# Patient Record
Sex: MALE | Race: WHITE | NOT HISPANIC OR LATINO | Employment: OTHER | URBAN - METROPOLITAN AREA
[De-identification: names, ages, dates, MRNs, and addresses within clinical notes are randomized per-mention and may not be internally consistent; named-entity substitution may affect disease eponyms.]

---

## 2017-01-04 ENCOUNTER — DOCUMENTATION ONLY (OUTPATIENT)
Dept: REHABILITATION | Facility: OTHER | Age: 62
End: 2017-01-04

## 2017-01-04 NOTE — PROGRESS NOTES
DC FROM [facility]    Pt was treated one time on 11/8/16.  He did not return for any further follow up. He did not respond to outreach calls and did not return for any further follow up.  DC from [facility] after one time only visit and goals of PT not met.

## 2017-02-15 ENCOUNTER — TELEPHONE (OUTPATIENT)
Dept: INTERNAL MEDICINE | Facility: CLINIC | Age: 62
End: 2017-02-15

## 2017-02-15 DIAGNOSIS — Z12.5 SCREENING PSA (PROSTATE SPECIFIC ANTIGEN): ICD-10-CM

## 2017-02-15 DIAGNOSIS — Z00.00 ROUTINE GENERAL MEDICAL EXAMINATION AT A HEALTH CARE FACILITY: Primary | ICD-10-CM

## 2017-02-15 NOTE — TELEPHONE ENCOUNTER
----- Message from Krystyna Finnegan sent at 2/15/2017  1:36 PM CST -----  Contact: Self/[phone]  Pt would like to do labs before his visit. Please advise pt.

## 2017-02-17 ENCOUNTER — HOSPITAL ENCOUNTER (OUTPATIENT)
Dept: RADIOLOGY | Facility: HOSPITAL | Age: 62
Discharge: HOME | End: 2017-02-17
Attending: INTERNAL MEDICINE
Payer: COMMERCIAL

## 2017-02-17 ENCOUNTER — OFFICE VISIT (OUTPATIENT)
Dept: INTERNAL MEDICINE | Facility: CLINIC | Age: 62
End: 2017-02-17
Payer: COMMERCIAL

## 2017-02-17 VITALS
TEMPERATURE: 98 F | DIASTOLIC BLOOD PRESSURE: 80 MMHG | HEIGHT: 77 IN | WEIGHT: 242.5 LBS | HEART RATE: 100 BPM | BODY MASS INDEX: 28.63 KG/M2 | SYSTOLIC BLOOD PRESSURE: 108 MMHG

## 2017-02-17 DIAGNOSIS — R53.83 FATIGUE, UNSPECIFIED TYPE: ICD-10-CM

## 2017-02-17 DIAGNOSIS — R53.83 FATIGUE, UNSPECIFIED TYPE: Primary | ICD-10-CM

## 2017-02-17 DIAGNOSIS — E78.5 HYPERLIPIDEMIA, UNSPECIFIED HYPERLIPIDEMIA TYPE: ICD-10-CM

## 2017-02-17 DIAGNOSIS — F09 COGNITIVE DISORDER: ICD-10-CM

## 2017-02-17 DIAGNOSIS — R06.02 EXERTIONAL SHORTNESS OF BREATH: ICD-10-CM

## 2017-02-17 PROCEDURE — 99999 PR PBB SHADOW E&M-EST. PATIENT-LVL III: CPT | Mod: PBBFAC,,, | Performed by: INTERNAL MEDICINE

## 2017-02-17 PROCEDURE — 99214 OFFICE O/P EST MOD 30 MIN: CPT | Mod: S$GLB,,, | Performed by: INTERNAL MEDICINE

## 2017-02-17 PROCEDURE — 71020 XR CHEST PA AND LATERAL: CPT | Mod: TC,PO

## 2017-02-17 PROCEDURE — 71020 XR CHEST PA AND LATERAL: CPT | Mod: 26,,, | Performed by: RADIOLOGY

## 2017-02-17 RX ORDER — HYDROCODONE BITARTRATE AND ACETAMINOPHEN 7.5; 325 MG/1; MG/1
TABLET ORAL
Refills: 0 | Status: ON HOLD | COMMUNITY
Start: 2017-01-20 | End: 2017-07-13 | Stop reason: HOSPADM

## 2017-02-17 NOTE — PROGRESS NOTES
This office note has been dictated.  HISTORY OF PRESENT ILLNESS:  This is a 61-year-old male with dyslipidemia,   bipolar disorder with chronic depression and tobacco use disorder who is in   today indicating for the last 6 months, he has had increasing fatigue and   decreased exertional capacity.  No chest pain.  No cough.  No shortness of   breath at rest.  He denies any changes in his medications or changes in   activities.  No changes of a significant degree to his bowel habits though he   does have off and on constipation.  There is no melena or hematochezia.    CURRENT MEDICATIONS:  All noted and reviewed in the electronic medical record   medication list.    REVIEW OF SYSTEMS:  CONSTITUTIONAL:  No fever.  No chills.  Fatigue as noted.  He sleeps reasonably   well.  HEENT:  No hoarseness.  No dysphagia.  CARDIOVASCULAR:  Denies chest pain, palpitations, syncope or edema.  GASTROINTESTINAL:  Mild constipation.  No abdominal pain, nausea or vomiting.    No melena.  No hematochezia.  GENITOURINARY:  No change in the color or character of urine.  NEUROLOGIC:  No new focal neurologic symptomatologies.  PSYCHIATRIC:  He reports that his depression is reasonably stable at this time.    PAST MEDICAL HISTORY, PAST SURGICAL HISTORY, FAMILY AND SOCIAL HISTORY:  All   noted and reviewed in the electronic medical record history sections.    PHYSICAL EXAMINATION:  GENERAL:  Alert male in no acute distress, appropriately groomed.  VITAL SIGNS:  All noted and reviewed.  Blood pressure taken by this examiner is   108/80.  EYES:  Sclerae are white and nonicteric.  HEENT:  Mucous membranes are not pale.  NECK:  Supple.  No masses.  No thyromegaly.  LUNGS:  Clear to auscultation.  CARDIOVASCULAR:  Regular rate and rhythm.  There is no significant murmur.    Carotids are full bilaterally without bruits.  There is no peripheral extremity   edema.  There is no abdominal bruit.  GASTROINTESTINAL:  The abdomen is soft and benign.  No  masses.  No tenderness.    No organomegaly.  MENTAL STATUS:  Alert and oriented.  Affect and mood all appear to be generally   appropriate.    IMPRESSION:  1.  Six months of fatigue and decreased exertional capacity.  2.  Dyslipidemia, on pharmacologic therapy.  3.  Bipolar disorder, seems to be stable at this time.  4.  Lumbar radiculopathy.    PLAN:  1.  Update lab data to include a CBC, metabolic profile, lipid profile, PSA, TSH   and urinalysis.  2.  Chest x-ray.  3.  If all is reasonable and normal, we will schedule stress echocardiogram.      PB/HN  dd: 02/17/2017 14:38:40 (CST)  td: 02/18/2017 04:57:22 (CST)  Doc ID   #[id number]  Job ID #[id number]    CC:

## 2017-02-20 ENCOUNTER — TELEPHONE (OUTPATIENT)
Dept: INTERNAL MEDICINE | Facility: CLINIC | Age: 62
End: 2017-02-20

## 2017-02-20 DIAGNOSIS — Z11.59 NEED FOR HEPATITIS C SCREENING TEST: Primary | ICD-10-CM

## 2017-02-20 DIAGNOSIS — F31.81 BIPOLAR 2 DISORDER: ICD-10-CM

## 2017-02-20 NOTE — TELEPHONE ENCOUNTER
----- Message from Krystyna Finnegan sent at 2/20/2017 10:59 AM CST -----  Contact: Self/[phone]  Pt states that when he gets his blood work like to check levels of Depakote. Pt would also like to get screened for Hepatatis C.

## 2017-02-22 ENCOUNTER — LAB VISIT (OUTPATIENT)
Dept: LAB | Facility: HOSPITAL | Age: 62
End: 2017-02-22
Attending: INTERNAL MEDICINE
Payer: COMMERCIAL

## 2017-02-22 DIAGNOSIS — Z00.00 ROUTINE GENERAL MEDICAL EXAMINATION AT A HEALTH CARE FACILITY: ICD-10-CM

## 2017-02-22 DIAGNOSIS — Z12.5 SCREENING PSA (PROSTATE SPECIFIC ANTIGEN): ICD-10-CM

## 2017-02-22 DIAGNOSIS — F31.81 BIPOLAR 2 DISORDER: ICD-10-CM

## 2017-02-22 DIAGNOSIS — Z11.59 NEED FOR HEPATITIS C SCREENING TEST: ICD-10-CM

## 2017-02-22 LAB
ALBUMIN SERPL BCP-MCNC: 3.8 G/DL
ALP SERPL-CCNC: 72 U/L
ALT SERPL W/O P-5'-P-CCNC: 17 U/L
ANION GAP SERPL CALC-SCNC: 9 MMOL/L
AST SERPL-CCNC: 17 U/L
BASOPHILS # BLD AUTO: 0.02 K/UL
BASOPHILS NFR BLD: 0.3 %
BILIRUB SERPL-MCNC: 0.5 MG/DL
BUN SERPL-MCNC: 7 MG/DL
CALCIUM SERPL-MCNC: 9.7 MG/DL
CHLORIDE SERPL-SCNC: 95 MMOL/L
CHOLEST/HDLC SERPL: 2.8 {RATIO}
CO2 SERPL-SCNC: 29 MMOL/L
COMPLEXED PSA SERPL-MCNC: 0.52 NG/ML
CREAT SERPL-MCNC: 1.1 MG/DL
DIFFERENTIAL METHOD: ABNORMAL
EOSINOPHIL # BLD AUTO: 0.2 K/UL
EOSINOPHIL NFR BLD: 3.3 %
ERYTHROCYTE [DISTWIDTH] IN BLOOD BY AUTOMATED COUNT: 12.3 %
EST. GFR  (AFRICAN AMERICAN): >60 ML/MIN/1.73 M^2
EST. GFR  (NON AFRICAN AMERICAN): >60 ML/MIN/1.73 M^2
GLUCOSE SERPL-MCNC: 102 MG/DL
HCT VFR BLD AUTO: 46.2 %
HCV AB SERPL QL IA: NEGATIVE
HDL/CHOLESTEROL RATIO: 36.2 %
HDLC SERPL-MCNC: 127 MG/DL
HDLC SERPL-MCNC: 46 MG/DL
HGB BLD-MCNC: 15.7 G/DL
LDLC SERPL CALC-MCNC: 54.4 MG/DL
LYMPHOCYTES # BLD AUTO: 2 K/UL
LYMPHOCYTES NFR BLD: 29.9 %
MCH RBC QN AUTO: 32.8 PG
MCHC RBC AUTO-ENTMCNC: 34 %
MCV RBC AUTO: 97 FL
MONOCYTES # BLD AUTO: 0.5 K/UL
MONOCYTES NFR BLD: 8 %
NEUTROPHILS # BLD AUTO: 3.9 K/UL
NEUTROPHILS NFR BLD: 57.8 %
NONHDLC SERPL-MCNC: 81 MG/DL
PLATELET # BLD AUTO: 174 K/UL
PMV BLD AUTO: 10.1 FL
POTASSIUM SERPL-SCNC: 4.5 MMOL/L
PROT SERPL-MCNC: 7.5 G/DL
RBC # BLD AUTO: 4.78 M/UL
SODIUM SERPL-SCNC: 133 MMOL/L
T4 FREE SERPL-MCNC: 0.95 NG/DL
TRIGL SERPL-MCNC: 133 MG/DL
TSH SERPL DL<=0.005 MIU/L-ACNC: 4 UIU/ML
VALPROATE SERPL-MCNC: 61 UG/ML
WBC # BLD AUTO: 6.73 K/UL

## 2017-02-22 PROCEDURE — 84443 ASSAY THYROID STIM HORMONE: CPT

## 2017-02-22 PROCEDURE — 80053 COMPREHEN METABOLIC PANEL: CPT

## 2017-02-22 PROCEDURE — 80164 ASSAY DIPROPYLACETIC ACD TOT: CPT

## 2017-02-22 PROCEDURE — 84153 ASSAY OF PSA TOTAL: CPT

## 2017-02-22 PROCEDURE — 86803 HEPATITIS C AB TEST: CPT

## 2017-02-22 PROCEDURE — 36415 COLL VENOUS BLD VENIPUNCTURE: CPT

## 2017-02-22 PROCEDURE — 80061 LIPID PANEL: CPT

## 2017-02-22 PROCEDURE — 84439 ASSAY OF FREE THYROXINE: CPT

## 2017-02-22 PROCEDURE — 85025 COMPLETE CBC W/AUTO DIFF WBC: CPT

## 2017-03-13 ENCOUNTER — PATIENT MESSAGE (OUTPATIENT)
Dept: PSYCHIATRY | Facility: CLINIC | Age: 62
End: 2017-03-13

## 2017-03-27 ENCOUNTER — PATIENT MESSAGE (OUTPATIENT)
Dept: PSYCHIATRY | Facility: CLINIC | Age: 62
End: 2017-03-27

## 2017-03-27 ENCOUNTER — OFFICE VISIT (OUTPATIENT)
Dept: PSYCHIATRY | Facility: CLINIC | Age: 62
End: 2017-03-27
Payer: COMMERCIAL

## 2017-03-27 VITALS
DIASTOLIC BLOOD PRESSURE: 92 MMHG | SYSTOLIC BLOOD PRESSURE: 144 MMHG | HEIGHT: 77 IN | HEART RATE: 102 BPM | WEIGHT: 244 LBS | BODY MASS INDEX: 28.81 KG/M2

## 2017-03-27 DIAGNOSIS — F31.9 BIPOLAR 1 DISORDER: Primary | ICD-10-CM

## 2017-03-27 PROCEDURE — 1160F RVW MEDS BY RX/DR IN RCRD: CPT | Mod: S$GLB,,, | Performed by: PSYCHIATRY & NEUROLOGY

## 2017-03-27 PROCEDURE — 99214 OFFICE O/P EST MOD 30 MIN: CPT | Mod: S$GLB,,, | Performed by: PSYCHIATRY & NEUROLOGY

## 2017-03-27 PROCEDURE — 99999 PR PBB SHADOW E&M-EST. PATIENT-LVL II: CPT | Mod: PBBFAC,,, | Performed by: PSYCHIATRY & NEUROLOGY

## 2017-03-27 RX ORDER — HYDROXYZINE PAMOATE 50 MG/1
50 CAPSULE ORAL NIGHTLY
Qty: 30 CAPSULE | Refills: 3 | Status: SHIPPED | OUTPATIENT
Start: 2017-03-27 | End: 2017-06-14 | Stop reason: SDUPTHER

## 2017-03-27 RX ORDER — DIVALPROEX SODIUM 500 MG/1
TABLET, FILM COATED, EXTENDED RELEASE ORAL
Qty: 90 TABLET | Refills: 3 | Status: SHIPPED | OUTPATIENT
Start: 2017-03-27 | End: 2017-06-14 | Stop reason: SDUPTHER

## 2017-03-27 RX ORDER — DOXEPIN HYDROCHLORIDE 100 MG/1
CAPSULE ORAL
Qty: 30 CAPSULE | Refills: 3 | Status: SHIPPED | OUTPATIENT
Start: 2017-03-27 | End: 2017-06-14 | Stop reason: SDUPTHER

## 2017-03-27 RX ORDER — LURASIDONE HYDROCHLORIDE 80 MG/1
80 TABLET, FILM COATED ORAL DAILY
Qty: 30 TABLET | Refills: 3 | Status: SHIPPED | OUTPATIENT
Start: 2017-03-27 | End: 2017-06-14 | Stop reason: SDUPTHER

## 2017-03-27 NOTE — PROGRESS NOTES
ESTABLISHED OUTPATIENT VISIT   E/M LEVEL 4: [id number]    ENCOUNTER DATE: 3/27/2017  SITE: [facility]    HISTORY    CHIEF COMPLAINT   Mirza Morales is a 61 y.o. male who presents for follow up of bipolar d/o.    HPI     Leg pain causes some frustration.    Denies recent cognitive problems.    No seizure.    On exam: some intention tremor of hands    Recent alcohol: rare  Recent drug: no    Sleeping well.    ROS   Right Leg pain continues    PAST MEDICAL, FAMILY AND SOCIAL HISTORY: The patient's past medical, family and social history have been reviewed and updated as appropriate within the electronic medical record - see encounter notes.    PSYCHOTROPIC MEDICATIONS   Doxepin 100 mg at bedtime, Latuda 80 mg with dinner, Depakote 500 mg qam and 1000 mg at bedtime[prescribed for seizures], Hydroxyzine 50 mg at bedtime    Scheduled and PRN Medications     Current Outpatient Prescriptions:     atorvastatin (LIPITOR) 40 MG tablet, TAKE 1 TABLET (40 MG TOTAL) BY MOUTH ONCE DAILY., Disp: 30 tablet, Rfl: 6    divalproex (DEPAKOTE) 500 MG Tb24, Take 1 tablet (500mg) every morning and 2 tablets (1000mg) every night., Disp: 90 tablet, Rfl: 3    doxepin (SINEQUAN) 100 MG capsule, TAKE 1 CAPSULE (100 MG TOTAL) BY MOUTH EVERY EVENING., Disp: 30 capsule, Rfl: 3    hydrocodone-acetaminophen 7.5-325mg (NORCO) 7.5-325 mg per tablet, TAKE 1 TABLET EVERY 12 HOURS FOR 30 DAYS, Disp: , Rfl: 0    hydrOXYzine pamoate (VISTARIL) 50 MG Cap, Take 1 capsule (50 mg total) by mouth every evening., Disp: 30 capsule, Rfl: 3    lurasidone (LATUDA) 80 mg Tab tablet, Take 80 mg by mouth once daily. Take one tablet daily with dinner., Disp: 30 tablet, Rfl: 3    LABORATORY DATA  Lab Visit on 02/22/2017   Component Date Value Ref Range Status    WBC 02/22/2017 6.73  3.90 - 12.70 K/uL Final    RBC 02/22/2017 4.78  4.60 - 6.20 M/uL Final    Hemoglobin 02/22/2017 15.7  14.0 - 18.0 g/dL Final    Hematocrit 02/22/2017  46.2  40.0 - 54.0 % Final    MCV 02/22/2017 97  82 - 98 fL Final    MCH 02/22/2017 32.8* 27.0 - 31.0 pg Final    MCHC 02/22/2017 34.0  32.0 - 36.0 % Final    RDW 02/22/2017 12.3  11.5 - 14.5 % Final    Platelets 02/22/2017 174  150 - 350 K/uL Final    MPV 02/22/2017 10.1  9.2 - 12.9 fL Final    Gran # 02/22/2017 3.9  1.8 - 7.7 K/uL Final    Lymph # 02/22/2017 2.0  1.0 - 4.8 K/uL Final    Mono # 02/22/2017 0.5  0.3 - 1.0 K/uL Final    Eos # 02/22/2017 0.2  0.0 - 0.5 K/uL Final    Baso # 02/22/2017 0.02  0.00 - 0.20 K/uL Final    Gran% 02/22/2017 57.8  38.0 - 73.0 % Final    Lymph% 02/22/2017 29.9  18.0 - 48.0 % Final    Mono% 02/22/2017 8.0  4.0 - 15.0 % Final    Eosinophil% 02/22/2017 3.3  0.0 - 8.0 % Final    Basophil% 02/22/2017 0.3  0.0 - 1.9 % Final    Differential Method 02/22/2017 Automated   Final    Sodium 02/22/2017 133* 136 - 145 mmol/L Final    Potassium 02/22/2017 4.5  3.5 - 5.1 mmol/L Final    Chloride 02/22/2017 95  95 - 110 mmol/L Final    CO2 02/22/2017 29  23 - 29 mmol/L Final    Glucose 02/22/2017 102  70 - 110 mg/dL Final    BUN, Bld 02/22/2017 7  6 - 20 mg/dL Final    Creatinine 02/22/2017 1.1  0.5 - 1.4 mg/dL Final    Calcium 02/22/2017 9.7  8.7 - 10.5 mg/dL Final    Total Protein 02/22/2017 7.5  6.0 - 8.4 g/dL Final    Albumin 02/22/2017 3.8  3.5 - 5.2 g/dL Final    Total Bilirubin 02/22/2017 0.5  0.1 - 1.0 mg/dL Final    Comment: For infants and newborns, interpretation of results should be based  on gestational age, weight and in agreement with clinical  observations.  Premature Infant recommended reference ranges:  Up to 24 hours.............<8.0 mg/dL  Up to 48 hours............<12.0 mg/dL  3-5 days..................<15.0 mg/dL  6-29 days.................<15.0 mg/dL      Alkaline Phosphatase 02/22/2017 72  55 - 135 U/L Final    AST 02/22/2017 17  10 - 40 U/L Final    ALT 02/22/2017 17  10 - 44 U/L Final    Anion Gap 02/22/2017 9  8 - 16 mmol/L Final    eGFR  if  02/22/2017 >60.0  >60 mL/min/1.73 m^2 Final    eGFR if non African American 02/22/2017 >60.0  >60 mL/min/1.73 m^2 Final    Comment: Calculation used to obtain the estimated glomerular filtration  rate (eGFR) is the CKD-EPI equation. Since race is unknown   in our information system, the eGFR values for   -American and Non--American patients are given   for each creatinine result.      Cholesterol 02/22/2017 127  120 - 199 mg/dL Final    Comment: The National Cholesterol Education Program (NCEP) has set the  following guidelines (reference ranges) for Cholesterol:  Optimal.....................<200 mg/dL  Borderline High.............200-239 mg/dL  High........................> or = 240 mg/dL      Triglycerides 02/22/2017 133  30 - 150 mg/dL Final    Comment: The National Cholesterol Education Program (NCEP) has set the  following guidelines (reference values) for triglycerides:  Normal......................<150 mg/dL  Borderline High.............150-199 mg/dL  High........................200-499 mg/dL      HDL 02/22/2017 46  40 - 75 mg/dL Final    Comment: The National Cholesterol Education Program (NCEP) has set the  following guidelines (reference values) for HDL Cholesterol:  Low...............<40 mg/dL  Optimal...........>60 mg/dL      LDL Cholesterol 02/22/2017 54.4* 63.0 - 159.0 mg/dL Final    Comment: The National Cholesterol Education Program (NCEP) has set the  following guidelines (reference values) for LDL Cholesterol:  Optimal.......................<130 mg/dL  Borderline High...............130-159 mg/dL  High..........................160-189 mg/dL  Very High.....................>190 mg/dL      HDL/Chol Ratio 02/22/2017 36.2  20.0 - 50.0 % Final    Total Cholesterol/HDL Ratio 02/22/2017 2.8  2.0 - 5.0 Final    Non-HDL Cholesterol 02/22/2017 81  mg/dL Final    Comment: Risk category and Non-HDL cholesterol goals:  Coronary heart disease (CHD)or equivalent (10-year  risk of CHD >20%):  Non-HDL cholesterol goal     <130 mg/dL  Two or more CHD risk factors and 10-year risk of CHD <= 20%:  Non-HDL cholesterol goal     <160 mg/dL  0 to 1 CHD risk factor:  Non-HDL cholesterol goal     <190 mg/dL      TSH 02/22/2017 4.004* 0.400 - 4.000 uIU/mL Final    PSA, SCREEN 02/22/2017 0.52  0.00 - 4.00 ng/mL Final    Comment: PSA Expected levels:  Hormonal Therapy: <0.05 ng/ml  Prostatectomy: <0.01 ng/ml  Radiation Therapy: <1.00 ng/ml      Hepatitis C Ab 02/22/2017 Negative   Final    Valproic Acid Lvl 02/22/2017 61.0  50.0 - 100.0 ug/mL Final    Comment: Valproic Acid (ug/ml)  Toxic:   >100.0 ug/ml      Free T4 02/22/2017 0.95  0.71 - 1.51 ng/dL Final   Lab Visit on 02/22/2017   Component Date Value Ref Range Status    Specimen UA 02/22/2017 Urine, Unspecified   Final    Color, UA 02/22/2017 Yellow  Yellow, Straw, Emily Final    Appearance, UA 02/22/2017 Hazy* Clear Final    pH, UA 02/22/2017 7.0  5.0 - 8.0 Final    Specific Gravity, UA 02/22/2017 1.010  1.005 - 1.030 Final    Protein, UA 02/22/2017 Negative  Negative Final    Comment: Recommend a 24 hour urine protein or a urine   protein/creatinine ratio if globulin induced proteinuria is  clinically suspected.      Glucose, UA 02/22/2017 Negative  Negative Final    Ketones, UA 02/22/2017 Negative  Negative Final    Bilirubin (UA) 02/22/2017 Negative  Negative Final    Occult Blood UA 02/22/2017 Negative  Negative Final    Nitrite, UA 02/22/2017 Negative  Negative Final    Urobilinogen, UA 02/22/2017 Negative  <2.0 EU/dL Final    Leukocytes, UA 02/22/2017 Negative  Negative Final       EXAMINATION    /100,     CONSTITUTIONAL  General Appearance: well nourished    MUSCULOSKELETAL  Muscle Strength and Tone: normal strength and tone  Abnormal Involuntary Movements: no abnormal movement noted  Gait and Station: normal gait    PSYCHIATRIC   Level of Consciousness: alert  Orientation: grossly intact  Grooming: well  groomed  Psychomotor Behavior: no restlessness/agitation  Speech: normal in rate, rhythm and volume  Language: normal vocabulary  Mood: stable  Affect: full range and appropriate  Thought Process: logical and goal directed  Associations: intact associations  Thought Content: no SI/HI  Memory: grossly intact  Attention: intact to content of interview  Fund of Knowledge: appears adequate  Insight: good  Judgement: good    MEDICAL DECISION MAKING    DIAGNOSES  Bipolar d/o    PROBLEM LIST AND MANAGEMENT PLANS    - continue Doxepin, Depakote ER, Latuda, Hydroxyzine as above  - monitor tremor  - rtc 3 months    Time with patient: 20 min      Ambrosio Loco

## 2017-03-27 NOTE — MR AVS SNAPSHOT
[facility] - Psychiatry  [address]  [place] [address]  Phone: [phone]  Fax: [phone]                  Mirza Morales   3/27/2017 4:30 PM   Office Visit    Description:  Male : 1955   Provider:  Ambrosio Loco MD   Department:  [facility] - Psychiatry           Diagnoses this Visit        Comments    Bipolar 1 disorder    -  Primary            To Do List           Goals (5 Years of Data)     None       These Medications        Disp Refills Start End    lurasidone (LATUDA) 80 mg Tab tablet 30 tablet 3 3/27/2017 3/27/2018    Take 80 mg by mouth once daily. Take one tablet daily with dinner. - Oral    Pharmacy: [facility]/pharmacy [address] [place] [address] [address] Ph #: [phone]       hydrOXYzine pamoate (VISTARIL) 50 MG Cap 30 capsule 3 3/27/2017     Take 1 capsule (50 mg total) by mouth every evening. - Oral    Pharmacy: [facility]/pharmacy #[address] [place] [address] [place] [address]. Ph #: [phone]       doxepin (SINEQUAN) 100 MG capsule 30 capsule 3 3/27/2017     TAKE 1 CAPSULE (100 MG TOTAL) BY MOUTH EVERY EVENING.    Pharmacy: [facility]/pharmacy #[address] [place] [address] [place] [address] [address]. Ph #: [phone]       divalproex (DEPAKOTE) 500 MG Tb24 90 tablet 3 3/27/2017     Take 1 tablet (500mg) every morning and 2 tablets (1000mg) every night.    Pharmacy: [facility]/pharmacy #[address] [place] [address] [address] Ph #: [phone]         [facility] On [facility]     [facility] On Call Nurse Care Line -  Assistance  Registered nurses in the [facility] provide clinical advisement, health education, appointment booking, and other advisory services.  Call for this free service at [phone].             Medications           Message regarding Medications     Verify the changes and/or additions to your medication regime listed below are the same as discussed with your clinician today.  If any of these changes or additions are incorrect, please notify your  "healthcare provider.             Verify that the below list of medications is an accurate representation of the medications you are currently taking.  If none reported, the list may be blank. If incorrect, please contact your healthcare provider. Carry this list with you in case of emergency.           Current Medications     atorvastatin (LIPITOR) 40 MG tablet TAKE 1 TABLET (40 MG TOTAL) BY MOUTH ONCE DAILY.    divalproex (DEPAKOTE) 500 MG Tb24 Take 1 tablet (500mg) every morning and 2 tablets (1000mg) every night.    doxepin (SINEQUAN) 100 MG capsule TAKE 1 CAPSULE (100 MG TOTAL) BY MOUTH EVERY EVENING.    hydrocodone-acetaminophen 7.5-325mg (NORCO) 7.5-325 mg per tablet TAKE 1 TABLET EVERY 12 HOURS FOR 30 DAYS    hydrOXYzine pamoate (VISTARIL) 50 MG Cap Take 1 capsule (50 mg total) by mouth every evening.    lurasidone (LATUDA) 80 mg Tab tablet Take 80 mg by mouth once daily. Take one tablet daily with dinner.           Clinical Reference Information           Your Vitals Were     BP Pulse Height Weight BMI    152/100 107 6' 5" (1.956 m) 110.7 kg (244 lb) 28.93 kg/m2      Blood Pressure          Most Recent Value    BP  (!)  152/100      Allergies as of 3/27/2017     Gabapentin    Nardil [Phenelzine]    Penicillins    Pneumococcal 23-jett Ps Vaccine    Lamictal [Lamotrigine]      Immunizations Administered on Date of Encounter - 3/27/2017     None      Smoking Cessation     If you would like to quit smoking:   You may be eligible for free services if you are a [place] resident and started smoking cigarettes before September 1, 1988.  Call the Smoking Cessation Trust ([facility]) toll free at (150) 012-7454 or (510) 665-7751.   Call 4-947-QUIT-NOW if you do not meet the above criteria.            Language Assistance Services     ATTENTION: Language assistance services are available, free of charge. Please call [phone].      ATENCIÓN: Si habla yancyañol, tiene a chen disposición servicios gratuitos de asistencia " lingüística. Yoselin al [phone].     MARTI Ý: N?u b?n nói Ti?ng Vi?t, có các d?ch v? h? tr? ngôn ng? mi?n phí dành cho b?n. G?i s? [phone].         [facility] complies with applicable Federal civil rights laws and does not discriminate on the basis of race, color, national origin, age, disability, or sex.

## 2017-04-04 RX ORDER — ATORVASTATIN CALCIUM 40 MG/1
TABLET, FILM COATED ORAL
Qty: 30 TABLET | Refills: 6 | Status: SHIPPED | OUTPATIENT
Start: 2017-04-04 | End: 2018-01-26 | Stop reason: SDUPTHER

## 2017-04-20 ENCOUNTER — PATIENT MESSAGE (OUTPATIENT)
Dept: PSYCHIATRY | Facility: CLINIC | Age: 62
End: 2017-04-20

## 2017-06-14 ENCOUNTER — OFFICE VISIT (OUTPATIENT)
Dept: PSYCHIATRY | Facility: CLINIC | Age: 62
End: 2017-06-14
Payer: COMMERCIAL

## 2017-06-14 VITALS
DIASTOLIC BLOOD PRESSURE: 89 MMHG | BODY MASS INDEX: 29.16 KG/M2 | HEART RATE: 111 BPM | HEIGHT: 77 IN | WEIGHT: 247 LBS | SYSTOLIC BLOOD PRESSURE: 139 MMHG

## 2017-06-14 DIAGNOSIS — F31.9 BIPOLAR 1 DISORDER: Primary | ICD-10-CM

## 2017-06-14 PROCEDURE — 99213 OFFICE O/P EST LOW 20 MIN: CPT | Mod: S$GLB,,, | Performed by: PSYCHIATRY & NEUROLOGY

## 2017-06-14 PROCEDURE — 99999 PR PBB SHADOW E&M-EST. PATIENT-LVL II: CPT | Mod: PBBFAC,,, | Performed by: PSYCHIATRY & NEUROLOGY

## 2017-06-14 RX ORDER — HYDROXYZINE PAMOATE 50 MG/1
50 CAPSULE ORAL NIGHTLY
Qty: 30 CAPSULE | Refills: 3 | Status: ON HOLD | OUTPATIENT
Start: 2017-06-14 | End: 2017-07-13 | Stop reason: HOSPADM

## 2017-06-14 RX ORDER — LURASIDONE HYDROCHLORIDE 80 MG/1
80 TABLET, FILM COATED ORAL DAILY
Qty: 30 TABLET | Refills: 3 | Status: SHIPPED | OUTPATIENT
Start: 2017-06-14 | End: 2018-05-24

## 2017-06-14 RX ORDER — DIVALPROEX SODIUM 500 MG/1
TABLET, FILM COATED, EXTENDED RELEASE ORAL
Qty: 90 TABLET | Refills: 3 | Status: SHIPPED | OUTPATIENT
Start: 2017-06-14 | End: 2017-09-12 | Stop reason: SDUPTHER

## 2017-06-14 RX ORDER — DOXEPIN HYDROCHLORIDE 100 MG/1
CAPSULE ORAL
Qty: 30 CAPSULE | Refills: 3 | Status: SHIPPED | OUTPATIENT
Start: 2017-06-14 | End: 2017-09-12 | Stop reason: SDUPTHER

## 2017-06-14 NOTE — PROGRESS NOTES
ESTABLISHED OUTPATIENT VISIT   E/M LEVEL 4: [id number]    ENCOUNTER DATE: 6/14/2017  SITE: [facility], [facility]    HISTORY    CHIEF COMPLAINT   Mirza Morales is a 62 y.o. male who presents for follow up of bipolar d/o.    HPI     Leg pain causes some frustration/depression, has difficulty walking.    Denies recent cognitive problems.    No seizure since previous.    On exam: some intention tremor of hands again noted[appears unchanged]    Enjoys reading, watches TV, spends time on the computer.    Recent alcohol: rare  Recent drug: no    Sleeping well.    ROS   Right Leg pain continues    PAST MEDICAL, FAMILY AND SOCIAL HISTORY: The patient's past medical, family and social history have been reviewed and updated as appropriate within the electronic medical record - see encounter notes.    PSYCHOTROPIC MEDICATIONS   Doxepin 100 mg at bedtime, Latuda 80 mg with dinner, Depakote 500 mg qam and 1000 mg at bedtime[prescribed for seizures], Hydroxyzine 50 mg at bedtime    Scheduled and PRN Medications     Current Outpatient Prescriptions:     atorvastatin (LIPITOR) 40 MG tablet, TAKE 1 TABLET (40 MG TOTAL) BY MOUTH ONCE DAILY., Disp: 30 tablet, Rfl: 6    divalproex (DEPAKOTE) 500 MG Tb24, Take 1 tablet (500mg) every morning and 2 tablets (1000mg) every night., Disp: 90 tablet, Rfl: 3    doxepin (SINEQUAN) 100 MG capsule, TAKE 1 CAPSULE (100 MG TOTAL) BY MOUTH EVERY EVENING., Disp: 30 capsule, Rfl: 3    hydrocodone-acetaminophen 7.5-325mg (NORCO) 7.5-325 mg per tablet, TAKE 1 TABLET EVERY 12 HOURS FOR 30 DAYS, Disp: , Rfl: 0    hydrOXYzine pamoate (VISTARIL) 50 MG Cap, Take 1 capsule (50 mg total) by mouth every evening., Disp: 30 capsule, Rfl: 3    lurasidone (LATUDA) 80 mg Tab tablet, Take 80 mg by mouth once daily. Take one tablet daily with dinner., Disp: 30 tablet, Rfl: 3    LABORATORY DATA  No visits with results within 3 Month(s) from this visit.   Latest known visit with results is:   Lab  Visit on 02/22/2017   Component Date Value Ref Range Status    WBC 02/22/2017 6.73  3.90 - 12.70 K/uL Final    RBC 02/22/2017 4.78  4.60 - 6.20 M/uL Final    Hemoglobin 02/22/2017 15.7  14.0 - 18.0 g/dL Final    Hematocrit 02/22/2017 46.2  40.0 - 54.0 % Final    MCV 02/22/2017 97  82 - 98 fL Final    MCH 02/22/2017 32.8* 27.0 - 31.0 pg Final    MCHC 02/22/2017 34.0  32.0 - 36.0 % Final    RDW 02/22/2017 12.3  11.5 - 14.5 % Final    Platelets 02/22/2017 174  150 - 350 K/uL Final    MPV 02/22/2017 10.1  9.2 - 12.9 fL Final    Gran # 02/22/2017 3.9  1.8 - 7.7 K/uL Final    Lymph # 02/22/2017 2.0  1.0 - 4.8 K/uL Final    Mono # 02/22/2017 0.5  0.3 - 1.0 K/uL Final    Eos # 02/22/2017 0.2  0.0 - 0.5 K/uL Final    Baso # 02/22/2017 0.02  0.00 - 0.20 K/uL Final    Gran% 02/22/2017 57.8  38.0 - 73.0 % Final    Lymph% 02/22/2017 29.9  18.0 - 48.0 % Final    Mono% 02/22/2017 8.0  4.0 - 15.0 % Final    Eosinophil% 02/22/2017 3.3  0.0 - 8.0 % Final    Basophil% 02/22/2017 0.3  0.0 - 1.9 % Final    Differential Method 02/22/2017 Automated   Final    Sodium 02/22/2017 133* 136 - 145 mmol/L Final    Potassium 02/22/2017 4.5  3.5 - 5.1 mmol/L Final    Chloride 02/22/2017 95  95 - 110 mmol/L Final    CO2 02/22/2017 29  23 - 29 mmol/L Final    Glucose 02/22/2017 102  70 - 110 mg/dL Final    BUN, Bld 02/22/2017 7  6 - 20 mg/dL Final    Creatinine 02/22/2017 1.1  0.5 - 1.4 mg/dL Final    Calcium 02/22/2017 9.7  8.7 - 10.5 mg/dL Final    Total Protein 02/22/2017 7.5  6.0 - 8.4 g/dL Final    Albumin 02/22/2017 3.8  3.5 - 5.2 g/dL Final    Total Bilirubin 02/22/2017 0.5  0.1 - 1.0 mg/dL Final    Comment: For infants and newborns, interpretation of results should be based  on gestational age, weight and in agreement with clinical  observations.  Premature Infant recommended reference ranges:  Up to 24 hours.............<8.0 mg/dL  Up to 48 hours............<12.0 mg/dL  3-5 days..................<15.0  mg/dL  6-29 days.................<15.0 mg/dL      Alkaline Phosphatase 02/22/2017 72  55 - 135 U/L Final    AST 02/22/2017 17  10 - 40 U/L Final    ALT 02/22/2017 17  10 - 44 U/L Final    Anion Gap 02/22/2017 9  8 - 16 mmol/L Final    eGFR if African American 02/22/2017 >60.0  >60 mL/min/1.73 m^2 Final    eGFR if non African American 02/22/2017 >60.0  >60 mL/min/1.73 m^2 Final    Comment: Calculation used to obtain the estimated glomerular filtration  rate (eGFR) is the CKD-EPI equation. Since race is unknown   in our information system, the eGFR values for   -American and Non--American patients are given   for each creatinine result.      Cholesterol 02/22/2017 127  120 - 199 mg/dL Final    Comment: The National Cholesterol Education Program (NCEP) has set the  following guidelines (reference ranges) for Cholesterol:  Optimal.....................<200 mg/dL  Borderline High.............200-239 mg/dL  High........................> or = 240 mg/dL      Triglycerides 02/22/2017 133  30 - 150 mg/dL Final    Comment: The National Cholesterol Education Program (NCEP) has set the  following guidelines (reference values) for triglycerides:  Normal......................<150 mg/dL  Borderline High.............150-199 mg/dL  High........................200-499 mg/dL      HDL 02/22/2017 46  40 - 75 mg/dL Final    Comment: The National Cholesterol Education Program (NCEP) has set the  following guidelines (reference values) for HDL Cholesterol:  Low...............<40 mg/dL  Optimal...........>60 mg/dL      LDL Cholesterol 02/22/2017 54.4* 63.0 - 159.0 mg/dL Final    Comment: The National Cholesterol Education Program (NCEP) has set the  following guidelines (reference values) for LDL Cholesterol:  Optimal.......................<130 mg/dL  Borderline High...............130-159 mg/dL  High..........................160-189 mg/dL  Very High.....................>190 mg/dL      HDL/Chol Ratio 02/22/2017 36.2   20.0 - 50.0 % Final    Total Cholesterol/HDL Ratio 02/22/2017 2.8  2.0 - 5.0 Final    Non-HDL Cholesterol 02/22/2017 81  mg/dL Final    Comment: Risk category and Non-HDL cholesterol goals:  Coronary heart disease (CHD)or equivalent (10-year risk of CHD >20%):  Non-HDL cholesterol goal     <130 mg/dL  Two or more CHD risk factors and 10-year risk of CHD <= 20%:  Non-HDL cholesterol goal     <160 mg/dL  0 to 1 CHD risk factor:  Non-HDL cholesterol goal     <190 mg/dL      TSH 02/22/2017 4.004* 0.400 - 4.000 uIU/mL Final    PSA, SCREEN 02/22/2017 0.52  0.00 - 4.00 ng/mL Final    Comment: PSA Expected levels:  Hormonal Therapy: <0.05 ng/ml  Prostatectomy: <0.01 ng/ml  Radiation Therapy: <1.00 ng/ml      Hepatitis C Ab 02/22/2017 Negative   Final    Valproic Acid Lvl 02/22/2017 61.0  50.0 - 100.0 ug/mL Final    Comment: Valproic Acid (ug/ml)  Toxic:   >100.0 ug/ml      Free T4 02/22/2017 0.95  0.71 - 1.51 ng/dL Final       EXAMINATION    Weight 247 lb's  /89, [walking is painful for the pt.]      CONSTITUTIONAL  General Appearance: well nourished    MUSCULOSKELETAL  Muscle Strength and Tone: normal strength and tone  Abnormal Involuntary Movements: no abnormal movement noted  Gait and Station: normal gait    PSYCHIATRIC   Level of Consciousness: alert  Orientation: grossly intact  Grooming: well groomed  Psychomotor Behavior: no restlessness/agitation  Speech: normal in rate, rhythm and volume  Language: normal vocabulary  Mood: stable  Affect: full range and appropriate  Thought Process: logical and goal directed  Associations: intact associations  Thought Content: no SI/HI  Memory: grossly intact  Attention: intact to content of interview  Fund of Knowledge: appears adequate  Insight: good  Judgement: good    MEDICAL DECISION MAKING    DIAGNOSES  Bipolar d/o    PROBLEM LIST AND MANAGEMENT PLANS    - continue Doxepin, Depakote ER, Latuda, Hydroxyzine as above  - obtain Doxepin level  - monitor  tremor  - rtc 3 months    Time with patient: 20 min      Ambrosio Loco

## 2017-07-09 ENCOUNTER — PATIENT MESSAGE (OUTPATIENT)
Dept: PSYCHIATRY | Facility: CLINIC | Age: 62
End: 2017-07-09

## 2017-07-10 ENCOUNTER — PATIENT MESSAGE (OUTPATIENT)
Dept: PSYCHIATRY | Facility: CLINIC | Age: 62
End: 2017-07-10

## 2017-07-11 ENCOUNTER — PATIENT MESSAGE (OUTPATIENT)
Dept: PSYCHIATRY | Facility: CLINIC | Age: 62
End: 2017-07-11

## 2017-07-11 ENCOUNTER — OFFICE VISIT (OUTPATIENT)
Dept: PSYCHIATRY | Facility: CLINIC | Age: 62
End: 2017-07-11
Payer: COMMERCIAL

## 2017-07-11 VITALS
BODY MASS INDEX: 28.22 KG/M2 | HEIGHT: 77 IN | HEART RATE: 133 BPM | SYSTOLIC BLOOD PRESSURE: 126 MMHG | DIASTOLIC BLOOD PRESSURE: 90 MMHG | WEIGHT: 239 LBS

## 2017-07-11 DIAGNOSIS — F31.9 BIPOLAR 1 DISORDER: Primary | ICD-10-CM

## 2017-07-11 PROCEDURE — 99213 OFFICE O/P EST LOW 20 MIN: CPT | Mod: S$GLB,,, | Performed by: PSYCHIATRY & NEUROLOGY

## 2017-07-11 PROCEDURE — 99999 PR PBB SHADOW E&M-EST. PATIENT-LVL II: CPT | Mod: PBBFAC,,, | Performed by: PSYCHIATRY & NEUROLOGY

## 2017-07-11 NOTE — PROGRESS NOTES
ESTABLISHED OUTPATIENT VISIT   E/M LEVEL 3: [id number]    ENCOUNTER DATE: 7/11/2017  SITE: [facility], [facility]    HISTORY    CHIEF COMPLAINT   Mirza Morales is a 62 y.o. male who presents for follow up of bipolar d/o.    HPI     Wife present during today's visit.    Pt. Reports stress due to family currently staying with pt. And wife. Wife confirms this.    Has again begun drinking alcohol, at least since around July 4th. Pt. States, that for the past few days he has decreased alcohol use. Has been smoking marijuana every few days.  Pt. Has been sleeping poorly.    ROS   Right Leg pain continues    PAST MEDICAL, FAMILY AND SOCIAL HISTORY: The patient's past medical, family and social history have been reviewed and updated as appropriate within the electronic medical record - see encounter notes    PSYCHOTROPIC MEDICATIONS   Doxepin 100 mg at bedtime, Latuda 80 mg with dinner, Depakote 500 mg qam and 1000 mg at bedtime[prescribed for seizures], Hydroxyzine 50 mg at bedtime    Scheduled and PRN Medications     Current Outpatient Prescriptions:     atorvastatin (LIPITOR) 40 MG tablet, TAKE 1 TABLET (40 MG TOTAL) BY MOUTH ONCE DAILY., Disp: 30 tablet, Rfl: 6    divalproex ER (DEPAKOTE) 500 MG Tb24, Take 1 tablet (500mg) every morning and 2 tablets (1000mg) every night., Disp: 90 tablet, Rfl: 3    doxepin (SINEQUAN) 100 MG capsule, TAKE 1 CAPSULE (100 MG TOTAL) BY MOUTH EVERY EVENING., Disp: 30 capsule, Rfl: 3    hydrocodone-acetaminophen 7.5-325mg (NORCO) 7.5-325 mg per tablet, TAKE 1 TABLET EVERY 12 HOURS FOR 30 DAYS, Disp: , Rfl: 0    hydrOXYzine pamoate (VISTARIL) 50 MG Cap, Take 1 capsule (50 mg total) by mouth every evening., Disp: 30 capsule, Rfl: 3    lurasidone (LATUDA) 80 mg Tab tablet, Take 80 mg by mouth once daily. Take one tablet daily with dinner., Disp: 30 tablet, Rfl: 3    LABORATORY DATA  No visits with results within 3 Month(s) from this visit.   Latest known visit with  results is:   Lab Visit on 02/22/2017   Component Date Value Ref Range Status    WBC 02/22/2017 6.73  3.90 - 12.70 K/uL Final    RBC 02/22/2017 4.78  4.60 - 6.20 M/uL Final    Hemoglobin 02/22/2017 15.7  14.0 - 18.0 g/dL Final    Hematocrit 02/22/2017 46.2  40.0 - 54.0 % Final    MCV 02/22/2017 97  82 - 98 fL Final    MCH 02/22/2017 32.8* 27.0 - 31.0 pg Final    MCHC 02/22/2017 34.0  32.0 - 36.0 % Final    RDW 02/22/2017 12.3  11.5 - 14.5 % Final    Platelets 02/22/2017 174  150 - 350 K/uL Final    MPV 02/22/2017 10.1  9.2 - 12.9 fL Final    Gran # 02/22/2017 3.9  1.8 - 7.7 K/uL Final    Lymph # 02/22/2017 2.0  1.0 - 4.8 K/uL Final    Mono # 02/22/2017 0.5  0.3 - 1.0 K/uL Final    Eos # 02/22/2017 0.2  0.0 - 0.5 K/uL Final    Baso # 02/22/2017 0.02  0.00 - 0.20 K/uL Final    Gran% 02/22/2017 57.8  38.0 - 73.0 % Final    Lymph% 02/22/2017 29.9  18.0 - 48.0 % Final    Mono% 02/22/2017 8.0  4.0 - 15.0 % Final    Eosinophil% 02/22/2017 3.3  0.0 - 8.0 % Final    Basophil% 02/22/2017 0.3  0.0 - 1.9 % Final    Differential Method 02/22/2017 Automated   Final    Sodium 02/22/2017 133* 136 - 145 mmol/L Final    Potassium 02/22/2017 4.5  3.5 - 5.1 mmol/L Final    Chloride 02/22/2017 95  95 - 110 mmol/L Final    CO2 02/22/2017 29  23 - 29 mmol/L Final    Glucose 02/22/2017 102  70 - 110 mg/dL Final    BUN, Bld 02/22/2017 7  6 - 20 mg/dL Final    Creatinine 02/22/2017 1.1  0.5 - 1.4 mg/dL Final    Calcium 02/22/2017 9.7  8.7 - 10.5 mg/dL Final    Total Protein 02/22/2017 7.5  6.0 - 8.4 g/dL Final    Albumin 02/22/2017 3.8  3.5 - 5.2 g/dL Final    Total Bilirubin 02/22/2017 0.5  0.1 - 1.0 mg/dL Final    Comment: For infants and newborns, interpretation of results should be based  on gestational age, weight and in agreement with clinical  observations.  Premature Infant recommended reference ranges:  Up to 24 hours.............<8.0 mg/dL  Up to 48 hours............<12.0 mg/dL  3-5  days..................<15.0 mg/dL  6-29 days.................<15.0 mg/dL      Alkaline Phosphatase 02/22/2017 72  55 - 135 U/L Final    AST 02/22/2017 17  10 - 40 U/L Final    ALT 02/22/2017 17  10 - 44 U/L Final    Anion Gap 02/22/2017 9  8 - 16 mmol/L Final    eGFR if African American 02/22/2017 >60.0  >60 mL/min/1.73 m^2 Final    eGFR if non African American 02/22/2017 >60.0  >60 mL/min/1.73 m^2 Final    Comment: Calculation used to obtain the estimated glomerular filtration  rate (eGFR) is the CKD-EPI equation. Since race is unknown   in our information system, the eGFR values for   -American and Non--American patients are given   for each creatinine result.      Cholesterol 02/22/2017 127  120 - 199 mg/dL Final    Comment: The National Cholesterol Education Program (NCEP) has set the  following guidelines (reference ranges) for Cholesterol:  Optimal.....................<200 mg/dL  Borderline High.............200-239 mg/dL  High........................> or = 240 mg/dL      Triglycerides 02/22/2017 133  30 - 150 mg/dL Final    Comment: The National Cholesterol Education Program (NCEP) has set the  following guidelines (reference values) for triglycerides:  Normal......................<150 mg/dL  Borderline High.............150-199 mg/dL  High........................200-499 mg/dL      HDL 02/22/2017 46  40 - 75 mg/dL Final    Comment: The National Cholesterol Education Program (NCEP) has set the  following guidelines (reference values) for HDL Cholesterol:  Low...............<40 mg/dL  Optimal...........>60 mg/dL      LDL Cholesterol 02/22/2017 54.4* 63.0 - 159.0 mg/dL Final    Comment: The National Cholesterol Education Program (NCEP) has set the  following guidelines (reference values) for LDL Cholesterol:  Optimal.......................<130 mg/dL  Borderline High...............130-159 mg/dL  High..........................160-189 mg/dL  Very High.....................>190 mg/dL       HDL/Chol Ratio 02/22/2017 36.2  20.0 - 50.0 % Final    Total Cholesterol/HDL Ratio 02/22/2017 2.8  2.0 - 5.0 Final    Non-HDL Cholesterol 02/22/2017 81  mg/dL Final    Comment: Risk category and Non-HDL cholesterol goals:  Coronary heart disease (CHD)or equivalent (10-year risk of CHD >20%):  Non-HDL cholesterol goal     <130 mg/dL  Two or more CHD risk factors and 10-year risk of CHD <= 20%:  Non-HDL cholesterol goal     <160 mg/dL  0 to 1 CHD risk factor:  Non-HDL cholesterol goal     <190 mg/dL      TSH 02/22/2017 4.004* 0.400 - 4.000 uIU/mL Final    PSA, SCREEN 02/22/2017 0.52  0.00 - 4.00 ng/mL Final    Comment: PSA Expected levels:  Hormonal Therapy: <0.05 ng/ml  Prostatectomy: <0.01 ng/ml  Radiation Therapy: <1.00 ng/ml      Hepatitis C Ab 02/22/2017 Negative   Final    Valproic Acid Lvl 02/22/2017 61.0  50.0 - 100.0 ug/mL Final    Comment: Valproic Acid (ug/ml)  Toxic:   >100.0 ug/ml      Free T4 02/22/2017 0.95  0.71 - 1.51 ng/dL Final       EXAMINATION    Weight 239 lb's  /90, heart rate 133       CONSTITUTIONAL  General Appearance: well nourished    MUSCULOSKELETAL  Muscle Strength and Tone: normal strength and tone  Abnormal Involuntary Movements: no abnormal movement noted  Gait and Station: normal gait    PSYCHIATRIC   Level of Consciousness: alert  Orientation: grossly intact  Grooming: well groomed  Psychomotor Behavior: no restlessness/agitation  Speech: normal in rate, rhythm and volume  Language: normal vocabulary  Mood: stable  Affect: full range and appropriate  Thought Process: logical and goal directed  Associations: intact associations  Thought Content: no SI/HI  Memory: grossly intact  Attention: intact to content of interview  Fund of Knowledge: appears adequate  Insight: good  Judgement: good    MEDICAL DECISION MAKING    DIAGNOSES  Bipolar d/o  Alcohol Use  Marijuana Use    PROBLEM LIST AND MANAGEMENT PLANS    - continue Doxepin, Depakote ER, Latuda, Hydroxyzine as above  -  obtain Doxepin level  - couples therapy  - monitor tremor  - rtc one month    Time with patient: 15 min      Ambrosio Loco

## 2017-07-12 ENCOUNTER — HOSPITAL ENCOUNTER (OUTPATIENT)
Facility: HOSPITAL | Age: 62
Discharge: HOME | DRG: 896 | End: 2017-07-13
Attending: EMERGENCY MEDICINE | Admitting: HOSPITALIST
Payer: COMMERCIAL

## 2017-07-12 DIAGNOSIS — F10.21 ALCOHOL DEPENDENCE IN SUSTAINED FULL REMISSION: Chronic | ICD-10-CM

## 2017-07-12 DIAGNOSIS — G40.909 SEIZURE DISORDER: ICD-10-CM

## 2017-07-12 DIAGNOSIS — J18.9 PNEUMONIA, COMMUNITY ACQUIRED: ICD-10-CM

## 2017-07-12 DIAGNOSIS — J18.9 COMMUNITY ACQUIRED PNEUMONIA: ICD-10-CM

## 2017-07-12 DIAGNOSIS — F41.9 ANXIETY: ICD-10-CM

## 2017-07-12 DIAGNOSIS — Z72.0 TOBACCO ABUSE: Chronic | ICD-10-CM

## 2017-07-12 DIAGNOSIS — F10.231: ICD-10-CM

## 2017-07-12 DIAGNOSIS — F09 COGNITIVE DISORDER: ICD-10-CM

## 2017-07-12 DIAGNOSIS — F31.60 BIPOLAR 1 DISORDER, MIXED: ICD-10-CM

## 2017-07-12 DIAGNOSIS — S09.90XA CLOSED HEAD INJURY: ICD-10-CM

## 2017-07-12 DIAGNOSIS — M79.661 PAIN IN RIGHT LOWER LEG: ICD-10-CM

## 2017-07-12 DIAGNOSIS — S09.90XA CLOSED HEAD INJURY, INITIAL ENCOUNTER: ICD-10-CM

## 2017-07-12 DIAGNOSIS — S82.451A DISPLACED COMMINUTED FRACTURE OF SHAFT OF RIGHT FIBULA, INITIAL ENCOUNTER FOR CLOSED FRACTURE: Primary | ICD-10-CM

## 2017-07-12 DIAGNOSIS — F10.939 ALCOHOL WITHDRAWAL SYNDROME WITH COMPLICATION, WITH UNSPECIFIED COMPLICATION: ICD-10-CM

## 2017-07-12 LAB
ALBUMIN SERPL BCP-MCNC: 3.4 G/DL
ALLENS TEST: ABNORMAL
ALP SERPL-CCNC: 117 U/L
ALT SERPL W/O P-5'-P-CCNC: 141 U/L
ANION GAP SERPL CALC-SCNC: 11 MMOL/L
AST SERPL-CCNC: 87 U/L
BASOPHILS # BLD AUTO: 0.03 K/UL
BASOPHILS NFR BLD: 0.2 %
BILIRUB SERPL-MCNC: 1.2 MG/DL
BUN SERPL-MCNC: 12 MG/DL
CALCIUM SERPL-MCNC: 8.8 MG/DL
CHLORIDE SERPL-SCNC: 99 MMOL/L
CO2 SERPL-SCNC: 28 MMOL/L
CREAT SERPL-MCNC: 1.1 MG/DL
DIFFERENTIAL METHOD: ABNORMAL
EOSINOPHIL # BLD AUTO: 0 K/UL
EOSINOPHIL NFR BLD: 0.1 %
ERYTHROCYTE [DISTWIDTH] IN BLOOD BY AUTOMATED COUNT: 12.9 %
EST. GFR  (AFRICAN AMERICAN): >60 ML/MIN/1.73 M^2
EST. GFR  (NON AFRICAN AMERICAN): >60 ML/MIN/1.73 M^2
ETHANOL SERPL-MCNC: <10 MG/DL
GLUCOSE SERPL-MCNC: 108 MG/DL
HCO3 UR-SCNC: 28.8 MMOL/L (ref 24–28)
HCT VFR BLD AUTO: 41.5 %
HGB BLD-MCNC: 14.7 G/DL
INR PPP: 1
LDH SERPL L TO P-CCNC: 0.8 MMOL/L (ref 0.5–2.2)
LYMPHOCYTES # BLD AUTO: 1.1 K/UL
LYMPHOCYTES NFR BLD: 9 %
MAGNESIUM SERPL-MCNC: 2.3 MG/DL
MCH RBC QN AUTO: 34.3 PG
MCHC RBC AUTO-ENTMCNC: 35.4 %
MCV RBC AUTO: 97 FL
MONOCYTES # BLD AUTO: 0.9 K/UL
MONOCYTES NFR BLD: 7.2 %
NEUTROPHILS # BLD AUTO: 10.1 K/UL
NEUTROPHILS NFR BLD: 83.2 %
PCO2 BLDA: 45.9 MMHG (ref 35–45)
PH SMN: 7.41 [PH] (ref 7.35–7.45)
PHOSPHATE SERPL-MCNC: 2.8 MG/DL
PLATELET # BLD AUTO: 185 K/UL
PMV BLD AUTO: 9.7 FL
PO2 BLDA: 17 MMHG (ref 40–60)
POC BE: 4 MMOL/L
POC SATURATED O2: 25 % (ref 95–100)
POC TCO2: 30 MMOL/L (ref 24–29)
POTASSIUM SERPL-SCNC: 4.3 MMOL/L
PROT SERPL-MCNC: 6.9 G/DL
PROTHROMBIN TIME: 10.7 SEC
RBC # BLD AUTO: 4.28 M/UL
SAMPLE: ABNORMAL
SITE: ABNORMAL
SODIUM SERPL-SCNC: 138 MMOL/L
TROPONIN I SERPL DL<=0.01 NG/ML-MCNC: 0.01 NG/ML
TSH SERPL DL<=0.005 MIU/L-ACNC: 1.87 UIU/ML
VALPROATE SERPL-MCNC: 14.6 UG/ML
WBC # BLD AUTO: 12.11 K/UL

## 2017-07-12 PROCEDURE — 36415 COLL VENOUS BLD VENIPUNCTURE: CPT

## 2017-07-12 PROCEDURE — 25000003 PHARM REV CODE 250: Performed by: EMERGENCY MEDICINE

## 2017-07-12 PROCEDURE — 87040 BLOOD CULTURE FOR BACTERIA: CPT | Mod: 59

## 2017-07-12 PROCEDURE — 80053 COMPREHEN METABOLIC PANEL: CPT

## 2017-07-12 PROCEDURE — 83605 ASSAY OF LACTIC ACID: CPT

## 2017-07-12 PROCEDURE — 83735 ASSAY OF MAGNESIUM: CPT

## 2017-07-12 PROCEDURE — 96361 HYDRATE IV INFUSION ADD-ON: CPT

## 2017-07-12 PROCEDURE — 96365 THER/PROPH/DIAG IV INF INIT: CPT

## 2017-07-12 PROCEDURE — 63600175 PHARM REV CODE 636 W HCPCS: Performed by: EMERGENCY MEDICINE

## 2017-07-12 PROCEDURE — 84100 ASSAY OF PHOSPHORUS: CPT

## 2017-07-12 PROCEDURE — 93010 ELECTROCARDIOGRAM REPORT: CPT | Mod: ,,, | Performed by: INTERNAL MEDICINE

## 2017-07-12 PROCEDURE — 80164 ASSAY DIPROPYLACETIC ACD TOT: CPT

## 2017-07-12 PROCEDURE — 85610 PROTHROMBIN TIME: CPT

## 2017-07-12 PROCEDURE — 87040 BLOOD CULTURE FOR BACTERIA: CPT

## 2017-07-12 PROCEDURE — 63600175 PHARM REV CODE 636 W HCPCS: Performed by: HOSPITALIST

## 2017-07-12 PROCEDURE — 99285 EMERGENCY DEPT VISIT HI MDM: CPT | Mod: 25

## 2017-07-12 PROCEDURE — 93005 ELECTROCARDIOGRAM TRACING: CPT

## 2017-07-12 PROCEDURE — 96366 THER/PROPH/DIAG IV INF ADDON: CPT

## 2017-07-12 PROCEDURE — 99285 EMERGENCY DEPT VISIT HI MDM: CPT | Mod: ,,, | Performed by: EMERGENCY MEDICINE

## 2017-07-12 PROCEDURE — 25000003 PHARM REV CODE 250: Performed by: HOSPITALIST

## 2017-07-12 PROCEDURE — G0378 HOSPITAL OBSERVATION PER HR: HCPCS

## 2017-07-12 PROCEDURE — 84443 ASSAY THYROID STIM HORMONE: CPT

## 2017-07-12 PROCEDURE — 80335 ANTIDEPRESSANT TRICYCLIC 1/2: CPT

## 2017-07-12 PROCEDURE — 99223 1ST HOSP IP/OBS HIGH 75: CPT | Mod: ,,, | Performed by: HOSPITALIST

## 2017-07-12 PROCEDURE — 80320 DRUG SCREEN QUANTALCOHOLS: CPT

## 2017-07-12 PROCEDURE — 85025 COMPLETE CBC W/AUTO DIFF WBC: CPT

## 2017-07-12 PROCEDURE — 84484 ASSAY OF TROPONIN QUANT: CPT

## 2017-07-12 PROCEDURE — 11000001 HC ACUTE MED/SURG PRIVATE ROOM

## 2017-07-12 RX ORDER — ENOXAPARIN SODIUM 100 MG/ML
40 INJECTION SUBCUTANEOUS EVERY 24 HOURS
Status: DISCONTINUED | OUTPATIENT
Start: 2017-07-12 | End: 2017-07-13 | Stop reason: HOSPADM

## 2017-07-12 RX ORDER — KETOROLAC TROMETHAMINE 30 MG/ML
15 INJECTION, SOLUTION INTRAMUSCULAR; INTRAVENOUS EVERY 6 HOURS PRN
Status: DISCONTINUED | OUTPATIENT
Start: 2017-07-12 | End: 2017-07-13 | Stop reason: HOSPADM

## 2017-07-12 RX ORDER — DIVALPROEX SODIUM 500 MG/1
1000 TABLET, FILM COATED, EXTENDED RELEASE ORAL DAILY
Status: DISCONTINUED | OUTPATIENT
Start: 2017-07-13 | End: 2017-07-13 | Stop reason: HOSPADM

## 2017-07-12 RX ORDER — IBUPROFEN 200 MG
1 TABLET ORAL DAILY
Status: DISCONTINUED | OUTPATIENT
Start: 2017-07-13 | End: 2017-07-13 | Stop reason: HOSPADM

## 2017-07-12 RX ORDER — LURASIDONE HYDROCHLORIDE 40 MG/1
80 TABLET, FILM COATED ORAL DAILY
Status: DISCONTINUED | OUTPATIENT
Start: 2017-07-13 | End: 2017-07-13 | Stop reason: HOSPADM

## 2017-07-12 RX ORDER — LIDOCAINE 50 MG/G
1 PATCH TOPICAL
Status: DISCONTINUED | OUTPATIENT
Start: 2017-07-12 | End: 2017-07-13 | Stop reason: HOSPADM

## 2017-07-12 RX ORDER — SODIUM CHLORIDE 0.9 % (FLUSH) 0.9 %
3 SYRINGE (ML) INJECTION EVERY 8 HOURS
Status: DISCONTINUED | OUTPATIENT
Start: 2017-07-12 | End: 2017-07-13 | Stop reason: HOSPADM

## 2017-07-12 RX ORDER — PANTOPRAZOLE SODIUM 40 MG/1
40 TABLET, DELAYED RELEASE ORAL DAILY
Status: DISCONTINUED | OUTPATIENT
Start: 2017-07-12 | End: 2017-07-13 | Stop reason: HOSPADM

## 2017-07-12 RX ORDER — CLONAZEPAM 0.5 MG/1
2 TABLET ORAL
Status: COMPLETED | OUTPATIENT
Start: 2017-07-12 | End: 2017-07-12

## 2017-07-12 RX ORDER — DIAZEPAM 5 MG/1
10 TABLET ORAL EVERY 8 HOURS
Status: DISCONTINUED | OUTPATIENT
Start: 2017-07-12 | End: 2017-07-12

## 2017-07-12 RX ORDER — SODIUM CHLORIDE, SODIUM LACTATE, POTASSIUM CHLORIDE, CALCIUM CHLORIDE 600; 310; 30; 20 MG/100ML; MG/100ML; MG/100ML; MG/100ML
INJECTION, SOLUTION INTRAVENOUS CONTINUOUS
Status: DISCONTINUED | OUTPATIENT
Start: 2017-07-12 | End: 2017-07-13 | Stop reason: HOSPADM

## 2017-07-12 RX ORDER — AZITHROMYCIN 250 MG/1
500 TABLET, FILM COATED ORAL
Status: COMPLETED | OUTPATIENT
Start: 2017-07-12 | End: 2017-07-12

## 2017-07-12 RX ORDER — ACETAMINOPHEN 325 MG/1
650 TABLET ORAL EVERY 4 HOURS PRN
Status: DISCONTINUED | OUTPATIENT
Start: 2017-07-12 | End: 2017-07-13 | Stop reason: HOSPADM

## 2017-07-12 RX ORDER — DIAZEPAM 5 MG/1
10 TABLET ORAL
Status: COMPLETED | OUTPATIENT
Start: 2017-07-12 | End: 2017-07-12

## 2017-07-12 RX ORDER — SODIUM CHLORIDE, SODIUM LACTATE, POTASSIUM CHLORIDE, CALCIUM CHLORIDE 600; 310; 30; 20 MG/100ML; MG/100ML; MG/100ML; MG/100ML
1000 INJECTION, SOLUTION INTRAVENOUS
Status: COMPLETED | OUTPATIENT
Start: 2017-07-12 | End: 2017-07-12

## 2017-07-12 RX ORDER — DIAZEPAM 5 MG/1
5 TABLET ORAL EVERY 8 HOURS
Status: DISCONTINUED | OUTPATIENT
Start: 2017-07-12 | End: 2017-07-13 | Stop reason: HOSPADM

## 2017-07-12 RX ADMIN — PANTOPRAZOLE SODIUM 40 MG: 40 TABLET, DELAYED RELEASE ORAL at 11:07

## 2017-07-12 RX ADMIN — ENOXAPARIN SODIUM 40 MG: 100 INJECTION SUBCUTANEOUS at 11:07

## 2017-07-12 RX ADMIN — THIAMINE HYDROCHLORIDE 100 MG: 100 INJECTION, SOLUTION INTRAMUSCULAR; INTRAVENOUS at 11:07

## 2017-07-12 RX ADMIN — SODIUM CHLORIDE, SODIUM LACTATE, POTASSIUM CHLORIDE, AND CALCIUM CHLORIDE: .6; .31; .03; .02 INJECTION, SOLUTION INTRAVENOUS at 06:07

## 2017-07-12 RX ADMIN — DIAZEPAM 5 MG: 5 TABLET ORAL at 11:07

## 2017-07-12 RX ADMIN — SODIUM CHLORIDE, SODIUM LACTATE, POTASSIUM CHLORIDE, AND CALCIUM CHLORIDE 1000 ML: .6; .31; .03; .02 INJECTION, SOLUTION INTRAVENOUS at 08:07

## 2017-07-12 RX ADMIN — LIDOCAINE 1 PATCH: 50 PATCH TOPICAL at 11:07

## 2017-07-12 RX ADMIN — SODIUM CHLORIDE, SODIUM LACTATE, POTASSIUM CHLORIDE, AND CALCIUM CHLORIDE 1000 ML: 600; 310; 30; 20 INJECTION, SOLUTION INTRAVENOUS at 01:07

## 2017-07-12 RX ADMIN — DIAZEPAM 10 MG: 5 TABLET ORAL at 08:07

## 2017-07-12 RX ADMIN — CEFTRIAXONE 1 G: 1 INJECTION, SOLUTION INTRAVENOUS at 09:07

## 2017-07-12 RX ADMIN — CLONAZEPAM 2 MG: 0.5 TABLET ORAL at 10:07

## 2017-07-12 RX ADMIN — Medication 3 ML: at 11:07

## 2017-07-12 RX ADMIN — AZITHROMYCIN DIHYDRATE 500 MG: 250 TABLET, FILM COATED ORAL at 09:07

## 2017-07-12 NOTE — ASSESSMENT & PLAN NOTE
Fall w head trauma, fibula fracture  Hx of seizure disorder  DT preventon  Valium taper  thiamie   MVI

## 2017-07-12 NOTE — ED NOTES
Pt aware of need for urine specimen, states he can not go at this time. Pt provided with water. Abx held while waiting for specimen.

## 2017-07-12 NOTE — ED TRIAGE NOTES
Patient came in with a c/o weakness and generalized body aches. Patient states that he's unsure if he fell or not. Patient also states that he may have had a drink or two.

## 2017-07-12 NOTE — ED NOTES
Patient identifiers verified and correct for Mirza Morales.    LOC: The patient is awake, alert and aware of environment with an appropriate affect, the patient is oriented x 3 and speaking appropriately.  APPEARANCE: Patient resting comfortably and in no acute distress, patient is clean and well groomed, patient's clothing is properly fastened.  SKIN: The skin is warm and dry, color consistent with ethnicity, patient has normal skin turgor and moist mucus membranes, skin intact, no breakdown noted. Hematoma noted to the right forehead area, redness also noted.   MUSCULOSKELETAL: Patient moving all extremities spontaneously, no obvious swelling or deformities noted.  RESPIRATORY: Airway is open and patent, respirations are spontaneous, patient has a normal effort and rate, no accessory muscle use noted, clear bilateral breath sounds noted through out the chest.  CARDIAC: Patient is tachycardiac, no periphreal edema noted, capillary refill < 3 seconds.  ABDOMEN: Soft and non tender to palpation, no distention noted, normoactive bowel sounds present in all four quadrants.

## 2017-07-12 NOTE — HPI
62-year-old man with history of bipolar disorder as well as recurrent alcohol and marijuana abuse presents by EMS for evaluation of suspected seizure activity and falls at home noted by the wife.  At the time of my exam, the patient describes sensation of dehydration as well as right lower leg pain.  He reports that his last alcohol intake was approximately 2 days ago, and reports medication noncompliance, otherwise.  He also describes intermittent nausea without vomiting, but denies any suicidal or homicidal ideations as well as any hallucinations.  He denies any associated recent fevers, chills, hematemesis, diarrhea, or chest pain

## 2017-07-12 NOTE — ASSESSMENT & PLAN NOTE
Consult ortho  High risk for opioids: on benzodiazepines, Hx of chronic back pain,  ORT score 15: take caution with opiods

## 2017-07-12 NOTE — ED PROVIDER NOTES
Encounter Date: 7/12/2017    SCRIBE #1 NOTE: I, Hailee Kline, am scribing for, and in the presence of,  Dr. Arrington. I have scribed the following portions of the note - Other sections scribed: CXR, XR-General readings, Head CT.       History     Chief Complaint   Patient presents with    Fatigue     x 4hrs      62-year-old man with history of bipolar disorder as well as recurrent alcohol and marijuana abuse presents by EMS for evaluation of suspected seizure activity and falls at home noted by the wife.  At the time of my exam, the patient describes sensation of dehydration as well as right lower leg pain.  He reports that his last alcohol intake was approximately 2 days ago, and reports medication noncompliance, otherwise.  He also describes intermittent nausea without vomiting, but denies any suicidal or homicidal ideations as well as any hallucinations.  He denies any associated recent fevers, chills, hematemesis, diarrhea, or chest pain.          Review of patient's allergies indicates:   Allergen Reactions    Gabapentin Other (See Comments)     SEVERE DIZZINESS AND BALANCE PROBLEMS, UNABLE TO WALK.    Nardil [phenelzine]      BECOMES HYPER, LIKE A MANIC EPISODE.    Penicillins Hives    Pneumococcal 23-jett ps vaccine      Patient refused, will get later      Lamictal [lamotrigine] Rash     Past Medical History:   Diagnosis Date    Alcohol abuse     Behavioral problem     Bipolar 1 disorder     Chronic right hip pain     Depression     DJD (degenerative joint disease), lumbar 1/27/2015    Fatigue     Hepatitis     pt. denies this    History of psychiatric care     History of psychiatric hospitalization     Hypertension     Karina     Post traumatic stress disorder     Psychiatric problem     Seizures     Suicide attempt     Therapy      Past Surgical History:   Procedure Laterality Date    HERNIA REPAIR      SPINE SURGERY  11-12-15    LAMINECTOMY/LUMBAR/WARE     Family History   Problem  Relation Age of Onset    Alcohol abuse Mother     Depression Mother     Depression Father     Depression Sister     Suicide Sister     Drug abuse Brother     Anxiety disorder Brother     Bipolar disorder Brother     Depression Brother     Alcohol abuse Maternal Uncle     Alcohol abuse Paternal Uncle     Dementia Maternal Grandmother     Alcohol abuse Cousin     Amblyopia Neg Hx     Blindness Neg Hx     Cancer Neg Hx     Cataracts Neg Hx     Diabetes Neg Hx     Glaucoma Neg Hx     Hypertension Neg Hx     Macular degeneration Neg Hx     Retinal detachment Neg Hx     Strabismus Neg Hx     Stroke Neg Hx     Thyroid disease Neg Hx      Social History   Substance Use Topics    Smoking status: Light Tobacco Smoker     Packs/day: 0.25     Years: 20.00    Smokeless tobacco: Not on file    Alcohol use No      Comment: quit 4 years ago     Review of Systems   Constitutional: Positive for fatigue. Negative for fever.   HENT: Negative for facial swelling and nosebleeds.    Respiratory: Negative for chest tightness and shortness of breath.    Cardiovascular: Negative for chest pain and leg swelling.   Gastrointestinal: Positive for nausea. Negative for abdominal pain and vomiting.   Genitourinary: Negative for dysuria, flank pain and hematuria.   Musculoskeletal: Positive for back pain. Negative for neck stiffness.   Skin: Negative for pallor and rash.   Neurological: Positive for tremors and seizures. Negative for speech difficulty and headaches.   Hematological: Bruises/bleeds easily.   Psychiatric/Behavioral: Negative for confusion, hallucinations, self-injury and suicidal ideas.       Physical Exam     Initial Vitals [07/12/17 0417]   BP Pulse Resp Temp SpO2   (!) 146/100 102 18 99.3 °F (37.4 °C) 100 %      MAP       115.33         Physical Exam    Vitals reviewed.  Constitutional: He appears well-developed and well-nourished.   62-year-old  man, mildly tremulous   HENT:   Head:  Normocephalic.   A 5 x 3 cm abrasion is noted to the right for head without associated bony deformity, hematoma, or step-off; mucous membranes are noted to be anhydrous, dentition is intact   Eyes: EOM are normal. Pupils are equal, round, and reactive to light.   Neck: No tracheal deviation present.   Cardiovascular:   Mild tachycardia is noted with intact pulses   Pulmonary/Chest: Breath sounds normal. No stridor. No respiratory distress.   Abdominal: Soft. He exhibits no distension. There is no tenderness.   Musculoskeletal:   There is mild localized swelling to the right proximal lower leg without associated bony deformity   Neurological: He is alert and oriented to person, place, and time.   A mild to moderate intention tremor is noted;    Skin: Skin is warm and dry.   Psychiatric: He has a normal mood and affect. His behavior is normal. Judgment and thought content normal.         ED Course   Procedures  Labs Reviewed   CBC W/ AUTO DIFFERENTIAL - Abnormal; Notable for the following:        Result Value    RBC 4.28 (*)     MCH 34.3 (*)     Gran # 10.1 (*)     Gran% 83.2 (*)     Lymph% 9.0 (*)     All other components within normal limits   COMPREHENSIVE METABOLIC PANEL - Abnormal; Notable for the following:     Albumin 3.4 (*)     Total Bilirubin 1.2 (*)     AST 87 (*)      (*)     All other components within normal limits   VALPROIC ACID - Abnormal; Notable for the following:     Valproic Acid Lvl 14.6 (*)     All other components within normal limits   ISTAT PROCEDURE - Abnormal; Notable for the following:     POC PCO2 45.9 (*)     POC PO2 17 (*)     POC HCO3 28.8 (*)     POC SATURATED O2 25 (*)     POC TCO2 30 (*)     All other components within normal limits   CULTURE, BLOOD   CULTURE, BLOOD   PROTIME-INR   TSH   TROPONIN I   MAGNESIUM   PHOSPHORUS   ALCOHOL,MEDICAL (ETHANOL)   URINALYSIS, REFLEX TO URINE CULTURE   DOXEPIN AND METABOLITE     EKG Readings: (Independently Interpreted)   Initial Reading:  No STEMI. Rhythm: Sinus Tachycardia. Heart Rate: 113. Ectopy: No Ectopy. Conduction: Normal. ST Segments: Normal ST Segments.       X-Rays:   Independently Interpreted Readings:   Chest X-Ray: Increased opacity at the left lung base.   Head CT: Mild frontal tissue edema without evidence of intracranial hemorrhage or mass effect.   Other Readings:  XR right tibia fibula: acute mildly displaced fracture of proximal fibula.    Imaging Results          CT Head Without Contrast (Final result)  Result time 07/12/17 09:21:09    Final result by Rick Pandya DO (07/12/17 09:21:09)                 Impression:     There is slight induration right inferior frontal soft tissues suggestive for subcutaneous hematoma without underlying calvarium of fracture.    Age-appropriate generalized cerebral volume loss similarly seen on the prior.     No evidence for acute intracranial hemorrhage or definite new abnormal parenchymal attenuation. Clinical correlation and further evaluation as warranted.      Electronically signed by: RICK PANDYA DO  Date:     07/12/17  Time:    09:21              Narrative:    CT brain without contrast.    Comparison: 12/31/2015    Technique: Multiple 5 mm axial images of the head were obtained without intravenous contrast.    Results: Study is slightly limited by patient motion. There is slight soft tissue swelling and induration overlying the right inferior frontal calvarium without gross underlying calvarial fracture. There is continued Generalized cerebral volume loss appropriate for age. No evidence for acute intracranial hemorrhage or sulcal effacement. No definite new abnormal parenchyma attenuation. Compensatory enlargement of ventricles without evidence for hydrocephalus. No midline shift or mass effect.  Visualized paranasal sinuses and mastoid air cells are clear.                             X-Ray Tibia Fibula 2 View Right (Final result)  Result time 07/12/17 08:04:09    Final result by Warner  SCOTT Clemons MD (07/12/17 08:04:09)                 Impression:        Acute comminuted fracture of the neck of the fibula.  Rest of the examination is unremarkable.      Electronically signed by: WARNER CLEMONS MD  Date:     07/12/17  Time:    08:04              Narrative:    History: Right lower extremity pain.    The cervical and right leg 2 views    Findings:    There is a impacted comminuted fracture of the neck of the fibula without significant displacement.  No tibial fracture is noted.  Distal fibula is intact.  Soft tissues are unremarkable.                             X-Ray Chest PA And Lateral (Final result)  Result time 07/12/17 08:06:41    Final result by Warner Clemons MD (07/12/17 08:06:41)                 Impression:        1.  Left basilar atelectasis/infiltrate.  Minimal discoid atelectasis in the right base.      Electronically signed by: WARNER CLEMONS MD  Date:     07/12/17  Time:    08:06              Narrative:    History: Fatigue.    Procedure: Chest 2 views    Findings:    The examination is compared with a study of 7/17/2017.    There is subsegmental atelectasis/infiltrate in the left base.  Left upper lung zone is clear.  There is chronic pleural apical thickening.    There is a vertical atelectasis in the right base.  Rest of the right lung is clear.    Heart size is not well evaluated due to the rotation of the patient to the right.  There is tortuosity of the descending thoracic aorta.  No pleural effusion or pneumothorax.    There is a scoliosis of the dorsolumbar spine.                              Medical Decision Making:   History:   Old Medical Records: I decided to obtain old medical records.  Differential Diagnosis:   Close head injury, seizure disorder, medication noncompliance, acute alcohol withdrawal, electrolyte derangement  Independently Interpreted Test(s):   I have ordered and independently interpreted X-rays - see prior notes.  I have ordered and independently interpreted EKG  Reading(s) - see prior notes  Clinical Tests:   Lab Tests: Ordered and Reviewed  Radiological Study: Ordered and Reviewed  Medical Tests: Ordered and Reviewed            Scribe Attestation:   Scribe #1: I performed the above scribed service and the documentation accurately describes the services I performed. I attest to the accuracy of the note.    Attending Attestation:           Physician Attestation for Scribe:  Physician Attestation Statement for Scribe #1: I, Dr. Arrington, reviewed documentation, as scribed by Hailee Kline in my presence, and it is both accurate and complete.         Attending ED Notes:   Emergency department evaluation today reveals soft tissue injury only on CT scan of the brain without evidence of intracranial injury or mass effect.  Chest x-ray does reveal findings of increased opacity to the left lower lung zone concerning for early consolidation in this patient with recent altered mental status and suspected seizure activity and daily, regular alcohol use.  Also, CBC reveals a leukocytosis with a granulocytosis without an associated bandemia.  The patient does not complain of significant cough or shortness of breath in the setting of his acute delirium, however, in light of uncertainty, I have initiated therapy for community-acquired pneumonia with Rocephin and a azithromycin after obtaining blood cultures.  Additionally, plain film of the right lower extremity reveals a comminuted, minimally displaced fracture of the proximal fibula which has been placed in a walking boot for comfort.  The patient exhibits improvement of his agitation and intention tremor with benzodiazepine therapy.  He will be admitted to the internal medicine service for further therapy and management of his multiple injuries identified today including acute alcohol withdrawal delirium as well as concern for community-acquired pneumonia in fair condition.          ED Course     Clinical Impression:   The primary  encounter diagnosis was Displaced comminuted fracture of shaft of right fibula, initial encounter for closed fracture. Diagnoses of Pain in right lower leg, Acute, mixed level of activity, alcohol withdrawal delirium, Community acquired pneumonia, and Closed head injury, initial encounter were also pertinent to this visit.    Disposition:   Disposition: Admitted  Condition: Key Arrington MD  07/12/17 1022

## 2017-07-13 ENCOUNTER — TELEPHONE (OUTPATIENT)
Dept: ORTHOPEDICS | Facility: CLINIC | Age: 62
End: 2017-07-13

## 2017-07-13 VITALS
HEART RATE: 98 BPM | TEMPERATURE: 97 F | HEIGHT: 77 IN | BODY MASS INDEX: 28.34 KG/M2 | RESPIRATION RATE: 18 BRPM | OXYGEN SATURATION: 95 % | WEIGHT: 240 LBS | SYSTOLIC BLOOD PRESSURE: 133 MMHG | DIASTOLIC BLOOD PRESSURE: 83 MMHG

## 2017-07-13 LAB
ALBUMIN SERPL BCP-MCNC: 2.7 G/DL
ALP SERPL-CCNC: 87 U/L
ALT SERPL W/O P-5'-P-CCNC: 82 U/L
AMPHET+METHAMPHET UR QL: NEGATIVE
ANION GAP SERPL CALC-SCNC: 7 MMOL/L
AST SERPL-CCNC: 40 U/L
BACTERIA #/AREA URNS AUTO: ABNORMAL /HPF
BARBITURATES UR QL SCN>200 NG/ML: NEGATIVE
BASOPHILS # BLD AUTO: 0.02 K/UL
BASOPHILS NFR BLD: 0.3 %
BENZODIAZ UR QL SCN>200 NG/ML: NORMAL
BILIRUB SERPL-MCNC: 0.6 MG/DL
BILIRUB UR QL STRIP: NEGATIVE
BUN SERPL-MCNC: 11 MG/DL
BZE UR QL SCN: NEGATIVE
CALCIUM SERPL-MCNC: 8.4 MG/DL
CANNABINOIDS UR QL SCN: NEGATIVE
CHLORIDE SERPL-SCNC: 99 MMOL/L
CLARITY UR REFRACT.AUTO: CLEAR
CO2 SERPL-SCNC: 31 MMOL/L
COLOR UR AUTO: YELLOW
CREAT SERPL-MCNC: 0.9 MG/DL
CREAT UR-MCNC: 250 MG/DL
DIFFERENTIAL METHOD: ABNORMAL
EOSINOPHIL # BLD AUTO: 0.4 K/UL
EOSINOPHIL NFR BLD: 6.4 %
ERYTHROCYTE [DISTWIDTH] IN BLOOD BY AUTOMATED COUNT: 12.9 %
EST. GFR  (AFRICAN AMERICAN): >60 ML/MIN/1.73 M^2
EST. GFR  (NON AFRICAN AMERICAN): >60 ML/MIN/1.73 M^2
ETHANOL UR-MCNC: <10 MG/DL
GLUCOSE SERPL-MCNC: 104 MG/DL
GLUCOSE UR QL STRIP: NEGATIVE
HCT VFR BLD AUTO: 37 %
HGB BLD-MCNC: 12.9 G/DL
HGB UR QL STRIP: NEGATIVE
HYALINE CASTS UR QL AUTO: 4 /LPF
KETONES UR QL STRIP: ABNORMAL
LEUKOCYTE ESTERASE UR QL STRIP: NEGATIVE
LYMPHOCYTES # BLD AUTO: 1.7 K/UL
LYMPHOCYTES NFR BLD: 24.8 %
MAGNESIUM SERPL-MCNC: 1.7 MG/DL
MCH RBC QN AUTO: 34 PG
MCHC RBC AUTO-ENTMCNC: 34.9 %
MCV RBC AUTO: 98 FL
METHADONE UR QL SCN>300 NG/ML: NEGATIVE
MICROSCOPIC COMMENT: ABNORMAL
MONOCYTES # BLD AUTO: 0.6 K/UL
MONOCYTES NFR BLD: 8.9 %
NEUTROPHILS # BLD AUTO: 4 K/UL
NEUTROPHILS NFR BLD: 59 %
NITRITE UR QL STRIP: NEGATIVE
OPIATES UR QL SCN: NEGATIVE
PCP UR QL SCN>25 NG/ML: NORMAL
PH UR STRIP: 6 [PH] (ref 5–8)
PHOSPHATE SERPL-MCNC: 3.6 MG/DL
PLATELET # BLD AUTO: 134 K/UL
PMV BLD AUTO: 9.6 FL
POTASSIUM SERPL-SCNC: 3.4 MMOL/L
PROT SERPL-MCNC: 5.5 G/DL
PROT UR QL STRIP: ABNORMAL
RBC # BLD AUTO: 3.79 M/UL
RBC #/AREA URNS AUTO: 1 /HPF (ref 0–4)
SODIUM SERPL-SCNC: 137 MMOL/L
SP GR UR STRIP: 1.02 (ref 1–1.03)
TOXICOLOGY INFORMATION: NORMAL
URN SPEC COLLECT METH UR: ABNORMAL
UROBILINOGEN UR STRIP-ACNC: NEGATIVE EU/DL
WBC # BLD AUTO: 6.74 K/UL
WBC #/AREA URNS AUTO: 1 /HPF (ref 0–5)

## 2017-07-13 PROCEDURE — 80307 DRUG TEST PRSMV CHEM ANLYZR: CPT

## 2017-07-13 PROCEDURE — 36415 COLL VENOUS BLD VENIPUNCTURE: CPT

## 2017-07-13 PROCEDURE — 80053 COMPREHEN METABOLIC PANEL: CPT

## 2017-07-13 PROCEDURE — G0378 HOSPITAL OBSERVATION PER HR: HCPCS

## 2017-07-13 PROCEDURE — 83735 ASSAY OF MAGNESIUM: CPT

## 2017-07-13 PROCEDURE — 25000003 PHARM REV CODE 250: Performed by: EMERGENCY MEDICINE

## 2017-07-13 PROCEDURE — 25000003 PHARM REV CODE 250: Performed by: HOSPITALIST

## 2017-07-13 PROCEDURE — 84100 ASSAY OF PHOSPHORUS: CPT

## 2017-07-13 PROCEDURE — 85025 COMPLETE CBC W/AUTO DIFF WBC: CPT

## 2017-07-13 PROCEDURE — 81001 URINALYSIS AUTO W/SCOPE: CPT

## 2017-07-13 PROCEDURE — 99239 HOSP IP/OBS DSCHRG MGMT >30: CPT | Mod: ,,, | Performed by: HOSPITALIST

## 2017-07-13 RX ORDER — LANOLIN ALCOHOL/MO/W.PET/CERES
100 CREAM (GRAM) TOPICAL DAILY
COMMUNITY
Start: 2017-07-13 | End: 2018-08-08

## 2017-07-13 RX ORDER — ACETAMINOPHEN 325 MG/1
650 TABLET ORAL EVERY 4 HOURS PRN
Refills: 0 | COMMUNITY
Start: 2017-07-13 | End: 2018-05-16

## 2017-07-13 RX ORDER — IBUPROFEN 200 MG
1 TABLET ORAL DAILY
Refills: 0 | COMMUNITY
Start: 2017-07-13 | End: 2018-05-17

## 2017-07-13 RX ORDER — THIAMINE HCL 100 MG
100 TABLET ORAL DAILY
Status: DISCONTINUED | OUTPATIENT
Start: 2017-07-13 | End: 2017-07-13 | Stop reason: HOSPADM

## 2017-07-13 RX ORDER — LIDOCAINE 50 MG/G
1 PATCH TOPICAL DAILY
Qty: 10 PATCH | Refills: 0 | Status: SHIPPED | OUTPATIENT
Start: 2017-07-13 | End: 2019-07-24

## 2017-07-13 RX ORDER — PANTOPRAZOLE SODIUM 40 MG/1
40 TABLET, DELAYED RELEASE ORAL DAILY
Qty: 30 TABLET | Refills: 0 | Status: SHIPPED | OUTPATIENT
Start: 2017-07-13 | End: 2018-06-15 | Stop reason: SDUPTHER

## 2017-07-13 RX ORDER — MOXIFLOXACIN HYDROCHLORIDE 400 MG/1
400 TABLET ORAL DAILY
Qty: 5 TABLET | Refills: 0 | Status: SHIPPED | OUTPATIENT
Start: 2017-07-13 | End: 2017-07-18

## 2017-07-13 RX ORDER — DIAZEPAM 5 MG/1
5 TABLET ORAL EVERY 12 HOURS
Qty: 4 TABLET | Refills: 0 | Status: ON HOLD | OUTPATIENT
Start: 2017-07-13 | End: 2018-02-23 | Stop reason: HOSPADM

## 2017-07-13 RX ADMIN — Medication 3 ML: at 05:07

## 2017-07-13 RX ADMIN — DIVALPROEX SODIUM 1000 MG: 500 TABLET, EXTENDED RELEASE ORAL at 10:07

## 2017-07-13 RX ADMIN — Medication 10 ML: at 10:07

## 2017-07-13 RX ADMIN — THIAMINE HCL TAB 100 MG 100 MG: 100 TAB at 03:07

## 2017-07-13 RX ADMIN — DIAZEPAM 5 MG: 5 TABLET ORAL at 05:07

## 2017-07-13 RX ADMIN — Medication 3 ML: at 02:07

## 2017-07-13 RX ADMIN — ACETAMINOPHEN 650 MG: 325 TABLET ORAL at 05:07

## 2017-07-13 RX ADMIN — PANTOPRAZOLE SODIUM 40 MG: 40 TABLET, DELAYED RELEASE ORAL at 08:07

## 2017-07-13 RX ADMIN — LURASIDONE HYDROCHLORIDE 80 MG: 40 TABLET, FILM COATED ORAL at 08:07

## 2017-07-13 NOTE — HPI
62-year-old man with history of bipolar disorder as well as recurrent alcohol and marijuana abuse presents by EMS for evaluation of suspected seizure activity and falls at home noted by the wife. At the time of my evaluation he was already on the floor and we were consulted for a known right proximal fibula fracture. States that he fell onto his right side and had immediate difficulty bearing weight. He has pain over the lateral aspect of the right knee and some pain radiating distally. He denies maureen numbness or paresthesias. He as no other musculoskeletal injuries.

## 2017-07-13 NOTE — H&P
[facility] Hospital Medicine  History & Physical    Patient Name: Mirza Morales  MRN: [id number]  Admission Date: 7/12/2017  Attending Physician: Prerna Steward MD  Primary Care Provider: LIDIA Banks MD    [facility] Medicine Team: [facility] MED B Prerna Steward MD     Patient information was obtained from patient and ER records.     Subjective:     Principal Problem:<principal problem not specified>    Chief Complaint:   Chief Complaint   Patient presents with    Fatigue     x 4hrs         HPI: 62-year-old man with history of bipolar disorder as well as recurrent alcohol and marijuana abuse presents by EMS for evaluation of suspected seizure activity and falls at home noted by the wife.  At the time of my exam, the patient describes sensation of dehydration as well as right lower leg pain.  He reports that his last alcohol intake was approximately 2 days ago, and reports medication noncompliance, otherwise.  He also describes intermittent nausea without vomiting, but denies any suicidal or homicidal ideations as well as any hallucinations.  He denies any associated recent fevers, chills, hematemesis, diarrhea, or chest pain    Past Medical History:   Diagnosis Date    Alcohol abuse     Behavioral problem     Bipolar 1 disorder     Chronic right hip pain     Depression     DJD (degenerative joint disease), lumbar 1/27/2015    Fatigue     Hepatitis     pt. denies this    History of psychiatric care     History of psychiatric hospitalization     Hypertension     Karina     Post traumatic stress disorder     Psychiatric problem     Seizures     Suicide attempt     Therapy        Past Surgical History:   Procedure Laterality Date    HERNIA REPAIR      SPINE SURGERY  11-12-15    LAMINECTOMY/LUMBAR/WARE       Review of patient's allergies indicates:   Allergen Reactions    Gabapentin Other (See Comments)     SEVERE DIZZINESS AND BALANCE PROBLEMS, UNABLE TO WALK.    Nardil  [phenelzine]      BECOMES HYPER, LIKE A MANIC EPISODE.    Penicillins Hives    Pneumococcal 23-jett ps vaccine      Patient refused, will get later      Lamictal [lamotrigine] Rash       No current facility-administered medications on file prior to encounter.      Current Outpatient Prescriptions on File Prior to Encounter   Medication Sig    divalproex ER (DEPAKOTE) 500 MG Tb24 Take 1 tablet (500mg) every morning and 2 tablets (1000mg) every night.    doxepin (SINEQUAN) 100 MG capsule TAKE 1 CAPSULE (100 MG TOTAL) BY MOUTH EVERY EVENING.    hydrocodone-acetaminophen 7.5-325mg (NORCO) 7.5-325 mg per tablet TAKE 1 TABLET EVERY 12 HOURS FOR 30 DAYS    hydrOXYzine pamoate (VISTARIL) 50 MG Cap Take 1 capsule (50 mg total) by mouth every evening.    lurasidone (LATUDA) 80 mg Tab tablet Take 80 mg by mouth once daily. Take one tablet daily with dinner.    atorvastatin (LIPITOR) 40 MG tablet TAKE 1 TABLET (40 MG TOTAL) BY MOUTH ONCE DAILY.     Family History     Problem Relation (Age of Onset)    Alcohol abuse Mother, Maternal Uncle, Paternal Uncle, Cousin    Anxiety disorder Brother    Bipolar disorder Brother    Dementia Maternal Grandmother    Depression Mother, Father, Sister, Brother    Drug abuse Brother    Suicide Sister        Social History Main Topics    Smoking status: Light Tobacco Smoker     Packs/day: 0.25     Years: 20.00    Smokeless tobacco: Not on file    Alcohol use No      Comment: quit 4 years ago    Drug use: No    Sexual activity: Yes     Partners: Female      Comment: with wife; monogamous      Review of Systems   Constitutional: Positive for chills, diaphoresis and fatigue. Negative for appetite change and fever.             HENT: Negative for congestion and trouble swallowing.    Respiratory: Positive for cough. Negative for shortness of breath and wheezing.    Cardiovascular: Negative for chest pain and leg swelling.   Gastrointestinal: Positive for diarrhea. Negative for abdominal  pain, constipation, nausea and vomiting.   Genitourinary: Negative for difficulty urinating and dysuria.   Musculoskeletal: Positive for back pain (chronic). Negative for arthralgias.   Skin: Negative for rash and wound.   Neurological: Negative for dizziness, speech difficulty, light-headedness and headaches.   Psychiatric/Behavioral: Negative for agitation and behavioral problems.     Objective:     Vital Signs (Most Recent):  Temp: 98.6 °F (37 °C) (07/12/17 1700)  Pulse: 101 (07/12/17 1700)  Resp: 18 (07/12/17 1700)  BP: 138/85 (07/12/17 1700)  SpO2: 95 % (07/12/17 1700) Vital Signs (24h Range):  Temp:  [98.4 °F (36.9 °C)-99.3 °F (37.4 °C)] 98.6 °F (37 °C)  Pulse:  [] 101  Resp:  [16-18] 18  SpO2:  [93 %-100 %] 95 %  BP: (118-172)/() 138/85     Weight: 108.9 kg (240 lb)  Body mass index is 28.46 kg/m².    Physical Exam   Constitutional: He is oriented to person, place, and time. He appears well-developed and well-nourished.   HENT:   Head: Normocephalic.   Mouth/Throat: Oropharynx is clear and moist.   Eyes: EOM are normal. Pupils are equal, round, and reactive to light. No scleral icterus.   Neck: Normal range of motion and full passive range of motion without pain. Neck supple. No JVD present. Carotid bruit is not present. No tracheal deviation, no edema and normal range of motion present. No thyromegaly present.   No carotid bruits   Cardiovascular: Normal rate, regular rhythm, normal heart sounds and intact distal pulses.  Exam reveals no gallop and no friction rub.    No murmur heard.  Pulmonary/Chest: Effort normal and breath sounds normal. No accessory muscle usage or stridor. No apnea. No respiratory distress. He has no wheezes. He has no rales. He exhibits no tenderness.   Abdominal: Soft. Bowel sounds are normal. He exhibits no distension, no ascites and no mass. There is no tenderness. There is no rebound and no guarding.   Musculoskeletal: Normal range of motion. He exhibits no edema or  tenderness.   Lymphadenopathy:        Head (right side): No posterior auricular adenopathy present.     He has no cervical adenopathy.   Neurological: He is alert and oriented to person, place, and time. He has normal strength. He displays normal reflexes.   Skin: Skin is warm and dry. No abrasion, no bruising, no ecchymosis, no laceration and no rash noted. No cyanosis or erythema. No pallor. Nails show no clubbing.   Psychiatric: He has a normal mood and affect. His behavior is normal. Judgment and thought content normal.        Significant Labs:   CBC:   Recent Labs  Lab 07/12/17  0738   WBC 12.11   HGB 14.7   HCT 41.5        CMP:   Recent Labs  Lab 07/12/17  0738      K 4.3   CL 99   CO2 28      BUN 12   CREATININE 1.1   CALCIUM 8.8   PROT 6.9   ALBUMIN 3.4*   BILITOT 1.2*   ALKPHOS 117   AST 87*   *   ANIONGAP 11   EGFRNONAA >60.0       Significant Imaging: I have reviewed and interpreted all pertinent imaging results/findings within the past 24 hours.    Assessment/Plan:     Displaced comminuted fracture of shaft of right fibula, initial encounter for closed fracture    Consult ortho  High risk for opioids: on benzodiazepines, Hx of chronic back pain,  ORT score 15: take caution with opiods          Pneumonia, community acquired    cxr - infiltrates/ atalectasis  Rocephin, zithromax started in ED          Alcohol withdrawal syndrome with complication    Fall w head trauma, fibula fracture  Hx of seizure disorder  DT preventon  Valium taper  thiamie   MVI        Closed head injury    CT neg for intracranial bleed, + subcutaneous hematoma without fracture        Seizure disorder    On depekote          Cognitive disorder    Head trauma          Bipolar 1 disorder, mixed    Continue depakote - level is low suggesting noncompliance  Continue latuda  Check U tox          VTE Risk Mitigation         Ordered     enoxaparin injection 40 mg  Daily     Route:  Subcutaneous        07/12/17 6566      Medium Risk of VTE  Once      07/12/17 1840     Place sequential compression device  Until discontinued      07/12/17 1840     Place EMY hose  Until discontinued      07/12/17 1054        Prerna Steward MD  Department of Hospital Medicine   [facility]

## 2017-07-13 NOTE — CONSULTS
[facility]  Orthopedics  Consult Note    Patient Name: Mirza Morales  MRN: [id number]  Admission Date: 7/12/2017  Hospital Length of Stay: 0 days  Attending Provider: Artis Pastor MD  Primary Care Provider: LIDIA Banks MD    Patient information was obtained from patient and ER records.     Inpatient consult to Orthopedics  Consult performed by: ELIAS ENGLISH  Consult ordered by: ARTIS PASTOR        Subjective:     Principal Problem:<principal problem not specified>    Chief Complaint:   Chief Complaint   Patient presents with    Fatigue     x 4hrs         HPI: 62-year-old man with history of bipolar disorder as well as recurrent alcohol and marijuana abuse presents by EMS for evaluation of suspected seizure activity and falls at home noted by the wife. At the time of my evaluation he was already on the floor and we were consulted for a known right proximal fibula fracture. States that he fell onto his right side and had immediate difficulty bearing weight. He has pain over the lateral aspect of the right knee and some pain radiating distally. He denies maureen numbness or paresthesias. He as no other musculoskeletal injuries.     Past Medical History:   Diagnosis Date    Alcohol abuse     Behavioral problem     Bipolar 1 disorder     Chronic right hip pain     Depression     DJD (degenerative joint disease), lumbar 1/27/2015    Fatigue     Hepatitis     pt. denies this    History of psychiatric care     History of psychiatric hospitalization     Hypertension     Karina     Post traumatic stress disorder     Psychiatric problem     Seizures     Suicide attempt     Therapy        Past Surgical History:   Procedure Laterality Date    HERNIA REPAIR      SPINE SURGERY  11-12-15    LAMINECTOMY/LUMBAR/WARE       Review of patient's allergies indicates:   Allergen Reactions    Gabapentin Other (See Comments)     SEVERE DIZZINESS AND BALANCE PROBLEMS, UNABLE TO WALK.     Nardil [phenelzine]      BECOMES HYPER, LIKE A MANIC EPISODE.    Penicillins Hives    Pneumococcal 23-jett ps vaccine      Patient refused, will get later      Lamictal [lamotrigine] Rash       Current Facility-Administered Medications   Medication    acetaminophen tablet 650 mg    diazePAM tablet 5 mg    [START ON 7/13/2017] divalproex ER 24 hr tablet 1,000 mg    enoxaparin injection 40 mg    ketorolac injection 15 mg    lactated ringers infusion    lidocaine 5 % patch 1 patch    [START ON 7/13/2017] lurasidone tablet 80 mg    [START ON 7/13/2017] multivitamin liquid no.118 per dose 10 mL    [START ON 7/13/2017] nicotine 14 mg/24 hr 1 patch    pantoprazole EC tablet 40 mg    sodium chloride 0.9% flush 3 mL    thiamine (B-1) 100 mg in dextrose 5 % 50 mL IVPB     Family History     Problem Relation (Age of Onset)    Alcohol abuse Mother, Maternal Uncle, Paternal Uncle, Cousin    Anxiety disorder Brother    Bipolar disorder Brother    Dementia Maternal Grandmother    Depression Mother, Father, Sister, Brother    Drug abuse Brother    Suicide Sister        Social History Main Topics    Smoking status: Light Tobacco Smoker     Packs/day: 0.25     Years: 20.00    Smokeless tobacco: Not on file    Alcohol use No      Comment: quit 4 years ago    Drug use: No    Sexual activity: Yes     Partners: Female      Comment: with wife; monogamous      ROS     Constitutional: negative for fevers  Eyes: no visual changes  ENT: negative for hearing loss  Respiratory: negative for dyspnea  Cardiovascular: negative for chest pain  Gastrointestinal: negative for abdominal pain  Genitourinary: negative for dysuria  Neurological: negative for headaches  Behavioral/Psych: negative for hallucinations  Endocrine: negative for temperature intolerance    Objective:     Vital Signs (Most Recent):  Temp: 98.6 °F (37 °C) (07/12/17 1700)  Pulse: 101 (07/12/17 1700)  Resp: 18 (07/12/17 1700)  BP: 138/85 (07/12/17  "1700)  SpO2: 95 % (07/12/17 1700) Vital Signs (24h Range):  Temp:  [98.4 °F (36.9 °C)-99.3 °F (37.4 °C)] 98.6 °F (37 °C)  Pulse:  [] 101  Resp:  [16-18] 18  SpO2:  [93 %-100 %] 95 %  BP: (118-172)/() 138/85     Weight: 108.9 kg (240 lb)  Height: 6' 5" (195.6 cm)  Body mass index is 28.46 kg/m².    No intake or output data in the 24 hours ending 07/12/17 2110    Ortho/SPM Exam      MSK:    RLE:  Skin intact   no gross deformity deformity  minimal ecchymoses over the lateral aspect of the knee   swelling over proximal fibula  TTP around lateral aspect of knee and some ankle tenderness  Compartments soft and compressible  full ROM at ankle, knee was not ranged due to pain  SILT intact throughout but patient did state that the sensation was slightly different than normal in the distribution of the deep peroneal nerve   Motor intact throughout tibial and peroneal nerves  Brisk cap refill  Warm well perfused extremities  pulses palpable      Significant Labs: All pertinent labs within the past 24 hours have been reviewed.    Significant Imaging: I have reviewed and interpreted all pertinent imaging results/findings.     There is a proximal fibula fracture of the right leg. There is some comminution but it is only minimally displaced.     Assessment/Plan:     Displaced comminuted fracture of shaft of right fibula, initial encounter for closed fracture    Right proximal fibula fracture, comminuted and minimally displaced.    1. Imaging of femur and ankle ordered revealed no other associate fracture  2. Patient is admitted to medicine service for observation  3. WBAT  4. F/U as outpatient             Thank you for your consult. I will sign off. Please contact us if you have any additional questions.    Francisco Javier Galvez MD  Orthopedics  [facility]      "

## 2017-07-13 NOTE — ASSESSMENT & PLAN NOTE
7/13 - moxi for home x 7 days  cxr - infiltrates/ atalectasis  Rocephin, zithromax started in ED

## 2017-07-13 NOTE — ASSESSMENT & PLAN NOTE
7/13 - stable, can finish taper at home , f/u psychiatry  Fall w head trauma, fibula fracture  Hx of seizure disorder  DT preventon  Valium taper  lauren   MVI

## 2017-07-13 NOTE — ASSESSMENT & PLAN NOTE
7/13 - pt able to bear weight, will order walker for home.   Discussed with ortho  High risk for opioids: on benzodiazepines, Hx of chronic back pain,  ORT score 15: take caution with opiods

## 2017-07-13 NOTE — TELEPHONE ENCOUNTER
----- Message from Maira Proctor PA-C sent at 7/13/2017  8:50 AM CDT -----  Please add to schedule at next available appointment, tomorrow or next week.     ----- Message -----  From: Kristen Moore MA  Sent: 7/13/2017   8:27 AM  To: NATASHA Paris would you please see this pt?  ----- Message -----  From: Francisco Javier Galvez MD  Sent: 7/13/2017   8:24 AM  To: Gisella Santo Staff    Hi can you please schedule for f/u in 2 weeks for right proximal fibula fracture. Non Op management will nee f/u tib/fib films    Thanks  eugenio

## 2017-07-13 NOTE — PHYSICIAN QUERY
"PT Name: Mirza Morales  MR #: [id number]    Physician Query Form - Pneumonia Clarification     CDS/: Brandy E Capley               Contact information:  Ext.  [phone]  This form is a permanent document in the medical record.    Query Date:  July 13, 2017    By submitting this query, we are merely seeking further clarification of documentation. Please utilize your independent clinical judgment when addressing the question(s) below.    The Medical record contains the following:   Indicators   Supporting Clinical Findings Location in Medical Record   X "Pneumonia" documented Pneumonia, community acquired        cxr - infiltrates/ atalectasis   Rocephin, zithromax started in ED     H&P 7/12   X Chest X-Ray: 1.  Left basilar atelectasis/infiltrate.  Minimal discoid atelectasis in the right base. CXR 7/12    PaO2    PaCO2     O2 sat      Cultures       Treatment       Supplemental O2      Other         Provider, please specify type of pneumonia.    [ x ] Bacterial Pneumonia (Specify organism): ______________________  [  ] Bacterial, Gram Negative organism Pneumonia  [  ] Viral Pneumonia (Specify virus): _______________________  [  ] Fungal Pneumonia (Specify organism): _______________________  [  ] Other type of pneumonia (please specify): ______________________________________  [  ] Clinically undetermined    Please document in your progress notes daily for the duration of treatment, until resolved, and include in your discharge summary.    .                                                                                    "

## 2017-07-13 NOTE — SUBJECTIVE & OBJECTIVE
Past Medical History:   Diagnosis Date    Alcohol abuse     Behavioral problem     Bipolar 1 disorder     Chronic right hip pain     Depression     DJD (degenerative joint disease), lumbar 1/27/2015    Fatigue     Hepatitis     pt. denies this    History of psychiatric care     History of psychiatric hospitalization     Hypertension     Karina     Post traumatic stress disorder     Psychiatric problem     Seizures     Suicide attempt     Therapy        Past Surgical History:   Procedure Laterality Date    HERNIA REPAIR      SPINE SURGERY  11-12-15    LAMINECTOMY/LUMBAR/WARE       Review of patient's allergies indicates:   Allergen Reactions    Gabapentin Other (See Comments)     SEVERE DIZZINESS AND BALANCE PROBLEMS, UNABLE TO WALK.    Nardil [phenelzine]      BECOMES HYPER, LIKE A MANIC EPISODE.    Penicillins Hives    Pneumococcal 23-jett ps vaccine      Patient refused, will get later      Lamictal [lamotrigine] Rash       No current facility-administered medications on file prior to encounter.      Current Outpatient Prescriptions on File Prior to Encounter   Medication Sig    divalproex ER (DEPAKOTE) 500 MG Tb24 Take 1 tablet (500mg) every morning and 2 tablets (1000mg) every night.    doxepin (SINEQUAN) 100 MG capsule TAKE 1 CAPSULE (100 MG TOTAL) BY MOUTH EVERY EVENING.    hydrocodone-acetaminophen 7.5-325mg (NORCO) 7.5-325 mg per tablet TAKE 1 TABLET EVERY 12 HOURS FOR 30 DAYS    hydrOXYzine pamoate (VISTARIL) 50 MG Cap Take 1 capsule (50 mg total) by mouth every evening.    lurasidone (LATUDA) 80 mg Tab tablet Take 80 mg by mouth once daily. Take one tablet daily with dinner.    atorvastatin (LIPITOR) 40 MG tablet TAKE 1 TABLET (40 MG TOTAL) BY MOUTH ONCE DAILY.     Family History     Problem Relation (Age of Onset)    Alcohol abuse Mother, Maternal Uncle, Paternal Uncle, Cousin    Anxiety disorder Brother    Bipolar disorder Brother    Dementia Maternal Grandmother    Depression  Mother, Father, Sister, Brother    Drug abuse Brother    Suicide Sister        Social History Main Topics    Smoking status: Light Tobacco Smoker     Packs/day: 0.25     Years: 20.00    Smokeless tobacco: Not on file    Alcohol use No      Comment: quit 4 years ago    Drug use: No    Sexual activity: Yes     Partners: Female      Comment: with wife; monogamous      Review of Systems   Constitutional: Positive for chills, diaphoresis and fatigue. Negative for appetite change and fever.             HENT: Negative for congestion and trouble swallowing.    Respiratory: Positive for cough. Negative for shortness of breath and wheezing.    Cardiovascular: Negative for chest pain and leg swelling.   Gastrointestinal: Positive for diarrhea. Negative for abdominal pain, constipation, nausea and vomiting.   Genitourinary: Negative for difficulty urinating and dysuria.   Musculoskeletal: Positive for back pain (chronic). Negative for arthralgias.   Skin: Negative for rash and wound.   Neurological: Negative for dizziness, speech difficulty, light-headedness and headaches.   Psychiatric/Behavioral: Negative for agitation and behavioral problems.     Objective:     Vital Signs (Most Recent):  Temp: 98.6 °F (37 °C) (07/12/17 1700)  Pulse: 101 (07/12/17 1700)  Resp: 18 (07/12/17 1700)  BP: 138/85 (07/12/17 1700)  SpO2: 95 % (07/12/17 1700) Vital Signs (24h Range):  Temp:  [98.4 °F (36.9 °C)-99.3 °F (37.4 °C)] 98.6 °F (37 °C)  Pulse:  [] 101  Resp:  [16-18] 18  SpO2:  [93 %-100 %] 95 %  BP: (118-172)/() 138/85     Weight: 108.9 kg (240 lb)  Body mass index is 28.46 kg/m².    Physical Exam   Constitutional: He is oriented to person, place, and time. He appears well-developed and well-nourished.   HENT:   Head: Normocephalic.   Mouth/Throat: Oropharynx is clear and moist.   Eyes: EOM are normal. Pupils are equal, round, and reactive to light. No scleral icterus.   Neck: Normal range of motion and full passive range  of motion without pain. Neck supple. No JVD present. Carotid bruit is not present. No tracheal deviation, no edema and normal range of motion present. No thyromegaly present.   No carotid bruits   Cardiovascular: Normal rate, regular rhythm, normal heart sounds and intact distal pulses.  Exam reveals no gallop and no friction rub.    No murmur heard.  Pulmonary/Chest: Effort normal and breath sounds normal. No accessory muscle usage or stridor. No apnea. No respiratory distress. He has no wheezes. He has no rales. He exhibits no tenderness.   Abdominal: Soft. Bowel sounds are normal. He exhibits no distension, no ascites and no mass. There is no tenderness. There is no rebound and no guarding.   Musculoskeletal: Normal range of motion. He exhibits no edema or tenderness.   Lymphadenopathy:        Head (right side): No posterior auricular adenopathy present.     He has no cervical adenopathy.   Neurological: He is alert and oriented to person, place, and time. He has normal strength. He displays normal reflexes.   Skin: Skin is warm and dry. No abrasion, no bruising, no ecchymosis, no laceration and no rash noted. No cyanosis or erythema. No pallor. Nails show no clubbing.   Psychiatric: He has a normal mood and affect. His behavior is normal. Judgment and thought content normal.        Significant Labs:   CBC:   Recent Labs  Lab 07/12/17  0738   WBC 12.11   HGB 14.7   HCT 41.5        CMP:   Recent Labs  Lab 07/12/17  0738      K 4.3   CL 99   CO2 28      BUN 12   CREATININE 1.1   CALCIUM 8.8   PROT 6.9   ALBUMIN 3.4*   BILITOT 1.2*   ALKPHOS 117   AST 87*   *   ANIONGAP 11   EGFRNONAA >60.0       Significant Imaging: I have reviewed and interpreted all pertinent imaging results/findings within the past 24 hours.

## 2017-07-13 NOTE — DISCHARGE SUMMARY
[facility] Hospital Medicine  Discharge Summary      Patient Name: Mirza Morales  MRN: [id number]  Admission Date: 7/12/2017  Hospital Length of Stay: 1 days  Discharge Date and Time: No discharge date for patient encounter.  Attending Physician: Artis Steward MD   Discharging Provider: Artis Steward MD  Primary Care Provider: LIDIA Banks MD  [facility] Medicine Team: [facility] Artis Steward MD    HPI:   62-year-old man with history of bipolar disorder as well as recurrent alcohol and marijuana abuse presents by EMS for evaluation of suspected seizure activity and falls at home noted by the wife.  At the time of my exam, the patient describes sensation of dehydration as well as right lower leg pain.  He reports that his last alcohol intake was approximately 2 days ago, and reports medication noncompliance, otherwise.  He also describes intermittent nausea without vomiting, but denies any suicidal or homicidal ideations as well as any hallucinations.  He denies any associated recent fevers, chills, hematemesis, diarrhea, or chest pain    * No surgery found *      Indwelling Lines/Drains at time of discharge:   Lines/Drains/Airways          No matching active lines, drains, or airways        Hospital Course:   7/13 - doing well.  Walked with nurse with walker without problems.  Ate breakfast, no shakes, or irritibility.  He wants to f/u with his psychiatrist, rather than AA.  He has quit drinking before,      Consults:   Consults         Status Ordering Provider     Inpatient consult to Orthopedics  Once     Provider:  (Not yet assigned)    Completed ARTIS STEWARD          Significant Diagnostic Studies: Labs:   CMP   Recent Labs  Lab 07/12/17  0738 07/13/17  0510    137   K 4.3 3.4*   CL 99 99   CO2 28 31*    104   BUN 12 11   CREATININE 1.1 0.9   CALCIUM 8.8 8.4*   PROT 6.9 5.5*   ALBUMIN 3.4* 2.7*   BILITOT 1.2* 0.6   ALKPHOS 117 87   AST 87* 40   *  82*   ANIONGAP 11 7*   ESTGFRAFRICA >60.0 >60.0   EGFRNONAA >60.0 >60.0    and CBC   Recent Labs  Lab 07/12/17  0738 07/13/17  0510   WBC 12.11 6.74   HGB 14.7 12.9*   HCT 41.5 37.0*    134*       Pending Diagnostic Studies:     Procedure Component Value Units Date/Time    Doxepin level [[id number]] Collected:  07/12/17 0738    Order Status:  Sent Lab Status:  In process Updated:  07/12/17 0812    Specimen:  Blood from Blood     Urinalysis [[id number]] Collected:  07/13/17 0206    Order Status:  Sent Lab Status:  In process Updated:  07/13/17 0206    Specimen:  Urine         Final Active Diagnoses:    Diagnosis Date Noted POA    PRINCIPAL PROBLEM:  Displaced comminuted fracture of shaft of right fibula, initial encounter for closed fracture [S82.451A] 07/12/2017 Yes    Pneumonia, community acquired [J18.9] 07/12/2017 Yes    Alcohol withdrawal syndrome with complication [F10.239] 02/27/2013 Yes    Closed head injury [S09.90XA] 07/12/2017 Yes    Seizure disorder [G40.909] 01/03/2016 Yes    Cognitive disorder [F09] 12/31/2015 Yes    Bipolar 1 disorder, mixed [F31.60] 12/21/2015 Yes    Tobacco abuse [Z72.0] 02/28/2013 Yes     Chronic      Problems Resolved During this Admission:    Diagnosis Date Noted Date Resolved POA      * Displaced comminuted fracture of shaft of right fibula, initial encounter for closed fracture    7/13 - pt able to bear weight, will order walker for home.   Discussed with ortho  High risk for opioids: on benzodiazepines, Hx of chronic back pain,  ORT score 15: take caution with opiods          Pneumonia, community acquired    7/13 - moxi for home x 5  days  cxr - infiltrates/ atalectasis  Rocephin, zithromax started in ED          Alcohol withdrawal syndrome with complication    7/13 - stable, can finish taper at home , f/u psychiatry  Fall w head trauma, fibula fracture  Hx of seizure disorder  DT preventon  Valium taper  thiamie   MVI        Closed head injury    7/13 - stable  CT  "neg for intracranial bleed, + subcutaneous hematoma without fracture        Seizure disorder    On depekote          Cognitive disorder    Head trauma          Bipolar 1 disorder, mixed    Continue depakote - level is low suggesting noncompliance  Continue latuda  Check U tox        Tobacco abuse    nicoderm patch was offerred          Alcohol dependence in sustained full remission                  Discharged Condition: good    Disposition: Home or Self Care    Follow Up:  Follow-up Information     [facility] - Psychiatry.    Specialty:  Psychiatry  Contact information:  [address]  [facility] [address] [phone]  Additional information:  [facility] - 4th Floor           [facility] Nielsencampbell - Orthopedics On 7/21/2017.    Specialty:  Orthopedics  Why:  appointment 09:00  Contact information:  [address], [address]  [facility] [address] [phone]  Additional information:  [facility] - [address]th Floor           P Kingsley Banks MD.    Specialty:  Internal Medicine  Contact information:  [address] [facility]  [place] [address]  [phone]                 Patient Instructions:     WALKER FOR HOME USE   Order Specific Question Answer Comments   Type of Walker: Navarro (4'4"-5'7")    With wheels? No    Height: 6' 5" (1.956 m)    Weight: 108.9 kg (240 lb)    Length of need (1-99 months): 99    Please check all that apply: Patient's condition impairs ambulation.      Diet general       Medications:  Reconciled Home Medications:   Current Discharge Medication List      START taking these medications    Details   acetaminophen (TYLENOL) 325 MG tablet Take 2 tablets (650 mg total) by mouth every 4 (four) hours as needed.  Refills: 0      diazePAM (VALIUM) 5 MG tablet Take 1 tablet (5 mg total) by mouth every 12 (twelve) hours.  Qty: 4 tablet, Refills: 0      lidocaine (LIDODERM) 5 % Place 1 patch onto the skin once daily. Remove & Discard patch within 12 hours or as directed by MD  Qty: 10 " patch, Refills: 0      multivit-min-FA-lycopen-lutein (CENTRUM SILVER ULTRA MEN'S) 300-600-300 mcg Tab Take 1 tablet by mouth once daily at 6am.      nicotine (NICODERM CQ) 14 mg/24 hr Place 1 patch onto the skin once daily at 6am.  Refills: 0      pantoprazole (PROTONIX) 40 MG tablet Take 1 tablet (40 mg total) by mouth once daily.  Qty: 30 tablet, Refills: 0         CONTINUE these medications which have NOT CHANGED    Details   divalproex ER (DEPAKOTE) 500 MG Tb24 Take 1 tablet (500mg) every morning and 2 tablets (1000mg) every night.  Qty: 90 tablet, Refills: 3      doxepin (SINEQUAN) 100 MG capsule TAKE 1 CAPSULE (100 MG TOTAL) BY MOUTH EVERY EVENING.  Qty: 30 capsule, Refills: 3      lurasidone (LATUDA) 80 mg Tab tablet Take 80 mg by mouth once daily. Take one tablet daily with dinner.  Qty: 30 tablet, Refills: 3      atorvastatin (LIPITOR) 40 MG tablet TAKE 1 TABLET (40 MG TOTAL) BY MOUTH ONCE DAILY.  Qty: 30 tablet, Refills: 6         STOP taking these medications       hydrocodone-acetaminophen 7.5-325mg (NORCO) 7.5-325 mg per tablet Comments:   Reason for Stopping:         hydrOXYzine pamoate (VISTARIL) 50 MG Cap Comments:   Reason for Stopping:               Moxifloxicin 400 daily x 5 days.      Time spent on the discharge of patient: 40  minutes    Prerna Steward MD  Department of Hospital Medicine  [facility]

## 2017-07-13 NOTE — SUBJECTIVE & OBJECTIVE
Past Medical History:   Diagnosis Date    Alcohol abuse     Behavioral problem     Bipolar 1 disorder     Chronic right hip pain     Depression     DJD (degenerative joint disease), lumbar 1/27/2015    Fatigue     Hepatitis     pt. denies this    History of psychiatric care     History of psychiatric hospitalization     Hypertension     Karina     Post traumatic stress disorder     Psychiatric problem     Seizures     Suicide attempt     Therapy        Past Surgical History:   Procedure Laterality Date    HERNIA REPAIR      SPINE SURGERY  11-12-15    LAMINECTOMY/LUMBAR/WARE       Review of patient's allergies indicates:   Allergen Reactions    Gabapentin Other (See Comments)     SEVERE DIZZINESS AND BALANCE PROBLEMS, UNABLE TO WALK.    Nardil [phenelzine]      BECOMES HYPER, LIKE A MANIC EPISODE.    Penicillins Hives    Pneumococcal 23-jett ps vaccine      Patient refused, will get later      Lamictal [lamotrigine] Rash       Current Facility-Administered Medications   Medication    acetaminophen tablet 650 mg    diazePAM tablet 5 mg    [START ON 7/13/2017] divalproex ER 24 hr tablet 1,000 mg    enoxaparin injection 40 mg    ketorolac injection 15 mg    lactated ringers infusion    lidocaine 5 % patch 1 patch    [START ON 7/13/2017] lurasidone tablet 80 mg    [START ON 7/13/2017] multivitamin liquid no.118 per dose 10 mL    [START ON 7/13/2017] nicotine 14 mg/24 hr 1 patch    pantoprazole EC tablet 40 mg    sodium chloride 0.9% flush 3 mL    thiamine (B-1) 100 mg in dextrose 5 % 50 mL IVPB     Family History     Problem Relation (Age of Onset)    Alcohol abuse Mother, Maternal Uncle, Paternal Uncle, Cousin    Anxiety disorder Brother    Bipolar disorder Brother    Dementia Maternal Grandmother    Depression Mother, Father, Sister, Brother    Drug abuse Brother    Suicide Sister        Social History Main Topics    Smoking status: Light Tobacco Smoker     Packs/day: 0.25     Years:  "20.00    Smokeless tobacco: Not on file    Alcohol use No      Comment: quit 4 years ago    Drug use: No    Sexual activity: Yes     Partners: Female      Comment: with wife; monogamous      ROS     Constitutional: negative for fevers  Eyes: no visual changes  ENT: negative for hearing loss  Respiratory: negative for dyspnea  Cardiovascular: negative for chest pain  Gastrointestinal: negative for abdominal pain  Genitourinary: negative for dysuria  Neurological: negative for headaches  Behavioral/Psych: negative for hallucinations  Endocrine: negative for temperature intolerance    Objective:     Vital Signs (Most Recent):  Temp: 98.6 °F (37 °C) (07/12/17 1700)  Pulse: 101 (07/12/17 1700)  Resp: 18 (07/12/17 1700)  BP: 138/85 (07/12/17 1700)  SpO2: 95 % (07/12/17 1700) Vital Signs (24h Range):  Temp:  [98.4 °F (36.9 °C)-99.3 °F (37.4 °C)] 98.6 °F (37 °C)  Pulse:  [] 101  Resp:  [16-18] 18  SpO2:  [93 %-100 %] 95 %  BP: (118-172)/() 138/85     Weight: 108.9 kg (240 lb)  Height: 6' 5" (195.6 cm)  Body mass index is 28.46 kg/m².    No intake or output data in the 24 hours ending 07/12/17 2110    Ortho/SPM Exam      MSK:    RLE:  Skin intact   no gross deformity deformity  minimal ecchymoses over the lateral aspect of the knee   swelling over proximal fibula  TTP around lateral aspect of knee and some ankle tenderness  Compartments soft and compressible  full ROM at ankle, knee was not ranged due to pain  SILT intact throughout but patient did state that the sensation was slightly different than normal in the distribution of the deep peroneal nerve   Motor intact throughout tibial and peroneal nerves  Brisk cap refill  Warm well perfused extremities  pulses palpable      Significant Labs: All pertinent labs within the past 24 hours have been reviewed.    Significant Imaging: I have reviewed and interpreted all pertinent imaging results/findings.     There is a proximal fibula fracture of the right leg. " There is some comminution but it is only minimally displaced.

## 2017-07-13 NOTE — HOSPITAL COURSE
7/13 - doing well.  Walked with nurse with walker without problems.  Ate breakfast, no shakes, or irritibility.  He wants to f/u with his psychiatrist, rather than AA.  He has quit drinking before,

## 2017-07-13 NOTE — ASSESSMENT & PLAN NOTE
Right proximal fibula fracture, comminuted and minimally displaced.    1. Imaging of femur and ankle ordered  2. Patient is admitted to medicine service for observation  3. WBAT  4. F/U as outpatient

## 2017-07-15 LAB
DOXEPIN SERPL-MCNC: 48 NG/ML
DOXEPIN+NOR SERPL-MCNC: 102 NG/ML (ref 100–300)
NORDOXEPIN SERPL-MCNC: 54 NG/ML

## 2017-07-17 LAB
BACTERIA BLD CULT: NORMAL

## 2017-07-19 ENCOUNTER — PATIENT MESSAGE (OUTPATIENT)
Dept: PSYCHIATRY | Facility: CLINIC | Age: 62
End: 2017-07-19

## 2017-07-20 RX ORDER — DIAZEPAM 5 MG/1
5 TABLET ORAL EVERY 12 HOURS
Qty: 4 TABLET | Refills: 0 | OUTPATIENT
Start: 2017-07-20 | End: 2017-08-19

## 2017-07-21 ENCOUNTER — OFFICE VISIT (OUTPATIENT)
Dept: ORTHOPEDICS | Facility: CLINIC | Age: 62
End: 2017-07-21
Payer: COMMERCIAL

## 2017-07-21 ENCOUNTER — HOSPITAL ENCOUNTER (OUTPATIENT)
Dept: RADIOLOGY | Facility: HOSPITAL | Age: 62
Discharge: HOME | End: 2017-07-21
Attending: ORTHOPAEDIC SURGERY
Payer: COMMERCIAL

## 2017-07-21 VITALS — WEIGHT: 240 LBS | BODY MASS INDEX: 28.34 KG/M2 | HEIGHT: 77 IN

## 2017-07-21 DIAGNOSIS — S82.831A CLOSED FRACTURE OF PROXIMAL END OF RIGHT FIBULA, UNSPECIFIED FRACTURE MORPHOLOGY, INITIAL ENCOUNTER: ICD-10-CM

## 2017-07-21 DIAGNOSIS — T14.8XXA FRACTURE: Primary | ICD-10-CM

## 2017-07-21 DIAGNOSIS — T14.8XXA FRACTURE: ICD-10-CM

## 2017-07-21 PROCEDURE — 99999 PR PBB SHADOW E&M-EST. PATIENT-LVL III: CPT | Mod: PBBFAC,,, | Performed by: PHYSICIAN ASSISTANT

## 2017-07-21 PROCEDURE — 99203 OFFICE O/P NEW LOW 30 MIN: CPT | Mod: S$GLB,,, | Performed by: PHYSICIAN ASSISTANT

## 2017-07-21 PROCEDURE — 73560 X-RAY EXAM OF KNEE 1 OR 2: CPT | Mod: 26,RT,, | Performed by: RADIOLOGY

## 2017-07-21 PROCEDURE — 73560 X-RAY EXAM OF KNEE 1 OR 2: CPT | Mod: TC,RT

## 2017-07-21 RX ORDER — OXYCODONE HYDROCHLORIDE 5 MG/1
5 CAPSULE ORAL EVERY 4 HOURS PRN
Qty: 30 CAPSULE | Refills: 0 | Status: ON HOLD | OUTPATIENT
Start: 2017-07-21 | End: 2018-02-23 | Stop reason: HOSPADM

## 2017-07-21 RX ORDER — HYDROXYZINE PAMOATE 50 MG/1
CAPSULE ORAL
Refills: 3 | COMMUNITY
Start: 2017-07-17 | End: 2017-09-12 | Stop reason: SDUPTHER

## 2017-07-22 NOTE — PROGRESS NOTES
Subjective:      Patient ID: Mirza Morales is a 62 y.o. male.    Chief Complaint: Pain of the Right Lower Leg; Pain of the Right Knee; Pain of the Right Ankle; and Pain of the Right Hip    HPI    Patient is a 62 year old male who presents to clinic for hospital orthopedic consult follow up of right proximal fibular fracture that occurred on 07/13/2017 secondary to falling during a seizure. Patient has been treated non-operative. He is WBAT in a CAM boot. He reports that he is doing ok. He stated that his pain is not controlled. He is trying to take OTC NSAID for pain relief without help. He stated that Tylenol gives him sever stomach pain. He cannot taken Tramadol due to medication interaction. He denied any ankle pain. He denied new numbness or tingling.   He has history of bilateral knee pain as well as lower back pain prior to this injury.     Review of Systems   Constitution: Negative for chills and fever.   Cardiovascular: Negative for chest pain.   Respiratory: Negative for cough and shortness of breath.    Skin: Negative for color change, dry skin, itching, nail changes, poor wound healing and rash.   Musculoskeletal:        Right proximal fibular fracture.   Neurological: Negative for dizziness.   Psychiatric/Behavioral: Negative for altered mental status. The patient is not nervous/anxious.    All other systems reviewed and are negative.        Objective:            General    Constitutional: He is oriented to person, place, and time. He appears well-developed and well-nourished. No distress.   HENT:   Head: Atraumatic.   Eyes: Conjunctivae are normal.   Cardiovascular: Normal rate.    Pulmonary/Chest: Effort normal.   Neurological: He is alert and oriented to person, place, and time.   Psychiatric: He has a normal mood and affect. His behavior is normal.           Right Knee Exam     Inspection   Swelling: absent  Bruising: absent    Range of Motion   The patient has normal right knee  ROM.    Comments:  Arrived to clinic in wheelchair.   TTP to area of fracture.   Mild swelling      Muscle Strength   Right Lower Extremity   Quadriceps:  4/5   Hamstrin/5     RADS: No significant change from prior. Comminuted fracture right proximal fibula again identified. Alignment stable. No evidence for dislocation.      Assessment:       Encounter Diagnoses   Name Primary?    Fracture Yes    Closed fracture of proximal end of right fibula, unspecified fracture morphology, initial encounter           Plan:       Dicussed treatment plan with patient. Patient is to be treated nonoperatively.   - weight bearing as tolerated, has walker at home; range of motion as tolerated   - d/c boot, it is causing increased pain and hitting fracture site.   - Order placed for home marie.   - pain medication: refilled.   - Patient is to return to clinic in 4 weeks at time x-ray of hs tib fib is needed.

## 2017-08-02 ENCOUNTER — PATIENT MESSAGE (OUTPATIENT)
Dept: PSYCHIATRY | Facility: CLINIC | Age: 62
End: 2017-08-02

## 2017-08-10 ENCOUNTER — PATIENT MESSAGE (OUTPATIENT)
Dept: PSYCHIATRY | Facility: CLINIC | Age: 62
End: 2017-08-10

## 2017-08-14 ENCOUNTER — PATIENT MESSAGE (OUTPATIENT)
Dept: PSYCHIATRY | Facility: CLINIC | Age: 62
End: 2017-08-14

## 2017-08-18 ENCOUNTER — OFFICE VISIT (OUTPATIENT)
Dept: ORTHOPEDICS | Facility: CLINIC | Age: 62
End: 2017-08-18
Payer: COMMERCIAL

## 2017-08-18 ENCOUNTER — HOSPITAL ENCOUNTER (OUTPATIENT)
Dept: RADIOLOGY | Facility: HOSPITAL | Age: 62
Discharge: HOME | End: 2017-08-18
Attending: ORTHOPAEDIC SURGERY
Payer: COMMERCIAL

## 2017-08-18 DIAGNOSIS — M89.8X6 PAIN OF RIGHT TIBIA: ICD-10-CM

## 2017-08-18 DIAGNOSIS — S82.831D CLOSED FRACTURE OF PROXIMAL END OF RIGHT FIBULA WITH ROUTINE HEALING, UNSPECIFIED FRACTURE MORPHOLOGY, SUBSEQUENT ENCOUNTER: ICD-10-CM

## 2017-08-18 DIAGNOSIS — M89.8X6 PAIN OF RIGHT TIBIA: Primary | ICD-10-CM

## 2017-08-18 PROCEDURE — 73590 X-RAY EXAM OF LOWER LEG: CPT | Mod: 26,RT,, | Performed by: RADIOLOGY

## 2017-08-18 PROCEDURE — 99213 OFFICE O/P EST LOW 20 MIN: CPT | Mod: S$GLB,,, | Performed by: PHYSICIAN ASSISTANT

## 2017-08-18 PROCEDURE — 99999 PR PBB SHADOW E&M-EST. PATIENT-LVL III: CPT | Mod: PBBFAC,,, | Performed by: PHYSICIAN ASSISTANT

## 2017-08-18 PROCEDURE — 73590 X-RAY EXAM OF LOWER LEG: CPT | Mod: TC,RT

## 2017-08-18 PROCEDURE — 3008F BODY MASS INDEX DOCD: CPT | Mod: S$GLB,,, | Performed by: PHYSICIAN ASSISTANT

## 2017-08-21 NOTE — PROGRESS NOTES
Subjective:      Patient ID: Mirza Morales is a 62 y.o. male.    Chief Complaint: No chief complaint on file.    HPI    Patient is a 62 year old male who presents to clinic forfollow up of right proximal fibular fracture that occurred on 2017 secondary to falling during a seizure. Patient has been treated non-operative. He is WBAT and range of motion as tolerated. He reports that he is doing well. He stated that his pain is controlled.  He denied any ankle pain. He denied new numbness or tingling.   He has history of bilateral knee pain as well as lower back pain prior to this injury.     Review of Systems   Constitution: Negative for chills and fever.   Cardiovascular: Negative for chest pain.   Respiratory: Negative for cough and shortness of breath.    Skin: Negative for color change, dry skin, itching, nail changes, poor wound healing and rash.   Musculoskeletal:        Right proximal fibular fracture.   Neurological: Negative for dizziness.   Psychiatric/Behavioral: Negative for altered mental status. The patient is not nervous/anxious.    All other systems reviewed and are negative.        Objective:            General    Constitutional: He is oriented to person, place, and time. He appears well-developed and well-nourished. No distress.   HENT:   Head: Atraumatic.   Eyes: Conjunctivae are normal.   Cardiovascular: Normal rate.    Pulmonary/Chest: Effort normal.   Neurological: He is alert and oriented to person, place, and time.   Psychiatric: He has a normal mood and affect. His behavior is normal.           Right Knee Exam     Inspection   Swelling: absent  Bruising: absent    Range of Motion   The patient has normal right knee ROM.    Comments:  Walked into clinic  No TTP to area of fracture.   no swelling  FROM of ankle, for patient.     Muscle Strength   Right Lower Extremity   Quadriceps:  4/5   Hamstrin/5     RADS: No significant change from prior. Comminuted fracture right proximal  fibula again identified. Alignment stable. No evidence for dislocation.      Assessment:       Encounter Diagnoses   Name Primary?    Pain of right tibia Yes    Closed fracture of proximal end of right fibula with routine healing, unspecified fracture morphology, subsequent encounter           Plan:       Dicussed treatment plan with patient. Patient is to be treated nonoperatively.   - weight bearing as tolerated; range of motion as tolerated   - pain medication: refill not needed  - Patient is to return to clinic in 6 weeks if any increase in pain at time x-ray of hs tib fib is needed.

## 2017-09-07 ENCOUNTER — LAB VISIT (OUTPATIENT)
Dept: LAB | Facility: HOSPITAL | Age: 62
End: 2017-09-07
Attending: PSYCHIATRY & NEUROLOGY
Payer: COMMERCIAL

## 2017-09-07 DIAGNOSIS — F31.9 BIPOLAR 1 DISORDER: ICD-10-CM

## 2017-09-07 PROCEDURE — 80335 ANTIDEPRESSANT TRICYCLIC 1/2: CPT

## 2017-09-07 PROCEDURE — 36415 COLL VENOUS BLD VENIPUNCTURE: CPT

## 2017-09-07 RX ORDER — LURASIDONE HYDROCHLORIDE 80 MG/1
TABLET, FILM COATED ORAL
Qty: 30 TABLET | Refills: 3 | Status: SHIPPED | OUTPATIENT
Start: 2017-09-07 | End: 2017-12-07 | Stop reason: SDUPTHER

## 2017-09-12 ENCOUNTER — OFFICE VISIT (OUTPATIENT)
Dept: PSYCHIATRY | Facility: CLINIC | Age: 62
End: 2017-09-12
Payer: COMMERCIAL

## 2017-09-12 VITALS
SYSTOLIC BLOOD PRESSURE: 135 MMHG | HEIGHT: 77 IN | HEART RATE: 105 BPM | WEIGHT: 241 LBS | BODY MASS INDEX: 28.46 KG/M2 | DIASTOLIC BLOOD PRESSURE: 94 MMHG

## 2017-09-12 DIAGNOSIS — F31.9 BIPOLAR 1 DISORDER: Primary | ICD-10-CM

## 2017-09-12 LAB
DOXEPIN SERPL-MCNC: 54 NG/ML
DOXEPIN+NOR SERPL-MCNC: 160 NG/ML (ref 100–300)
NORDOXEPIN SERPL-MCNC: 106 NG/ML

## 2017-09-12 PROCEDURE — 3008F BODY MASS INDEX DOCD: CPT | Mod: S$GLB,,, | Performed by: PSYCHIATRY & NEUROLOGY

## 2017-09-12 PROCEDURE — 99999 PR PBB SHADOW E&M-EST. PATIENT-LVL II: CPT | Mod: PBBFAC,,, | Performed by: PSYCHIATRY & NEUROLOGY

## 2017-09-12 PROCEDURE — 99213 OFFICE O/P EST LOW 20 MIN: CPT | Mod: S$GLB,,, | Performed by: PSYCHIATRY & NEUROLOGY

## 2017-09-12 RX ORDER — HYDROXYZINE PAMOATE 50 MG/1
CAPSULE ORAL
Qty: 30 CAPSULE | Refills: 3 | Status: SHIPPED | OUTPATIENT
Start: 2017-09-12 | End: 2017-12-07 | Stop reason: SDUPTHER

## 2017-09-12 RX ORDER — DIVALPROEX SODIUM 500 MG/1
TABLET, FILM COATED, EXTENDED RELEASE ORAL
Qty: 90 TABLET | Refills: 3 | Status: SHIPPED | OUTPATIENT
Start: 2017-09-12 | End: 2017-12-07 | Stop reason: SDUPTHER

## 2017-09-12 RX ORDER — DOXEPIN HYDROCHLORIDE 100 MG/1
CAPSULE ORAL
Qty: 30 CAPSULE | Refills: 3 | Status: SHIPPED | OUTPATIENT
Start: 2017-09-12 | End: 2017-12-07 | Stop reason: SDUPTHER

## 2017-09-12 NOTE — PROGRESS NOTES
ESTABLISHED OUTPATIENT VISIT   E/M LEVEL 3: [id number]    ENCOUNTER DATE: 9/12/2017  SITE: [facility]    HISTORY    CHIEF COMPLAINT   Mirza Morales is a 62 y.o. male who presents for follow up of bipolar d/o.    HPI     No recent seizure. No alcohol, no marijuana.    Reports, that he has been doing reasonably well psychiatrically.    States, that he has been having erectile dysfunction, which he feels may be related to Depakote.    Has been cooking soups.    ROS   Right Leg pain continues[pain when walking]    PAST MEDICAL, FAMILY AND SOCIAL HISTORY: The patient's past medical, family and social history have been reviewed and updated as appropriate within the electronic medical record - see encounter notes    PSYCHOTROPIC MEDICATIONS   Doxepin 100 mg at bedtime, Latuda 80 mg with dinner, Depakote 500 mg qam and 1000 mg at bedtime[prescribed for seizures], Hydroxyzine 50 mg at bedtime    Scheduled and PRN Medications     Current Outpatient Prescriptions:     acetaminophen (TYLENOL) 325 MG tablet, Take 2 tablets (650 mg total) by mouth every 4 (four) hours as needed., Disp: , Rfl: 0    atorvastatin (LIPITOR) 40 MG tablet, TAKE 1 TABLET (40 MG TOTAL) BY MOUTH ONCE DAILY., Disp: 30 tablet, Rfl: 6    diazePAM (VALIUM) 5 MG tablet, Take 1 tablet (5 mg total) by mouth every 12 (twelve) hours., Disp: 4 tablet, Rfl: 0    divalproex ER (DEPAKOTE) 500 MG Tb24, Take 1 tablet (500mg) every morning and 2 tablets (1000mg) every night., Disp: 90 tablet, Rfl: 3    doxepin (SINEQUAN) 100 MG capsule, TAKE 1 CAPSULE (100 MG TOTAL) BY MOUTH EVERY EVENING., Disp: 30 capsule, Rfl: 3    hydrOXYzine pamoate (VISTARIL) 50 MG Cap, TAKE 1 CAPSULE (50 MG TOTAL) BY MOUTH EVERY EVENING., Disp: , Rfl: 3    LATUDA 80 mg Tab tablet, TAKE 1 TABLET BY MOUTH DAILY WITH DINNER, Disp: 30 tablet, Rfl: 3    lidocaine (LIDODERM) 5 %, Place 1 patch onto the skin once daily. Remove & Discard patch within 12 hours or as  directed by MD, Disp: 10 patch, Rfl: 0    lurasidone (LATUDA) 80 mg Tab tablet, Take 80 mg by mouth once daily. Take one tablet daily with dinner., Disp: 30 tablet, Rfl: 3    multivit-min-FA-lycopen-lutein (CENTRUM SILVER ULTRA MEN'S) 300-600-300 mcg Tab, Take 1 tablet by mouth once daily at 6am., Disp: , Rfl:     nicotine (NICODERM CQ) 14 mg/24 hr, Place 1 patch onto the skin once daily at 6am., Disp: , Rfl: 0    oxycodone (OXY-IR) 5 mg Cap, Take 1 capsule (5 mg total) by mouth every 4 (four) hours as needed for Pain., Disp: 30 capsule, Rfl: 0    pantoprazole (PROTONIX) 40 MG tablet, Take 1 tablet (40 mg total) by mouth once daily., Disp: 30 tablet, Rfl: 0    thiamine 100 MG tablet, Take 1 tablet (100 mg total) by mouth once daily., Disp: , Rfl:     LABORATORY DATA  Admission on 07/12/2017, Discharged on 07/13/2017   Component Date Value Ref Range Status    WBC 07/12/2017 12.11  3.90 - 12.70 K/uL Final    RBC 07/12/2017 4.28* 4.60 - 6.20 M/uL Final    Hemoglobin 07/12/2017 14.7  14.0 - 18.0 g/dL Final    Hematocrit 07/12/2017 41.5  40.0 - 54.0 % Final    MCV 07/12/2017 97  82 - 98 fL Final    MCH 07/12/2017 34.3* 27.0 - 31.0 pg Final    MCHC 07/12/2017 35.4  32.0 - 36.0 % Final    RDW 07/12/2017 12.9  11.5 - 14.5 % Final    Platelets 07/12/2017 185  150 - 350 K/uL Final    MPV 07/12/2017 9.7  9.2 - 12.9 fL Final    Gran # 07/12/2017 10.1* 1.8 - 7.7 K/uL Final    Lymph # 07/12/2017 1.1  1.0 - 4.8 K/uL Final    Mono # 07/12/2017 0.9  0.3 - 1.0 K/uL Final    Eos # 07/12/2017 0.0  0.0 - 0.5 K/uL Final    Baso # 07/12/2017 0.03  0.00 - 0.20 K/uL Final    Gran% 07/12/2017 83.2* 38.0 - 73.0 % Final    Lymph% 07/12/2017 9.0* 18.0 - 48.0 % Final    Mono% 07/12/2017 7.2  4.0 - 15.0 % Final    Eosinophil% 07/12/2017 0.1  0.0 - 8.0 % Final    Basophil% 07/12/2017 0.2  0.0 - 1.9 % Final    Differential Method 07/12/2017 Automated   Final    Sodium 07/12/2017 138  136 - 145 mmol/L Final    Potassium  07/12/2017 4.3  3.5 - 5.1 mmol/L Final    Chloride 07/12/2017 99  95 - 110 mmol/L Final    CO2 07/12/2017 28  23 - 29 mmol/L Final    Glucose 07/12/2017 108  70 - 110 mg/dL Final    BUN, Bld 07/12/2017 12  8 - 23 mg/dL Final    Creatinine 07/12/2017 1.1  0.5 - 1.4 mg/dL Final    Calcium 07/12/2017 8.8  8.7 - 10.5 mg/dL Final    Total Protein 07/12/2017 6.9  6.0 - 8.4 g/dL Final    Albumin 07/12/2017 3.4* 3.5 - 5.2 g/dL Final    Total Bilirubin 07/12/2017 1.2* 0.1 - 1.0 mg/dL Final    Comment: For infants and newborns, interpretation of results should be based  on gestational age, weight and in agreement with clinical  observations.  Premature Infant recommended reference ranges:  Up to 24 hours.............<8.0 mg/dL  Up to 48 hours............<12.0 mg/dL  3-5 days..................<15.0 mg/dL  6-29 days.................<15.0 mg/dL      Alkaline Phosphatase 07/12/2017 117  55 - 135 U/L Final    AST 07/12/2017 87* 10 - 40 U/L Final    ALT 07/12/2017 141* 10 - 44 U/L Final    Anion Gap 07/12/2017 11  8 - 16 mmol/L Final    eGFR if African American 07/12/2017 >60.0  >60 mL/min/1.73 m^2 Final    eGFR if non African American 07/12/2017 >60.0  >60 mL/min/1.73 m^2 Final    Comment: Calculation used to obtain the estimated glomerular filtration  rate (eGFR) is the CKD-EPI equation. Since race is unknown   in our information system, the eGFR values for   -American and Non--American patients are given   for each creatinine result.      Specimen UA 07/13/2017 Urine, Clean Catch   Final    Color, UA 07/13/2017 Yellow  Yellow, Straw, Emily Final    Appearance, UA 07/13/2017 Clear  Clear Final    pH, UA 07/13/2017 6.0  5.0 - 8.0 Final    Specific Gravity, UA 07/13/2017 1.020  1.005 - 1.030 Final    Protein, UA 07/13/2017 1+* Negative Final    Comment: Recommend a 24 hour urine protein or a urine   protein/creatinine ratio if globulin induced proteinuria is  clinically suspected.      Glucose,  UA 07/13/2017 Negative  Negative Final    Ketones, UA 07/13/2017 Trace* Negative Final    Bilirubin (UA) 07/13/2017 Negative  Negative Final    Occult Blood UA 07/13/2017 Negative  Negative Final    Nitrite, UA 07/13/2017 Negative  Negative Final    Urobilinogen, UA 07/13/2017 Negative  <2.0 EU/dL Final    Leukocytes, UA 07/13/2017 Negative  Negative Final    Prothrombin Time 07/12/2017 10.7  9.0 - 12.5 sec Final    INR 07/12/2017 1.0  0.8 - 1.2 Final    Comment: Coumadin Therapy:  2.0 - 3.0 for INR for all indicators except mechanical heart valves  and antiphospholipid syndromes which should use 2.5 - 3.5.      TSH 07/12/2017 1.868  0.400 - 4.000 uIU/mL Final    Troponin I 07/12/2017 0.008  0.000 - 0.026 ng/mL Final    Comment: The reference interval for Troponin I represents the 99th percentile   cutoff   for our facility and is consistent with 3rd generation assay   performance.      Magnesium 07/12/2017 2.3  1.6 - 2.6 mg/dL Final    Phosphorus 07/12/2017 2.8  2.7 - 4.5 mg/dL Final    Alcohol, Medical, Serum 07/12/2017 <10  <10 mg/dL Final    Doxepin Lvl 07/15/2017 48  ng/mL Final    Nordoxepin Lvl 07/15/2017 54  ng/mL Final    Doxepin+Nordoxepin Lvl 07/15/2017 102  100 - 300 ng/mL Final    Comment: INTERPRETIVE INFORMATION: Doxepin/Nordoxepin Total, SP  Therapeutic Range:  Total (Doxepin and Nordoxepin): 100-300 ng/mL  Toxic: Greater than 500 ng/mL  Toxic concentrations may cause anticholinergic effects and   cardiac abnormalities.  Performed by [facility],                              [address] [place] [address],[place] [address] [phone]                    [email or web address], Velasquez Nguyen MD - Lab. Director      Valproic Acid Lvl 07/12/2017 14.6* 50.0 - 100.0 ug/mL Final    Comment: Valproic Acid (ug/ml)  Toxic:   >100.0 ug/ml      Blood Culture, Routine 07/17/2017 No growth after 5 days.   Final    Blood Culture, Routine 07/17/2017 No growth after 5 days.   Final    POC PH 07/12/2017 7.406  7.35  - 7.45 Final    POC PCO2 07/12/2017 45.9* 35 - 45 mmHg Final    POC PO2 07/12/2017 17* 40 - 60 mmHg Final    POC HCO3 07/12/2017 28.8* 24 - 28 mmol/L Final    POC BE 07/12/2017 4  -2 to 2 mmol/L Final    POC SATURATED O2 07/12/2017 25* 95 - 100 % Final    POC Lactate 07/12/2017 0.80  0.5 - 2.2 mmol/L Final    POC TCO2 07/12/2017 30* 24 - 29 mmol/L Final    Sample 07/12/2017 VENOUS   Final    Site 07/12/2017 Other   Final    Allens Test 07/12/2017 N/A   Final    Alcohol, Urine 07/13/2017 <10  <10 mg/dL Final    Benzodiazepines 07/13/2017 Presumptive Positive   Final    Methadone metabolites 07/13/2017 Negative   Final    Cocaine (Metab.) 07/13/2017 Negative   Final    Opiate Scrn, Ur 07/13/2017 Negative   Final    Barbiturate Screen, Ur 07/13/2017 Negative   Final    Amphetamine Screen, Ur 07/13/2017 Negative   Final    THC 07/13/2017 Negative   Final    Phencyclidine 07/13/2017 Presumptive Positive   Final    Creatinine, Random Ur 07/13/2017 250.0  23.0 - 375.0 mg/dL Final    Comment: The random urine reference ranges provided were established   for 24 hour urine collections.  No reference ranges exist for  random urine specimens.  Correlate clinically.      Toxicology Information 07/13/2017 SEE COMMENT   Final    Comment: This screen includes the following classes of drugs at the   listed cut-off:  Benzodiazepines                  200 ng/ml  Methadone                        300 ng/ml  Cocaine metabolite               300 ng/ml  Opiates                          300 ng/ml  Barbiturates                     200 ng/ml  Amphetamines                    1000 ng/ml  Marijuana metabs (THC)            50 ng/ml  Phencyclidine (PCP)               25 ng/ml  High concentrations of Diphenhydramine may cross-react with  Phencyclidine PCP screening immunoassay giving a false   positive result.  High concentrations of Methylenedioxymethamphetamine (MDMA aka  Ectasy) and other structurally similar compounds may  cross-   react with the Amphetamine/Methamphetamine screening   immunoassay giving a false positive result.  A metabolite of the anti-HIV drug Sustiva () may cause  false positive results in the Marijuana metabolite (THC)   screening assay.  Note: This exception list includes only more common   interferants i                           n toxicology screen testing.  Because of many   cross-reactantspositive results on toxicology drug screens   should be confirmed whenever results do not correlate with   clinical presentation.  This report is intended for use in clinical monitoring and  management of patients. It is not intended for use in   employment related drug testing.  Because of any cross-reactants, positive results on toxicology  drug screens should be confirmed whenever results do not  correlate with clinical presentation.  Presumptive positive results are unconfirmed and may be used   only for medical purposes.      Blood Culture, Routine 07/17/2017 No growth after 5 days.   Final    Sodium 07/13/2017 137  136 - 145 mmol/L Final    Potassium 07/13/2017 3.4* 3.5 - 5.1 mmol/L Final    Chloride 07/13/2017 99  95 - 110 mmol/L Final    CO2 07/13/2017 31* 23 - 29 mmol/L Final    Glucose 07/13/2017 104  70 - 110 mg/dL Final    BUN, Bld 07/13/2017 11  8 - 23 mg/dL Final    Creatinine 07/13/2017 0.9  0.5 - 1.4 mg/dL Final    Calcium 07/13/2017 8.4* 8.7 - 10.5 mg/dL Final    Total Protein 07/13/2017 5.5* 6.0 - 8.4 g/dL Final    Albumin 07/13/2017 2.7* 3.5 - 5.2 g/dL Final    Total Bilirubin 07/13/2017 0.6  0.1 - 1.0 mg/dL Final    Comment: For infants and newborns, interpretation of results should be based  on gestational age, weight and in agreement with clinical  observations.  Premature Infant recommended reference ranges:  Up to 24 hours.............<8.0 mg/dL  Up to 48 hours............<12.0 mg/dL  3-5 days..................<15.0 mg/dL  6-29 days.................<15.0 mg/dL      Alkaline  Phosphatase 07/13/2017 87  55 - 135 U/L Final    AST 07/13/2017 40  10 - 40 U/L Final    ALT 07/13/2017 82* 10 - 44 U/L Final    Anion Gap 07/13/2017 7* 8 - 16 mmol/L Final    eGFR if African American 07/13/2017 >60.0  >60 mL/min/1.73 m^2 Final    eGFR if non African American 07/13/2017 >60.0  >60 mL/min/1.73 m^2 Final    Comment: Calculation used to obtain the estimated glomerular filtration  rate (eGFR) is the CKD-EPI equation. Since race is unknown   in our information system, the eGFR values for   -American and Non--American patients are given   for each creatinine result.      WBC 07/13/2017 6.74  3.90 - 12.70 K/uL Final    RBC 07/13/2017 3.79* 4.60 - 6.20 M/uL Final    Hemoglobin 07/13/2017 12.9* 14.0 - 18.0 g/dL Final    Hematocrit 07/13/2017 37.0* 40.0 - 54.0 % Final    MCV 07/13/2017 98  82 - 98 fL Final    MCH 07/13/2017 34.0* 27.0 - 31.0 pg Final    MCHC 07/13/2017 34.9  32.0 - 36.0 % Final    RDW 07/13/2017 12.9  11.5 - 14.5 % Final    Platelets 07/13/2017 134* 150 - 350 K/uL Final    MPV 07/13/2017 9.6  9.2 - 12.9 fL Final    Gran # 07/13/2017 4.0  1.8 - 7.7 K/uL Final    Lymph # 07/13/2017 1.7  1.0 - 4.8 K/uL Final    Mono # 07/13/2017 0.6  0.3 - 1.0 K/uL Final    Eos # 07/13/2017 0.4  0.0 - 0.5 K/uL Final    Baso # 07/13/2017 0.02  0.00 - 0.20 K/uL Final    Gran% 07/13/2017 59.0  38.0 - 73.0 % Final    Lymph% 07/13/2017 24.8  18.0 - 48.0 % Final    Mono% 07/13/2017 8.9  4.0 - 15.0 % Final    Eosinophil% 07/13/2017 6.4  0.0 - 8.0 % Final    Basophil% 07/13/2017 0.3  0.0 - 1.9 % Final    Differential Method 07/13/2017 Automated   Final    Magnesium 07/13/2017 1.7  1.6 - 2.6 mg/dL Final    Phosphorus 07/13/2017 3.6  2.7 - 4.5 mg/dL Final    RBC, UA 07/13/2017 1  0 - 4 /hpf Final    WBC, UA 07/13/2017 1  0 - 5 /hpf Final    Bacteria, UA 07/13/2017 Occasional  None-Occ /hpf Final    Hyaline Casts, UA 07/13/2017 4* 0-1/lpf /lpf Final    Microscopic Comment  "07/13/2017 SEE COMMENT   Final    Comment: Other formed elements not mentioned in the report are not   present in the microscopic examination.          EXAMINATION    BP (!) 135/94   Pulse 105   Ht 6' 5" (1.956 m)   Wt 109.3 kg (241 lb)   BMI 28.58 kg/m²       CONSTITUTIONAL  General Appearance: well nourished    MUSCULOSKELETAL  Muscle Strength and Tone: normal strength and tone  Abnormal Involuntary Movements: no abnormal movement noted  Gait and Station: normal gait    PSYCHIATRIC   Level of Consciousness: alert  Orientation: grossly intact  Grooming: well groomed  Psychomotor Behavior: no restlessness/agitation  Speech: normal in rate, rhythm and volume  Language: normal vocabulary  Mood: stable  Affect: full range and appropriate  Thought Process: logical and goal directed  Associations: intact associations  Thought Content: no SI/HI  Memory: grossly intact  Attention: intact to content of interview  Fund of Knowledge: appears adequate  Insight: good  Judgement: good    MEDICAL DECISION MAKING    DIAGNOSES  Bipolar d/o      PROBLEM LIST AND MANAGEMENT PLANS    - continue Doxepin, Depakote ER, Latuda, Hydroxyzine as above  - Doxepin level is pending[drawn last week]  - rtc 3 months    Time with patient: 15 min      Ambrosio Loco          "

## 2017-09-19 ENCOUNTER — PATIENT MESSAGE (OUTPATIENT)
Dept: NEUROLOGY | Facility: CLINIC | Age: 62
End: 2017-09-19

## 2017-09-19 ENCOUNTER — TELEPHONE (OUTPATIENT)
Dept: NEUROLOGY | Facility: CLINIC | Age: 62
End: 2017-09-19

## 2017-09-19 NOTE — TELEPHONE ENCOUNTER
----- Message from Felipe Roland sent at 9/19/2017  3:21 PM CDT -----  Contact: pt wife   Pt would like to be called back regarding speaking with nurse about divalproex ER (DEPAKOTE) 500 MG Tb24 medication. Pt is trembling with medication.       Pt can be reached at [phone].

## 2017-09-19 NOTE — TELEPHONE ENCOUNTER
I spoke to wife who states he has trembling in his hands. They want to come for visit asap. The soonest is with Dr. Fitch 10/3/17. I messaged them on this as she asked me to do

## 2017-09-21 ENCOUNTER — PATIENT MESSAGE (OUTPATIENT)
Dept: PSYCHIATRY | Facility: CLINIC | Age: 62
End: 2017-09-21

## 2017-10-03 ENCOUNTER — OFFICE VISIT (OUTPATIENT)
Dept: NEUROLOGY | Facility: CLINIC | Age: 62
End: 2017-10-03
Payer: COMMERCIAL

## 2017-10-03 VITALS
WEIGHT: 241.19 LBS | DIASTOLIC BLOOD PRESSURE: 89 MMHG | BODY MASS INDEX: 28.48 KG/M2 | HEART RATE: 118 BPM | SYSTOLIC BLOOD PRESSURE: 132 MMHG | HEIGHT: 77 IN

## 2017-10-03 DIAGNOSIS — R25.1 TREMOR: Primary | ICD-10-CM

## 2017-10-03 PROCEDURE — 99214 OFFICE O/P EST MOD 30 MIN: CPT | Mod: S$GLB,,, | Performed by: PSYCHIATRY & NEUROLOGY

## 2017-10-03 PROCEDURE — 99999 PR PBB SHADOW E&M-EST. PATIENT-LVL III: CPT | Mod: PBBFAC,,, | Performed by: PSYCHIATRY & NEUROLOGY
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
## 2017-10-31 DIAGNOSIS — M54.17 RADICULOPATHY OF LUMBOSACRAL REGION: Primary | ICD-10-CM

## 2017-10-31 DIAGNOSIS — M41.9 SCOLIOSIS: Primary | ICD-10-CM

## 2017-11-07 ENCOUNTER — HOSPITAL ENCOUNTER (OUTPATIENT)
Dept: RADIOLOGY | Facility: HOSPITAL | Age: 62
Discharge: HOME | End: 2017-11-07
Attending: PHYSICIAN ASSISTANT
Payer: COMMERCIAL

## 2017-11-07 DIAGNOSIS — M41.9 SCOLIOSIS: ICD-10-CM

## 2017-11-07 PROCEDURE — 72146 MRI CHEST SPINE W/O DYE: CPT | Mod: TC

## 2017-11-07 PROCEDURE — 72146 MRI CHEST SPINE W/O DYE: CPT | Mod: 26,,, | Performed by: RADIOLOGY

## 2017-11-12 ENCOUNTER — OFFICE VISIT (OUTPATIENT)
Dept: URGENT CARE | Facility: CLINIC | Age: 62
End: 2017-11-12
Payer: COMMERCIAL

## 2017-11-12 VITALS
HEIGHT: 77 IN | TEMPERATURE: 99 F | OXYGEN SATURATION: 95 % | SYSTOLIC BLOOD PRESSURE: 128 MMHG | DIASTOLIC BLOOD PRESSURE: 81 MMHG | HEART RATE: 101 BPM | WEIGHT: 238 LBS | BODY MASS INDEX: 28.1 KG/M2

## 2017-11-12 DIAGNOSIS — J18.9 PNEUMONIA OF LEFT LUNG DUE TO INFECTIOUS ORGANISM, UNSPECIFIED PART OF LUNG: Primary | ICD-10-CM

## 2017-11-12 PROCEDURE — 99214 OFFICE O/P EST MOD 30 MIN: CPT | Mod: S$GLB,,, | Performed by: PHYSICIAN ASSISTANT

## 2017-11-12 RX ORDER — MOXIFLOXACIN HYDROCHLORIDE 400 MG/1
400 TABLET ORAL DAILY
Qty: 10 TABLET | Refills: 0 | Status: SHIPPED | OUTPATIENT
Start: 2017-11-12 | End: 2017-11-22

## 2017-11-12 RX ORDER — AZITHROMYCIN 250 MG/1
250 TABLET, FILM COATED ORAL DAILY
Qty: 6 TABLET | Refills: 0 | Status: SHIPPED | OUTPATIENT
Start: 2017-11-12 | End: 2017-11-12 | Stop reason: CLARIF

## 2017-11-12 NOTE — PATIENT INSTRUCTIONS
- Rest.    - Drink plenty of fluids.    - Tylenol or Ibuprofen as directed as needed for fever/pain.    - Follow up with your PCP or specialty clinic as directed in the next 1-2 weeks if not improved or as needed.  You can call [phone] to schedule an appointment with the appropriate provider.    - Go to the ED if your symptoms worsen.    Pneumonia (Adult)  Pneumonia is an infection deep within the lungs. It is in the small air sacs (alveoli). Pneumonia may be caused by a virus or bacteria. Pneumonia caused by bacteria is usually treated with an antibiotic. Severe cases may need to be treated in the hospital. Milder cases can be treated at home. Symptoms usually start to get better during the first 2 days of treatment.    Home care  Follow these guidelines when caring for yourself at home:  · Rest at home for the first 2 to 3 days, or until you feel stronger. Dont let yourself get overly tired when you go back to your activities.  · Stay away from cigarette smoke - yours or other peoples.  · You may use acetaminophen or ibuprofen to control fever or pain, unless another medicine was prescribed. If you have chronic liver or kidney disease, talk with your healthcare provider before using these medicines. Also talk with your provider if youve had a stomach ulcer or gastrointestinal bleeding. Dont give aspirin to anyone younger than 18 years of age who is ill with a fever. It may cause severe liver damage.  · Your appetite may be poor, so a light diet is fine.  · Drink 6 to 8 glasses of fluids every day to make sure you are getting enough fluids. Beverages can include water, sport drinks, sodas without caffeine, juices, tea, or soup. Fluids will help loosen secretions in the lung. This will make it easier for you to cough up the phlegm (sputum). If you also have heart or kidney disease, check with your healthcare provider before you drink extra fluids.  · Take antibiotic medicine prescribed until it is all  gone, even if you are feeling better after a few days.  Follow-up care  Follow up with your healthcare provider in the next 2 to 3 days, or as advised. This is to be sure the medicine is helping you get better.  If you are 65 or older, you should get a pneumococcal vaccine and a yearly flu (influenza) shot. You should also get these vaccines if you have chronic lung disease like asthma, emphysema, or COPD. Recently, a second type of pneumonia vaccine has become available for everyone over 65 years old. This is in addition to the previous vaccine. Ask your provider about this.  When to seek medical advice  Call your healthcare provider right away if any of these occur:  · You dont get better within the first 48 hours of treatment  · Shortness of breath gets worse  · Rapid breathing (more than 25 breaths per minute)  · Coughing up blood  · Chest pain gets worse with breathing  · Fever of 100.4°F (38°C) or higher that doesnt get better with fever medicine  · Weakness, dizziness, or fainting that gets worse  · Thirst or dry mouth that gets worse  · Sinus pain, headache, or a stiff neck  · Chest pain not caused by coughing  Date Last Reviewed: 1/1/2017  © 2651-4169 [employer]. [address] [place] [address], [place] [address]. All rights reserved. This information is not intended as a substitute for professional medical care. Always follow your healthcare professional's instructions.

## 2017-11-12 NOTE — PROGRESS NOTES
"Subjective:       Patient ID: Mirza Morales is a 62 y.o. male.    Vitals:  height is 6' 5" (1.956 m) and weight is 108 kg (238 lb). His tympanic temperature is 98.7 °F (37.1 °C). His blood pressure is 128/81 and his pulse is 101. His oxygen saturation is 95%.     Chief Complaint: Chest Congestion and Cough    This is a 62 y.o. male with Past Medical History:  No date: Alcohol abuse  No date: Behavioral problem  No date: Bipolar 1 disorder  No date: Chronic right hip pain  No date: Depression  1/27/2015: DJD (degenerative joint disease), lumbar  No date: Fatigue  No date: Hepatitis      Comment: pt. denies this  No date: History of psychiatric care  No date: History of psychiatric hospitalization  No date: Hypertension  No date: Karina  No date: Post traumatic stress disorder  No date: Psychiatric problem  No date: Seizures  No date: Suicide attempt  No date: Therapy   who presents today with a chief complaint of chest congestion, cough and headache.      Cough   This is a new problem. The current episode started in the past 7 days. The problem has been gradually worsening. The problem occurs hourly. The cough is productive of sputum. Associated symptoms include headaches, nasal congestion, shortness of breath and wheezing. Pertinent negatives include no chest pain, chills, ear pain, eye redness, fever, myalgias, postnasal drip, rhinorrhea or sore throat. The symptoms are aggravated by lying down. Risk factors for lung disease include smoking/tobacco exposure. He has tried OTC cough suppressant for the symptoms. The treatment provided moderate relief. There is no history of asthma or COPD.     Review of Systems   Constitution: Negative for chills, fever and malaise/fatigue.   HENT: Negative for congestion, ear pain, hoarse voice, postnasal drip, rhinorrhea and sore throat.    Eyes: Negative for discharge and redness.   Cardiovascular: Negative for chest pain, dyspnea on exertion and leg swelling.   Respiratory: " Positive for cough, shortness of breath, sputum production and wheezing.    Musculoskeletal: Negative for myalgias.   Gastrointestinal: Negative for abdominal pain and nausea.   Neurological: Positive for headaches.       Objective:      Physical Exam   Constitutional: He is oriented to person, place, and time. He appears well-developed and well-nourished. No distress.   HENT:   Head: Normocephalic and atraumatic.   Right Ear: Hearing, tympanic membrane, external ear and ear canal normal.   Left Ear: Hearing, tympanic membrane, external ear and ear canal normal.   Nose: Nose normal.   Mouth/Throat: Uvula is midline and oropharynx is clear and moist.   Eyes: Conjunctivae are normal.   Neck: Normal range of motion. Neck supple.   Cardiovascular: Normal rate and regular rhythm.  Exam reveals no gallop and no friction rub.    No murmur heard.  Pulmonary/Chest: Effort normal and breath sounds normal. He has no wheezes. He has no rales.   Musculoskeletal: Normal range of motion.   Neurological: He is alert and oriented to person, place, and time.   Skin: Skin is warm and dry. No rash noted. No erythema.   Psychiatric: He has a normal mood and affect. His behavior is normal. Judgment and thought content normal.   Nursing note and vitals reviewed.      Assessment:       1. Pneumonia of left lung due to infectious organism, unspecified part of lung        Plan:         Pneumonia of left lung due to infectious organism, unspecified part of lung  -     X-Ray Chest PA And Lateral; Future; Expected date: 11/12/2017  -     Discontinue: azithromycin (Z-KEHINDE) 250 MG tablet; Take 1 tablet (250 mg total) by mouth once daily. Take 2 tablets on day 1.  Dispense: 6 tablet; Refill: 0  -     moxifloxacin (AVELOX) 400 mg tablet; Take 1 tablet (400 mg total) by mouth once daily.  Dispense: 10 tablet; Refill: 0      Mirza was seen today for chest congestion and cough.    Diagnoses and all orders for this visit:    Pneumonia of left lung due  to infectious organism, unspecified part of lung  -     X-Ray Chest PA And Lateral; Future  -     Discontinue: azithromycin (Z-KEHINDE) 250 MG tablet; Take 1 tablet (250 mg total) by mouth once daily. Take 2 tablets on day 1.  -     moxifloxacin (AVELOX) 400 mg tablet; Take 1 tablet (400 mg total) by mouth once daily.      Patient Instructions   - Rest.    - Drink plenty of fluids.    - Tylenol or Ibuprofen as directed as needed for fever/pain.    - Follow up with your PCP or specialty clinic as directed in the next 1-2 weeks if not improved or as needed.  You can call [phone] to schedule an appointment with the appropriate provider.    - Go to the ED if your symptoms worsen.    Pneumonia (Adult)  Pneumonia is an infection deep within the lungs. It is in the small air sacs (alveoli). Pneumonia may be caused by a virus or bacteria. Pneumonia caused by bacteria is usually treated with an antibiotic. Severe cases may need to be treated in the hospital. Milder cases can be treated at home. Symptoms usually start to get better during the first 2 days of treatment.    Home care  Follow these guidelines when caring for yourself at home:  · Rest at home for the first 2 to 3 days, or until you feel stronger. Dont let yourself get overly tired when you go back to your activities.  · Stay away from cigarette smoke - yours or other peoples.  · You may use acetaminophen or ibuprofen to control fever or pain, unless another medicine was prescribed. If you have chronic liver or kidney disease, talk with your healthcare provider before using these medicines. Also talk with your provider if youve had a stomach ulcer or gastrointestinal bleeding. Dont give aspirin to anyone younger than 18 years of age who is ill with a fever. It may cause severe liver damage.  · Your appetite may be poor, so a light diet is fine.  · Drink 6 to 8 glasses of fluids every day to make sure you are getting enough fluids. Beverages can include  water, sport drinks, sodas without caffeine, juices, tea, or soup. Fluids will help loosen secretions in the lung. This will make it easier for you to cough up the phlegm (sputum). If you also have heart or kidney disease, check with your healthcare provider before you drink extra fluids.  · Take antibiotic medicine prescribed until it is all gone, even if you are feeling better after a few days.  Follow-up care  Follow up with your healthcare provider in the next 2 to 3 days, or as advised. This is to be sure the medicine is helping you get better.  If you are 65 or older, you should get a pneumococcal vaccine and a yearly flu (influenza) shot. You should also get these vaccines if you have chronic lung disease like asthma, emphysema, or COPD. Recently, a second type of pneumonia vaccine has become available for everyone over 65 years old. This is in addition to the previous vaccine. Ask your provider about this.  When to seek medical advice  Call your healthcare provider right away if any of these occur:  · You dont get better within the first 48 hours of treatment  · Shortness of breath gets worse  · Rapid breathing (more than 25 breaths per minute)  · Coughing up blood  · Chest pain gets worse with breathing  · Fever of 100.4°F (38°C) or higher that doesnt get better with fever medicine  · Weakness, dizziness, or fainting that gets worse  · Thirst or dry mouth that gets worse  · Sinus pain, headache, or a stiff neck  · Chest pain not caused by coughing  Date Last Reviewed: 1/1/2017  © 8616-7684 [employer] [place]. [address] [place] [address], [place] [address]. All rights reserved. This information is not intended as a substitute for professional medical care. Always follow your healthcare professional's instructions.

## 2017-11-16 ENCOUNTER — OFFICE VISIT (OUTPATIENT)
Dept: INTERNAL MEDICINE | Facility: CLINIC | Age: 62
End: 2017-11-16
Payer: COMMERCIAL

## 2017-11-16 ENCOUNTER — HOSPITAL ENCOUNTER (OUTPATIENT)
Dept: RADIOLOGY | Facility: HOSPITAL | Age: 62
Discharge: HOME | End: 2017-11-16
Attending: NURSE PRACTITIONER
Payer: COMMERCIAL

## 2017-11-16 VITALS
SYSTOLIC BLOOD PRESSURE: 118 MMHG | HEIGHT: 77 IN | HEART RATE: 103 BPM | OXYGEN SATURATION: 95 % | BODY MASS INDEX: 28.56 KG/M2 | WEIGHT: 241.88 LBS | TEMPERATURE: 98 F | DIASTOLIC BLOOD PRESSURE: 60 MMHG

## 2017-11-16 DIAGNOSIS — J18.9 PNEUMONIA OF LEFT LUNG DUE TO INFECTIOUS ORGANISM, UNSPECIFIED PART OF LUNG: ICD-10-CM

## 2017-11-16 DIAGNOSIS — J18.9 PNEUMONIA OF LEFT LUNG DUE TO INFECTIOUS ORGANISM, UNSPECIFIED PART OF LUNG: Primary | ICD-10-CM

## 2017-11-16 DIAGNOSIS — J98.11 ATELECTASIS OF LEFT LUNG: ICD-10-CM

## 2017-11-16 DIAGNOSIS — Z72.0 TOBACCO ABUSE: ICD-10-CM

## 2017-11-16 PROCEDURE — 71020 XR CHEST PA AND LATERAL: CPT | Mod: TC

## 2017-11-16 PROCEDURE — 99214 OFFICE O/P EST MOD 30 MIN: CPT | Mod: S$GLB,,, | Performed by: NURSE PRACTITIONER

## 2017-11-16 PROCEDURE — 99999 PR PBB SHADOW E&M-EST. PATIENT-LVL V: CPT | Mod: PBBFAC,,, | Performed by: NURSE PRACTITIONER

## 2017-11-16 PROCEDURE — 71020 XR CHEST PA AND LATERAL: CPT | Mod: 26,,, | Performed by: RADIOLOGY

## 2017-11-16 RX ORDER — ALBUTEROL SULFATE 90 UG/1
2 AEROSOL, METERED RESPIRATORY (INHALATION) EVERY 4 HOURS PRN
Qty: 1 INHALER | Refills: 0 | Status: SHIPPED | OUTPATIENT
Start: 2017-11-16 | End: 2018-05-24

## 2017-11-16 RX ORDER — GUAIFENESIN 1200 MG/1
1 TABLET, EXTENDED RELEASE ORAL 2 TIMES DAILY
Start: 2017-11-16 | End: 2017-11-26

## 2017-11-16 NOTE — PROGRESS NOTES
Subjective:       Patient ID: Mirza Morales is a 62 y.o. male.    Chief Complaint: Pneumonia    Mr Morales presents today for severe cough. He was seen at urgent care Sunday, diagnosed with pneumonia and placed on avelox, which he is still taking.       Pneumonia   He complains of cough, frequent throat clearing, hoarse voice, shortness of breath and sputum production. There is no chest tightness, difficulty breathing, hemoptysis or wheezing. This is a new problem. The current episode started in the past 7 days. The problem occurs constantly. The problem has been unchanged. The cough is productive of sputum, hacking, harsh and productive. Associated symptoms include dyspnea on exertion, headaches, malaise/fatigue, rhinorrhea and sneezing. Pertinent negatives include no appetite change, chest pain, ear congestion, ear pain, fever, heartburn, myalgias, nasal congestion, orthopnea, PND, postnasal drip, sore throat, sweats, trouble swallowing or weight loss. His symptoms are aggravated by nothing. His symptoms are alleviated by nothing. Risk factors for lung disease include smoking/tobacco exposure.     Review of Systems   Constitutional: Positive for activity change, chills and malaise/fatigue. Negative for appetite change, fever, unexpected weight change and weight loss.   HENT: Positive for hoarse voice, rhinorrhea and sneezing. Negative for ear pain, hearing loss, postnasal drip, sore throat and trouble swallowing.    Eyes: Negative for discharge and visual disturbance.   Respiratory: Positive for cough, sputum production, chest tightness and shortness of breath. Negative for hemoptysis and wheezing.    Cardiovascular: Positive for dyspnea on exertion. Negative for chest pain, palpitations and PND.   Gastrointestinal: Negative for blood in stool, constipation, diarrhea, heartburn and vomiting.   Endocrine: Negative for polydipsia and polyuria.   Genitourinary: Negative for difficulty urinating, hematuria and  urgency.   Musculoskeletal: Negative for arthralgias, joint swelling, myalgias and neck pain.   Skin: Negative for rash.   Neurological: Positive for weakness and headaches.   Psychiatric/Behavioral: Positive for dysphoric mood. Negative for confusion.       Objective:      Physical Exam   Constitutional: He is oriented to person, place, and time. He appears well-developed and well-nourished. No distress.   HENT:   Head: Atraumatic.   Eyes: No scleral icterus.   Neck: Normal range of motion. Neck supple.   Cardiovascular: Normal rate, regular rhythm and normal heart sounds.    No murmur heard.  Pulmonary/Chest: Effort normal. No respiratory distress. He has decreased breath sounds in the left middle field and the left lower field. He has rales in the left middle field and the left lower field. He exhibits no tenderness.   Musculoskeletal: He exhibits no edema.   Lymphadenopathy:     He has no cervical adenopathy.   Neurological: He is alert and oriented to person, place, and time.   Skin: He is not diaphoretic. There is pallor.   Nursing note and vitals reviewed.      Assessment:       1. Pneumonia of left lung due to infectious organism, unspecified part of lung    2. Atelectasis of left lung    3. Tobacco abuse        Plan:   1. Pneumonia of left lung due to infectious organism, unspecified part of lung  - X-Ray Chest PA And Lateral; Future  - CBC auto differential; Future  - guaiFENesin (MUCINEX) 1,200 mg Ta12; Take 1 tablet by mouth 2 (two) times daily. With lots of water  - albuterol 90 mcg/actuation inhaler; Inhale 2 puffs into the lungs every 4 (four) hours as needed for Wheezing.  Dispense: 1 Inhaler; Refill: 0    2. Atelectasis of left lung  - guaiFENesin (MUCINEX) 1,200 mg Ta12; Take 1 tablet by mouth 2 (two) times daily. With lots of water  - albuterol 90 mcg/actuation inhaler; Inhale 2 puffs into the lungs every 4 (four) hours as needed for Wheezing.  Dispense: 1 Inhaler; Refill: 0  - Ambulatory consult  to Pulmonology  - beclomethasone (QVAR) 40 mcg/actuation Aero; Inhale 1 puff into the lungs 2 (two) times daily. Controller  Dispense: 120 each; Refill: 0    3. Tobacco abuse  - Declines another referral to smoking cessation.       Pt has been given instructions populated from [facility] [employer] database and has verbalized understanding of the after visit summary and information contained wherein.    Follow up with a primary care provider. May go to ER for acute shortness of breath, lightheadedness, fever, or any other emergent complaints or changes in condition.

## 2017-11-27 ENCOUNTER — OFFICE VISIT (OUTPATIENT)
Dept: PULMONOLOGY | Facility: CLINIC | Age: 62
End: 2017-11-27
Payer: COMMERCIAL

## 2017-11-27 VITALS
RESPIRATION RATE: 14 BRPM | HEIGHT: 77 IN | WEIGHT: 241.38 LBS | OXYGEN SATURATION: 97 % | SYSTOLIC BLOOD PRESSURE: 128 MMHG | DIASTOLIC BLOOD PRESSURE: 88 MMHG | HEART RATE: 105 BPM | BODY MASS INDEX: 28.5 KG/M2

## 2017-11-27 DIAGNOSIS — J98.6 CHRONICALLY ELEVATED HEMIDIAPHRAGM: ICD-10-CM

## 2017-11-27 DIAGNOSIS — J20.8 ACUTE BRONCHITIS DUE TO OTHER SPECIFIED ORGANISMS: ICD-10-CM

## 2017-11-27 DIAGNOSIS — Z72.0 TOBACCO ABUSE: Primary | Chronic | ICD-10-CM

## 2017-11-27 DIAGNOSIS — M41.9 SCOLIOSIS OF THORACOLUMBAR SPINE, UNSPECIFIED SCOLIOSIS TYPE: ICD-10-CM

## 2017-11-27 DIAGNOSIS — J98.11: ICD-10-CM

## 2017-11-27 PROCEDURE — 99999 PR PBB SHADOW E&M-EST. PATIENT-LVL III: CPT | Mod: PBBFAC,,, | Performed by: INTERNAL MEDICINE

## 2017-11-27 PROCEDURE — 99204 OFFICE O/P NEW MOD 45 MIN: CPT | Mod: S$GLB,,, | Performed by: INTERNAL MEDICINE

## 2017-11-27 NOTE — LETTER
November 27, 2017      Aiyana López, MODE  [address]  [place] [address]           [facility]  [address]  [place] [address]  Phone: [phone]          Patient: Mirza Morales   MR Number: [id number]   YOB: 1955   Date of Visit: 11/27/2017       Dear Aiyana López:    Thank you for referring Mirza Morales to me for evaluation. Attached you will find relevant portions of my assessment and plan of care.    If you have questions, please do not hesitate to call me. I look forward to following Mirza Morales along with you.    Sincerely,    ALETHEA Banda MD    Enclosure  CC:  No Recipients    If you would like to receive this communication electronically, please contact [email or web address] or [phone] to request more information on [employer] access.    For providers and/or their staff who would like to refer a patient to [facility], please contact us through our one-stop-shop provider referral line, Birgit Lindsey, at [phone].    If you feel you have received this communication in error or would no longer like to receive these types of communications, please e-mail [email or web address]

## 2017-11-27 NOTE — PATIENT INSTRUCTIONS
CT Chest (phone report).  Continue Mucinex and Albuterol to control resp symptoms.  OK to stop QVAR.  Urged to stop smoking.

## 2017-11-27 NOTE — PROGRESS NOTES
Subjective:       Patient ID: Mirza Morales is a 62 y.o. male.    Chief Complaint: Cough and Abnormal Chest X-ray    HPI HISTORY OF PRESENT ILLNESS:  Mr. Morales is a 62-year-old smoker who comes for   evaluation of recent respiratory symptoms.  He describes having an acute illness   beginning approximately two weeks ago which was characterized by malaise,   cough, wheezing, and nonpurulent sputum.  He did not have fever or any thoracic   pain.  He had a visit in the [facility], and was begun on treatment   for possible pneumonia.  He feels that his symptoms are now significantly   improved.  His recent treatment has included Avelox, albuterol inhaler, QVAR   inhaler, and Mucinex.    Mr. Morales does not know of any proven past episodes of pneumonia.  He has never   been prescribed long term respiratory medications in the past.  He does not have any   upper GI symptoms to suggest reflux disease or swallowing problems.  He does   have significant scoliosis.  He has not had any unexplained changes in weight.    Mr. Morales estimates that he has smoked no more than one-half pack of cigarettes   per day for the past 15 to 20 years.  He believes that his family history is   negative for lung diseases.  He does not know of any important past occupational   exposures.    DATA:  I have reviewed the images from the chest x-ray done earlier this month.    There is modest elevation of the left hemidiaphragm with associated plate   atelectasis involving the lung base on that side.  Similar findings are seen in   previous x-rays from July and February of this year.  These features were not   present in a chest radiograph from 2012.  There is modest scoliosis of the   thoracolumbar spine.  The right lung appears clear.  There are no definite   pleural effusions.    A CBC done recently shows the white blood count 8400, hemoglobin 15, and   platelet count 223,000.          /ls [id number] blank(s)          CB/HN  dd:  11/27/2017 19:27:29 (CST)  td: 11/28/2017 08:57:35 ([facility])  Doc ID   #[id number]  Job ID #[id number]    CC:       Review of Systems   Constitutional: Positive for fatigue. Negative for fever.   HENT: Positive for congestion. Negative for postnasal drip, sinus pressure and voice change.    Respiratory: Positive for cough and shortness of breath. Negative for sputum production, wheezing and dyspnea on extertion.    Cardiovascular: Positive for chest pain. Negative for leg swelling.   Genitourinary: Negative for difficulty urinating.   Musculoskeletal: Negative for arthralgias and back pain.   Skin: Negative for rash.   Gastrointestinal: Negative for abdominal pain and acid reflux.   Neurological: Negative for dizziness and weakness.   Hematological: Negative for adenopathy.       Objective:      Physical Exam   Constitutional: He is oriented to person, place, and time. He appears well-developed. He is obese.   HENT:   Head: Normocephalic.   Mouth/Throat: Oropharynx is clear and moist. No oropharyngeal exudate.   Neck: Normal range of motion. Neck supple. No JVD present. No tracheal deviation present. No thyromegaly present.   Cardiovascular: Regular rhythm and normal heart sounds.    No murmur heard.  Regular Tachycardia.   Pulmonary/Chest: Symmetric chest wall expansion. No stridor. He has no wheezes. He has no rhonchi. He has rales (few rales L base). He exhibits no tenderness.   Abdominal: Soft.   Musculoskeletal: He exhibits no edema.   Lymphadenopathy:     He has no cervical adenopathy.   Neurological: He is alert and oriented to person, place, and time.   Skin: Skin is warm and dry. No rash noted. No erythema. Nails show no clubbing.   Psychiatric: He has a normal mood and affect.   Vitals reviewed.    Personal Diagnostic Review    No flowsheet data found.      Assessment:       1. Tobacco abuse    2. Acute bronchitis due to other specified organisms    3. Chronically elevated hemidiaphragm    4. Plate atelectasis    5.  Scoliosis of thoracolumbar spine, unspecified scoliosis type        Outpatient Encounter Prescriptions as of 2017   Medication Sig Dispense Refill    acetaminophen (TYLENOL) 325 MG tablet Take 2 tablets (650 mg total) by mouth every 4 (four) hours as needed.  0    albuterol 90 mcg/actuation inhaler Inhale 2 puffs into the lungs every 4 (four) hours as needed for Wheezing. 1 Inhaler 0    atorvastatin (LIPITOR) 40 MG tablet TAKE 1 TABLET (40 MG TOTAL) BY MOUTH ONCE DAILY. 30 tablet 6    beclomethasone (QVAR) 40 mcg/actuation Aero Inhale 1 puff into the lungs 2 (two) times daily. Controller 120 each 0    diazePAM (VALIUM) 5 MG tablet Take 1 tablet (5 mg total) by mouth every 12 (twelve) hours. 4 tablet 0    divalproex ER (DEPAKOTE) 500 MG Tb24 Take 1 tablet (500mg) every morning and 2 tablets (1000mg) every night. 90 tablet 3    doxepin (SINEQUAN) 100 MG capsule TAKE 1 CAPSULE (100 MG TOTAL) BY MOUTH EVERY EVENING. 30 capsule 3    [] guaiFENesin (MUCINEX) 1,200 mg Ta12 Take 1 tablet by mouth 2 (two) times daily. With lots of water      hydrOXYzine pamoate (VISTARIL) 50 MG Cap TAKE 1 CAPSULE (50 MG TOTAL) BY MOUTH EVERY EVENING. 30 capsule 3    LATUDA 80 mg Tab tablet TAKE 1 TABLET BY MOUTH DAILY WITH DINNER 30 tablet 3    lidocaine (LIDODERM) 5 % Place 1 patch onto the skin once daily. Remove & Discard patch within 12 hours or as directed by MD 10 patch 0    lurasidone (LATUDA) 80 mg Tab tablet Take 80 mg by mouth once daily. Take one tablet daily with dinner. 30 tablet 3    multivit-min-FA-lycopen-lutein (CENTRUM SILVER ULTRA MEN'S) 300-600-300 mcg Tab Take 1 tablet by mouth once daily at 6am.      nicotine (NICODERM CQ) 14 mg/24 hr Place 1 patch onto the skin once daily at 6am.  0    oxycodone (OXY-IR) 5 mg Cap Take 1 capsule (5 mg total) by mouth every 4 (four) hours as needed for Pain. 30 capsule 0    pantoprazole (PROTONIX) 40 MG tablet Take 1 tablet (40 mg total) by mouth once daily.  30 tablet 0    thiamine 100 MG tablet Take 1 tablet (100 mg total) by mouth once daily.       No facility-administered encounter medications on file as of 11/27/2017.      Orders Placed This Encounter   Procedures    CT Chest Without Contrast     Standing Status:   Future     Standing Expiration Date:   11/27/2018     Plan:     CT Chest (phone report).  Continue Mucinex and Albuterol to control resp symptoms.  OK to stop QVAR.  Urged to stop smoking.

## 2017-11-28 ENCOUNTER — HOSPITAL ENCOUNTER (OUTPATIENT)
Dept: RADIOLOGY | Facility: HOSPITAL | Age: 62
Discharge: HOME | End: 2017-11-28
Attending: INTERNAL MEDICINE
Payer: COMMERCIAL

## 2017-11-28 DIAGNOSIS — J98.11: ICD-10-CM

## 2017-11-28 PROCEDURE — 71250 CT THORAX DX C-: CPT | Mod: TC

## 2017-11-28 PROCEDURE — 71250 CT THORAX DX C-: CPT | Mod: 26,,, | Performed by: RADIOLOGY

## 2017-12-07 ENCOUNTER — OFFICE VISIT (OUTPATIENT)
Dept: PSYCHIATRY | Facility: CLINIC | Age: 62
End: 2017-12-07
Payer: COMMERCIAL

## 2017-12-07 VITALS
BODY MASS INDEX: 28.32 KG/M2 | WEIGHT: 239.81 LBS | HEIGHT: 77 IN | DIASTOLIC BLOOD PRESSURE: 95 MMHG | SYSTOLIC BLOOD PRESSURE: 148 MMHG | HEART RATE: 86 BPM

## 2017-12-07 DIAGNOSIS — F31.9 BIPOLAR 1 DISORDER: Primary | ICD-10-CM

## 2017-12-07 PROCEDURE — 99212 OFFICE O/P EST SF 10 MIN: CPT | Mod: S$GLB,,, | Performed by: PSYCHIATRY & NEUROLOGY

## 2017-12-07 RX ORDER — HYDROXYZINE PAMOATE 50 MG/1
CAPSULE ORAL
Qty: 30 CAPSULE | Refills: 3 | Status: SHIPPED | OUTPATIENT
Start: 2017-12-07 | End: 2018-05-24

## 2017-12-07 RX ORDER — DIVALPROEX SODIUM 500 MG/1
TABLET, FILM COATED, EXTENDED RELEASE ORAL
Qty: 90 TABLET | Refills: 3 | Status: SHIPPED | OUTPATIENT
Start: 2017-12-07 | End: 2018-05-24

## 2017-12-07 RX ORDER — LURASIDONE HYDROCHLORIDE 80 MG/1
TABLET, FILM COATED ORAL
Qty: 30 TABLET | Refills: 3 | Status: SHIPPED | OUTPATIENT
Start: 2017-12-07 | End: 2018-03-15 | Stop reason: SDUPTHER

## 2017-12-07 RX ORDER — DOXEPIN HYDROCHLORIDE 100 MG/1
CAPSULE ORAL
Qty: 30 CAPSULE | Refills: 3 | Status: SHIPPED | OUTPATIENT
Start: 2017-12-07 | End: 2018-05-24

## 2017-12-07 NOTE — PROGRESS NOTES
ESTABLISHED OUTPATIENT VISIT   E/M LEVEL 3: 41800    ENCOUNTER DATE: 12/7/2017  SITE: [facility]    HISTORY    CHIEF COMPLAINT   Mirza Morales is a 62 y.o. male who presents for follow up of bipolar d/o.    HPI     No recent seizure. No alcohol, no marijuana.    Some depression.     States, that he has been having erectile dysfunction for the past year. Psychotropic medications a year ago were the same.    Has not been in [place] for about 10 years.    ROS   Right Leg pain continues[pain when walking]    PAST MEDICAL, FAMILY AND SOCIAL HISTORY: The patient's past medical, family and social history have been reviewed and updated as appropriate within the electronic medical record - see encounter notes    PSYCHOTROPIC MEDICATIONS   Doxepin 100 mg at bedtime, Latuda 80 mg with dinner, Depakote 500 mg qam and 1000 mg at bedtime[prescribed for seizures], Hydroxyzine 50 mg at bedtime    Scheduled and PRN Medications     Current Outpatient Prescriptions:     acetaminophen (TYLENOL) 325 MG tablet, Take 2 tablets (650 mg total) by mouth every 4 (four) hours as needed., Disp: , Rfl: 0    albuterol 90 mcg/actuation inhaler, Inhale 2 puffs into the lungs every 4 (four) hours as needed for Wheezing., Disp: 1 Inhaler, Rfl: 0    atorvastatin (LIPITOR) 40 MG tablet, TAKE 1 TABLET (40 MG TOTAL) BY MOUTH ONCE DAILY., Disp: 30 tablet, Rfl: 6    beclomethasone (QVAR) 40 mcg/actuation Aero, Inhale 1 puff into the lungs 2 (two) times daily. Controller, Disp: 120 each, Rfl: 0    diazePAM (VALIUM) 5 MG tablet, Take 1 tablet (5 mg total) by mouth every 12 (twelve) hours., Disp: 4 tablet, Rfl: 0    divalproex ER (DEPAKOTE) 500 MG Tb24, Take 1 tablet (500mg) every morning and 2 tablets (1000mg) every night., Disp: 90 tablet, Rfl: 3    doxepin (SINEQUAN) 100 MG capsule, TAKE 1 CAPSULE (100 MG TOTAL) BY MOUTH EVERY EVENING., Disp: 30 capsule, Rfl: 3    hydrOXYzine pamoate (VISTARIL) 50 MG Cap, TAKE 1  CAPSULE (50 MG TOTAL) BY MOUTH EVERY EVENING., Disp: 30 capsule, Rfl: 3    LATUDA 80 mg Tab tablet, TAKE 1 TABLET BY MOUTH DAILY WITH DINNER, Disp: 30 tablet, Rfl: 3    lidocaine (LIDODERM) 5 %, Place 1 patch onto the skin once daily. Remove & Discard patch within 12 hours or as directed by MD, Disp: 10 patch, Rfl: 0    lurasidone (LATUDA) 80 mg Tab tablet, Take 80 mg by mouth once daily. Take one tablet daily with dinner., Disp: 30 tablet, Rfl: 3    multivit-min-FA-lycopen-lutein (CENTRUM SILVER ULTRA MEN'S) 300-600-300 mcg Tab, Take 1 tablet by mouth once daily at 6am., Disp: , Rfl:     nicotine (NICODERM CQ) 14 mg/24 hr, Place 1 patch onto the skin once daily at 6am., Disp: , Rfl: 0    oxycodone (OXY-IR) 5 mg Cap, Take 1 capsule (5 mg total) by mouth every 4 (four) hours as needed for Pain., Disp: 30 capsule, Rfl: 0    pantoprazole (PROTONIX) 40 MG tablet, Take 1 tablet (40 mg total) by mouth once daily., Disp: 30 tablet, Rfl: 0    thiamine 100 MG tablet, Take 1 tablet (100 mg total) by mouth once daily., Disp: , Rfl:     LABORATORY DATA  Lab Visit on 11/16/2017   Component Date Value Ref Range Status    WBC 11/16/2017 8.37  3.90 - 12.70 K/uL Final    RBC 11/16/2017 4.51* 4.60 - 6.20 M/uL Final    Hemoglobin 11/16/2017 15.0  14.0 - 18.0 g/dL Final    Hematocrit 11/16/2017 43.0  40.0 - 54.0 % Final    MCV 11/16/2017 95  82 - 98 fL Final    MCH 11/16/2017 33.3* 27.0 - 31.0 pg Final    MCHC 11/16/2017 34.9  32.0 - 36.0 g/dL Final    RDW 11/16/2017 12.1  11.5 - 14.5 % Final    Platelets 11/16/2017 223  150 - 350 K/uL Final    MPV 11/16/2017 9.6  9.2 - 12.9 fL Final    Gran # 11/16/2017 4.8  1.8 - 7.7 K/uL Final    Lymph # 11/16/2017 1.8  1.0 - 4.8 K/uL Final    Mono # 11/16/2017 1.1* 0.3 - 1.0 K/uL Final    Eos # 11/16/2017 0.3  0.0 - 0.5 K/uL Final    Baso # 11/16/2017 0.04  0.00 - 0.20 K/uL Final    Gran% 11/16/2017 57.7  38.0 - 73.0 % Final    Lymph% 11/16/2017 21.1  18.0 - 48.0 % Final     Mono% 11/16/2017 13.3  4.0 - 15.0 % Final    Eosinophil% 11/16/2017 3.0  0.0 - 8.0 % Final    Basophil% 11/16/2017 0.5  0.0 - 1.9 % Final    Platelet Estimate 11/16/2017 Appears normal   Final    Differential Method 11/16/2017 Automated   Final   Lab Visit on 11/07/2017   Component Date Value Ref Range Status    Creatinine 11/07/2017 1.2  0.5 - 1.4 mg/dL Final    eGFR if African American 11/07/2017 >60.0  >60 mL/min/1.73 m^2 Final    eGFR if non African American 11/07/2017 >60.0  >60 mL/min/1.73 m^2 Final    Comment: Calculation used to obtain the estimated glomerular filtration  rate (eGFR) is the CKD-EPI equation.          EXAMINATION    There were no vitals taken for this visit.    Pt. To have vitals and weight done after today's visit.  CONSTITUTIONAL  General Appearance: well nourished    MUSCULOSKELETAL  Muscle Strength and Tone: normal strength and tone  Abnormal Involuntary Movements: no abnormal movement noted  Gait and Station: normal gait    PSYCHIATRIC   Level of Consciousness: alert  Orientation: grossly intact  Grooming: well groomed  Psychomotor Behavior: no restlessness/agitation  Speech: normal in rate, rhythm and volume  Language: normal vocabulary  Mood: stable  Affect: full range and appropriate  Thought Process: logical and goal directed  Associations: intact associations  Thought Content: no SI/HI  Memory: grossly intact  Attention: intact to content of interview  Fund of Knowledge: appears adequate  Insight: good  Judgement: good    MEDICAL DECISION MAKING    DIAGNOSES  Bipolar d/o      PROBLEM LIST AND MANAGEMENT PLANS    - continue Doxepin, Depakote ER, Latuda, Hydroxyzine as above  - rtc 3 months    Time with patient: 10 min      Ambrosio Loco

## 2017-12-18 ENCOUNTER — PATIENT MESSAGE (OUTPATIENT)
Dept: PSYCHIATRY | Facility: CLINIC | Age: 62
End: 2017-12-18

## 2017-12-27 NOTE — PROGRESS NOTES
Attestation:  I have personally taken the history and examined this patient and concur with the resident's note as stated above.   Assessment, and plan was made by me after evaluation of all available information .    Yashira Ann MD, DANIEL(), [facility].  Neurology-Epilepsy.

## 2018-01-26 RX ORDER — ATORVASTATIN CALCIUM 40 MG/1
TABLET, FILM COATED ORAL
Qty: 30 TABLET | Refills: 3 | Status: SHIPPED | OUTPATIENT
Start: 2018-01-26 | End: 2018-05-24

## 2018-02-21 ENCOUNTER — PATIENT MESSAGE (OUTPATIENT)
Dept: PSYCHIATRY | Facility: CLINIC | Age: 63
End: 2018-02-21

## 2018-02-21 ENCOUNTER — HOSPITAL ENCOUNTER (OUTPATIENT)
Facility: HOSPITAL | Age: 63
Discharge: HOME | DRG: 918 | End: 2018-02-23
Attending: EMERGENCY MEDICINE | Admitting: EMERGENCY MEDICINE
Payer: COMMERCIAL

## 2018-02-21 DIAGNOSIS — R41.82 ALTERED MENTAL STATUS, UNSPECIFIED ALTERED MENTAL STATUS TYPE: ICD-10-CM

## 2018-02-21 DIAGNOSIS — R40.0 SOMNOLENCE: ICD-10-CM

## 2018-02-21 DIAGNOSIS — T40.2X1D OPIOID OVERDOSE, ACCIDENTAL OR UNINTENTIONAL, SUBSEQUENT ENCOUNTER: Primary | ICD-10-CM

## 2018-02-21 DIAGNOSIS — T40.2X4A OPIOID OVERDOSE, UNDETERMINED INTENT, INITIAL ENCOUNTER: ICD-10-CM

## 2018-02-21 PROBLEM — E46 PROTEIN CALORIE MALNUTRITION: Status: ACTIVE | Noted: 2018-02-21

## 2018-02-21 PROBLEM — E87.1 HYPONATREMIA: Status: ACTIVE | Noted: 2018-02-21

## 2018-02-21 PROBLEM — T40.2X1A OPIOID OVERDOSE: Status: ACTIVE | Noted: 2018-02-21

## 2018-02-21 PROBLEM — I10 ESSENTIAL HYPERTENSION: Status: ACTIVE | Noted: 2018-02-21

## 2018-02-21 LAB
ALBUMIN SERPL BCP-MCNC: 2.8 G/DL
ALP SERPL-CCNC: 80 U/L
ALT SERPL W/O P-5'-P-CCNC: 18 U/L
AMPHET+METHAMPHET UR QL: NEGATIVE
ANION GAP SERPL CALC-SCNC: 10 MMOL/L
APAP SERPL-MCNC: <3 UG/ML
AST SERPL-CCNC: 32 U/L
BARBITURATES UR QL SCN>200 NG/ML: NEGATIVE
BASOPHILS # BLD AUTO: 0.02 K/UL
BASOPHILS NFR BLD: 0.3 %
BENZODIAZ UR QL SCN>200 NG/ML: NEGATIVE
BILIRUB SERPL-MCNC: 0.7 MG/DL
BILIRUB UR QL STRIP: NEGATIVE
BUN SERPL-MCNC: 10 MG/DL
BZE UR QL SCN: NEGATIVE
CALCIUM SERPL-MCNC: 9.3 MG/DL
CANNABINOIDS UR QL SCN: NEGATIVE
CHLORIDE SERPL-SCNC: 91 MMOL/L
CLARITY UR REFRACT.AUTO: CLEAR
CO2 SERPL-SCNC: 26 MMOL/L
COLOR UR AUTO: YELLOW
CREAT SERPL-MCNC: 1.2 MG/DL
CREAT UR-MCNC: 135 MG/DL
DIFFERENTIAL METHOD: ABNORMAL
EOSINOPHIL # BLD AUTO: 0.1 K/UL
EOSINOPHIL NFR BLD: 1.6 %
ERYTHROCYTE [DISTWIDTH] IN BLOOD BY AUTOMATED COUNT: 12.8 %
EST. GFR  (AFRICAN AMERICAN): >60 ML/MIN/1.73 M^2
EST. GFR  (NON AFRICAN AMERICAN): >60 ML/MIN/1.73 M^2
ETHANOL SERPL-MCNC: <10 MG/DL
GLUCOSE SERPL-MCNC: 108 MG/DL
GLUCOSE UR QL STRIP: NEGATIVE
HCT VFR BLD AUTO: 32.9 %
HGB BLD-MCNC: 11.3 G/DL
HGB UR QL STRIP: NEGATIVE
IMM GRANULOCYTES # BLD AUTO: 0.03 K/UL
IMM GRANULOCYTES NFR BLD AUTO: 0.4 %
KETONES UR QL STRIP: NEGATIVE
LEUKOCYTE ESTERASE UR QL STRIP: NEGATIVE
LYMPHOCYTES # BLD AUTO: 0.6 K/UL
LYMPHOCYTES NFR BLD: 8 %
MCH RBC QN AUTO: 32.1 PG
MCHC RBC AUTO-ENTMCNC: 34.3 G/DL
MCV RBC AUTO: 94 FL
METHADONE UR QL SCN>300 NG/ML: NEGATIVE
MONOCYTES # BLD AUTO: 0.8 K/UL
MONOCYTES NFR BLD: 11.1 %
NEUTROPHILS # BLD AUTO: 5.5 K/UL
NEUTROPHILS NFR BLD: 78.6 %
NITRITE UR QL STRIP: NEGATIVE
NRBC BLD-RTO: 0 /100 WBC
OPIATES UR QL SCN: NORMAL
PCP UR QL SCN>25 NG/ML: NEGATIVE
PH UR STRIP: 6 [PH] (ref 5–8)
PLATELET # BLD AUTO: 141 K/UL
PMV BLD AUTO: 9.7 FL
POCT GLUCOSE: 127 MG/DL (ref 70–110)
POTASSIUM SERPL-SCNC: 4.2 MMOL/L
PROT SERPL-MCNC: 7 G/DL
PROT UR QL STRIP: NEGATIVE
RBC # BLD AUTO: 3.52 M/UL
SODIUM SERPL-SCNC: 127 MMOL/L
SP GR UR STRIP: 1.01 (ref 1–1.03)
TOXICOLOGY INFORMATION: NORMAL
TROPONIN I SERPL DL<=0.01 NG/ML-MCNC: 0.01 NG/ML
URN SPEC COLLECT METH UR: NORMAL
UROBILINOGEN UR STRIP-ACNC: 2 EU/DL
WBC # BLD AUTO: 7.04 K/UL

## 2018-02-21 PROCEDURE — 96376 TX/PRO/DX INJ SAME DRUG ADON: CPT | Mod: 59

## 2018-02-21 PROCEDURE — 99220 PR INITIAL OBSERVATION CARE,LEVL III: CPT | Mod: SA,,, | Performed by: NURSE PRACTITIONER

## 2018-02-21 PROCEDURE — 63600175 PHARM REV CODE 636 W HCPCS: Performed by: STUDENT IN AN ORGANIZED HEALTH CARE EDUCATION/TRAINING PROGRAM

## 2018-02-21 PROCEDURE — 93005 ELECTROCARDIOGRAM TRACING: CPT

## 2018-02-21 PROCEDURE — 82962 GLUCOSE BLOOD TEST: CPT

## 2018-02-21 PROCEDURE — 81003 URINALYSIS AUTO W/O SCOPE: CPT

## 2018-02-21 PROCEDURE — 80320 DRUG SCREEN QUANTALCOHOLS: CPT

## 2018-02-21 PROCEDURE — 85025 COMPLETE CBC W/AUTO DIFF WBC: CPT

## 2018-02-21 PROCEDURE — G0378 HOSPITAL OBSERVATION PER HR: HCPCS

## 2018-02-21 PROCEDURE — 25000003 PHARM REV CODE 250: Performed by: NURSE PRACTITIONER

## 2018-02-21 PROCEDURE — 80329 ANALGESICS NON-OPIOID 1 OR 2: CPT

## 2018-02-21 PROCEDURE — 99284 EMERGENCY DEPT VISIT MOD MDM: CPT | Mod: ,,, | Performed by: EMERGENCY MEDICINE

## 2018-02-21 PROCEDURE — 99284 EMERGENCY DEPT VISIT MOD MDM: CPT | Mod: 25

## 2018-02-21 PROCEDURE — 96374 THER/PROPH/DIAG INJ IV PUSH: CPT | Mod: 59

## 2018-02-21 PROCEDURE — 80053 COMPREHEN METABOLIC PANEL: CPT

## 2018-02-21 PROCEDURE — 80307 DRUG TEST PRSMV CHEM ANLYZR: CPT

## 2018-02-21 PROCEDURE — 84484 ASSAY OF TROPONIN QUANT: CPT

## 2018-02-21 PROCEDURE — 94761 N-INVAS EAR/PLS OXIMETRY MLT: CPT

## 2018-02-21 RX ORDER — IPRATROPIUM BROMIDE AND ALBUTEROL SULFATE 2.5; .5 MG/3ML; MG/3ML
3 SOLUTION RESPIRATORY (INHALATION) EVERY 6 HOURS
Status: DISCONTINUED | OUTPATIENT
Start: 2018-02-22 | End: 2018-02-22 | Stop reason: RX

## 2018-02-21 RX ORDER — DIVALPROEX SODIUM 500 MG/1
500 TABLET, FILM COATED, EXTENDED RELEASE ORAL DAILY
Status: DISCONTINUED | OUTPATIENT
Start: 2018-02-22 | End: 2018-02-23 | Stop reason: HOSPADM

## 2018-02-21 RX ORDER — NALOXONE HCL 0.4 MG/ML
1 VIAL (ML) INJECTION
Status: DISCONTINUED | OUTPATIENT
Start: 2018-02-21 | End: 2018-02-21

## 2018-02-21 RX ORDER — AMOXICILLIN 250 MG
1 CAPSULE ORAL 2 TIMES DAILY PRN
Status: DISCONTINUED | OUTPATIENT
Start: 2018-02-21 | End: 2018-02-23 | Stop reason: HOSPADM

## 2018-02-21 RX ORDER — LURASIDONE HYDROCHLORIDE 40 MG/1
80 TABLET, FILM COATED ORAL
Status: DISCONTINUED | OUTPATIENT
Start: 2018-02-22 | End: 2018-02-23 | Stop reason: HOSPADM

## 2018-02-21 RX ORDER — SODIUM CHLORIDE 9 MG/ML
INJECTION, SOLUTION INTRAVENOUS CONTINUOUS
Status: DISCONTINUED | OUTPATIENT
Start: 2018-02-21 | End: 2018-02-23 | Stop reason: HOSPADM

## 2018-02-21 RX ORDER — HYDRALAZINE HYDROCHLORIDE 25 MG/1
25 TABLET, FILM COATED ORAL EVERY 6 HOURS PRN
Status: DISCONTINUED | OUTPATIENT
Start: 2018-02-21 | End: 2018-02-23 | Stop reason: HOSPADM

## 2018-02-21 RX ORDER — GLUCAGON 1 MG
1 KIT INJECTION
Status: DISCONTINUED | OUTPATIENT
Start: 2018-02-21 | End: 2018-02-23 | Stop reason: HOSPADM

## 2018-02-21 RX ORDER — NALOXONE HCL 0.4 MG/ML
0.4 VIAL (ML) INJECTION ONCE
Status: COMPLETED | OUTPATIENT
Start: 2018-02-21 | End: 2018-02-21

## 2018-02-21 RX ORDER — ACETAMINOPHEN 325 MG/1
650 TABLET ORAL EVERY 6 HOURS PRN
Status: DISCONTINUED | OUTPATIENT
Start: 2018-02-21 | End: 2018-02-23 | Stop reason: HOSPADM

## 2018-02-21 RX ORDER — THIAMINE HCL 50 MG
100 TABLET ORAL DAILY
Status: DISCONTINUED | OUTPATIENT
Start: 2018-02-22 | End: 2018-02-23 | Stop reason: HOSPADM

## 2018-02-21 RX ORDER — ATORVASTATIN CALCIUM 20 MG/1
40 TABLET, FILM COATED ORAL DAILY
Status: DISCONTINUED | OUTPATIENT
Start: 2018-02-22 | End: 2018-02-23 | Stop reason: HOSPADM

## 2018-02-21 RX ORDER — ONDANSETRON 2 MG/ML
4 INJECTION INTRAMUSCULAR; INTRAVENOUS EVERY 8 HOURS PRN
Status: DISCONTINUED | OUTPATIENT
Start: 2018-02-21 | End: 2018-02-23 | Stop reason: HOSPADM

## 2018-02-21 RX ORDER — HYDROXYZINE HYDROCHLORIDE 25 MG/1
50 TABLET, FILM COATED ORAL EVERY 6 HOURS PRN
Status: DISCONTINUED | OUTPATIENT
Start: 2018-02-21 | End: 2018-02-23 | Stop reason: HOSPADM

## 2018-02-21 RX ORDER — IBUPROFEN 200 MG
24 TABLET ORAL
Status: DISCONTINUED | OUTPATIENT
Start: 2018-02-21 | End: 2018-02-23 | Stop reason: HOSPADM

## 2018-02-21 RX ORDER — ONDANSETRON 8 MG/1
8 TABLET, ORALLY DISINTEGRATING ORAL EVERY 8 HOURS PRN
Status: DISCONTINUED | OUTPATIENT
Start: 2018-02-21 | End: 2018-02-23 | Stop reason: HOSPADM

## 2018-02-21 RX ORDER — SODIUM CHLORIDE 0.9 % (FLUSH) 0.9 %
5 SYRINGE (ML) INJECTION
Status: DISCONTINUED | OUTPATIENT
Start: 2018-02-21 | End: 2018-02-23 | Stop reason: HOSPADM

## 2018-02-21 RX ORDER — ALBUTEROL SULFATE 0.83 MG/ML
2.5 SOLUTION RESPIRATORY (INHALATION) EVERY 4 HOURS PRN
Status: DISCONTINUED | OUTPATIENT
Start: 2018-02-21 | End: 2018-02-23 | Stop reason: HOSPADM

## 2018-02-21 RX ORDER — DOXEPIN HYDROCHLORIDE 25 MG/1
100 CAPSULE ORAL NIGHTLY
Status: DISCONTINUED | OUTPATIENT
Start: 2018-02-22 | End: 2018-02-23 | Stop reason: HOSPADM

## 2018-02-21 RX ORDER — PANTOPRAZOLE SODIUM 40 MG/1
40 TABLET, DELAYED RELEASE ORAL DAILY
Status: DISCONTINUED | OUTPATIENT
Start: 2018-02-22 | End: 2018-02-23 | Stop reason: HOSPADM

## 2018-02-21 RX ORDER — GABAPENTIN 600 MG/1
600 TABLET ORAL 3 TIMES DAILY
COMMUNITY
End: 2018-05-17

## 2018-02-21 RX ORDER — IBUPROFEN 200 MG
16 TABLET ORAL
Status: DISCONTINUED | OUTPATIENT
Start: 2018-02-21 | End: 2018-02-23 | Stop reason: HOSPADM

## 2018-02-21 RX ORDER — LOPERAMIDE HYDROCHLORIDE 2 MG/1
2 CAPSULE ORAL ONCE AS NEEDED
Status: ACTIVE | OUTPATIENT
Start: 2018-02-21 | End: 2018-02-21

## 2018-02-21 RX ADMIN — NALOXONE HYDROCHLORIDE 0.4 MG: 0.4 INJECTION, SOLUTION INTRAMUSCULAR; INTRAVENOUS; SUBCUTANEOUS at 05:02

## 2018-02-21 RX ADMIN — SODIUM CHLORIDE: 0.9 INJECTION, SOLUTION INTRAVENOUS at 11:02

## 2018-02-21 RX ADMIN — NALOXONE HYDROCHLORIDE 1 MG: 0.4 INJECTION, SOLUTION INTRAMUSCULAR; INTRAVENOUS; SUBCUTANEOUS at 09:02

## 2018-02-21 RX ADMIN — NALOXONE HYDROCHLORIDE 1 MG: 0.4 INJECTION, SOLUTION INTRAMUSCULAR; INTRAVENOUS; SUBCUTANEOUS at 06:02

## 2018-02-21 RX ADMIN — NALOXONE HYDROCHLORIDE 1 MG: 0.4 INJECTION, SOLUTION INTRAMUSCULAR; INTRAVENOUS; SUBCUTANEOUS at 07:02

## 2018-02-21 NOTE — ED NOTES
Pt opens eyes to verbal command, does not maintain eyes open or maintain alert, not answering questions at this time.    Patient identifiers verified and correct for Mirza Rich Andrew.    APPEARANCE: Patient resting and in no acute distress at this time.  SKIN: Skin is warm dry and intact, and color is pallor. No tenting observed and capillary refill <3 seconds.   MUSCULOSKELETAL: Spontaneous movement noted to all 4 extremities.  RESPIRATORY: Airway is open and patent. Respirations-unlabored, regular rate, equal bilaterally on inspiration and expiration.  CARDIAC: Patient has regular heart rate and rhythm. No peripheral edema noted.  ABDOMEN: Soft, no signs of tenderness noted.  NEUROLOGIC: Eyes open to verbal command. Pt not following commands or answering questions.

## 2018-02-21 NOTE — ED TRIAGE NOTES
EMS was called by the pt's wife for c/o pt having altered mental status. Upon EMS arrival. Pt was found to be lethargic with shallow respirations. Pt was given 0.5 of Narcan and responded to voice. Pt breathing at 15/min. Pt admits to taking six 10mg oxycodone last night. Pt denies SI. Pt has chronic back pain.

## 2018-02-22 LAB
ALBUMIN SERPL BCP-MCNC: 2.3 G/DL
ALP SERPL-CCNC: 65 U/L
ALT SERPL W/O P-5'-P-CCNC: 13 U/L
ANION GAP SERPL CALC-SCNC: 8 MMOL/L
AST SERPL-CCNC: 23 U/L
BASOPHILS # BLD AUTO: 0.02 K/UL
BASOPHILS NFR BLD: 0.4 %
BILIRUB SERPL-MCNC: 0.5 MG/DL
BUN SERPL-MCNC: 7 MG/DL
CALCIUM SERPL-MCNC: 8.4 MG/DL
CHLORIDE SERPL-SCNC: 97 MMOL/L
CO2 SERPL-SCNC: 28 MMOL/L
CREAT SERPL-MCNC: 0.8 MG/DL
DIFFERENTIAL METHOD: ABNORMAL
EOSINOPHIL # BLD AUTO: 0.2 K/UL
EOSINOPHIL NFR BLD: 4.4 %
ERYTHROCYTE [DISTWIDTH] IN BLOOD BY AUTOMATED COUNT: 12.5 %
EST. GFR  (AFRICAN AMERICAN): >60 ML/MIN/1.73 M^2
EST. GFR  (NON AFRICAN AMERICAN): >60 ML/MIN/1.73 M^2
GLUCOSE SERPL-MCNC: 95 MG/DL
HCT VFR BLD AUTO: 30 %
HGB BLD-MCNC: 10.3 G/DL
IMM GRANULOCYTES # BLD AUTO: 0.03 K/UL
IMM GRANULOCYTES NFR BLD AUTO: 0.6 %
LYMPHOCYTES # BLD AUTO: 1 K/UL
LYMPHOCYTES NFR BLD: 18.7 %
MAGNESIUM SERPL-MCNC: 1.5 MG/DL
MCH RBC QN AUTO: 32.4 PG
MCHC RBC AUTO-ENTMCNC: 34.3 G/DL
MCV RBC AUTO: 94 FL
MONOCYTES # BLD AUTO: 0.7 K/UL
MONOCYTES NFR BLD: 12.4 %
NEUTROPHILS # BLD AUTO: 3.3 K/UL
NEUTROPHILS NFR BLD: 63.5 %
NRBC BLD-RTO: 0 /100 WBC
PHOSPHATE SERPL-MCNC: 2.5 MG/DL
PLATELET # BLD AUTO: 139 K/UL
PMV BLD AUTO: 9.6 FL
POTASSIUM SERPL-SCNC: 3.9 MMOL/L
PREALB SERPL-MCNC: 9 MG/DL
PROT SERPL-MCNC: 5.6 G/DL
RBC # BLD AUTO: 3.18 M/UL
SODIUM SERPL-SCNC: 133 MMOL/L
TSH SERPL DL<=0.005 MIU/L-ACNC: 0.73 UIU/ML
WBC # BLD AUTO: 5.25 K/UL

## 2018-02-22 PROCEDURE — 25000003 PHARM REV CODE 250: Performed by: PHYSICIAN ASSISTANT

## 2018-02-22 PROCEDURE — 94761 N-INVAS EAR/PLS OXIMETRY MLT: CPT

## 2018-02-22 PROCEDURE — 63600175 PHARM REV CODE 636 W HCPCS: Performed by: PHYSICIAN ASSISTANT

## 2018-02-22 PROCEDURE — 84443 ASSAY THYROID STIM HORMONE: CPT

## 2018-02-22 PROCEDURE — 25000242 PHARM REV CODE 250 ALT 637 W/ HCPCS: Performed by: NURSE PRACTITIONER

## 2018-02-22 PROCEDURE — 99233 SBSQ HOSP IP/OBS HIGH 50: CPT | Mod: SA,,, | Performed by: PHYSICIAN ASSISTANT

## 2018-02-22 PROCEDURE — 11000001 HC ACUTE MED/SURG PRIVATE ROOM

## 2018-02-22 PROCEDURE — 36415 COLL VENOUS BLD VENIPUNCTURE: CPT

## 2018-02-22 PROCEDURE — 63600175 PHARM REV CODE 636 W HCPCS: Performed by: NURSE PRACTITIONER

## 2018-02-22 PROCEDURE — G0378 HOSPITAL OBSERVATION PER HR: HCPCS

## 2018-02-22 PROCEDURE — 84134 ASSAY OF PREALBUMIN: CPT

## 2018-02-22 PROCEDURE — 25000242 PHARM REV CODE 250 ALT 637 W/ HCPCS: Performed by: PHYSICIAN ASSISTANT

## 2018-02-22 PROCEDURE — 94640 AIRWAY INHALATION TREATMENT: CPT

## 2018-02-22 PROCEDURE — 83735 ASSAY OF MAGNESIUM: CPT

## 2018-02-22 PROCEDURE — 25000003 PHARM REV CODE 250: Performed by: NURSE PRACTITIONER

## 2018-02-22 PROCEDURE — 80053 COMPREHEN METABOLIC PANEL: CPT

## 2018-02-22 PROCEDURE — 84100 ASSAY OF PHOSPHORUS: CPT

## 2018-02-22 PROCEDURE — 85025 COMPLETE CBC W/AUTO DIFF WBC: CPT

## 2018-02-22 PROCEDURE — 27000221 HC OXYGEN, UP TO 24 HOURS

## 2018-02-22 RX ORDER — IPRATROPIUM BROMIDE 0.5 MG/2.5ML
0.5 SOLUTION RESPIRATORY (INHALATION) EVERY 6 HOURS
Status: DISCONTINUED | OUTPATIENT
Start: 2018-02-22 | End: 2018-02-23 | Stop reason: HOSPADM

## 2018-02-22 RX ORDER — SODIUM,POTASSIUM PHOSPHATES 280-250MG
1 POWDER IN PACKET (EA) ORAL
Status: DISCONTINUED | OUTPATIENT
Start: 2018-02-22 | End: 2018-02-23 | Stop reason: HOSPADM

## 2018-02-22 RX ORDER — ALBUTEROL SULFATE 0.83 MG/ML
2.5 SOLUTION RESPIRATORY (INHALATION) EVERY 6 HOURS
Status: DISCONTINUED | OUTPATIENT
Start: 2018-02-22 | End: 2018-02-23 | Stop reason: HOSPADM

## 2018-02-22 RX ORDER — MAGNESIUM SULFATE HEPTAHYDRATE 40 MG/ML
2 INJECTION, SOLUTION INTRAVENOUS ONCE
Status: COMPLETED | OUTPATIENT
Start: 2018-02-22 | End: 2018-02-22

## 2018-02-22 RX ADMIN — ALBUTEROL SULFATE 2.5 MG: 2.5 SOLUTION RESPIRATORY (INHALATION) at 08:02

## 2018-02-22 RX ADMIN — IPRATROPIUM BROMIDE AND ALBUTEROL SULFATE 3 ML: .5; 3 SOLUTION RESPIRATORY (INHALATION) at 02:02

## 2018-02-22 RX ADMIN — BECLOMETHASONE DIPROPIONATE 1 PUFF: 40 AEROSOL, METERED RESPIRATORY (INHALATION) at 08:02

## 2018-02-22 RX ADMIN — BECLOMETHASONE DIPROPIONATE 1 PUFF: 40 AEROSOL, METERED RESPIRATORY (INHALATION) at 09:02

## 2018-02-22 RX ADMIN — IPRATROPIUM BROMIDE AND ALBUTEROL SULFATE 3 ML: .5; 3 SOLUTION RESPIRATORY (INHALATION) at 08:02

## 2018-02-22 RX ADMIN — POTASSIUM & SODIUM PHOSPHATES POWDER PACK 280-160-250 MG 1 PACKET: 280-160-250 PACK at 09:02

## 2018-02-22 RX ADMIN — SODIUM CHLORIDE: 0.9 INJECTION, SOLUTION INTRAVENOUS at 08:02

## 2018-02-22 RX ADMIN — ATORVASTATIN CALCIUM 40 MG: 20 TABLET, FILM COATED ORAL at 09:02

## 2018-02-22 RX ADMIN — MAGNESIUM SULFATE HEPTAHYDRATE 2 G: 40 INJECTION, SOLUTION INTRAVENOUS at 09:02

## 2018-02-22 RX ADMIN — DOXEPIN HYDROCHLORIDE 100 MG: 25 CAPSULE ORAL at 08:02

## 2018-02-22 RX ADMIN — DIVALPROEX SODIUM 500 MG: 500 TABLET, EXTENDED RELEASE ORAL at 09:02

## 2018-02-22 RX ADMIN — SODIUM CHLORIDE: 0.9 INJECTION, SOLUTION INTRAVENOUS at 09:02

## 2018-02-22 RX ADMIN — ACETAMINOPHEN 650 MG: 325 TABLET, FILM COATED ORAL at 07:02

## 2018-02-22 RX ADMIN — IPRATROPIUM BROMIDE 0.5 MG: 0.5 SOLUTION RESPIRATORY (INHALATION) at 01:02

## 2018-02-22 RX ADMIN — IPRATROPIUM BROMIDE 0.5 MG: 0.5 SOLUTION RESPIRATORY (INHALATION) at 08:02

## 2018-02-22 RX ADMIN — ALBUTEROL SULFATE 2.5 MG: 2.5 SOLUTION RESPIRATORY (INHALATION) at 01:02

## 2018-02-22 RX ADMIN — THIAMINE HCL (VITAMIN B1) 50 MG TABLET 100 MG: at 09:02

## 2018-02-22 RX ADMIN — POTASSIUM & SODIUM PHOSPHATES POWDER PACK 280-160-250 MG 1 PACKET: 280-160-250 PACK at 06:02

## 2018-02-22 RX ADMIN — LURASIDONE HYDROCHLORIDE 80 MG: 40 TABLET, FILM COATED ORAL at 06:02

## 2018-02-22 RX ADMIN — PANTOPRAZOLE SODIUM 40 MG: 40 TABLET, DELAYED RELEASE ORAL at 09:02

## 2018-02-22 RX ADMIN — ONDANSETRON 4 MG: 2 INJECTION INTRAMUSCULAR; INTRAVENOUS at 07:02

## 2018-02-22 NOTE — H&P
[facility] Hospital Medicine  History & Physical    Patient Name: Mirza Morales  MRN: [id number]  Admission Date: 2/21/2018  Attending Physician: Polly Talamantes MD   Primary Care Provider: LIDIA Banks MD    [facility] Medicine Team: [facility] MED F Haris House NP     Patient information was obtained from ER records.     Subjective:     Principal Problem:Opioid overdose    Chief Complaint:   Chief Complaint   Patient presents with    Altered Mental Status     Pt took 6 10mg Oxycodone. Pt denies SI.     Drug Overdose        HPI: Mr. Morales is a 63yo  male with history of HTN, Bipolar 1 disorder, Seizures, Depression, Alcohol abuse and Opiate dependency presenting with Altered Mental Status by EMS due to opiate overdose.  Per EMS, patient found somnolent in home, given 0.4mg Narcan in route to ED which aroused patient.  Patient admitted to EMS to taking 60mg of Oxycodone at once around 1300 today (2/21/18).  Per ED note, wife states patient was intoxicated yesterday. She reports today he was lethargic and not responsive to questions.  She denied witnessed seizures.  Patient has multiple admissions for overdose of various substances.  At this time he is poorly interactive.  He is in NAD and maintaining his airway without difficulty.     Patient history provided from ED note due to altered mental status.  Wife was not at bedside during examination.    Past Medical History:   Diagnosis Date    Alcohol abuse     Behavioral problem     Bipolar 1 disorder     Chronic right hip pain     Depression     DJD (degenerative joint disease), lumbar 1/27/2015    Fatigue     Hepatitis     pt. denies this    History of psychiatric care     History of psychiatric hospitalization     Hypertension     Karina     Post traumatic stress disorder     Psychiatric problem     Seizures     Suicide attempt     Therapy        Past Surgical History:   Procedure Laterality Date    HERNIA  REPAIR      SPINE SURGERY  11-12-15    LAMINECTOMY/LUMBAR/WARE       Review of patient's allergies indicates:   Allergen Reactions    Gabapentin Other (See Comments)     SEVERE DIZZINESS AND BALANCE PROBLEMS, UNABLE TO WALK.    Nardil [phenelzine]      BECOMES HYPER, LIKE A MANIC EPISODE.    Penicillins Hives    Pneumococcal 23-jett ps vaccine      Patient refused, will get later      Lamictal [lamotrigine] Rash       No current facility-administered medications on file prior to encounter.      Current Outpatient Prescriptions on File Prior to Encounter   Medication Sig    atorvastatin (LIPITOR) 40 MG tablet TAKE 1 TABLET (40 MG TOTAL) BY MOUTH ONCE DAILY.    divalproex ER (DEPAKOTE) 500 MG Tb24 Take 1 tablet (500mg) every morning and 2 tablets (1000mg) every night.    doxepin (SINEQUAN) 100 MG capsule TAKE 1 CAPSULE (100 MG TOTAL) BY MOUTH EVERY EVENING.    hydrOXYzine pamoate (VISTARIL) 50 MG Cap TAKE 1 CAPSULE (50 MG TOTAL) BY MOUTH EVERY EVENING.    lurasidone (LATUDA) 80 mg Tab tablet Take 80 mg by mouth once daily. Take one tablet daily with dinner.    lurasidone (LATUDA) 80 mg Tab tablet TAKE 1 TABLET BY MOUTH DAILY WITH DINNER    acetaminophen (TYLENOL) 325 MG tablet Take 2 tablets (650 mg total) by mouth every 4 (four) hours as needed.    albuterol 90 mcg/actuation inhaler Inhale 2 puffs into the lungs every 4 (four) hours as needed for Wheezing.    beclomethasone (QVAR) 40 mcg/actuation Aero Inhale 1 puff into the lungs 2 (two) times daily. Controller    diazePAM (VALIUM) 5 MG tablet Take 1 tablet (5 mg total) by mouth every 12 (twelve) hours.    lidocaine (LIDODERM) 5 % Place 1 patch onto the skin once daily. Remove & Discard patch within 12 hours or as directed by MD    multivit-min-FA-lycopen-lutein (CENTRUM SILVER ULTRA MEN'S) 300-600-300 mcg Tab Take 1 tablet by mouth once daily at 6am.    nicotine (NICODERM CQ) 14 mg/24 hr Place 1 patch onto the skin once daily at 6am.    oxycodone  (OXY-IR) 5 mg Cap Take 1 capsule (5 mg total) by mouth every 4 (four) hours as needed for Pain. (Patient taking differently: Take 10 mg by mouth every 6 (six) hours as needed for Pain. )    pantoprazole (PROTONIX) 40 MG tablet Take 1 tablet (40 mg total) by mouth once daily.    thiamine 100 MG tablet Take 1 tablet (100 mg total) by mouth once daily.     Family History     Problem Relation (Age of Onset)    Alcohol abuse Mother, Maternal Uncle, Paternal Uncle, Cousin    Anxiety disorder Brother    Bipolar disorder Brother    Dementia Maternal Grandmother    Depression Mother, Father, Sister, Brother    Drug abuse Brother    Suicide Sister        Social History Main Topics    Smoking status: Light Tobacco Smoker     Packs/day: 0.25     Years: 20.00     Types: Cigarettes    Smokeless tobacco: Never Used    Alcohol use No      Comment: quit 4 years ago    Drug use: No    Sexual activity: Yes     Partners: Female      Comment: with wife; monogamous      Review of Systems   Unable to perform ROS: Mental status change     Objective:     Vital Signs (Most Recent):  Temp: 98.6 °F (37 °C) (02/21/18 1653)  Pulse: 87 (02/21/18 2132)  Resp: 15 (02/21/18 2132)  BP: 125/70 (02/21/18 2132)  SpO2: 98 % (02/21/18 2132) Vital Signs (24h Range):  Temp:  [98.6 °F (37 °C)] 98.6 °F (37 °C)  Pulse:  [] 87  Resp:  [15-24] 15  SpO2:  [93 %-100 %] 98 %  BP: (125-172)/(67-96) 125/70        There is no height or weight on file to calculate BMI.    Physical Exam   Constitutional: He appears well-developed and well-nourished. No distress.   HENT:   Head: Normocephalic and atraumatic.   Mouth/Throat: Mucous membranes are normal.   Eyes: Conjunctivae are normal. Pupils are equal, round, and reactive to light.   Neck: Normal range of motion.   Cardiovascular: Normal rate, regular rhythm, normal heart sounds and intact distal pulses.  Exam reveals no gallop and no friction rub.    No murmur heard.  Pulmonary/Chest: Effort normal. No  respiratory distress. He has wheezes. He has no rales.   Bilateral inspiratory and expiratory wheezing   Abdominal: Soft. Bowel sounds are normal. He exhibits distension.   Musculoskeletal: He exhibits no deformity.   1+ BLE edema   Neurological:   Patient lethargic, minimally interactive during exam, actively moving all exts, PERRL   Skin: Skin is warm.         CRANIAL NERVES     CN III, IV, VI   Pupils are equal, round, and reactive to light.       Significant Labs:   CBC:   Recent Labs  Lab 02/21/18  1756   WBC 7.04   HGB 11.3*   HCT 32.9*   *     CMP:   Recent Labs  Lab 02/21/18  1756   *   K 4.2   CL 91*   CO2 26      BUN 10   CREATININE 1.2   CALCIUM 9.3   PROT 7.0   ALBUMIN 2.8*   BILITOT 0.7   ALKPHOS 80   AST 32   ALT 18   ANIONGAP 10   EGFRNONAA >60.0     POCT Glucose:   Recent Labs  Lab 02/21/18  1759   POCTGLUCOSE 127*     Urine Studies:   Recent Labs  Lab 02/21/18  1807   COLORU Yellow   APPEARANCEUA Clear   PHUR 6.0   SPECGRAV 1.010   PROTEINUA Negative   GLUCUA Negative   KETONESU Negative   BILIRUBINUA Negative   OCCULTUA Negative   NITRITE Negative   UROBILINOGEN 2.0   LEUKOCYTESUR Negative     All pertinent labs within the past 24 hours have been reviewed.    Significant Imaging: I have reviewed all pertinent imaging results/findings within the past 24 hours.    Assessment/Plan:     * Opioid overdose    61yo M with history of HTN, Bipolar 1 disorder, Seizures, Depression, Alcohol abuse and Opiate dependency presenting with Altered Mental Status by EMS due to opiate overdose.  Patient found somnolent in home, aroused after given 0.4mg Narcan by EMS. He reportedly admitted to taking 60mg of Oxycodone at once around 1300 today.    -Urine tox screen positive for opiates  -0.4mg Naloxone given by EMS with good response  - monitor neuro status closely and consider redosing naloxone if needed   - withdrawal order set initiated    -Telemetry  -High risk of fall / injury utilize all  safety measures and fall precautions  -Aspiration precautions  -Seizure precautions  -Sitter at bedside for now and reassess in AM  -Neuro checks q4h  -Hold neuro blunting agents for now        Hyponatremia    - Na 127 on arrival (baseline 133-137)  - possibly r/t dehydration - slight bump in crt from baseline  - continue MIVF's overnight   - reassess chemistry in AM and replete electrolytes as indicated         Tobacco abuse    - tobacco cessation education and counseling  - Duo Neb treatments Q6 for now with albuterol nebs PRN SOB  - supplemental oxygen as needed to maintain saturations greater than 92%        Protein calorie malnutrition    - albumin 2.8 on arrival  - prealbumin pending  - consider nutrition consultation        Essential hypertension    - hypertensive on arrival   -will add hydralazine 25mg tablet PO q6h PRN for SBP >180 and DBP >110        Anxiety    -Continue doxepin on home dosing regimen        Bipolar I disorder, recurrent manic episode    -Continue divalproex ER and lurasidone as prescribed        HLD (hyperlipidemia)    -Continue statin          VTE Risk Mitigation         Ordered     Medium Risk of VTE  Once      02/21/18 2211     Place EMY hose  Until discontinued      02/21/18 2211     Place sequential compression device  Until discontinued      02/21/18 2211     Reason for No Pharmacological VTE Prophylaxis  Once      02/21/18 2211             Haris House NP  Department of Hospital Medicine   [facility]

## 2018-02-22 NOTE — ASSESSMENT & PLAN NOTE
- hypertensive on arrival   -will add hydralazine 25mg tablet PO q6h PRN for SBP >180 and DBP >110

## 2018-02-22 NOTE — HOSPITAL COURSE
"Pt admitted to observation for opioid overdose.  Day 2 pt states he took additional opioids to "take the edge off and get high."  Pt denies SI, plan or intent to harm himself or others.  Na improved from 127->133.  Pt agreed to contact Dr. Loco for follow up asap.  "

## 2018-02-22 NOTE — ED PROVIDER NOTES
Encounter Date: 2/21/2018       History     Chief Complaint   Patient presents with    Altered Mental Status     Pt took 6 10mg Oxycodone. Pt denies SI.     Drug Overdose     Mr. Morales is a 61 y/o gentleman with Bipolar I, alcohol abuse and opioid dependence who presents by EMS for AMS 2/2 to opioid overdose. Per EMS, patient was found somnolent at home, given 0.4 mg Narcan in route, patient aroused and endorsed taking 60 mg Oxycodone at once around 1 pm. Per wife, patient appeared intoxicated last night, and then around 1 pm today he was very lethargic and not responding to questions. No witnessed seizure activity at home. Per wife, he has no known history of suicide attempts, but he has had several admissions for overdose from different substances. Patient is an active alcoholic, however patient leaves the house to drink, so she is unsure of how much he consumes daily.           Review of patient's allergies indicates:   Allergen Reactions    Gabapentin Other (See Comments)     SEVERE DIZZINESS AND BALANCE PROBLEMS, UNABLE TO WALK.    Nardil [phenelzine]      BECOMES HYPER, LIKE A MANIC EPISODE.    Penicillins Hives    Pneumococcal 23-jett ps vaccine      Patient refused, will get later      Lamictal [lamotrigine] Rash     Past Medical History:   Diagnosis Date    Alcohol abuse     Behavioral problem     Bipolar 1 disorder     Chronic right hip pain     Depression     DJD (degenerative joint disease), lumbar 1/27/2015    Fatigue     Hepatitis     pt. denies this    History of psychiatric care     History of psychiatric hospitalization     Hypertension     Karina     Post traumatic stress disorder     Psychiatric problem     Seizures     Suicide attempt     Therapy      Past Surgical History:   Procedure Laterality Date    HERNIA REPAIR      SPINE SURGERY  11-12-15    LAMINECTOMY/LUMBAR/WARE     Family History   Problem Relation Age of Onset    Alcohol abuse Mother     Depression Mother      Depression Father     Depression Sister     Suicide Sister     Drug abuse Brother     Anxiety disorder Brother     Bipolar disorder Brother     Depression Brother     Alcohol abuse Maternal Uncle     Alcohol abuse Paternal Uncle     Dementia Maternal Grandmother     Alcohol abuse Cousin     Amblyopia Neg Hx     Blindness Neg Hx     Cancer Neg Hx     Cataracts Neg Hx     Diabetes Neg Hx     Glaucoma Neg Hx     Hypertension Neg Hx     Macular degeneration Neg Hx     Retinal detachment Neg Hx     Strabismus Neg Hx     Stroke Neg Hx     Thyroid disease Neg Hx     Asthma Neg Hx     Emphysema Neg Hx      Social History   Substance Use Topics    Smoking status: Light Tobacco Smoker     Packs/day: 0.25     Years: 20.00     Types: Cigarettes    Smokeless tobacco: Never Used    Alcohol use No      Comment: quit 4 years ago     Review of Systems   Unable to perform ROS: Mental status change       Physical Exam     Initial Vitals [02/21/18 1653]   BP Pulse Resp Temp SpO2   (!) 143/96 102 15 98.6 °F (37 °C) 95 %      MAP       111.67         Physical Exam    Constitutional: He appears well-developed and well-nourished. He is not diaphoretic. No distress.   somnolent   HENT:   Head: Normocephalic and atraumatic.   Mouth/Throat: Oropharynx is clear and moist.   Eyes: Conjunctivae are normal. No scleral icterus.   Pupils pinpoint. Patient unable to track eye movements.    Neck: Neck supple. No JVD present.   Cardiovascular: Regular rhythm, normal heart sounds and intact distal pulses.   No murmur heard.  mildy tachycardic.    Pulmonary/Chest: Breath sounds normal. No stridor. No respiratory distress. He has no wheezes. He has no rhonchi. He has no rales.   Bradypnea.    Abdominal: Soft. Bowel sounds are normal. He exhibits distension.   Musculoskeletal: Normal range of motion. He exhibits no edema.   Neurological:   Patient lethargic, arouses briefly to voice. Intermittently following simple commands.  Oriented to place only.    Skin: Skin is warm and dry. Capillary refill takes less than 2 seconds. No rash noted. No erythema.         ED Course   Procedures  Labs Reviewed   CBC W/ AUTO DIFFERENTIAL - Abnormal; Notable for the following:        Result Value    RBC 3.52 (*)     Hemoglobin 11.3 (*)     Hematocrit 32.9 (*)     MCH 32.1 (*)     Platelets 141 (*)     Lymph # 0.6 (*)     Gran% 78.6 (*)     Lymph% 8.0 (*)     All other components within normal limits   POCT GLUCOSE - Abnormal; Notable for the following:     POCT Glucose 127 (*)     All other components within normal limits   COMPREHENSIVE METABOLIC PANEL   URINALYSIS   DRUG SCREEN PANEL, URINE EMERGENCY   ALCOHOL,MEDICAL (ETHANOL)   ACETAMINOPHEN LEVEL   TROPONIN I   POCT GLUCOSE MONITORING CONTINUOUS             Medical Decision Making:   Initial Assessment:   62 y.o M presents with AMS  Differential Diagnosis:   DDx includes but not limited to opioid intoxication, alcohol intoxication, metabolic encephalopathy, seizure.   Clinical Tests:   Lab Tests: Ordered  Medical Tests: Ordered                      Clinical Impression:   The primary encounter diagnosis was Opioid overdose, undetermined intent, initial encounter. Diagnoses of Somnolence and Altered mental status, unspecified altered mental status type were also pertinent to this visit.                           Hesham Robert MD  Resident  02/27/18 [id number]

## 2018-02-22 NOTE — ED NOTES
Pt keeps leaning towards left side of bed rail. Pt repositioned on foam wedge to prevent fall. Side rails up X2. Bed low and locked. Call light within reach. Sitter at bedside.

## 2018-02-22 NOTE — ED NOTES
Pt disoriented to time. Responds to voice. Pt in NAD. Resident at bedside. Respirations spontaneous and equal bilaterally.

## 2018-02-22 NOTE — NURSING
Patient arrived to unit via cart and sitter. Nursing staff transferred patient from cart to bed. CHANEL Callahan asked the patient did he know where he was and the year, he responded appropriately. Patient remains on 4 L of 02 via [place]. Sitter and patient were orientated to room; call light within reach. Will continue to monitor.

## 2018-02-22 NOTE — HPI
Mr. Morales is a 61yo  male with history of HTN, Bipolar 1 disorder, Seizures, Depression, Alcohol abuse and Opiate dependency presenting with Altered Mental Status by EMS due to opiate overdose.  Per EMS, patient found somnolent in home, given 0.4mg Narcan in route to ED which aroused patient.  Patient admitted to EMS to taking 60mg of Oxycodone at once around 1300 today (2/21/18).  Per ED note, wife states patient was intoxicated yesterday. She reports today he was lethargic and not responsive to questions.  She denied witnessed seizures.  Patient has multiple admissions for overdose of various substances.  At this time he is poorly interactive.  He is in NAD and maintaining his airway without difficulty.     Patient history provided from ED note due to altered mental status.  Wife was not at bedside during examination.

## 2018-02-22 NOTE — ASSESSMENT & PLAN NOTE
- hypertensive on arrival   -will add hydralazine 25mg tablet PO q6h PRN for SBP >180 and DBP >110  - 2/22 controlled

## 2018-02-22 NOTE — SUBJECTIVE & OBJECTIVE
Past Medical History:   Diagnosis Date    Alcohol abuse     Behavioral problem     Bipolar 1 disorder     Chronic right hip pain     Depression     DJD (degenerative joint disease), lumbar 1/27/2015    Fatigue     Hepatitis     pt. denies this    History of psychiatric care     History of psychiatric hospitalization     Hypertension     Karina     Post traumatic stress disorder     Psychiatric problem     Seizures     Suicide attempt     Therapy        Past Surgical History:   Procedure Laterality Date    HERNIA REPAIR      SPINE SURGERY  11-12-15    LAMINECTOMY/LUMBAR/WARE       Review of patient's allergies indicates:   Allergen Reactions    Gabapentin Other (See Comments)     SEVERE DIZZINESS AND BALANCE PROBLEMS, UNABLE TO WALK.    Nardil [phenelzine]      BECOMES HYPER, LIKE A MANIC EPISODE.    Penicillins Hives    Pneumococcal 23-jett ps vaccine      Patient refused, will get later      Lamictal [lamotrigine] Rash       No current facility-administered medications on file prior to encounter.      Current Outpatient Prescriptions on File Prior to Encounter   Medication Sig    atorvastatin (LIPITOR) 40 MG tablet TAKE 1 TABLET (40 MG TOTAL) BY MOUTH ONCE DAILY.    divalproex ER (DEPAKOTE) 500 MG Tb24 Take 1 tablet (500mg) every morning and 2 tablets (1000mg) every night.    doxepin (SINEQUAN) 100 MG capsule TAKE 1 CAPSULE (100 MG TOTAL) BY MOUTH EVERY EVENING.    hydrOXYzine pamoate (VISTARIL) 50 MG Cap TAKE 1 CAPSULE (50 MG TOTAL) BY MOUTH EVERY EVENING.    lurasidone (LATUDA) 80 mg Tab tablet Take 80 mg by mouth once daily. Take one tablet daily with dinner.    lurasidone (LATUDA) 80 mg Tab tablet TAKE 1 TABLET BY MOUTH DAILY WITH DINNER    acetaminophen (TYLENOL) 325 MG tablet Take 2 tablets (650 mg total) by mouth every 4 (four) hours as needed.    albuterol 90 mcg/actuation inhaler Inhale 2 puffs into the lungs every 4 (four) hours as needed for Wheezing.    beclomethasone  (QVAR) 40 mcg/actuation Aero Inhale 1 puff into the lungs 2 (two) times daily. Controller    diazePAM (VALIUM) 5 MG tablet Take 1 tablet (5 mg total) by mouth every 12 (twelve) hours.    lidocaine (LIDODERM) 5 % Place 1 patch onto the skin once daily. Remove & Discard patch within 12 hours or as directed by MD    multivit-min-FA-lycopen-lutein (CENTRUM SILVER ULTRA MEN'S) 300-600-300 mcg Tab Take 1 tablet by mouth once daily at 6am.    nicotine (NICODERM CQ) 14 mg/24 hr Place 1 patch onto the skin once daily at 6am.    oxycodone (OXY-IR) 5 mg Cap Take 1 capsule (5 mg total) by mouth every 4 (four) hours as needed for Pain. (Patient taking differently: Take 10 mg by mouth every 6 (six) hours as needed for Pain. )    pantoprazole (PROTONIX) 40 MG tablet Take 1 tablet (40 mg total) by mouth once daily.    thiamine 100 MG tablet Take 1 tablet (100 mg total) by mouth once daily.     Family History     Problem Relation (Age of Onset)    Alcohol abuse Mother, Maternal Uncle, Paternal Uncle, Cousin    Anxiety disorder Brother    Bipolar disorder Brother    Dementia Maternal Grandmother    Depression Mother, Father, Sister, Brother    Drug abuse Brother    Suicide Sister        Social History Main Topics    Smoking status: Light Tobacco Smoker     Packs/day: 0.25     Years: 20.00     Types: Cigarettes    Smokeless tobacco: Never Used    Alcohol use No      Comment: quit 4 years ago    Drug use: No    Sexual activity: Yes     Partners: Female      Comment: with wife; monogamous      Review of Systems   Unable to perform ROS: Mental status change     Objective:     Vital Signs (Most Recent):  Temp: 98.6 °F (37 °C) (02/21/18 1653)  Pulse: 87 (02/21/18 2132)  Resp: 15 (02/21/18 2132)  BP: 125/70 (02/21/18 2132)  SpO2: 98 % (02/21/18 2132) Vital Signs (24h Range):  Temp:  [98.6 °F (37 °C)] 98.6 °F (37 °C)  Pulse:  [] 87  Resp:  [15-24] 15  SpO2:  [93 %-100 %] 98 %  BP: (125-172)/(67-96) 125/70        There is no  height or weight on file to calculate BMI.    Physical Exam   Constitutional: He appears well-developed and well-nourished. No distress.   HENT:   Head: Normocephalic and atraumatic.   Mouth/Throat: Mucous membranes are normal.   Eyes: Conjunctivae are normal. Pupils are equal, round, and reactive to light.   Neck: Normal range of motion.   Cardiovascular: Normal rate, regular rhythm, normal heart sounds and intact distal pulses.  Exam reveals no gallop and no friction rub.    No murmur heard.  Pulmonary/Chest: Effort normal. No respiratory distress. He has wheezes. He has no rales.   Bilateral inspiratory and expiratory wheezing   Abdominal: Soft. Bowel sounds are normal. He exhibits distension.   Musculoskeletal: He exhibits no deformity.   1+ BLE edema   Neurological:   Patient lethargic, minimally interactive during exam, actively moving all exts, PERRL   Skin: Skin is warm.         CRANIAL NERVES     CN III, IV, VI   Pupils are equal, round, and reactive to light.       Significant Labs:   CBC:   Recent Labs  Lab 02/21/18  1756   WBC 7.04   HGB 11.3*   HCT 32.9*   *     CMP:   Recent Labs  Lab 02/21/18  1756   *   K 4.2   CL 91*   CO2 26      BUN 10   CREATININE 1.2   CALCIUM 9.3   PROT 7.0   ALBUMIN 2.8*   BILITOT 0.7   ALKPHOS 80   AST 32   ALT 18   ANIONGAP 10   EGFRNONAA >60.0     Cardiac Markers: No results for input(s): CKMB, MYOGLOBIN, BNP, TROPISTAT in the last 48 hours.  Magnesium: No results for input(s): MG in the last 48 hours.  POCT Glucose:   Recent Labs  Lab 02/21/18  1759   POCTGLUCOSE 127*     TSH: No results for input(s): TSH in the last 4320 hours.  Urine Culture: No results for input(s): LABURIN in the last 48 hours.  Urine Studies:   Recent Labs  Lab 02/21/18  1807   COLORU Yellow   APPEARANCEUA Clear   PHUR 6.0   SPECGRAV 1.010   PROTEINUA Negative   GLUCUA Negative   KETONESU Negative   BILIRUBINUA Negative   OCCULTUA Negative   NITRITE Negative   UROBILINOGEN 2.0    LEUKOCYTESUR Negative     All pertinent labs within the past 24 hours have been reviewed.    Significant Imaging: I have reviewed all pertinent imaging results/findings within the past 24 hours.

## 2018-02-22 NOTE — ED NOTES
Pt slightly responsive to pain, twitches with pain. Respirations even, equal, and spontaneous bilaterally at 16BPM.

## 2018-02-22 NOTE — ASSESSMENT & PLAN NOTE
- Na 127 on arrival (baseline 133-137)  - possibly r/t dehydration - slight bump in crt from baseline  - continue MIVF's overnight   - reassess chemistry in AM and replete electrolytes as indicated   - 2/22 improving with IVF

## 2018-02-22 NOTE — ASSESSMENT & PLAN NOTE
"63yo M with history of HTN, Bipolar 1 disorder, Seizures, Depression, Alcohol abuse and Opiate dependency presenting with Altered Mental Status by EMS due to opiate overdose.  Patient found somnolent in home, aroused after given 0.4mg Narcan by EMS. He reportedly admitted to taking 60mg of Oxycodone at once around 1300 today.    -Urine tox screen positive for opiates  -0.4mg Naloxone given by EMS with good response  - monitor neuro status closely and consider redosing naloxone if needed   - withdrawal order set initiated    -Telemetry  -High risk of fall / injury utilize all safety measures and fall precautions  -Aspiration precautions  -Seizure precautions  -Sitter at bedside for now and reassess in AM  -Neuro checks q4h  -Hold neuro blunting agents for now  2/22 pt AA x 3; R hand tremors.  Pt states he took additional opioid to "take the edge off and get high."  "

## 2018-02-22 NOTE — SUBJECTIVE & OBJECTIVE
"Interval History: pt reports he took additional opioids to "get high and take the edge off."  Pt has been helping to take care of his wife at home (fractured her leg).  Pt reports this is his second opioid overdose since completing inpatient psych stay ~1 year ago.    Review of Systems   Constitutional: Negative for chills, diaphoresis, fatigue and fever.   Respiratory: Negative for cough, chest tightness, shortness of breath and wheezing.    Gastrointestinal: Negative for abdominal pain, diarrhea, nausea and vomiting.   Psychiatric/Behavioral: Negative for confusion, decreased concentration, dysphoric mood, self-injury and suicidal ideas. The patient is nervous/anxious.      Objective:     Vital Signs (Most Recent):  Temp: 98 °F (36.7 °C) (02/22/18 1146)  Pulse: 99 (02/22/18 1323)  Resp: 18 (02/22/18 1323)  BP: 123/71 (02/22/18 1146)  SpO2: 97 % (02/22/18 1323) Vital Signs (24h Range):  Temp:  [97.5 °F (36.4 °C)-98.6 °F (37 °C)] 98 °F (36.7 °C)  Pulse:  [] 99  Resp:  [15-24] 18  SpO2:  [91 %-100 %] 97 %  BP: (115-172)/(67-96) 123/71     Weight: 112.7 kg (248 lb 6.4 oz)  Body mass index is 29.46 kg/m².    Intake/Output Summary (Last 24 hours) at 02/22/18 1409  Last data filed at 02/22/18 0914   Gross per 24 hour   Intake                0 ml   Output              375 ml   Net             -375 ml      Physical Exam   Constitutional: He is oriented to person, place, and time. He appears well-developed and well-nourished. No distress.   HENT:   Head: Normocephalic and atraumatic.   Mouth/Throat: Mucous membranes are normal.   Eyes: Conjunctivae are normal. Pupils are equal, round, and reactive to light.   Neck: Normal range of motion.   Cardiovascular: Normal rate, regular rhythm, normal heart sounds and intact distal pulses.  Exam reveals no gallop and no friction rub.    No murmur heard.  Pulmonary/Chest: Effort normal. No respiratory distress. He has wheezes. He has no rales.   Bilateral inspiratory and " expiratory wheezing   Abdominal: Soft. Bowel sounds are normal. He exhibits no distension.   Musculoskeletal: He exhibits no deformity.   1+ BLE edema; R hand tremors   Neurological: He is alert and oriented to person, place, and time.   Skin: Skin is warm.   Psychiatric: His affect is not inappropriate. His speech is not slurred. He is not agitated. He expresses no suicidal ideation.       Significant Labs: All pertinent labs within the past 24 hours have been reviewed.    Significant Imaging: I have reviewed all pertinent imaging results/findings within the past 24 hours.

## 2018-02-22 NOTE — ED NOTES
Pt continues resting with eyes closed, frequently spitting up sputum, opens eyes to verbal command, mumbles with no comprehensible speech, not answering questions or following commands, VSS and respirations even and unlabored. Risk sitter at bedside for one to one observation. Pt updated on POC. Bed low and locked with side rails up x2.

## 2018-02-22 NOTE — ED NOTES
Pt urinated on self. Pt's clothes taken off and placed in pt's belonging bag. Diaper placed on pt, linens and absorbent pads changed. Pt repositioned in bed. Pt in NAD. Pt on continuous cardiac monitoring, continuous pulse oximetry, and automatic BP cuff. Side rails up X2. Bed low and locked. Call light within reach. Pt disoriented to time. Pt mumbling. Pt alert to voice. Pt on 4L NC.

## 2018-02-22 NOTE — ASSESSMENT & PLAN NOTE
- tobacco cessation education and counseling  - Duo Neb treatments Q6 for now with albuterol nebs PRN SOB  - supplemental oxygen as needed to maintain saturations greater than 92%

## 2018-02-22 NOTE — PROGRESS NOTES
"[facility] Hospital Medicine  Progress Note    Patient Name: Mirza Morales  MRN: [id number]  Patient Class: IP- Inpatient   Admission Date: 2/21/2018  Length of Stay: 0 days  Attending Physician: Polly Talamantes MD  Primary Care Provider: LIDIA Banks MD    [facility] Medicine Team: [facility] MED F Layla Fernandez PA-C    Subjective:     Principal Problem:Opioid overdose    HPI:  Mr. Morales is a 61yo  male with history of HTN, Bipolar 1 disorder, Seizures, Depression, Alcohol abuse and Opiate dependency presenting with Altered Mental Status by EMS due to opiate overdose.  Per EMS, patient found somnolent in home, given 0.4mg Narcan in route to ED which aroused patient.  Patient admitted to EMS to taking 60mg of Oxycodone at once around 1300 today (2/21/18).  Per ED note, wife states patient was intoxicated yesterday. She reports today he was lethargic and not responsive to questions.  She denied witnessed seizures.  Patient has multiple admissions for overdose of various substances.  At this time he is poorly interactive.  He is in NAD and maintaining his airway without difficulty.     Patient history provided from ED note due to altered mental status.  Wife was not at bedside during examination.    Hospital Course:  Pt admitted to observation for opioid overdose.  Day 2 pt states he took additional opioids to "take the edge off and get high."  Pt denies SI.    Interval History: pt reports he took additional opioids to "get high and take the edge off."  Pt has been helping to take care of his wife at home (fractured her leg).  Pt reports this is his second opioid overdose since completing inpatient psych stay ~1 year ago.    Review of Systems   Constitutional: Negative for chills, diaphoresis, fatigue and fever.   Respiratory: Negative for cough, chest tightness, shortness of breath and wheezing.    Gastrointestinal: Negative for abdominal pain, diarrhea, nausea and vomiting. "   Psychiatric/Behavioral: Negative for confusion, decreased concentration, dysphoric mood, self-injury and suicidal ideas. The patient is nervous/anxious.      Objective:     Vital Signs (Most Recent):  Temp: 98 °F (36.7 °C) (02/22/18 1146)  Pulse: 99 (02/22/18 1323)  Resp: 18 (02/22/18 1323)  BP: 123/71 (02/22/18 1146)  SpO2: 97 % (02/22/18 1323) Vital Signs (24h Range):  Temp:  [97.5 °F (36.4 °C)-98.6 °F (37 °C)] 98 °F (36.7 °C)  Pulse:  [] 99  Resp:  [15-24] 18  SpO2:  [91 %-100 %] 97 %  BP: (115-172)/(67-96) 123/71     Weight: 112.7 kg (248 lb 6.4 oz)  Body mass index is 29.46 kg/m².    Intake/Output Summary (Last 24 hours) at 02/22/18 1409  Last data filed at 02/22/18 0914   Gross per 24 hour   Intake                0 ml   Output              375 ml   Net             -375 ml      Physical Exam   Constitutional: He is oriented to person, place, and time. He appears well-developed and well-nourished. No distress.   HENT:   Head: Normocephalic and atraumatic.   Mouth/Throat: Mucous membranes are normal.   Eyes: Conjunctivae are normal. Pupils are equal, round, and reactive to light.   Neck: Normal range of motion.   Cardiovascular: Normal rate, regular rhythm, normal heart sounds and intact distal pulses.  Exam reveals no gallop and no friction rub.    No murmur heard.  Pulmonary/Chest: Effort normal. No respiratory distress. He has wheezes. He has no rales.   Bilateral inspiratory and expiratory wheezing   Abdominal: Soft. Bowel sounds are normal. He exhibits no distension.   Musculoskeletal: He exhibits no deformity.   1+ BLE edema; R hand tremors   Neurological: He is alert and oriented to person, place, and time.   Skin: Skin is warm.   Psychiatric: His affect is not inappropriate. His speech is not slurred. He is not agitated. He expresses no suicidal ideation.       Significant Labs: All pertinent labs within the past 24 hours have been reviewed.    Significant Imaging: I have reviewed all pertinent  "imaging results/findings within the past 24 hours.    Assessment/Plan:      * Opioid overdose    61yo M with history of HTN, Bipolar 1 disorder, Seizures, Depression, Alcohol abuse and Opiate dependency presenting with Altered Mental Status by EMS due to opiate overdose.  Patient found somnolent in home, aroused after given 0.4mg Narcan by EMS. He reportedly admitted to taking 60mg of Oxycodone at once around 1300 today.    -Urine tox screen positive for opiates  -0.4mg Naloxone given by EMS with good response  - monitor neuro status closely and consider redosing naloxone if needed   - withdrawal order set initiated    -Telemetry  -High risk of fall / injury utilize all safety measures and fall precautions  -Aspiration precautions  -Seizure precautions  -Sitter at bedside for now and reassess in AM  -Neuro checks q4h  -Hold neuro blunting agents for now  2/22 pt AA x 3; R hand tremors.  Pt states he took additional opioid to "take the edge off and get high."        Hyponatremia    - Na 127 on arrival (baseline 133-137)  - possibly r/t dehydration - slight bump in crt from baseline  - continue MIVF's overnight   - reassess chemistry in AM and replete electrolytes as indicated   - 2/22 improving with IVF        Tobacco abuse    - tobacco cessation education and counseling  - Duo Neb treatments Q6 for now with albuterol nebs PRN SOB  - supplemental oxygen as needed to maintain saturations greater than 92%        Protein calorie malnutrition    - albumin 2.8 on arrival  - prealbumin 9  - consider nutrition consultation        Essential hypertension    - hypertensive on arrival   -will add hydralazine 25mg tablet PO q6h PRN for SBP >180 and DBP >110  - 2/22 controlled        Anxiety    -Continue doxepin on home dosing regimen  -stable 2/22        Bipolar I disorder, recurrent manic episode    -Continue divalproex ER and lurasidone as prescribed  -2/22 stable        HLD (hyperlipidemia)    -Continue statin          VTE " Risk Mitigation         Ordered     Medium Risk of VTE  Once      02/21/18 2211     Place EMY hose  Until discontinued      02/21/18 2211     Place sequential compression device  Until discontinued      02/21/18 2211     Reason for No Pharmacological VTE Prophylaxis  Once      02/21/18 2211              Layla Fernandez PA-C  Department of Hospital Medicine   [facility]

## 2018-02-22 NOTE — ASSESSMENT & PLAN NOTE
- Na 127 on arrival (baseline 133-137)  - possibly r/t dehydration - slight bump in crt from baseline  - continue MIVF's overnight   - reassess chemistry in AM and replete electrolytes as indicated

## 2018-02-22 NOTE — ASSESSMENT & PLAN NOTE
61yo M with history of HTN, Bipolar 1 disorder, Seizures, Depression, Alcohol abuse and Opiate dependency presenting with Altered Mental Status by EMS due to opiate overdose.  Patient found somnolent in home, aroused after given 0.4mg Narcan by EMS. He reportedly admitted to taking 60mg of Oxycodone at once around 1300 today.    -Urine tox screen positive for opiates  -0.4mg Naloxone given by EMS with good response  - monitor neuro status closely and consider redosing naloxone if needed   - withdrawal order set initiated    -Telemetry  -High risk of fall / injury utilize all safety measures and fall precautions  -Aspiration precautions  -Seizure precautions  -Sitter at bedside for now and reassess in AM  -Neuro checks q4h  -Hold neuro blunting agents for now

## 2018-02-22 NOTE — PLAN OF CARE
[id number] - [place], [place] - [address]  [address]  [place] [address]  Phone: [phone] Fax: [phone]    This CM met with patient at bedside. AAOx3       02/22/18 1143   Discharge Assessment   Assessment Type Discharge Planning Assessment   Confirmed/corrected address and phone number on facesheet? Yes   Assessment information obtained from? Patient   Expected Length of Stay (days) 2   Communicated expected length of stay with patient/caregiver yes   Prior to hospitilization cognitive status: Alert/Oriented   Prior to hospitalization functional status: Independent   Current cognitive status: Alert/Oriented   Current Functional Status: Needs Assistance   Facility Arrived From: (arrive from home )   Lives With spouse   Able to Return to Prior Arrangements yes   Is patient able to care for self after discharge? Yes   Who are your caregiver(s) and their phone number(s)? (spouse Anamaria Morales [phone])   Patient's perception of discharge disposition home or selfcare   Readmission Within The Last 30 Days no previous admission in last 30 days   Patient currently being followed by outpatient case management? No   Patient currently receives any other outside agency services? No   Equipment Currently Used at Home none   Do you have any problems affording any of your prescribed medications? No   Is the patient taking medications as prescribed? yes   Does the patient have transportation home? Yes   Transportation Available family or friend will provide   Does the patient receive services at the [facility]? No   Discharge Plan A Home with family   Discharge Plan B Home with family   Patient/Family In Agreement With Plan yes     Mana Kelley RN/BSN/ARABELLA  [phone]  [facility]

## 2018-02-23 VITALS
HEART RATE: 76 BPM | RESPIRATION RATE: 18 BRPM | SYSTOLIC BLOOD PRESSURE: 139 MMHG | BODY MASS INDEX: 29.33 KG/M2 | OXYGEN SATURATION: 94 % | HEIGHT: 77 IN | TEMPERATURE: 98 F | WEIGHT: 248.38 LBS | DIASTOLIC BLOOD PRESSURE: 84 MMHG

## 2018-02-23 PROBLEM — T40.2X1A OPIOID OVERDOSE: Status: RESOLVED | Noted: 2018-02-21 | Resolved: 2018-02-23

## 2018-02-23 LAB
ALBUMIN SERPL BCP-MCNC: 2.2 G/DL
ALP SERPL-CCNC: 57 U/L
ALT SERPL W/O P-5'-P-CCNC: 12 U/L
ANION GAP SERPL CALC-SCNC: 4 MMOL/L
AST SERPL-CCNC: 19 U/L
BASOPHILS # BLD AUTO: 0.02 K/UL
BASOPHILS NFR BLD: 0.4 %
BILIRUB SERPL-MCNC: 0.4 MG/DL
BUN SERPL-MCNC: 6 MG/DL
CALCIUM SERPL-MCNC: 8.1 MG/DL
CHLORIDE SERPL-SCNC: 99 MMOL/L
CO2 SERPL-SCNC: 30 MMOL/L
CREAT SERPL-MCNC: 0.7 MG/DL
DIFFERENTIAL METHOD: ABNORMAL
EOSINOPHIL # BLD AUTO: 0.3 K/UL
EOSINOPHIL NFR BLD: 5.2 %
ERYTHROCYTE [DISTWIDTH] IN BLOOD BY AUTOMATED COUNT: 12.8 %
EST. GFR  (AFRICAN AMERICAN): >60 ML/MIN/1.73 M^2
EST. GFR  (NON AFRICAN AMERICAN): >60 ML/MIN/1.73 M^2
GLUCOSE SERPL-MCNC: 95 MG/DL
HCT VFR BLD AUTO: 29.5 %
HGB BLD-MCNC: 10 G/DL
IMM GRANULOCYTES # BLD AUTO: 0.02 K/UL
IMM GRANULOCYTES NFR BLD AUTO: 0.4 %
LYMPHOCYTES # BLD AUTO: 0.8 K/UL
LYMPHOCYTES NFR BLD: 13.5 %
MAGNESIUM SERPL-MCNC: 1.8 MG/DL
MCH RBC QN AUTO: 32.2 PG
MCHC RBC AUTO-ENTMCNC: 33.9 G/DL
MCV RBC AUTO: 95 FL
MONOCYTES # BLD AUTO: 0.6 K/UL
MONOCYTES NFR BLD: 11.4 %
NEUTROPHILS # BLD AUTO: 3.8 K/UL
NEUTROPHILS NFR BLD: 69.1 %
NRBC BLD-RTO: 0 /100 WBC
PHOSPHATE SERPL-MCNC: 2.6 MG/DL
PLATELET # BLD AUTO: 155 K/UL
PMV BLD AUTO: 10.1 FL
POTASSIUM SERPL-SCNC: 4.2 MMOL/L
PROT SERPL-MCNC: 5.4 G/DL
RBC # BLD AUTO: 3.11 M/UL
SODIUM SERPL-SCNC: 133 MMOL/L
WBC # BLD AUTO: 5.54 K/UL

## 2018-02-23 PROCEDURE — 36415 COLL VENOUS BLD VENIPUNCTURE: CPT

## 2018-02-23 PROCEDURE — 83735 ASSAY OF MAGNESIUM: CPT

## 2018-02-23 PROCEDURE — 25000242 PHARM REV CODE 250 ALT 637 W/ HCPCS: Performed by: PHYSICIAN ASSISTANT

## 2018-02-23 PROCEDURE — 80053 COMPREHEN METABOLIC PANEL: CPT

## 2018-02-23 PROCEDURE — 25000003 PHARM REV CODE 250: Performed by: NURSE PRACTITIONER

## 2018-02-23 PROCEDURE — 94640 AIRWAY INHALATION TREATMENT: CPT

## 2018-02-23 PROCEDURE — 99239 HOSP IP/OBS DSCHRG MGMT >30: CPT | Mod: SA,,, | Performed by: PHYSICIAN ASSISTANT

## 2018-02-23 PROCEDURE — 84100 ASSAY OF PHOSPHORUS: CPT

## 2018-02-23 PROCEDURE — 27000221 HC OXYGEN, UP TO 24 HOURS

## 2018-02-23 PROCEDURE — 85025 COMPLETE CBC W/AUTO DIFF WBC: CPT

## 2018-02-23 PROCEDURE — 25000003 PHARM REV CODE 250: Performed by: PHYSICIAN ASSISTANT

## 2018-02-23 PROCEDURE — G0378 HOSPITAL OBSERVATION PER HR: HCPCS

## 2018-02-23 RX ORDER — FOLIC ACID 1 MG/1
1 TABLET ORAL DAILY
Qty: 30 TABLET | Refills: 0 | Status: SHIPPED | OUTPATIENT
Start: 2018-02-23 | End: 2018-08-08

## 2018-02-23 RX ADMIN — ATORVASTATIN CALCIUM 40 MG: 20 TABLET, FILM COATED ORAL at 09:02

## 2018-02-23 RX ADMIN — ACETAMINOPHEN 650 MG: 325 TABLET, FILM COATED ORAL at 04:02

## 2018-02-23 RX ADMIN — BECLOMETHASONE DIPROPIONATE 1 PUFF: 40 AEROSOL, METERED RESPIRATORY (INHALATION) at 09:02

## 2018-02-23 RX ADMIN — PANTOPRAZOLE SODIUM 40 MG: 40 TABLET, DELAYED RELEASE ORAL at 09:02

## 2018-02-23 RX ADMIN — IPRATROPIUM BROMIDE 0.5 MG: 0.5 SOLUTION RESPIRATORY (INHALATION) at 12:02

## 2018-02-23 RX ADMIN — ALBUTEROL SULFATE 2.5 MG: 2.5 SOLUTION RESPIRATORY (INHALATION) at 07:02

## 2018-02-23 RX ADMIN — DIVALPROEX SODIUM 500 MG: 500 TABLET, EXTENDED RELEASE ORAL at 09:02

## 2018-02-23 RX ADMIN — SODIUM CHLORIDE: 0.9 INJECTION, SOLUTION INTRAVENOUS at 04:02

## 2018-02-23 RX ADMIN — IPRATROPIUM BROMIDE 0.5 MG: 0.5 SOLUTION RESPIRATORY (INHALATION) at 07:02

## 2018-02-23 RX ADMIN — THIAMINE HCL (VITAMIN B1) 50 MG TABLET 100 MG: at 09:02

## 2018-02-23 RX ADMIN — POTASSIUM & SODIUM PHOSPHATES POWDER PACK 280-160-250 MG 1 PACKET: 280-160-250 PACK at 07:02

## 2018-02-23 RX ADMIN — ALBUTEROL SULFATE 2.5 MG: 2.5 SOLUTION RESPIRATORY (INHALATION) at 12:02

## 2018-02-23 RX ADMIN — POTASSIUM & SODIUM PHOSPHATES POWDER PACK 280-160-250 MG 1 PACKET: 280-160-250 PACK at 11:02

## 2018-02-23 NOTE — PLAN OF CARE
02/23/18 1452   Final Note   Assessment Type Final Discharge Note     Patient discharged home with no needs 2/23/18.

## 2018-02-23 NOTE — DISCHARGE SUMMARY
"[facility] Hospital Medicine  Discharge Summary      Patient Name: Mirza Morales  MRN: [id number]  Admission Date: 2/21/2018  Hospital Length of Stay: 1 days  Discharge Date and Time:  02/23/2018 10:01 AM  Attending Physician: Polly Talamantes MD   Discharging Provider: Layla Fernandez PA-C  Primary Care Provider: LIDIA Banks MD  [facility] Medicine Team: [facility] Layla Fernandez PA-C    HPI:   Mr. Morales is a 63yo  male with history of HTN, Bipolar 1 disorder, Seizures, Depression, Alcohol abuse and Opiate dependency presenting with Altered Mental Status by EMS due to opiate overdose.  Per EMS, patient found somnolent in home, given 0.4mg Narcan in route to ED which aroused patient.  Patient admitted to EMS to taking 60mg of Oxycodone at once around 1300 today (2/21/18).  Per ED note, wife states patient was intoxicated yesterday. She reports today he was lethargic and not responsive to questions.  She denied witnessed seizures.  Patient has multiple admissions for overdose of various substances.  At this time he is poorly interactive.  He is in NAD and maintaining his airway without difficulty.     Patient history provided from ED note due to altered mental status.  Wife was not at bedside during examination.    * No surgery found *      Hospital Course:   Pt admitted to observation for opioid overdose.  Day 2 pt states he took additional opioids to "take the edge off and get high."  Pt denies SI, plan or intent to harm himself or others.  Na improved from 127->133.  Pt agreed to contact Dr. Loco for follow up asap.     Consults:   Consults         Status Ordering Provider     Case Management/  Once     Provider:  (Not yet assigned)    Completed GHASSAN CARRILLO          * Opioid overdose-resolved as of 2/23/2018    63yo M with history of HTN, Bipolar 1 disorder, Seizures, Depression, Alcohol abuse and Opiate dependency presenting with Altered Mental Status " "by EMS due to opiate overdose.  Patient found somnolent in home, aroused after given 0.4mg Narcan by EMS. He reportedly admitted to taking 60mg of Oxycodone at once around 1300 today.    -Urine tox screen positive for opiates  -0.4mg Naloxone given by EMS with good response  - monitor neuro status closely and consider redosing naloxone if needed   - withdrawal order set initiated    -Telemetry  -High risk of fall / injury utilize all safety measures and fall precautions  -Aspiration precautions  -Seizure precautions  -Sitter at bedside for now and reassess in AM  -Neuro checks q4h  -Hold neuro blunting agents for now  2/22 pt AA x 3; R hand tremors.  Pt states he took additional opioid to "take the edge off and get high."  2/23 stable for discharge and follow up with Dr. Loco        Hyponatremia    - Na 127 on arrival (baseline 133-137)  - possibly r/t dehydration - slight bump in crt from baseline  - continue MIVF's overnight   - reassess chemistry in AM and replete electrolytes as indicated   - 2/22 improving with IVF to 133        Tobacco abuse    - tobacco cessation education and counseling  - Duo Neb treatments Q6 for now with albuterol nebs PRN SOB  - supplemental oxygen as needed to maintain saturations greater than 92%        Protein calorie malnutrition    - albumin 2.8 on arrival  - prealbumin 9  - MVI, thiamine, folic acid        Essential hypertension    - hypertensive on arrival   -will add hydralazine 25mg tablet PO q6h PRN for SBP >180 and DBP >110  - 2/22 controlled; 2/23 fluctuations but fair control        Anxiety    -Continue doxepin on home dosing regimen  -stable 2/22        Bipolar I disorder, recurrent manic episode    -Continue divalproex ER and lurasidone as prescribed  -2/22-2/23 stable        HLD (hyperlipidemia)    -Continue statin          Final Active Diagnoses:    Diagnosis Date Noted POA    Hyponatremia [E87.1] 02/21/2018 Unknown    Tobacco abuse [Z72.0] 02/28/2013 Yes     Chronic "    Protein calorie malnutrition [E46] 02/21/2018 Unknown    Essential hypertension [I10] 02/21/2018 Unknown    Anxiety [F41.9] 07/02/2013 Yes    Bipolar I disorder, recurrent manic episode [F31.10] 12/31/2015 Yes    HLD (hyperlipidemia) [E78.5] 01/03/2016 Yes      Problems Resolved During this Admission:    Diagnosis Date Noted Date Resolved POA    PRINCIPAL PROBLEM:  Opioid overdose [T40.2X1A] 02/21/2018 02/23/2018 Yes       Discharged Condition: good    Disposition: Home or Self Care    Follow Up:  Follow-up Information     Schedule an appointment as soon as possible for a visit with Ambrosio Loco MD.    Specialty:  Psychiatry  Contact information:  [address] [place] [id number]  [phone]                 Patient Instructions:     Notify your health care provider if you experience any of the following:  persistent nausea and vomiting or diarrhea     Notify your health care provider if you experience any of the following:  severe persistent headache     Notify your health care provider if you experience any of the following:  persistent dizziness, light-headedness, or visual disturbances     Notify your health care provider if you experience any of the following:  increased confusion or weakness     Notify your health care provider if you experience any of the following:   Order Comments: Plan or intent to harm yourself, increased depression         Significant Diagnostic Studies: Labs: All labs within the past 24 hours have been reviewed    Pending Diagnostic Studies:     None         Medications:  Reconciled Home Medications:   Current Discharge Medication List      START taking these medications    Details   folic acid (FOLVITE) 1 MG tablet Take 1 tablet (1 mg total) by mouth once daily.  Qty: 30 tablet, Refills: 0         CONTINUE these medications which have NOT CHANGED    Details   atorvastatin (LIPITOR) 40 MG tablet TAKE 1 TABLET (40 MG TOTAL) BY MOUTH ONCE DAILY.  Qty: 30 tablet,  Refills: 3      divalproex ER (DEPAKOTE) 500 MG Tb24 Take 1 tablet (500mg) every morning and 2 tablets (1000mg) every night.  Qty: 90 tablet, Refills: 3      doxepin (SINEQUAN) 100 MG capsule TAKE 1 CAPSULE (100 MG TOTAL) BY MOUTH EVERY EVENING.  Qty: 30 capsule, Refills: 3      gabapentin (NEURONTIN) 600 MG tablet Take 600 mg by mouth 3 (three) times daily.      hydrOXYzine pamoate (VISTARIL) 50 MG Cap TAKE 1 CAPSULE (50 MG TOTAL) BY MOUTH EVERY EVENING.  Qty: 30 capsule, Refills: 3      !! lurasidone (LATUDA) 80 mg Tab tablet Take 80 mg by mouth once daily. Take one tablet daily with dinner.  Qty: 30 tablet, Refills: 3      !! lurasidone (LATUDA) 80 mg Tab tablet TAKE 1 TABLET BY MOUTH DAILY WITH DINNER  Qty: 30 tablet, Refills: 3      acetaminophen (TYLENOL) 325 MG tablet Take 2 tablets (650 mg total) by mouth every 4 (four) hours as needed.  Refills: 0      albuterol 90 mcg/actuation inhaler Inhale 2 puffs into the lungs every 4 (four) hours as needed for Wheezing.  Qty: 1 Inhaler, Refills: 0    Associated Diagnoses: Pneumonia of left lung due to infectious organism, unspecified part of lung; Atelectasis of left lung      beclomethasone (QVAR) 40 mcg/actuation Aero Inhale 1 puff into the lungs 2 (two) times daily. Controller  Qty: 120 each, Refills: 0    Associated Diagnoses: Atelectasis of left lung      lidocaine (LIDODERM) 5 % Place 1 patch onto the skin once daily. Remove & Discard patch within 12 hours or as directed by MD  Qty: 10 patch, Refills: 0      multivit-min-FA-lycopen-lutein (CENTRUM SILVER ULTRA MEN'S) 300-600-300 mcg Tab Take 1 tablet by mouth once daily at 6am.      nicotine (NICODERM CQ) 14 mg/24 hr Place 1 patch onto the skin once daily at 6am.  Refills: 0      pantoprazole (PROTONIX) 40 MG tablet Take 1 tablet (40 mg total) by mouth once daily.  Qty: 30 tablet, Refills: 0      thiamine 100 MG tablet Take 1 tablet (100 mg total) by mouth once daily.       !! - Potential duplicate medications  found. Please discuss with provider.      STOP taking these medications       diazePAM (VALIUM) 5 MG tablet Comments:   Reason for Stopping:         oxycodone (OXY-IR) 5 mg Cap Comments:   Reason for Stopping:               Indwelling Lines/Drains at time of discharge:   Lines/Drains/Airways          No matching active lines, drains, or airways          Time spent on the discharge of patient: >30 minutes  Patient was seen and examined on the date of discharge and determined to be suitable for discharge.         Layla Fernandez PA-C  Department of Hospital Medicine  [facility]

## 2018-02-23 NOTE — PLAN OF CARE
Problem: Patient Care Overview  Goal: Plan of Care Review  Outcome: Ongoing (interventions implemented as appropriate)  Patient is AAOx4. Vital signs stable. No falls throughout shift. No complaints of pain. IV fluids infusing. IV magnesium administered.

## 2018-02-23 NOTE — ASSESSMENT & PLAN NOTE
- Na 127 on arrival (baseline 133-137)  - possibly r/t dehydration - slight bump in crt from baseline  - continue MIVF's overnight   - reassess chemistry in AM and replete electrolytes as indicated   - 2/22 improving with IVF to 133

## 2018-02-23 NOTE — PLAN OF CARE
Message sent to Dr. PO Banks's (PCP) nurse requesting a hospital follow up appointment in 1-2 weeks. Dr. Banks's nurse to call the patient with appointment date & time.

## 2018-02-23 NOTE — ASSESSMENT & PLAN NOTE
- hypertensive on arrival   -will add hydralazine 25mg tablet PO q6h PRN for SBP >180 and DBP >110  - 2/22 controlled; 2/23 fluctuations but fair control

## 2018-02-23 NOTE — PROGRESS NOTES
Patient received discharge instructions and verbalized understanding. IV discontinued with catheter tip intact. Patient disconnected from telemetry monitor. Patient in room waiting for wheelchair transport. Will continue to monitor.

## 2018-02-26 ENCOUNTER — TELEPHONE (OUTPATIENT)
Dept: INTERNAL MEDICINE | Facility: CLINIC | Age: 63
End: 2018-02-26

## 2018-02-26 NOTE — TELEPHONE ENCOUNTER
----- Message from Jagjit Gupta sent at 2/23/2018  2:31 PM CST -----  Contact: self [phone]  Sooner appointment than the  can schedule.  Did you offer to schedule the next available appointment and put the patient on the wait list?:  no  When is the first available appointment: 04/06  What is the nature of the appointment:  Hosfu/ discharge 02/23/2018  What visit type: hosfu   Patient preference of timeframe to be scheduled:  Anytime   Comments:

## 2018-03-12 ENCOUNTER — TELEPHONE (OUTPATIENT)
Dept: INTERNAL MEDICINE | Facility: CLINIC | Age: 63
End: 2018-03-12

## 2018-03-12 NOTE — TELEPHONE ENCOUNTER
----- Message from Jes Gant sent at 3/12/2018  1:55 PM CDT -----  Contact: Patient [phone]  Requesting an earlier appt date or time    Next available appt: 4/5/2018    Nature of the appt:Preop Lumbar Surgery 03/20/18     Does patient have appt scheduled?: No    Please contact patient to schedule earlier appt if available but notify patient either way.    Patient request you call him today   Thank You

## 2018-03-12 NOTE — TELEPHONE ENCOUNTER
Spoke with pt to advise of an appt on 3/15/18 at 2 pm.    Verbalized understanding and agreeable to such.

## 2018-03-15 ENCOUNTER — OFFICE VISIT (OUTPATIENT)
Dept: INTERNAL MEDICINE | Facility: CLINIC | Age: 63
End: 2018-03-15
Payer: COMMERCIAL

## 2018-03-15 ENCOUNTER — LAB VISIT (OUTPATIENT)
Dept: LAB | Facility: HOSPITAL | Age: 63
End: 2018-03-15
Attending: INTERNAL MEDICINE
Payer: COMMERCIAL

## 2018-03-15 VITALS
BODY MASS INDEX: 26.94 KG/M2 | SYSTOLIC BLOOD PRESSURE: 102 MMHG | HEIGHT: 77 IN | WEIGHT: 228.19 LBS | OXYGEN SATURATION: 98 % | HEART RATE: 100 BPM | TEMPERATURE: 98 F | DIASTOLIC BLOOD PRESSURE: 74 MMHG

## 2018-03-15 DIAGNOSIS — F31.60 BIPOLAR 1 DISORDER, MIXED: ICD-10-CM

## 2018-03-15 DIAGNOSIS — M54.16 LUMBAR RADICULOPATHY: ICD-10-CM

## 2018-03-15 DIAGNOSIS — F17.200 TOBACCO USE DISORDER: ICD-10-CM

## 2018-03-15 DIAGNOSIS — Z01.818 PRE-OP EVALUATION: ICD-10-CM

## 2018-03-15 DIAGNOSIS — E78.5 DYSLIPIDEMIA: ICD-10-CM

## 2018-03-15 DIAGNOSIS — Z01.818 PRE-OP EVALUATION: Primary | ICD-10-CM

## 2018-03-15 LAB
ANION GAP SERPL CALC-SCNC: 8 MMOL/L
BASOPHILS # BLD AUTO: 0.05 K/UL
BASOPHILS NFR BLD: 0.5 %
BUN SERPL-MCNC: 5 MG/DL
CALCIUM SERPL-MCNC: 9.7 MG/DL
CHLORIDE SERPL-SCNC: 95 MMOL/L
CO2 SERPL-SCNC: 28 MMOL/L
CREAT SERPL-MCNC: 1 MG/DL
DIFFERENTIAL METHOD: ABNORMAL
EOSINOPHIL # BLD AUTO: 0.2 K/UL
EOSINOPHIL NFR BLD: 2.3 %
ERYTHROCYTE [DISTWIDTH] IN BLOOD BY AUTOMATED COUNT: 12.8 %
EST. GFR  (AFRICAN AMERICAN): >60 ML/MIN/1.73 M^2
EST. GFR  (NON AFRICAN AMERICAN): >60 ML/MIN/1.73 M^2
FERRITIN SERPL-MCNC: 186 NG/ML
GLUCOSE SERPL-MCNC: 91 MG/DL
HCT VFR BLD AUTO: 43.5 %
HGB BLD-MCNC: 14.5 G/DL
IMM GRANULOCYTES # BLD AUTO: 0.1 K/UL
IMM GRANULOCYTES NFR BLD AUTO: 1.1 %
IRON SERPL-MCNC: 100 UG/DL
LYMPHOCYTES # BLD AUTO: 1.8 K/UL
LYMPHOCYTES NFR BLD: 19.2 %
MCH RBC QN AUTO: 31.5 PG
MCHC RBC AUTO-ENTMCNC: 33.3 G/DL
MCV RBC AUTO: 94 FL
MONOCYTES # BLD AUTO: 0.8 K/UL
MONOCYTES NFR BLD: 8.3 %
NEUTROPHILS # BLD AUTO: 6.5 K/UL
NEUTROPHILS NFR BLD: 68.6 %
NRBC BLD-RTO: 0 /100 WBC
PLATELET # BLD AUTO: 195 K/UL
PMV BLD AUTO: 10.3 FL
POTASSIUM SERPL-SCNC: 5.1 MMOL/L
RBC # BLD AUTO: 4.61 M/UL
SATURATED IRON: 24 %
SODIUM SERPL-SCNC: 131 MMOL/L
TOTAL IRON BINDING CAPACITY: 425 UG/DL
TRANSFERRIN SERPL-MCNC: 287 MG/DL
VIT B12 SERPL-MCNC: 1162 PG/ML
WBC # BLD AUTO: 9.45 K/UL

## 2018-03-15 PROCEDURE — 80048 BASIC METABOLIC PNL TOTAL CA: CPT

## 2018-03-15 PROCEDURE — 99214 OFFICE O/P EST MOD 30 MIN: CPT | Mod: S$GLB,,, | Performed by: INTERNAL MEDICINE

## 2018-03-15 PROCEDURE — 82607 VITAMIN B-12: CPT

## 2018-03-15 PROCEDURE — 83540 ASSAY OF IRON: CPT

## 2018-03-15 PROCEDURE — 36415 COLL VENOUS BLD VENIPUNCTURE: CPT | Mod: PO

## 2018-03-15 PROCEDURE — 85025 COMPLETE CBC W/AUTO DIFF WBC: CPT

## 2018-03-15 PROCEDURE — 99999 PR PBB SHADOW E&M-EST. PATIENT-LVL IV: CPT | Mod: PBBFAC,,,

## 2018-03-15 PROCEDURE — 82728 ASSAY OF FERRITIN: CPT

## 2018-03-15 RX ORDER — OXYCODONE HYDROCHLORIDE 10 MG/1
TABLET ORAL
Status: ON HOLD | COMMUNITY
Start: 2018-02-14 | End: 2018-04-10 | Stop reason: HOSPADM

## 2018-03-15 NOTE — PROGRESS NOTES
Mr Morales is a 63 yo man here for pre-op evaluation for planned lumbar spinal fusion (?) presumably from spinal stenosis and osteoarthritis of lumbar spine. Scheduled for Tuesday March 19-20, with Dr. Norwood at [facility].     NO CP/SOB/nausea/MI last 3 months, No heart failure, NO arrhythmias, No valvular heart disease.    RCRI criteria: No IDDM, No CAD, no CVA, no chronic renal insufficiency, no heart failure, THIS IS A high risk surgery    Functional status: Walking limited mainly due to pain. He does not get short of breath, but he does get fatigued. He is able to walk without a walker, but cannot walk far unless he uses the walker. He cannot walk up stairs.     Bleeding risk - No history of bleeding with prior surgeries. Previous surgeries include cholecystectomy, previous lumbar surgery about 3 years ago - NO family history of bleeding disorders     History of prior anesthetic complications - NONE    Current smoker, 5 cigarettes a day, tobacco, Denies EtOH (history of alcohol abuse in past), Denies Illicit substances, but he does use oxycodone currently for pain management of lower back. Recent hospitalization for opioid overdose requiring narcan (2/28/2018).    Chronic conditions/medication usage -   Atorvastatin for HLD  Oxycodone for back pain- 10 mg every 6 hours, prescribed by Dr Norwood  Doxepin, Hydroxyzine, Depakote, Latuda for depression, anxiety, sleep  Folic acid supplementation    Chronic Steroid usage - NONE    Review of Systems   Constitutional: Negative for chills, fever, malaise/fatigue and weight loss.   HENT: Negative for congestion, sinus pain and sore throat.    Eyes: Negative for blurred vision and pain.   Respiratory: Negative for cough, shortness of breath and wheezing.    Cardiovascular: Negative for chest pain, palpitations, orthopnea, claudication and leg swelling.   Gastrointestinal: Negative for abdominal pain, constipation, diarrhea, heartburn, melena, nausea and vomiting.    Genitourinary: Negative for dysuria, frequency, hematuria and urgency.   Musculoskeletal: Positive for back pain.   Skin: Negative for itching and rash.   Neurological: Negative for dizziness, seizures, loss of consciousness and headaches.   Endo/Heme/Allergies: Negative for environmental allergies and polydipsia. Does not bruise/bleed easily.   Psychiatric/Behavioral: Negative for depression. The patient is not nervous/anxious.      .  Vitals:    03/15/18 1441   BP: 102/74   Pulse: 100   Temp: 98.3 °F (36.8 °C)     Physical Exam   Constitutional: He is oriented to person, place, and time. He appears well-developed and well-nourished. No distress.   HENT:   Head: Normocephalic and atraumatic.   Eyes: EOM are normal. Pupils are equal, round, and reactive to light. No scleral icterus.   Neck: Normal range of motion. No JVD present. No tracheal deviation present.   Cardiovascular: Normal rate, regular rhythm and normal heart sounds.    No murmur heard.  Pulmonary/Chest: Effort normal and breath sounds normal. No respiratory distress. He has no wheezes.   Decreased breath sounds at the base of left compared to right. Otherwise, breath sounds are clear.    Abdominal: Soft. Bowel sounds are normal. He exhibits no distension. There is no tenderness.   Musculoskeletal: Normal range of motion. He exhibits deformity. He exhibits no edema.   Back is asymetrical, the right side is elevated and raised compared to left. There is no bruising, bleeding. There are no step offs.    Neurological: He is alert and oriented to person, place, and time. He exhibits normal muscle tone.   Skin: Skin is warm and dry. No rash noted. He is not diaphoretic. No pallor.   Right hand with tar stains.  Dry skin lower limb, no peripheral edema.     Psychiatric: He has a normal mood and affect.   Nursing note and vitals reviewed.    Assessment:    Pre-op evaluation    lumbar radiculopathy.  Generally healthy 62 male with dyslipidemia and tobacco use  disorder.  Bipolar disorder stable on current pharmacologic regimen.    Rec:  Will recheck cbc, bmp.  ecg noted    Cal Blackmon MD  PGY1 Internal Medicine    Staff Note:  Pt was seen ,examined by myself and I agree with the evaluation as noted by Dr Blackmon. Partial note editing executed by myself.  Repeat cbc and bmp are noted.    He is considered a reasonable candidate for planned lumbar spine surgery and anesthesia.    MANUELA Banks MD

## 2018-03-30 ENCOUNTER — HOSPITAL ENCOUNTER (INPATIENT)
Facility: HOSPITAL | Age: 63
LOS: 11 days | Discharge: HOME HEALTH | DRG: 559 | End: 2018-04-10
Attending: PHYSICAL MEDICINE & REHABILITATION | Admitting: PHYSICAL MEDICINE & REHABILITATION
Payer: COMMERCIAL

## 2018-03-30 DIAGNOSIS — G95.9 MYELOPATHY: ICD-10-CM

## 2018-03-30 DIAGNOSIS — G95.9 LUMBAR MYELOPATHY: Primary | ICD-10-CM

## 2018-03-30 PROBLEM — J95.811 POSTPROCEDURAL PNEUMOTHORAX: Status: ACTIVE | Noted: 2018-03-30

## 2018-03-30 PROCEDURE — 12800000 HC REHAB SEMI-PRIVATE ROOM

## 2018-03-30 PROCEDURE — 25000003 PHARM REV CODE 250: Performed by: PHYSICAL MEDICINE & REHABILITATION

## 2018-03-30 PROCEDURE — 99223 1ST HOSP IP/OBS HIGH 75: CPT | Mod: ,,, | Performed by: PHYSICAL MEDICINE & REHABILITATION

## 2018-03-30 RX ORDER — PANTOPRAZOLE SODIUM 40 MG/1
40 TABLET, DELAYED RELEASE ORAL DAILY
Status: DISCONTINUED | OUTPATIENT
Start: 2018-03-31 | End: 2018-04-10 | Stop reason: HOSPADM

## 2018-03-30 RX ORDER — LURASIDONE HYDROCHLORIDE 40 MG/1
80 TABLET, FILM COATED ORAL DAILY
Status: DISCONTINUED | OUTPATIENT
Start: 2018-03-31 | End: 2018-04-10 | Stop reason: HOSPADM

## 2018-03-30 RX ORDER — DOXEPIN HYDROCHLORIDE 100 MG/1
100 CAPSULE ORAL NIGHTLY
Status: DISCONTINUED | OUTPATIENT
Start: 2018-03-30 | End: 2018-04-10 | Stop reason: HOSPADM

## 2018-03-30 RX ORDER — ENOXAPARIN SODIUM 100 MG/ML
40 INJECTION SUBCUTANEOUS EVERY 24 HOURS
Status: DISCONTINUED | OUTPATIENT
Start: 2018-03-31 | End: 2018-04-10 | Stop reason: HOSPADM

## 2018-03-30 RX ORDER — PRAVASTATIN SODIUM 20 MG/1
40 TABLET ORAL NIGHTLY
Status: DISCONTINUED | OUTPATIENT
Start: 2018-03-30 | End: 2018-04-10 | Stop reason: HOSPADM

## 2018-03-30 RX ORDER — FOLIC ACID 1 MG/1
1 TABLET ORAL DAILY
Status: DISCONTINUED | OUTPATIENT
Start: 2018-03-31 | End: 2018-04-10 | Stop reason: HOSPADM

## 2018-03-30 RX ORDER — DIVALPROEX SODIUM 250 MG/1
500 TABLET, DELAYED RELEASE ORAL DAILY
Status: DISCONTINUED | OUTPATIENT
Start: 2018-03-31 | End: 2018-04-10 | Stop reason: HOSPADM

## 2018-03-30 RX ORDER — GABAPENTIN 300 MG/1
600 CAPSULE ORAL 3 TIMES DAILY
Status: DISCONTINUED | OUTPATIENT
Start: 2018-03-30 | End: 2018-04-10 | Stop reason: HOSPADM

## 2018-03-30 RX ORDER — METOPROLOL TARTRATE 25 MG/1
12.5 TABLET ORAL 2 TIMES DAILY
Status: DISCONTINUED | OUTPATIENT
Start: 2018-03-30 | End: 2018-04-10 | Stop reason: HOSPADM

## 2018-03-30 RX ORDER — HYDROXYZINE PAMOATE 25 MG/1
50 CAPSULE ORAL NIGHTLY
Status: DISCONTINUED | OUTPATIENT
Start: 2018-03-30 | End: 2018-04-10 | Stop reason: HOSPADM

## 2018-03-30 RX ORDER — ONDANSETRON 8 MG/1
8 TABLET, ORALLY DISINTEGRATING ORAL EVERY 8 HOURS PRN
Status: DISCONTINUED | OUTPATIENT
Start: 2018-03-30 | End: 2018-04-10 | Stop reason: HOSPADM

## 2018-03-30 RX ORDER — BISACODYL 10 MG
10 SUPPOSITORY, RECTAL RECTAL DAILY PRN
Status: DISCONTINUED | OUTPATIENT
Start: 2018-03-30 | End: 2018-04-10 | Stop reason: HOSPADM

## 2018-03-30 RX ORDER — ADHESIVE BANDAGE
30 BANDAGE TOPICAL DAILY PRN
Status: DISCONTINUED | OUTPATIENT
Start: 2018-03-30 | End: 2018-04-10 | Stop reason: HOSPADM

## 2018-03-30 RX ORDER — DIVALPROEX SODIUM 500 MG/1
1000 TABLET, FILM COATED, EXTENDED RELEASE ORAL NIGHTLY
Status: DISCONTINUED | OUTPATIENT
Start: 2018-03-30 | End: 2018-04-10 | Stop reason: HOSPADM

## 2018-03-30 RX ORDER — RAMELTEON 8 MG/1
8 TABLET ORAL NIGHTLY PRN
Status: DISCONTINUED | OUTPATIENT
Start: 2018-03-30 | End: 2018-04-10 | Stop reason: HOSPADM

## 2018-03-30 RX ORDER — CIPROFLOXACIN 500 MG/1
500 TABLET ORAL EVERY 12 HOURS
Status: COMPLETED | OUTPATIENT
Start: 2018-03-30 | End: 2018-04-06

## 2018-03-30 RX ORDER — OXYCODONE AND ACETAMINOPHEN 10; 325 MG/1; MG/1
1 TABLET ORAL EVERY 4 HOURS PRN
Status: DISCONTINUED | OUTPATIENT
Start: 2018-03-30 | End: 2018-04-04

## 2018-03-30 RX ORDER — ACETAMINOPHEN 325 MG/1
650 TABLET ORAL EVERY 4 HOURS PRN
Status: DISCONTINUED | OUTPATIENT
Start: 2018-03-30 | End: 2018-04-10 | Stop reason: HOSPADM

## 2018-03-30 RX ORDER — ALBUTEROL SULFATE 90 UG/1
2 AEROSOL, METERED RESPIRATORY (INHALATION) EVERY 4 HOURS PRN
Status: DISCONTINUED | OUTPATIENT
Start: 2018-03-30 | End: 2018-04-10 | Stop reason: HOSPADM

## 2018-03-30 RX ADMIN — HYDROXYZINE PAMOATE 50 MG: 25 CAPSULE ORAL at 09:03

## 2018-03-30 RX ADMIN — Medication 12.5 MG: at 08:03

## 2018-03-30 RX ADMIN — DIVALPROEX SODIUM 1000 MG: 500 TABLET, FILM COATED, EXTENDED RELEASE ORAL at 09:03

## 2018-03-30 RX ADMIN — RAMELTEON 8 MG: 8 TABLET, FILM COATED ORAL at 08:03

## 2018-03-30 RX ADMIN — PRAVASTATIN SODIUM 40 MG: 20 TABLET ORAL at 09:03

## 2018-03-30 RX ADMIN — GABAPENTIN 600 MG: 300 CAPSULE ORAL at 09:03

## 2018-03-30 RX ADMIN — OXYCODONE HYDROCHLORIDE AND ACETAMINOPHEN 1 TABLET: 10; 325 TABLET ORAL at 07:03

## 2018-03-30 RX ADMIN — CIPROFLOXACIN HYDROCHLORIDE 500 MG: 500 TABLET, FILM COATED ORAL at 08:03

## 2018-03-30 RX ADMIN — DOXEPIN HYDROCHLORIDE 100 MG: 100 CAPSULE ORAL at 09:03

## 2018-03-30 NOTE — H&P
[facility]  Physical Medicine & Rehab  History & Physical    Patient Name: Mirza Morales  MRN: [id number]  Admission Date: 3/30/2018  Attending Physician: Valentin Cunha MD   Primary Care Provider: LIDIA Banks MD    Subjective:     Principal Problem: Myelopathy    HPI: 62 year old male with PMHx Bipolar disorder, Sezuires, Scoliosis s/p lumbar fusion direct lateral, lumbar fusion posterior l4-5, L3-4, L2-3. Posterior lumbar fusion L1-S1 c sacral iliac fusion and segmental and pelvic fixation, decompressed L1-S1; placement of 2nd left anterior tube thoracostomy. Pt was transfered to ICU post- op with a left pneumothorax- since then has resolved (thoravent removed on 3/25) left lung shows good expansion.       Current Functional Status:  Participating with therapy. Grroming Supervision. UBD Max Assit, LBD Total Assist, Bed Mobility Mod A, Transfers Mod A, Ambulated 40 ft c RW using CGA, chair following      Functional History: .  Pt lives at home with wife and niece- pt has 4 steps to enter the home (raised cottage), 2 steps downward to enter bedroom.  Prior to admission, he was independent for ADLs and ambulation.  DME: none.    Past Medical History:   Diagnosis Date    Alcohol abuse     Behavioral problem     Bipolar 1 disorder     Chronic right hip pain     Depression     DJD (degenerative joint disease), lumbar 1/27/2015    Fatigue     Hepatitis     pt. denies this    History of psychiatric care     History of psychiatric hospitalization     Hypertension     Karina     Post traumatic stress disorder     Psychiatric problem     Seizures     Suicide attempt     Therapy      Past Surgical History:   Procedure Laterality Date    HERNIA REPAIR      SPINE SURGERY  11-12-15    LAMINECTOMY/LUMBAR/WARE     Review of patient's allergies indicates:   Allergen Reactions    Gabapentin Other (See Comments)     SEVERE DIZZINESS AND BALANCE PROBLEMS, UNABLE TO WALK.    Nardil  [phenelzine]      BECOMES HYPER, LIKE A MANIC EPISODE.    Penicillins Hives    Pneumococcal 23-jett ps vaccine      Patient refused, will get later      Lamictal [lamotrigine] Rash       Scheduled Medications:    ciprofloxacin HCl  500 mg Oral Q12H    [START ON 3/31/2018] divalproex  500 mg Oral Daily    divalproex ER  1,000 mg Oral QHS    doxepin  100 mg Oral QHS    [START ON 3/31/2018] enoxaparin  40 mg Subcutaneous Daily    [START ON 3/31/2018] folic acid  1 mg Oral Daily    gabapentin  600 mg Oral TID    hydrOXYzine pamoate  50 mg Oral QHS    [START ON 3/31/2018] lurasidone  80 mg Oral Daily    metoprolol tartrate  12.5 mg Oral BID    [START ON 3/31/2018] pantoprazole  40 mg Oral Daily    pravastatin  40 mg Oral QHS       PRN Medications: acetaminophen, albuterol, bisacodyl, magnesium hydroxide 400 mg/5 ml, ondansetron, oxyCODONE-acetaminophen, ramelteon    Family History     Problem Relation (Age of Onset)    Alcohol abuse Mother, Maternal Uncle, Paternal Uncle, Cousin    Anxiety disorder Brother    Bipolar disorder Brother    Dementia Maternal Grandmother    Depression Mother, Father, Sister, Brother    Drug abuse Brother    Suicide Sister        Social History Main Topics    Smoking status: Former Smoker     Packs/day: 0.25     Years: 20.00    Smokeless tobacco: Never Used    Alcohol use No      Comment: quit 4 years ago    Drug use: No    Sexual activity: Yes     Partners: Female      Comment: with wife; monogamous      Review of Systems   Constitutional: Positive for activity change and fatigue. Negative for chills, diaphoresis and fever.   HENT: Negative for congestion, drooling and ear discharge.    Eyes: Negative for photophobia, pain and visual disturbance.   Respiratory: Negative for cough and wheezing.    Cardiovascular: Negative for chest pain and palpitations.   Gastrointestinal: Negative for abdominal distention, constipation, nausea and vomiting.   Endocrine: Negative for cold  intolerance and heat intolerance.   Genitourinary: Negative for dysuria, flank pain and hematuria.   Musculoskeletal: Negative for arthralgias and neck pain.   Neurological: Positive for weakness and numbness. Negative for tremors and speech difficulty.   Hematological: Negative for adenopathy.   Psychiatric/Behavioral: Negative for agitation, confusion and hallucinations.     Objective:     Vital Signs (Most Recent):  Temp: 99.3 °F (37.4 °C) (03/30/18 1640)  Pulse: 85 (03/30/18 1640)  Resp: 18 (03/30/18 1640)  BP: 126/76 (03/30/18 1640)    Vital Signs (24h Range):  Temp:  [99.3 °F (37.4 °C)] 99.3 °F (37.4 °C)  Pulse:  [85] 85  Resp:  [18] 18  BP: (126)/(76) 126/76     Body mass index is 21.26 kg/m².    Physical Exam   Constitutional: He is oriented to person, place, and time. He appears well-developed and well-nourished.   HENT:   Head: Normocephalic and atraumatic.   Right Ear: External ear normal.   Left Ear: External ear normal.   Eyes: Conjunctivae and EOM are normal. Pupils are equal, round, and reactive to light. Right eye exhibits no discharge. Left eye exhibits no discharge.   Neck: Normal range of motion. Neck supple. No thyromegaly present.   Cardiovascular: Normal rate, regular rhythm, normal heart sounds and intact distal pulses.    Pulmonary/Chest: Effort normal and breath sounds normal. No respiratory distress.   Abdominal: Soft. Bowel sounds are normal. He exhibits no distension. There is no guarding.   Musculoskeletal: He exhibits no edema.   4+/5 BUE  4-/5 BLE   Lymphadenopathy:     He has no cervical adenopathy.   Neurological: He is alert and oriented to person, place, and time. No cranial nerve deficit. He exhibits normal muscle tone.   Skin: Skin is warm and dry. Capillary refill takes less than 2 seconds.   Back incision c/d/i   Psychiatric: He has a normal mood and affect. His behavior is normal. Judgment and thought content normal.   Vitals reviewed.    NEUROLOGICAL EXAMINATION:     MENTAL  STATUS   Oriented to person, place, and time.     CRANIAL NERVES     CN III, IV, VI   Pupils are equal, round, and reactive to light.  Extraocular motions are normal.       Diagnostic Results: Labs: Reviewed    Assessment/Plan:     iMrza Morales is a 62 y.o. male being admitted to inpatient rehabilitation on 3/30/2018 for Myelopathy with impaired mobility and ADLs.     * Myelopathy    PT/OT  Monitor bowel and bladder        Postprocedural pneumothorax    Resolved. Continue to monitor respiratory status        Bipolar 1 disorder, mixed    Continue Doxepin and Vistaril        Seizure    Continue Dekaote            Valentin uCnha MD  Department of Physical Medicine & Rehab   [facility]    Post Admission Assessment:    Mirza Morales is a 62 y.o. male being admitted to inpatient rehabilitation on 3/30/2018 for myelopathy with impaired mobility and ADLs.   Patient is appropriate for inpatient rehabilitation and is expected to tolerate 3 hours of therapy a day or 15 hours of therapy a week.  Patient is expected to benefit from the following therapy services: Physical Therapy, Occupational Therapy, as well as Recreational Therapy  Impairments include: Impaired balance, Decreased Endurance, Impaired activity tolerance  Goals: Supervision to Mod I with Mobility, ADLs, Transfers using LRAD   Precautions:  Fall  ELOS: 10-14 days   Disposition: Home with family     I have had the opportunity to examine the patient within 24 hours of admission and have reviewed the Pre-Admission Assessment and find it consistent with my examination and evaluation of the patient. I confirm that this patient is appropriate for admission and treatment in this inpatient rehabilitation hospital, needs intense interdisciplinary rehabilitation care under my direction and is expected to achieve meaningful goals within a reasonable period of time that are consistent with the planned discharge disposition.     The patient  will be admitted for inpatient comprehensive interdisciplinary rehabilitation to address the impairments and medical conditions listed above while assessing equipment needs and compensatory strategies, with coordinated interdisciplinary services that will include physical therapy, occupational therapy, and close monitoring and treatment with 24-hour rehabilitative nursing. This interdisciplinary program will be performed under the direction of a physiatrist.

## 2018-03-30 NOTE — PLAN OF CARE
Problem: Patient Care Overview  Goal: Plan of Care Review  Outcome: Ongoing (interventions implemented as appropriate)  Patient's plan of care reviewed    Problem: Fall Risk (Adult)  Goal: Identify Related Risk Factors and Signs and Symptoms  Related risk factors and signs and symptoms are identified upon initiation of Human Response Clinical Practice Guideline (CPG)   Outcome: Ongoing (interventions implemented as appropriate)  Patient has not had a fall    Problem: Pressure Ulcer Risk (Mike Scale) (Adult,Obstetrics,Pediatric)  Goal: Identify Related Risk Factors and Signs and Symptoms  Related risk factors and signs and symptoms are identified upon initiation of Human Response Clinical Practice Guideline (CPG)   Outcome: Ongoing (interventions implemented as appropriate)  Patient has no pressure ulcers

## 2018-03-31 PROBLEM — E43 SEVERE MALNUTRITION: Status: ACTIVE | Noted: 2018-02-21

## 2018-03-31 LAB
ANION GAP SERPL CALC-SCNC: 8 MMOL/L
ANISOCYTOSIS BLD QL SMEAR: SLIGHT
BASOPHILS # BLD AUTO: ABNORMAL K/UL
BASOPHILS NFR BLD: 1 %
BUN SERPL-MCNC: 14 MG/DL
CALCIUM SERPL-MCNC: 8.4 MG/DL
CHLORIDE SERPL-SCNC: 97 MMOL/L
CO2 SERPL-SCNC: 25 MMOL/L
CREAT SERPL-MCNC: 0.7 MG/DL
DIFFERENTIAL METHOD: ABNORMAL
EOSINOPHIL # BLD AUTO: ABNORMAL K/UL
EOSINOPHIL NFR BLD: 2 %
ERYTHROCYTE [DISTWIDTH] IN BLOOD BY AUTOMATED COUNT: 14.6 %
EST. GFR  (AFRICAN AMERICAN): >60 ML/MIN/1.73 M^2
EST. GFR  (NON AFRICAN AMERICAN): >60 ML/MIN/1.73 M^2
GLUCOSE SERPL-MCNC: 106 MG/DL
HCT VFR BLD AUTO: 33.8 %
HGB BLD-MCNC: 11.3 G/DL
IMM GRANULOCYTES # BLD AUTO: ABNORMAL K/UL
IMM GRANULOCYTES NFR BLD AUTO: ABNORMAL %
LYMPHOCYTES # BLD AUTO: ABNORMAL K/UL
LYMPHOCYTES NFR BLD: 20 %
MCH RBC QN AUTO: 29.9 PG
MCHC RBC AUTO-ENTMCNC: 33.4 G/DL
MCV RBC AUTO: 89 FL
METAMYELOCYTES NFR BLD MANUAL: 1 %
MONOCYTES # BLD AUTO: ABNORMAL K/UL
MONOCYTES NFR BLD: 17 %
NEUTROPHILS NFR BLD: 57 %
NEUTS BAND NFR BLD MANUAL: 2 %
NRBC BLD-RTO: 0 /100 WBC
PLATELET # BLD AUTO: 253 K/UL
PMV BLD AUTO: 9.6 FL
POLYCHROMASIA BLD QL SMEAR: ABNORMAL
POTASSIUM SERPL-SCNC: 4.6 MMOL/L
RBC # BLD AUTO: 3.78 M/UL
SODIUM SERPL-SCNC: 130 MMOL/L
WBC # BLD AUTO: 9.14 K/UL

## 2018-03-31 PROCEDURE — 12800000 HC REHAB SEMI-PRIVATE ROOM

## 2018-03-31 PROCEDURE — 63600175 PHARM REV CODE 636 W HCPCS: Performed by: PHYSICAL MEDICINE & REHABILITATION

## 2018-03-31 PROCEDURE — 97110 THERAPEUTIC EXERCISES: CPT

## 2018-03-31 PROCEDURE — 97167 OT EVAL HIGH COMPLEX 60 MIN: CPT

## 2018-03-31 PROCEDURE — 85007 BL SMEAR W/DIFF WBC COUNT: CPT

## 2018-03-31 PROCEDURE — 97163 PT EVAL HIGH COMPLEX 45 MIN: CPT

## 2018-03-31 PROCEDURE — 80048 BASIC METABOLIC PNL TOTAL CA: CPT

## 2018-03-31 PROCEDURE — 85027 COMPLETE CBC AUTOMATED: CPT

## 2018-03-31 PROCEDURE — 97535 SELF CARE MNGMENT TRAINING: CPT

## 2018-03-31 PROCEDURE — 25000003 PHARM REV CODE 250: Performed by: PHYSICAL MEDICINE & REHABILITATION

## 2018-03-31 RX ADMIN — CIPROFLOXACIN HYDROCHLORIDE 500 MG: 500 TABLET, FILM COATED ORAL at 08:03

## 2018-03-31 RX ADMIN — GABAPENTIN 600 MG: 300 CAPSULE ORAL at 09:03

## 2018-03-31 RX ADMIN — HYDROXYZINE PAMOATE 50 MG: 25 CAPSULE ORAL at 09:03

## 2018-03-31 RX ADMIN — GABAPENTIN 600 MG: 300 CAPSULE ORAL at 08:03

## 2018-03-31 RX ADMIN — Medication 12.5 MG: at 08:03

## 2018-03-31 RX ADMIN — ENOXAPARIN SODIUM 40 MG: 100 INJECTION SUBCUTANEOUS at 04:03

## 2018-03-31 RX ADMIN — GABAPENTIN 600 MG: 300 CAPSULE ORAL at 03:03

## 2018-03-31 RX ADMIN — PANTOPRAZOLE SODIUM 40 MG: 40 TABLET, DELAYED RELEASE ORAL at 08:03

## 2018-03-31 RX ADMIN — DIVALPROEX SODIUM 1000 MG: 500 TABLET, FILM COATED, EXTENDED RELEASE ORAL at 09:03

## 2018-03-31 RX ADMIN — DIVALPROEX SODIUM 500 MG: 250 TABLET, DELAYED RELEASE ORAL at 08:03

## 2018-03-31 RX ADMIN — DOXEPIN HYDROCHLORIDE 100 MG: 100 CAPSULE ORAL at 09:03

## 2018-03-31 RX ADMIN — OXYCODONE HYDROCHLORIDE AND ACETAMINOPHEN 1 TABLET: 10; 325 TABLET ORAL at 01:03

## 2018-03-31 RX ADMIN — LURASIDONE HYDROCHLORIDE 80 MG: 40 TABLET, FILM COATED ORAL at 12:03

## 2018-03-31 RX ADMIN — RAMELTEON 8 MG: 8 TABLET, FILM COATED ORAL at 09:03

## 2018-03-31 RX ADMIN — OXYCODONE HYDROCHLORIDE AND ACETAMINOPHEN 1 TABLET: 10; 325 TABLET ORAL at 08:03

## 2018-03-31 RX ADMIN — PRAVASTATIN SODIUM 40 MG: 20 TABLET ORAL at 09:03

## 2018-03-31 RX ADMIN — FOLIC ACID 1 MG: 1 TABLET ORAL at 08:03

## 2018-03-31 RX ADMIN — OXYCODONE HYDROCHLORIDE AND ACETAMINOPHEN 1 TABLET: 10; 325 TABLET ORAL at 07:03

## 2018-03-31 NOTE — PROGRESS NOTES
"Physical Therapy   Evaluation    Mirza Morales   MRN: [id number]      03/31/18 1345   PT Time Calculation   PT Start Time 1345   PT Stop Time 1515   PT Total Time (min) 90 min   Treatment   Treatment Type Evaluation   General   PT Received On 03/31/18   Chart Reviewed Yes   Onset of Illness/Injury or Date of Surgery 03/30/18   Diagnosis Myelopathy; Lumbar Fusion posterior L4-L5, L3-L4, L2-L3   Additional Pertinent History Past Medical History:   Diagnosis Date    Alcohol abuse     Behavioral problem     Bipolar 1 disorder     Chronic right hip pain     Depression     DJD (degenerative joint disease), lumbar 1/27/2015    Fatigue     Hepatitis     pt. denies this    History of psychiatric care     History of psychiatric hospitalization     Hypertension     Karina     Post traumatic stress disorder     Psychiatric problem     Seizures     Suicide attempt     Therapy         Date Referred to PT 03/30/18   Referring Physician Guerline   Family/Caregiver Present No   Patient Found (position) Seated in wheelchair   Pt found with (direct handoff from OT)   Precautions   General Precautions fall   Orthopedic Yes   Required Braces or Orthoses No   Previous Level of Function   Ambulation Skills independent   Transfer Skills independent   ADL Skills independent   Living Environment   Lives With spouse   Living Arrangements house   Home Accessibility stairs to enter home   Home Layout Able to live on 1st floor   Number of Stairs to Enter Home 3   Stair Railings at Home outside, present at both sides   Transportation Available family or friend will provide   Living Environment Comment Per, pt lives in a single story home with 3 steps to enter with his wife. Prior to admit pt was independent with all mobility. Pt reports having a walk in shower and tub/shower combo.    Equipment Currently Used at Home none   Subjective   Patient states "I just got a pain pill"   Pain/Comfort   Pain Rating 1 7/10   Location - Side 1 " Bilateral   Location - Orientation 1 lower   Location 1 back   Pain Addressed 1 Reposition;Distraction;Pre-medicate for activity   Pain Rating Post-Intervention 1 6/10   Cognitive Status   Level of Consciousness (AVPU) alert   Arousal Level opens eyes spontaneously   Orientation oriented x 4   Able to Follow Commands (Communication) WFL   Speech clear/fluent;follows commands   Mood/Behavior calm;cooperative   Sensory Examination   Additional Documentation no;yes   Light Touch   RLE moderate impairment  (impaired sensation to dorsal and plantar surface of foot)   LLE w/i normal limits   Range of Motion (ROM)   Range of Motion Examination LLE ROM was WFL;RLE ROM was WFL  (PROM WFL)       MMT: Hip, Rehab Eval   Left Flexion (4/5) good, left   Right Flexion (4/5) good, right       MMT: Knee, Rehab Eval   Left Flexion (4/5) good, left   Right Flexion (4/5) good, right   Left Extension (4/5) good, left   Right Extension (4/5) good, right       MMT: Ankle, Rehab Eval   Left Dorsiflexion (4/5) good, left   Right Dorsiflexion (2+/5) poor plus, right   Left Plantarflexion (4/5) good, left   Right Plantarflexion (2+/5) poor plus, right   Tone   Right LE normal   Left LE normal   Observation   Appearance Thin, male sitting upright in wheelchair with rounded shoulders   Posture Rounded shoulders;Kyphosis in Thoracic Spine  (while seated at EOM)   Skin intact  (skin visible to PT intact, Incision and staples intact, no drainage observed at incision)   Bed Mobility   Bed Mobility yes   Rolling/Turning to Left Contact guard assistance  (x 1 trial on mat )   Rolling/Turning Right Contact guard assistance  (x 1 trial on mat )   Supine to Sit Stand by Assistance  (x 1 trial on mat )   Sit to Supine Stand by Assistance  (x 1 trial on mat, x 1 trial in bed)   Transfers   Transfer yes   Sit to Stand   Sit <> Stand Assistance Contact Guard Assistance   Sit <> Stand Assistive Device No Assistive Device;Rolling Walker   Trials/Comments Pt  performed multiple trials of this transfer throughout PT treatment session   Stand to Sit   Assistance Contact Guard Assistance   Assistive Device No Assistive Device;Rolling Walker   Trials/Comments Pt performed multiple trials of this transfer throughout PT evaluation   Chair to Bed   Technique Stand Pivot   Assistance Moderate Assistance   Assistive Device No Assistive Device   Trials/Comments x 1 trial, pt required CGA for sit to stand portion of transfer and moderate assistance for pivot portion of transfer to maintain balance    Chair to Mat   Chair<> Mat Technique Stand Pivot   Chair<>Mat Assistance Moderate Assistance   Chair <> Mat Assistive Device No Assistive Device   Trials/Comments x 1 trial, pt required CGA for sit to stand and stand to sit portion of transfer, pt required moderate assisstance for pivot portion of transfer to maintain balance    Mat to Chair   Technique Stand Pivot   Assistance Moderate Assistance   Assistive Device No Assistive Device   Trials/Comments x 1 trial, pt required CGA for sit to stand and stand to sit portion of transfer, pt required moderate assistance during pivot portion to maintain balance    Shower Transfer   Technique Stand Pivot   Assistance Moderate Assistance   Assistive Device No Assistive Device   Trials/Comments x 1 trial, pt required moderate assistance to maintain balance while stepping over ledge of shower   Toilet Transfer   Toilet Transfer Technique Stand Pivot   Toilet Transfer Assistance Moderate Assistance   Toilet Transfer Assistive Device No Assistive Device   Trials/Comments x 1 trial, pt required CGA for sit to stand portion of transfer, and moderate assist for pivot portion of transfer    Car Transfer   Trials/Comments PT deferred performing the car transfer due to safety concerns due to low nature of the Rehab car   Wheelchair Activities   Propulsion Yes   Propulsion Type 1 Manual   Level 1 Level tile   Method 1 Right upper extremity;Left upper  "extremity   Level of Assistance 1 Stand by assistsance   Description/ Details 1 Pt propelled self in wheelchair x 2 trials of 100 feet, increased time required for trials. Pt required seated rest breaks between trials due to fatigue   Gait   Gait Yes   Weight Bearing Status full   Gait 1   Surface 1 Level tile   Gait Assistive Device Rolling walker   Other Apparatus 1 Wheelchair follow   Assistance 1 Minimum assistance   Gait Distance Pt ambulated x 1 trial of 42 feet, small shuffling steps observed with flexed forward posture. Pt required seated rest break following trial due to fatigue   Gait Pattern swing-to gait   Gait Deviation(s) decreased fran;increased time in double stance;decreased velocity of limb motion;decreased step length;decreased stride length;decreased toe-to-floor clearance;decreased weight-shifting ability;forward lean   Impairments Contributing to Gait Deviations impaired balance;decreased strength;impaired postural control;impaired motor control;decreased flexibility;pain;decreased sensation   Stairs   Stairs Yes   Stairs/Curb Step   Rails 1 Bilateral   Device 1 No device   Assistance 1 Minimum assistance   Comment/# Steps 1 Pt ascended and descended 8 3" steps with non reciprocal LE gait pattern x 1 trial    Stairs/ Curb Step  2   Rails 2 None (Comment)   Device 2 No device   Assistance 2 Moderate assistance   Comment/# Steps 2 Pt required assistance to maintain balance while negotiating 4" curb step    Endurance Deficit   Endurance Deficit Yes   Endurance Deficit Description Pt required frequent seated rest breaks throughout treatment session due to pt's fatigue and SOB. Pt does not appear to be at his PLOF   Balance   Balance Yes   Static Sitting Balance   Static Sitting-Balance Support Right upper extremity supported;Left upper extremity supported;Feet supported  (seated at EOM)   Static Sitting-Level of Assistance Supervision   Static Sitting-Comment/# of Minutes Pt tolerated static " "sitting at EOM for approximately 5 minutes while pt tolerated static sitting while PT observed pt's static sitting posture   Static Standing Balance   Static Standing-Balance Support Left upper extremity supported;Right upper extremity supported  (on RW)   Static Standing-Level of Assistance Contact guard   Static Standing-Comment/# of Minutes Pt tolerated static standing for approx 20 seconds prior to ambulation trial    Activity Tolerance   Activity Tolerance Patient tolerated treatment well   After Treatment   Patient Position After Treatment Supine in bed   Patient after treatment left call button in reach   Treatment   Treatment Patient performed supine B LE exercises 3 x 10 reps including: AP, GS, heel slides, hip ABD, QS    Patient performed seated dowel exercises with 3# dowel including: bicep curls and chest press (3x15 reps)    Patient performed seated B LE exercises 3 x 15 reps including: AP, GS, LAQ, hip ADD with theraball    Upon entering room patient asked PT to look in his dirty laundry bag for his phone . While looking in the bag, PT found LSO in bag under dirty clothes. PT asked pt if he was supposed to bearing this brace OOB and pt reported "I don't know I am still a little foggy." No orders found from MD regarding wear of LSO in Epic or in MD H&P. PT unable to locate admission packet to confirm orders with notes from prior admission. PT will need to clarify wear of LSO with MD.    Assessment   Prognosis Good   Problem List Decreased strength;Decreased range of motion;Decreased endurance;Impaired balance;Decreased mobility;Pain;Impaired sensation   Session Assessment other (see comments)  (PT evaluation completed)   Assessment Pt is a 63 y/o male who presents to [facility] following a recent lumbar posterior fusion. Prior to admit pt was independent with all mobility and transfers. Pt presents to PT with decreased B LE strength, decreased R foot sensation, poor endurance for activity, " "lower back pain, and impaired functional independence with mobility. Pt will continue to benefit from further skilled PT intervention in order to address these above impairments and to improve pt's functional independence with mobility.    Level of Motivation/Participation good   Barriers to Discharge Inaccessible home environment   Barriers to Discharge Comments Pt has 2 steps to enter home   Short Term Goals   Additional Documentation yes   Time For Goal Achievement 7 days   Pt Will Perform Supine To Sit New goal;Supervision   Supine to Sit-Met/Not Met Not Met   Pt Will Perform Sit to Supine New goal;Supervision   Sit to Supine - Met/Not Met Not Met   Pt Will Logroll New goal;Supervision   Logroll - Met/Not Met Not Met   Pt Will Transfer Sit to Stand New goal;With RW;With stand by assist   Transfer Sit to stand - Met/Not Met Not Met   Pt Will Transfer Bed/Chair New goal;Stand Pivot;With RW;With contact guard assistance  (with or without appropriate AD)   Transfer Bed/Chair - Met/Not Met Not Met   Pt Will Ambulate New goal; feet;With RW;With contact guard assistance   Ambulate - Met/Not Met Not met   Pt Will Go Up / Down Stairs New goal;6-10 stairs;With Bilateral Rails;With contact guard assist   Go Up/Down Stairs - Met/Not Met Not met   Pt Will Go Up / Down Curb Step New goal;4" curb step;With RW;With minimal assist   Go Up/Down Curb- Met/Not Met Not Met   Pt Will Propel Wheelchair New goal;> 150 feet;With stand by assist;With (B) UE   Propel Wheelchair - Met/Not Met Not met   Other Goal Patient will perform stand pivot transfer from wheelchair to bedside commode with or without RW and CGA   Other Goal - Progress Not met    Long Term Goals   Additional Documentation yes   Time For Goal Achievement (12-14 days)   Pt Will Perform Supine To Sit New goal;Modified Independently   Supine to Sit-Met/Not Met Not Met   Pt Will Perform Sit to Supine New goal;Modified Independently   Sit to Supine - Met/Not Met Not Met " "  Pt Will Logroll New goal;Modified Indepent   Logroll - Met/Not Met Not Met   Pt Will Transfer Sit to Stand New goal;With RW;Supervision   Transfer Sit to stand - Met/Not Met Not Met   Pt Will Transfer Bed/Chair New goal;Stand Pivot;With RW;Supervision  (with or without appropriate AD)   Transfer Bed/Chair - Met/Not Met Not Met   Pt Will Ambulate New goal;151-200 feet;With RW;With stand by assist   Ambulate - Met/Not Met Not met   Pt Will Go Up / Down Stairs New goal;1 flight;With Bilateral Rails;With stand by assist   Go Up/Down Stairs - Met/Not Met Not met   Pt Will Go Up / Down Curb Step New goal;4" curb step;With RW;With stand by assist   Go Up/Down Curb- Met/Not Met Not Met   Pt Will Propel Wheelchair New goal;> 150 feet;With (B) UE;Supervision   Propel Wheelchair - Met/Not Met Not met   Other Goal Patient will perform stand pivot transfer from wheelchair to beside commode with or without appropriate AD and SBA   Other Goal - Progress Not met    Discharge Recommendations   Equipment Needed After Discharge bath bench;wheelchair   Discharge Facility/Level Of Care Needs home health PT   Plan   Planned Therapy Intervention Plan of care initiated;Bed mobility training;Transfer training;Gait training;Balance Training;ROM;Stretching;Strengthening;Postural Re-education;Wheelchair Management/Propulsion   Therapy Frequency 2 times/day;daily;Monday-Friday;Saturday or Sunday   Physical Therapy Follow-up   PT Follow-up? Yes   PT - Next Visit Date 04/02/18   Treatment/Billable Minutes   Evaluation 60   Therapeutic Exercise 30   Total Time 90       Anna Deleon, PT  3/31/2018            "

## 2018-03-31 NOTE — PROGRESS NOTES
Physical Therapy   Admit Physical Therapy FIM Scores    Mirza Morales   MRN: [id number]        03/31/18 1500   Transfers   Bed/Chair/WC 3   Toilet 3   Shower 3   Locomotion   Distance Walked 1   Distance Wheelchair 2   Walk 1   Wheelchair 2   Mode W   Stairs 2     Anna Deleon, PT  3/31/2018

## 2018-03-31 NOTE — PLAN OF CARE
Problem: Patient Care Overview  Goal: Plan of Care Review  03/31/2018    Recommendations  1) Advance patient to a Regular diet (double portions) 2) Boost Plus TID 3) MVI daily 4) thiamin 100 mg or per MD 5) Honor food preferences 6) Monitor oral intake 7) Monitor weight    Goals: 1) patient to consume >=85% of EEN and EPN x5 days to promote weight gain  Nutrition Goal Status: new  Communication of RD Recs: reviewed with CHANEL Renyolds, MPH, RD, LDN

## 2018-03-31 NOTE — ASSESSMENT & PLAN NOTE
Related to (etiology):   Inadequate oral intake    Signs and Symptoms (as evidenced by):   1) 21.44% unintentional weight loss over 1 month  2) Patient consumed <75% of estimated energy requirements for > 1 month     Interventions/Recommendations (treatment strategy):  1) Advance patient to a Regular diet (double portions) 2) Boost Plus TID 3) MVI daily 4) thiamin 100 mg or per MD 5) Honor food preferences 6) Monitor oral intake 7) Monitor weight    Nutrition Diagnosis Status:   New

## 2018-03-31 NOTE — PROGRESS NOTES
Occupational Therapy  ADMIT FIM SCORES    Mirza Morales  MRN: [id number]  Room/Bed: E280/E280 B       03/31/18 1600   Transfers   Bed/Chair/WC 3   Toilet 3   Shower 3   Self Care   Eating 5   Grooming 5   Bathing 4   Dressing-Upper 5   Dressing-Lower 1   Toileting 3   Communication   Comprehension 4   Mode B   Expression 4   Mode B   Social Cognition   Social Interaction 4   Problem Solving 3   Memory 3       DOMINIC Lanza  3/31/2018

## 2018-03-31 NOTE — PROGRESS NOTES
Occupational Therapy   Evaluation    Mirza Morales  MRN: [id number]  Room/Bed: E280/E280 B     03/31/18 1117   OT Time Calculation   OT Start Time 1117   OT Stop Time 1208   OT Total Time (min) 51 min   General   OT Date of Treatment 03/31/18   Chart Reviewed Yes   Onset of Illness/Injury or Date of Surgery 03/30/18   Diagnosis Myelopathy; Lumbar Fusion posterior L4-L5, L3-L4, L2-L3   Additional Pertinent History From H&P: 62 year old male with PMHx Bipolar disorder, Sezuires, Scoliosis s/p lumbar fusion direct lateral, lumbar fusion posterior l4-5, L3-4, L2-3. Posterior lumbar fusion L1-S1 c sacral iliac fusion and segmental and pelvic fixation, decompressed L1-S1; placement of 2nd left anterior tube thoracostomy. Pt was transfered to ICU post- op with a left pneumothorax- since then has resolved (thoravent removed on 3/25) left lung shows good expansion.   Past Medical History:   Diagnosis Date    Alcohol abuse     Behavioral problem     Bipolar 1 disorder     Chronic right hip pain     Depression     DJD (degenerative joint disease), lumbar 1/27/2015    Fatigue     Hepatitis     pt. denies this    History of psychiatric care     History of psychiatric hospitalization     Hypertension     Karina     Post traumatic stress disorder     Psychiatric problem     Seizures     Suicide attempt     Therapy      Past Surgical History:   Procedure Laterality Date    HERNIA REPAIR      SPINE SURGERY  11-12-15    LAMINECTOMY/LUMBAR/WARE      Date Referred to OT 03/30/18   Referring Physician Guerline   Family/Caregiver Present No   Patient Found (position) Supine in bed   Precautions   General Precautions fall   Required Braces or Orthoses No  (none reported in H&P)   BADL History   Bed Mobility/Transfers independent   Grooming independent   Bathing independent   Upper Body Dressing independent   Lower Body Dressing independent   Toileting independent   Home Management Skills independent   IADL History  "  Driving License Yes   Mode of Transportation Car   Occupation On disability   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 Pt unsure if he has a shower chair or TTB. Reports having a RW and is spouse has a BSC.   Equipment Currently Used at Home walker, rolling   Subjective   Patient states "I'm still kind of foggy."   Pain/Comfort   Pain Rating 7/10   Location - Orientation lower   Location back   Pain Addressed Pre-medicate for activity;Nurse notified   Pain Comment RN gave pain meds during eval   Cognitive Status  Comprehension:  - Understands basic 75-89% - May need words repeated  (4)  Expression:  - Expresses basic 75-89% of time - Needs to repeat words  (4)  Social Interaction:  - Interacts appropriately 75-89% of time - needs redirection for appropriate language or to initiate interaction  (4)  Problem Solving:  - Solves basic problems 50-74% of the time  (3)  Memory:  - Recognizes, recalls, or executes 50-74% of time 2 steps of 2 step request (3)   Level of Consciousness (AVPU) alert   Arousal Level arouses to voice   Orientation person;place;situation   Able to Follow Commands (Communication) WFL   Speech follows commands;clear/fluent   Mood/Behavior calm;cooperative   Sensory Exam   Additional Documenation yes   Light Touch   LUE w/i normal limits   RUE w/i normal limits   Range of Motion (ROM)   Range of Motion Examination bilateral upper extremity ROM was WFL   Manual Muscle Testing (MMT)   Manual Muscle " Testing Results (4/5 B UE grossly tested)   Fine Motor Coordination Examination   Additional Documentation Fine Motor Coordination   Fine Motor Coordination   Left Hand, Manipulation of Objects normal performance   Right Hand, Manipulation of Objects normal performance   Tone   Right UE  normal   Left UE normal   Observation   Appearance appears well nourished and well developed   Posture Rounded shoulders   Skin Other (see comments)  (incision noted low back and hips)   Bed Mobility   Bed Mobility yes   Rolling/Turning to Left Modified independent   Rolling/Turning Left Comments with rails   Rolling/Turning Right Modified independent   Rolling/Turning Right Comments with rails   Scooting/Bridging Supervision   Scooting/Bridging Comments to scoot to EOB   Supine to Sit Supervision   Supine to Sit Comments to EOB   Sit to Supine Supervision   Sit to Supine Comments to EOB   Transfers   Transfer yes   Sit to Stand   Sit <> Stand Assistance Minimum Assistance   Sit <> Stand Assistive Device Rolling Walker   Trials/Comments from EOB   Stand to Sit   Assistance Minimum Assistance   Assistive Device Rolling Walker   Trials/Comments to EOB   Bed to Chair   Bed <> Chair Technique Stand Pivot   Bed <> Chair Transfer Assistance Minimum Assistance   Bed <> Chair Assistive Device Rolling Walker   Trials/Comments from EOB>wc   Chair to Bed   Technique Stand Pivot   Assistance Minimum Assistance   Assistive Device Rolling Walker   Trials/Comments from wc>EOB   Shower Transfer   Shower Transfer Technique Stand Pivot   Shower Transfer Assistance Minimum Assistance   Shower Transfer Assistive Device Rolling Walker   Trials/Comments from wc>EOB   Toilet Transfer   Toilet Transfer Technique Stand Pivot   Toilet Transfer Assistance Minimum Assistance   Toilet Transfer Assistive Device Rolling Walker   Trials/Comments from wc>raised toilet   Feeding   Feeding Level of Assistance Set-up Assistance   Feeding Where Assessed Bed level    Grooming   Grooming Level of Assistance Stand by assistance   Grooming Where Assessed Wheelchair   Bathing   Bathing Level of Assistance Minimum assistance   Bathing Where Assessed Edge of bed   Bathing Comments steadying assist from stance for pericare   UE Dressing   UE Dressing Level of Assistance Set-up Assistance   UE Dressing Where Assessed Edge of bed   LE Dressing   LE Dressing Yes   LE Dressing  Level of Assistance Total assistance   LE Dressing Where Assessed Edge of bed   LE Dressing Comments assist with B socks, threading pants and clothing management over hips   Toileting   Toileting Level of Assistance Moderate assistance   Toileting Where Assessed Toilet   Toileting Comments steadying from stance and clothing management over hips including fastners   Balance   Balance Yes   Dynamic Sitting Balance   Dynamic Sitting-Balance Support No upper extremity supported   Dynamic Sitting-Balance Reaching for objects;Reaching across midline   Dynamic Sitting-Comments SBA during functional activities   Dynamic Standing Balance   Dynamic Standing-Balance Support Unilateral upper extremity supported   Dynamic Standing-Balance Reaching across midline;Reaching for objects   Dynamic Standing-Comments Min (A) during functional activities   Treatment   Treatment OT eval   Activity Tolerance   Activity Tolerance Patient tolerated treatment well   After Treatment   Patient Position After Treatment Seated in wheelchair   Assessment   Prognosis Good   Problem List Decreased upper extremity range of motion;Decreased Self Care skills;Decreased upper extremity strength;Decreased safe judgment during ADL;Decreased cognition;Decreased endurance;Decreased functional mobility;Decreased gross motor control;Decreased IADLs;Decreased trunk control for functional activities   Assessment Pt participated well in tx session but remains with decreased (I) with ADLs and functional mobility. Pt would  continue to benefit from skilled OT  services.   Level of Motivation/Participation Good   Short Term Goals   Additional Documentation yes   Time For Goal Achievement 7 days   Pt Will Perform Bathing With moderate assistance;New goal   Bathing - Met/Not Met Not Met   Pt Will Perform LE Dressing With moderate assistance;New goal   LE Dressing - Met/Not Met Not Met   Pt Will Perform Toileting With minimal assistance;New goal   Toileting-Met/Not Met Not Met   Long Term Goals   Additional Documentation yes   Time For Goal Achievement 2 weeks   Pt Will Transfer Bed/Chair Supervision;New goal   Transfer Bed/Chair - Met/Not Met Not Met   Pt Will Transfer To Bedside Commode With supervision;New goal   Transfer Bedside Commode -Met/Not Met Not Met   Pt Will Transfer To Shower With supervision;New goal   Transfer to Shower-Met/Not Met Not Met   Pt Will Perform Eating Independently;New goal   Eating - Met/Not Met Not Met   Pt Will Perform Grooming Independently;New goal   Grooming - Met/Not Met Not Met   Pt Will Perform Bathing With supervision;New goal   Bathing - Met/Not Met Not Met   Pt Will Perform UE Dressing Independently;New goal   UE Dressing - Met/Not Met Not Met   Pt Will Perform LE Dressing With supervision;New goal   LE Dressing - Met/Not Met Not Met   Pt Will Perform Toileting With supervision;New goal   Toileting-Met/Not Met Not Met   Discharge Recommendations   Equipment Needed After Discharge bath bench;wheelchair   Discharge Facility/Level Of Care Needs home with home health   Plan   Plan Self care retraining;Functional transfer training;UE thereex;Family training;Equipment Training;Safety training;Functional endurance training;Patient education;Fine motor training;Cognitive Retraining;Postural control;Neuromuscular Re-education;Functional Standing Activities   Therapy Frequency 2 times/day   Occupational Therapy Follow-up   OT Follow-up? Yes   Treatment/Billable Minutes   Evaluation 51   Total Time 51       DOMINIC Lanza  3/31/2018      Patient with wheelchair safety belt fastened and able to self release wheelchair safety belt. Pt left in supine in bed with call light and all necessities in reach, RN notified.     LEGEND:   CGA: Contact Guard Assist   EOB: Edge of Bed   HHA: Hand Held Assist   HOB: Head of Bed   (I): Independent-patient performs task in a timely manner   Max (A): Maximal Assist-patient performs 25-49% of task   Min (A): Minimal Assist- patient performs 75% or more of task   Mod (A): Moderate Assist- patient performs 50-74% of task   NA: Not applicable   NT: Not tested   OOB: Out of Bed   PTA: Prior to admit   QC: Quad Cane   RW: Rolling Walker   (S): Supervision- patient requires cues, coaxing, prompting   SBA: Stand By Assist   SC: Straight Cane   SW: Standard Walker   TBA: To be assessed   Total (A): Total Assist- patient performs less than 25% of task   WC: Wheelchair   WFL: Within Functional Limits   WNL: Within Normal Limits

## 2018-03-31 NOTE — PROGRESS NOTES
Occupational Therapy  PM Treatment  Mirza Morales   MRN: [id number]   Room/Bed: E280/E280 B       03/31/18 1300   OT Time Calculation   OT Start Time 1300   OT Stop Time 1344   OT Total Time (min) 44 min   General   OT Date of Treatment 03/31/18   Patient Found (position) Supine in bed   Precautions   General Precautions fall   Bed to Chair   Bed <> Chair Technique Stand Pivot   Bed <> Chair Transfer Assistance Minimum Assistance   Bed <> Chair Assistive Device Rolling Walker   Trials/Comments from EOB>wc   Exercise Tools   Exercise Tools Yes   Bilateral Isael 8# x 100 reps foward/back to increase BUE strength in prep for ADLs and functional mobility.   Occupational Therapy Follow-up   OT Follow-up? Yes   Treatment/Billable Minutes   Evaluation 9   Self Care/Home Management 15   Therapeutic Exercise 20   Total Time 44       DOMINIC Lanza  3/31/2018    Patient with wheelchair safety belt fastened and able to self release wheelchair safety belt. Pt left in wc with PT for PT session.     LEGEND:   CGA: Contact Guard Assist   EOB: Edgeof Bed   HHA: Hand Held Assist   HOB: Head of Bed   (I): Independent-patient performs task in a timely manner   Max (A): Maximal Assist-patient performs 25-49% of task   Min (A): Minimal Assist- patient performs 75% or more of task   Mod (A): Moderate Assist- patient performs 50-74% of task   NA: Not applicable   NT: Not tested   OOB: Out of Bed   PTA: Prior to admit   QC: Quad Cane   RW: Rolling Walker   (S): Supervision- patient requires cues, coaxing, prompting   SBA: Stand By Assist   SC: Straight Cane   SW: Standard Walker   TBA: To be assessed   Total (A): Total Assist- patient performs less than 25% of task   WC: Wheelchair   WFL: Within Functional Limits   WNL: Within Normal Limits

## 2018-04-01 PROCEDURE — 25000003 PHARM REV CODE 250: Performed by: PHYSICAL MEDICINE & REHABILITATION

## 2018-04-01 PROCEDURE — 63600175 PHARM REV CODE 636 W HCPCS: Performed by: PHYSICAL MEDICINE & REHABILITATION

## 2018-04-01 PROCEDURE — 12800000 HC REHAB SEMI-PRIVATE ROOM

## 2018-04-01 RX ADMIN — GABAPENTIN 600 MG: 300 CAPSULE ORAL at 08:04

## 2018-04-01 RX ADMIN — GABAPENTIN 600 MG: 300 CAPSULE ORAL at 03:04

## 2018-04-01 RX ADMIN — ENOXAPARIN SODIUM 40 MG: 100 INJECTION SUBCUTANEOUS at 05:04

## 2018-04-01 RX ADMIN — CIPROFLOXACIN HYDROCHLORIDE 500 MG: 500 TABLET, FILM COATED ORAL at 08:04

## 2018-04-01 RX ADMIN — LURASIDONE HYDROCHLORIDE 80 MG: 40 TABLET, FILM COATED ORAL at 01:04

## 2018-04-01 RX ADMIN — OXYCODONE HYDROCHLORIDE AND ACETAMINOPHEN 1 TABLET: 10; 325 TABLET ORAL at 07:04

## 2018-04-01 RX ADMIN — MAGNESIUM HYDROXIDE 2400 MG: 400 SUSPENSION ORAL at 08:04

## 2018-04-01 RX ADMIN — DIVALPROEX SODIUM 500 MG: 250 TABLET, DELAYED RELEASE ORAL at 01:04

## 2018-04-01 RX ADMIN — Medication 12.5 MG: at 08:04

## 2018-04-01 RX ADMIN — RAMELTEON 8 MG: 8 TABLET, FILM COATED ORAL at 08:04

## 2018-04-01 RX ADMIN — PANTOPRAZOLE SODIUM 40 MG: 40 TABLET, DELAYED RELEASE ORAL at 07:04

## 2018-04-01 RX ADMIN — DOXEPIN HYDROCHLORIDE 100 MG: 100 CAPSULE ORAL at 08:04

## 2018-04-01 RX ADMIN — FOLIC ACID 1 MG: 1 TABLET ORAL at 07:04

## 2018-04-01 RX ADMIN — DIVALPROEX SODIUM 1000 MG: 500 TABLET, FILM COATED, EXTENDED RELEASE ORAL at 08:04

## 2018-04-01 RX ADMIN — OXYCODONE HYDROCHLORIDE AND ACETAMINOPHEN 1 TABLET: 10; 325 TABLET ORAL at 05:04

## 2018-04-01 RX ADMIN — CIPROFLOXACIN HYDROCHLORIDE 500 MG: 500 TABLET, FILM COATED ORAL at 07:04

## 2018-04-01 RX ADMIN — Medication 12.5 MG: at 07:04

## 2018-04-01 RX ADMIN — HYDROXYZINE PAMOATE 50 MG: 25 CAPSULE ORAL at 08:04

## 2018-04-01 RX ADMIN — PRAVASTATIN SODIUM 40 MG: 20 TABLET ORAL at 08:04

## 2018-04-01 RX ADMIN — OXYCODONE HYDROCHLORIDE AND ACETAMINOPHEN 1 TABLET: 10; 325 TABLET ORAL at 11:04

## 2018-04-01 NOTE — PLAN OF CARE
Problem: Patient Care Overview  Goal: Plan of Care Review  Outcome: Ongoing (interventions implemented as appropriate)  POC reviewed with pt who verbalized understanding. Pt AAOX4.  Remains free of falls and injury. VSS and afebrile.  C/o slight pain controlled with PRN medication. Encouraged pt repositioned to help decrease pressure. Pt sleeping throughout the night.  Infection control measures taken, such as hand washing and proper disposal of contaminated materials.Safety rounds maintained according to protocol, environmental consistency maintained. Mirza Morales agrees to call when assistance is needed per call light which is within reach. No acute events. No distress noted. WCTM.

## 2018-04-02 PROBLEM — E87.5 HYPERKALEMIA: Status: ACTIVE | Noted: 2018-04-02

## 2018-04-02 LAB
ANION GAP SERPL CALC-SCNC: 6 MMOL/L
ANISOCYTOSIS BLD QL SMEAR: SLIGHT
BASOPHILS # BLD AUTO: ABNORMAL K/UL
BASOPHILS NFR BLD: 0 %
BUN SERPL-MCNC: 11 MG/DL
CALCIUM SERPL-MCNC: 9.1 MG/DL
CHLORIDE SERPL-SCNC: 97 MMOL/L
CO2 SERPL-SCNC: 31 MMOL/L
CREAT SERPL-MCNC: 0.7 MG/DL
DIFFERENTIAL METHOD: ABNORMAL
EOSINOPHIL # BLD AUTO: ABNORMAL K/UL
EOSINOPHIL NFR BLD: 5 %
ERYTHROCYTE [DISTWIDTH] IN BLOOD BY AUTOMATED COUNT: 14.5 %
EST. GFR  (AFRICAN AMERICAN): >60 ML/MIN/1.73 M^2
EST. GFR  (NON AFRICAN AMERICAN): >60 ML/MIN/1.73 M^2
GLUCOSE SERPL-MCNC: 122 MG/DL
HCT VFR BLD AUTO: 34.6 %
HGB BLD-MCNC: 11.3 G/DL
IMM GRANULOCYTES # BLD AUTO: ABNORMAL K/UL
IMM GRANULOCYTES NFR BLD AUTO: ABNORMAL %
LYMPHOCYTES # BLD AUTO: ABNORMAL K/UL
LYMPHOCYTES NFR BLD: 22 %
MAGNESIUM SERPL-MCNC: 2.1 MG/DL
MCH RBC QN AUTO: 29.7 PG
MCHC RBC AUTO-ENTMCNC: 32.7 G/DL
MCV RBC AUTO: 91 FL
METAMYELOCYTES NFR BLD MANUAL: 3 %
MONOCYTES # BLD AUTO: ABNORMAL K/UL
MONOCYTES NFR BLD: 3 %
NEUTROPHILS NFR BLD: 66 %
NEUTS BAND NFR BLD MANUAL: 1 %
NRBC BLD-RTO: 0 /100 WBC
PHOSPHATE SERPL-MCNC: 3.8 MG/DL
PLATELET # BLD AUTO: 334 K/UL
PLATELET BLD QL SMEAR: ABNORMAL
PMV BLD AUTO: 9.7 FL
POTASSIUM SERPL-SCNC: 5.2 MMOL/L
RBC # BLD AUTO: 3.8 M/UL
SODIUM SERPL-SCNC: 134 MMOL/L
WBC # BLD AUTO: 8.37 K/UL

## 2018-04-02 PROCEDURE — 12800000 HC REHAB SEMI-PRIVATE ROOM

## 2018-04-02 PROCEDURE — 97802 MEDICAL NUTRITION INDIV IN: CPT

## 2018-04-02 PROCEDURE — 97110 THERAPEUTIC EXERCISES: CPT

## 2018-04-02 PROCEDURE — 85027 COMPLETE CBC AUTOMATED: CPT

## 2018-04-02 PROCEDURE — 63600175 PHARM REV CODE 636 W HCPCS: Performed by: PHYSICAL MEDICINE & REHABILITATION

## 2018-04-02 PROCEDURE — 97112 NEUROMUSCULAR REEDUCATION: CPT

## 2018-04-02 PROCEDURE — 25000003 PHARM REV CODE 250: Performed by: PHYSICAL MEDICINE & REHABILITATION

## 2018-04-02 PROCEDURE — 97530 THERAPEUTIC ACTIVITIES: CPT

## 2018-04-02 PROCEDURE — 83735 ASSAY OF MAGNESIUM: CPT

## 2018-04-02 PROCEDURE — 36415 COLL VENOUS BLD VENIPUNCTURE: CPT

## 2018-04-02 PROCEDURE — 99232 SBSQ HOSP IP/OBS MODERATE 35: CPT | Mod: ,,, | Performed by: PHYSICAL MEDICINE & REHABILITATION

## 2018-04-02 PROCEDURE — 85007 BL SMEAR W/DIFF WBC COUNT: CPT

## 2018-04-02 PROCEDURE — 80048 BASIC METABOLIC PNL TOTAL CA: CPT

## 2018-04-02 PROCEDURE — 84100 ASSAY OF PHOSPHORUS: CPT

## 2018-04-02 PROCEDURE — 97535 SELF CARE MNGMENT TRAINING: CPT

## 2018-04-02 PROCEDURE — 97150 GROUP THERAPEUTIC PROCEDURES: CPT

## 2018-04-02 RX ADMIN — CIPROFLOXACIN HYDROCHLORIDE 500 MG: 500 TABLET, FILM COATED ORAL at 08:04

## 2018-04-02 RX ADMIN — Medication 12.5 MG: at 08:04

## 2018-04-02 RX ADMIN — PANTOPRAZOLE SODIUM 40 MG: 40 TABLET, DELAYED RELEASE ORAL at 07:04

## 2018-04-02 RX ADMIN — HYDROXYZINE PAMOATE 50 MG: 25 CAPSULE ORAL at 08:04

## 2018-04-02 RX ADMIN — OXYCODONE HYDROCHLORIDE AND ACETAMINOPHEN 1 TABLET: 10; 325 TABLET ORAL at 08:04

## 2018-04-02 RX ADMIN — Medication 12.5 MG: at 07:04

## 2018-04-02 RX ADMIN — ENOXAPARIN SODIUM 40 MG: 100 INJECTION SUBCUTANEOUS at 04:04

## 2018-04-02 RX ADMIN — DIVALPROEX SODIUM 500 MG: 250 TABLET, DELAYED RELEASE ORAL at 07:04

## 2018-04-02 RX ADMIN — OXYCODONE HYDROCHLORIDE AND ACETAMINOPHEN 1 TABLET: 10; 325 TABLET ORAL at 01:04

## 2018-04-02 RX ADMIN — DOXEPIN HYDROCHLORIDE 100 MG: 100 CAPSULE ORAL at 08:04

## 2018-04-02 RX ADMIN — PRAVASTATIN SODIUM 40 MG: 20 TABLET ORAL at 08:04

## 2018-04-02 RX ADMIN — GABAPENTIN 600 MG: 300 CAPSULE ORAL at 08:04

## 2018-04-02 RX ADMIN — CIPROFLOXACIN HYDROCHLORIDE 500 MG: 500 TABLET, FILM COATED ORAL at 07:04

## 2018-04-02 RX ADMIN — GABAPENTIN 600 MG: 300 CAPSULE ORAL at 03:04

## 2018-04-02 RX ADMIN — FOLIC ACID 1 MG: 1 TABLET ORAL at 07:04

## 2018-04-02 RX ADMIN — DIVALPROEX SODIUM 1000 MG: 500 TABLET, FILM COATED, EXTENDED RELEASE ORAL at 08:04

## 2018-04-02 RX ADMIN — BISACODYL 10 MG: 10 SUPPOSITORY RECTAL at 05:04

## 2018-04-02 RX ADMIN — OXYCODONE HYDROCHLORIDE AND ACETAMINOPHEN 1 TABLET: 10; 325 TABLET ORAL at 05:04

## 2018-04-02 RX ADMIN — LURASIDONE HYDROCHLORIDE 80 MG: 40 TABLET, FILM COATED ORAL at 07:04

## 2018-04-02 NOTE — PLAN OF CARE
04/02/18 1100   Discharge Assessment   Assessment Type Discharge Planning Assessment   Confirmed/corrected address and phone number on facesheet? Yes   Assessment information obtained from? Patient   Expected Length of Stay (days) 14   Communicated expected length of stay with patient/caregiver yes   Prior to hospitilization cognitive status: Alert/Oriented   Prior to hospitalization functional status: Assistive Equipment   Current cognitive status: Alert/Oriented   Current Functional Status: Assistive Equipment;Needs Assistance   Facility Arrived From: [facility]   Lives With spouse;other (see comments)  (adult niece)   Able to Return to Prior Arrangements yes   Is patient able to care for self after discharge? Unable to determine at this time (comments)   Who are your caregiver(s) and their phone number(s)? wife Anamaria [phone]   Patient's perception of discharge disposition home health   Equipment Currently Used at Home walker, rolling;bedside commode;wheelchair;shower chair   Do you have any problems affording any of your prescribed medications? No   Does the patient have transportation home? Yes   Transportation Available car   Discharge Plan A Home with family;Home Health   Discharge Plan B Home with family;Home Health   Patient/Family In Agreement With Plan yes     Spoke with patient. Lives at home with wife who just had leg sx in early months and just started back at work part time. Adult niece also lives with them and assists them in their care. She is available to drive pt at discharge and to any follow up appointments. Can attend family training. Lives in house with 3 steps to enter and 2 steps down into main living area. Has all DME. Educated on rehab process and team conference, states understanding. CM will continue to follow.

## 2018-04-02 NOTE — PLAN OF CARE
Problem: Patient Care Overview  Goal: Plan of Care Review  Outcome: Ongoing (interventions implemented as appropriate)  Plan of care reviewed: Pain control will be maintained by administering pain medication as ordered, frequent rounds, assess, monitor, and manage pain pre medicate before therapy, will report pain level < 4.

## 2018-04-02 NOTE — PROGRESS NOTES
[facility]  Physical Medicine & Rehab  Progress Note    Patient Name: Mirza Morales  MRN: [id number]  Patient Class: IP- Rehab   Admission Date: 3/30/2018  Length of Stay: 3 days  Attending Physician: Valentin Cunha MD  Primary Care Provider: LIDIA Banks MD    Subjective:     Principal Problem:Myelopathy    Interval History 4/2/2018:  Patient is seen for follow-up rehab evaluation and recommendations: No new complaints. Pain currently controlled.  HPI, Past Medical, Family, and Social History remains the same as documented in the initial encounter.    Scheduled Medications:    ciprofloxacin HCl  500 mg Oral Q12H    divalproex  500 mg Oral Daily    divalproex ER  1,000 mg Oral QHS    doxepin  100 mg Oral QHS    enoxaparin  40 mg Subcutaneous Daily    folic acid  1 mg Oral Daily    gabapentin  600 mg Oral TID    hydrOXYzine pamoate  50 mg Oral QHS    lurasidone  80 mg Oral Daily    metoprolol tartrate  12.5 mg Oral BID    pantoprazole  40 mg Oral Daily    pravastatin  40 mg Oral QHS       Diagnostic Results: Labs: Reviewed    PRN Medications: acetaminophen, albuterol, bisacodyl, magnesium hydroxide 400 mg/5 ml, ondansetron, oxyCODONE-acetaminophen, ramelteon    Review of Systems  Objective:     Vital Signs (Most Recent):  Temp: 98 °F (36.7 °C) (04/02/18 0700)  Pulse: 80 (04/02/18 0700)  Resp: 16 (04/02/18 0700)  BP: 111/67 (04/02/18 0700)  SpO2: 95 % (04/02/18 0700)    Vital Signs (24h Range):  Temp:  [98 °F (36.7 °C)-98.4 °F (36.9 °C)] 98 °F (36.7 °C)  Pulse:  [80-83] 80  Resp:  [16-18] 16  SpO2:  [95 %] 95 %  BP: (111-116)/(63-67) 111/67     Physical Exam   Constitutional: He is oriented to person, place, and time. He appears well-developed.   HENT:   Head: Normocephalic.   Eyes: EOM are normal.   Neck: Normal range of motion.   Cardiovascular: Normal rate and regular rhythm.    Pulmonary/Chest: Effort normal and breath sounds normal.   Abdominal: Soft. Bowel sounds are  normal.   Musculoskeletal: Normal range of motion.   Neurological: He is alert and oriented to person, place, and time.   Skin: Skin is warm and dry.   Psychiatric: He has a normal mood and affect.     NEUROLOGICAL EXAMINATION:     MENTAL STATUS   Oriented to person, place, and time.     CRANIAL NERVES     CN III, IV, VI   Extraocular motions are normal.     MOTOR EXAM        BUE 4+/5 BLE 4-/5       Assessment/Plan:      Mirza Morales is a 62 y.o. male admitted to inpatient rehabilitation on 3/30/2018 for Myelopathy with impaired mobility and ADLs. Patient remains appropriate for PT, OT, and as required Speech therapy. Patient continues to require 24 hour nursing care as well as daily Physician assessment.    * Myelopathy    PT/OT  Monitor bowel and bladder  BM 3/29        Postprocedural pneumothorax    Resolved. Continue to monitor respiratory status        Bipolar 1 disorder, mixed    Continue Doxepin and Vistaril        Seizure    Continue Dekaote     Hyperkalemia  K 5.2 Monitor and consider kayexelate.       DISCHARGE PLANNING:  Tentative Discharge Date:     Future Appointments  Date Time Provider Department Center   4/11/2018 3:30 PM Ambrosio Loco MD [facility]  Department of Physical Medicine & Rehab   [facility]

## 2018-04-02 NOTE — PROGRESS NOTES
"Occupational Therapy   AM Treatment    Mirza Morales   MRN: [id number]      04/02/18 0830   OT Time Calculation   OT Start Time 0830   OT Stop Time 1000   OT Total Time (min) 90 min   General   OT Date of Treatment 04/02/18   Precautions   General Precautions fall   Orthopedic Yes   Required Braces or Orthoses No   Subjective   Patient states "I was doing some twists for a warm up this morning"   Pain/Comfort   Pain Rating 6/10   Location - Orientation lower   Location back   Pain Comment pt recevied pain meds prior to OT session   Transfers   Transfer yes   Sit to Stand   Sit <> Stand Assistance Stand By Assistance;Contact Guard Assistance   Sit <> Stand Assistive Device Rolling Walker   Trials/Comments CGA from w/c, SBA-CGA from EOM pending fatigue level   Stand to Sit   Assistance Contact Guard Assistance;Stand By Assistance   Assistive Device Rolling Walker   Bed to Chair   Bed <> Chair Technique Stand Pivot   Bed <> Chair Transfer Assistance Contact Guard Assistance   Bed <> Chair Assistive Device Rolling Walker   Chair to Mat   Chair <>Mat Technique Stand Pivot   Chair <> Mat Assistance Contact Guard Assistance   Chair<> Mat Assistive Device Rolling Walker   Mat to Chair   Technique Stand Pivot   Assistance Contact Guard Assistance   Assistive Device Rolling Walker   Toilet Transfer   Toilet Transfer Technique Stand Pivot   Toilet Transfer Assistance Contact Guard Assistance   Toilet Transfer Assistive Device Rolling Walker   UE Dressing   UE Dressing Level of Assistance Set-up Assistance   UE Dressing Where Assessed Wheelchair   UE Dressing Comments to don pullover shirt and button down shirt, set up LSO brace   LE Dressing   LE Dressing Yes   LE Dressing  Level of Assistance Contact guard   LE Dressing Level of Assistance Contact guard  (steadying assist only, reacher for threading feet)   Sock Level of Assistance Stand by assistance  (sock aid, dressing stick)   LE Dressing Where Assessed Wheelchair   LE " Dressing Comments cues for using AE and spinal precautions   Exercise Tools   Exercise Tools Yes   Rickshaw 5lb x 20 reps x 5 sets    Bilateral Isael 4lb x 20 reps x    Additional Activities   Additional Activities Comments  - Pt performed short distance functional mobility trials (3 trials 25ft) in gym with RW in prep for household mobility, ADLs and IADLs with CGA. Pt noted to take short steps, required cues for hand placement with initial sit-stand from w/c.   - Pt stood at countertop, tf'd objects from overhead cabinet onto countertop with emphasis on posture and body mechanics.    - Pt educated on spinal precautions (no lifting >8lb, no twisting or bending, and importance of posture during ADLs and transfers for joint protection). Pt verbalized understanding but would benefit from continued education.    - Spoke with  during session who verbally stated pt is not required to wear LSO brace, brace taken off for remainder of session.    - Bicep curls x 20 reps x 2 sets with 3lb dowel at EOM with emphasis on posture    Activity Tolerance   Activity Tolerance Patient tolerated treatment well   Medical Staff Made Aware NSG   After Treatment   Patient Position After Treatment Seated in wheelchair   Patient after treatment left call button in reach   Assessment   Problem List Decreased Self Care skills;Decreased upper extremity range of motion;Decreased upper extremity strength;Decreased safe judgment during ADL;Decreased cognition;Decreased endurance;Decreased functional mobility;Decreased gross motor control;Decreased IADLs;Decreased trunk control for functional activities   Assessment Pt tolerated session well, noted increased (I) with LB dressing with use of AE today. Pt's endurance also appears to be improving as well as transfers, but needs cues for safety and spinal precautions. Pt remains motivated and would continue to benefit from skilled OT services.    Level of Motivation/Participation Good    Discharge Recommendations   Equipment Needed After Discharge bath bench;wheelchair   Discharge Facility/Level Of Care Needs home health OT   Plan   Plan Continue with current plan   Therapy Frequency 2 times/day;Monday-Friday;Saturday or Sunday   Occupational Therapy Follow-up   OT Follow-up? Yes   Treatment/Billable Minutes   Self Care/Home Management 25   Therapeutic Activity 35   Therapeutic Exercise 30   Total Time 90     DOMINIC Albert   4/2/2018

## 2018-04-02 NOTE — SUBJECTIVE & OBJECTIVE
Interval History 4/2/2018:  Patient is seen for follow-up rehab evaluation and recommendations: No new complaints. Pain currently controlled.  HPI, Past Medical, Family, and Social History remains the same as documented in the initial encounter.    Scheduled Medications:    ciprofloxacin HCl  500 mg Oral Q12H    divalproex  500 mg Oral Daily    divalproex ER  1,000 mg Oral QHS    doxepin  100 mg Oral QHS    enoxaparin  40 mg Subcutaneous Daily    folic acid  1 mg Oral Daily    gabapentin  600 mg Oral TID    hydrOXYzine pamoate  50 mg Oral QHS    lurasidone  80 mg Oral Daily    metoprolol tartrate  12.5 mg Oral BID    pantoprazole  40 mg Oral Daily    pravastatin  40 mg Oral QHS       Diagnostic Results: Labs: Reviewed    PRN Medications: acetaminophen, albuterol, bisacodyl, magnesium hydroxide 400 mg/5 ml, ondansetron, oxyCODONE-acetaminophen, ramelteon    Review of Systems  Objective:     Vital Signs (Most Recent):  Temp: 98 °F (36.7 °C) (04/02/18 0700)  Pulse: 80 (04/02/18 0700)  Resp: 16 (04/02/18 0700)  BP: 111/67 (04/02/18 0700)  SpO2: 95 % (04/02/18 0700)    Vital Signs (24h Range):  Temp:  [98 °F (36.7 °C)-98.4 °F (36.9 °C)] 98 °F (36.7 °C)  Pulse:  [80-83] 80  Resp:  [16-18] 16  SpO2:  [95 %] 95 %  BP: (111-116)/(63-67) 111/67     Physical Exam   Constitutional: He is oriented to person, place, and time. He appears well-developed.   HENT:   Head: Normocephalic.   Eyes: EOM are normal.   Neck: Normal range of motion.   Cardiovascular: Normal rate and regular rhythm.    Pulmonary/Chest: Effort normal and breath sounds normal.   Abdominal: Soft. Bowel sounds are normal.   Musculoskeletal: Normal range of motion.   Neurological: He is alert and oriented to person, place, and time.   Skin: Skin is warm and dry.   Psychiatric: He has a normal mood and affect.     NEUROLOGICAL EXAMINATION:     MENTAL STATUS   Oriented to person, place, and time.     CRANIAL NERVES     CN III, IV, VI   Extraocular  motions are normal.     MOTOR EXAM        BUE 4+/5 BLE 4-/5

## 2018-04-02 NOTE — PROGRESS NOTES
Report received from Connie. I have reviewed the documentation and care plan and/or discussed the patient with the LPN and visualized/assessed the patient myself and agree with the documentation and care plan. I assumed care.  Pt is AAOx4. Pt is comfortable resting with eyes closed. Behavior is age appropriate and pleasant.  Airway is patent.  Respirations are full, even, and non labored.   No needs verbalized or noted. Will continue to monitor.

## 2018-04-02 NOTE — PROGRESS NOTES
"Physical Therapy   PT Treatment    Mirza Morales   MRN: [id number]        18 1342   PT Time Calculation   PT Start Time 1342   PT Stop Time 1427   PT Total Time (min) 45 min   Treatment   Treatment Type Treatment   PT/PTA PT   General   PT Received On 18   Family/Caregiver Present No   Patient Found (position) Supine in bed   Precautions   General Precautions fall   Orthopedic Yes   Required Braces or Orthoses No   Subjective   Patient states "I was just about to bend over and put these leg rests on."   Pain/Comfort   Pain Rating 1 5/10   Location - Side 1 Bilateral   Location - Orientation 1 lower   Location 1 back   Pain Addressed 1 Reposition;Distraction   Pain Rating Post-Intervention 1 6/10   Bed Mobility   Bed Mobility yes   Rolling/Turning Right Contact guard assistance   Supine to Sit Contact Guard Assistance   Sit to Supine Contact Guard Assistance   Transfers   Transfer yes   Chair to Mat   Chair<> Mat Technique Stand Pivot   Chair<>Mat Assistance Moderate Assistance   Chair <> Mat Assistive Device No Assistive Device   Trials/Comments x1 trial; CGA for stand; mod A for balance    Mat to Chair   Technique Stand Pivot   Assistance Minimum Assistance   Assistive Device No Assistive Device   Trials/Comments x1 trial from elevated mat; CGA for stand; but min A during pivot for stability   Other Activities   Comments Patient performed 8 x 5 reps of supine TA pelvic tilts for improved pelvic proprioception     PT performed straight leg raise leg:   R le deg   L leg- 56 deg     PT performed sciatic nerve flossing 3 x 10 reps of Df; 3 x 5 reps of neck flexion   PT performed hip abduction with eversion- no pain noted     All symptoms stay proximal to knee. Patient finds relief with R leg extension compared to knee & hip flexion.    Activity Tolerance   Activity Tolerance Patient tolerated treatment well;Patient limited by pain   After Treatment   Patient Position After Treatment Seated in " wheelchair  (dining room)   Patient after treatment left nursing notified  (seat  belt donned)   Assessment   Prognosis Good   Problem List Decreased strength;Decreased range of motion;Decreased endurance;Impaired balance;Decreased mobility;Pain;Orthopedic restrictions;Impaired sensation   Session Assessment progressing toward goals   Assessment Patient demonstrated limitations 2/2 pain. Pain is elicited with stretching sciatic nerve, but patient seemed to benefit from nerve flossing. Patient is demonstrating improved ROM and would benefit from continued strengthening to support surgical site and pain management. R SLR test- 11 deg. Pt remains appropriate for IPR. Cont POC    Level of Motivation/Participation good   Barriers to Discharge Inaccessible home environment   Barriers to Discharge Comments Pt has 2 steps to enter home   Discharge Recommendations   Equipment Needed After Discharge bath bench;wheelchair   Discharge Facility/Level Of Care Needs home health PT   Plan   Planned Therapy Intervention Continue with current plan   Therapy Frequency 2 times/day;daily;Monday-Friday;Saturday or Sunday   Physical Therapy Follow-up   PT Follow-up? Yes   PT - Next Visit Date 04/03/18   Treatment/Billable Minutes   Therapeutic Activity 5   Neuromuscular Re-education 40   Total Time 45     Salud Nixon, PT, DPT   4/2/2018

## 2018-04-02 NOTE — PLAN OF CARE
Problem: Patient Care Overview  Goal: Plan of Care Review  POC reviewed with pt who verbalized understanding. Pt AAOX4.  Remains free of falls and injury. VSS and afebrile.  C/o slight pain controlled with PRN medication. Encouraged pt repositioned to help decrease pressure. Pt sleeping throughout the night.  Infection control measures taken, such as hand washing and proper disposal of contaminated materials.Safety rounds maintained according to protocol, environmental consistency maintained. Mirza Morales agrees to call when assistance is needed per call light which is within reach. No acute events. No distress noted. WCTM.

## 2018-04-02 NOTE — PROGRESS NOTES
Occupational Therapy  AM Group  Mirza Morales   MRN: [id number]        04/02/18 1115   OT Time Calculation   OT Start Time 1115   OT Stop Time 1200   OT Total Time (min) 45 min   General   OT Date of Treatment 04/02/18   Treatment/Billable Minutes   Therapeutic Group 45   Total Time 45   Subjective: Ask pt how they responding to their therapy treatment sessions.good Pain level:           01/10                            How was pain addressed:na   Precautions:fall         Objective:    Patient participated in a 45 minute seated therapeutic exercises program with (S) assist. This group activity challenged the patient's upper and lower extremity strength, seated balance, and endurance to improve functional mobility, decrease risk of falls, and increase activity tolerance. Patient required  Short rest breaks during this activity. Patient performed the following exercises: hip flexion, LAQs, ankle pumps, glute sets, bicep curls, tricep extension, and shoulder extension/flexion/abduction 3/20x reps     -Endurance moderate  on the RPE scale. No pain complaints voiced or observed.      -Social Interaction: (choose FIM score rating below and delete the rest) 5   Interacts appropriately with others - No medications needed. Patient asleep all shift (7)  Interacts appropriately with others with medication or extra time(anti-anxiety, antidepressant)  (6)  Interacts appropriately 90% of time - needs monitoring or encouragement for participation or interaction  (5)  Interacts appropriately 75-89% of time - needs redirection for appropriate language or to initiate interaction  (4)  Interacts appropriately 50-74% of time - May be physically or verbally inappropriate   (3)  Interacts appropriately 25-49% of time - Needs frequent redirection  (2)  Interacts appropriately less than 25% of time - May be withdrawn or combative  (1)   Assessment:  Patient participated in a 45 minute seated endurance activity.  The group activity  challenged dynamic sitting balance, core strengthening, bilateral upper and lower extremity strengthening, cardiovascular and respiratory capacity, and social affect/mood. Patient will improve activity tolerance by requiring no rest breaks and maintain a RPE of somewhat hard throughout the session.         SUDHEER Barahona 4/2/2018

## 2018-04-02 NOTE — PROGRESS NOTES
Visited Pt this afternoon and he reports a great appetite with a 100% intake. Pt has not been drinking his ONS because he dislikes the flavor. Pt's preferences were obtained. Pt denies N/V/D/C.

## 2018-04-03 PROBLEM — R23.9 ALTERATION IN SKIN INTEGRITY RELATED TO SURGICAL INCISION: Status: ACTIVE | Noted: 2018-04-03

## 2018-04-03 PROCEDURE — 99232 SBSQ HOSP IP/OBS MODERATE 35: CPT | Mod: ,,, | Performed by: PHYSICAL MEDICINE & REHABILITATION

## 2018-04-03 PROCEDURE — 97535 SELF CARE MNGMENT TRAINING: CPT

## 2018-04-03 PROCEDURE — 97530 THERAPEUTIC ACTIVITIES: CPT

## 2018-04-03 PROCEDURE — 97110 THERAPEUTIC EXERCISES: CPT

## 2018-04-03 PROCEDURE — 97150 GROUP THERAPEUTIC PROCEDURES: CPT

## 2018-04-03 PROCEDURE — 25000003 PHARM REV CODE 250: Performed by: PHYSICAL MEDICINE & REHABILITATION

## 2018-04-03 PROCEDURE — 12800000 HC REHAB SEMI-PRIVATE ROOM

## 2018-04-03 PROCEDURE — 63600175 PHARM REV CODE 636 W HCPCS: Performed by: PHYSICAL MEDICINE & REHABILITATION

## 2018-04-03 RX ORDER — SENNOSIDES 8.6 MG/1
8.6 TABLET ORAL DAILY PRN
Status: DISCONTINUED | OUTPATIENT
Start: 2018-04-03 | End: 2018-04-03

## 2018-04-03 RX ORDER — HYDROCODONE BITARTRATE AND ACETAMINOPHEN 7.5; 325 MG/1; MG/1
1 TABLET ORAL EVERY 6 HOURS PRN
Status: DISCONTINUED | OUTPATIENT
Start: 2018-04-03 | End: 2018-04-10 | Stop reason: HOSPADM

## 2018-04-03 RX ORDER — AMOXICILLIN 250 MG
1 CAPSULE ORAL 2 TIMES DAILY
Status: DISCONTINUED | OUTPATIENT
Start: 2018-04-03 | End: 2018-04-10 | Stop reason: HOSPADM

## 2018-04-03 RX ORDER — BACITRACIN 500 [USP'U]/G
OINTMENT TOPICAL 2 TIMES DAILY
Status: DISCONTINUED | OUTPATIENT
Start: 2018-04-03 | End: 2018-04-03

## 2018-04-03 RX ORDER — LACTULOSE 10 G/15ML
30 SOLUTION ORAL EVERY 6 HOURS PRN
Status: DISCONTINUED | OUTPATIENT
Start: 2018-04-03 | End: 2018-04-10 | Stop reason: HOSPADM

## 2018-04-03 RX ORDER — BACLOFEN 10 MG/1
10 TABLET ORAL NIGHTLY
Status: DISCONTINUED | OUTPATIENT
Start: 2018-04-03 | End: 2018-04-10 | Stop reason: HOSPADM

## 2018-04-03 RX ADMIN — DIVALPROEX SODIUM 500 MG: 250 TABLET, DELAYED RELEASE ORAL at 08:04

## 2018-04-03 RX ADMIN — DIVALPROEX SODIUM 1000 MG: 500 TABLET, FILM COATED, EXTENDED RELEASE ORAL at 09:04

## 2018-04-03 RX ADMIN — MAGNESIUM HYDROXIDE 2400 MG: 400 SUSPENSION ORAL at 01:04

## 2018-04-03 RX ADMIN — PANTOPRAZOLE SODIUM 40 MG: 40 TABLET, DELAYED RELEASE ORAL at 08:04

## 2018-04-03 RX ADMIN — GABAPENTIN 600 MG: 300 CAPSULE ORAL at 09:04

## 2018-04-03 RX ADMIN — GABAPENTIN 600 MG: 300 CAPSULE ORAL at 03:04

## 2018-04-03 RX ADMIN — BACITRACIN: 500 OINTMENT TOPICAL at 09:04

## 2018-04-03 RX ADMIN — HYDROXYZINE PAMOATE 50 MG: 25 CAPSULE ORAL at 09:04

## 2018-04-03 RX ADMIN — STANDARDIZED SENNA CONCENTRATE AND DOCUSATE SODIUM 1 TABLET: 8.6; 5 TABLET, FILM COATED ORAL at 09:04

## 2018-04-03 RX ADMIN — DOXEPIN HYDROCHLORIDE 100 MG: 100 CAPSULE ORAL at 09:04

## 2018-04-03 RX ADMIN — Medication 12.5 MG: at 08:04

## 2018-04-03 RX ADMIN — HYDROCODONE BITARTRATE AND ACETAMINOPHEN 1 TABLET: 7.5; 325 TABLET ORAL at 11:04

## 2018-04-03 RX ADMIN — ENOXAPARIN SODIUM 40 MG: 100 INJECTION SUBCUTANEOUS at 04:04

## 2018-04-03 RX ADMIN — OXYCODONE HYDROCHLORIDE AND ACETAMINOPHEN 1 TABLET: 10; 325 TABLET ORAL at 10:04

## 2018-04-03 RX ADMIN — HYDROCODONE BITARTRATE AND ACETAMINOPHEN 1 TABLET: 7.5; 325 TABLET ORAL at 04:04

## 2018-04-03 RX ADMIN — FOLIC ACID 1 MG: 1 TABLET ORAL at 08:04

## 2018-04-03 RX ADMIN — Medication 12.5 MG: at 09:04

## 2018-04-03 RX ADMIN — CIPROFLOXACIN HYDROCHLORIDE 500 MG: 500 TABLET, FILM COATED ORAL at 09:04

## 2018-04-03 RX ADMIN — GABAPENTIN 600 MG: 300 CAPSULE ORAL at 08:04

## 2018-04-03 RX ADMIN — BACLOFEN 10 MG: 10 TABLET ORAL at 09:04

## 2018-04-03 RX ADMIN — PRAVASTATIN SODIUM 40 MG: 20 TABLET ORAL at 09:04

## 2018-04-03 RX ADMIN — LURASIDONE HYDROCHLORIDE 80 MG: 40 TABLET, FILM COATED ORAL at 08:04

## 2018-04-03 RX ADMIN — CIPROFLOXACIN HYDROCHLORIDE 500 MG: 500 TABLET, FILM COATED ORAL at 08:04

## 2018-04-03 NOTE — PLAN OF CARE
Problem: Patient Care Overview  Goal: Plan of Care Review  Outcome: Ongoing (interventions implemented as appropriate)  Acute Pain: Spoke to patient about pain medication regimen. Encouraged patient to call for pain medication prior to pain becoming too intense. Patient verbalized understanding.  Current discomfort due to constipation. Patient given prune juice at 2100 to assist with current situation. Will maintain safety precautions and follow up as needed.

## 2018-04-03 NOTE — PROGRESS NOTES
"Occupational Therapy   Seated Endurance Group    Mirza Morales  MRN: [id number]  Room/Bed: E280/E280 B       04/03/18 1015   OT Time Calculation   OT Start Time 1015   OT Stop Time 1100   OT Total Time (min) 45 min   General   OT Date of Treatment 04/03/18   Patient Found (position) Seated in wheelchair   Precautions   General Precautions fall  (no lifting >8lbs)   Subjective   Patient states "I'm going to have a hard time doing this."   Pain/Comfort   Pain Rating 7/10   Location - Orientation lower   Location back   Pain Addressed Pre-medicate for activity   OT Therapeutic Groups   OT Therapeutic Yes   Other Patient participated in a 45 minute seated therapeutic exercises program with Min  assist. This group activity challenged the patient's upper and lower extremity strength, seated balance, and endurance to improve functional mobility, decrease risk of falls, and increase activity tolerance. Patient required  several rest breaks during this activity.       Patient completed warm-up and cool-down exercises as well as aerobic wheelchair exercise as follows:     - Alternating lateral and overhead punches, side punches, diagonal reaches, ABD, ADD, elbow flex/ext in rapid succession in varied sequences.     -Endurance 10  on the RPE scale. No pain complaints voiced or observed.      -Social Interaction: (choose FIM score rating below and delete the rest)  - 4   Interacts appropriately with others - No medications needed. Patient asleep all shift (7)  Interacts appropriately with others with medication or extra time(anti-anxiety, antidepressant)  (6)  Interacts appropriately 90% of time - needs monitoring or encouragement for participation or interaction  (5)  Interacts appropriately 75-89% of time - needs redirection for appropriate language or to initiate interaction  (4)  Interacts appropriately 50-74% of time - May be physically or verbally inappropriate   (3)  Interacts appropriately 25-49% of time - Needs " frequent redirection  (2)  Interacts appropriately less than 25% of time - May be withdrawn or combative  (1)   Assessment:  Patient participated in a 45 minute seated endurance activity.  The group activity challenged dynamic sitting balance, core strengthening, bilateral upper and lower extremity strengthening, cardiovascular and respiratory capacity, and social affect/mood. Patient will improve activity tolerance by requiring no rest breaks and maintain a RPE of 16 throughout the session.    Occupational Therapy Follow-up   OT Follow-up? Yes   Treatment/Billable Minutes   Therapeutic Group 45   Total Time 45       DOMINIC Lanza  4/3/2018     Patient with wheelchair safety belt fastened and able to self release wheelchair safety belt. Pt left in supine in bed with call light and all necessities in reach, RN notified.     LEGEND:   CGA: Contact Guard Assist   EOB: Edge of Bed   HHA: Hand Held Assist   HOB: Head of Bed   (I): Independent-patient performs task in a timely manner   Max (A): Maximal Assist-patient performs 25-49% of task   Min (A): Minimal Assist- patient performs 75% or more of task   Mod (A): Moderate Assist- patient performs 50-74% of task   NA: Not applicable   NT: Not tested   OOB: Out of Bed   PTA: Prior to admit   QC: Quad Cane   RW: Rolling Walker   (S): Supervision- patient requires cues, coaxing, prompting   SBA: Stand By Assist   SC: Straight Cane   SW: Standard Walker   TBA: To be assessed   Total (A): Total Assist- patient performs less than 25% of task   WC: Wheelchair   WFL: Within Functional Limits   WNL: Within Normal Limits

## 2018-04-03 NOTE — NURSING
Fleets enema admin for c/o constipation. Assisted to bathroom. Bowel sounds present. Denies nausea/vomitting. Pt with medium sized soft stool mixed with liquid  stool resulted. Pt states feels relief. Informed dr Cunha.

## 2018-04-03 NOTE — NURSING
Red slighly raised Rash noted along midline thoracic lumbar spine incision and surrounding skin . infomed Dr. Cunha. Area cleansed with antibacterial soap . Received order for  Bacitracin and  applied as ordered.. Left open to air as instructed. No warmth or drainage noted.  Dr sutton will examine.

## 2018-04-03 NOTE — PROGRESS NOTES
"Physical Therapy   PT Treatment    Mirza Morales   MRN: [id number]      04/03/18 1616   PT Time Calculation   PT Start Time 1616   PT Stop Time 1701   PT Total Time (min) 45 min   Treatment   Treatment Type Treatment   PT/PTA PT   General   PT Received On 04/03/18   Family/Caregiver Present No   Patient Found (position) Supine in bed   Precautions   General Precautions fall   Orthopedic Yes   Required Braces or Orthoses No   Subjective   Patient states "today is not my day."    Pain/Comfort   Pain Rating 1 10/10   Location - Side 1 Left   Location - Orientation 1 lower   Location 1 back   Pain Addressed 1 Reposition;Distraction;Cessation of Activity   Pain Rating Post-Intervention 1 7/10   Bed Mobility   Bed Mobility yes   Rolling/Turning Right Supervision   Supine to Sit Supervision   Sit to Supine Supervision   Transfers   Transfer yes   Sit to Stand   Sit <> Stand Assistance Contact Guard Assistance   Sit <> Stand Assistive Device Rolling Walker   Trials/Comments x2 trials   Stand to Sit   Assistance Contact Guard Assistance   Assistive Device Rolling Walker   Trials/Comments x2 trials   Bed to Chair   Bed <> Chair Technique Stand Pivot   Bed <> Chair Assistance Contact Guard Assistance   Bed <> Chair Assistive Device No Assistive Device   Trials/Comments x2 trials    Chair to Bed   Technique Squat Pivot   Assistance Contact Guard Assistance   Assistive Device No Assistive Device   Trials/Comments x1 trial to R; CGA for safety   Gait   Gait Yes   Weight Bearing Status full   Gait 1   Surface 1 Level tile   Gait Assistive Device Rolling walker   Assistance 1 Contact Guard Assistance   Gait Distance Patient ambulated 32'. Trial terminated 2/2 pain in L glute/low back. Trial 2- 8'; trial terminated due to same pain   Gait Pattern swing-through gait   Gait Deviation(s) decreased fran;increased time in double stance;decreased velocity of limb motion;decreased step length;increased stride width;decreased " weight-shifting ability;decreased toe-to-floor clearance;foot flat;forward lean   Impairments Contributing to Gait Deviations impaired balance;impaired coordination;impaired motor control;pain;impaired postural control;impaired sensory feedback;decreased strength   Other Activities   Comments Patient performed 2 x 10 reps of minimal excursion lumbar extension/flexion for increased mobility to address LBP. Exercise did not improve or worsen symptoms.   Patient performed x10 reps of iso pallof press with green TB on R; did not tolerate L- pain reproduced. Patient terminated session and requested to return to bed.     Supine, PT performed hip extension/knee extension resisted exercises x5 reps with no pain reproduced on L. Patient had 2 beat DF clonus on L; 1 beat clonus on R. MD notified. Patient also had increased hamstring resistance velocity dependent on L.     Patient tolerated striaght leg raise compared to yesterday's session with R leg with approx 40 deg before symptoms elicited and L was approx 55 deg. PT could not reproduce symptoms supine.    Activity Tolerance   Activity Tolerance Patient limited by pain;Patient tolerated treatment well   After Treatment   Patient Position After Treatment Supine in bed   Patient after treatment left nursing notified  (MD notified)   Assessment   Prognosis Good   Problem List Decreased strength;Decreased range of motion;Decreased endurance;Impaired balance;Decreased mobility;Decreased safety awareness;Impaired sensation;Pain   Session Assessment progressing toward goals   Assessment Patient required increased rest breaks today 2/2 pain in the L glute/low back area. Pain was reproducible when performing L iso pallof press but not with hip ER/IR/add/abduction. No improved symptoms with repeated minimal lumbar extensions. Pt remains appropriate for IPr. Cont POC    Level of Motivation/Participation good   Barriers to Discharge Inaccessible home environment   Barriers to  Discharge Comments Pt has 2 steps to enter home   Discharge Recommendations   Equipment Needed After Discharge bath bench;wheelchair   Discharge Facility/Level Of Care Needs home health PT   Plan   Planned Therapy Intervention Continue with current plan   Therapy Frequency 2 times/day;daily;Monday-Friday;Saturday or Sunday   Physical Therapy Follow-up   PT Follow-up? Yes   PT - Next Visit Date 04/04/18   Treatment/Billable Minutes   Gait training 7   Therapeutic Activity 13   Therapeutic Exercise 25   Total Time 45       Salud Nixon, PT, DPT   4/3/2018    '

## 2018-04-03 NOTE — PROGRESS NOTES
"Occupational Therapy  PM Treatment  Mirza Morales   MRN: [id number]   Room/Bed: E280/E280 B     04/03/18 1300   OT Time Calculation   OT Start Time 1300   OT Stop Time 1432   OT Total Time (min) 92 min   General   OT Date of Treatment 04/03/18   Family/Caregiver Present Yes  (wife and friend present beginning of session)   Patient Found (position) Supine in bed   Precautions   General Precautions fall  (no lifting >8lbs)   Orthopedic Yes   Required Braces or Orthoses No   Subjective   Patient states "Do I have the energy to do this program?"    Pain/Comfort   Pain Rating 7/10   Location - Orientation lower   Location back   Pain Addressed Reposition;Distraction   Bed Mobility   Bed Mobility yes   Supine to Sit Supervision   Supine to Sit Comments to EOB   Sit to Supine Supervision   Sit to Supine Comments from EOB   Transfers   Transfer yes   Sit to Stand   Sit <> Stand Assistance Contact Guard Assistance   Sit <> Stand Assistive Device Rolling Walker   Trials/Comments CGA for steadying assist from w/c for multiple trials   Stand to Sit   Assistance Contact Guard Assistance   Assistive Device Rolling Walker   Trials/Comments CGA for steadying assist with VCs to reach back to w/c with multiple trials   Bed to Chair   Bed <> Chair Technique Stand Pivot   Bed <> Chair Transfer Assistance Contact Guard Assistance   Bed <> Chair Assistive Device No Assistive Device   Trials/Comments CGA for steadying assist from EOB > w/c   Chair to Bed   Technique Stand Pivot   Assistance Contact Guard Assistance   Assistive Device Rolling Walker   Trials/Comments CGA for steadying assist from w/c > EOB   Toilet Transfer   Toilet Transfer Technique Stand Pivot   Toilet Transfer Assistance Contact Guard Assistance   Toilet Transfer Assistive Device grab bar   Trials/Comments CGA for steadying assist from w/c <> raised toilet   Grooming   Grooming Level of Assistance Supervision   Grooming Where Assessed Wheelchair   Grooming Comments " (S) for hand washing sitting in w/c at sinkside   Toileting   Toileting Level of Assistance Contact guard   Toileting Where Assessed Toilet   Toileting Comments CGA for steadying assist as pt performed 3/3 steps with loose BM episode    Exercise Tools   Exercise Tools Yes   Mariana 6# 5x20 reps to increase B triceps strength for functional t/fs   Bilateral Isael 3# flat surface scap protraction/retraction to increase UB strength  for IADL prep   Additional Activities   Additional Activities Other (Comment)   Additional Activities Comments Pt performed card sorting/matching ther ax standing at tabletop with RW and CGA - SBA to increase dynamic standing balance, endurance, weight-shifting, and coordination for multiple trials (x4.16 mins, x2.05 mins, x3.40 mins, x5 mins, untimed, x2.38 mins, x6.16 mins) with seated rest breaks throughout ther ax    Activity Tolerance   Activity Tolerance Patient tolerated treatment well   After Treatment   Patient Position After Treatment Supine in bed   Patient after treatment left call button in reach;with bed alarm on   Assessment   Prognosis Good   Problem List Decreased Self Care skills;Decreased upper extremity strength;Decreased safe judgment during ADL;Decreased endurance;Decreased functional mobility;Decreased gross motor control;Decreased IADLs;Decreased trunk control for functional activities   Assessment Pt tolerated tx session well today. Pt continues with decreased endurance, requiring moderate seated rest breaks after performing dynamic standing trials. Pt improving with toileting, able to complete 3/3 steps and requires CGA for steadying assist with stance. Pt would continue to benefit from skilled OT services.    Level of Motivation/Participation good   Discharge Recommendations   Equipment Needed After Discharge bath bench;wheelchair   Discharge Facility/Level Of Care Needs home health OT   Plan   Plan Continue with current plan   Therapy Frequency 2 times/day    Occupational Therapy Follow-up   OT Follow-up? Yes   Treatment/Billable Minutes   Self Care/Home Management 35   Therapeutic Activity 30   Therapeutic Exercise 27   Total Time 92       Katie Mckeon, OT  4/3/2018     Pt left supine in bed with bed alarm (location) with call light and all necessities in reach, nursing notified.     LEGEND:   CGA: Contact Guard Assist   EOB: Edgeof Bed   HHA: Hand Held Assist   HOB: Head of Bed   (I): Independent-patient performs task in a timely manner   Max (A): Maximal Assist-patient performs 25-49% of task   Min (A): Minimal Assist- patient performs 75% or more of task   Mod (A): Moderate Assist- patient performs 50-74% of task   NA: Not applicable   NT: Not tested   OOB: Out of Bed   PTA: Prior to admit   QC: Quad Cane   RW: Rolling Walker   (S): Supervision- patient requires cues, coaxing, prompting   SBA: Stand By Assist   SC: Straight Cane   SW: Standard Walker   TBA: To be assessed   Total (A): Total Assist- patient performs less than 25% of task   WC: Wheelchair   WFL: Within Functional Limits   WNL: Within Normal Limits

## 2018-04-03 NOTE — PROGRESS NOTES
Spoke with patient and wife after team conference, agreeable to DC date of 4/11 to home with family and HH. Wife to look at schedule and call with a time for family training.

## 2018-04-03 NOTE — PROGRESS NOTES
[facility]  Physical Medicine & Rehab  Progress Note    Patient Name: Mirza Morales  MRN: [id number]  Patient Class: IP- Rehab   Admission Date: 3/30/2018  Length of Stay: 4 days  Attending Physician: Valentin Cunha MD  Primary Care Provider: LIDIA Banks MD    Subjective:     Principal Problem:Myelopathy    Interval History 4/3/2018:  Patient is seen for follow-up rehab evaluation and recommendations: No new complaints. Pain currently controlled. WC evaluated patient this morning and recommended dressing to his incisions with Ag. Strained with suppository last night. Feels constipated. HPI, Past Medical, Family, and Social History remains the same as documented in the initial encounter.    Scheduled Medications:    ciprofloxacin HCl  500 mg Oral Q12H    divalproex  500 mg Oral Daily    divalproex ER  1,000 mg Oral QHS    doxepin  100 mg Oral QHS    enoxaparin  40 mg Subcutaneous Daily    folic acid  1 mg Oral Daily    gabapentin  600 mg Oral TID    hydrOXYzine pamoate  50 mg Oral QHS    lurasidone  80 mg Oral Daily    metoprolol tartrate  12.5 mg Oral BID    pantoprazole  40 mg Oral Daily    pravastatin  40 mg Oral QHS       Diagnostic Results: Labs: Reviewed    PRN Medications: acetaminophen, albuterol, bisacodyl, magnesium hydroxide 400 mg/5 ml, ondansetron, oxyCODONE-acetaminophen, ramelteon    Review of Systems    Objective:     Vital Signs (Most Recent):  Temp: 98.3 °F (36.8 °C) (04/02/18 1919)  Pulse: 87 (04/03/18 0831)  Resp: 18 (04/02/18 1919)  BP: 111/64 (04/03/18 0831)  SpO2: 95 % (04/02/18 1919)    Vital Signs (24h Range):  Temp:  [98.3 °F (36.8 °C)] 98.3 °F (36.8 °C)  Pulse:  [87] 87  Resp:  [18] 18  SpO2:  [95 %] 95 %  BP: (111-134)/(64-71) 111/64     Physical Exam   Constitutional: He is oriented to person, place, and time. He appears well-developed.   HENT:   Head: Normocephalic.   Eyes: EOM are normal.   Neck: Normal range of motion.   Cardiovascular: Normal  rate and regular rhythm.    Pulmonary/Chest: Effort normal and breath sounds normal.   Abdominal: Soft. Bowel sounds are normal.   Musculoskeletal: Normal range of motion.   Neurological: He is alert and oriented to person, place, and time.   Skin: Skin is warm and dry.   Psychiatric: He has a normal mood and affect.     NEUROLOGICAL EXAMINATION:     MENTAL STATUS   Oriented to person, place, and time.     CRANIAL NERVES     CN III, IV, VI   Extraocular motions are normal.     MOTOR EXAM        BUE 4+/5 BLE 4-/5       Assessment/Plan:      Mirza Morales is a 62 y.o. male admitted to inpatient rehabilitation on 3/30/2018 for Myelopathy with impaired mobility and ADLs. Patient remains appropriate for PT, OT, and as required Speech therapy. Patient continues to require 24 hour nursing care as well as daily Physician assessment.    * Myelopathy    PT/OT  Monitor bowel and bladder  Last BM 4/3        Alteration in skin integrity related to surgical incision    4/3 Wound care - dressing to incisions with Ag MWF        Postprocedural pneumothorax    Resolved. Continue to monitor respiratory status        Bipolar 1 disorder, mixed    Continue Doxepin and Vistaril        Seizure    Continue Dekaote     Constipation  4/3 KUB. Cont prn suppository. Add senokot 8.6 prn       DISCHARGE PLANNING:  Tentative Discharge Date:     Future Appointments  Date Time Provider Department Center   4/11/2018 3:30 PM Ambrosio Loco MD [facility]       Ashlee Al MD  Department of Physical Medicine & Rehab   [facility]

## 2018-04-03 NOTE — NURSING
Incisions cleaned  And Dressings received from central supply. aquacel ag  dressings changed to stapled  incisions of back as ordered and covered with mepore island dressings. Seen today by wound care nurse.

## 2018-04-03 NOTE — PROGRESS NOTES
Wound care consulted for redness to incision sites and rash on back.  The staples to the 3 incision sites are intact, skin is red, erythema noted to incision with red satellite lesions noted on the back.  No complaints of itching.  Patient states he was up and down quite a bit last night to the restroom, had the 'sweats'.    Recommendations:     Dressing to incisions with Ag- change every M-W-F.      04/03/18 0920       Incision/Site 03/30/18 1640 Back midline   Date First Assessed/Time First Assessed: 03/30/18 1640   Present Prior to Hospital Arrival?: Yes  Location: Back  Incision Type: midline  Closure Method: Staples   Wound Image    Incision WDL ex   Dressing Appearance Open to air;Dry;No dressing   Drainage Amount None   Appearance Red;Dry   Periwound Area Satellite lesion;Redness;Intact   Wound Edges Rolled/closed  (31 staples )   Wound Length (cm) 28   Care Cleansed with:;Sterile normal saline       Incision/Site 04/03/18 0920 Left Back lateral   Date First Assessed/Time First Assessed: 04/03/18 0920   Present Prior to Hospital Arrival?: Yes  Side: Left  Location: Back  Orientation: lateral  Closure Method: Staples   Wound Image    Incision WDL ex   Dressing Appearance Open to air;No dressing   Drainage Amount None   Appearance Red;Dry   Periwound Area Intact;Dry;Redness;Satellite lesion   Wound Edges Rolled/closed   Wound Length (cm) (17 staples)   Care Cleansed with:;Sterile normal saline       Incision/Site 04/03/18 0920 Right Back lower   Date First Assessed/Time First Assessed: 04/03/18 0920   Present Prior to Hospital Arrival?: Yes  Side: Right  Location: Back  Orientation: lower  Closure Method: Staples   Wound Image    Incision WDL ex   Dressing Appearance Open to air;No dressing   Drainage Amount None   Appearance Red;Dry   Periwound Area Intact;Dry;Redness  (5 staples)

## 2018-04-03 NOTE — SUBJECTIVE & OBJECTIVE
Interval History 4/3/2018:  Patient is seen for follow-up rehab evaluation and recommendations: No new complaints. Pain currently controlled. WC evaluated patient this morning and recommended dressing to his incisions with Ag.   HPI, Past Medical, Family, and Social History remains the same as documented in the initial encounter.    Scheduled Medications:    ciprofloxacin HCl  500 mg Oral Q12H    divalproex  500 mg Oral Daily    divalproex ER  1,000 mg Oral QHS    doxepin  100 mg Oral QHS    enoxaparin  40 mg Subcutaneous Daily    folic acid  1 mg Oral Daily    gabapentin  600 mg Oral TID    hydrOXYzine pamoate  50 mg Oral QHS    lurasidone  80 mg Oral Daily    metoprolol tartrate  12.5 mg Oral BID    pantoprazole  40 mg Oral Daily    pravastatin  40 mg Oral QHS       Diagnostic Results: Labs: Reviewed    PRN Medications: acetaminophen, albuterol, bisacodyl, magnesium hydroxide 400 mg/5 ml, ondansetron, oxyCODONE-acetaminophen, ramelteon    Review of Systems    Objective:     Vital Signs (Most Recent):  Temp: 98.3 °F (36.8 °C) (04/02/18 1919)  Pulse: 87 (04/03/18 0831)  Resp: 18 (04/02/18 1919)  BP: 111/64 (04/03/18 0831)  SpO2: 95 % (04/02/18 1919)    Vital Signs (24h Range):  Temp:  [98.3 °F (36.8 °C)] 98.3 °F (36.8 °C)  Pulse:  [87] 87  Resp:  [18] 18  SpO2:  [95 %] 95 %  BP: (111-134)/(64-71) 111/64     Physical Exam   Constitutional: He is oriented to person, place, and time. He appears well-developed.   HENT:   Head: Normocephalic.   Eyes: EOM are normal.   Neck: Normal range of motion.   Cardiovascular: Normal rate and regular rhythm.    Pulmonary/Chest: Effort normal and breath sounds normal.   Abdominal: Soft. Bowel sounds are normal.   Musculoskeletal: Normal range of motion.   Neurological: He is alert and oriented to person, place, and time.   Skin: Skin is warm and dry.   Psychiatric: He has a normal mood and affect.     NEUROLOGICAL EXAMINATION:     MENTAL STATUS   Oriented to person,  place, and time.     CRANIAL NERVES     CN III, IV, VI   Extraocular motions are normal.     MOTOR EXAM        BUE 4+/5 BLE 4-/5

## 2018-04-04 ENCOUNTER — PATIENT MESSAGE (OUTPATIENT)
Dept: PSYCHIATRY | Facility: CLINIC | Age: 63
End: 2018-04-04

## 2018-04-04 PROCEDURE — 97535 SELF CARE MNGMENT TRAINING: CPT

## 2018-04-04 PROCEDURE — 97530 THERAPEUTIC ACTIVITIES: CPT

## 2018-04-04 PROCEDURE — 25000003 PHARM REV CODE 250: Performed by: PHYSICAL MEDICINE & REHABILITATION

## 2018-04-04 PROCEDURE — 99232 SBSQ HOSP IP/OBS MODERATE 35: CPT | Mod: ,,, | Performed by: PHYSICAL MEDICINE & REHABILITATION

## 2018-04-04 PROCEDURE — 12800000 HC REHAB SEMI-PRIVATE ROOM

## 2018-04-04 PROCEDURE — 63600175 PHARM REV CODE 636 W HCPCS: Performed by: PHYSICAL MEDICINE & REHABILITATION

## 2018-04-04 PROCEDURE — 97110 THERAPEUTIC EXERCISES: CPT

## 2018-04-04 RX ORDER — OXYCODONE HYDROCHLORIDE 5 MG/1
10 TABLET ORAL EVERY 4 HOURS PRN
Status: DISCONTINUED | OUTPATIENT
Start: 2018-04-04 | End: 2018-04-10 | Stop reason: HOSPADM

## 2018-04-04 RX ORDER — CYCLOBENZAPRINE HCL 5 MG
5 TABLET ORAL 3 TIMES DAILY PRN
Status: DISCONTINUED | OUTPATIENT
Start: 2018-04-04 | End: 2018-04-04

## 2018-04-04 RX ORDER — LACTULOSE 10 G/15ML
30 SOLUTION ORAL
Status: DISCONTINUED | OUTPATIENT
Start: 2018-04-04 | End: 2018-04-07

## 2018-04-04 RX ORDER — CYCLOBENZAPRINE HCL 5 MG
5 TABLET ORAL 3 TIMES DAILY
Status: DISCONTINUED | OUTPATIENT
Start: 2018-04-04 | End: 2018-04-10 | Stop reason: HOSPADM

## 2018-04-04 RX ADMIN — LURASIDONE HYDROCHLORIDE 80 MG: 40 TABLET, FILM COATED ORAL at 08:04

## 2018-04-04 RX ADMIN — HYDROCODONE BITARTRATE AND ACETAMINOPHEN 1 TABLET: 7.5; 325 TABLET ORAL at 10:04

## 2018-04-04 RX ADMIN — BACLOFEN 10 MG: 10 TABLET ORAL at 08:04

## 2018-04-04 RX ADMIN — PANTOPRAZOLE SODIUM 40 MG: 40 TABLET, DELAYED RELEASE ORAL at 08:04

## 2018-04-04 RX ADMIN — GABAPENTIN 600 MG: 300 CAPSULE ORAL at 08:04

## 2018-04-04 RX ADMIN — FOLIC ACID 1 MG: 1 TABLET ORAL at 08:04

## 2018-04-04 RX ADMIN — DIVALPROEX SODIUM 500 MG: 250 TABLET, DELAYED RELEASE ORAL at 08:04

## 2018-04-04 RX ADMIN — PRAVASTATIN SODIUM 40 MG: 20 TABLET ORAL at 08:04

## 2018-04-04 RX ADMIN — CYCLOBENZAPRINE HYDROCHLORIDE 5 MG: 5 TABLET, FILM COATED ORAL at 08:04

## 2018-04-04 RX ADMIN — DIVALPROEX SODIUM 1000 MG: 500 TABLET, FILM COATED, EXTENDED RELEASE ORAL at 08:04

## 2018-04-04 RX ADMIN — CIPROFLOXACIN HYDROCHLORIDE 500 MG: 500 TABLET, FILM COATED ORAL at 08:04

## 2018-04-04 RX ADMIN — OXYCODONE HYDROCHLORIDE 10 MG: 5 TABLET ORAL at 08:04

## 2018-04-04 RX ADMIN — ENOXAPARIN SODIUM 40 MG: 100 INJECTION SUBCUTANEOUS at 04:04

## 2018-04-04 RX ADMIN — STANDARDIZED SENNA CONCENTRATE AND DOCUSATE SODIUM 1 TABLET: 8.6; 5 TABLET, FILM COATED ORAL at 08:04

## 2018-04-04 RX ADMIN — Medication 12.5 MG: at 08:04

## 2018-04-04 RX ADMIN — HYDROCODONE BITARTRATE AND ACETAMINOPHEN 1 TABLET: 7.5; 325 TABLET ORAL at 05:04

## 2018-04-04 RX ADMIN — OXYCODONE HYDROCHLORIDE 10 MG: 5 TABLET ORAL at 01:04

## 2018-04-04 RX ADMIN — GABAPENTIN 600 MG: 300 CAPSULE ORAL at 02:04

## 2018-04-04 RX ADMIN — HYDROXYZINE PAMOATE 50 MG: 25 CAPSULE ORAL at 08:04

## 2018-04-04 RX ADMIN — DOXEPIN HYDROCHLORIDE 100 MG: 100 CAPSULE ORAL at 08:04

## 2018-04-04 NOTE — ASSESSMENT & PLAN NOTE
PT/OT  Monitor bowel and bladder  Last BM 4/3.   4/3 Senna-colace 8.6-50 bid, lactulose q6h prn constipation. KUB showed mild-mod constipation. Baclofen 10 qhs  4/4 Change oxycodone-apap 10 q4h prn to oxycodone 10 q4h prn per patient request. Add flexeril 10 tid

## 2018-04-04 NOTE — PROGRESS NOTES
[facility]  Physical Medicine & Rehab  Progress Note    Patient Name: Mirza Morales  MRN: [id number]  Patient Class: IP- Rehab   Admission Date: 3/30/2018  Length of Stay: 5 days  Attending Physician: Valentin Cunha MD  Primary Care Provider: LIDIA Banks MD    Subjective:     Principal Problem:Myelopathy    Interval History 4/4/2018:  Patient is seen for follow-up rehab evaluation and recommendations: No new complaints. Pain over left incision site bothering him a lot this morning. Sitting is uncomfortable and prevents him from completing a group seated therapy session. He is requesting to change oxycodone-apap 10 to just oxycodone, which he was taking outpatient. He reports that he does not like tylenol and his body does not react well to it. KUB showed mild-mod constipation. HPI, Past Medical, Family, and Social History remains the same as documented in the initial encounter.    Scheduled Medications:    baclofen  10 mg Oral QHS    ciprofloxacin HCl  500 mg Oral Q12H    divalproex  500 mg Oral Daily    divalproex ER  1,000 mg Oral QHS    doxepin  100 mg Oral QHS    enoxaparin  40 mg Subcutaneous Daily    folic acid  1 mg Oral Daily    gabapentin  600 mg Oral TID    hydrOXYzine pamoate  50 mg Oral QHS    lurasidone  80 mg Oral Daily    metoprolol tartrate  12.5 mg Oral BID    pantoprazole  40 mg Oral Daily    pravastatin  40 mg Oral QHS    senna-docusate 8.6-50 mg  1 tablet Oral BID       Diagnostic Results: Labs: Reviewed    PRN Medications: acetaminophen, albuterol, bisacodyl, hydrocodone-acetaminophen 7.5-325mg, lactulose, magnesium hydroxide 400 mg/5 ml, ondansetron, oxyCODONE-acetaminophen, ramelteon    Review of Systems   Constitutional: Positive for activity change.   HENT: Negative.  Negative for congestion.    Eyes: Negative.    Respiratory: Negative for shortness of breath.    Cardiovascular: Negative for chest pain and leg swelling.   Gastrointestinal:  Positive for constipation.   Endocrine: Negative.    Genitourinary: Negative for difficulty urinating.   Musculoskeletal: Positive for back pain, gait problem and myalgias.   Skin: Negative.    Allergic/Immunologic: Negative.    Neurological: Positive for weakness and numbness. Negative for headaches.   Hematological: Negative.    Psychiatric/Behavioral: Negative.      Objective:     Vital Signs (Most Recent):  Temp: 98.1 °F (36.7 °C) (04/04/18 0827)  Pulse: 91 (04/04/18 0830)  Resp: 18 (04/04/18 0827)  BP: 111/71 (04/04/18 0827)  SpO2: 96 % (04/04/18 0827)    Vital Signs (24h Range):  Temp:  [98.1 °F (36.7 °C)-98.2 °F (36.8 °C)] 98.1 °F (36.7 °C)  Pulse:  [71-91] 91  Resp:  [16-18] 18  SpO2:  [94 %-96 %] 96 %  BP: (111-124)/(64-75) 111/71     Physical Exam   Constitutional: He is oriented to person, place, and time. He appears well-developed.   HENT:   Head: Normocephalic.   Eyes: EOM are normal.   Neck: Normal range of motion.   Cardiovascular: Normal rate and regular rhythm.    Pulmonary/Chest: Effort normal and breath sounds normal.   Abdominal: Soft. Bowel sounds are normal.   Musculoskeletal: Normal range of motion.   Neurological: He is alert and oriented to person, place, and time.   Skin: Skin is warm and dry.   Psychiatric: He has a normal mood and affect.     NEUROLOGICAL EXAMINATION:     MENTAL STATUS   Oriented to person, place, and time.     CRANIAL NERVES     CN III, IV, VI   Extraocular motions are normal.     MOTOR EXAM        BUE 4+/5 BLE 4-/5       Assessment/Plan:      Miraz Morales is a 62 y.o. male admitted to inpatient rehabilitation on 3/30/2018 for Myelopathy with impaired mobility and ADLs. Patient remains appropriate for PT, OT, and as required Speech therapy. Patient continues to require 24 hour nursing care as well as daily Physician assessment.    * Myelopathy    PT/OT  Monitor bowel and bladder  Last BM 4/3.   4/3 Senna-colace 8.6-50 bid, lactulose q6h prn constipation. KUB  showed mild-mod constipation. Baclofen 10 qhs  4/4 Change oxycodone-apap 10 q4h prn to oxycodone 10 q4h prn per patient request. Add flexeril 5 tid.         Alteration in skin integrity related to surgical incision    4/3 Wound care - dressing to incisions with Ag MWF        Postprocedural pneumothorax    Resolved. Continue to monitor respiratory status        Bipolar 1 disorder, mixed    Continue Doxepin and Vistaril        Seizure    Continue Depakote            DISCHARGE PLANNING:  Tentative Discharge Date:     Future Appointments  Date Time Provider Department Center   4/11/2018 3:30 PM Ambrosio Loco MD [facility]       Ashlee Al MD  Department of Physical Medicine & Rehab   [facility]

## 2018-04-04 NOTE — PROGRESS NOTES
"Physical Therapy   PT Treatment PM    Mirza Morales   MRN: [id number]        04/04/18 1119   PT Time Calculation   PT Start Time 1119   PT Stop Time 1204   PT Total Time (min) 45 min   Treatment   Treatment Type Treatment   PT/PTA PT   General   PT Received On 04/04/18   Family/Caregiver Present No   Patient Found (position) Supine in bed   Precautions   General Precautions fall   Orthopedic Yes   Required Braces or Orthoses No   Subjective   Patient states "I'm in so much pain."   Pain/Comfort   Pain Rating 1 8/10   Location - Side 1 Bilateral   Location - Orientation 1 lower   Location 1 back   Pain Addressed 1 Reposition;Distraction   Pain Rating Post-Intervention 1 6/10   Other Activities   Comments Due to increased pain, patient willing to only perform supine ther ex. The following exercises were performed in order to increased abdominal bracing and improve BLE strengthening for improved functional mobility 3 x 10 reps of each:   SLR, supine hip abduction, dead bugs with alternating leg extensions, mini bridges (hips did not elevate off bed).   Rest breaks required for pain management    Activity Tolerance   Activity Tolerance Patient tolerated treatment well;Patient limited by pain   After Treatment   Patient Position After Treatment Supine in bed   Patient after treatment left call button in reach   Plan   Planned Therapy Intervention Continue with current plan   Physical Therapy Follow-up   PT Follow-up? Yes   Treatment/Billable Minutes   Therapeutic Exercise 45   Total Time 45     Salud Nixon, PT, DPT   4/4/2018            "

## 2018-04-04 NOTE — NURSING
Pt  Refused part of  therapy session this am d/t c/o pain. . Had received ordered oxycodone -acetaminophen tab. . Pt now states that had felt nauseated during therapy in the gym and requesting oxycodone without the tylenol. Noted new order and administered.  Pt states  No longer feels nauseated.

## 2018-04-04 NOTE — PROGRESS NOTES
"Physical Therapy   PT Treatment AM    Mirza Morales   MRN: [id number]        04/04/18 0856   PT Time Calculation   PT Start Time 0856   PT Stop Time 0941   PT Total Time (min) 45 min   Treatment   Treatment Type Treatment   PT/PTA PT   General   PT Received On 04/04/18   Family/Caregiver Present No   Patient Found (position) Seated in wheelchair   Pt found with (seat belt donned)   Precautions   General Precautions fall   Orthopedic Yes   Required Braces or Orthoses No   Subjective   Patient states "I'm feeling better today."   Pain/Comfort   Pain Rating 1 3/10   Location - Side 1 Left   Location - Orientation 1 lower   Location 1 back   Pain Addressed 1 Reposition;Cessation of Activity   Pain Rating Post-Intervention 1 no pain   Gait   Gait Yes   Weight Bearing Status full   Gait 1   Surface 1 Level tile   Gait Assistive Device Rolling walker   Assistance 1 Stand by Assistance   Gait Distance Patient ambulated 118 feet with SBA. Increaased L knee flexion during mobility and decreased step and foot clearance with L.    Gait Pattern swing-to gait   Gait Deviation(s) decreased fran;increased time in double stance;decreased velocity of limb motion;decreased step length;decreased swing-to-stance ratio;decreased toe-to-floor clearance;decreased weight-shifting ability;foot flat;excessive knee flexion;forward lean   Impairments Contributing to Gait Deviations impaired balance;impaired motor control;pain;impaired postural control;decreased sensation;impaired sensory feedback;decreased strength   Stairs   Stairs Yes   Stairs/Curb Step   Rails 1 Bilateral   Device 1 No device   Assistance 1 Stand by Assistance   Comment/# Steps 1 Patient ascended/descended 8 3" steps with SBA with reciprocal gait. No complaints of pain    Other Activities   Comments Patient performed 4 x 5 reps of sit to stands from wheel chair with emphasis on LE strengthening. Trail 1, 2 used UE support; Trial 3, 4 used no UE support but required mod " A to assist with hip elevation.   Patient performed modified RDLs using  bar for support in which patient would perform approx 40 deg of hip flexion with knee extension 2 x 10 reps with emphasis on strengthening hamstrings, glutes, and back extensors for improved stability.   PT performed hamstring stretching for 2 minutes x 2 trials with passive DF/PF    Activity Tolerance   Activity Tolerance Patient tolerated treatment well;Patient limited by fatigue   After Treatment   Patient Position After Treatment Seated in wheelchair   Patient after treatment left call button in reach   Assessment   Prognosis Good   Problem List Decreased strength;Impaired balance;Decreased endurance;Decreased range of motion;Decreased safety awareness;Pain   Session Assessment progressing toward goals   Assessment Patient demonstrated improved pain management today and allowed for greater participation. Patient overrelies on UE support during functional mobiltiy and would benefit from continued LE strengthening. Patient c/o stiffness in BLE (L>R) and some in low back. Patient continues to require rest breaks between trials due to fatigue. Pt remains appropriate for IPR. Cont POC.    Level of Motivation/Participation good   Barriers to Discharge Inaccessible home environment   Barriers to Discharge Comments Pt has 2 steps to enter home   Discharge Recommendations   Equipment Needed After Discharge bath bench;wheelchair   Discharge Facility/Level Of Care Needs home health PT   Plan   Planned Therapy Intervention Continue with current plan   Therapy Frequency 2 times/day;daily;Monday-Friday;Saturday or Sunday   Physical Therapy Follow-up   PT Follow-up? Yes   PT - Next Visit Date 04/05/18   Treatment/Billable Minutes   Therapeutic Activity 12   Therapeutic Exercise 33   Total Time 45     Salud Nixon, PT, DPT   4/4/2018

## 2018-04-04 NOTE — SUBJECTIVE & OBJECTIVE
Interval History 4/4/2018:  Patient is seen for follow-up rehab evaluation and recommendations: No new complaints. Pain over left incision site bothering him a lot this morning. Sitting is uncomfortable and prevents him from completing a group seated therapy session. He is requesting to change oxycodone-apap 10 to just oxycodone, which he was taking outpatient. He reports that he does not like tylenol and his body does not react well to it. KUB showed mild-mod constipation. HPI, Past Medical, Family, and Social History remains the same as documented in the initial encounter.    Scheduled Medications:    baclofen  10 mg Oral QHS    ciprofloxacin HCl  500 mg Oral Q12H    divalproex  500 mg Oral Daily    divalproex ER  1,000 mg Oral QHS    doxepin  100 mg Oral QHS    enoxaparin  40 mg Subcutaneous Daily    folic acid  1 mg Oral Daily    gabapentin  600 mg Oral TID    hydrOXYzine pamoate  50 mg Oral QHS    lurasidone  80 mg Oral Daily    metoprolol tartrate  12.5 mg Oral BID    pantoprazole  40 mg Oral Daily    pravastatin  40 mg Oral QHS    senna-docusate 8.6-50 mg  1 tablet Oral BID       Diagnostic Results: Labs: Reviewed    PRN Medications: acetaminophen, albuterol, bisacodyl, hydrocodone-acetaminophen 7.5-325mg, lactulose, magnesium hydroxide 400 mg/5 ml, ondansetron, oxyCODONE-acetaminophen, ramelteon    Review of Systems   Constitutional: Positive for activity change.   HENT: Negative.  Negative for congestion.    Eyes: Negative.    Respiratory: Negative for shortness of breath.    Cardiovascular: Negative for chest pain and leg swelling.   Gastrointestinal: Positive for constipation.   Endocrine: Negative.    Genitourinary: Negative for difficulty urinating.   Musculoskeletal: Positive for back pain, gait problem and myalgias.   Skin: Negative.    Allergic/Immunologic: Negative.    Neurological: Positive for weakness and numbness. Negative for headaches.   Hematological: Negative.     Psychiatric/Behavioral: Negative.      Objective:     Vital Signs (Most Recent):  Temp: 98.1 °F (36.7 °C) (04/04/18 0827)  Pulse: 91 (04/04/18 0830)  Resp: 18 (04/04/18 0827)  BP: 111/71 (04/04/18 0827)  SpO2: 96 % (04/04/18 0827)    Vital Signs (24h Range):  Temp:  [98.1 °F (36.7 °C)-98.2 °F (36.8 °C)] 98.1 °F (36.7 °C)  Pulse:  [71-91] 91  Resp:  [16-18] 18  SpO2:  [94 %-96 %] 96 %  BP: (111-124)/(64-75) 111/71     Physical Exam   Constitutional: He is oriented to person, place, and time. He appears well-developed.   HENT:   Head: Normocephalic.   Eyes: EOM are normal.   Neck: Normal range of motion.   Cardiovascular: Normal rate and regular rhythm.    Pulmonary/Chest: Effort normal and breath sounds normal.   Abdominal: Soft. Bowel sounds are normal.   Musculoskeletal: Normal range of motion.   Neurological: He is alert and oriented to person, place, and time.   Skin: Skin is warm and dry.   Psychiatric: He has a normal mood and affect.     NEUROLOGICAL EXAMINATION:     MENTAL STATUS   Oriented to person, place, and time.     CRANIAL NERVES     CN III, IV, VI   Extraocular motions are normal.     MOTOR EXAM        BUE 4+/5 BLE 4-/5

## 2018-04-04 NOTE — ASSESSMENT & PLAN NOTE
PT/OT  Monitor bowel and bladder  Last BM 4/3.   4/3 Senna-colace 8.6-50 bid, lactulose q6h prn constipation. KUB showed mild-mod constipation  4/4 Change oxycodone-apap 10 q4h prn to oxycodone 10 q4h prn per patient request.

## 2018-04-04 NOTE — PROGRESS NOTES
Occupational Therapy   Missed Treatment    Mirza Morales   MRN: [id number]     Pt missed  45 minutes of OT due to pt refusal to participate 2* pain, pt reports receiving pain medication prior to session. Will attempt to make up this missed time as soon as possible. Treating physician has been made aware as well as patient's nurse.        DOMINIC Albert   4/4/2018 04/04/18 1015   OT Time Calculation   OT Start Time 1015   OT Stop Time 1015   OT Total Time (min) 0 min

## 2018-04-04 NOTE — PROGRESS NOTES
"Occupational Therapy  PM Treatment  Mirza Morales   MRN: [id number]   Room/Bed: E280/E280 B         04/04/18 1500   OT Time Calculation   OT Start Time 1500   OT Stop Time 1612   OT Total Time (min) 72 min   General   OT Date of Treatment 04/04/18   Family/Caregiver Present No   Patient Found (position) Supine in bed   Precautions   General Precautions fall   Orthopedic Yes   Required Braces or Orthoses No   Subjective   Patient states "I'm feeling better this afternoon. Not having the pain like earlier."    Pain/Comfort   Pain Rating no pain   Transfers   Transfer yes   Sit to Stand   Sit <> Stand Assistance Contact Guard Assistance   Sit <> Stand Assistive Device Rolling Walker   Trials/Comments CGA for steadying assist from w/c   Stand to Sit   Assistance Contact Guard Assistance   Assistive Device Rolling Walker   Trials/Comments CGA for steadying assist to w/c   Bed to Chair   Bed <> Chair Technique Stand Pivot   Bed <> Chair Transfer Assistance Contact Guard Assistance   Bed <> Chair Assistive Device No Assistive Device   Trials/Comments CGA for steadying assist from w/c > EOB   Grooming   Additional Grooming yes   Shaving/Makeup Level of Assistance Minimum assistance   Grooming Where Assessed Wheelchair   Grooming Comments Min (A) for reaching posterior neck for shaving while sitting in w/c with increased time to perform   UE Dressing   UE Dressing Level of Assistance Modified independent   UE Dressing Where Assessed Wheelchair   UE Dressing Comments Mod (I) while seated in w/c to don pullover shirt   Toileting   Toileting Level of Assistance Stand by assistance   Toileting Where Assessed Bed level   Toileting Comments SBA for safety while seated EOB with urinal; pt weight-shifted to doff/don pants   Exercise Tools   Exercise Tools Yes   Other Exercise Tool 1 3x20 reps chest presses; biceps curls while seated in w/c with 3# dowel to increase UB strength for IADL prep   Additional Activities   Additional " Activities Other (Comment)   Additional Activities Comments Pt performed BITS ther ax in standing with RW and CGA to increase dynamic standing balance, weight-shifting, functional endurance, BLE strength with the following results; bell cancellation task x1 miss, x0 errors; memory task 69.23% accuracy, 9 trials.    Activity Tolerance   Activity Tolerance Patient tolerated treatment well   After Treatment   Patient Position After Treatment Supine in bed   Patient after treatment left call button in reach;nursing notified  (nursing student present)   Assessment   Prognosis Good   Problem List Decreased Self Care skills;Decreased upper extremity strength;Decreased safe judgment during ADL;Decreased endurance;Decreased functional mobility;Decreased gross motor control;Decreased IADLs   Assessment Pt tolerated tx session well today, with report of decreased pain. Pt progressing with safety awareness with t/fs from w/c. Pt continues to decreased endurance and functional mobility. Pt would continue to benefit from skilled OT services.    Level of Motivation/Participation good   Barriers to Discharge Decreased caregiver support   Discharge Recommendations   Equipment Needed After Discharge bath bench;wheelchair   Discharge Facility/Level Of Care Needs home health OT   Plan   Plan Continue with current plan   Therapy Frequency 2 times/day   Occupational Therapy Follow-up   OT Follow-up? Yes   Treatment/Billable Minutes   Self Care/Home Management 30   Therapeutic Activity 17   Therapeutic Exercise 25   Total Time 72       Katie Mckeon OT  4/4/2018    Patient with wheelchair safety belt fastened and able to self release wheelchair safety belt. Pt left seated at EOB with nursing student present (location) with call light and all necessities in reach, nursing notified.     LEGEND:   CGA: Contact Guard Assist   EOB: Edgeof Bed   HHA: Hand Held Assist   HOB: Head of Bed   (I): Independent-patient performs task in a timely  manner   Max (A): Maximal Assist-patient performs 25-49% of task   Min (A): Minimal Assist- patient performs 75% or more of task   Mod (A): Moderate Assist- patient performs 50-74% of task   NA: Not applicable   NT: Not tested   OOB: Out of Bed   PTA: Prior to admit   QC: Quad Cane   RW: Rolling Walker   (S): Supervision- patient requires cues, coaxing, prompting   SBA: Stand By Assist   SC: Straight Cane   SW: Standard Walker   TBA: To be assessed   Total (A): Total Assist- patient performs less than 25% of task   WC: Wheelchair   WFL: Within Functional Limits   WNL: Within Normal Limits

## 2018-04-05 LAB
ANION GAP SERPL CALC-SCNC: 11 MMOL/L
BASOPHILS # BLD AUTO: 0.04 K/UL
BASOPHILS NFR BLD: 0.6 %
BUN SERPL-MCNC: 9 MG/DL
CALCIUM SERPL-MCNC: 8.7 MG/DL
CHLORIDE SERPL-SCNC: 97 MMOL/L
CO2 SERPL-SCNC: 29 MMOL/L
CREAT SERPL-MCNC: 0.9 MG/DL
DIFFERENTIAL METHOD: ABNORMAL
EOSINOPHIL # BLD AUTO: 0.1 K/UL
EOSINOPHIL NFR BLD: 1.5 %
ERYTHROCYTE [DISTWIDTH] IN BLOOD BY AUTOMATED COUNT: 14.4 %
EST. GFR  (AFRICAN AMERICAN): >60 ML/MIN/1.73 M^2
EST. GFR  (NON AFRICAN AMERICAN): >60 ML/MIN/1.73 M^2
GLUCOSE SERPL-MCNC: 126 MG/DL
HCT VFR BLD AUTO: 34.2 %
HGB BLD-MCNC: 10.8 G/DL
IMM GRANULOCYTES # BLD AUTO: 0.26 K/UL
IMM GRANULOCYTES NFR BLD AUTO: 3.6 %
LYMPHOCYTES # BLD AUTO: 1.2 K/UL
LYMPHOCYTES NFR BLD: 16.3 %
MAGNESIUM SERPL-MCNC: 1.9 MG/DL
MCH RBC QN AUTO: 29.9 PG
MCHC RBC AUTO-ENTMCNC: 31.6 G/DL
MCV RBC AUTO: 95 FL
MONOCYTES # BLD AUTO: 1 K/UL
MONOCYTES NFR BLD: 13.4 %
NEUTROPHILS # BLD AUTO: 4.6 K/UL
NEUTROPHILS NFR BLD: 64.6 %
NRBC BLD-RTO: 0 /100 WBC
PHOSPHATE SERPL-MCNC: 3.1 MG/DL
PLATELET # BLD AUTO: 370 K/UL
PMV BLD AUTO: 9.7 FL
POTASSIUM SERPL-SCNC: 3.9 MMOL/L
RBC # BLD AUTO: 3.61 M/UL
SODIUM SERPL-SCNC: 137 MMOL/L
WBC # BLD AUTO: 7.17 K/UL

## 2018-04-05 PROCEDURE — 63600175 PHARM REV CODE 636 W HCPCS: Performed by: PHYSICAL MEDICINE & REHABILITATION

## 2018-04-05 PROCEDURE — 97535 SELF CARE MNGMENT TRAINING: CPT

## 2018-04-05 PROCEDURE — 25000003 PHARM REV CODE 250: Performed by: PHYSICAL MEDICINE & REHABILITATION

## 2018-04-05 PROCEDURE — 80048 BASIC METABOLIC PNL TOTAL CA: CPT

## 2018-04-05 PROCEDURE — 97110 THERAPEUTIC EXERCISES: CPT

## 2018-04-05 PROCEDURE — 36415 COLL VENOUS BLD VENIPUNCTURE: CPT

## 2018-04-05 PROCEDURE — 84100 ASSAY OF PHOSPHORUS: CPT

## 2018-04-05 PROCEDURE — 85025 COMPLETE CBC W/AUTO DIFF WBC: CPT

## 2018-04-05 PROCEDURE — 12800000 HC REHAB SEMI-PRIVATE ROOM

## 2018-04-05 PROCEDURE — 97530 THERAPEUTIC ACTIVITIES: CPT

## 2018-04-05 PROCEDURE — 83735 ASSAY OF MAGNESIUM: CPT

## 2018-04-05 PROCEDURE — 99232 SBSQ HOSP IP/OBS MODERATE 35: CPT | Mod: ,,, | Performed by: PHYSICAL MEDICINE & REHABILITATION

## 2018-04-05 PROCEDURE — 97150 GROUP THERAPEUTIC PROCEDURES: CPT

## 2018-04-05 RX ADMIN — HYDROXYZINE PAMOATE 50 MG: 25 CAPSULE ORAL at 09:04

## 2018-04-05 RX ADMIN — LACTULOSE 30 G: 20 SOLUTION ORAL at 08:04

## 2018-04-05 RX ADMIN — LACTULOSE 30 G: 20 SOLUTION ORAL at 12:04

## 2018-04-05 RX ADMIN — DIVALPROEX SODIUM 1000 MG: 500 TABLET, FILM COATED, EXTENDED RELEASE ORAL at 09:04

## 2018-04-05 RX ADMIN — HYDROCODONE BITARTRATE AND ACETAMINOPHEN 1 TABLET: 7.5; 325 TABLET ORAL at 08:04

## 2018-04-05 RX ADMIN — PANTOPRAZOLE SODIUM 40 MG: 40 TABLET, DELAYED RELEASE ORAL at 08:04

## 2018-04-05 RX ADMIN — PRAVASTATIN SODIUM 40 MG: 20 TABLET ORAL at 09:04

## 2018-04-05 RX ADMIN — GABAPENTIN 600 MG: 300 CAPSULE ORAL at 09:04

## 2018-04-05 RX ADMIN — CYCLOBENZAPRINE HYDROCHLORIDE 5 MG: 5 TABLET, FILM COATED ORAL at 08:04

## 2018-04-05 RX ADMIN — CIPROFLOXACIN HYDROCHLORIDE 500 MG: 500 TABLET, FILM COATED ORAL at 08:04

## 2018-04-05 RX ADMIN — STANDARDIZED SENNA CONCENTRATE AND DOCUSATE SODIUM 1 TABLET: 8.6; 5 TABLET, FILM COATED ORAL at 09:04

## 2018-04-05 RX ADMIN — Medication 12.5 MG: at 09:04

## 2018-04-05 RX ADMIN — OXYCODONE HYDROCHLORIDE 10 MG: 5 TABLET ORAL at 05:04

## 2018-04-05 RX ADMIN — FOLIC ACID 1 MG: 1 TABLET ORAL at 08:04

## 2018-04-05 RX ADMIN — LURASIDONE HYDROCHLORIDE 80 MG: 40 TABLET, FILM COATED ORAL at 08:04

## 2018-04-05 RX ADMIN — GABAPENTIN 600 MG: 300 CAPSULE ORAL at 02:04

## 2018-04-05 RX ADMIN — ENOXAPARIN SODIUM 40 MG: 100 INJECTION SUBCUTANEOUS at 04:04

## 2018-04-05 RX ADMIN — DOXEPIN HYDROCHLORIDE 100 MG: 100 CAPSULE ORAL at 09:04

## 2018-04-05 RX ADMIN — STANDARDIZED SENNA CONCENTRATE AND DOCUSATE SODIUM 1 TABLET: 8.6; 5 TABLET, FILM COATED ORAL at 08:04

## 2018-04-05 RX ADMIN — CYCLOBENZAPRINE HYDROCHLORIDE 5 MG: 5 TABLET, FILM COATED ORAL at 09:04

## 2018-04-05 RX ADMIN — CYCLOBENZAPRINE HYDROCHLORIDE 5 MG: 5 TABLET, FILM COATED ORAL at 02:04

## 2018-04-05 RX ADMIN — GABAPENTIN 600 MG: 300 CAPSULE ORAL at 08:04

## 2018-04-05 RX ADMIN — BACLOFEN 10 MG: 10 TABLET ORAL at 09:04

## 2018-04-05 RX ADMIN — Medication 12.5 MG: at 12:04

## 2018-04-05 RX ADMIN — DIVALPROEX SODIUM 500 MG: 250 TABLET, DELAYED RELEASE ORAL at 08:04

## 2018-04-05 RX ADMIN — CIPROFLOXACIN HYDROCHLORIDE 500 MG: 500 TABLET, FILM COATED ORAL at 09:04

## 2018-04-05 RX ADMIN — HYDROCODONE BITARTRATE AND ACETAMINOPHEN 1 TABLET: 7.5; 325 TABLET ORAL at 04:04

## 2018-04-05 NOTE — PROGRESS NOTES
"Physical Therapy   PT Reassessment    Mirza Morales   MRN: [id number]      04/05/18 1349   PT Time Calculation   PT Start Time 1349   PT Stop Time 1434   PT Total Time (min) 45 min   Treatment   Treatment Type Treatment   PT/PTA PT   General   PT Received On 04/05/18   Family/Caregiver Present No   Patient Found (position) Supine in bed   Precautions   General Precautions fall   Orthopedic Yes   Required Braces or Orthoses No   Subjective   Patient states "I'm feeling alright right now."   Pain/Comfort   Pain Rating 1 3/10   Location - Side 1 Left   Location - Orientation 1 lower   Location 1 back   Pain Addressed 1 Nurse notified;Cessation of Activity;Distraction;Reposition   Pain Rating Post-Intervention 1 6/10   Bed Mobility   Bed Mobility yes   Rolling/Turning to Left Stand by assistance   Rolling/Turning Right Stand by assistance   Supine to Sit Stand by Assistance   Transfers   Transfer yes   Sit to Stand   Sit <> Stand Assistance Stand By Assistance   Sit <> Stand Assistive Device Rolling Walker   Trials/Comments multiple trials   Stand to Sit   Assistance Stand By Assistance   Assistive Device Rolling Walker   Trials/Comments multiple trials   Bed to Chair   Bed <> Chair Technique Stand Pivot   Bed <> Chair Assistance Stand By Assistance   Bed <> Chair Assistive Device No Assistive Device   Trials/Comments x1 trial   Shower Transfer   Technique Stand Pivot   Assistance Contact Guard Assistance   Assistive Device Rolling Walker   Trials/Comments x1 trial; patient performed a step over threshold method. VC for sequencing    Wheelchair Activities   Propulsion Yes   Propulsion Type 1 Manual   Level 1 Level tile   Method 1 Right upper extremity;Left upper extremity   Level of Assistance 1 Supervision   Description/ Details 1 Patient self propelled 165' with two turns; trial terminated due to fatigue   Gait   Gait Yes   Weight Bearing Status full   Gait 1   Surface 1 Level tile   Gait Assistive Device Rolling " "walker   Assistance 1 Contact Guard Assistance   Gait Distance Patient ambulated 38' with small shuffling steps with increased pain with L swing. Trial terminated due to pain and fatigue   Gait Pattern swing-to gait   Gait Deviation(s) decreased fran;increased time in double stance;decreased velocity of limb motion;decreased step length;decreased weight-shifting ability;foot flat   Impairments Contributing to Gait Deviations impaired balance;pain;impaired postural control;decreased sensation;impaired sensory feedback;decreased strength   Stairs   Stairs Yes   Stairs/Curb Step   Rails 1 Bilateral   Device 1 No device   Assistance 1 Contact guard   Comment/# Steps 1 Patient negotiated 4 full steps with BHR   Stairs/ Curb Step  2   Rails 2 None (Comment)   Device 2 Rolling walker   Assistance 2 Contact Guard Assistance   Comment/# Steps 2 Pt negotiated a 4" curb step. PT demonstrated safe technique prior to performing bib   Other Activities   Comments Patient performed a stand pivot [place] [facility] [place] and CGA.     Patient performed sciatic nerve flossing with knee extension and hip flexion 2 x 10 reps of cervical flexion for flossing motion  Patient performed 2 x 10 reps of increased hip flexion for movement into a more neutral pelvis alignment.    Activity Tolerance   Activity Tolerance Patient tolerated treatment well   Medical Staff Made Aware Nurse Mary   After Treatment   Patient Position After Treatment Seated in bedside chair   Patient after treatment left call button in reach   Assessment   Prognosis Good   Problem List Decreased strength;Decreased endurance;Impaired balance;Decreased mobility;Decreased coordination;Decreased safety awareness;Pain   Session Assessment progressing toward goals   Assessment Patient continues to improve functional mobility, however, is limited by pain. Trials typically terminate due to pain and occasionally fatigue. Patient's pain tends to exacerbate with a L posterior " pelvic tilt. Patient would benefit from increased pelvic mobility and improved hamstring flexibility to assist with pain management. Pt remains appropriate for IPR. Cont POC   Level of Motivation/Participation good   Barriers to Discharge Inaccessible home environment   Barriers to Discharge Comments Pt has 2 steps to enter home   Short Term Goals   Supine to Sit-Met/Not Met Not Met   Sit to Supine - Met/Not Met Not Met   Logroll - Met/Not Met Not Met   Transfer Sit to stand - Met/Not Met Met   Transfer Bed/Chair - Met/Not Met Met   Ambulate - Met/Not Met Not met   Go Up/Down Stairs - Met/Not Met Not met   Go Up/Down Curb- Met/Not Met Met   Propel Wheelchair - Met/Not Met Met   Other Goal - Progress Met   Discharge Recommendations   Equipment Needed After Discharge bath bench;wheelchair   Discharge Facility/Level Of Care Needs home health PT   Plan   Planned Therapy Intervention Continue with current plan   Therapy Frequency 2 times/day;daily;Monday-Friday;Saturday or Sunday   Physical Therapy Follow-up   PT Follow-up? Yes   PT - Next Visit Date 04/06/18   Treatment/Billable Minutes   Therapeutic Activity 35   Therapeutic Exercise 10   Total Time 45       Salud Nixon, PT, DPT   4/5/2018

## 2018-04-05 NOTE — PROGRESS NOTES
"Occupational Therapy  AM Treatment/Reassessment  Mirza Morales   MRN: [id number]   Room/Bed: E280/E280 B     04/05/18 1015   OT Time Calculation   OT Start Time 1015   OT Stop Time 1147   OT Total Time (min) 92 min   General   OT Date of Treatment 04/05/18   Family/Caregiver Present No   Patient Found (position) Supine in bed   Precautions   General Precautions fall   Orthopedic Yes   Required Braces or Orthoses No   Subjective   Patient states "I got to sleep in this morning. I think it helped me out. My body just wants to rest from surgery."    Pain/Comfort   Pain Rating 3/10   Location - Orientation lower   Location back   Pain Addressed Reposition;Distraction   Pain Comment Pt reports pain after extended period of sitting EOB during ADLs (takes rest breaks in supine, which he reports relieves pain)    Bed Mobility   Bed Mobility yes   Rolling/Turning to Left Modified independent   Rolling/Turning Left Comments with bed rails and safety precautions   Supine to Sit Supervision   Supine to Sit Comments to EOB   Sit to Supine Supervision   Sit to Supine Comments from EOB   Transfers   Transfer yes   Sit to Stand   Sit <> Stand Assistance Contact Guard Assistance   Sit <> Stand Assistive Device Rolling Walker   Trials/Comments CGA for steadying assist from EOB; from w/c   Stand to Sit   Assistance Contact Guard Assistance   Assistive Device Rolling Walker   Trials/Comments CGA for steadying assist to EOB; to w/c   Bed to Chair   Bed <> Chair Technique Stand Pivot   Bed <> Chair Transfer Assistance Contact Guard Assistance   Bed <> Chair Assistive Device Rolling Walker   Trials/Comments CGA for steadying assist from EOB > w/c   Chair to Bed   Technique Stand Pivot   Assistance Contact Guard Assistance   Assistive Device Rolling Walker   Trials/Comments CGA for steadying assist from EOB > w/c   Grooming   Additional Grooming yes   Grooming Level of Assistance Supervision   Hand Washing Level of Assistance " Supervision   Face Washing Level of Assistance Supervision   Hair Grooming Level of Assistance Supervision   Grooming Where Assessed Wheelchair   Grooming Comments (S) for 3/3 G/H tasks sitting sinkside in w/c   Bathing   Bathing Level of Assistance Contact guard   Bathing Where Assessed Edge of bed   Bathing Comments CGA for steadying assist in stance as pt bathed 10/10 body parts; pt crosses BLE to bathe BLE and feet   UE Dressing   UE Dressing Level of Assistance Independent   UE Dressing Where Assessed Wheelchair   UE Dressing Comments (I) sitting EOB to don pullover shirt   LE Dressing   LE Dressing Yes   LE Dressing Adaptive Equipment Sock aide   LE Dressing  Level of Assistance Contact guard   LE Dressing Level of Assistance Contact guard   Sock Level of Assistance Stand by assistance   LE Dressing Where Assessed Wheelchair   LE Dressing Comments CGA for steadying assist as pt crosses BLE to thread pants; SBA for VCs for use of sock aide to don B socks   Exercise Tools   Exercise Tools Yes   Bilateral Isael 5x20 5# flat surface to increase UB strength for IADL prep   Additional Activities   Additional Activities Other (Comment)   Additional Activities Comments Pt performed BITS ther ax in standing (~1 minute in sitting for rest break) to increase dynamic standing balance, weight-shifting, endurance with RW and CGA for steadying assist with the following results: 13.29 sec reaction time, 6 trials, 66.67% accuracy.    Activity Tolerance   Activity Tolerance Patient tolerated treatment well   After Treatment   Patient Position After Treatment Seated in wheelchair   Patient after treatment left nursing notified  (in dining room; safety belt intact)   Assessment   Prognosis Good   Problem List Decreased Self Care skills;Decreased upper extremity strength;Decreased safe judgment during ADL;Decreased cognition;Decreased endurance;Decreased functional mobility;Decreased gross motor control;Decreased IADLs;Decreased  trunk control for functional activities   Assessment Pt has met 3/3 STG and is progressing towards LTG upon reassessment today. Pt is making progress with LB dressing with AE, functional t/f from bed <> chr, and toileting. Pt reports decreased pain today. Pt continues with overall decreased functional mobility and endurance. Pt would continue to benefit from skilled OT services.    Level of Motivation/Participation good   Barriers to Discharge Decreased caregiver support   Short Term Goals   Additional Documentation yes   Time For Goal Achievement 7 days   Pt Will Perform Bathing With moderate assistance   Bathing - Met/Not Met Met   Pt Will Perform LE Dressing With moderate assistance   LE Dressing - Met/Not Met Met   Pt Will Perform Toileting With minimal assistance   Toileting-Met/Not Met Met   Discharge Recommendations   Equipment Needed After Discharge bath bench;wheelchair   Discharge Facility/Level Of Care Needs home health OT   Plan   Plan Continue with current plan   Therapy Frequency 2 times/day   Occupational Therapy Follow-up   OT Follow-up? Yes   Treatment/Billable Minutes   Self Care/Home Management 60   Therapeutic Activity 12   Therapeutic Exercise 20   Total Time 92       Katie Mckeon, OT  4/5/2018     Patient with wheelchair safety belt fastened and able to self release wheelchair safety belt. Pt left seated in dining room (location) with  all necessities in reach, nursing notified.     LEGEND:   CGA: Contact Guard Assist   EOB: Edgeof Bed   HHA: Hand Held Assist   HOB: Head of Bed   (I): Independent-patient performs task in a timely manner   Max (A): Maximal Assist-patient performs 25-49% of task   Min (A): Minimal Assist- patient performs 75% or more of task   Mod (A): Moderate Assist- patient performs 50-74% of task   NA: Not applicable   NT: Not tested   OOB: Out of Bed   PTA: Prior to admit   QC: Quad Cane   RW: Rolling Walker   (S): Supervision- patient requires cues, coaxing, prompting    SBA: Stand By Assist   SC: Straight Cane   SW: Standard Walker   TBA: To be assessed   Total (A): Total Assist- patient performs less than 25% of task   WC: Wheelchair   WFL: Within Functional Limits   WNL: Within Normal Limits

## 2018-04-05 NOTE — SUBJECTIVE & OBJECTIVE
Interval History 4/5/2018:  Patient is seen for follow-up rehab evaluation and recommendations: Feels much more comfortable this morning after adding flexeril and changing oxycodone-apap to oxycodone. Tolerating sitting better. Took lactulose this morning without a BM yet. HPI, Past Medical, Family, and Social History remains the same as documented in the initial encounter.    Scheduled Medications:    baclofen  10 mg Oral QHS    ciprofloxacin HCl  500 mg Oral Q12H    cyclobenzaprine  5 mg Oral TID    divalproex  500 mg Oral Daily    divalproex ER  1,000 mg Oral QHS    doxepin  100 mg Oral QHS    enoxaparin  40 mg Subcutaneous Daily    folic acid  1 mg Oral Daily    gabapentin  600 mg Oral TID    hydrOXYzine pamoate  50 mg Oral QHS    lactulose  30 g Oral Q2H    lurasidone  80 mg Oral Daily    metoprolol tartrate  12.5 mg Oral BID    pantoprazole  40 mg Oral Daily    pravastatin  40 mg Oral QHS    senna-docusate 8.6-50 mg  1 tablet Oral BID       Diagnostic Results: Labs: Reviewed    PRN Medications: acetaminophen, albuterol, bisacodyl, hydrocodone-acetaminophen 7.5-325mg, lactulose, magnesium hydroxide 400 mg/5 ml, ondansetron, oxyCODONE, ramelteon    Review of Systems   Constitutional: Positive for activity change.   HENT: Negative.  Negative for congestion.    Eyes: Negative.    Respiratory: Negative for shortness of breath.    Cardiovascular: Negative for chest pain and leg swelling.   Gastrointestinal: Positive for constipation.   Endocrine: Negative.    Genitourinary: Negative for difficulty urinating.   Musculoskeletal: Positive for back pain, gait problem and myalgias.   Skin: Negative.    Allergic/Immunologic: Negative.    Neurological: Positive for weakness and numbness. Negative for headaches.   Hematological: Negative.    Psychiatric/Behavioral: Negative.      Objective:     Vital Signs (Most Recent):  Temp: 98.7 °F (37.1 °C) (04/05/18 0700)  Pulse: 88 (04/05/18 0700)  Resp: 18 (04/05/18  0700)  BP: 114/61 (04/05/18 0700)  SpO2: (!) 94 % (04/05/18 0700)    Vital Signs (24h Range):  Temp:  [98.1 °F (36.7 °C)-98.7 °F (37.1 °C)] 98.7 °F (37.1 °C)  Pulse:  [80-88] 88  Resp:  [12-18] 18  SpO2:  [94 %-95 %] 94 %  BP: (114-122)/(61-78) 114/61     Physical Exam   Constitutional: He is oriented to person, place, and time. He appears well-developed.   HENT:   Head: Normocephalic.   Eyes: EOM are normal.   Neck: Normal range of motion.   Cardiovascular: Normal rate and regular rhythm.    Pulmonary/Chest: Effort normal and breath sounds normal.   Abdominal: Soft. Bowel sounds are normal.   Musculoskeletal: Normal range of motion.   Neurological: He is alert and oriented to person, place, and time.   Skin: Skin is warm and dry.   Psychiatric: He has a normal mood and affect.     NEUROLOGICAL EXAMINATION:     MENTAL STATUS   Oriented to person, place, and time.     CRANIAL NERVES     CN III, IV, VI   Extraocular motions are normal.     MOTOR EXAM        BUE 4+/5 BLE 4-/5

## 2018-04-05 NOTE — PROGRESS NOTES
Physical Therapy   PT Treatment PM     Mirza Morales   MRN: [id number]        04/05/18 1546   PT Time Calculation   PT Start Time 1546   PT Stop Time 1631   PT Total Time (min) 45 min   Treatment   Treatment Type Treatment   PT/PTA PT   General   PT Received On 04/05/18   Family/Caregiver Present No   Patient Found (position) Seated in wheelchair   Pt found with (direct hand off from OT )   Precautions   General Precautions fall   Orthopedic Yes   Required Braces or Orthoses No   Subjective   Patient states Pt is agreeable to participate in PT treatment   Pain/Comfort   Pain Rating 1 7/10   Location - Side 1 Left   Location - Orientation 1 lower   Location 1 back   Pain Addressed 1 Distraction;Cessation of Activity  (MD notified)   Pain Rating Post-Intervention 1 5/10   Bed Mobility   Bed Mobility yes   Rolling/Turning to Left Stand by assistance   Rolling/Turning Right Stand by assistance   Supine to Sit Stand by Assistance   Sit to Supine Stand by Assistance   Transfers   Transfer yes   Sit to Stand   Sit <> Stand Assistance Stand By Assistance   Sit <> Stand Assistive Device No Assistive Device   Trials/Comments x2 trials; stood to adjust pants   Stand to Sit   Assistance Stand By Assistance   Assistive Device No Assistive Device   Trials/Comments x2 trials    Chair to Bed   Technique Stand Pivot   Assistance Contact Guard Assistance   Assistive Device No Assistive Device   Trials/Comments x1 trial   Other Activities   Comments Patient performed the following exercises in order to improve the functional strength for increased glute and core activation to address pain 2 x 10 reps of each: supine with BUE shoulder flexion, floating heel slides alternating, supine TA pelvic tilts, clams. PT performed scar mobilization to L side of incision per verbal MD to assist with pain and increase mobility of pelvic mobility.    Activity Tolerance   Activity Tolerance Patient limited by pain;Patient tolerated treatment well    Medical Staff Made Aware Nurse MD Mary    After Treatment   Patient Position After Treatment Supine in bed   Patient after treatment left call button in reach;nursing notified   Plan   Planned Therapy Intervention Continue with current plan   Physical Therapy Follow-up   PT Follow-up? Yes   PT - Next Visit Date 04/06/18   Treatment/Billable Minutes   Therapeutic Exercise 45   Total Time 45     Salud Nixon, PT, DPT   4/5/2018

## 2018-04-05 NOTE — PLAN OF CARE
Problem: Patient Care Overview  Goal: Plan of Care Review  Outcome: Ongoing (interventions implemented as appropriate)  Acute Pain: Spoke to patient about pain medication regimen. Encouraged patient to call for pain medication prior to pain becoming too intense. Patient verbalized understanding. Will maintain safety precautions and follow up as needed.

## 2018-04-05 NOTE — PROGRESS NOTES
"Occupational Therapy   Seated Endurance Group    Mirza Morales  MRN: [id number]  Room/Bed: E280/E280 B       04/05/18 1500   OT Time Calculation   OT Start Time 1500   OT Stop Time 1545   OT Total Time (min) 45 min   General   OT Date of Treatment 04/05/18   Patient Found (position) Seated in bedside chair   Precautions   General Precautions fall   Subjective   Patient states "It hurts me to sit in this w/c too long."    Pain/Comfort   Pain Rating 6/10   Location - Orientation lower   Location back   Pain Addressed Reposition;Distraction   OT Therapeutic Groups   Other    Objective:    Patient participated in a 45 minute seated therapeutic exercises program with SBA  assist. This group activity challenged the patient's upper and lower extremity strength, seated balance, and endurance to improve functional mobility, decrease risk of falls, and increase activity tolerance. Patient required  maximal rest breaks during this activity. Patient performed the following exercises: hip flexion, LAQs, ankle pumps, glute sets, bicep curls, tricep extension, and shoulder extension/flexion/abduction .     -Endurance 7.5  on the RPE scale. No pain complaints voiced or observed.      -Social Interaction: (choose FIM score rating below and delete the rest)    Interacts appropriately 90% of time - needs monitoring or encouragement for participation or interaction  (5)     Assessment:  Patient participated in a 45 minute seated endurance activity.  The group activity challenged dynamic sitting balance, core strengthening, bilateral upper and lower extremity strengthening, cardiovascular and respiratory capacity, and social affect/mood. Patient will improve activity tolerance by requiring no rest breaks and maintain a RPE of 9 throughout the session.    Plan   Plan Continue with current plan   Occupational Therapy Follow-up   OT Follow-up? Yes   Treatment/Billable Minutes   Therapeutic Group 45   Total Time 45       Layla Panda" Gonorrhea and Chlamydia: About These Tests  What is it? You can have a test to find out if you have gonorrhea or chlamydia. This kind of test checks for the bacteria that cause these sexually transmitted infections (STIs). There are different ways to test for the bacteria. Most tests use either a urine sample or a sample of body fluid. A fluid sample can come from inside the tip of the penis or from inside the rectum or vagina. Why is this test done? These tests may be done to:  · Find out if your symptoms are caused by gonorrhea or chlamydia. · Find out if you have one of these infections even though you don't have symptoms. How can you prepare for the test?  · If you are a woman, do not douche or use vaginal creams or medicines for at least 24 hours before the test.  · If you are going to have a urine test, do not urinate for 2 hours before the test.  What happens during the test?  · To get a sample from the urethra or rectum, the doctor will put a small swab into the tip of the penis or inside the rectum. · To get a sample from the vagina, the doctor will first put a speculum into the vagina. A speculum is a tool to spread apart the walls of the vagina. Then he or she will use a small swab to get the fluid sample. · For a urine sample, you will collect the urine that comes out when you first start to urinate. Don't wipe the genital area clean before you urinate. What else should you know about the test?  · If you think you may have chlamydia or gonorrhea, don't have sexual intercourse until you get your test results. And you may want to have tests for other STIs, such as HIV. · If you do have an infection, don't have sexual intercourse for 7 days after you start treatment. · If you have an infection, your sex partner(s) should also be treated. How long does the test take? · The test will take a few minutes. What happens after the test?  · You will be able to go home right away.   · You can go back to [facility]  4/5/2018     Patient with wheelchair safety belt fastened and able to self release wheelchair safety belt. Pt left seated in w/c with PT    your usual activities right away. Follow-up care is a key part of your treatment and safety. Be sure to make and go to all appointments, and call your doctor if you are having problems. It's also a good idea to keep a list of the medicines you take. Ask your doctor when you can expect to have your test results. Where can you learn more? Go to http://zayra-dwain.info/. Enter N216 in the search box to learn more about \"Gonorrhea and Chlamydia: About These Tests. \"  Current as of: May 27, 2016  Content Version: 11.2  © 8910-6967 [employer]. Care instructions adapted under license by [employer] (which disclaims liability or warranty for this information). If you have questions about a medical condition or this instruction, always ask your healthcare professional. [address] any warranty or liability for your use of this information.     Urine Test: About This Test  What is it? A urine test checks the color, clarity (clear or cloudy), odor, concentration, and acidity (pH) of your urine. It also checks your levels of protein, sugar, blood cells, or other substances in your urine. This test is sometimes called a urinalysis. Why is this test done? A urine test may be done:  · To check for a disease or infection of the urinary tract. The urinary tract includes the kidneys, the tubes that carry urine from the kidneys to the bladder (ureters), and the bladder. It also includes the tube that carries urine from the bladder to outside the body (urethra). · To check the treatment of conditions such as diabetes, kidney stones, a urinary tract infection (UTI), high blood pressure, or some kidney or liver diseases. How can you prepare for the test?  · Before the test, don't eat foods that can change the color of your urine. Examples of these include blackberries, beets, and rhubarb.   · Don't do heavy exercise before the test.  · Tell your doctor if you are menstruating or close to starting your period. Your doctor may want to wait to do the test.  · Tell your doctor about all the nonprescription and prescription medicines and herbs or other supplements you take. Some of these can affect the results of this test.  What happens during the test?  A urine test can be done in your doctor's office, clinic, or lab. Or you may be asked to collect a urine sample at home. Then you can take it to the office or lab for testing. Clean-catch midstream urine collection  · Wash your hands before you start. · If the cup you are given has a lid, remove it carefully. Set it down with the inner surface up. Don't touch the inside of the cup with your fingers. · Clean the area around your genitals. ¨ For men: Pull back the foreskin, if present. Clean the head of your penis with medicated towelettes or swabs. ¨ For women: Spread open the genital folds of skin with one hand. Then use medicated towelettes or swabs in your other hand to clean the area where urine comes out (the urethra). Wipe the area from front to back. · Start urinating into the toilet or urinal. A woman should hold apart the genital folds of skin while she urinates. · After the urine has flowed for several seconds, place the cup into the urine stream. Collect about 2 ounces of urine without stopping your flow of urine. · Don't touch the rim of the cup to your genital area. Don't get toilet paper, pubic hair, stool (feces), menstrual blood, or anything else in the urine sample. · Finish urinating into the toilet or urinal.  · Carefully replace and tighten the lid on the cup, and then return it to the lab. If you are collecting the urine at home and can't get it to the lab in an hour, refrigerate it. Double-voided urine sample collection  This method collects the urine your body is making right now. · Urinate into the toilet or urinal. Don't collect any of this urine.   · Drink a large glass of water, and wait about 30 to 40 minutes. · Then get a urine sample. Follow the instructions above for collecting a clean-catch urine sample. · Take the urine sample to the lab. If you are collecting the urine at home and can't get it to the lab in an hour, refrigerate it. Follow-up care is a key part of your treatment and safety. Be sure to make and go to all appointments, and call your doctor if you are having problems. It's also a good idea to keep a list of the medicines you take. Ask your doctor when you can expect to have your test results. Where can you learn more? Go to http://zayra-dwain.info/. Enter R266 in the search box to learn more about \"Urine Test: About This Test.\"  Current as of: October 14, 2016  Content Version: 11.2  © 1894-9891 [employer]. Care instructions adapted under license by [employer] (which disclaims liability or warranty for this information). If you have questions about a medical condition or this instruction, always ask your healthcare professional. [place] [address] any warranty or liability for your use of this information.     Exposure to Sexually Transmitted Infections: Care Instructions  Your Care Instructions  Sexually transmitted infections (STIs) are those diseases spread by sexual contact. There are at least 20 different STIs, including chlamydia, gonorrhea, syphilis, and human immunodeficiency virus (HIV), which causes AIDS. Bacteria-caused STIs can be treated and cured. STIs caused by viruses, such as HIV, can be treated but not cured. Some STIs can reduce a woman's chances of getting pregnant in the future. STIs are spread during sexual contact, such as vaginal intercourse and oral or anal sex. Follow-up care is a key part of your treatment and safety. Be sure to make and go to all appointments, and call your doctor if you are having problems.  Its also a good idea to know your test results and keep a list of the medicines you take. How can you care for yourself at home? · Your doctor may have given you a shot of antibiotics. If your doctor prescribed antibiotic pills, take them as directed. Do not stop taking them just because you feel better. You need to take the full course of antibiotics. · Do not have sexual contact while you have symptoms of an STI or are being treated for an STI. · Tell your sex partner (or partners) that he or she will need treatment. · If you are a woman, do not douche. Douching changes the normal balance of bacteria in the vagina and may spread an infection up into your reproductive organs. To prevent exposure to STIs in the future  · Use latex condoms every time you have sex. Use them from the beginning to the end of sexual contact. · Talk to your partner before you have sex. Find out if he or she has or is at risk for any STI. Keep in mind that a person may be able to spread an STI even if he or she does not have symptoms. · Do not have sex if you are being treated for an STI. · Do not have sex with anyone who has symptoms of an STI, such as sores on the genitals or mouth. · Having one sex partner (who does not have STIs and does not have sex with anyone else) is a good way to avoid STIs. When should you call for help? Call your doctor now or seek immediate medical care if:  · You have new pain in your belly or pelvis. · You have symptoms of a urinary tract infection. These may include:  ¨ Pain or burning when you urinate. ¨ A frequent need to urinate without being able to pass much urine. ¨ Pain in the flank, which is just below the rib cage and above the waist on either side of the back. ¨ Blood in your urine. ¨ A fever. · You have new or worsening pain or swelling in the scrotum. Watch closely for changes in your health, and be sure to contact your doctor if:  · You have unusual vaginal bleeding. · You have a discharge from the vagina or penis.   · You have any new symptoms, such as sores, bumps, rashes, blisters, or warts. · You have itching, tingling, pain, or burning in the genital or anal area. · You think you may have an STI. Where can you learn more? Go to http://zayra-dwain.info/. Enter I975 in the search box to learn more about \"Exposure to Sexually Transmitted Infections: Care Instructions. \"  Current as of: May 27, 2016  Content Version: 11.2  © 5008-1834 [employer]. Care instructions adapted under license by [employer] (which disclaims liability or warranty for this information). If you have questions about a medical condition or this instruction, always ask your healthcare professional. [address] any warranty or liability for your use of this information.     Safer Sex: Care Instructions  Your Care Instructions  Safer sex is a way to reduce your risk of getting an infection spread through sex. It can also help prevent pregnancy. Most infections that are spread through sex, also called sexually transmitted infections or STIs, can be cured. But some can decrease your chances of getting pregnant if they are not treated early. Others, such as herpes, have no cure. And some, such as HIV, can be deadly. Several products can help you practice safer sex and reduce your chance of STIs. One of the best is a condom. There are condoms for men and for women. The female condom is a tube of soft plastic with a closed end that is placed deep into the vagina. You can use a special rubber sheet (dental dam) for protection during oral sex. Latex gloves can keep your hands from touching blood, semen, or other body fluids that can carry infections. Remember that birth control methods such as diaphragms, IUDs, foams, and birth control pills do not stop you from getting STIs. Follow-up care is a key part of your treatment and safety. Be sure to make and go to all appointments, and call your doctor if you are having problems. Its also a good idea to know your test results and keep a list of the medicines you take. How can you care for yourself at home? · Think about getting shots to prevent hepatitis A and hepatitis B. These two diseases can be spread through sex. You also can get hepatitis A if you eat infected food. · Use condoms or female condoms each time and every time you have sex. · Learn the right way to use a male condom:  ¨ Condoms come in several sizes. Make sure you use the right size. A condom that is too small can break easily. A condom that is too big can slip off during sex. Use a new condom each time you have sex. ¨ Be careful not to poke a hole in the condom when you open the wrapper. ¨ Squeeze the tip of the condom to keep out air. ¨ Pull down the loose skin (foreskin) from the head of an uncircumcised penis. ¨ While squeezing the tip of the condom, unroll it all the way down to the base of the firm penis. ¨ Never use petroleum jelly (such as Vaseline), grease, hand lotion, baby oil, or anything with oil in it. These products can make holes in the condom. ¨ After sex, hold the condom on your penis as you remove your penis from your partner. This will keep semen from spilling out of the condom. · Learn to use a female condom:  ¨ You can put in a female condom up to 8 hours before sex. ¨ Squeeze the smaller ring at the closed end and insert it deep into the vagina. The larger ring at the open end should stay outside the vagina. ¨ During sex, make sure the penis goes into the condom. ¨ After the penis is removed, close the open end of the condom by twisting it. Remove the condom. · Do not use a female condom and male condom at the same time. · Do not have sex with anyone who has symptoms of an STI, such as sores on the genitals or mouth. The herpes virus that causes cold sores can spread to and from the penis and vagina. · Do not drink a lot of alcohol or use drugs before sex.  This can cause you to let down your guard and not practice safer sex. · Having one sex partner (who does not have STIs and does not have sex with anyone else) is a sure way to avoid STIs. · Talk to your partner before you have sex. Find out if he or she has or is at risk for any STI. Keep in mind that a person may be able to spread an STI even if he or she does not have symptoms. You and your partner may want to get an HIV test. You should get tested again 6 months later. Where can you learn more? Go to http://zayra-dwain.info/. Enter Z815 in the search box to learn more about \"Safer Sex: Care Instructions. \"  Current as of: May 27, 2016  Content Version: 11.2  © 3565-4540 [employer]. Care instructions adapted under license by [employer] (which disclaims liability or warranty for this information).  If you have questions about a medical condition or this instruction, always ask your healthcare professional. [place] [address] any warranty or liability for your use of this information.

## 2018-04-05 NOTE — PROGRESS NOTES
[facility]  Physical Medicine & Rehab  Progress Note    Patient Name: Mirza Morales  MRN: [id number]  Patient Class: IP- Rehab   Admission Date: 3/30/2018  Length of Stay: 6 days  Attending Physician: Valentin Cunha MD  Primary Care Provider: LIDIA Banks MD    Subjective:     Principal Problem:Myelopathy    Interval History 4/5/2018:  Patient is seen for follow-up rehab evaluation and recommendations: Feels much more comfortable this morning after adding flexeril and changing oxycodone-apap to oxycodone. Tolerating sitting better. Took lactulose this morning without a BM yet. HPI, Past Medical, Family, and Social History remains the same as documented in the initial encounter.    Scheduled Medications:    baclofen  10 mg Oral QHS    ciprofloxacin HCl  500 mg Oral Q12H    cyclobenzaprine  5 mg Oral TID    divalproex  500 mg Oral Daily    divalproex ER  1,000 mg Oral QHS    doxepin  100 mg Oral QHS    enoxaparin  40 mg Subcutaneous Daily    folic acid  1 mg Oral Daily    gabapentin  600 mg Oral TID    hydrOXYzine pamoate  50 mg Oral QHS    lactulose  30 g Oral Q2H    lurasidone  80 mg Oral Daily    metoprolol tartrate  12.5 mg Oral BID    pantoprazole  40 mg Oral Daily    pravastatin  40 mg Oral QHS    senna-docusate 8.6-50 mg  1 tablet Oral BID       Diagnostic Results: Labs: Reviewed    PRN Medications: acetaminophen, albuterol, bisacodyl, hydrocodone-acetaminophen 7.5-325mg, lactulose, magnesium hydroxide 400 mg/5 ml, ondansetron, oxyCODONE, ramelteon    Review of Systems   Constitutional: Positive for activity change.   HENT: Negative.  Negative for congestion.    Eyes: Negative.    Respiratory: Negative for shortness of breath.    Cardiovascular: Negative for chest pain and leg swelling.   Gastrointestinal: Positive for constipation.   Endocrine: Negative.    Genitourinary: Negative for difficulty urinating.   Musculoskeletal: Positive for back pain, gait problem and  myalgias.   Skin: Negative.    Allergic/Immunologic: Negative.    Neurological: Positive for weakness and numbness. Negative for headaches.   Hematological: Negative.    Psychiatric/Behavioral: Negative.      Objective:     Vital Signs (Most Recent):  Temp: 98.7 °F (37.1 °C) (04/05/18 0700)  Pulse: 88 (04/05/18 0700)  Resp: 18 (04/05/18 0700)  BP: 114/61 (04/05/18 0700)  SpO2: (!) 94 % (04/05/18 0700)    Vital Signs (24h Range):  Temp:  [98.1 °F (36.7 °C)-98.7 °F (37.1 °C)] 98.7 °F (37.1 °C)  Pulse:  [80-88] 88  Resp:  [12-18] 18  SpO2:  [94 %-95 %] 94 %  BP: (114-122)/(61-78) 114/61     Physical Exam   Constitutional: He is oriented to person, place, and time. He appears well-developed.   HENT:   Head: Normocephalic.   Eyes: EOM are normal.   Neck: Normal range of motion.   Cardiovascular: Normal rate and regular rhythm.    Pulmonary/Chest: Effort normal and breath sounds normal.   Abdominal: Soft. Bowel sounds are normal.   Musculoskeletal: Normal range of motion.   Neurological: He is alert and oriented to person, place, and time.   Skin: Skin is warm and dry.   Psychiatric: He has a normal mood and affect.     NEUROLOGICAL EXAMINATION:     MENTAL STATUS   Oriented to person, place, and time.     CRANIAL NERVES     CN III, IV, VI   Extraocular motions are normal.     MOTOR EXAM        BUE 4+/5 BLE 4-/5       Assessment/Plan:      Mirza Morales is a 62 y.o. male admitted to inpatient rehabilitation on 3/30/2018 for Myelopathy with impaired mobility and ADLs. Patient remains appropriate for PT, OT, and as required Speech therapy. Patient continues to require 24 hour nursing care as well as daily Physician assessment.    * Myelopathy    PT/OT  Monitor bowel and bladder  Last BM 4/3.   4/3 Senna-colace 8.6-50 bid, lactulose q6h prn constipation. KUB showed mild-mod constipation. Baclofen 10 qhs  4/4 Change oxycodone-apap 10 q4h prn to oxycodone 10 q4h prn per patient request. Add flexeril 10 tid         Alteration in skin integrity related to surgical incision    4/3 Wound care - dressing to incisions with Ag MWF        Postprocedural pneumothorax    Resolved. Continue to monitor respiratory status        Bipolar 1 disorder, mixed    Continue Doxepin and Vistaril        Seizure    Continue Depakote            DISCHARGE PLANNING:  Tentative Discharge Date:     Future Appointments  Date Time Provider Department Center   4/11/2018 3:30 PM Ambrosio Loco MD [facility]       Ashlee Al MD  Department of Physical Medicine & Rehab   [facility]

## 2018-04-05 NOTE — PLAN OF CARE
Problem: Pain, Acute (Adult)  Goal: Acceptable Pain Control/Comfort Level  Patient will demonstrate the desired outcomes by discharge/transition of care.   Outcome: Ongoing (interventions implemented as appropriate)  Pt c/o lower back pain and states that pain is better when lying flat.  Denies any numbness or tingling to extremities.    PAULA without difficulty.  Ambulate with assistance.

## 2018-04-05 NOTE — PROGRESS NOTES
Wound care follow-up:  The mid back incision is approximated, dry, no drainage, redness to incision site has diminished, rash decreased to palak-wound.  Plan of care discussed with patient who verbalized understanding.  Recommendations:  Continue wound care     04/05/18 0920       Incision/Site 03/30/18 1640 Back midline   Date First Assessed/Time First Assessed: 03/30/18 1640   Present Prior to Hospital Arrival?: Yes  Location: Back  Incision Type: midline  Closure Method: Staples   Wound Image    Incision WDL ex   Dressing Appearance Dry;Intact;Clean   Drainage Amount None   Appearance Red;Dry;Staples intact   Red (%), Wound Tissue Color 100 %   Periwound Area Intact;Dry   Wound Edges Approximated   Dressing Silver;Island/border   Dressing Change Due 04/06/18

## 2018-04-06 PROCEDURE — 63600175 PHARM REV CODE 636 W HCPCS: Performed by: PHYSICAL MEDICINE & REHABILITATION

## 2018-04-06 PROCEDURE — 25000003 PHARM REV CODE 250: Performed by: PHYSICAL MEDICINE & REHABILITATION

## 2018-04-06 PROCEDURE — 97150 GROUP THERAPEUTIC PROCEDURES: CPT

## 2018-04-06 PROCEDURE — 97110 THERAPEUTIC EXERCISES: CPT

## 2018-04-06 PROCEDURE — 97530 THERAPEUTIC ACTIVITIES: CPT

## 2018-04-06 PROCEDURE — 97535 SELF CARE MNGMENT TRAINING: CPT

## 2018-04-06 PROCEDURE — 99233 SBSQ HOSP IP/OBS HIGH 50: CPT | Mod: ,,, | Performed by: PHYSICAL MEDICINE & REHABILITATION

## 2018-04-06 PROCEDURE — 97116 GAIT TRAINING THERAPY: CPT

## 2018-04-06 PROCEDURE — 12800000 HC REHAB SEMI-PRIVATE ROOM

## 2018-04-06 RX ADMIN — CYCLOBENZAPRINE HYDROCHLORIDE 5 MG: 5 TABLET, FILM COATED ORAL at 03:04

## 2018-04-06 RX ADMIN — DOXEPIN HYDROCHLORIDE 100 MG: 100 CAPSULE ORAL at 08:04

## 2018-04-06 RX ADMIN — BACLOFEN 10 MG: 10 TABLET ORAL at 08:04

## 2018-04-06 RX ADMIN — GABAPENTIN 600 MG: 300 CAPSULE ORAL at 08:04

## 2018-04-06 RX ADMIN — DIVALPROEX SODIUM 1000 MG: 500 TABLET, FILM COATED, EXTENDED RELEASE ORAL at 08:04

## 2018-04-06 RX ADMIN — HYDROCODONE BITARTRATE AND ACETAMINOPHEN 1 TABLET: 7.5; 325 TABLET ORAL at 01:04

## 2018-04-06 RX ADMIN — ENOXAPARIN SODIUM 40 MG: 100 INJECTION SUBCUTANEOUS at 05:04

## 2018-04-06 RX ADMIN — HYDROCODONE BITARTRATE AND ACETAMINOPHEN 1 TABLET: 7.5; 325 TABLET ORAL at 08:04

## 2018-04-06 RX ADMIN — Medication 12.5 MG: at 08:04

## 2018-04-06 RX ADMIN — PRAVASTATIN SODIUM 40 MG: 20 TABLET ORAL at 08:04

## 2018-04-06 RX ADMIN — CYCLOBENZAPRINE HYDROCHLORIDE 5 MG: 5 TABLET, FILM COATED ORAL at 08:04

## 2018-04-06 RX ADMIN — HYDROXYZINE PAMOATE 50 MG: 25 CAPSULE ORAL at 08:04

## 2018-04-06 RX ADMIN — PANTOPRAZOLE SODIUM 40 MG: 40 TABLET, DELAYED RELEASE ORAL at 08:04

## 2018-04-06 RX ADMIN — LURASIDONE HYDROCHLORIDE 80 MG: 40 TABLET, FILM COATED ORAL at 08:04

## 2018-04-06 RX ADMIN — CIPROFLOXACIN HYDROCHLORIDE 500 MG: 500 TABLET, FILM COATED ORAL at 08:04

## 2018-04-06 RX ADMIN — STANDARDIZED SENNA CONCENTRATE AND DOCUSATE SODIUM 1 TABLET: 8.6; 5 TABLET, FILM COATED ORAL at 08:04

## 2018-04-06 RX ADMIN — DIVALPROEX SODIUM 500 MG: 250 TABLET, DELAYED RELEASE ORAL at 08:04

## 2018-04-06 RX ADMIN — FOLIC ACID 1 MG: 1 TABLET ORAL at 08:04

## 2018-04-06 RX ADMIN — GABAPENTIN 600 MG: 300 CAPSULE ORAL at 03:04

## 2018-04-06 NOTE — SUBJECTIVE & OBJECTIVE
Interval History 4/6/2018:  Patient is seen for follow-up rehab evaluation and recommendations: Pt without new c/o's. States pain a little more increased in back today. I have reviewed the HPI and PMFSH and there are no changes from admission.       Scheduled Medications:    baclofen  10 mg Oral QHS    cyclobenzaprine  5 mg Oral TID    divalproex  500 mg Oral Daily    divalproex ER  1,000 mg Oral QHS    doxepin  100 mg Oral QHS    enoxaparin  40 mg Subcutaneous Daily    folic acid  1 mg Oral Daily    gabapentin  600 mg Oral TID    hydrOXYzine pamoate  50 mg Oral QHS    lactulose  30 g Oral Q2H    lurasidone  80 mg Oral Daily    metoprolol tartrate  12.5 mg Oral BID    pantoprazole  40 mg Oral Daily    pravastatin  40 mg Oral QHS    senna-docusate 8.6-50 mg  1 tablet Oral BID       PRN Medications: acetaminophen, albuterol, bisacodyl, hydrocodone-acetaminophen 7.5-325mg, lactulose, magnesium hydroxide 400 mg/5 ml, ondansetron, oxyCODONE, ramelteon    Review of Systems   Constitutional: Positive for activity change.   HENT: Negative.  Negative for congestion.    Eyes: Negative.    Respiratory: Negative for shortness of breath.    Cardiovascular: Negative for chest pain and leg swelling.   Gastrointestinal: Positive for constipation.   Endocrine: Negative.    Genitourinary: Negative for difficulty urinating.   Musculoskeletal: Positive for back pain, gait problem and myalgias.   Skin: Negative.    Allergic/Immunologic: Negative.    Neurological: Positive for weakness and numbness. Negative for headaches.   Hematological: Negative.    Psychiatric/Behavioral: Negative.      Objective:     Vital Signs (Most Recent):  Temp: 98.1 °F (36.7 °C) (04/06/18 0700)  Pulse: 82 (04/06/18 0700)  Resp: 20 (04/06/18 0700)  BP: 137/84 (04/06/18 0700)  SpO2: 97 % (04/06/18 0700)    Vital Signs (24h Range):  Temp:  [98.1 °F (36.7 °C)-98.2 °F (36.8 °C)] 98.1 °F (36.7 °C)  Pulse:  [82] 82  Resp:  [18-20] 20  SpO2:  [95 %-97  %] 97 %  BP: (117-137)/(65-84) 137/84     Physical Exam   Constitutional: He is oriented to person, place, and time. He appears well-developed.   HENT:   Head: Normocephalic.   Eyes: EOM are normal.   Neck: Normal range of motion.   Cardiovascular: Normal rate and regular rhythm.    Pulmonary/Chest: Effort normal and breath sounds normal.   Abdominal: Soft. Bowel sounds are normal.   Musculoskeletal: Normal range of motion.   Neurological: He is alert and oriented to person, place, and time.   Skin: Skin is warm and dry.   Incision c/d/i  Mild erythema around incision. No warmth or discharge   Psychiatric: He has a normal mood and affect.     NEUROLOGICAL EXAMINATION:     MENTAL STATUS   Oriented to person, place, and time.     CRANIAL NERVES     CN III, IV, VI   Extraocular motions are normal.

## 2018-04-06 NOTE — ASSESSMENT & PLAN NOTE
4/3 Wound care - dressing to incisions with Ag MWF  4/6 - erythema decreased. Will wait to take staples out until 4/9

## 2018-04-06 NOTE — PROGRESS NOTES
Physical Therapy   Treatment    Mirza Morales   MRN: [id number]   PTA visit #: 1   04/06/18 1000   PT Time Calculation   PT Start Time 1000   PT Stop Time 1045   PT Total Time (min) 45 min   Treatment   Treatment Type Treatment   PT/PTA PTA   PTA Visit Number 1   General   PT Received On 04/06/18   Family/Caregiver Present No   Patient Found (position) Seated in wheelchair   Precautions   General Precautions fall   Orthopedic Yes   Required Braces or Orthoses No   Subjective   Patient states Pt agreeable to tx    Pain/Comfort   Pain Rating 1 7/10   Location - Orientation 1 lower   Location 1 back   Pain Addressed 1 Distraction   Bed Mobility   Bed Mobility yes   Supine to Sit Stand by Assistance   Sit to Supine Stand by Assistance   Transfers   Transfer yes   Sit to Stand   Sit <> Stand Assistance Stand By Assistance   Sit <> Stand Assistive Device Rolling Walker   Stand to Sit   Assistance Stand By Assistance   Assistive Device Rolling Walker   Chair to Mat   Chair<> Mat Technique Stand Pivot   Chair<>Mat Assistance Stand By Assistance   Chair <> Mat Assistive Device Rolling Walker   Mat to Chair   Technique Stand Pivot   Assistance Contact Guard Assistance   Assistive Device No Assistive Device   Wheelchair Activities   Propulsion Yes   Propulsion Type 1 Manual   Level 1 Level tile   Method 1 Right upper extremity;Left upper extremity   Level of Assistance 1 Supervision   Description/ Details 1 200'   Gait   Gait Yes   Weight Bearing Status FWB   Gait 1   Surface 1 Level tile   Gait Assistive Device Rolling walker   Other Apparatus 1 Wheelchair follow   Assistance 1 Contact Guard Assistance   Gait Distance Pt amb 105', 58' with cga and no LOB   Gait Pattern swing-through gait   Gait Deviation(s) decreased fran;decreased step length;decreased stride length;decreased weight-shifting ability   Stairs   Stairs No   Supine   Supine-Exercises Lower extremity;Specific exercises   Supine-Exercise Type Ankle  pumps;Short arc quads;Bridging with bolster   Supine-Exercise Comments B LE x 20 reps   Side Lying   Side Lying-Exercises Lower extremity;Specific exercises   Side Lying-Exercise Type Hip extension;Hip ABduction with flexed knee   Side Lying-Exercise Comments B LE x 10 reps   Activity Tolerance   Activity Tolerance Patient tolerated treatment well   After Treatment   Patient Position After Treatment Seated in wheelchair   Patient after treatment left call button in reach   Assessment   Prognosis Good   Problem List Decreased strength;Decreased endurance;Impaired balance;Decreased mobility;Decreased safety awareness;Pain   Session Assessment progressing toward goals   Assessment Pt heena tx well and with less pain today.  Cont POC   Level of Motivation/Participation good   Barriers to Discharge Inaccessible home environment   Discharge Recommendations   Equipment Needed After Discharge bath bench;wheelchair   Discharge Facility/Level Of Care Needs home health PT   Plan   Planned Therapy Intervention Continue with current plan   Therapy Frequency 2 times/day;Monday-Friday;Saturday or Sunday   Physical Therapy Follow-up   PT Follow-up? Yes   Treatment/Billable Minutes   Gait training 15   Therapeutic Activity 10   Therapeutic Exercise 20   Total Time 45       Maricruz Alcala, PTA  4/6/2018

## 2018-04-06 NOTE — PROGRESS NOTES
"Occupational Therapy  PM Treatment  Mirza Morales   MRN: [id number]   Room/Bed: E280/E280 B     04/06/18 1115 04/06/18 1300   OT Time Calculation   OT Start Time 1115 1300   OT Stop Time 1200 1432   OT Total Time (min) 45 min 92 min   General   OT Date of Treatment --  04/06/18   Family/Caregiver Present --  No   Patient Found (position) --  Supine in bed   Precautions   General Precautions --  fall   Subjective   Patient states --  "I have back pain when I sit for too long."   Pain/Comfort   Pain Rating --  6/10   Location - Orientation --  lower   Location --  back   Pain Addressed --  Pre-medicate for activity;Reposition;Nurse notified   Bed Mobility   Scooting/Bridging --  Modified Independent   Scooting/Bridging Comments --  to scoot to EOB   Supine to Sit --  Modified Independent   Supine to Sit Comments --  with use of rails to EOB   Sit to Supine --  Modified Independent   Sit to Supine Comments --  to bed with HOB elevated    Sit to Stand   Sit <> Stand Assistance --  Stand By Assistance   Sit <> Stand Assistive Device --  No Assistive Device   Trials/Comments --  from    Stand to Sit   Assistance --  Stand By Assistance   Assistive Device --  Rolling Walker   Trials/Comments --  to    Bed to Chair   Bed <> Chair Technique --  Stand Pivot   Bed <> Chair Transfer Assistance --  Contact Guard Assistance   Bed <> Chair Assistive Device --  No Assistive Device   Trials/Comments --  from EOB>   Chair to Bed   Technique --  Stand Pivot   Assistance --  Contact Guard Assistance   Assistive Device --  No Assistive Device   Trials/Comments --  from wc>EOB   Toilet Transfer   Toilet Transfer Technique --  Stand Pivot   Toilet Transfer Assistance --  Contact Guard Assistance   Toilet Transfer Assistive Device --  grab bar   Trials/Comments --  from wc>raised toilet   Grooming   Grooming Level of Assistance --  Contact guard assistance   Grooming Where Assessed --  Standing sinkside   Grooming Comments --  for "  steadying from stance   Toileting   Toileting Level of Assistance --  Contact guard   Toileting Where Assessed --  Toilet   Toileting Comments --  steadying from stance   Exercise Tools   Bilateral Isael --  3# x 100 reps on incline; 8# x 100 reps flat forward/back and side/side to increase B UE strength in prep for ADLs and t/fs.   OT Therapeutic Groups   Other -The patient performed volleyball group at w/c level with __(S)____. The patient required _minimal___ rest breaks during this activity.  Patient performed activity using bilateral upper extremities to volley the ball, lateral arm movement noted throughout the activity.  Endurance __15___on the RPE scale. no pain complaints voiced or observed.  -Social Interaction: (choose FIM score rating below) - 6    - Interacts appropriately with others - No medications needed. Patient asleep all shift (7)  - Interacts appropriately with others with medication or extra time(anti-anxiety, antidepressant)  (6)  - Interacts appropriately 90% of time - needs monitoring or encouragement for participation or interaction  (5)  - Interacts appropriately 75-89% of time - needs redirection for appropriate language or to initiate interaction  (4)  - Interacts appropriately 50-74% of time - May be physically or verbally inappropriate   (3)  - Interacts appropriately 25-49% of time - Needs frequent redirection  (2)  - Interacts appropriately less than 25% of time - May be withdrawn or combative  (1)    Goal:  Pts endurance will improve to a RPE of ___19_____ (choose one)      Assessment:  Patient participated in a 45 minute volleyball group activity.  The group activity challenged dynamic standing/sitting balance, core strengthening, bilateral upper extremity strengthening, cardiovascular and respiratory capacity, hand -eye coordination, visual scanning and social affect/mood.    --    Dynamic Standing Balance   Dynamic Standing-Balance Support --  No upper extremity supported    Dynamic Standing-Balance --  Reaching for objects;Reaching across midline   Dynamic Standing-Comments --  SBA to complete dynamic standing activity with focus on  balance, endurance, weight shift. Pt completed spatial relations board and clothesing tree handing cloth pcs. Pt required several seated rest breaks to complete 2/2 increased pain in stance.   Activity Tolerance   Activity Tolerance --  Patient tolerated treatment well   After Treatment   Patient Position After Treatment --  Supine in bed   Patient after treatment left --  call button in reach;with bed alarm on   Assessment   Prognosis --  Good   Problem List --  Decreased Self Care skills;Decreased upper extremity range of motion;Decreased upper extremity strength;Decreased safe judgment during ADL;Decreased endurance;Decreased sensation;Decreased functional mobility;Decreased IADLs;Decreased trunk control for functional activities   Assessment --  Pt participated well in tx session but continues to c/o pain in low back. Pt also continues to show decreased (I) with ADLs, t/fs and safety at this time. Pt would continue to benefit from skilled OT services.   Level of Motivation/Participation --  Good   Discharge Recommendations   Equipment Needed After Discharge --  bath bench;wheelchair   Discharge Facility/Level Of Care Needs --  home with home health   Plan   Plan --  Continue with current plan   Therapy Frequency --  2 times/day   Occupational Therapy Follow-up   OT Follow-up? --  Yes   Treatment/Billable Minutes   Self Care/Home Management --  11   Therapeutic Activity --  50   Therapeutic Exercise --  31   Therapeutic Group 45 --    Total Time 45 92       DOMINIC Lanza  4/6/2018    Patient with wheelchair safety belt fastened and able to self release wheelchair safety belt. Pt left in supine in bed with call light and all necessities in reach, RN notified.     LEGEND:   CGA: Contact Guard Assist   EOB: Edgeof Bed   HHA: Hand Held Assist    HOB: Head of Bed   (I): Independent-patient performs task in a timely manner   Max (A): Maximal Assist-patient performs 25-49% of task   Min (A): Minimal Assist- patient performs 75% or more of task   Mod (A): Moderate Assist- patient performs 50-74% of task   NA: Not applicable   NT: Not tested   OOB: Out of Bed   PTA: Prior to admit   QC: Quad Cane   RW: Rolling Walker   (S): Supervision- patient requires cues, coaxing, prompting   SBA: Stand By Assist   SC: Straight Cane   SW: Standard Walker   TBA: To be assessed   Total (A): Total Assist- patient performs less than 25% of task   WC: Wheelchair   WFL: Within Functional Limits   WNL: Within Normal Limits

## 2018-04-06 NOTE — PROGRESS NOTES
[facility]  Physical Medicine & Rehab  Progress Note    Patient Name: Mirza Morales  MRN: [id number]  Patient Class: IP- Rehab   Admission Date: 3/30/2018  Length of Stay: 7 days  Attending Physician: Valentin Cunha MD  Primary Care Provider: LIDIA Banks MD    Subjective:     Principal Problem:Lumbar myelopathy    Interval History 4/6/2018:  Patient is seen for follow-up rehab evaluation and recommendations: Pt without new c/o's. States pain a little more increased in back today. I have reviewed the HPI and PMFSH and there are no changes from admission.       Scheduled Medications:    baclofen  10 mg Oral QHS    cyclobenzaprine  5 mg Oral TID    divalproex  500 mg Oral Daily    divalproex ER  1,000 mg Oral QHS    doxepin  100 mg Oral QHS    enoxaparin  40 mg Subcutaneous Daily    folic acid  1 mg Oral Daily    gabapentin  600 mg Oral TID    hydrOXYzine pamoate  50 mg Oral QHS    lactulose  30 g Oral Q2H    lurasidone  80 mg Oral Daily    metoprolol tartrate  12.5 mg Oral BID    pantoprazole  40 mg Oral Daily    pravastatin  40 mg Oral QHS    senna-docusate 8.6-50 mg  1 tablet Oral BID       PRN Medications: acetaminophen, albuterol, bisacodyl, hydrocodone-acetaminophen 7.5-325mg, lactulose, magnesium hydroxide 400 mg/5 ml, ondansetron, oxyCODONE, ramelteon    Review of Systems   Constitutional: Positive for activity change.   HENT: Negative.  Negative for congestion.    Eyes: Negative.    Respiratory: Negative for shortness of breath.    Cardiovascular: Negative for chest pain and leg swelling.   Gastrointestinal: Positive for constipation.   Endocrine: Negative.    Genitourinary: Negative for difficulty urinating.   Musculoskeletal: Positive for back pain, gait problem and myalgias.   Skin: Negative.    Allergic/Immunologic: Negative.    Neurological: Positive for weakness and numbness. Negative for headaches.   Hematological: Negative.    Psychiatric/Behavioral:  Negative.      Objective:     Vital Signs (Most Recent):  Temp: 98.1 °F (36.7 °C) (04/06/18 0700)  Pulse: 82 (04/06/18 0700)  Resp: 20 (04/06/18 0700)  BP: 137/84 (04/06/18 0700)  SpO2: 97 % (04/06/18 0700)    Vital Signs (24h Range):  Temp:  [98.1 °F (36.7 °C)-98.2 °F (36.8 °C)] 98.1 °F (36.7 °C)  Pulse:  [82] 82  Resp:  [18-20] 20  SpO2:  [95 %-97 %] 97 %  BP: (117-137)/(65-84) 137/84     Physical Exam   Constitutional: He is oriented to person, place, and time. He appears well-developed.   HENT:   Head: Normocephalic.   Eyes: EOM are normal.   Neck: Normal range of motion.   Cardiovascular: Normal rate and regular rhythm.    Pulmonary/Chest: Effort normal and breath sounds normal.   Abdominal: Soft. Bowel sounds are normal.   Musculoskeletal: Normal range of motion.   Neurological: He is alert and oriented to person, place, and time.   Skin: Skin is warm and dry.   Incision c/d/i  Mild erythema around incision. No warmth or discharge   Psychiatric: He has a normal mood and affect.     NEUROLOGICAL EXAMINATION:     MENTAL STATUS   Oriented to person, place, and time.     CRANIAL NERVES     CN III, IV, VI   Extraocular motions are normal.       Assessment/Plan:      Mirza Morales is a 62 y.o. male admitted to inpatient rehabilitation on 3/30/2018 for Lumbar myelopathy with impaired mobility and ADLs. Patient remains appropriate for PT, OT, and as required Speech therapy. Patient continues to require 24 hour nursing care as well as daily Physician assessment.    * Lumbar myelopathy    PT/OT  Monitor bowel and bladder  Last BM 4/3.   4/3 Senna-colace 8.6-50 bid, lactulose q6h prn constipation. KUB showed mild-mod constipation. Baclofen 10 qhs  4/4 Change oxycodone-apap 10 q4h prn to oxycodone 10 q4h prn per patient request. Add flexeril 10 tid        Alteration in skin integrity related to surgical incision    4/3 Wound care - dressing to incisions with Ag MWF  4/6 - erythema decreased. Will wait to take  staples out until 4/9        Postprocedural pneumothorax    Resolved. Continue to monitor respiratory status        Bipolar 1 disorder, mixed    Continue Doxepin and Vistaril        Seizure    Continue Depakote            DISCHARGE PLANNING:  Tentative Discharge Date:     Future Appointments  Date Time Provider Department Center   4/11/2018 3:30 PM Ambrosio Loco MD [facility]       Valentin Cunha MD  Department of Physical Medicine & Rehab   [facility]

## 2018-04-07 PROCEDURE — 99232 SBSQ HOSP IP/OBS MODERATE 35: CPT | Mod: ,,, | Performed by: PHYSICAL MEDICINE & REHABILITATION

## 2018-04-07 PROCEDURE — 25000003 PHARM REV CODE 250: Performed by: PHYSICAL MEDICINE & REHABILITATION

## 2018-04-07 PROCEDURE — 63600175 PHARM REV CODE 636 W HCPCS: Performed by: PHYSICAL MEDICINE & REHABILITATION

## 2018-04-07 PROCEDURE — 12800000 HC REHAB SEMI-PRIVATE ROOM

## 2018-04-07 RX ADMIN — GABAPENTIN 600 MG: 300 CAPSULE ORAL at 09:04

## 2018-04-07 RX ADMIN — HYDROXYZINE PAMOATE 50 MG: 25 CAPSULE ORAL at 09:04

## 2018-04-07 RX ADMIN — ONDANSETRON 8 MG: 8 TABLET, ORALLY DISINTEGRATING ORAL at 08:04

## 2018-04-07 RX ADMIN — OXYCODONE HYDROCHLORIDE 10 MG: 5 TABLET ORAL at 05:04

## 2018-04-07 RX ADMIN — GABAPENTIN 600 MG: 300 CAPSULE ORAL at 02:04

## 2018-04-07 RX ADMIN — OXYCODONE HYDROCHLORIDE 10 MG: 5 TABLET ORAL at 09:04

## 2018-04-07 RX ADMIN — ENOXAPARIN SODIUM 40 MG: 100 INJECTION SUBCUTANEOUS at 05:04

## 2018-04-07 RX ADMIN — DIVALPROEX SODIUM 500 MG: 250 TABLET, DELAYED RELEASE ORAL at 08:04

## 2018-04-07 RX ADMIN — Medication 12.5 MG: at 08:04

## 2018-04-07 RX ADMIN — OXYCODONE HYDROCHLORIDE 10 MG: 5 TABLET ORAL at 02:04

## 2018-04-07 RX ADMIN — DIVALPROEX SODIUM 1000 MG: 500 TABLET, FILM COATED, EXTENDED RELEASE ORAL at 09:04

## 2018-04-07 RX ADMIN — BACLOFEN 10 MG: 10 TABLET ORAL at 09:04

## 2018-04-07 RX ADMIN — PRAVASTATIN SODIUM 40 MG: 20 TABLET ORAL at 09:04

## 2018-04-07 RX ADMIN — FOLIC ACID 1 MG: 1 TABLET ORAL at 08:04

## 2018-04-07 RX ADMIN — DOXEPIN HYDROCHLORIDE 100 MG: 100 CAPSULE ORAL at 09:04

## 2018-04-07 RX ADMIN — STANDARDIZED SENNA CONCENTRATE AND DOCUSATE SODIUM 1 TABLET: 8.6; 5 TABLET, FILM COATED ORAL at 08:04

## 2018-04-07 RX ADMIN — CYCLOBENZAPRINE HYDROCHLORIDE 5 MG: 5 TABLET, FILM COATED ORAL at 02:04

## 2018-04-07 RX ADMIN — GABAPENTIN 600 MG: 300 CAPSULE ORAL at 08:04

## 2018-04-07 RX ADMIN — CYCLOBENZAPRINE HYDROCHLORIDE 5 MG: 5 TABLET, FILM COATED ORAL at 08:04

## 2018-04-07 RX ADMIN — LURASIDONE HYDROCHLORIDE 80 MG: 40 TABLET, FILM COATED ORAL at 08:04

## 2018-04-07 RX ADMIN — OXYCODONE HYDROCHLORIDE 10 MG: 5 TABLET ORAL at 08:04

## 2018-04-07 RX ADMIN — PANTOPRAZOLE SODIUM 40 MG: 40 TABLET, DELAYED RELEASE ORAL at 08:04

## 2018-04-07 RX ADMIN — CYCLOBENZAPRINE HYDROCHLORIDE 5 MG: 5 TABLET, FILM COATED ORAL at 09:04

## 2018-04-07 NOTE — NURSING
aquacel  Alginate rope   Covered with mepore island dressings applied as ordereed to incisions this am.  Stapled. Edges well approximated. No drainage, no increased  warmth . + Redness along incision line.   Red satelite rash improving surrounding incisions. Encouraged pt to avoid sleeping on back. Turned and repositioned q 2-3 hours.

## 2018-04-07 NOTE — SUBJECTIVE & OBJECTIVE
Interval History 4/7/2018:  Patient is seen for follow-up rehab evaluation and recommendations: Pt without new c/o's. Had BM on 4/6. Received low air loss mattress today - reported back pain somewhat improved.   I have reviewed the HPI and PMFSH and there are no changes from admission.       Scheduled Medications:    baclofen  10 mg Oral QHS    cyclobenzaprine  5 mg Oral TID    divalproex  500 mg Oral Daily    divalproex ER  1,000 mg Oral QHS    doxepin  100 mg Oral QHS    enoxaparin  40 mg Subcutaneous Daily    folic acid  1 mg Oral Daily    gabapentin  600 mg Oral TID    hydrOXYzine pamoate  50 mg Oral QHS    lurasidone  80 mg Oral Daily    metoprolol tartrate  12.5 mg Oral BID    pantoprazole  40 mg Oral Daily    pravastatin  40 mg Oral QHS    senna-docusate 8.6-50 mg  1 tablet Oral BID       PRN Medications: acetaminophen, albuterol, bisacodyl, hydrocodone-acetaminophen 7.5-325mg, lactulose, magnesium hydroxide 400 mg/5 ml, ondansetron, oxyCODONE, ramelteon    Review of Systems   Constitutional: Positive for activity change.   HENT: Negative.  Negative for congestion.    Eyes: Negative.    Respiratory: Negative for shortness of breath.    Cardiovascular: Negative for chest pain and leg swelling.   Gastrointestinal: Positive for constipation.   Endocrine: Negative.    Genitourinary: Negative for difficulty urinating.   Musculoskeletal: Positive for back pain, gait problem and myalgias.   Skin: Negative.    Allergic/Immunologic: Negative.    Neurological: Positive for weakness and numbness. Negative for headaches.   Hematological: Negative.    Psychiatric/Behavioral: Negative.      Objective:     Vital Signs (Most Recent):  Temp: 98 °F (36.7 °C) (04/07/18 0700)  Pulse: 88 (04/07/18 0700)  Resp: 18 (04/07/18 0700)  BP: 128/63 (04/07/18 0700)  SpO2: 99 % (04/07/18 0700)    Vital Signs (24h Range):  Temp:  [97.8 °F (36.6 °C)-98 °F (36.7 °C)] 98 °F (36.7 °C)  Pulse:  [83-88] 88  Resp:  [16-18] 18  SpO2:   [99 %-100 %] 99 %  BP: (128-132)/(63-83) 128/63     Physical Exam   Constitutional: He is oriented to person, place, and time. He appears well-developed.   HENT:   Head: Normocephalic.   Eyes: EOM are normal.   Neck: Normal range of motion.   Cardiovascular: Normal rate and regular rhythm.    Pulmonary/Chest: Effort normal and breath sounds normal.   Abdominal: Soft. Bowel sounds are normal.   Musculoskeletal: Normal range of motion.   Neurological: He is alert and oriented to person, place, and time.   Skin: Skin is warm and dry.   Incision c/d/i  Mild erythema around incision. No warmth or discharge   Psychiatric: He has a normal mood and affect.     NEUROLOGICAL EXAMINATION:     MENTAL STATUS   Oriented to person, place, and time.     CRANIAL NERVES     CN III, IV, VI   Extraocular motions are normal.

## 2018-04-07 NOTE — PROGRESS NOTES
[facility]  Physical Medicine & Rehab  Progress Note    Patient Name: Mirza Morales  MRN: [id number]  Patient Class: IP- Rehab   Admission Date: 3/30/2018  Length of Stay: 8 days  Attending Physician: Valentin Cunha MD  Primary Care Provider: LIDIA Banks MD    Subjective:     Principal Problem:Lumbar myelopathy    Interval History 4/7/2018:  Patient is seen for follow-up rehab evaluation and recommendations: Pt without new c/o's. Had BM on 4/6. Received low air loss mattress today - reported back pain somewhat improved.   I have reviewed the HPI and PMFSH and there are no changes from admission.       Scheduled Medications:    baclofen  10 mg Oral QHS    cyclobenzaprine  5 mg Oral TID    divalproex  500 mg Oral Daily    divalproex ER  1,000 mg Oral QHS    doxepin  100 mg Oral QHS    enoxaparin  40 mg Subcutaneous Daily    folic acid  1 mg Oral Daily    gabapentin  600 mg Oral TID    hydrOXYzine pamoate  50 mg Oral QHS    lurasidone  80 mg Oral Daily    metoprolol tartrate  12.5 mg Oral BID    pantoprazole  40 mg Oral Daily    pravastatin  40 mg Oral QHS    senna-docusate 8.6-50 mg  1 tablet Oral BID       PRN Medications: acetaminophen, albuterol, bisacodyl, hydrocodone-acetaminophen 7.5-325mg, lactulose, magnesium hydroxide 400 mg/5 ml, ondansetron, oxyCODONE, ramelteon    Review of Systems   Constitutional: Positive for activity change.   HENT: Negative.  Negative for congestion.    Eyes: Negative.    Respiratory: Negative for shortness of breath.    Cardiovascular: Negative for chest pain and leg swelling.   Gastrointestinal: Positive for constipation.   Endocrine: Negative.    Genitourinary: Negative for difficulty urinating.   Musculoskeletal: Positive for back pain, gait problem and myalgias.   Skin: Negative.    Allergic/Immunologic: Negative.    Neurological: Positive for weakness and numbness. Negative for headaches.   Hematological: Negative.     Psychiatric/Behavioral: Negative.      Objective:     Vital Signs (Most Recent):  Temp: 98 °F (36.7 °C) (04/07/18 0700)  Pulse: 88 (04/07/18 0700)  Resp: 18 (04/07/18 0700)  BP: 128/63 (04/07/18 0700)  SpO2: 99 % (04/07/18 0700)    Vital Signs (24h Range):  Temp:  [97.8 °F (36.6 °C)-98 °F (36.7 °C)] 98 °F (36.7 °C)  Pulse:  [83-88] 88  Resp:  [16-18] 18  SpO2:  [99 %-100 %] 99 %  BP: (128-132)/(63-83) 128/63     Physical Exam   Constitutional: He is oriented to person, place, and time. He appears well-developed.   HENT:   Head: Normocephalic.   Eyes: EOM are normal.   Neck: Normal range of motion.   Cardiovascular: Normal rate and regular rhythm.    Pulmonary/Chest: Effort normal and breath sounds normal.   Abdominal: Soft. Bowel sounds are normal.   Musculoskeletal: Normal range of motion.   Neurological: He is alert and oriented to person, place, and time.   Skin: Skin is warm and dry.   Incision c/d/i  Mild erythema around incision. No warmth or discharge   Psychiatric: He has a normal mood and affect.     NEUROLOGICAL EXAMINATION:     MENTAL STATUS   Oriented to person, place, and time.     CRANIAL NERVES     CN III, IV, VI   Extraocular motions are normal.       Assessment/Plan:      Mirza Morales is a 62 y.o. male admitted to inpatient rehabilitation on 3/30/2018 for Lumbar myelopathy with impaired mobility and ADLs. Patient remains appropriate for PT, OT, and as required Speech therapy. Patient continues to require 24 hour nursing care as well as daily Physician assessment.    * Lumbar myelopathy    PT/OT  Monitor bowel and bladder  Last BM 4/3.   4/3 Senna-colace 8.6-50 bid, lactulose q6h prn constipation. KUB showed mild-mod constipation. Baclofen 10 qhs  4/4 Change oxycodone-apap 10 q4h prn to oxycodone 10 q4h prn per patient request. Add flexeril 10 tid  4/7 received low air loss mattress         Alteration in skin integrity related to surgical incision    4/3 Wound care - dressing to incisions  with Ag MWF  4/6 - erythema decreased. Will wait to take staples out until 4/9        Hyperkalemia    BMP  Lab Results   Component Value Date     04/05/2018    K 3.9 04/05/2018    CL 97 04/05/2018    CO2 29 04/05/2018    BUN 9 04/05/2018    CREATININE 0.9 04/05/2018    CALCIUM 8.7 04/05/2018    ANIONGAP 11 04/05/2018    ESTGFRAFRICA >60.0 04/05/2018    EGFRNONAA >60.0 04/05/2018             Postprocedural pneumothorax    Resolved. Continue to monitor respiratory status        Bipolar 1 disorder, mixed    Continue Doxepin and Vistaril        Seizure    Continue Depakote            DISCHARGE PLANNING:  Tentative Discharge Date:     Future Appointments  Date Time Provider Department Center   4/11/2018 3:30 PM Ambrosio Loco MD [facility] PSYCH Lindsay Gia       Agustina Smith MD  Department of Physical Medicine & Rehab   [facility]

## 2018-04-07 NOTE — ASSESSMENT & PLAN NOTE
BMP  Lab Results   Component Value Date     04/05/2018    K 3.9 04/05/2018    CL 97 04/05/2018    CO2 29 04/05/2018    BUN 9 04/05/2018    CREATININE 0.9 04/05/2018    CALCIUM 8.7 04/05/2018    ANIONGAP 11 04/05/2018    ESTGFRAFRICA >60.0 04/05/2018    EGFRNONAA >60.0 04/05/2018

## 2018-04-08 PROCEDURE — 25000003 PHARM REV CODE 250: Performed by: PHYSICAL MEDICINE & REHABILITATION

## 2018-04-08 PROCEDURE — 99232 SBSQ HOSP IP/OBS MODERATE 35: CPT | Mod: ,,, | Performed by: PHYSICAL MEDICINE & REHABILITATION

## 2018-04-08 PROCEDURE — 12800000 HC REHAB SEMI-PRIVATE ROOM

## 2018-04-08 PROCEDURE — 63600175 PHARM REV CODE 636 W HCPCS: Performed by: PHYSICAL MEDICINE & REHABILITATION

## 2018-04-08 RX ADMIN — DOXEPIN HYDROCHLORIDE 100 MG: 100 CAPSULE ORAL at 09:04

## 2018-04-08 RX ADMIN — DIVALPROEX SODIUM 500 MG: 250 TABLET, DELAYED RELEASE ORAL at 09:04

## 2018-04-08 RX ADMIN — Medication 12.5 MG: at 09:04

## 2018-04-08 RX ADMIN — LURASIDONE HYDROCHLORIDE 80 MG: 40 TABLET, FILM COATED ORAL at 09:04

## 2018-04-08 RX ADMIN — OXYCODONE HYDROCHLORIDE 10 MG: 5 TABLET ORAL at 08:04

## 2018-04-08 RX ADMIN — RAMELTEON 8 MG: 8 TABLET, FILM COATED ORAL at 09:04

## 2018-04-08 RX ADMIN — STANDARDIZED SENNA CONCENTRATE AND DOCUSATE SODIUM 1 TABLET: 8.6; 5 TABLET, FILM COATED ORAL at 09:04

## 2018-04-08 RX ADMIN — HYDROXYZINE PAMOATE 50 MG: 25 CAPSULE ORAL at 09:04

## 2018-04-08 RX ADMIN — CYCLOBENZAPRINE HYDROCHLORIDE 5 MG: 5 TABLET, FILM COATED ORAL at 09:04

## 2018-04-08 RX ADMIN — PRAVASTATIN SODIUM 40 MG: 20 TABLET ORAL at 09:04

## 2018-04-08 RX ADMIN — CYCLOBENZAPRINE HYDROCHLORIDE 5 MG: 5 TABLET, FILM COATED ORAL at 03:04

## 2018-04-08 RX ADMIN — ENOXAPARIN SODIUM 40 MG: 100 INJECTION SUBCUTANEOUS at 04:04

## 2018-04-08 RX ADMIN — GABAPENTIN 600 MG: 300 CAPSULE ORAL at 03:04

## 2018-04-08 RX ADMIN — Medication 12.5 MG: at 08:04

## 2018-04-08 RX ADMIN — FOLIC ACID 1 MG: 1 TABLET ORAL at 09:04

## 2018-04-08 RX ADMIN — OXYCODONE HYDROCHLORIDE 10 MG: 5 TABLET ORAL at 04:04

## 2018-04-08 RX ADMIN — ONDANSETRON 8 MG: 8 TABLET, ORALLY DISINTEGRATING ORAL at 09:04

## 2018-04-08 RX ADMIN — BACLOFEN 10 MG: 10 TABLET ORAL at 09:04

## 2018-04-08 RX ADMIN — DIVALPROEX SODIUM 1000 MG: 500 TABLET, FILM COATED, EXTENDED RELEASE ORAL at 09:04

## 2018-04-08 RX ADMIN — GABAPENTIN 600 MG: 300 CAPSULE ORAL at 09:04

## 2018-04-08 RX ADMIN — OXYCODONE HYDROCHLORIDE 10 MG: 5 TABLET ORAL at 09:04

## 2018-04-08 RX ADMIN — STANDARDIZED SENNA CONCENTRATE AND DOCUSATE SODIUM 1 TABLET: 8.6; 5 TABLET, FILM COATED ORAL at 08:04

## 2018-04-08 RX ADMIN — PANTOPRAZOLE SODIUM 40 MG: 40 TABLET, DELAYED RELEASE ORAL at 09:04

## 2018-04-08 NOTE — SUBJECTIVE & OBJECTIVE
Interval History 4/8/2018:  Patient is seen for follow-up rehab evaluation and recommendations: Pt without new c/o's. Back pain improved/stable.     I have reviewed the HPI and PMFSH and there are no changes from admission.       Scheduled Medications:    baclofen  10 mg Oral QHS    cyclobenzaprine  5 mg Oral TID    divalproex  500 mg Oral Daily    divalproex ER  1,000 mg Oral QHS    doxepin  100 mg Oral QHS    enoxaparin  40 mg Subcutaneous Daily    folic acid  1 mg Oral Daily    gabapentin  600 mg Oral TID    hydrOXYzine pamoate  50 mg Oral QHS    lurasidone  80 mg Oral Daily    metoprolol tartrate  12.5 mg Oral BID    pantoprazole  40 mg Oral Daily    pravastatin  40 mg Oral QHS    senna-docusate 8.6-50 mg  1 tablet Oral BID       PRN Medications: acetaminophen, albuterol, bisacodyl, hydrocodone-acetaminophen 7.5-325mg, lactulose, magnesium hydroxide 400 mg/5 ml, ondansetron, oxyCODONE, ramelteon    Review of Systems   Constitutional: Positive for activity change.   HENT: Negative.  Negative for congestion.    Eyes: Negative.    Respiratory: Negative for shortness of breath.    Cardiovascular: Negative for chest pain and leg swelling.   Gastrointestinal: Positive for constipation.   Endocrine: Negative.    Genitourinary: Negative for difficulty urinating.   Musculoskeletal: Positive for back pain, gait problem and myalgias.   Skin: Negative.    Allergic/Immunologic: Negative.    Neurological: Positive for weakness and numbness. Negative for headaches.   Hematological: Negative.    Psychiatric/Behavioral: Negative.      Objective:     Vital Signs (Most Recent):  Temp: 97.6 °F (36.4 °C) (04/08/18 0700)  Pulse: 79 (04/08/18 0700)  Resp: 18 (04/08/18 0700)  BP: 113/66 (04/08/18 0700)  SpO2: 96 % (04/08/18 0700)    Vital Signs (24h Range):  Temp:  [97.6 °F (36.4 °C)-98.1 °F (36.7 °C)] 97.6 °F (36.4 °C)  Pulse:  [79-84] 79  Resp:  [17-18] 18  SpO2:  [96 %-98 %] 96 %  BP: (105-113)/(66-67) 113/12      Physical Exam   Constitutional: He is oriented to person, place, and time. He appears well-developed.   HENT:   Head: Normocephalic.   Eyes: EOM are normal.   Neck: Normal range of motion.   Cardiovascular: Normal rate and regular rhythm.    Pulmonary/Chest: Effort normal and breath sounds normal.   Abdominal: Soft. Bowel sounds are normal.   Musculoskeletal: Normal range of motion.   Neurological: He is alert and oriented to person, place, and time.   Skin: Skin is warm and dry.   Incision c/d/i  Mild erythema around incision. No warmth or discharge   Psychiatric: He has a normal mood and affect.     NEUROLOGICAL EXAMINATION:     MENTAL STATUS   Oriented to person, place, and time.     CRANIAL NERVES     CN III, IV, VI   Extraocular motions are normal.

## 2018-04-08 NOTE — ASSESSMENT & PLAN NOTE
PT/OT  Monitor bowel and bladder  Last BM 4/3.   4/3 Senna-colace 8.6-50 bid, lactulose q6h prn constipation. KUB showed mild-mod constipation. Baclofen 10 qhs  4/4 Change oxycodone-apap 10 q4h prn to oxycodone 10 q4h prn per patient request. Add flexeril 10 tid  4/7 received low air loss mattress   4/8 pain well controlled

## 2018-04-08 NOTE — PROGRESS NOTES
[facility]  Physical Medicine & Rehab  Progress Note    Patient Name: Mirza Morales  MRN: [id number]  Patient Class: IP- Rehab   Admission Date: 3/30/2018  Length of Stay: 9 days  Attending Physician: Valentin Cunha MD  Primary Care Provider: LIDIA Banks MD    Subjective:     Principal Problem:Lumbar myelopathy    Interval History 4/8/2018:  Patient is seen for follow-up rehab evaluation and recommendations: Pt without new c/o's. Back pain improved/stable.     I have reviewed the HPI and PMFSH and there are no changes from admission.       Scheduled Medications:    baclofen  10 mg Oral QHS    cyclobenzaprine  5 mg Oral TID    divalproex  500 mg Oral Daily    divalproex ER  1,000 mg Oral QHS    doxepin  100 mg Oral QHS    enoxaparin  40 mg Subcutaneous Daily    folic acid  1 mg Oral Daily    gabapentin  600 mg Oral TID    hydrOXYzine pamoate  50 mg Oral QHS    lurasidone  80 mg Oral Daily    metoprolol tartrate  12.5 mg Oral BID    pantoprazole  40 mg Oral Daily    pravastatin  40 mg Oral QHS    senna-docusate 8.6-50 mg  1 tablet Oral BID       PRN Medications: acetaminophen, albuterol, bisacodyl, hydrocodone-acetaminophen 7.5-325mg, lactulose, magnesium hydroxide 400 mg/5 ml, ondansetron, oxyCODONE, ramelteon    Review of Systems   Constitutional: Positive for activity change.   HENT: Negative.  Negative for congestion.    Eyes: Negative.    Respiratory: Negative for shortness of breath.    Cardiovascular: Negative for chest pain and leg swelling.   Gastrointestinal: Positive for constipation.   Endocrine: Negative.    Genitourinary: Negative for difficulty urinating.   Musculoskeletal: Positive for back pain, gait problem and myalgias.   Skin: Negative.    Allergic/Immunologic: Negative.    Neurological: Positive for weakness and numbness. Negative for headaches.   Hematological: Negative.    Psychiatric/Behavioral: Negative.      Objective:     Vital Signs (Most  Recent):  Temp: 97.6 °F (36.4 °C) (04/08/18 0700)  Pulse: 79 (04/08/18 0700)  Resp: 18 (04/08/18 0700)  BP: 113/66 (04/08/18 0700)  SpO2: 96 % (04/08/18 0700)    Vital Signs (24h Range):  Temp:  [97.6 °F (36.4 °C)-98.1 °F (36.7 °C)] 97.6 °F (36.4 °C)  Pulse:  [79-84] 79  Resp:  [17-18] 18  SpO2:  [96 %-98 %] 96 %  BP: (105-113)/(66-67) 113/66     Physical Exam   Constitutional: He is oriented to person, place, and time. He appears well-developed.   HENT:   Head: Normocephalic.   Eyes: EOM are normal.   Neck: Normal range of motion.   Cardiovascular: Normal rate and regular rhythm.    Pulmonary/Chest: Effort normal and breath sounds normal.   Abdominal: Soft. Bowel sounds are normal.   Musculoskeletal: Normal range of motion.   Neurological: He is alert and oriented to person, place, and time.   Skin: Skin is warm and dry.   Incision c/d/i  Mild erythema around incision. No warmth or discharge   Psychiatric: He has a normal mood and affect.     NEUROLOGICAL EXAMINATION:     MENTAL STATUS   Oriented to person, place, and time.     CRANIAL NERVES     CN III, IV, VI   Extraocular motions are normal.       Assessment/Plan:      Mirza Morales is a 62 y.o. male admitted to inpatient rehabilitation on 3/30/2018 for Lumbar myelopathy with impaired mobility and ADLs. Patient remains appropriate for PT, OT, and as required Speech therapy. Patient continues to require 24 hour nursing care as well as daily Physician assessment.    * Lumbar myelopathy    PT/OT  Monitor bowel and bladder  Last BM 4/3.   4/3 Senna-colace 8.6-50 bid, lactulose q6h prn constipation. KUB showed mild-mod constipation. Baclofen 10 qhs  4/4 Change oxycodone-apap 10 q4h prn to oxycodone 10 q4h prn per patient request. Add flexeril 10 tid  4/7 received low air loss mattress   4/8 pain well controlled         Alteration in skin integrity related to surgical incision    4/3 Wound care - dressing to incisions with Ag MWF  4/6 - erythema decreased. Will  wait to take staples out until 4/9        Hyperkalemia    BMP  Lab Results   Component Value Date     04/05/2018    K 3.9 04/05/2018    CL 97 04/05/2018    CO2 29 04/05/2018    BUN 9 04/05/2018    CREATININE 0.9 04/05/2018    CALCIUM 8.7 04/05/2018    ANIONGAP 11 04/05/2018    ESTGFRAFRICA >60.0 04/05/2018    EGFRNONAA >60.0 04/05/2018             Postprocedural pneumothorax    Resolved. Continue to monitor respiratory status        Bipolar 1 disorder, mixed    Continue Doxepin and Vistaril        Seizure    Continue Depakote            DISCHARGE PLANNING:  Tentative Discharge Date:     Future Appointments  Date Time Provider Department Center   4/11/2018 3:30 PM Ambrosio Loco MD [facility] PSYCH Rohith Verenice Smith MD  Department of Physical Medicine & Rehab   [facility]

## 2018-04-09 PROBLEM — K59.00 CONSTIPATION: Status: ACTIVE | Noted: 2018-04-09

## 2018-04-09 LAB
ANION GAP SERPL CALC-SCNC: 6 MMOL/L
BASOPHILS # BLD AUTO: 0.04 K/UL
BASOPHILS NFR BLD: 0.9 %
BUN SERPL-MCNC: 10 MG/DL
CALCIUM SERPL-MCNC: 8.8 MG/DL
CHLORIDE SERPL-SCNC: 94 MMOL/L
CO2 SERPL-SCNC: 33 MMOL/L
CREAT SERPL-MCNC: 0.9 MG/DL
DIFFERENTIAL METHOD: ABNORMAL
EOSINOPHIL # BLD AUTO: 0.2 K/UL
EOSINOPHIL NFR BLD: 3.4 %
ERYTHROCYTE [DISTWIDTH] IN BLOOD BY AUTOMATED COUNT: 13.9 %
EST. GFR  (AFRICAN AMERICAN): >60 ML/MIN/1.73 M^2
EST. GFR  (NON AFRICAN AMERICAN): >60 ML/MIN/1.73 M^2
GLUCOSE SERPL-MCNC: 108 MG/DL
HCT VFR BLD AUTO: 34.4 %
HGB BLD-MCNC: 10.8 G/DL
IMM GRANULOCYTES # BLD AUTO: 0.12 K/UL
IMM GRANULOCYTES NFR BLD AUTO: 2.6 %
LYMPHOCYTES # BLD AUTO: 1 K/UL
LYMPHOCYTES NFR BLD: 21.2 %
MAGNESIUM SERPL-MCNC: 1.9 MG/DL
MCH RBC QN AUTO: 29.6 PG
MCHC RBC AUTO-ENTMCNC: 31.4 G/DL
MCV RBC AUTO: 94 FL
MONOCYTES # BLD AUTO: 0.8 K/UL
MONOCYTES NFR BLD: 16.2 %
NEUTROPHILS # BLD AUTO: 2.6 K/UL
NEUTROPHILS NFR BLD: 55.7 %
NRBC BLD-RTO: 0 /100 WBC
PHOSPHATE SERPL-MCNC: 4 MG/DL
PLATELET # BLD AUTO: 295 K/UL
PMV BLD AUTO: 9.2 FL
POTASSIUM SERPL-SCNC: 4.4 MMOL/L
RBC # BLD AUTO: 3.65 M/UL
SODIUM SERPL-SCNC: 133 MMOL/L
WBC # BLD AUTO: 4.68 K/UL

## 2018-04-09 PROCEDURE — 97530 THERAPEUTIC ACTIVITIES: CPT

## 2018-04-09 PROCEDURE — 97110 THERAPEUTIC EXERCISES: CPT

## 2018-04-09 PROCEDURE — 80048 BASIC METABOLIC PNL TOTAL CA: CPT

## 2018-04-09 PROCEDURE — 85025 COMPLETE CBC W/AUTO DIFF WBC: CPT

## 2018-04-09 PROCEDURE — 12800000 HC REHAB SEMI-PRIVATE ROOM

## 2018-04-09 PROCEDURE — 84100 ASSAY OF PHOSPHORUS: CPT

## 2018-04-09 PROCEDURE — 25000003 PHARM REV CODE 250: Performed by: PHYSICAL MEDICINE & REHABILITATION

## 2018-04-09 PROCEDURE — 63600175 PHARM REV CODE 636 W HCPCS: Performed by: PHYSICAL MEDICINE & REHABILITATION

## 2018-04-09 PROCEDURE — 97150 GROUP THERAPEUTIC PROCEDURES: CPT

## 2018-04-09 PROCEDURE — 99232 SBSQ HOSP IP/OBS MODERATE 35: CPT | Mod: ,,, | Performed by: PHYSICAL MEDICINE & REHABILITATION

## 2018-04-09 PROCEDURE — 36415 COLL VENOUS BLD VENIPUNCTURE: CPT

## 2018-04-09 PROCEDURE — 83735 ASSAY OF MAGNESIUM: CPT

## 2018-04-09 RX ORDER — LACTULOSE 10 G/15ML
30 SOLUTION ORAL 3 TIMES DAILY
Status: DISCONTINUED | OUTPATIENT
Start: 2018-04-09 | End: 2018-04-10 | Stop reason: HOSPADM

## 2018-04-09 RX ORDER — BISACODYL 10 MG
10 SUPPOSITORY, RECTAL RECTAL DAILY PRN
Status: DISCONTINUED | OUTPATIENT
Start: 2018-04-09 | End: 2018-04-10 | Stop reason: HOSPADM

## 2018-04-09 RX ADMIN — ENOXAPARIN SODIUM 40 MG: 100 INJECTION SUBCUTANEOUS at 04:04

## 2018-04-09 RX ADMIN — GABAPENTIN 600 MG: 300 CAPSULE ORAL at 04:04

## 2018-04-09 RX ADMIN — BACLOFEN 10 MG: 10 TABLET ORAL at 08:04

## 2018-04-09 RX ADMIN — LURASIDONE HYDROCHLORIDE 80 MG: 40 TABLET, FILM COATED ORAL at 08:04

## 2018-04-09 RX ADMIN — HYDROXYZINE PAMOATE 50 MG: 25 CAPSULE ORAL at 08:04

## 2018-04-09 RX ADMIN — OXYCODONE HYDROCHLORIDE 10 MG: 5 TABLET ORAL at 08:04

## 2018-04-09 RX ADMIN — Medication 12.5 MG: at 08:04

## 2018-04-09 RX ADMIN — DIVALPROEX SODIUM 500 MG: 250 TABLET, DELAYED RELEASE ORAL at 08:04

## 2018-04-09 RX ADMIN — CYCLOBENZAPRINE HYDROCHLORIDE 5 MG: 5 TABLET, FILM COATED ORAL at 08:04

## 2018-04-09 RX ADMIN — DOXEPIN HYDROCHLORIDE 100 MG: 100 CAPSULE ORAL at 08:04

## 2018-04-09 RX ADMIN — CYCLOBENZAPRINE HYDROCHLORIDE 5 MG: 5 TABLET, FILM COATED ORAL at 04:04

## 2018-04-09 RX ADMIN — DIVALPROEX SODIUM 1000 MG: 500 TABLET, FILM COATED, EXTENDED RELEASE ORAL at 08:04

## 2018-04-09 RX ADMIN — FOLIC ACID 1 MG: 1 TABLET ORAL at 08:04

## 2018-04-09 RX ADMIN — PANTOPRAZOLE SODIUM 40 MG: 40 TABLET, DELAYED RELEASE ORAL at 08:04

## 2018-04-09 RX ADMIN — GABAPENTIN 600 MG: 300 CAPSULE ORAL at 08:04

## 2018-04-09 RX ADMIN — PRAVASTATIN SODIUM 40 MG: 20 TABLET ORAL at 08:04

## 2018-04-09 RX ADMIN — LACTULOSE 30 G: 20 SOLUTION ORAL at 04:04

## 2018-04-09 RX ADMIN — STANDARDIZED SENNA CONCENTRATE AND DOCUSATE SODIUM 1 TABLET: 8.6; 5 TABLET, FILM COATED ORAL at 08:04

## 2018-04-09 NOTE — SUBJECTIVE & OBJECTIVE
Interval History 4/9/2018:  Patient is seen for follow-up rehab evaluation and recommendations: Pt without new c/o's. Happy with current pain medication. Does not want any changes before going home tomorrow. Staples to be removed today.  I have reviewed the HPI and PMFSH and there are no changes from admission.       Scheduled Medications:    baclofen  10 mg Oral QHS    cyclobenzaprine  5 mg Oral TID    divalproex  500 mg Oral Daily    divalproex ER  1,000 mg Oral QHS    doxepin  100 mg Oral QHS    enoxaparin  40 mg Subcutaneous Daily    folic acid  1 mg Oral Daily    gabapentin  600 mg Oral TID    hydrOXYzine pamoate  50 mg Oral QHS    lurasidone  80 mg Oral Daily    metoprolol tartrate  12.5 mg Oral BID    pantoprazole  40 mg Oral Daily    pravastatin  40 mg Oral QHS    senna-docusate 8.6-50 mg  1 tablet Oral BID       PRN Medications: acetaminophen, albuterol, bisacodyl, hydrocodone-acetaminophen 7.5-325mg, lactulose, magnesium hydroxide 400 mg/5 ml, ondansetron, oxyCODONE, ramelteon    Review of Systems   Constitutional: Positive for activity change.   HENT: Negative.  Negative for congestion.    Eyes: Negative.    Respiratory: Negative for shortness of breath.    Cardiovascular: Negative for chest pain and leg swelling.   Gastrointestinal: Positive for constipation.   Endocrine: Negative.    Genitourinary: Negative for difficulty urinating.   Musculoskeletal: Positive for back pain, gait problem and myalgias.   Skin: Negative.    Allergic/Immunologic: Negative.    Neurological: Positive for weakness and numbness. Negative for headaches.   Hematological: Negative.    Psychiatric/Behavioral: Negative.      Objective:     Vital Signs (Most Recent):  Temp: 98.3 °F (36.8 °C) (04/09/18 0844)  Pulse: 86 (04/09/18 0844)  Resp: 17 (04/09/18 0844)  BP: 116/72 (04/09/18 0844)  SpO2: 96 % (04/09/18 0844)    Vital Signs (24h Range):  Temp:  [98.3 °F (36.8 °C)-98.4 °F (36.9 °C)] 98.3 °F (36.8 °C)  Pulse:   [85-86] 86  Resp:  [17] 17  SpO2:  [96 %-98 %] 96 %  BP: (100-116)/(59-72) 116/72     Physical Exam   Constitutional: He is oriented to person, place, and time. He appears well-developed.   HENT:   Head: Normocephalic.   Eyes: EOM are normal.   Neck: Normal range of motion.   Cardiovascular: Normal rate and regular rhythm.    Pulmonary/Chest: Effort normal and breath sounds normal.   Abdominal: Soft. Bowel sounds are normal.   Musculoskeletal: Normal range of motion.   Neurological: He is alert and oriented to person, place, and time.   Skin: Skin is warm and dry.   Incision c/d/i  Mild erythema around incision. No warmth or discharge   Psychiatric: He has a normal mood and affect.     NEUROLOGICAL EXAMINATION:     MENTAL STATUS   Oriented to person, place, and time.     CRANIAL NERVES     CN III, IV, VI   Extraocular motions are normal.

## 2018-04-09 NOTE — PROGRESS NOTES
Pt wants to discharge home tomorrow with wife after family training. Okay'd per Dr. Cunha. No DME needed. Per Nick at pt's insurance company, [employer]  is in network, referral sent, awaiting on approval.

## 2018-04-09 NOTE — TREATMENT PLAN
[facility] DME recommendations  DME to be delivered home unless otherwise specified.         [x] NO DME NEEDED ________________________________________________________________________    []Walker:(can only select one of these)  []Adult (5'4"-6'6") [][place] (4'4"-5'7")                           [] Wide/ Heavy Duty         []Michael                  []  Rollator                                                 [] knee walker       Accessories/Other:____________________________________     Wheels:(must complete) [] Yes  [] No     []Crutches:  []Axillary []Loft strand    []Cane:              []Straight []Narrow based quad   []Wide based quad         [] Other:____________________________________________    [] Transfer Devices:   []Edd Lift    []Slide Board ( [] with cut out  []26  []29)    ______________________________________________________________________    []Wheelchair: (please check)    Number of hours up in wheelchair per     day:_____________     Style:  [] Standard [] Pediatric  [] Light weight  []Reclining     []Amputee [] Michael [] POV []Custom     Custom Vendor:________________________________________                                                      Seat Width: []18 (standard adult)    []16" (small adult)   []14(child)      [] 20  [] 22 [] 24         Seat Depth:   []16    []18  []20      Back Height:    []Standard      []Michael          []Other     Leg Support: []Standard []Heel loops []Elevating leg rest    []Articulating              []Swing Away     Arm Height:  []Detachable []Full   [] Desk  []Swing Away [] Adjustable Height          Lap Belt: []Velcro []Buckle                               Accessories: [] Front brakes          [] Brake Extensions  []Anti-tippers                          []Safety Belt    Other:_______________________________________________     Cushion: []Basic []Foam []Gel  []Roho []Ellis I      Justification for Cushion(Must " complete):________________________________    ______________________________________________________________________        For wheelchair order: (please choose all that apply)  - Caregiver is capable and willing to operate wheelchair safely. []  - Patients upper body strength is sufficient for propulsion. []   - The patient has a cast, brace, or musculoskeletal condition which prevents 90 degree flexion of the knee. []  - The patient has significant edema of the lower extremities that requires elevating leg rests.  []  - A reclining back is ordered. []  - The patient requires the use of a wheel chair for ADLs within the home. []  - Patient mobility limitations cannot be sufficiently resolved by the use of other ambulatory therapies. []         __    []3 in 1 commode: []Standard     []Wide-Heavy Duty           []Drop arm                                      []Heavy Duty Drop Arm                              []Tub bench:  []Padded      [](Unpadded=standard)     []Commode Opening                                          [] Heavy Duty    []Shower Chair: []With back   []Without back      []Hospital Bed: []Semi Electric    []Manual    []Electric   []Heavy Duty  []Extra Heavy Duty(>600#)  Patient requires a bed height different than a fixed height hospital bed to permit transfers to chair, wheel chair or standing. []Yes         []N/A     []Accessories: [] Gel Overlay Mattress     []Low Air Mattress   []Alternating Pressure Pad                              []Trapeze    [] Hip Kit:  [] Short Horn     [] Long Horn         []Other:________________________________________________________________    ________________________________________________________________________    [x]Home Health:  [x]OT [x]PT []SLP   [] MSW   [] Aide    []SN        []Outpatient:  []OT []PT []SLP [] MSW   [] CM      [] Internal Referral      [] External Referral  ________________________________________________________________________    Emigdio  DOMINIC Moeller 4/9/2018

## 2018-04-09 NOTE — PROGRESS NOTES
"Physical Therapy   PT Treatment    Mirza Morales   MRN: [id number]      04/09/18 1309   PT Time Calculation   PT Start Time 1309   PT Stop Time 1354   PT Total Time (min) 45 min   Treatment   Treatment Type Treatment   PT/PTA PT   General   PT Received On 04/09/18   Family/Caregiver Present No   Patient Found (position) Supine in bed   Precautions   General Precautions fall   Orthopedic Yes   Required Braces or Orthoses No   Subjective   Patient states "I am a little sleepy, but I'm happy to see you."   Pain/Comfort   Pain Rating 1 3/10   Location - Side 1 Left   Location - Orientation 1 lower   Location 1 back   Pain Addressed 1 Distraction   Pain Rating Post-Intervention 1 1/10   Bed Mobility   Bed Mobility yes   Rolling/Turning to Left Modified independent  (use of bed rails)   Rolling/Turning Right Modified independent  (use of bed rails)   Supine to Sit Modified Independent  (use of bed rails)   Sit to Supine Modified Independent  (use of bed rails)   Transfers   Transfer yes   Sit to Stand   Sit <> Stand Assistance Stand By Assistance   Sit <> Stand Assistive Device Rolling Walker   Trials/Comments multiple trials   Stand to Sit   Assistance Stand By Assistance   Assistive Device Rolling Walker   Trials/Comments multiple trials   Bed to Chair   Bed <> Chair Technique Stand Pivot   Bed <> Chair Assistance Supervision   Bed <> Chair Assistive Device No Assistive Device   Trials/Comments x1 trial   Chair to Bed   Technique Stand Pivot   Assistance Supervision   Assistive Device No Assistive Device   Trials/Comments x1 trial   Wheelchair Activities   Propulsion Yes   Propulsion Type 1 Manual   Level 1 Level tile   Method 1 Right upper extremity;Left upper extremity   Level of Assistance 1 Supervision   Description/ Details 1 >200'    Gait   Gait Yes   Weight Bearing Status full   Gait 1   Surface 1 Level tile   Gait Assistive Device Rolling walker   Assistance 1 Stand by Assistance   Gait Distance 187'. Trial " "terminated due to fatigue    Gait Pattern swing-through gait   Gait Deviation(s) decreased fran;increased time in double stance;decreased velocity of limb motion;decreased step length;decreased swing-to-stance ratio;decreased weight-shifting ability;forward lean;foot flat   Impairments Contributing to Gait Deviations impaired balance;impaired coordination;pain;impaired postural control;decreased sensation;impaired sensory feedback;decreased strength   Stairs   Stairs Yes   Stairs/Curb Step   Rails 1 Bilateral   Device 1 No device   Assistance 1 Stand by Assistance   Comment/# Steps 1 Patient negotiated a flight of stairs using step to method.    Stairs/ Curb Step  2   Rails 2 None (Comment)   Device 2 Rolling walker   Assistance 2 Stand by Assistance   Comment/# Steps 2 Pt negotiated a 4" curb step with SBA; VC for safety   Other Activities   Comments Pt ambulated over uneven gruond for 22' with CGA for safety. Trial terminated due to increased pain.     Patient performed a stand pivot transfer to [facility] with RW with SBA    Activity Tolerance   Activity Tolerance Patient tolerated treatment well   After Treatment   Patient Position After Treatment Supine in bed   Patient after treatment left call button in reach   Assessment   Prognosis Good   Problem List Decreased strength;Decreased endurance;Decreased range of motion;Impaired balance;Decreased mobility;Pain;Impaired sensation   Session Assessment progressing toward goals   Assessment Patient had a great therapy session with increased mobility and decreased pain. Pt is able to compensate by decreasing L posterior pelvic tilt during functional mobility. Patient is improving well. Wife would benefit from family training. Cont POC    Level of Motivation/Participation good   Barriers to Discharge Inaccessible home environment   Barriers to Discharge Comments Pt has 2 steps to enter home   Discharge Recommendations   Equipment Needed After Discharge none   Discharge " Facility/Level Of Care Needs home health PT   Plan   Planned Therapy Intervention Continue with current plan   Therapy Frequency 2 times/day;daily;Monday-Friday;Saturday or Sunday   Physical Therapy Follow-up   PT Follow-up? Yes   PT - Next Visit Date 04/10/18   Treatment/Billable Minutes   Therapeutic Activity 45   Total Time 45     Salud Nixon, PT, DPT   4/9/2018

## 2018-04-09 NOTE — ASSESSMENT & PLAN NOTE
4/3 Wound care - dressing to incisions with Ag MWF  4/6 - erythema decreased. Will wait to take staples out until 4/9 4/9 staples out today

## 2018-04-09 NOTE — PROGRESS NOTES
"Occupational Therapy  AM Treatment  Mirza Morales   MRN: [id number]   Room/Bed: E280/E280 B       04/09/18 0930   OT Time Calculation   OT Start Time 0930   OT Stop Time 1102   OT Total Time (min) 92 min   General   OT Date of Treatment 04/09/18   Family/Caregiver Present No   Patient Found (position) Seated in wheelchair   Precautions   General Precautions fall   Subjective   Patient states "My back is starting to get a little sore from sitting up."   Pain/Comfort   Pain Rating 6/10   Location - Orientation lower   Location back   Pain Addressed Pre-medicate for activity   Pain Comment sharp pain that gets worse from sitting.   Sit to Stand   Sit <> Stand Assistance Stand By Assistance   Sit <> Stand Assistive Device No Assistive Device   Trials/Comments from    Stand to Sit   Assistance Stand By Assistance   Assistive Device No Assistive Device   Trials/Comments to    Bed to Chair   Bed <> Chair Technique Stand Pivot   Bed <> Chair Transfer Assistance Stand By Assistance   Bed <> Chair Assistive Device No Assistive Device   Trials/Comments from EOB>   Exercise Tools   Bilateral Isael 3# x 100 reps on incline; 8# x 100 reps flat forward/back to increase B UE strength in prep for ADLs and t/fs.   Dynamic Standing Balance   Dynamic Standing-Balance Support No upper extremity supported   Dynamic Standing-Balance Reaching for objects;Reaching across midline   Dynamic Standing-Comments SBA to complete functional standing activites with focus on balance, endurance, weight shift and B hand coordination. Pt completed [employer] box x 3 rows and 24pc jigsaw puzzle. Pt required several seated rest breaks to complete.   Activity Tolerance   Activity Tolerance Patient tolerated treatment well   Assessment   Prognosis Fair   Problem List Decreased Self Care skills;Decreased upper extremity range of motion;Decreased upper extremity strength;Decreased safe judgment during ADL;Decreased endurance;Decreased " sensation;Decreased functional mobility;Decreased IADLs;Decreased trunk control for functional activities   Assessment Pt participated well in tx session but remains with decresaed (I) with ADLs and functional mobility and decreased standing endurance. Pt would continue to benefit from skillee OT services.   Level of Motivation/Participation Good   Discharge Recommendations   Equipment Needed After Discharge none   Discharge Facility/Level Of Care Needs home with home health   Plan   Plan Continue with current plan   Therapy Frequency 2 times/day   Occupational Therapy Follow-up   OT Follow-up? Yes   Treatment/Billable Minutes   Therapeutic Activity 62   Therapeutic Exercise 30   Total Time 92       Emigdio DOMINIC Moeller  4/9/2018     Patient with wheelchair safety belt fastened and able to self release wheelchair safety belt. Pt left in gym in  awaiting kitchen mobility group with all necessities in reach, RN  notified.     LEGEND:   CGA: Contact Guard Assist   EOB: Edgeof Bed   HHA: Hand Held Assist   HOB: Head of Bed   (I): Independent-patient performs task in a timely manner   Max (A): Maximal Assist-patient performs 25-49% of task   Min (A): Minimal Assist- patient performs 75% or more of task   Mod (A): Moderate Assist- patient performs 50-74% of task   NA: Not applicable   NT: Not tested   OOB: Out of Bed   PTA: Prior to admit   QC: Quad Cane   RW: Rolling Walker   (S): Supervision- patient requires cues, coaxing, prompting   SBA: Stand By Assist   SC: Straight Cane   SW: Standard Walker   TBA: To be assessed   Total (A): Total Assist- patient performs less than 25% of task   WC: Wheelchair   WFL: Within Functional Limits   WNL: Within Normal Limits

## 2018-04-09 NOTE — NURSING
Midline incision to back with 31staples removed without difficulty and no drainage noted.  Incision well approximated without redness or swelling.  Steri strips applied.  Lt flank incision with 13 staples removed without difficulty.  Incision well approximated and steri strips applied.  No drainage or swelling noted.  Rt hip incision with 4 staples removed no drainage or swelling noted. Steri strips applied.   Lt hip with 5 staples removed without difficulty.  No redness or drainage.  Incision edges are well approximated and steri strips applied.

## 2018-04-09 NOTE — PROGRESS NOTES
Physical Therapy   PT FIM     Mirza Morales   MRN: [id number]        04/09/18 1309   Transfers   Bed/Chair/WC 5   Toilet 5   Locomotion   Distance Walked 3   Distance Wheelchair 3   Walk 5   Wheelchair 5   Mode B   Stairs 5     Salud Nixon, PT, DPT   4/9/2018

## 2018-04-09 NOTE — PROGRESS NOTES
"Occupational Therapy   Kitchen Mobility Group    Mirza Morales  MRN: [id number]  Room/Bed: E280/E280 B       04/09/18 1115   OT Time Calculation   OT Start Time 1115   OT Stop Time 1200   OT Total Time (min) 45 min   General   OT Date of Treatment 04/09/18   Family/Caregiver Present No   Patient Found (position) Seated in wheelchair   Precautions   General Precautions fall   Orthopedic Yes   Required Braces or Orthoses No   Subjective   Patient states "You want to get someone to clean up spills as soon as possible at home." Pt stated with good safety awareness concering kitchen mobility   Pain/Comfort   Pain Rating 4/10   Location - Orientation lower   Location back   Pain Addressed Pre-medicate for activity;Distraction   OT Therapeutic Groups   OT Therapeutic Yes   Other Pt participated in 45 kitchen mobility group. The group activity reviewed safety techniques, energy conservation and work simplification for kitchen accessibility and safe mobility. The patient performed kitchen mobility in ADL Therapy Kitchen. Patient completed kitchen mobility standing with RW with SBA assistance. Patient demonstrated appropriate safety awareness with item-retrieval from a variety of surfaces within kitchen. Patient able to successfully transfer items from kitchen to table with CGA assistance. Patient completed setting table appropriately with efficient timing. Patient demonstrated increased awareness with compensatory techniques with any home barriers within kitchen.     These activities were performed in a group setting to encourage increased participation with peers and social interaction skills.    Treatment/Billable Minutes   Therapeutic Group 45   Total Time 45       Katie Mckeon OT  4/9/2018     Patient with wheelchair safety belt fastened and able to self release wheelchair safety belt. Pt left seated in w/c in pt's room (location) with call light and all necessities in reach, nursing notified.     LEGEND:   CGA: " Contact Guard Assist   EOB: Edge of Bed   HHA: Hand Held Assist   HOB: Head of Bed   (I): Independent-patient performs task in a timely manner   Max (A): Maximal Assist-patient performs 25-49% of task   Min (A): Minimal Assist- patient performs 75% or more of task   Mod (A): Moderate Assist- patient performs 50-74% of task   NA: Not applicable   NT: Not tested   OOB: Out of Bed   PTA: Prior to admit   QC: Quad Cane   RW: Rolling Walker   (S): Supervision- patient requires cues, coaxing, prompting   SBA: Stand By Assist   SC: Straight Cane   SW: Standard Walker   TBA: To be assessed   Total (A): Total Assist- patient performs less than 25% of task   WC: Wheelchair   WFL: Within Functional Limits   WNL: Within Normal Limits

## 2018-04-09 NOTE — PROGRESS NOTES
[facility]  Physical Medicine & Rehab  Progress Note    Patient Name: Mirza Morales  MRN: [id number]  Patient Class: IP- Rehab   Admission Date: 3/30/2018  Length of Stay: 10 days  Attending Physician: Valentin uCnha MD  Primary Care Provider: LIDIA Banks MD    Subjective:     Principal Problem:Lumbar myelopathy    Interval History 4/9/2018:  Patient is seen for follow-up rehab evaluation and recommendations: Pt without new c/o's. Happy with current pain medication. Does not want any changes before going home tomorrow. Staples to be removed today.  I have reviewed the HPI and PMFSH and there are no changes from admission.       Scheduled Medications:    baclofen  10 mg Oral QHS    cyclobenzaprine  5 mg Oral TID    divalproex  500 mg Oral Daily    divalproex ER  1,000 mg Oral QHS    doxepin  100 mg Oral QHS    enoxaparin  40 mg Subcutaneous Daily    folic acid  1 mg Oral Daily    gabapentin  600 mg Oral TID    hydrOXYzine pamoate  50 mg Oral QHS    lurasidone  80 mg Oral Daily    metoprolol tartrate  12.5 mg Oral BID    pantoprazole  40 mg Oral Daily    pravastatin  40 mg Oral QHS    senna-docusate 8.6-50 mg  1 tablet Oral BID       PRN Medications: acetaminophen, albuterol, bisacodyl, hydrocodone-acetaminophen 7.5-325mg, lactulose, magnesium hydroxide 400 mg/5 ml, ondansetron, oxyCODONE, ramelteon    Review of Systems   Constitutional: Positive for activity change.   HENT: Negative.  Negative for congestion.    Eyes: Negative.    Respiratory: Negative for shortness of breath.    Cardiovascular: Negative for chest pain and leg swelling.   Gastrointestinal: Positive for constipation.   Endocrine: Negative.    Genitourinary: Negative for difficulty urinating.   Musculoskeletal: Positive for back pain, gait problem and myalgias.   Skin: Negative.    Allergic/Immunologic: Negative.    Neurological: Positive for weakness and numbness. Negative for headaches.   Hematological:  Negative.    Psychiatric/Behavioral: Negative.      Objective:     Vital Signs (Most Recent):  Temp: 98.3 °F (36.8 °C) (04/09/18 0844)  Pulse: 86 (04/09/18 0844)  Resp: 17 (04/09/18 0844)  BP: 116/72 (04/09/18 0844)  SpO2: 96 % (04/09/18 0844)    Vital Signs (24h Range):  Temp:  [98.3 °F (36.8 °C)-98.4 °F (36.9 °C)] 98.3 °F (36.8 °C)  Pulse:  [85-86] 86  Resp:  [17] 17  SpO2:  [96 %-98 %] 96 %  BP: (100-116)/(59-72) 116/72     Physical Exam   Constitutional: He is oriented to person, place, and time. He appears well-developed.   HENT:   Head: Normocephalic.   Eyes: EOM are normal.   Neck: Normal range of motion.   Cardiovascular: Normal rate and regular rhythm.    Pulmonary/Chest: Effort normal and breath sounds normal.   Abdominal: Soft. Bowel sounds are normal.   Musculoskeletal: Normal range of motion.   Neurological: He is alert and oriented to person, place, and time.   Skin: Skin is warm and dry.   Incision c/d/i  Mild erythema around incision. No warmth or discharge   Psychiatric: He has a normal mood and affect.     NEUROLOGICAL EXAMINATION:     MENTAL STATUS   Oriented to person, place, and time.     CRANIAL NERVES     CN III, IV, VI   Extraocular motions are normal.       Assessment/Plan:      Mirza Morales is a 62 y.o. male admitted to inpatient rehabilitation on 3/30/2018 for Lumbar myelopathy with impaired mobility and ADLs. Patient remains appropriate for PT, OT, and as required Speech therapy. Patient continues to require 24 hour nursing care as well as daily Physician assessment.    * Lumbar myelopathy    PT/OT  Monitor bowel and bladder  Last BM 4/3.   4/3 Senna-colace 8.6-50 bid, lactulose q6h prn constipation. KUB showed mild-mod constipation. Baclofen 10 qhs  4/4 Change oxycodone-apap 10 q4h prn to oxycodone 10 q4h prn per patient request. Add flexeril 10 tid  4/7 received low air loss mattress   4/8 pain well controlled         Constipation    4/9 KUB. Lactulose until BM. Fleet enema if no  results from lactulose        Alteration in skin integrity related to surgical incision    4/3 Wound care - dressing to incisions with Ag MWF  4/6 - erythema decreased. Will wait to take staples out until 4/9 4/9 staples out today        Hyperkalemia    BMP  Lab Results   Component Value Date     04/05/2018    K 3.9 04/05/2018    CL 97 04/05/2018    CO2 29 04/05/2018    BUN 9 04/05/2018    CREATININE 0.9 04/05/2018    CALCIUM 8.7 04/05/2018    ANIONGAP 11 04/05/2018    ESTGFRAFRICA >60.0 04/05/2018    EGFRNONAA >60.0 04/05/2018             Postprocedural pneumothorax    Resolved. Continue to monitor respiratory status        Bipolar 1 disorder, mixed    Continue Doxepin and Vistaril        Seizure    Continue Depakote            DISCHARGE PLANNING:  Tentative Discharge Date:     Future Appointments  Date Time Provider Department Center   4/11/2018 3:30 PM Ambrosio Loco MD [facility] PS[facility]       Ashlee Al MD  Department of Physical Medicine & Rehab   [facility]

## 2018-04-09 NOTE — DISCHARGE INSTRUCTIONS
[employer] [phone] will provide therapy in your home. You will be contacted to schedule initial visit. If you have not received a call the day after discharge you should call the company at the number provided.

## 2018-04-10 ENCOUNTER — TELEPHONE (OUTPATIENT)
Dept: PHYSICAL MEDICINE AND REHAB | Facility: CLINIC | Age: 63
End: 2018-04-10

## 2018-04-10 VITALS
WEIGHT: 179.25 LBS | RESPIRATION RATE: 18 BRPM | BODY MASS INDEX: 21.17 KG/M2 | HEIGHT: 77 IN | HEART RATE: 89 BPM | OXYGEN SATURATION: 95 % | DIASTOLIC BLOOD PRESSURE: 66 MMHG | SYSTOLIC BLOOD PRESSURE: 108 MMHG | TEMPERATURE: 98 F

## 2018-04-10 PROBLEM — E87.5 HYPERKALEMIA: Status: RESOLVED | Noted: 2018-04-02 | Resolved: 2018-04-10

## 2018-04-10 PROBLEM — R23.9 ALTERATION IN SKIN INTEGRITY RELATED TO SURGICAL INCISION: Status: RESOLVED | Noted: 2018-04-03 | Resolved: 2018-04-10

## 2018-04-10 PROBLEM — J95.811 POSTPROCEDURAL PNEUMOTHORAX: Status: RESOLVED | Noted: 2018-03-30 | Resolved: 2018-04-10

## 2018-04-10 PROCEDURE — 25000003 PHARM REV CODE 250: Performed by: PHYSICAL MEDICINE & REHABILITATION

## 2018-04-10 PROCEDURE — 99232 SBSQ HOSP IP/OBS MODERATE 35: CPT | Mod: ,,, | Performed by: PHYSICAL MEDICINE & REHABILITATION

## 2018-04-10 PROCEDURE — 97535 SELF CARE MNGMENT TRAINING: CPT

## 2018-04-10 PROCEDURE — 97530 THERAPEUTIC ACTIVITIES: CPT

## 2018-04-10 PROCEDURE — 97150 GROUP THERAPEUTIC PROCEDURES: CPT

## 2018-04-10 PROCEDURE — 97110 THERAPEUTIC EXERCISES: CPT

## 2018-04-10 RX ORDER — LACTULOSE 10 G/15ML
20 SOLUTION ORAL EVERY 6 HOURS PRN
Qty: 30 ML | Refills: 10 | Status: SHIPPED | OUTPATIENT
Start: 2018-04-10 | End: 2018-04-20

## 2018-04-10 RX ORDER — OXYCODONE HYDROCHLORIDE 10 MG/1
10 TABLET ORAL EVERY 4 HOURS PRN
Qty: 40 TABLET | Refills: 0 | Status: SHIPPED | OUTPATIENT
Start: 2018-04-10 | End: 2018-05-12

## 2018-04-10 RX ORDER — CYCLOBENZAPRINE HCL 5 MG
5 TABLET ORAL 3 TIMES DAILY
Qty: 30 TABLET | Refills: 0 | Status: SHIPPED | OUTPATIENT
Start: 2018-04-10 | End: 2018-04-20

## 2018-04-10 RX ADMIN — CYCLOBENZAPRINE HYDROCHLORIDE 5 MG: 5 TABLET, FILM COATED ORAL at 08:04

## 2018-04-10 RX ADMIN — DIVALPROEX SODIUM 500 MG: 250 TABLET, DELAYED RELEASE ORAL at 08:04

## 2018-04-10 RX ADMIN — GABAPENTIN 600 MG: 300 CAPSULE ORAL at 08:04

## 2018-04-10 RX ADMIN — PANTOPRAZOLE SODIUM 40 MG: 40 TABLET, DELAYED RELEASE ORAL at 08:04

## 2018-04-10 RX ADMIN — LURASIDONE HYDROCHLORIDE 80 MG: 40 TABLET, FILM COATED ORAL at 08:04

## 2018-04-10 RX ADMIN — FOLIC ACID 1 MG: 1 TABLET ORAL at 08:04

## 2018-04-10 RX ADMIN — STANDARDIZED SENNA CONCENTRATE AND DOCUSATE SODIUM 1 TABLET: 8.6; 5 TABLET, FILM COATED ORAL at 08:04

## 2018-04-10 RX ADMIN — Medication 12.5 MG: at 08:04

## 2018-04-10 RX ADMIN — OXYCODONE HYDROCHLORIDE 10 MG: 5 TABLET ORAL at 08:04

## 2018-04-10 NOTE — ASSESSMENT & PLAN NOTE
4/9 KUB. Lactulose until BM. Fleet enema if no results from lactulose  4/10 BM after lactulose. KUB showed moderate stool.

## 2018-04-10 NOTE — PROGRESS NOTES
[facility]  Physical Medicine & Rehab  Progress Note    Patient Name: Mirza Morales  MRN: [id number]  Patient Class: IP- Rehab   Admission Date: 3/30/2018  Length of Stay: 11 days  Attending Physician: Valentin Cunha MD  Primary Care Provider: LIDIA Banks MD    Subjective:     Principal Problem:Lumbar myelopathy    Interval History 4/10/2018:  Patient is seen for follow-up rehab evaluation and recommendations: Pt without new c/o's. Ready to go home today.  [facility] and [facility] are [facility] from [facility].       Scheduled Medications:    baclofen  10 mg Oral QHS    cyclobenzaprine  5 mg Oral TID    divalproex  500 mg Oral Daily    divalproex ER  1,000 mg Oral QHS    doxepin  100 mg Oral QHS    enoxaparin  40 mg Subcutaneous Daily    folic acid  1 mg Oral Daily    gabapentin  600 mg Oral TID    hydrOXYzine pamoate  50 mg Oral QHS    lactulose  30 g Oral TID    lurasidone  80 mg Oral Daily    metoprolol tartrate  12.5 mg Oral BID    pantoprazole  40 mg Oral Daily    pravastatin  40 mg Oral QHS    senna-docusate 8.6-50 mg  1 tablet Oral BID    sodium phosphates  1 enema Rectal Once       PRN Medications: acetaminophen, albuterol, bisacodyl, bisacodyl, hydrocodone-acetaminophen 7.5-325mg, lactulose, magnesium hydroxide 400 mg/5 ml, ondansetron, oxyCODONE, ramelteon    Review of Systems   Constitutional: Positive for activity change.   HENT: Negative.  Negative for congestion.    Eyes: Negative.    Respiratory: Negative for shortness of breath.    Cardiovascular: Negative for chest pain and leg swelling.   Gastrointestinal: Positive for constipation.   Endocrine: Negative.    Genitourinary: Negative for difficulty urinating.   Musculoskeletal: Positive for back pain, gait problem and myalgias.   Skin: Negative.    Allergic/Immunologic: Negative.    Neurological: Positive for weakness and numbness. Negative for headaches.   Hematological: Negative.     Psychiatric/Behavioral: Negative.      Objective:     Vital Signs (Most Recent):  Temp: 98.3 °F (36.8 °C) (04/10/18 0700)  Pulse: 89 (04/10/18 0700)  Resp: 18 (04/10/18 0700)  BP: 108/66 (04/10/18 0700)  SpO2: 95 % (04/10/18 0700)    Vital Signs (24h Range):  Temp:  [98.2 °F (36.8 °C)-98.3 °F (36.8 °C)] 98.3 °F (36.8 °C)  Pulse:  [78-89] 89  Resp:  [18] 18  SpO2:  [95 %-97 %] 95 %  BP: (108-130)/(66-79) 108/66     Physical Exam   Constitutional: He is oriented to person, place, and time. He appears well-developed.   HENT:   Head: Normocephalic.   Eyes: EOM are normal.   Neck: Normal range of motion.   Cardiovascular: Normal rate and regular rhythm.    Pulmonary/Chest: Effort normal and breath sounds normal.   Abdominal: Soft. Bowel sounds are normal.   Musculoskeletal: Normal range of motion.   Neurological: He is alert and oriented to person, place, and time.   Skin: Skin is warm and dry.   Incision c/d/i  Mild erythema around incision. No warmth or discharge   Psychiatric: He has a normal mood and affect.     NEUROLOGICAL EXAMINATION:     MENTAL STATUS   Oriented to person, place, and time.     CRANIAL NERVES     CN III, IV, VI   Extraocular motions are normal.       Assessment/Plan:      Mirza Morales is a 62 y.o. male admitted to inpatient rehabilitation on 3/30/2018 for Lumbar myelopathy with impaired mobility and ADLs. Patient remains appropriate for PT, OT, and as required Speech therapy. Patient continues to require 24 hour nursing care as well as daily Physician assessment.    * Lumbar myelopathy    PT/OT  Monitor bowel and bladder  Last BM 4/3.   4/3 Senna-colace 8.6-50 bid, lactulose q6h prn constipation. KUB showed mild-mod constipation. Baclofen 10 qhs  4/4 Change oxycodone-apap 10 q4h prn to oxycodone 10 q4h prn per patient request. Add flexeril 10 tid  4/7 received low air loss mattress   4/8 pain well controlled         Constipation    4/9 KUB. Lactulose until BM. Fleet enema if no results  from lactulose  4/10 BM after lactulose. KUB showed moderate stool.        Bipolar 1 disorder, mixed    Continue Doxepin and Vistaril        Seizure    Continue Depakote            DISCHARGE PLANNING:  Tentative Discharge Date: 4/10/2018    Future Appointments  Date Time Provider Department Center   4/11/2018 3:30 PM Ambrosio Loco MD [facility]   4/17/2018 3:00 PM Francisco Javier Tavarez MD [facility]       Ashlee Al MD  Department of Physical Medicine & Rehab   [facility]

## 2018-04-10 NOTE — PROGRESS NOTES
"Occupational Therapy  Discharge Summary/AM Treatment  Mirza Morales   MRN: [id number]   Room/Bed: E280/E280 B     04/10/18 0730   OT Time Calculation   OT Start Time 0730   OT Stop Time 0902   OT Total Time (min) 92 min   General   OT Date of Treatment 04/10/18   Family/Caregiver Present No   Patient Found (position) Seated in wheelchair   Precautions   General Precautions fall   Subjective   Patient states "My back is pretty tender from having those staples removed."   Pain/Comfort   Pain Rating 4/10   Location - Orientation lower   Location back   Pain Addressed Pre-medicate for activity   Bed Mobility   Rolling/Turning to Left Modified independent   Rolling/Turning Left Comments with rail   Rolling/Turning Right Modified independent   Rolling/Turning Right Comments with rail   Scooting/Bridging Modified Independent   Scooting/Bridging Comments to scoot to EOB   Supine to Sit Modified Independent   Supine to Sit Comments to EOB with use of rail   Sit to Supine Modified Independent   Sit to Supine Comments to flat bed   Sit to Stand   Sit <> Stand Assistance Supervision   Sit <> Stand Assistive Device No Assistive Device   Trials/Comments from EOB   Stand to Sit   Assistance Supervision   Assistive Device No Assistive Device   Trials/Comments to EOB   Bed to Chair   Bed <> Chair Technique Stand Pivot   Bed <> Chair Transfer Assistance Supervision   Bed <> Chair Assistive Device No Assistive Device   Trials/Comments from EOB>wc   Chair to Bed   Technique Stand Pivot   Assistance Supervision   Assistive Device No Assistive Device   Trials/Comments from wc>EOB   Shower Transfer   Shower Transfer Technique Stand Pivot   Shower Transfer Assistance Supervision   Shower Transfer Assistive Device No Assistive Device   Trials/Comments from >TTB   Toilet Transfer   Toilet Transfer Technique Stand Pivot   Toilet Transfer Assistance Supervision   Toilet Transfer Assistive Device grab bar   Trials/Comments from >raised " toilet   Feeding   Feeding Level of Assistance Independent   Feeding Where Assessed Wheelchair   Grooming   Grooming Level of Assistance Independent   Grooming Where Assessed Sitting sinkside   Bathing   Bathing Level of Assistance Supervision   Bathing Where Assessed Edge of bed   Bathing Comments pt unable to shower 2/2 <24hrs since staples removed   UE Dressing   UE Dressing Level of Assistance Independent   UE Dressing Where Assessed Edge of bed   UE Dressing Comments button-up shirt   LE Dressing   LE Dressing  Level of Assistance Supervision   LE Dressing Where Assessed Wheelchair   Toileting   Toileting Level of Assistance Supervision   Toileting Where Assessed Toilet   Toileting Comments for safety   Exercise Tools   Rickshaw 8# 5x20 reps to increase B triceps strength for functional t/fs   Bilateral Isael 3# x 100 reps on incline to increase B UE strength in prep for ADLs and t/fs.   Activity Tolerance   Activity Tolerance Patient tolerated treatment well   After Treatment   Patient Position After Treatment Seated in wheelchair   Assessment   Prognosis Good   Problem List Decreased Self Care skills;Decreased upper extremity range of motion;Decreased upper extremity strength;Decreased safe judgment during ADL;Decreased endurance;Decreased sensation;Decreased functional mobility;Decreased IADLs;Decreased trunk control for functional activities   Assessment Pt participated well in tx session but remains with requiring (S) for safety. Pt appears to have met all established goals. Pt would continue to benefit from HHOT.   Level of Motivation/Participation Good   Long Term Goals   Pt Will Transfer Bed/Chair Supervision   Transfer Bed/Chair - Met/Not Met Met   Pt Will Transfer To Bedside Commode With supervision   Transfer Bedside Commode -Met/Not Met Met   Pt Will Transfer To Shower With supervision   Transfer to Shower-Met/Not Met Met   Pt Will Perform Eating Independently   Eating - Met/Not Met Met   Pt Will  Perform Grooming Independently   Grooming - Met/Not Met Met   Pt Will Perform Bathing With supervision   Bathing - Met/Not Met Met   Pt Will Perform UE Dressing Independently   UE Dressing - Met/Not Met Met   Pt Will Perform LE Dressing With supervision   LE Dressing - Met/Not Met Met   Pt Will Perform Toileting With supervision   Toileting-Met/Not Met Met   Discharge Recommendations   Equipment Needed After Discharge none   Discharge Facility/Level Of Care Needs home with home health   Plan   Plan Alter current plan   Plan Comments continue with HHOT   Occupational Therapy Follow-up   OT Follow-up? No   Treatment/Billable Minutes   Self Care/Home Management 70   Therapeutic Exercise 22   Total Time 92       DOMINIC Lanza  4/10/2018     Patient with wheelchair safety belt fastened and able to self release wheelchair safety belt. Pt left in room with call light and all necessities in reach, RN notified.     LEGEND:   CGA: Contact Guard Assist   EOB: Edgeof Bed   HHA: Hand Held Assist   HOB: Head of Bed   (I): Independent-patient performs task in a timely manner   Max (A): Maximal Assist-patient performs 25-49% of task   Min (A): Minimal Assist- patient performs 75% or more of task   Mod (A): Moderate Assist- patient performs 50-74% of task   NA: Not applicable   NT: Not tested   OOB: Out of Bed   PTA: Prior to admit   QC: Quad Cane   RW: Rolling Walker   (S): Supervision- patient requires cues, coaxing, prompting   SBA: Stand By Assist   SC: Straight Cane   SW: Standard Walker   TBA: To be assessed   Total (A): Total Assist- patient performs less than 25% of task   WC: Wheelchair   WFL: Within Functional Limits   WNL: Within Normal Limits

## 2018-04-10 NOTE — NURSING
Pt read and given discharged instructions and verbalized understanding.  Pt discharged to home with wife and family.  No complaints at this time.  Pt all packed up belongings and taken to car via wheelchair.  No acute distress noted.

## 2018-04-10 NOTE — DISCHARGE SUMMARY
[facility]  Physical Medicine & Rehab  Discharge Summary    Patient Name: Mirza Morales  MRN: [id number]  Admission Date: 3/30/2018  Hospital Length of Stay: 11 days  Discharge Date and Time:  04/10/2018 12:04 PM  Attending Physician: No att. providers found  Discharging Provider: Ashlee Al MD  Primary Care Physician: LIDIA Banks MD    HPI: 62 year old male with PMHx Bipolar disorder, Sezuires, Scoliosis s/p lumbar fusion direct lateral, lumbar fusion posterior l4-5, L3-4, L2-3. Posterior lumbar fusion L1-S1 c sacral iliac fusion and segmental and pelvic fixation, decompressed L1-S1; placement of 2nd left anterior tube thoracostomy. Pt was transfered to ICU post- op with a left pneumothorax- since then has resolved (thoravent removed on 3/25) left lung shows good expansion.    * No surgery found *     Indwelling Lines/Drains at time of discharge:   Lines/Drains/Airways          No matching active lines, drains, or airways          Hospital Course:   Patient completed inpatient rehabilitation. Pain medications were titrated during his stay. He was hemodynamically stable. Constipation was resolved with bowel meds and monitored with KUBs. He was continued on home meds for seizures and bipolar disorder.      04/10/18 0900   Transfers   Bed/Chair/WC 5   Toilet 5   Shower 5   Self Care   Eating 7   Grooming 7   Bathing 5   Dressing-Upper 7   Dressing-Lower 5   Toileting 5   Communication   Comprehension 6   Mode B   Expression 6   Mode B   Social Cognition   Social Interaction 6   Problem Solving 6   Memory 6       Consults         Status Ordering Provider     Inpatient consult to Registered Dietitian/Nutritionist  Once     Provider:  (Not yet assigned)    Completed MIGDALIA PULIDO          Significant Diagnostic Studies: Labs: All labs within the past 24 hours have been reviewed    Final Active Diagnoses:    Diagnosis Date Noted POA    PRINCIPAL PROBLEM:  Lumbar myelopathy [G95.9]  03/30/2018 Yes    Constipation [K59.00] 04/09/2018 Yes    Severe malnutrition [E43] 02/21/2018 Yes    Bipolar 1 disorder, mixed [F31.60] 12/21/2015 Yes    Seizure [R56.9] 12/07/2015 Yes      Problems Resolved During this Admission:    Diagnosis Date Noted Date Resolved POA    Alteration in skin integrity related to surgical incision [R23.9] 04/03/2018 04/10/2018 Yes    Hyperkalemia [E87.5] 04/02/2018 04/10/2018 No    Postprocedural pneumothorax [J95.811] 03/30/2018 04/10/2018 Yes       Discharged Condition: good    Disposition: Home or Self Care    Follow Up:  Follow-up Information     P Kingsley Banks MD In 1 week.    Specialty:  Internal Medicine  Contact information:  [address] [facility]  [place] [address]  [phone]                 Patient Instructions:     Referral to Home health   Referral Priority: Routine Referral Type: Home Health   Referral Reason: Specialty Services Required    Requested Specialty: Home Health Services    Number of Visits Requested: 1        Medications:  Reconciled Home Medications:      Medication List      START taking these medications    cyclobenzaprine 5 MG tablet  Commonly known as:  FLEXERIL  Take 1 tablet (5 mg total) by mouth 3 (three) times daily.     lactulose 20 gram/30 mL Soln  Commonly known as:  CHRONULAC  Take 30 mLs (20 g total) by mouth every 6 (six) hours as needed.        CHANGE how you take these medications    oxyCODONE 10 mg Tab immediate release tablet  Commonly known as:  ROXICODONE  Take 1 tablet (10 mg total) by mouth every 4 (four) hours as needed.  What changed:  · how much to take  · how to take this  · when to take this  · reasons to take this        CONTINUE taking these medications    acetaminophen 325 MG tablet  Commonly known as:  TYLENOL  Take 2 tablets (650 mg total) by mouth every 4 (four) hours as needed.     albuterol 90 mcg/actuation inhaler  Inhale 2 puffs into the lungs every 4 (four) hours as needed for Wheezing.      atorvastatin 40 MG tablet  Commonly known as:  LIPITOR  TAKE 1 TABLET (40 MG TOTAL) BY MOUTH ONCE DAILY.     beclomethasone 40 mcg/actuation Aero  Commonly known as:  QVAR  Inhale 1 puff into the lungs 2 (two) times daily. Controller     divalproex  MG Tb24  Commonly known as:  DEPAKOTE  Take 1 tablet (500mg) every morning and 2 tablets (1000mg) every night.     doxepin 100 MG capsule  Commonly known as:  SINEQUAN  TAKE 1 CAPSULE (100 MG TOTAL) BY MOUTH EVERY EVENING.     folic acid 1 MG tablet  Commonly known as:  FOLVITE  Take 1 tablet (1 mg total) by mouth once daily.     gabapentin 600 MG tablet  Commonly known as:  NEURONTIN  Take 600 mg by mouth 3 (three) times daily.     hydrOXYzine pamoate 50 MG Cap  Commonly known as:  VISTARIL  TAKE 1 CAPSULE (50 MG TOTAL) BY MOUTH EVERY EVENING.     lidocaine 5 %  Commonly known as:  LIDODERM  Place 1 patch onto the skin once daily. Remove & Discard patch within 12 hours or as directed by MD     lurasidone 80 mg Tab tablet  Commonly known as:  LATUDA  Take 80 mg by mouth once daily. Take one tablet daily with dinner.     multivit-min-FA-lycopen-lutein 300-600-300 mcg Tab  Commonly known as:  CENTRUM SILVER ULTRA MEN'S  Take 1 tablet by mouth once daily at 6am.     nicotine 14 mg/24 hr  Commonly known as:  NICODERM CQ  Place 1 patch onto the skin once daily at 6am.     pantoprazole 40 MG tablet  Commonly known as:  PROTONIX  Take 1 tablet (40 mg total) by mouth once daily.     thiamine 100 MG tablet  Take 1 tablet (100 mg total) by mouth once daily.            Time spent on the discharge of patient: 20 minutes    Ashlee Al MD  Department of Physical Medicine & Rehab  [facility]

## 2018-04-10 NOTE — HPI
62 year old male with PMHx Bipolar disorder, Sezuires, Scoliosis s/p lumbar fusion direct lateral, lumbar fusion posterior l4-5, L3-4, L2-3. Posterior lumbar fusion L1-S1 c sacral iliac fusion and segmental and pelvic fixation, decompressed L1-S1; placement of 2nd left anterior tube thoracostomy. Pt was transfered to ICU post- op with a left pneumothorax- since then has resolved (thoravent removed on 3/25) left lung shows good expansion.

## 2018-04-10 NOTE — TELEPHONE ENCOUNTER
----- Message from Corrina Castillo sent at 4/10/2018 12:19 PM CDT -----  Contact: Anamaria (wife) @ [phone]  Calling to speak with someone in Dr. Cunha's office regarding pain medication for the patient, says he was discharged from rehab today and needs something for the pain. Please call.

## 2018-04-10 NOTE — TELEPHONE ENCOUNTER
Spoke with the wife and told her to check with the pharmacy because something was sent over for the patient

## 2018-04-10 NOTE — HOSPITAL COURSE
Patient completed inpatient rehabilitation. Pain medications were titrated during his stay. He was hemodynamically stable. Constipation was resolved with bowel meds and monitored with KUBs. He was continued on home meds for seizures and bipolar disorder.      04/10/18 0900   Transfers   Bed/Chair/WC 5   Toilet 5   Shower 5   Self Care   Eating 7   Grooming 7   Bathing 5   Dressing-Upper 7   Dressing-Lower 5   Toileting 5   Communication   Comprehension 6   Mode B   Expression 6   Mode B   Social Cognition   Social Interaction 6   Problem Solving 6   Memory 6

## 2018-04-10 NOTE — PROGRESS NOTES
"Physical Therapy   PT Treatment/Family Training/DC    Mirza Morales   MRN: [id number]      04/10/18 1015   PT Time Calculation   PT Start Time 1015   PT Stop Time 1046   PT Total Time (min) 31 min   Treatment   Treatment Type Treatment   PT/PTA PT   General   PT Received On 04/10/18   Missed Time Reason (per family request to DC immediately. )   Family/Caregiver Present Yes   Patient Found (position) Seated in wheelchair   Pt found with (seat belt donned ;hand off from OT )   Precautions   General Precautions fall   Orthopedic Yes   Required Braces or Orthoses No   Subjective   Patient states "I'm ready to go home today"   Pain/Comfort   Pain Rating 1 no pain   Pain Rating Post-Intervention 1 no pain   Transfers   Transfer yes   Sit to Stand   Sit <> Stand Assistance Supervision   Sit <> Stand Assistive Device Rolling Walker   Trials/Comments x2 trials   Stand to Sit   Assistance Supervision   Assistive Device Rolling Walker   Trials/Comments x2 trials   Shower Transfer   Technique Stand Pivot   Assistance Stand By Assistance   Assistive Device Rolling Walker   Trials/Comments x1 trial; VC for squencing    Car Transfer   Trials/Comments PT deferred due to low nature of car and wanting to abide to precautions   Wheelchair Activities   Propulsion Yes   Propulsion Type 1 Manual   Level 1 Level tile   Method 1 Right upper extremity;Left upper extremity   Level of Assistance 1 Supervision   Description/ Details 1 >200'    Gait   Gait Yes   Weight Bearing Status full   Gait 1   Surface 1 Level tile   Gait Assistive Device Rolling walker   Assistance 1 Stand by Assistance   Gait Distance Patient ambulated 100' to demonstrate to family safe guarding techniques   Gait Pattern swing-to gait   Gait Deviation(s) decreased fran;increased time in double stance;decreased velocity of limb motion;decreased weight-shifting ability;decreased step length;forward lean   Impairments Contributing to Gait Deviations impaired " "balance;impaired coordination;impaired motor control;pain;impaired postural control;decreased sensation;impaired sensory feedback;decreased strength   Stairs   Stairs Yes   Stairs/Curb Step   Rails 1 Bilateral   Device 1 No device   Assistance 1 Stand by Assistance   Comment/# Steps 1 Patient negotiated 4 steps to demonstrate to family the step to gait pattern for pain management   Stairs/ Curb Step  2   Rails 2 None (Comment)   Device 2 Rolling walker   Assistance 2 Supervision   Comment/# Steps 2 Pt negotiated a 4" curb step with SBA to demonstrate to family safe guarding and sequencing for safety   Other Activities   Comments Family/caregiver arrived on time for training session.Family training initiated/completed with wife and friend. The following skills were demonstrated to the family/caregiver with pt present:  - Sit <> stand   - stairs  - curb  - shower  - Fall prevention  - DME adjustment- RW    Family /caregiver required no assistance to complete the above tasks. Family/caregiver will not need additional training. Family/caregiver escorted to the nurse's desk and OC notified to complete nursing training.    PT deferred picking up an object from ground 2/2 pain and precautions. FIM and QI scores were obtained in the last 24 hours.      Activity Tolerance   Activity Tolerance Patient tolerated treatment well   After Treatment   Patient Position After Treatment Seated in wheelchair   Patient after treatment left (left at nursing desk for DC )   Assessment   Prognosis Good   Problem List Decreased strength;Decreased endurance;Impaired balance;Decreased mobility;Decreased safety awareness;Decreased skin integrity;Pain   Session Assessment progressing toward goals   Assessment Patient made great progress during his IPR stay. Patient has modified ambulation and stair negotiation in order to reduce the frequency of posterior pelvic tilt during mobility. Family was educated and verbalized no other questions. PT " recommends home health in order to improve functional mobility and decrease burden of care on wife.    Level of Motivation/Participation good   Barriers to Discharge Inaccessible home environment   Barriers to Discharge Comments Pt has 2 steps to enter home   Long Term Goals   Supine to Sit-Met/Not Met Met   Sit to Supine - Met/Not Met Met   Logroll - Met/Not Met Met   Transfer Sit to stand - Met/Not Met Met   Transfer Bed/Chair - Met/Not Met Met   Ambulate - Met/Not Met Met   Go Up/Down Stairs - Met/Not Met Met   Go Up/Down Curb- Met/Not Met Met   Propel Wheelchair - Met/Not Met Met   Other Goal - Progress Met   Discharge Recommendations   Equipment Needed After Discharge none   Discharge Facility/Level Of Care Needs home health PT   Physical Therapy Follow-up   PT Follow-up? No   Treatment/Billable Minutes   Therapeutic Activity 31   Total Time 31     Salud Nixon, PT, DPT   4/10/2018

## 2018-04-10 NOTE — PROGRESS NOTES
Physical Therapy   PT FIM DC       Mirza Hilario Andrew   MRN: [id number]        04/10/18 1015   Transfers   Shower 5     Salud Nixon, PT, DPT   4/10/2018

## 2018-04-10 NOTE — PROGRESS NOTES
"Occupational Therapy   Dressing Group    Mirza Morales   MRN: [id number]       Subjective: "I got myself dressed this morning."     Pain level:  7/10 lower back                      How was pain addressed: Pre-medicated prior to session     Objective:   Pt participated in 45 min functional dressing group. The group activity reviewed safety considerations, energy conservation, and adaptive strategies for upper body and lower body dressing. Patient educated on adaptive equipment available to assist with dressing (button hook, long handled shoe horn, reacher, dressing stick, elastic shoe laces). Patient also educated on dressing tips that promote increased (I) with dressing such as wearing loose fit clothing, using footstool, and adapted clothing available.      UB dressing (pullover shirt, button down shirt, jacket):  Stand by Assist (pt impulsively stood up without RW to doff shirt)     LB dressing (socks)  Stand by Assist                      (pants)   Stand by Assist (used RW for safety and balance during stance to doff and don pants)             These activities were performed in a group setting to encourage participation with peers and social interaction skills.Social Interaction: (choose FIM score rating below)     §     Interacts appropriately with others - No medications needed. Patient asleep all shift (7)     Assessment:  Patient participated in a 45 minute dressing group activity.  The group activity challenged dynamic standing/sitting balance, core strengthening, bilateral upper extremity/ lower extremity strengthening, hand -eye coordination, and social affect/mood.  The patient verbalized understanding, demonstrated comprehension of use of AE and adaptive techniques for dressing.        04/10/18 0930   OT Time Calculation   OT Start Time 0930   OT Stop Time 1015   OT Total Time (min) 45 min   General   OT Date of Treatment 04/10/18   Family/Caregiver Present Yes   Patient Found (position) Seated in " wheelchair   Precautions   General Precautions fall   Orthopedic Yes   Required Braces or Orthoses No   Treatment/Billable Minutes   Therapeutic Group 45   Total Time 45     DOMINIC Albert  4/10/2018

## 2018-04-10 NOTE — PROGRESS NOTES
Occupational Therapy  DISCHARGE FIM SCORES    Mirza Morales  MRN: [id number]  Room/Bed: E280/E280 B       04/10/18 0900   Transfers   Bed/Chair/WC 5   Toilet 5   Shower 5   Self Care   Eating 7   Grooming 7   Bathing 5   Dressing-Upper 7   Dressing-Lower 5   Toileting 5   Communication   Comprehension 6   Mode B   Expression 6   Mode B   Social Cognition   Social Interaction 6   Problem Solving 6   Memory 6       DOMINIC Lanza  4/10/2018

## 2018-04-10 NOTE — SUBJECTIVE & OBJECTIVE
Interval History 4/10/2018:  Patient is seen for follow-up rehab evaluation and recommendations: Pt without new c/o's. Ready to go home today.  I have reviewed the HPI and PMFSH and there are no changes from admission.       Scheduled Medications:    baclofen  10 mg Oral QHS    cyclobenzaprine  5 mg Oral TID    divalproex  500 mg Oral Daily    divalproex ER  1,000 mg Oral QHS    doxepin  100 mg Oral QHS    enoxaparin  40 mg Subcutaneous Daily    folic acid  1 mg Oral Daily    gabapentin  600 mg Oral TID    hydrOXYzine pamoate  50 mg Oral QHS    lactulose  30 g Oral TID    lurasidone  80 mg Oral Daily    metoprolol tartrate  12.5 mg Oral BID    pantoprazole  40 mg Oral Daily    pravastatin  40 mg Oral QHS    senna-docusate 8.6-50 mg  1 tablet Oral BID    sodium phosphates  1 enema Rectal Once       PRN Medications: acetaminophen, albuterol, bisacodyl, bisacodyl, hydrocodone-acetaminophen 7.5-325mg, lactulose, magnesium hydroxide 400 mg/5 ml, ondansetron, oxyCODONE, ramelteon    Review of Systems   Constitutional: Positive for activity change.   HENT: Negative.  Negative for congestion.    Eyes: Negative.    Respiratory: Negative for shortness of breath.    Cardiovascular: Negative for chest pain and leg swelling.   Gastrointestinal: Positive for constipation.   Endocrine: Negative.    Genitourinary: Negative for difficulty urinating.   Musculoskeletal: Positive for back pain, gait problem and myalgias.   Skin: Negative.    Allergic/Immunologic: Negative.    Neurological: Positive for weakness and numbness. Negative for headaches.   Hematological: Negative.    Psychiatric/Behavioral: Negative.      Objective:     Vital Signs (Most Recent):  Temp: 98.3 °F (36.8 °C) (04/10/18 0700)  Pulse: 89 (04/10/18 0700)  Resp: 18 (04/10/18 0700)  BP: 108/66 (04/10/18 0700)  SpO2: 95 % (04/10/18 0700)    Vital Signs (24h Range):  Temp:  [98.2 °F (36.8 °C)-98.3 °F (36.8 °C)] 98.3 °F (36.8 °C)  Pulse:  [78-89]  89  Resp:  [18] 18  SpO2:  [95 %-97 %] 95 %  BP: (108-130)/(66-79) 108/66     Physical Exam   Constitutional: He is oriented to person, place, and time. He appears well-developed.   HENT:   Head: Normocephalic.   Eyes: EOM are normal.   Neck: Normal range of motion.   Cardiovascular: Normal rate and regular rhythm.    Pulmonary/Chest: Effort normal and breath sounds normal.   Abdominal: Soft. Bowel sounds are normal.   Musculoskeletal: Normal range of motion.   Neurological: He is alert and oriented to person, place, and time.   Skin: Skin is warm and dry.   Incision c/d/i  Mild erythema around incision. No warmth or discharge   Psychiatric: He has a normal mood and affect.     NEUROLOGICAL EXAMINATION:     MENTAL STATUS   Oriented to person, place, and time.     CRANIAL NERVES     CN III, IV, VI   Extraocular motions are normal.

## 2018-04-12 ENCOUNTER — TELEPHONE (OUTPATIENT)
Dept: INTERNAL MEDICINE | Facility: CLINIC | Age: 63
End: 2018-04-12

## 2018-04-12 NOTE — TELEPHONE ENCOUNTER
----- Message from Yolande Santo sent at 4/12/2018  9:33 AM CDT -----  Contact: [facility]-[phone]  Patient is refusing home health services PT, OT, and home health nurse.

## 2018-04-13 RX ORDER — DOXEPIN HYDROCHLORIDE 100 MG/1
CAPSULE ORAL
Qty: 30 CAPSULE | Refills: 3 | Status: ON HOLD | OUTPATIENT
Start: 2018-04-13 | End: 2018-05-15 | Stop reason: HOSPADM

## 2018-04-27 ENCOUNTER — PATIENT MESSAGE (OUTPATIENT)
Dept: INTERNAL MEDICINE | Facility: CLINIC | Age: 63
End: 2018-04-27

## 2018-05-08 ENCOUNTER — TELEPHONE (OUTPATIENT)
Dept: ADMINISTRATIVE | Facility: CLINIC | Age: 63
End: 2018-05-08

## 2018-05-08 ENCOUNTER — TELEPHONE (OUTPATIENT)
Dept: PHYSICAL MEDICINE AND REHAB | Facility: CLINIC | Age: 63
End: 2018-05-08

## 2018-05-08 NOTE — TELEPHONE ENCOUNTER
PATIENT NOT TAKING THE FOLLOWING MEDS AS LISTED ON DISCHARGE SUMMARY.      MEDS NOT IN HOME AND PATIENT STATES IS NOT TAKING. GABAPENTIN, LIDOCAINE PATCH, MULTIVITAMIN, OXYCODONE, THIAMINE, PROTONIX, ALBUTEROL, FOLIC ACID           Shemar Hoffmann RN   [facility] [place]   [phone]

## 2018-05-08 NOTE — PATIENT INSTRUCTIONS
PATIENT NOT TAKING THE FOLLOWING MEDS AS LISTED ON DISCHARGE SUMMARY.     MEDS NOT IN HOME AND PATIENT STATES IS NOT TAKING. GABAPENTIN, LIDOCAINE PATCH, MULTIVITAMIN, OXYCODONE, THIAMINE, PROTONIX, ALBUTEROL, FOLIC ACID        Shemar Hoffmann RN   [facility] [place]   [phone]

## 2018-05-08 NOTE — PATIENT INSTRUCTIONS
Patient is refusing PT eval at this time.  Advised him to advise RN when he is ready.     : Carol Oliveros RN with [facility]

## 2018-05-10 ENCOUNTER — HOSPITAL ENCOUNTER (EMERGENCY)
Facility: HOSPITAL | Age: 63
Discharge: HOME | End: 2018-05-10
Attending: EMERGENCY MEDICINE
Payer: COMMERCIAL

## 2018-05-10 ENCOUNTER — PATIENT MESSAGE (OUTPATIENT)
Dept: NEUROLOGY | Facility: CLINIC | Age: 63
End: 2018-05-10

## 2018-05-10 ENCOUNTER — PATIENT MESSAGE (OUTPATIENT)
Dept: PSYCHIATRY | Facility: CLINIC | Age: 63
End: 2018-05-10

## 2018-05-10 VITALS
HEART RATE: 96 BPM | WEIGHT: 230 LBS | DIASTOLIC BLOOD PRESSURE: 94 MMHG | OXYGEN SATURATION: 98 % | HEIGHT: 75 IN | BODY MASS INDEX: 28.6 KG/M2 | RESPIRATION RATE: 18 BRPM | SYSTOLIC BLOOD PRESSURE: 142 MMHG | TEMPERATURE: 98 F

## 2018-05-10 DIAGNOSIS — E87.1 HYPONATREMIA: ICD-10-CM

## 2018-05-10 DIAGNOSIS — R56.9 SEIZURE: Primary | ICD-10-CM

## 2018-05-10 DIAGNOSIS — E86.0 DEHYDRATION: ICD-10-CM

## 2018-05-10 LAB
ALBUMIN SERPL BCP-MCNC: 3 G/DL
ALP SERPL-CCNC: 112 U/L
ALT SERPL W/O P-5'-P-CCNC: 11 U/L
ANION GAP SERPL CALC-SCNC: 9 MMOL/L
AST SERPL-CCNC: 13 U/L
BASOPHILS # BLD AUTO: 0.01 K/UL
BASOPHILS NFR BLD: 0.3 %
BILIRUB SERPL-MCNC: 0.5 MG/DL
BUN SERPL-MCNC: 8 MG/DL (ref 6–30)
BUN SERPL-MCNC: 9 MG/DL
CALCIUM SERPL-MCNC: 9 MG/DL
CHLORIDE SERPL-SCNC: 105 MMOL/L (ref 95–110)
CHLORIDE SERPL-SCNC: 97 MMOL/L
CO2 SERPL-SCNC: 23 MMOL/L
CREAT SERPL-MCNC: 0.6 MG/DL (ref 0.5–1.4)
CREAT SERPL-MCNC: 0.8 MG/DL
DIFFERENTIAL METHOD: ABNORMAL
EOSINOPHIL # BLD AUTO: 0 K/UL
EOSINOPHIL NFR BLD: 0.3 %
ERYTHROCYTE [DISTWIDTH] IN BLOOD BY AUTOMATED COUNT: 14.9 %
EST. GFR  (AFRICAN AMERICAN): >60 ML/MIN/1.73 M^2
EST. GFR  (NON AFRICAN AMERICAN): >60 ML/MIN/1.73 M^2
GLUCOSE SERPL-MCNC: 127 MG/DL
GLUCOSE SERPL-MCNC: 95 MG/DL (ref 70–110)
HCT VFR BLD AUTO: 36.2 %
HCT VFR BLD CALC: 33 %PCV (ref 36–54)
HGB BLD-MCNC: 11.8 G/DL
IMM GRANULOCYTES # BLD AUTO: 0.02 K/UL
IMM GRANULOCYTES NFR BLD AUTO: 0.6 %
LYMPHOCYTES # BLD AUTO: 0.8 K/UL
LYMPHOCYTES NFR BLD: 25.2 %
MCH RBC QN AUTO: 30.3 PG
MCHC RBC AUTO-ENTMCNC: 32.6 G/DL
MCV RBC AUTO: 93 FL
MONOCYTES # BLD AUTO: 0.4 K/UL
MONOCYTES NFR BLD: 12.4 %
NEUTROPHILS # BLD AUTO: 2 K/UL
NEUTROPHILS NFR BLD: 61.2 %
NRBC BLD-RTO: 0 /100 WBC
PLATELET # BLD AUTO: 105 K/UL
PMV BLD AUTO: 9.8 FL
POC IONIZED CALCIUM: 0.68 MMOL/L (ref 1.06–1.42)
POC TCO2 (MEASURED): 19 MMOL/L (ref 23–29)
POTASSIUM BLD-SCNC: 3.8 MMOL/L (ref 3.5–5.1)
POTASSIUM SERPL-SCNC: 3.7 MMOL/L
PROT SERPL-MCNC: 6.9 G/DL
RBC # BLD AUTO: 3.9 M/UL
SAMPLE: ABNORMAL
SODIUM BLD-SCNC: 130 MMOL/L (ref 136–145)
SODIUM SERPL-SCNC: 129 MMOL/L
VALPROATE SERPL-MCNC: 72.8 UG/ML
WBC # BLD AUTO: 3.3 K/UL

## 2018-05-10 PROCEDURE — 80053 COMPREHEN METABOLIC PANEL: CPT

## 2018-05-10 PROCEDURE — 96374 THER/PROPH/DIAG INJ IV PUSH: CPT

## 2018-05-10 PROCEDURE — 80164 ASSAY DIPROPYLACETIC ACD TOT: CPT

## 2018-05-10 PROCEDURE — 99284 EMERGENCY DEPT VISIT MOD MDM: CPT | Mod: 25

## 2018-05-10 PROCEDURE — 99284 EMERGENCY DEPT VISIT MOD MDM: CPT | Mod: ,,, | Performed by: NURSE PRACTITIONER

## 2018-05-10 PROCEDURE — 25000003 PHARM REV CODE 250: Performed by: NURSE PRACTITIONER

## 2018-05-10 PROCEDURE — 63600175 PHARM REV CODE 636 W HCPCS: Performed by: NURSE PRACTITIONER

## 2018-05-10 PROCEDURE — 96361 HYDRATE IV INFUSION ADD-ON: CPT

## 2018-05-10 PROCEDURE — 85025 COMPLETE CBC W/AUTO DIFF WBC: CPT

## 2018-05-10 RX ORDER — KETOROLAC TROMETHAMINE 30 MG/ML
15 INJECTION, SOLUTION INTRAMUSCULAR; INTRAVENOUS
Status: COMPLETED | OUTPATIENT
Start: 2018-05-10 | End: 2018-05-10

## 2018-05-10 RX ORDER — ONDANSETRON 4 MG/1
4 TABLET, ORALLY DISINTEGRATING ORAL
Status: COMPLETED | OUTPATIENT
Start: 2018-05-10 | End: 2018-05-10

## 2018-05-10 RX ADMIN — ONDANSETRON 4 MG: 4 TABLET, ORALLY DISINTEGRATING ORAL at 03:05

## 2018-05-10 RX ADMIN — KETOROLAC TROMETHAMINE 15 MG: 30 INJECTION, SOLUTION INTRAMUSCULAR at 04:05

## 2018-05-10 RX ADMIN — SODIUM CHLORIDE 1000 ML: 0.9 INJECTION, SOLUTION INTRAVENOUS at 05:05

## 2018-05-10 NOTE — ED PROVIDER NOTES
"Encounter Date: 5/10/2018    SCRIBE #1 NOTE: I, Kanwal Galvez, am scribing for, and in the presence of,  Dr. Mix . I have scribed the following portions of the note - the APC attestation.       History     Chief Complaint   Patient presents with    Seizures     Pt had witnessed seizure x 2 since last night.  (+) hx seizures     Pt is a 62 yo M w/PMH bipolar disorder, PTSD, previous alcohol dependence, seizures related to alcohol withdrawal presenting to the ED due to 2 witnessed seizures last night and one this afternoon.  Pt states seizures only lasted a "few minutes. Patient with history of seizures, but unsure when his last seizure was.  Patient reports no recent alcohol use and is compliant with his Depakote.  Pt states he has a 10/10 headache that started within the last hour and is accompanied by nausea.  Pt denies abdominal pain, SOB, chest pain, fever, chills, diarrhea, or constipation.             Review of patient's allergies indicates:   Allergen Reactions    Gabapentin Other (See Comments)     SEVERE DIZZINESS AND BALANCE PROBLEMS, UNABLE TO WALK.    Nardil [phenelzine]      BECOMES HYPER, LIKE A MANIC EPISODE.    Penicillins Hives    Pneumococcal 23-jett ps vaccine      Patient refused, will get later      Lamictal [lamotrigine] Rash     Past Medical History:   Diagnosis Date    Alcohol abuse     Behavioral problem     Bipolar 1 disorder     Chronic right hip pain     Depression     DJD (degenerative joint disease), lumbar 1/27/2015    Fatigue     Hepatitis     pt. denies this    History of psychiatric care     History of psychiatric hospitalization     Hypertension     Karina     Post traumatic stress disorder     Psychiatric problem     Seizures     Suicide attempt     Therapy      Past Surgical History:   Procedure Laterality Date    HERNIA REPAIR      SPINE SURGERY  11-12-15    LAMINECTOMY/LUMBAR/WARE     Family History   Problem Relation Age of Onset    Alcohol abuse " Mother     Depression Mother     Depression Father     Depression Sister     Suicide Sister     Drug abuse Brother     Anxiety disorder Brother     Bipolar disorder Brother     Depression Brother     Alcohol abuse Maternal Uncle     Alcohol abuse Paternal Uncle     Dementia Maternal Grandmother     Alcohol abuse Cousin     Amblyopia Neg Hx     Blindness Neg Hx     Cancer Neg Hx     Cataracts Neg Hx     Diabetes Neg Hx     Glaucoma Neg Hx     Hypertension Neg Hx     Macular degeneration Neg Hx     Retinal detachment Neg Hx     Strabismus Neg Hx     Stroke Neg Hx     Thyroid disease Neg Hx     Asthma Neg Hx     Emphysema Neg Hx      Social History   Substance Use Topics    Smoking status: Former Smoker     Packs/day: 0.25     Years: 20.00    Smokeless tobacco: Never Used    Alcohol use No      Comment: quit 4 years ago     Review of Systems   Constitutional: Negative for activity change, appetite change, chills and fever.   HENT: Negative for congestion, ear discharge, sinus pain, sinus pressure, sneezing, sore throat and trouble swallowing.    Eyes: Negative for photophobia and discharge.   Respiratory: Negative for apnea, cough, chest tightness, shortness of breath and wheezing.    Cardiovascular: Negative for chest pain, palpitations and leg swelling.   Gastrointestinal: Positive for nausea. Negative for abdominal distention, abdominal pain, constipation, diarrhea and vomiting.   Genitourinary: Negative for difficulty urinating, dysuria, flank pain and urgency.   Musculoskeletal: Negative for arthralgias, back pain, gait problem, joint swelling, myalgias, neck pain and neck stiffness.   Skin: Negative for pallor and rash.   Allergic/Immunologic: Negative.    Neurological: Positive for seizures and headaches. Negative for dizziness, syncope, weakness and numbness.   Hematological: Does not bruise/bleed easily.   Psychiatric/Behavioral: Negative.        Physical Exam     Initial Vitals  [05/10/18 1448]   BP Pulse Resp Temp SpO2   (!) 140/100 (!) 118 18 97.5 °F (36.4 °C) 98 %      MAP       113.33         Physical Exam    Nursing note and vitals reviewed.  Constitutional: He appears well-developed and well-nourished. He is not diaphoretic. He is cooperative. He is easily aroused. He does not have a sickly appearance. He does not appear ill. No distress.   HENT:   Head: Normocephalic and atraumatic.   Mouth/Throat: Uvula is midline, oropharynx is clear and moist and mucous membranes are normal.   Eyes: Conjunctivae, EOM and lids are normal. Pupils are equal, round, and reactive to light.   Pupils 3-2 ERR.    Neck: Trachea normal, normal range of motion, full passive range of motion without pain and phonation normal. Neck supple. No JVD present.   Cardiovascular: Regular rhythm and normal heart sounds. Tachycardia present.    Pulses:       Radial pulses are 2+ on the right side, and 2+ on the left side.        Dorsalis pedis pulses are 2+ on the right side, and 2+ on the left side.   Pulmonary/Chest: Effort normal. He has decreased breath sounds.   Abdominal: Soft. Normal appearance and bowel sounds are normal. He exhibits no distension. There is no tenderness. There is no rigidity, no rebound and no guarding.   Musculoskeletal: Normal range of motion.   Neurological: He is alert, oriented to person, place, and time and easily aroused. He has normal strength. No cranial nerve deficit or sensory deficit. GCS eye subscore is 4. GCS verbal subscore is 5. GCS motor subscore is 6.   Skin: Skin is warm and dry. Capillary refill takes less than 2 seconds. No abrasion and no rash noted. No cyanosis. There is pallor. Nails show no clubbing.   Psychiatric: He has a normal mood and affect. His speech is normal and behavior is normal. Judgment and thought content normal. Cognition and memory are normal.         ED Course   Procedures  Labs Reviewed   CBC W/ AUTO DIFFERENTIAL - Abnormal; Notable for the following:         Result Value    WBC 3.30 (*)     RBC 3.90 (*)     Hemoglobin 11.8 (*)     Hematocrit 36.2 (*)     RDW 14.9 (*)     Platelets 105 (*)     Immature Granulocytes 0.6 (*)     Lymph # 0.8 (*)     All other components within normal limits   COMPREHENSIVE METABOLIC PANEL - Abnormal; Notable for the following:     Sodium 129 (*)     Glucose 127 (*)     Albumin 3.0 (*)     All other components within normal limits   ISTAT PROCEDURE - Abnormal; Notable for the following:     POC Sodium 130 (*)     POC TCO2 (MEASURED) 19 (*)     POC Ionized Calcium 0.68 (*)     POC Hematocrit 33 (*)     All other components within normal limits   VALPROIC ACID   DRUG SCREEN PANEL, URINE EMERGENCY   URINALYSIS, REFLEX TO URINE CULTURE   ISTAT CHEM8        Imaging Results          CT Head Without Contrast (Final result)  Result time 05/10/18 16:27:50    Final result by Yan Simons MD (05/10/18 16:27:50)                 Impression:    Impression:    No acute intracranial abnormality is identified.      Electronically signed by: Yan Simons MD  Date:    05/10/2018  Time:    16:27             Narrative:    COMPARISON  Head CT 07/12/2017    Technique: Noncontrast head CT with sagittal and coronal reformats.    Findings:    No intracranial hemorrhage, mass lesion, vascular territory infarction, or evidence of hydrocephalus.    Craniocervical junction appears within normal limits.  Mastoid air cells and paranasal sinuses are clear.                                  Medical Decision Making:   History:   Old Medical Records: I decided to obtain old medical records.  Clinical Tests:   Lab Tests: Ordered and Reviewed  Radiological Study: Ordered and Reviewed       APC / Resident Notes:   Emergent evaluation of a 64 yo male patient presenting to the ER with chief complaint of seizure.  Pt states he had 2 witnessed seizures yesterday and one this morning.  Pt states that he has been taking his Depakote as prescribed.  Pt complains of  headache on assessment.  Breath sounds clear.  Abdomen soft and nontender.  I will get labs, imaging and reassess.  Differential diagnoses include but are not limited to migraine headache, tension headache, viral syndrome, intracranial tumor/bleed/mass, SAH, hypertensive headache, meningitis, sinusitis, temporal arteritis, non-therapeutic on medications (non-compliance or needs medication adjustment), alcohol / drug use, intra-cranial bleed, intra-cranial tumor, intra-cranial infection, metabolic (gross electrolyte abnormalities). I discussed the care of this patient with my Supervising Physician.      Patient is hemodynamically stable, vital signs are normal. Discharge instructions given. Pt advised to increased salt intake.  Return to ED precautions discussed. Follow up as directed. Pt verbalized understanding of this plan. Pt is stable for discharge.          Scribe Attestation:   Scribe #1: I performed the above scribed service and the documentation accurately describes the services I performed. I attest to the accuracy of the note.    Attending Attestation:     Physician Attestation Statement for NP/PA:   I discussed this assessment and plan of this patient with the NP/PA, but I did not personally examine the patient. The face to face encounter was performed by the NP/PA.                     Clinical Impression:   The primary encounter diagnosis was Seizure. Diagnoses of Hyponatremia and Dehydration were also pertinent to this visit.    Disposition:   Disposition: Discharged  Condition: Stable                        Gilma Andres NP  05/10/18 1954

## 2018-05-10 NOTE — ED NOTES
"Wife at bedside, states pt had back surgery in March and shortly after he refused to get out of bed except for the restroom. Wife states pt has not had a sz in "a long time" and it usually occurred when he was coming off ETOH, "but this one came out of nowhere". Wife states he is "spacey" now and was last night after the sz. Wife states "I think he's been very depressed since the 3rd day out of rehab". Pt is worried his legs will never be strong again.   "

## 2018-05-10 NOTE — ED NOTES
LOC: The patient is awake, alert and aware of environment with an appropriate affect, the patient is oriented x 3 and speaking appropriately. Slow to respond at time with intermittent confusion, but answers questions appropriately.  APPEARANCE: Patient resting comfortably and in no acute distress, patient is clean and well groomed  SKIN: The skin is warm and dry, color consistent with ethnicity, patient has normal skin turgor and moist mucus membranes, skin intact, no breakdown or brusing noted.  MUSCULOSKELETAL: Patient moving all extremities well, no obvious swelling or deformities noted.   RESPIRATORY: Airway is open and patent, breath sounds clear throughout all lung fields; respirations are spontaneous, patient has a normal effort and rate, no accessory muscle use noted.   CARDIAC: Patient has no peripheral edema noted, capillary refill < 3 seconds. No complaints of chest pain   ABDOMEN: Soft and non tender to palpation, no distention noted. Bowel sounds present x 4  NEUROLOGIC: PERRL, 3mm bilaterally, eyes open spontaneously, behavior appropriate to situation, follows commands, facial expression symmetrical, bilateral hand grasp equal and even, purposeful motor response noted, normal sensation in all extremities when touched with a finger.

## 2018-05-10 NOTE — ED NOTES
"Pt presents s/p sz 1 hour ago witnessed by nephew that lasted 1 minute, pt also reportedly had a witnessed sz yesterday by nephew that lasted 5 minutes. Pt reports loss of bladder. Pt reports N/V and HA, denies blurred vision. Pt reports light sensitivity. Pt is AAOx4. Pt is sometimes slow to respond, pt reports "some confusion". Pt reports doubling up on depakote by accident w/in the last 3 days, states his wife and nephew gave him his morning medication today.  "

## 2018-05-12 ENCOUNTER — PATIENT MESSAGE (OUTPATIENT)
Dept: NEUROLOGY | Facility: CLINIC | Age: 63
End: 2018-05-12

## 2018-05-12 ENCOUNTER — HOSPITAL ENCOUNTER (INPATIENT)
Facility: HOSPITAL | Age: 63
LOS: 3 days | Discharge: HOME HEALTH | DRG: 101 | End: 2018-05-15
Attending: EMERGENCY MEDICINE | Admitting: HOSPITALIST
Payer: COMMERCIAL

## 2018-05-12 DIAGNOSIS — R56.9 SEIZURE: ICD-10-CM

## 2018-05-12 PROBLEM — J18.9 PNEUMONIA, COMMUNITY ACQUIRED: Status: RESOLVED | Noted: 2017-07-12 | Resolved: 2018-05-12

## 2018-05-12 LAB
ALBUMIN SERPL BCP-MCNC: 2.9 G/DL
ALP SERPL-CCNC: 104 U/L
ALT SERPL W/O P-5'-P-CCNC: 10 U/L
ANION GAP SERPL CALC-SCNC: 9 MMOL/L
AST SERPL-CCNC: 13 U/L
BASOPHILS # BLD AUTO: 0.01 K/UL
BASOPHILS NFR BLD: 0.3 %
BILIRUB SERPL-MCNC: 0.4 MG/DL
BUN SERPL-MCNC: 8 MG/DL
CA-I BLDV-SCNC: 1.15 MMOL/L
CALCIUM SERPL-MCNC: 8.9 MG/DL
CHLORIDE SERPL-SCNC: 96 MMOL/L
CO2 SERPL-SCNC: 25 MMOL/L
CREAT SERPL-MCNC: 0.8 MG/DL
DIFFERENTIAL METHOD: ABNORMAL
EOSINOPHIL # BLD AUTO: 0 K/UL
EOSINOPHIL NFR BLD: 0.3 %
ERYTHROCYTE [DISTWIDTH] IN BLOOD BY AUTOMATED COUNT: 14.8 %
EST. GFR  (AFRICAN AMERICAN): >60 ML/MIN/1.73 M^2
EST. GFR  (NON AFRICAN AMERICAN): >60 ML/MIN/1.73 M^2
ETHANOL SERPL-MCNC: <10 MG/DL
GLUCOSE SERPL-MCNC: 136 MG/DL
HCT VFR BLD AUTO: 37.4 %
HGB BLD-MCNC: 13.1 G/DL
IMM GRANULOCYTES # BLD AUTO: 0.03 K/UL
IMM GRANULOCYTES NFR BLD AUTO: 0.9 %
LYMPHOCYTES # BLD AUTO: 0.6 K/UL
LYMPHOCYTES NFR BLD: 17.9 %
MAGNESIUM SERPL-MCNC: 1.8 MG/DL
MCH RBC QN AUTO: 31.4 PG
MCHC RBC AUTO-ENTMCNC: 35 G/DL
MCV RBC AUTO: 90 FL
MONOCYTES # BLD AUTO: 0.5 K/UL
MONOCYTES NFR BLD: 15.3 %
NEUTROPHILS # BLD AUTO: 2.2 K/UL
NEUTROPHILS NFR BLD: 65.3 %
NRBC BLD-RTO: 0 /100 WBC
PLATELET # BLD AUTO: 153 K/UL
PMV BLD AUTO: 11.6 FL
POTASSIUM SERPL-SCNC: 3.7 MMOL/L
PROT SERPL-MCNC: 6.6 G/DL
RBC # BLD AUTO: 4.17 M/UL
SODIUM SERPL-SCNC: 130 MMOL/L
TSH SERPL DL<=0.005 MIU/L-ACNC: 1.63 UIU/ML
VALPROATE SERPL-MCNC: 52.3 UG/ML
VALPROATE SERPL-MCNC: 61.5 UG/ML
WBC # BLD AUTO: 3.4 K/UL

## 2018-05-12 PROCEDURE — 11000001 HC ACUTE MED/SURG PRIVATE ROOM

## 2018-05-12 PROCEDURE — 36415 COLL VENOUS BLD VENIPUNCTURE: CPT

## 2018-05-12 PROCEDURE — 82330 ASSAY OF CALCIUM: CPT

## 2018-05-12 PROCEDURE — 80164 ASSAY DIPROPYLACETIC ACD TOT: CPT

## 2018-05-12 PROCEDURE — 80164 ASSAY DIPROPYLACETIC ACD TOT: CPT | Mod: 91

## 2018-05-12 PROCEDURE — 82962 GLUCOSE BLOOD TEST: CPT

## 2018-05-12 PROCEDURE — 63600175 PHARM REV CODE 636 W HCPCS: Performed by: EMERGENCY MEDICINE

## 2018-05-12 PROCEDURE — 93010 ELECTROCARDIOGRAM REPORT: CPT | Mod: ,,, | Performed by: INTERNAL MEDICINE

## 2018-05-12 PROCEDURE — 63600175 PHARM REV CODE 636 W HCPCS: Performed by: STUDENT IN AN ORGANIZED HEALTH CARE EDUCATION/TRAINING PROGRAM

## 2018-05-12 PROCEDURE — 99284 EMERGENCY DEPT VISIT MOD MDM: CPT | Mod: ,,, | Performed by: EMERGENCY MEDICINE

## 2018-05-12 PROCEDURE — 85025 COMPLETE CBC W/AUTO DIFF WBC: CPT

## 2018-05-12 PROCEDURE — 84443 ASSAY THYROID STIM HORMONE: CPT

## 2018-05-12 PROCEDURE — 80320 DRUG SCREEN QUANTALCOHOLS: CPT

## 2018-05-12 PROCEDURE — 99285 EMERGENCY DEPT VISIT HI MDM: CPT | Mod: 25

## 2018-05-12 PROCEDURE — 80053 COMPREHEN METABOLIC PANEL: CPT

## 2018-05-12 PROCEDURE — 96365 THER/PROPH/DIAG IV INF INIT: CPT

## 2018-05-12 PROCEDURE — 83735 ASSAY OF MAGNESIUM: CPT

## 2018-05-12 PROCEDURE — 99223 1ST HOSP IP/OBS HIGH 75: CPT | Mod: ,,, | Performed by: HOSPITALIST

## 2018-05-12 PROCEDURE — 25000003 PHARM REV CODE 250: Performed by: STUDENT IN AN ORGANIZED HEALTH CARE EDUCATION/TRAINING PROGRAM

## 2018-05-12 PROCEDURE — 25000003 PHARM REV CODE 250: Performed by: EMERGENCY MEDICINE

## 2018-05-12 RX ORDER — THIAMINE HCL 50 MG
100 TABLET ORAL DAILY
Status: DISCONTINUED | OUTPATIENT
Start: 2018-05-13 | End: 2018-05-15 | Stop reason: HOSPADM

## 2018-05-12 RX ORDER — DIVALPROEX SODIUM 500 MG/1
500 TABLET, FILM COATED, EXTENDED RELEASE ORAL DAILY
Status: DISCONTINUED | OUTPATIENT
Start: 2018-05-13 | End: 2018-05-15 | Stop reason: HOSPADM

## 2018-05-12 RX ORDER — ENOXAPARIN SODIUM 100 MG/ML
40 INJECTION SUBCUTANEOUS EVERY 24 HOURS
Status: DISCONTINUED | OUTPATIENT
Start: 2018-05-12 | End: 2018-05-15 | Stop reason: HOSPADM

## 2018-05-12 RX ORDER — DOXEPIN HYDROCHLORIDE 25 MG/1
100 CAPSULE ORAL NIGHTLY
Status: DISCONTINUED | OUTPATIENT
Start: 2018-05-12 | End: 2018-05-15 | Stop reason: HOSPADM

## 2018-05-12 RX ORDER — DIVALPROEX SODIUM 500 MG/1
1000 TABLET, FILM COATED, EXTENDED RELEASE ORAL NIGHTLY
Status: DISCONTINUED | OUTPATIENT
Start: 2018-05-12 | End: 2018-05-15 | Stop reason: HOSPADM

## 2018-05-12 RX ORDER — ATORVASTATIN CALCIUM 20 MG/1
40 TABLET, FILM COATED ORAL DAILY
Status: DISCONTINUED | OUTPATIENT
Start: 2018-05-13 | End: 2018-05-15 | Stop reason: HOSPADM

## 2018-05-12 RX ORDER — DIVALPROEX SODIUM 500 MG/1
500 TABLET, FILM COATED, EXTENDED RELEASE ORAL DAILY
Status: DISCONTINUED | OUTPATIENT
Start: 2018-05-13 | End: 2018-05-12

## 2018-05-12 RX ORDER — DIVALPROEX SODIUM 500 MG/1
1000 TABLET, FILM COATED, EXTENDED RELEASE ORAL NIGHTLY
Status: DISCONTINUED | OUTPATIENT
Start: 2018-05-12 | End: 2018-05-12

## 2018-05-12 RX ORDER — THIAMINE HCL 100 MG
100 TABLET ORAL
Status: COMPLETED | OUTPATIENT
Start: 2018-05-12 | End: 2018-05-12

## 2018-05-12 RX ORDER — SODIUM CHLORIDE 0.9 % (FLUSH) 0.9 %
5 SYRINGE (ML) INJECTION
Status: DISCONTINUED | OUTPATIENT
Start: 2018-05-12 | End: 2018-05-15 | Stop reason: HOSPADM

## 2018-05-12 RX ORDER — LURASIDONE HYDROCHLORIDE 40 MG/1
80 TABLET, FILM COATED ORAL DAILY
Status: DISCONTINUED | OUTPATIENT
Start: 2018-05-13 | End: 2018-05-15 | Stop reason: HOSPADM

## 2018-05-12 RX ORDER — FOLIC ACID 1 MG/1
1 TABLET ORAL DAILY
Status: DISCONTINUED | OUTPATIENT
Start: 2018-05-13 | End: 2018-05-15 | Stop reason: HOSPADM

## 2018-05-12 RX ADMIN — Medication 100 MG: at 05:05

## 2018-05-12 RX ADMIN — DEXTROSE 2000 MG: 5 SOLUTION INTRAVENOUS at 05:05

## 2018-05-12 RX ADMIN — DOXEPIN HYDROCHLORIDE 100 MG: 25 CAPSULE ORAL at 10:05

## 2018-05-12 RX ADMIN — DIVALPROEX SODIUM 1000 MG: 500 TABLET, EXTENDED RELEASE ORAL at 10:05

## 2018-05-12 RX ADMIN — ENOXAPARIN SODIUM 40 MG: 100 INJECTION SUBCUTANEOUS at 10:05

## 2018-05-12 NOTE — ED TRIAGE NOTES
"Patient arrives to ED via EMS with CC of seizures. Wife reports his last seizure was this morning lasting around 30 seconds states patient has had 3 seizures in the last 3 days. Wife reports that when having a seizure he "shakes and makes noises" and the patient "goes into darkness". Patient denies remembering the event. Patient denies chest pain, SOB, fever, and n/v. No other complaints at this time.     Patient identifiers verified and correct for Mirza Morales.    LOC: The patient is awake, alert and oriented x 4. Pt is slow in responding to questions, no slurred speech.  APPEARANCE: Patient resting comfortably and in no acute distress. Pt is clean and well groomed. No JVD visible. Pt reports pain level of 0.  SKIN: Skin is warm, dry, and color is consistent with ethnicity. No tenting observed and capillary refill <3 seconds. No clubbing noted to nail beds. No breakdown or brusing visible and mucus membranes moist and acyanotic.  MUSCULOSKELETAL: Full range of motion present in all extremities. Hand  equal and leg strength strong +5 bilaterally.  RESPIRATORY: Airway is open and patent. Respirations-unlabored, regular rate, equal bilaterally on inspiration and expiration. No accessory muscle use noted.   CARDIAC: Patient tachycardic on monitor. BLE edema noted. Patient has no c/o chest pain.  ABDOMEN: Soft and non-tender to palpation with no distention noted. Pt has no complaints of abnormal bowel movements. Pt reports normal appetite.   NEUROLOGIC: Eyes open spontaneously and facial expression symmetrical. Pt behavior appropriate to situation, and pt follows commands.  Pt reports sensation present in all extremities when touched with a finger. PERRLA.  : No complaints of frequency, burning, urgency or blood in the urine.     "

## 2018-05-12 NOTE — ED PROVIDER NOTES
"Encounter Date: 5/12/2018    SCRIBE #1 NOTE: I, Kanwal Galvez, am scribing for, and in the presence of, Dr. Jordan.       History     Chief Complaint   Patient presents with    Seizures     tonic clonic seizure 3rd one in 3 days. PT was d/c lastnight. hx of seizures.      This is a 63 y.o. male with co-morbidities including seizures, alcohol abuse, bipolar 1 disorder, depression, marielena, PTSD, hepatitis, HTN and history of psychiatric hospitalization who presents to the ED via EMS with a chief complaint of three seizures over the past three days. Patient was in ED on 5/10 for a seizure as well. Patient endorses abrasion to his left lower leg during one of the seizures. He thinks his seizures are due to the medication he is on. His wife at bedside states that he has a history of seizures from when he was coming off alcohol, but those were different than the current episodes in that he has never had this many in so short a time. Patient denies recent alcohol consumption. The patient's wife witness 2 of the 3 seizures. Reports that today, the patient got up from bed and was walking towards a different room, and when she told him to get back in bed, he was "in another world" and looked blankly at her. She state he then walked around in a [place] looking up at the ceiling, before dropping to the floor. When he hit the floor, he began shaking and convulsing. No incontinence during seizures. The patient's wife additionally notes that he has been very confused and speaking nonsense recently, and the patient is scared that he is going senile. He is scheduled to see a neurologist on Wednesday.       The history is provided by the patient, medical records and the spouse.     Review of patient's allergies indicates:   Allergen Reactions    Gabapentin Other (See Comments)     SEVERE DIZZINESS AND BALANCE PROBLEMS, UNABLE TO WALK.    Nardil [phenelzine]      BECOMES HYPER, LIKE A MANIC EPISODE.    Penicillins Hives    " Pneumococcal 23-jett ps vaccine      Patient refused, will get later      Lamictal [lamotrigine] Rash     Past Medical History:   Diagnosis Date    Alcohol abuse     Behavioral problem     Bipolar 1 disorder     Chronic right hip pain     Depression     DJD (degenerative joint disease), lumbar 1/27/2015    Fatigue     Hepatitis     pt. denies this    History of psychiatric care     History of psychiatric hospitalization     Hypertension     Karina     Post traumatic stress disorder     Psychiatric problem     Seizures     Suicide attempt     Therapy      Past Surgical History:   Procedure Laterality Date    HERNIA REPAIR      SPINE SURGERY  11-12-15    LAMINECTOMY/LUMBAR/WARE     Family History   Problem Relation Age of Onset    Alcohol abuse Mother     Depression Mother     Depression Father     Depression Sister     Suicide Sister     Drug abuse Brother     Anxiety disorder Brother     Bipolar disorder Brother     Depression Brother     Alcohol abuse Maternal Uncle     Alcohol abuse Paternal Uncle     Dementia Maternal Grandmother     Alcohol abuse Cousin     Amblyopia Neg Hx     Blindness Neg Hx     Cancer Neg Hx     Cataracts Neg Hx     Diabetes Neg Hx     Glaucoma Neg Hx     Hypertension Neg Hx     Macular degeneration Neg Hx     Retinal detachment Neg Hx     Strabismus Neg Hx     Stroke Neg Hx     Thyroid disease Neg Hx     Asthma Neg Hx     Emphysema Neg Hx      Social History   Substance Use Topics    Smoking status: Current Some Day Smoker     Packs/day: 0.25     Years: 20.00    Smokeless tobacco: Never Used    Alcohol use No      Comment: quit 4 years ago     Review of Systems   Constitutional: Negative for chills, diaphoresis and fever.   HENT: Negative for congestion.    Eyes: Negative for photophobia and visual disturbance.   Respiratory: Negative for cough and shortness of breath.    Cardiovascular: Negative for chest pain and palpitations.    Gastrointestinal: Negative for abdominal pain, diarrhea, nausea and vomiting.   Musculoskeletal: Negative for back pain and neck pain.   Skin: Positive for wound (abrasion to left leg).   Neurological: Positive for seizures.   Psychiatric/Behavioral: Positive for confusion.       Physical Exam     Initial Vitals [05/12/18 1530]   BP Pulse Resp Temp SpO2   (!) 144/94 (!) 122 20 98.4 °F (36.9 °C) 96 %      MAP       110.67         Physical Exam    Nursing note and vitals reviewed.  Constitutional: He appears well-developed and well-nourished. No distress.   HENT:   Head: Normocephalic and atraumatic.   Mouth/Throat: Oropharynx is clear and moist.   Eyes: Conjunctivae are normal.   Neck: Normal range of motion.   Cardiovascular: Normal rate, regular rhythm and normal heart sounds.   Pulmonary/Chest: Breath sounds normal. No respiratory distress.   Abdominal: Soft. He exhibits no distension. There is no tenderness.   Musculoskeletal: Normal range of motion.   Neurological: He is alert and oriented to person, place, and time. He displays tremor. GCS eye subscore is 4. GCS verbal subscore is 5. GCS motor subscore is 6.   Resting tremor in bilateral hands. Normal finger-to-nose.   Skin: Skin is warm and dry.         ED Course   Procedures  Labs Reviewed   CBC W/ AUTO DIFFERENTIAL - Abnormal; Notable for the following:        Result Value    WBC 3.40 (*)     RBC 4.17 (*)     Hemoglobin 13.1 (*)     Hematocrit 37.4 (*)     MCH 31.4 (*)     RDW 14.8 (*)     Immature Granulocytes 0.9 (*)     Lymph # 0.6 (*)     Lymph% 17.9 (*)     Mono% 15.3 (*)     All other components within normal limits   COMPREHENSIVE METABOLIC PANEL - Abnormal; Notable for the following:     Sodium 130 (*)     Glucose 136 (*)     Albumin 2.9 (*)     All other components within normal limits   CALCIUM, IONIZED   VALPROIC ACID   MAGNESIUM   TSH   ALCOHOL,MEDICAL (ETHANOL)   URINALYSIS, REFLEX TO URINE CULTURE   DRUG SCREEN PANEL, URINE EMERGENCY   POCT  GLUCOSE MONITORING CONTINUOUS     EKG Readings: (Independently Interpreted)   Rhythm: Normal Sinus Rhythm. Heart Rate: 97. Axis: Normal.   No acute ST changes.          Medical Decision Making:   History:   Old Medical Records: I decided to obtain old medical records.  Initial Assessment:   Emergent evaluation of seizure activity. Wife reports history of alcoholic seizures. Has had 3 in the last 2 days not associated with alcohol withdrawal. ED visit from 2 days ago reviewed: no acute findings on head CT, sodium level was 129. Initial concerns include electrolyte abnormality, seizure disorder, encephalopathy. Will check labs, will load with Keppra given repeated seizures. Plan for admission with Neurology evaluation.    Independently Interpreted Test(s):   I have ordered and independently interpreted EKG Reading(s) - see prior notes  Clinical Tests:   Lab Tests: Ordered and Reviewed  Medical Tests: Ordered and Reviewed            Scribe Attestation:   Scribe #1: I performed the above scribed service and the documentation accurately describes the services I performed. I attest to the accuracy of the note.    Attending Attestation:             Attending ED Notes:     ED workup is unremarkable. Electrolytes within normal limits. Depakote level therapeutic. No additional seizure activity in the ER. Will admit for further monitoring and EEG.                Clinical Impression:   The encounter diagnosis was Seizure.    Disposition:   Disposition: Admitted                        Edward Jordan MD  05/12/18 1951

## 2018-05-13 LAB
AMMONIA PLAS-SCNC: 15 UMOL/L
ANION GAP SERPL CALC-SCNC: 7 MMOL/L
BASOPHILS # BLD AUTO: 0.02 K/UL
BASOPHILS NFR BLD: 0.5 %
BUN SERPL-MCNC: 6 MG/DL
CALCIUM SERPL-MCNC: 9.1 MG/DL
CHLORIDE SERPL-SCNC: 97 MMOL/L
CO2 SERPL-SCNC: 29 MMOL/L
CREAT SERPL-MCNC: 0.8 MG/DL
DIFFERENTIAL METHOD: ABNORMAL
EOSINOPHIL # BLD AUTO: 0 K/UL
EOSINOPHIL NFR BLD: 0.8 %
ERYTHROCYTE [DISTWIDTH] IN BLOOD BY AUTOMATED COUNT: 14.7 %
EST. GFR  (AFRICAN AMERICAN): >60 ML/MIN/1.73 M^2
EST. GFR  (NON AFRICAN AMERICAN): >60 ML/MIN/1.73 M^2
GLUCOSE SERPL-MCNC: 83 MG/DL
HCT VFR BLD AUTO: 38.8 %
HGB BLD-MCNC: 12.8 G/DL
HIV1+2 IGG SERPL QL IA.RAPID: NEGATIVE
IMM GRANULOCYTES # BLD AUTO: 0.04 K/UL
IMM GRANULOCYTES NFR BLD AUTO: 1.1 %
LYMPHOCYTES # BLD AUTO: 1.3 K/UL
LYMPHOCYTES NFR BLD: 33.7 %
MAGNESIUM SERPL-MCNC: 1.9 MG/DL
MCH RBC QN AUTO: 30.5 PG
MCHC RBC AUTO-ENTMCNC: 33 G/DL
MCV RBC AUTO: 92 FL
MONOCYTES # BLD AUTO: 0.6 K/UL
MONOCYTES NFR BLD: 14.6 %
NEUTROPHILS # BLD AUTO: 1.9 K/UL
NEUTROPHILS NFR BLD: 49.3 %
NRBC BLD-RTO: 0 /100 WBC
PHOSPHATE SERPL-MCNC: 3.3 MG/DL
PLATELET # BLD AUTO: 134 K/UL
PMV BLD AUTO: 10.1 FL
POTASSIUM SERPL-SCNC: 3.9 MMOL/L
RBC # BLD AUTO: 4.2 M/UL
SODIUM SERPL-SCNC: 133 MMOL/L
VIT B12 SERPL-MCNC: 891 PG/ML
WBC # BLD AUTO: 3.77 K/UL

## 2018-05-13 PROCEDURE — 86703 HIV-1/HIV-2 1 RESULT ANTBDY: CPT

## 2018-05-13 PROCEDURE — 36415 COLL VENOUS BLD VENIPUNCTURE: CPT

## 2018-05-13 PROCEDURE — 82140 ASSAY OF AMMONIA: CPT

## 2018-05-13 PROCEDURE — 11000001 HC ACUTE MED/SURG PRIVATE ROOM

## 2018-05-13 PROCEDURE — 25000003 PHARM REV CODE 250: Performed by: STUDENT IN AN ORGANIZED HEALTH CARE EDUCATION/TRAINING PROGRAM

## 2018-05-13 PROCEDURE — 99223 1ST HOSP IP/OBS HIGH 75: CPT | Mod: ,,, | Performed by: PSYCHIATRY & NEUROLOGY

## 2018-05-13 PROCEDURE — 82607 VITAMIN B-12: CPT

## 2018-05-13 PROCEDURE — 86592 SYPHILIS TEST NON-TREP QUAL: CPT

## 2018-05-13 PROCEDURE — 80321 ALCOHOLS BIOMARKERS 1OR 2: CPT

## 2018-05-13 PROCEDURE — 90792 PSYCH DIAG EVAL W/MED SRVCS: CPT | Mod: ,,, | Performed by: PSYCHIATRY & NEUROLOGY

## 2018-05-13 PROCEDURE — 84100 ASSAY OF PHOSPHORUS: CPT

## 2018-05-13 PROCEDURE — 80048 BASIC METABOLIC PNL TOTAL CA: CPT

## 2018-05-13 PROCEDURE — 85025 COMPLETE CBC W/AUTO DIFF WBC: CPT

## 2018-05-13 PROCEDURE — 83735 ASSAY OF MAGNESIUM: CPT

## 2018-05-13 PROCEDURE — 63600175 PHARM REV CODE 636 W HCPCS: Performed by: STUDENT IN AN ORGANIZED HEALTH CARE EDUCATION/TRAINING PROGRAM

## 2018-05-13 PROCEDURE — 80307 DRUG TEST PRSMV CHEM ANLYZR: CPT

## 2018-05-13 RX ADMIN — THIAMINE HCL (VITAMIN B1) 50 MG TABLET 100 MG: at 08:05

## 2018-05-13 RX ADMIN — LURASIDONE HYDROCHLORIDE 80 MG: 40 TABLET, FILM COATED ORAL at 08:05

## 2018-05-13 RX ADMIN — ENOXAPARIN SODIUM 40 MG: 100 INJECTION SUBCUTANEOUS at 04:05

## 2018-05-13 RX ADMIN — FOLIC ACID 1 MG: 1 TABLET ORAL at 08:05

## 2018-05-13 RX ADMIN — DIVALPROEX SODIUM 1000 MG: 500 TABLET, EXTENDED RELEASE ORAL at 08:05

## 2018-05-13 RX ADMIN — DOXEPIN HYDROCHLORIDE 100 MG: 25 CAPSULE ORAL at 08:05

## 2018-05-13 RX ADMIN — DIVALPROEX SODIUM 500 MG: 500 TABLET, EXTENDED RELEASE ORAL at 08:05

## 2018-05-13 RX ADMIN — ATORVASTATIN CALCIUM 40 MG: 20 TABLET, FILM COATED ORAL at 08:05

## 2018-05-13 NOTE — SUBJECTIVE & OBJECTIVE
Past Medical History:   Diagnosis Date    Alcohol abuse     Behavioral problem     Bipolar 1 disorder     Chronic right hip pain     Depression     DJD (degenerative joint disease), lumbar 1/27/2015    Fatigue     Hepatitis     pt. denies this    History of psychiatric care     History of psychiatric hospitalization     Hypertension     Karina     Post traumatic stress disorder     Psychiatric problem     Seizures     Suicide attempt     Therapy        Past Surgical History:   Procedure Laterality Date    HERNIA REPAIR      SPINE SURGERY  11-12-15    LAMINECTOMY/LUMBAR/WARE       Review of patient's allergies indicates:   Allergen Reactions    Gabapentin Other (See Comments)     SEVERE DIZZINESS AND BALANCE PROBLEMS, UNABLE TO WALK.    Nardil [phenelzine]      BECOMES HYPER, LIKE A MANIC EPISODE.    Penicillins Hives    Pneumococcal 23-jett ps vaccine      Patient refused, will get later      Lamictal [lamotrigine] Rash       Current Neurological Medications: NA    No current facility-administered medications on file prior to encounter.      Current Outpatient Prescriptions on File Prior to Encounter   Medication Sig    atorvastatin (LIPITOR) 40 MG tablet TAKE 1 TABLET (40 MG TOTAL) BY MOUTH ONCE DAILY.    beclomethasone (QVAR) 40 mcg/actuation Aero Inhale 1 puff into the lungs 2 (two) times daily. Controller    divalproex ER (DEPAKOTE) 500 MG Tb24 Take 1 tablet (500mg) every morning and 2 tablets (1000mg) every night.    doxepin (SINEQUAN) 100 MG capsule TAKE 1 CAPSULE (100 MG TOTAL) BY MOUTH EVERY EVENING.    gabapentin (NEURONTIN) 600 MG tablet Take 600 mg by mouth 3 (three) times daily.    hydrOXYzine pamoate (VISTARIL) 50 MG Cap TAKE 1 CAPSULE (50 MG TOTAL) BY MOUTH EVERY EVENING.    lurasidone (LATUDA) 80 mg Tab tablet Take 80 mg by mouth once daily. Take one tablet daily with dinner.    pantoprazole (PROTONIX) 40 MG tablet Take 1 tablet (40 mg total) by mouth once daily.     acetaminophen (TYLENOL) 325 MG tablet Take 2 tablets (650 mg total) by mouth every 4 (four) hours as needed.    albuterol 90 mcg/actuation inhaler Inhale 2 puffs into the lungs every 4 (four) hours as needed for Wheezing.    doxepin (SINEQUAN) 100 MG capsule TAKE 1 CAPSULE (100 MG TOTAL) BY MOUTH EVERY EVENING.    folic acid (FOLVITE) 1 MG tablet Take 1 tablet (1 mg total) by mouth once daily.    lidocaine (LIDODERM) 5 % Place 1 patch onto the skin once daily. Remove & Discard patch within 12 hours or as directed by MD    multivit-min-FA-lycopen-lutein (CENTRUM SILVER ULTRA MEN'S) 300-600-300 mcg Tab Take 1 tablet by mouth once daily at 6am.    nicotine (NICODERM CQ) 14 mg/24 hr Place 1 patch onto the skin once daily at 6am.    thiamine 100 MG tablet Take 1 tablet (100 mg total) by mouth once daily.     Family History     Problem Relation (Age of Onset)    Alcohol abuse Mother, Maternal Uncle, Paternal Uncle, Cousin    Anxiety disorder Brother    Bipolar disorder Brother    Dementia Maternal Grandmother    Depression Mother, Father, Sister, Brother    Drug abuse Brother    Suicide Sister        Social History Main Topics    Smoking status: Current Some Day Smoker     Packs/day: 0.25     Years: 20.00    Smokeless tobacco: Never Used    Alcohol use No      Comment: quit 4 years ago    Drug use: No    Sexual activity: Yes     Partners: Female      Comment: with wife; monogamous      Review of Systems   Unable to perform ROS: Acuity of condition   Constitutional: Negative for chills and fever.   HENT: Negative for trouble swallowing.    Eyes: Negative for visual disturbance.   Respiratory: Negative for chest tightness and shortness of breath.    Gastrointestinal: Negative for abdominal pain.   Genitourinary: Negative for dysuria.   Musculoskeletal: Negative for back pain.   Neurological: Negative for facial asymmetry, speech difficulty, weakness and headaches.   Psychiatric/Behavioral: Negative for agitation  and behavioral problems.     Objective:     Vital Signs (Most Recent):  Temp: 97.8 °F (36.6 °C) (05/13/18 0749)  Pulse: 92 (05/13/18 0749)  Resp: 18 (05/13/18 0749)  BP: 116/76 (05/13/18 0749)  SpO2: (!) 92 % (05/13/18 0749) Vital Signs (24h Range):  Temp:  [97.8 °F (36.6 °C)-98.4 °F (36.9 °C)] 97.8 °F (36.6 °C)  Pulse:  [] 92  Resp:  [16-20] 18  SpO2:  [92 %-97 %] 92 %  BP: (113-168)/(76-94) 116/76        There is no height or weight on file to calculate BMI.    Physical Exam   Constitutional: No distress.   HENT:   Head: Normocephalic and atraumatic.   Eyes: EOM are normal.   Neck: Neck supple.   Cardiovascular:   No murmur heard.  Pulmonary/Chest: No respiratory distress.   Abdominal: Soft.   Musculoskeletal: He exhibits no deformity.   Neurological: He has normal strength. He has a normal Finger-Nose-Finger Test. Gait normal.   Reflex Scores:       Tricep reflexes are 2+ on the right side and 2+ on the left side.       Bicep reflexes are 2+ on the right side and 2+ on the left side.       Brachioradialis reflexes are 2+ on the right side and 2+ on the left side.       Patellar reflexes are 2+ on the right side and 2+ on the left side.       Achilles reflexes are 2+ on the right side and 2+ on the left side.  Psychiatric: His speech is normal.       NEUROLOGICAL EXAMINATION:     MENTAL STATUS   Oriented to person.   Oriented to place.   Disoriented to time.   Attention: normal. Concentration: decreased.   Speech: speech is normal   Level of consciousness: alert    CRANIAL NERVES     CN II   Visual fields full to confrontation.     CN III, IV, VI   Extraocular motions are normal.   Nystagmus: none   Ophthalmoparesis: none    CN V   Facial sensation intact.     CN VII   Facial expression full, symmetric.     CN VIII   CN VIII normal.     CN IX, X   CN IX normal.   CN X normal.     MOTOR EXAM   Muscle bulk: normal  Overall muscle tone: normal    Strength   Strength 5/5 throughout.     REFLEXES     Reflexes    Right brachioradialis: 2+  Left brachioradialis: 2+  Right biceps: 2+  Left biceps: 2+  Right triceps: 2+  Left triceps: 2+  Right patellar: 2+  Left patellar: 2+  Right achilles: 2+  Left achilles: 2+  Right plantar: normal  Left plantar: normal    SENSORY EXAM   Pinprick normal.     GAIT AND COORDINATION     Gait  Gait: normal     Coordination   Finger to nose coordination: normal    Tremor   Resting tremor: absent  Intention tremor: absent  Action tremor: absent      Significant Labs:   Hemoglobin A1c: No results for input(s): HGBA1C in the last 720 hours.  CBC:   Recent Labs  Lab 05/12/18  1646 05/13/18  0551   WBC 3.40* 3.77*   HGB 13.1* 12.8*   HCT 37.4* 38.8*    134*     CMP:   Recent Labs  Lab 05/12/18  1818 05/13/18  0551   * 83   * 133*   K 3.7 3.9   CL 96 97   CO2 25 29   BUN 8 6*   CREATININE 0.8 0.8   CALCIUM 8.9 9.1   MG 1.8 1.9   PROT 6.6  --    ALBUMIN 2.9*  --    BILITOT 0.4  --    ALKPHOS 104  --    AST 13  --    ALT 10  --    ANIONGAP 9 7*   EGFRNONAA >60.0 >60.0     Urine Studies: No results for input(s): COLORU, APPEARANCEUA, PHUR, SPECGRAV, PROTEINUA, GLUCUA, KETONESU, BILIRUBINUA, OCCULTUA, NITRITE, UROBILINOGEN, LEUKOCYTESUR, RBCUA, WBCUA, BACTERIA, SQUAMEPITHEL, HYALINECASTS in the last 48 hours.    Invalid input(s): KYLER  All pertinent lab results from the past 24 hours have been reviewed.    Significant Imaging: I have reviewed and interpreted all pertinent imaging results/findings within the past 24 hours.

## 2018-05-13 NOTE — ASSESSMENT & PLAN NOTE
Loaded w/ keppra in ED  C/w home depakote  Neurology consult  Will keep NPO for now  Seizure may be multifactorial- including medication induced (adverse reactions from latuda & doxepin), hyponatremia (although chronic and mild)  Patient has been on these medications for a long time per wife therefore will continue, however, will also place psych consult  Per Neurology f/u 24hr EEG on depakote as patient having episodes on medication & hold off keppra as this may make his bipolar symptoms worse

## 2018-05-13 NOTE — HPI
Mirza Warren is a 64 y/o M PMhx HTN, Bipolar 1 disorder, Seizures, Depression, Alcohol abuse and Opiate dependency admitted with concerns of seizure & general neurology has been consulted for the same.     History through records review / family not availbale bedside, not reachable over phone. As per records - Patient recently presented to ED on 5/10 for seizures as well. Per wife she believes patient has had at least 3 seizures since he was last evaluated in ED. She states she witnessed one in which he fell to the ground and had tonic clonic movements followed by confusion afterwards. Seizures last ~30 seconds at a time, last witnessed 2PM 5/12. She endorses that he has had urinary incontinence but if unsure of their relationship to the seizures. She describes one episode when she woke up to find patient staring and the bed was wet. Additionally, she is concerned of his worsening confusion and forgetfulness. States patient knows that something is wrong. Patient himself believes that seizures may be due to medications which he is currently taking. However, wife states he has been taking these medications for some times now without any adverse effects.    Prior Seizure semiology:   Seizure Type 1  Age of onset: 60  Classification: Focal onset seizures with dyscognitive symptoms and secondarily generalization   Aura - None / Ictus - Conv - eyes open wide, but not focused; rigid extermities  Duration - 30 sec- 1 min  Post-ictal - Symptoms- confusion, growgy / Duration- 5 min  Seizure Frequency -  Two   History of status epilepticus - no   Last seizure - 12/07/15    Patient remained hemodynamically stable since admission. No concerns for seizure overnight. On depakote / CT head negative for acute intracranial process.
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
## 2018-05-13 NOTE — NURSING
Notified IM5 of patient's elevated pulse upon orthostatic blood pressure. Patient asymptomatic, will continue to monitor.
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
## 2018-05-13 NOTE — H&P
[facility] Hospital Medicine  History & Physical    Patient Name: Mirza Morales  MRN: [id number]  Admission Date: 5/12/2018  Attending Physician: Dr. Vizcarra  Primary Care Provider: LIDIA Banks MD    [facility] Medicine Team: Networked reference to record PCT  Facundo Orona MD     Patient information was obtained from patient, spouse/SO, past medical records and ER records.     Subjective:     Principal Problem:Seizure    Chief Complaint:   Chief Complaint   Patient presents with    Seizures     tonic clonic seizure 3rd one in 3 days. PT was d/c lastnight. hx of seizures.         HPI: 62 y/o M PMhx HTN, Bipolar 1 disorder, Seizures, Depression, Alcohol abuse and Opiate dependency who presents w/ seizure. Patient recently presented to ED on 5/10 for seizures as well. Per wife she believes patient has had at least 3 seizures since he was last evaluated in ED. She states she witnessed one in which he fell to the ground and had tonic clonic movements followed by confusion afterwards. Seizures last ~30 seconds at a time, last witnessed 2PM 5/12. She endorses that he has had urinary incontinence but if unsure of their relationship to the seizures. She describes one episode when she woke up to find patient staring and the bed was wet. Additionally, she is concerned of his worsening confusion and forgetfulness. States patient knows that something is wrong. Patient himself believes that seizures may be due to medications which he is currently taking. However, wife states he has been taking these medications for some times now without any adverse effects.  Per wife patient has had seizures in the past, however, they had all been due to alcohol withdrawal. She denies patient having had any recent alcohol ingestion. Also states that she has been administering his medications due to his confusion to endure that he does not miss any doses.  History obtained from wife (Nga Morales [phone])  as patient poor historian and still somewhat confused.  Patient was scheduled to Neurology Dr. Fitch on 5/16.      Past Medical History:   Diagnosis Date    Alcohol abuse     Behavioral problem     Bipolar 1 disorder     Chronic right hip pain     Depression     DJD (degenerative joint disease), lumbar 1/27/2015    Fatigue     Hepatitis     pt. denies this    History of psychiatric care     History of psychiatric hospitalization     Hypertension     Karina     Post traumatic stress disorder     Psychiatric problem     Seizures     Suicide attempt     Therapy        Past Surgical History:   Procedure Laterality Date    HERNIA REPAIR      SPINE SURGERY  11-12-15    LAMINECTOMY/LUMBAR/WARE       Review of patient's allergies indicates:   Allergen Reactions    Gabapentin Other (See Comments)     SEVERE DIZZINESS AND BALANCE PROBLEMS, UNABLE TO WALK.    Nardil [phenelzine]      BECOMES HYPER, LIKE A MANIC EPISODE.    Penicillins Hives    Pneumococcal 23-jett ps vaccine      Patient refused, will get later      Lamictal [lamotrigine] Rash       No current facility-administered medications on file prior to encounter.      Current Outpatient Prescriptions on File Prior to Encounter   Medication Sig    atorvastatin (LIPITOR) 40 MG tablet TAKE 1 TABLET (40 MG TOTAL) BY MOUTH ONCE DAILY.    beclomethasone (QVAR) 40 mcg/actuation Aero Inhale 1 puff into the lungs 2 (two) times daily. Controller    divalproex ER (DEPAKOTE) 500 MG Tb24 Take 1 tablet (500mg) every morning and 2 tablets (1000mg) every night.    doxepin (SINEQUAN) 100 MG capsule TAKE 1 CAPSULE (100 MG TOTAL) BY MOUTH EVERY EVENING.    gabapentin (NEURONTIN) 600 MG tablet Take 600 mg by mouth 3 (three) times daily.    hydrOXYzine pamoate (VISTARIL) 50 MG Cap TAKE 1 CAPSULE (50 MG TOTAL) BY MOUTH EVERY EVENING.    lurasidone (LATUDA) 80 mg Tab tablet Take 80 mg by mouth once daily. Take one tablet daily with dinner.    oxyCODONE  (ROXICODONE) 10 mg Tab immediate release tablet Take 1 tablet (10 mg total) by mouth every 4 (four) hours as needed.    pantoprazole (PROTONIX) 40 MG tablet Take 1 tablet (40 mg total) by mouth once daily.    acetaminophen (TYLENOL) 325 MG tablet Take 2 tablets (650 mg total) by mouth every 4 (four) hours as needed.    albuterol 90 mcg/actuation inhaler Inhale 2 puffs into the lungs every 4 (four) hours as needed for Wheezing.    doxepin (SINEQUAN) 100 MG capsule TAKE 1 CAPSULE (100 MG TOTAL) BY MOUTH EVERY EVENING.    folic acid (FOLVITE) 1 MG tablet Take 1 tablet (1 mg total) by mouth once daily.    lidocaine (LIDODERM) 5 % Place 1 patch onto the skin once daily. Remove & Discard patch within 12 hours or as directed by MD    multivit-min-FA-lycopen-lutein (CENTRUM SILVER ULTRA MEN'S) 300-600-300 mcg Tab Take 1 tablet by mouth once daily at 6am.    nicotine (NICODERM CQ) 14 mg/24 hr Place 1 patch onto the skin once daily at 6am.    thiamine 100 MG tablet Take 1 tablet (100 mg total) by mouth once daily.     Family History     Problem Relation (Age of Onset)    Alcohol abuse Mother, Maternal Uncle, Paternal Uncle, Cousin    Anxiety disorder Brother    Bipolar disorder Brother    Dementia Maternal Grandmother    Depression Mother, Father, Sister, Brother    Drug abuse Brother    Suicide Sister        Social History Main Topics    Smoking status: Current Some Day Smoker     Packs/day: 0.25     Years: 20.00    Smokeless tobacco: Never Used    Alcohol use No      Comment: quit 4 years ago    Drug use: No    Sexual activity: Yes     Partners: Female      Comment: with wife; monogamous      Review of Systems   Constitutional: Positive for fatigue. Negative for chills and fever.   HENT: Negative for congestion and sore throat.    Eyes: Negative for photophobia.   Respiratory: Negative for cough, chest tightness, shortness of breath and wheezing.    Cardiovascular: Negative for chest pain and leg swelling.    Gastrointestinal: Negative for abdominal pain, constipation, diarrhea, nausea and vomiting.   Genitourinary: Negative for dysuria and hematuria.   Musculoskeletal: Negative for arthralgias and myalgias.   Skin: Negative for rash.   Neurological: Positive for tremors, seizures and weakness. Negative for dizziness, speech difficulty, light-headedness, numbness and headaches.   Psychiatric/Behavioral: Positive for confusion.     Objective:     Vital Signs (Most Recent):  Temp: 98.4 °F (36.9 °C) (05/12/18 1530)  Pulse: 94 (05/12/18 1949)  Resp: 18 (05/12/18 1949)  BP: 136/88 (05/12/18 1949)  SpO2: 97 % (05/12/18 1949) Vital Signs (24h Range):  Temp:  [98.4 °F (36.9 °C)] 98.4 °F (36.9 °C)  Pulse:  [] 94  Resp:  [18-20] 18  SpO2:  [95 %-97 %] 97 %  BP: (134-168)/(82-94) 136/88        There is no height or weight on file to calculate BMI.    Physical Exam   Constitutional: No distress.   HENT:   Head: Normocephalic and atraumatic.   Eyes: EOM are normal. Pupils are equal, round, and reactive to light. No scleral icterus.   Neck: Normal range of motion. Neck supple.   Cardiovascular: Normal rate, regular rhythm and intact distal pulses.    No murmur heard.  Pulmonary/Chest: Effort normal and breath sounds normal. No respiratory distress.   Abdominal: Soft. He exhibits no distension. There is no tenderness. There is no guarding.   Musculoskeletal: Normal range of motion. He exhibits no edema or tenderness.   Lymphadenopathy:     He has no cervical adenopathy.   Neurological: He is alert.   Skin: Skin is warm and dry. No rash noted. He is not diaphoretic.   Abrasion to L shin   Psychiatric:   Confused, saying random words at times, staring w/o response at times & at times answering questions appropriately         CRANIAL NERVES     CN II   Visual acuity: normal    CN III, IV, VI   Pupils are equal, round, and reactive to light.  Extraocular motions are normal.   Nystagmus: none   Diplopia: none  Ophthalmoparesis:  none    CN V   Facial sensation intact.     CN VII   Facial expression full, symmetric.     CN VIII   CN VIII normal.     CN IX, X   Palate: symmetric    CN XI   CN XI normal.        Significant Labs:   CBC:   Recent Labs  Lab 05/12/18  1646   WBC 3.40*   HGB 13.1*   HCT 37.4*        CMP:   Recent Labs  Lab 05/12/18  1818   *   K 3.7   CL 96   CO2 25   *   BUN 8   CREATININE 0.8   CALCIUM 8.9   PROT 6.6   ALBUMIN 2.9*   BILITOT 0.4   ALKPHOS 104   AST 13   ALT 10   ANIONGAP 9   EGFRNONAA >60.0     TSH:   Recent Labs  Lab 05/12/18  1818   TSH 1.630     Urine Studies: No results for input(s): COLORU, APPEARANCEUA, PHUR, SPECGRAV, PROTEINUA, GLUCUA, KETONESU, BILIRUBINUA, OCCULTUA, NITRITE, UROBILINOGEN, LEUKOCYTESUR, RBCUA, WBCUA, BACTERIA, SQUAMEPITHEL, HYALINECASTS in the last 48 hours.    Invalid input(s): FERNANDA    Significant Imaging: I have reviewed all pertinent imaging results/findings within the past 24 hours.    Assessment/Plan:     * Seizure    Loaded w/ keppra in ED  Neurology consulted  Will keep NPO for now  Seizure may be multifactorial- including medication induced (adverse reactions from latuda & doxepin), hyponatremia (although chronic and mild)  Patient has been on these medications for a long time per wife therefore will continue, however, will also place psych consult  Per Neurology f/u 24hr EEG on depakote as patient having episodes on medication & hold off keppra as this may make his bipolar symptoms worse  C/w home depakote  F/u valproic acid level  F/u UDS  Aspiration precautions  Seizure precautions  Stopped drinking alcohol 4 yrs ago, alcohol level negative      Hyponatremia    Chronic mild hyponatremia to borderline low normal  F/u urine Na, urine osm        HLD (hyperlipidemia)    C/w home atorvastatin        Bipolar 1 disorder, mixed    C/w home depakote, latuda  Psychiatry consult        Anxiety    C/w home doxepin        Tobacco abuse    Current smoker  .5 pack  per day  Smoking cessation counseling          VTE Risk Mitigation         Ordered     enoxaparin injection 40 mg  Daily      05/12/18 2027     IP VTE HIGH RISK PATIENT  Once      05/12/18 2030     Place EMY hose  Until discontinued      05/12/18 2030             Facundo Orona MD  Department of Hospital Medicine   [facility]

## 2018-05-13 NOTE — HPI
64 y/o M PMhx HTN, Bipolar 1 disorder, Seizures, Depression, Alcohol abuse and Opiate dependency who presents w/ seizure. Patient recently presented to ED on 5/10 for seizures as well. Per wife she believes patient has had at least 3 seizures since he was last evaluated in ED. She states she witnessed one in which he fell to the ground and had tonic clonic movements followed by confusion afterwards. Seizures last ~30 seconds at a time, last witnessed 2PM 5/12. She endorses that he has had urinary incontinence but if unsure of their relationship to the seizures. She describes one episode when she woke up to find patient staring and the bed was wet. Additionally, she is concerned of his worsening confusion and forgetfulness. States patient knows that something is wrong. Patient himself believes that seizures may be due to medications which he is currently taking. However, wife states he has been taking these medications for some times now without any adverse effects.  Per wife patient has had seizures in the past, however, they had all been due to alcohol withdrawal. She denies patient having had any recent alcohol ingestion. Also states that she has been administering his medications due to his confusion to endure that he does not miss any doses.  History obtained from wife (Nga Andrew [phone]) as patient poor historian and still somewhat confused.  Patient was scheduled to Neurology Dr. Fitch on 5/16.

## 2018-05-13 NOTE — ED NOTES
War room states unable to see pt on monitor and will call nurse in 930A to have monitor switched.

## 2018-05-13 NOTE — CONSULTS
[facility]  Neurology  Consult Note    Patient Name: Mirza Morales  MRN: [id number]  Admission Date: 5/12/2018  Hospital Length of Stay: 1 days  Code Status: Full Code   Attending Provider: Malissa Vizcarra MD   Consulting Provider: Belle Villa MD  Primary Care Physician: LIDIA Banks MD  Principal Problem:Seizure    Inpatient consult to Neurology Epilepsy  Consult performed by: BELLE VILLA  Consult ordered by: BARBY PFEIFFER         Subjective:     Chief Complaint:  Seizure      HPI:   Mirza Warren is a 64 y/o M PMhx HTN, Bipolar 1 disorder, Seizures, Depression, Alcohol abuse and Opiate dependency admitted with concerns of seizure & general neurology has been consulted for the same.     History through records review / family not availbale bedside, not reachable over phone. As per records - Patient recently presented to ED on 5/10 for seizures as well. Per wife she believes patient has had at least 3 seizures since he was last evaluated in ED. She states she witnessed one in which he fell to the ground and had tonic clonic movements followed by confusion afterwards. Seizures last ~30 seconds at a time, last witnessed 2PM 5/12. She endorses that he has had urinary incontinence but if unsure of their relationship to the seizures. She describes one episode when she woke up to find patient staring and the bed was wet. Additionally, she is concerned of his worsening confusion and forgetfulness. States patient knows that something is wrong. Patient himself believes that seizures may be due to medications which he is currently taking. However, wife states he has been taking these medications for some times now without any adverse effects.    Prior Seizure semiology:   Seizure Type 1  Age of onset: 60  Classification: Focal onset seizures with dyscognitive symptoms and secondarily generalization   Aura - None / Ictus -  Conv - eyes open wide, but not focused;  rigid extermities  Duration - 30 sec- 1 min  Post-ictal - Symptoms- confusion, growgy / Duration- 5 min  Seizure Frequency -  Two   History of status epilepticus - no   Last seizure - 12/07/15    Patient remained hemodynamically stable since admission. No concerns for seizure overnight. On depakote / CT head negative for acute intracranial process.      Past Medical History:   Diagnosis Date    Alcohol abuse     Behavioral problem     Bipolar 1 disorder     Chronic right hip pain     Depression     DJD (degenerative joint disease), lumbar 1/27/2015    Fatigue     Hepatitis     pt. denies this    History of psychiatric care     History of psychiatric hospitalization     Hypertension     Karina     Post traumatic stress disorder     Psychiatric problem     Seizures     Suicide attempt     Therapy        Past Surgical History:   Procedure Laterality Date    HERNIA REPAIR      SPINE SURGERY  11-12-15    LAMINECTOMY/LUMBAR/WARE       Review of patient's allergies indicates:   Allergen Reactions    Gabapentin Other (See Comments)     SEVERE DIZZINESS AND BALANCE PROBLEMS, UNABLE TO WALK.    Nardil [phenelzine]      BECOMES HYPER, LIKE A MANIC EPISODE.    Penicillins Hives    Pneumococcal 23-jett ps vaccine      Patient refused, will get later      Lamictal [lamotrigine] Rash       Current Neurological Medications: NA    No current facility-administered medications on file prior to encounter.      Current Outpatient Prescriptions on File Prior to Encounter   Medication Sig    atorvastatin (LIPITOR) 40 MG tablet TAKE 1 TABLET (40 MG TOTAL) BY MOUTH ONCE DAILY.    beclomethasone (QVAR) 40 mcg/actuation Aero Inhale 1 puff into the lungs 2 (two) times daily. Controller    divalproex ER (DEPAKOTE) 500 MG Tb24 Take 1 tablet (500mg) every morning and 2 tablets (1000mg) every night.    doxepin (SINEQUAN) 100 MG capsule TAKE 1 CAPSULE (100 MG TOTAL) BY MOUTH EVERY EVENING.    gabapentin (NEURONTIN) 600 MG  tablet Take 600 mg by mouth 3 (three) times daily.    hydrOXYzine pamoate (VISTARIL) 50 MG Cap TAKE 1 CAPSULE (50 MG TOTAL) BY MOUTH EVERY EVENING.    lurasidone (LATUDA) 80 mg Tab tablet Take 80 mg by mouth once daily. Take one tablet daily with dinner.    pantoprazole (PROTONIX) 40 MG tablet Take 1 tablet (40 mg total) by mouth once daily.    acetaminophen (TYLENOL) 325 MG tablet Take 2 tablets (650 mg total) by mouth every 4 (four) hours as needed.    albuterol 90 mcg/actuation inhaler Inhale 2 puffs into the lungs every 4 (four) hours as needed for Wheezing.    doxepin (SINEQUAN) 100 MG capsule TAKE 1 CAPSULE (100 MG TOTAL) BY MOUTH EVERY EVENING.    folic acid (FOLVITE) 1 MG tablet Take 1 tablet (1 mg total) by mouth once daily.    lidocaine (LIDODERM) 5 % Place 1 patch onto the skin once daily. Remove & Discard patch within 12 hours or as directed by MD    multivit-min-FA-lycopen-lutein (CENTRUM SILVER ULTRA MEN'S) 300-600-300 mcg Tab Take 1 tablet by mouth once daily at 6am.    nicotine (NICODERM CQ) 14 mg/24 hr Place 1 patch onto the skin once daily at 6am.    thiamine 100 MG tablet Take 1 tablet (100 mg total) by mouth once daily.     Family History     Problem Relation (Age of Onset)    Alcohol abuse Mother, Maternal Uncle, Paternal Uncle, Cousin    Anxiety disorder Brother    Bipolar disorder Brother    Dementia Maternal Grandmother    Depression Mother, Father, Sister, Brother    Drug abuse Brother    Suicide Sister        Social History Main Topics    Smoking status: Current Some Day Smoker     Packs/day: 0.25     Years: 20.00    Smokeless tobacco: Never Used    Alcohol use No      Comment: quit 4 years ago    Drug use: No    Sexual activity: Yes     Partners: Female      Comment: with wife; monogamous      Review of Systems   Unable to perform ROS: Acuity of condition   Constitutional: Negative for chills and fever.   HENT: Negative for trouble swallowing.    Eyes: Negative for visual  disturbance.   Respiratory: Negative for chest tightness and shortness of breath.    Gastrointestinal: Negative for abdominal pain.   Genitourinary: Negative for dysuria.   Musculoskeletal: Negative for back pain.   Neurological: Negative for facial asymmetry, speech difficulty, weakness and headaches.   Psychiatric/Behavioral: Negative for agitation and behavioral problems.     Objective:     Vital Signs (Most Recent):  Temp: 97.8 °F (36.6 °C) (05/13/18 0749)  Pulse: 92 (05/13/18 0749)  Resp: 18 (05/13/18 0749)  BP: 116/76 (05/13/18 0749)  SpO2: (!) 92 % (05/13/18 0749) Vital Signs (24h Range):  Temp:  [97.8 °F (36.6 °C)-98.4 °F (36.9 °C)] 97.8 °F (36.6 °C)  Pulse:  [] 92  Resp:  [16-20] 18  SpO2:  [92 %-97 %] 92 %  BP: (113-168)/(76-94) 116/76        There is no height or weight on file to calculate BMI.    Physical Exam   Constitutional: No distress.   HENT:   Head: Normocephalic and atraumatic.   Eyes: EOM are normal.   Neck: Neck supple.   Cardiovascular:   No murmur heard.  Pulmonary/Chest: No respiratory distress.   Abdominal: Soft.   Musculoskeletal: He exhibits no deformity.   Neurological: He has normal strength. He has a normal Finger-Nose-Finger Test. Gait normal.   Reflex Scores:       Tricep reflexes are 2+ on the right side and 2+ on the left side.       Bicep reflexes are 2+ on the right side and 2+ on the left side.       Brachioradialis reflexes are 2+ on the right side and 2+ on the left side.       Patellar reflexes are 2+ on the right side and 2+ on the left side.       Achilles reflexes are 2+ on the right side and 2+ on the left side.  Psychiatric: His speech is normal.       NEUROLOGICAL EXAMINATION:     MENTAL STATUS   Oriented to person.   Oriented to place.   Disoriented to time.   Attention: normal. Concentration: decreased.   Speech: speech is normal   Level of consciousness: alert    CRANIAL NERVES     CN II   Visual fields full to confrontation.     CN III, IV, VI   Extraocular  motions are normal.   Nystagmus: none   Ophthalmoparesis: none    CN V   Facial sensation intact.     CN VII   Facial expression full, symmetric.     CN VIII   CN VIII normal.     CN IX, X   CN IX normal.   CN X normal.     MOTOR EXAM   Muscle bulk: normal  Overall muscle tone: normal    Strength   Strength 5/5 throughout.     REFLEXES     Reflexes   Right brachioradialis: 2+  Left brachioradialis: 2+  Right biceps: 2+  Left biceps: 2+  Right triceps: 2+  Left triceps: 2+  Right patellar: 2+  Left patellar: 2+  Right achilles: 2+  Left achilles: 2+  Right plantar: normal  Left plantar: normal    SENSORY EXAM   Pinprick normal.     GAIT AND COORDINATION     Gait  Gait: normal     Coordination   Finger to nose coordination: normal    Tremor   Resting tremor: absent  Intention tremor: absent  Action tremor: absent      Significant Labs:   Hemoglobin A1c: No results for input(s): HGBA1C in the last 720 hours.  CBC:   Recent Labs  Lab 05/12/18  1646 05/13/18  0551   WBC 3.40* 3.77*   HGB 13.1* 12.8*   HCT 37.4* 38.8*    134*     CMP:   Recent Labs  Lab 05/12/18  1818 05/13/18  0551   * 83   * 133*   K 3.7 3.9   CL 96 97   CO2 25 29   BUN 8 6*   CREATININE 0.8 0.8   CALCIUM 8.9 9.1   MG 1.8 1.9   PROT 6.6  --    ALBUMIN 2.9*  --    BILITOT 0.4  --    ALKPHOS 104  --    AST 13  --    ALT 10  --    ANIONGAP 9 7*   EGFRNONAA >60.0 >60.0     Urine Studies: No results for input(s): COLORU, APPEARANCEUA, PHUR, SPECGRAV, PROTEINUA, GLUCUA, KETONESU, BILIRUBINUA, OCCULTUA, NITRITE, UROBILINOGEN, LEUKOCYTESUR, RBCUA, WBCUA, BACTERIA, SQUAMEPITHEL, HYALINECASTS in the last 48 hours.    Invalid input(s): CHRISTINASUR  All pertinent lab results from the past 24 hours have been reviewed.    Significant Imaging: I have reviewed and interpreted all pertinent imaging results/findings within the past 24 hours.    Assessment and Plan:     * Seizure    - 62 y/o M PMhx HTN, Bipolar 1 disorder, Seizures, Depression, Alcohol  abuse and Opiate dependency admitted with concerns of seizure      - Reported events of generalized tonic clonic x 3 events over the last 1 week, with unclear trigger or provoking factors / with reported concerns of increased cognitive affect     - Prior Seizure semiology:   Seizure Type 1 / Age of onset: 60  Classification: Focal onset seizures with dyscognitive symptoms and secondarily generalization   Duration - 30 sec- 1 min / Post-ictal - Symptoms- confusion, growgy / Duration- 5 min  Last seizure - 12/07/15  - Prior EEG / No correlate or evidence     Plans:  - Extended EEG monitoring to rule out epileptiform foci   - Continue on depakote / Follow-up on levels wnr  - No further AEDs at the moment / shall consider based on  EEG findings  - Order MRI brain w wo contrast   - Correct lytes as needed / control infection / avoid hyercapnea or hypoxia   - seizure and delirium precautions         Hyponatremia    - as per primary         Essential hypertension    - avoid HTN encephalopathy         HLD (hyperlipidemia)    - as per primary         Bipolar I disorder    - as per primary   - continue depakote         DJD (degenerative joint disease), lumbar    - limit excessive opioid / BZD         Alcohol dependence in sustained full remission    - as per primary   - monitor for any withdrawal             VTE Risk Mitigation         Ordered     enoxaparin injection 40 mg  Daily      05/12/18 2027     IP VTE HIGH RISK PATIENT  Once      05/12/18 2030     Place EMY hose  Until discontinued      05/12/18 2030          Thank you for your consult. I will follow-up with patient. Please contact us if you have any additional questions.    Belle Villa MD  Neurology  [facility]

## 2018-05-13 NOTE — ASSESSMENT & PLAN NOTE
- 62 y/o M PMhx HTN, Bipolar 1 disorder, Seizures, Depression, Alcohol abuse and Opiate dependency admitted with concerns of seizure     - Reported events of generalized tonic clonic x 3 events over the last 1 week, with unclear trigger or provoking factors / with reported concerns of increased cognitive affect     - Prior Seizure semiology:   Seizure Type 1 / Age of onset: 60  Classification: Focal onset seizures with dyscognitive symptoms and secondarily generalization   Duration - 30 sec- 1 min / Post-ictal - Symptoms- confusion, growgy / Duration- 5 min  Last seizure - 12/07/15  - Prior EEG / No correlate or evidence     Plans:  - Extended EEG monitoring to rule out epileptiform foci   - Continue on depakote / Follow-up on levels wnr  - No further AEDs at the moment / shall consider based on  EEG findings  - Order MRI brain w wo contrast   - Correct lytes as needed / control infection / avoid hyercapnea or hypoxia   - seizure and delirium precautions

## 2018-05-13 NOTE — NURSING
Patient sitting up in bed feeding self. IV C/D/I, flushes well; saline locked. Bed alarm on. VS WNL. POC reviewed with patient. Will continue to monitor.
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
## 2018-05-13 NOTE — ASSESSMENT & PLAN NOTE
63 y.o.  male with past psychiatric history of Bipolar I and a past medical history significant for seizure disorder, HTN, etoh abuse, opiate dependency, who presented via EMS after wife called 911 reporting 3 seizures, 2 of which she witnessed. Of note, pt had been seen in the ED on 2018/5/10 after noting 3 witnessed seizures (did report prior seizures but reported that last one was so long ago they couldn't recall when it was), after workup diagnosed hyponatremia, dehydration, and discharged from the ED, plan for neurology appt 2018/5/16. On the floor, Patient himself reported he was concerned that seizures may be due to medications which he is currently taking because his seizures occurred after taking his psychiatric medications.. However, wife states he has been taking these medications for some times now without any adverse effects. Per wife patient has had seizures in the past, however, they had all been due to alcohol withdrawal. She denies patient having had any recent alcohol ingestion. Also states that she has been administering his medications due to his confusion to ensure that he does not miss any doses.  Psychiatry was consulted for concern that psychiatric medications could be inducing seizures.    Per collateral wife and patient, wife has been monitoring and managing medications, their accounts of his home meds match the records in [facility] charting as having been stable since jan 2016. Given that he has not had a recent dose change or other recent changes that are likely culprits for changing medication levels, psychiatric medications are unlikely the cause of this recent recurrence of seizures. Also note that VPA would not cause seizures, VPA level on 5/10 was 72.8, on 5/12 was 52.3, doxepin with relatively low risk for seizure and latuda also with low risk given no recent changes. Timing that concerned pt is likely coincidental.     - continue prior regimen for bipolar disorder    Case discussed  with primary team and psychiatry attending. Will sign off. Please recontact should any new questions arise

## 2018-05-14 PROBLEM — R23.9 ALTERATION IN SKIN INTEGRITY: Status: ACTIVE | Noted: 2018-05-14

## 2018-05-14 LAB
BASOPHILS # BLD AUTO: 0.02 K/UL
BASOPHILS # BLD AUTO: 0.03 K/UL
BASOPHILS NFR BLD: 0.7 %
BASOPHILS NFR BLD: 0.9 %
DIFFERENTIAL METHOD: ABNORMAL
DIFFERENTIAL METHOD: ABNORMAL
EOSINOPHIL # BLD AUTO: 0 K/UL
EOSINOPHIL # BLD AUTO: 0 K/UL
EOSINOPHIL NFR BLD: 0.9 %
EOSINOPHIL NFR BLD: 1 %
ERYTHROCYTE [DISTWIDTH] IN BLOOD BY AUTOMATED COUNT: 14.9 %
ERYTHROCYTE [DISTWIDTH] IN BLOOD BY AUTOMATED COUNT: 14.9 %
HAPTOGLOB SERPL-MCNC: 168 MG/DL
HCT VFR BLD AUTO: 35.6 %
HCT VFR BLD AUTO: 40.9 %
HGB BLD-MCNC: 12.1 G/DL
HGB BLD-MCNC: 13.6 G/DL
IMM GRANULOCYTES # BLD AUTO: 0.04 K/UL
IMM GRANULOCYTES # BLD AUTO: 0.05 K/UL
IMM GRANULOCYTES NFR BLD AUTO: 1.3 %
IMM GRANULOCYTES NFR BLD AUTO: 1.7 %
LDH SERPL L TO P-CCNC: 188 U/L
LYMPHOCYTES # BLD AUTO: 0.8 K/UL
LYMPHOCYTES # BLD AUTO: 1.2 K/UL
LYMPHOCYTES NFR BLD: 27.1 %
LYMPHOCYTES NFR BLD: 36.6 %
MCH RBC QN AUTO: 30.7 PG
MCH RBC QN AUTO: 30.9 PG
MCHC RBC AUTO-ENTMCNC: 33.3 G/DL
MCHC RBC AUTO-ENTMCNC: 34 G/DL
MCV RBC AUTO: 91 FL
MCV RBC AUTO: 92 FL
MONOCYTES # BLD AUTO: 0.5 K/UL
MONOCYTES # BLD AUTO: 0.5 K/UL
MONOCYTES NFR BLD: 15.1 %
MONOCYTES NFR BLD: 16.4 %
NEUTROPHILS # BLD AUTO: 1.4 K/UL
NEUTROPHILS # BLD AUTO: 1.6 K/UL
NEUTROPHILS NFR BLD: 43.9 %
NEUTROPHILS NFR BLD: 54.4 %
NRBC BLD-RTO: 0 /100 WBC
NRBC BLD-RTO: 0 /100 WBC
PLATELET # BLD AUTO: 133 K/UL
PLATELET # BLD AUTO: 156 K/UL
PMV BLD AUTO: 10.1 FL
PMV BLD AUTO: 9.8 FL
POCT GLUCOSE: 123 MG/DL (ref 70–110)
PROT 24H UR-MRATE: NORMAL MG/SPEC
PROT UR-MCNC: 15 MG/DL
RBC # BLD AUTO: 3.91 M/UL
RBC # BLD AUTO: 4.43 M/UL
RETICS/RBC NFR AUTO: 1.2 %
RPR SER QL: NORMAL
URINE COLLECTION DURATION: NORMAL HR
URINE VOLUME: NORMAL ML
WBC # BLD AUTO: 2.99 K/UL
WBC # BLD AUTO: 3.17 K/UL

## 2018-05-14 PROCEDURE — A9585 GADOBUTROL INJECTION: HCPCS | Performed by: HOSPITALIST

## 2018-05-14 PROCEDURE — 83010 ASSAY OF HAPTOGLOBIN QUANT: CPT

## 2018-05-14 PROCEDURE — 85060 BLOOD SMEAR INTERPRETATION: CPT | Mod: ,,, | Performed by: PATHOLOGY

## 2018-05-14 PROCEDURE — 85025 COMPLETE CBC W/AUTO DIFF WBC: CPT

## 2018-05-14 PROCEDURE — 86334 IMMUNOFIX E-PHORESIS SERUM: CPT | Mod: 26,,, | Performed by: PATHOLOGY

## 2018-05-14 PROCEDURE — 85045 AUTOMATED RETICULOCYTE COUNT: CPT

## 2018-05-14 PROCEDURE — 11000001 HC ACUTE MED/SURG PRIVATE ROOM

## 2018-05-14 PROCEDURE — 84166 PROTEIN E-PHORESIS/URINE/CSF: CPT | Mod: 26,,, | Performed by: PATHOLOGY

## 2018-05-14 PROCEDURE — 92610 EVALUATE SWALLOWING FUNCTION: CPT

## 2018-05-14 PROCEDURE — 63600175 PHARM REV CODE 636 W HCPCS: Performed by: STUDENT IN AN ORGANIZED HEALTH CARE EDUCATION/TRAINING PROGRAM

## 2018-05-14 PROCEDURE — 84165 PROTEIN E-PHORESIS SERUM: CPT | Mod: 26,,, | Performed by: PATHOLOGY

## 2018-05-14 PROCEDURE — 84166 PROTEIN E-PHORESIS/URINE/CSF: CPT

## 2018-05-14 PROCEDURE — 84165 PROTEIN E-PHORESIS SERUM: CPT

## 2018-05-14 PROCEDURE — 99232 SBSQ HOSP IP/OBS MODERATE 35: CPT | Mod: ,,, | Performed by: PSYCHIATRY & NEUROLOGY

## 2018-05-14 PROCEDURE — 25000003 PHARM REV CODE 250: Performed by: STUDENT IN AN ORGANIZED HEALTH CARE EDUCATION/TRAINING PROGRAM

## 2018-05-14 PROCEDURE — 95951 PR EEG MONITORING/VIDEORECORD: CPT | Mod: 26,,, | Performed by: PSYCHIATRY & NEUROLOGY

## 2018-05-14 PROCEDURE — 84156 ASSAY OF PROTEIN URINE: CPT

## 2018-05-14 PROCEDURE — 95951 HC EEG MONITORING/VIDEO RECORD: CPT

## 2018-05-14 PROCEDURE — 83615 LACTATE (LD) (LDH) ENZYME: CPT

## 2018-05-14 PROCEDURE — 25500020 PHARM REV CODE 255: Performed by: HOSPITALIST

## 2018-05-14 PROCEDURE — 36415 COLL VENOUS BLD VENIPUNCTURE: CPT

## 2018-05-14 PROCEDURE — 86334 IMMUNOFIX E-PHORESIS SERUM: CPT

## 2018-05-14 RX ORDER — GADOBUTROL 604.72 MG/ML
10 INJECTION INTRAVENOUS
Status: COMPLETED | OUTPATIENT
Start: 2018-05-14 | End: 2018-05-14

## 2018-05-14 RX ADMIN — DOXEPIN HYDROCHLORIDE 100 MG: 25 CAPSULE ORAL at 09:05

## 2018-05-14 RX ADMIN — FOLIC ACID 1 MG: 1 TABLET ORAL at 08:05

## 2018-05-14 RX ADMIN — THIAMINE HCL (VITAMIN B1) 50 MG TABLET 100 MG: at 08:05

## 2018-05-14 RX ADMIN — ENOXAPARIN SODIUM 40 MG: 100 INJECTION SUBCUTANEOUS at 04:05

## 2018-05-14 RX ADMIN — GADOBUTROL 10 ML: 604.72 INJECTION INTRAVENOUS at 09:05

## 2018-05-14 RX ADMIN — ATORVASTATIN CALCIUM 40 MG: 20 TABLET, FILM COATED ORAL at 08:05

## 2018-05-14 RX ADMIN — DIVALPROEX SODIUM 500 MG: 500 TABLET, EXTENDED RELEASE ORAL at 09:05

## 2018-05-14 RX ADMIN — DIVALPROEX SODIUM 1000 MG: 500 TABLET, EXTENDED RELEASE ORAL at 09:05

## 2018-05-14 RX ADMIN — LURASIDONE HYDROCHLORIDE 80 MG: 40 TABLET, FILM COATED ORAL at 08:05

## 2018-05-14 NOTE — PT/OT/SLP EVAL
Speech Language Pathology Evaluation  Bedside Swallow  Discharge    Patient Name:  Mirza Morales   MRN:  [id number]  Admitting Diagnosis: Seizure    Recommendations:                 General Recommendations:  Follow-up not indicated  Diet recommendations:  Regular, Thin   Aspiration Precautions: Standard aspiration precautions   General Precautions: Standard, fall  Communication strategies:  none    History:     Past Medical History:   Diagnosis Date    Alcohol abuse     Behavioral problem     Bipolar 1 disorder     Chronic right hip pain     Depression     DJD (degenerative joint disease), lumbar 1/27/2015    Fatigue     Hepatitis     pt. denies this    History of psychiatric care     History of psychiatric hospitalization     Hypertension     Karina     Post traumatic stress disorder     Psychiatric problem     Seizures     Suicide attempt     Therapy        Past Surgical History:   Procedure Laterality Date    HERNIA REPAIR      SPINE SURGERY  11-12-15    LAMINECTOMY/LUMBAR/WARE       Chest X-Rays:5/13 Mediastinal structures are midline.  Cardiomediastinal silhouette is stable in size.  Lung volumes are low but symmetric.  Chronic subsegmental opacities noted within the left lower lung zone, likely atelectasis or scarring.  No new focal airspace opacities.  No pneumothorax or large pleural effusions.  No free air beneath the diaphragm.  Degenerative changes of the spine and shoulders noted.    Prior diet: Regular/thin.      Subjective     Awake/alert    Pain/Comfort:  · Pain Rating 1: 0/10  · Pain Rating Post-Intervention 1: 0/10    Objective:     Oral Musculature Evaluation  · Oral Musculature: WFL  · Dentition: present and adequate  · Mucosal Quality: good  · Mandibular Strength and Mobility: WFL  · Oral Labial Strength and Mobility: WFL  · Lingual Strength and Mobility: WFL  · Volitional Cough: adequate  · Volitional Swallow: timely  · Voice Prior to PO Intake: clear    Bedside Swallow  Eval:   Consistencies Assessed:  · Thin liquids x2 cup, x7 straw  · Puree x1  · Solids x 2     Oral Phase:   · WFL    Pharyngeal Phase:   · no overt clinical signs/symptoms of aspiration    Compensatory Strategies  · None        Assessment:     Mirza Morales is a 63 y.o. male with oral and pharyngeal phases of swallow deemed WFL. No further ST recommended at this time.     Goals:    SLP Goals        Problem: SLP Goal    Goal Priority Disciplines Outcome   SLP Goal     SLP                    Plan:     · Plan of Care reviewed with:  patient   · SLP Follow-Up:  No       Discharge recommendations:    No ST f/u      Time Tracking:     SLP Treatment Date:   05/14/18  Speech Start Time:  1451  Speech Stop Time:  1506     Speech Total Time (min):  15 min    Billable Minutes: Eval Swallow and Oral Function 15    Aiyana Monroy CCC-SLP  05/14/2018

## 2018-05-14 NOTE — PROGRESS NOTES
Consult received on patient. Abrasion noted over patient's existing scar tissue to lumbar spine. Recommend dressing with aquacel foam dressing and change twice a week. Patient tolerated care well. Answered patient's questions. Nursing to continue care.       05/14/18 1254       Wound 05/14/18 1254 Shearing midline Lumbar spine   Date First Assessed/Time First Assessed: 05/14/18 1254   Pre-existing: Yes  Primary Wound Type: Shearing  Orientation: midline  Location: Lumbar spine   Wound WDL ex   Drainage Amount Scant   Drainage Characteristics/Odor Serosanguineous;No odor   Appearance Moist;Pink   Tissue loss description Partial thickness   Periwound Area Scar tissue   Wound Edges Open   Wound Length (cm) 1 cm   Wound Width (cm) 0.2 cm   Wound Depth (cm) 0 cm   Wound Volume (cm^3) 0 cm^3   Care Cleansed with:;Sterile normal saline   Dressing Applied;Foam   Dressing Change Due 05/17/18

## 2018-05-14 NOTE — PROGRESS NOTES
[facility] Hospital Medicine  Progress Note    Patient Name: Mirza Morales  MRN: [id number]  Patient Class: IP- Inpatient   Admission Date: 5/12/2018  Length of Stay: 2 days  Attending Physician: Malissa Vizcarra MD  Primary Care Provider: LIDIA Banks MD    [facility] Medicine Team: [facility] HOSP MED 5 Cal Blackmon MD    Subjective:     Principal Problem:Seizure    HPI:  64 y/o M PMhx HTN, Bipolar 1 disorder, Seizures, Depression, Alcohol abuse and Opiate dependency who presents w/ seizure. Patient recently presented to ED on 5/10 for seizures as well. Per wife she believes patient has had at least 3 seizures since he was last evaluated in ED. She states she witnessed one in which he fell to the ground and had tonic clonic movements followed by confusion afterwards. Seizures last ~30 seconds at a time, last witnessed 2PM 5/12. She endorses that he has had urinary incontinence but if unsure of their relationship to the seizures. She describes one episode when she woke up to find patient staring and the bed was wet. Additionally, she is concerned of his worsening confusion and forgetfulness. States patient knows that something is wrong. Patient himself believes that seizures may be due to medications which he is currently taking. However, wife states he has been taking these medications for some times now without any adverse effects.  Per wife patient has had seizures in the past, however, they had all been due to alcohol withdrawal. She denies patient having had any recent alcohol ingestion. Also states that she has been administering his medications due to his confusion to endure that he does not miss any doses.  History obtained from wife (Nga Morales [phone]) as patient poor historian and still somewhat confused.  Patient was scheduled to Neurology Dr. Fitch on 5/16.      Hospital Course:  No notes on file    Interval History: NAEON.     Review of Systems   Constitutional:  Positive for fatigue. Negative for chills and fever.   HENT: Negative for congestion and sore throat.    Eyes: Negative for photophobia.   Respiratory: Negative for cough, chest tightness, shortness of breath and wheezing.    Cardiovascular: Negative for chest pain and leg swelling.   Gastrointestinal: Negative for abdominal pain, constipation, diarrhea, nausea and vomiting.   Genitourinary: Negative for dysuria and hematuria.   Musculoskeletal: Negative for arthralgias and myalgias.   Skin: Negative for rash.   Neurological: Positive for tremors, seizures and weakness. Negative for dizziness, speech difficulty, light-headedness, numbness and headaches.   Psychiatric/Behavioral: Positive for confusion.     Objective:     Vital Signs (Most Recent):  Temp: 98.1 °F (36.7 °C) (05/14/18 1150)  Pulse: 95 (05/14/18 1150)  Resp: 18 (05/14/18 1150)  BP: 113/76 (05/14/18 1150)  SpO2: 99 % (05/14/18 1150) Vital Signs (24h Range):  Temp:  [97.6 °F (36.4 °C)-98.6 °F (37 °C)] 98.1 °F (36.7 °C)  Pulse:  [] 95  Resp:  [16-18] 18  SpO2:  [95 %-100 %] 99 %  BP: (110-132)/(72-86) 113/76     Weight: 94.8 kg (208 lb 15.9 oz)  Body mass index is 24.78 kg/m².    Intake/Output Summary (Last 24 hours) at 05/14/18 1344  Last data filed at 05/14/18 0600   Gross per 24 hour   Intake              240 ml   Output                0 ml   Net              240 ml      Physical Exam   Constitutional: He is oriented to person, place, and time. No distress.   HENT:   Head: Normocephalic and atraumatic.   Eyes: EOM are normal. Pupils are equal, round, and reactive to light. No scleral icterus.   Neck: Normal range of motion. Neck supple.   Cardiovascular: Normal rate, regular rhythm and intact distal pulses.    No murmur heard.  Pulmonary/Chest: Effort normal and breath sounds normal. No respiratory distress.   Abdominal: Soft. He exhibits no distension. There is no tenderness. There is no guarding.   Musculoskeletal: Normal range of motion. He  exhibits no edema or tenderness.   Lymphadenopathy:     He has no cervical adenopathy.   Neurological: He is alert and oriented to person, place, and time.   Skin: Skin is warm and dry. No rash noted. He is not diaphoretic.   Abrasion to L shin   Psychiatric:   Confused, saying random words at times, staring w/o response at times & at times answering questions appropriately       Significant Labs:   BMP:   Recent Labs  Lab 05/13/18  0551   GLU 83   *   K 3.9   CL 97   CO2 29   BUN 6*   CREATININE 0.8   CALCIUM 9.1   MG 1.9     CBC:   Recent Labs  Lab 05/13/18  0551 05/14/18  0349 05/14/18  1035   WBC 3.77* 3.17* 2.99*   HGB 12.8* 12.1* 13.6*   HCT 38.8* 35.6* 40.9   * 133* 156       Significant Imaging: I have reviewed and interpreted all pertinent imaging results/findings within the past 24 hours.    Assessment/Plan:      * Seizure      Loaded w/ keppra in ED  C/w home depakote  Neurology consult  Will keep NPO for now  Seizure may be multifactorial- including medication induced (adverse reactions from latuda & doxepin), hyponatremia (although chronic and mild)  Patient has been on these medications for a long time per wife therefore will continue, however, will also place psych consult  Per Neurology f/u 24hr EEG on depakote as patient having episodes on medication & hold off keppra as this may make his bipolar symptoms worse        Hyponatremia    Chronic mild hyponatremia  - Stable.           HLD (hyperlipidemia)    C/w home atorvastatin        Bipolar I disorder    C/w home depakote, latuda  Psychiatry consult- unlikely to have interaction. Recommend continuation.         DJD (degenerative joint disease), lumbar              Anxiety    C/w home doxepin        Tobacco abuse    Current smoker  .5 pack per day  Smoking cessation counseling        Alcohol dependence in sustained full remission                VTE Risk Mitigation         Ordered     enoxaparin injection 40 mg  Daily      05/12/18 2027      IP VTE HIGH RISK PATIENT  Once      05/12/18 2030     Place EMY hose  Until discontinued      05/12/18 2030              Cal Blackmon MD  Department of Hospital Medicine   [facility]

## 2018-05-14 NOTE — ASSESSMENT & PLAN NOTE
Admitted after 3 generalized tonic clonic events last week despite VPA (level was low/normal).     5/14-no clinical events overnight. At cognitive baseline    Recommendations:  -overnight EEG, pending results will make AED adjustments as needed   -MRI brain W WO contrast was unremarkable  -Follow-up with Neurology scheduled for 5/16/18

## 2018-05-14 NOTE — PROGRESS NOTES
[facility]  Neurology  Progress Note    Patient Name: Mirza Morales  MRN: [id number]  Admission Date: 5/12/2018  Hospital Length of Stay: 2 days  Code Status: Full Code   Attending Provider: Malissa Vizcarra MD  Primary Care Physician: LIDIA Banks MD   Principal Problem:Seizure      Subjective:     Interval History:   MRI Brain was unremarkable  Awaiting EEG hookup    Current Neurological Medications:   Divalproex ER 1 g qhs and 500 mg q am     Current Facility-Administered Medications   Medication Dose Route Frequency Provider Last Rate Last Dose    atorvastatin tablet 40 mg  40 mg Oral Daily Facundo Orona MD   40 mg at 05/14/18 0824    divalproex ER 24 hr tablet 1,000 mg  1,000 mg Oral QHS Facundo Orona MD   1,000 mg at 05/13/18 2043    divalproex ER 24 hr tablet 500 mg  500 mg Oral Daily Facundo Orona MD   500 mg at 05/14/18 0900    doxepin capsule 100 mg  100 mg Oral QHS Facundo Orona MD   100 mg at 05/13/18 2042    enoxaparin injection 40 mg  40 mg Subcutaneous Daily Facundo Orona MD   40 mg at 05/13/18 1637    folic acid tablet 1 mg  1 mg Oral Daily Facundo Orona MD   1 mg at 05/14/18 0824    lurasidone tablet 80 mg  80 mg Oral Daily Facundo Orona MD   80 mg at 05/14/18 0825    pneumoc 13-jett conj-dip cr(PF) 0.5 mL  0.5 mL Intramuscular vaccine x 1 dose Malissa Vizcarra MD        sodium chloride 0.9% flush 5 mL  5 mL Intravenous PRN Facundo Orona MD        thiamine tablet 100 mg  100 mg Oral Daily Facundo Orona MD   100 mg at 05/14/18 0824     Review of Systems   Neurological: Negative for dizziness, seizures, facial asymmetry, weakness and headaches.     Objective:     Vital Signs (Most Recent):  Temp: 97.9 °F (36.6 °C) (05/14/18 0816)  Pulse: 98 (05/14/18 0855)  Resp: 16 (05/14/18 0855)  BP: 110/77 (05/14/18 0816)  SpO2: 96 % (05/14/18 0816) Vital Signs (24h Range):  Temp:  [97.6 °F (36.4 °C)-98.6 °F (37 °C)] 97.9  °F (36.6 °C)  Pulse:  [] 98  Resp:  [16-18] 16  SpO2:  [92 %-100 %] 96 %  BP: (110-132)/(72-86) 110/77     Weight: 94.8 kg (208 lb 15.9 oz)  Body mass index is 24.78 kg/m².    Physical Exam   Psychiatric: His speech is normal.     NEUROLOGICAL EXAMINATION:     MENTAL STATUS   Follows 2 step commands.   Attention: normal. Concentration: normal.   Speech: speech is normal   Level of consciousness: alert  Knowledge: good.   Normal comprehension.     CRANIAL NERVES     CN II   Visual fields full to confrontation.     CN III, IV, VI   Right pupil: Shape: regular.   Left pupil: Shape: regular.   Nystagmus: none   Ophthalmoparesis: none    CN VII   Facial expression full, symmetric.     CN VIII   Hearing: intact    MOTOR EXAM   Muscle bulk: normal  Overall muscle tone: normal    SENSORY EXAM   Light touch normal.     GAIT AND COORDINATION     Tremor   Resting tremor: absent    Significant Labs:   CBC:   Recent Labs  Lab 05/13/18  0551 05/14/18  0349 05/14/18  1035   WBC 3.77* 3.17* 2.99*   HGB 12.8* 12.1* 13.6*   HCT 38.8* 35.6* 40.9   * 133* 156     CMP:   Recent Labs  Lab 05/12/18  1818 05/13/18  0551   * 83   * 133*   K 3.7 3.9   CL 96 97   CO2 25 29   BUN 8 6*   CREATININE 0.8 0.8   CALCIUM 8.9 9.1   MG 1.8 1.9   PROT 6.6  --    ALBUMIN 2.9*  --    BILITOT 0.4  --    ALKPHOS 104  --    AST 13  --    ALT 10  --    ANIONGAP 9 7*   EGFRNONAA >60.0 >60.0     Inflammatory Markers: No results for input(s): SEDRATE, CRP, PROCAL in the last 48 hours.  Urine Culture: No results for input(s): LABURIN in the last 48 hours.  Urine Studies: No results for input(s): COLORU, APPEARANCEUA, PHUR, SPECGRAV, PROTEINUA, GLUCUA, KETONESU, BILIRUBINUA, OCCULTUA, NITRITE, UROBILINOGEN, LEUKOCYTESUR, RBCUA, WBCUA, BACTERIA, SQUAMEPITHEL, HYALINECASTS in the last 48 hours.    Invalid input(s): [facility]  All pertinent lab results from the past 24 hours have been reviewed.    EEG (pending)    Significant Imaging: I  have reviewed and interpreted all pertinent imaging results/findings within the past 24 hours.     MRI Brain W WO contrast (5/14/18):  No evidence of acute intracranial pathology.Mild generalized cerebral and hippocampal volume loss    Assessment and Plan:     * Seizure    Admitted after 3 generalized tonic clonic events last week despite VPA (level was low/normal).   5/14-no clinical events overnight. At cognitive baseline    Recommendations:  -overnight EEG, pending results will make AED adjustments as needed   -MRI brain W WO contrast was unremarkable  -Follow-up with Neurology scheduled for 5/16/18     VTE Risk Mitigation         Ordered     enoxaparin injection 40 mg  Daily      05/12/18 2027     IP VTE HIGH RISK PATIENT  Once      05/12/18 2030     Place EMY hose  Until discontinued      05/12/18 2030        Please call with any questions or clarifications.    Jayna Butler PA-C  General Neurology Consult  Neuro Consult [facility] # [id number]

## 2018-05-14 NOTE — SUBJECTIVE & OBJECTIVE
Interval History: NAEON.     Review of Systems   Constitutional: Positive for fatigue. Negative for chills and fever.   HENT: Negative for congestion and sore throat.    Eyes: Negative for photophobia.   Respiratory: Negative for cough, chest tightness, shortness of breath and wheezing.    Cardiovascular: Negative for chest pain and leg swelling.   Gastrointestinal: Negative for abdominal pain, constipation, diarrhea, nausea and vomiting.   Genitourinary: Negative for dysuria and hematuria.   Musculoskeletal: Negative for arthralgias and myalgias.   Skin: Negative for rash.   Neurological: Positive for tremors, seizures and weakness. Negative for dizziness, speech difficulty, light-headedness, numbness and headaches.   Psychiatric/Behavioral: Positive for confusion.     Objective:     Vital Signs (Most Recent):  Temp: 98.1 °F (36.7 °C) (05/14/18 1150)  Pulse: 95 (05/14/18 1150)  Resp: 18 (05/14/18 1150)  BP: 113/76 (05/14/18 1150)  SpO2: 99 % (05/14/18 1150) Vital Signs (24h Range):  Temp:  [97.6 °F (36.4 °C)-98.6 °F (37 °C)] 98.1 °F (36.7 °C)  Pulse:  [] 95  Resp:  [16-18] 18  SpO2:  [95 %-100 %] 99 %  BP: (110-132)/(72-86) 113/76     Weight: 94.8 kg (208 lb 15.9 oz)  Body mass index is 24.78 kg/m².    Intake/Output Summary (Last 24 hours) at 05/14/18 1344  Last data filed at 05/14/18 0600   Gross per 24 hour   Intake              240 ml   Output                0 ml   Net              240 ml      Physical Exam   Constitutional: He is oriented to person, place, and time. No distress.   HENT:   Head: Normocephalic and atraumatic.   Eyes: EOM are normal. Pupils are equal, round, and reactive to light. No scleral icterus.   Neck: Normal range of motion. Neck supple.   Cardiovascular: Normal rate, regular rhythm and intact distal pulses.    No murmur heard.  Pulmonary/Chest: Effort normal and breath sounds normal. No respiratory distress.   Abdominal: Soft. He exhibits no distension. There is no tenderness. There  is no guarding.   Musculoskeletal: Normal range of motion. He exhibits no edema or tenderness.   Lymphadenopathy:     He has no cervical adenopathy.   Neurological: He is alert and oriented to person, place, and time.   Skin: Skin is warm and dry. No rash noted. He is not diaphoretic.   Abrasion to L shin   Psychiatric:   Confused, saying random words at times, staring w/o response at times & at times answering questions appropriately       Significant Labs:   BMP:   Recent Labs  Lab 05/13/18  0551   GLU 83   *   K 3.9   CL 97   CO2 29   BUN 6*   CREATININE 0.8   CALCIUM 9.1   MG 1.9     CBC:   Recent Labs  Lab 05/13/18  0551 05/14/18  0349 05/14/18  1035   WBC 3.77* 3.17* 2.99*   HGB 12.8* 12.1* 13.6*   HCT 38.8* 35.6* 40.9   * 133* 156       Significant Imaging: I have reviewed and interpreted all pertinent imaging results/findings within the past 24 hours.

## 2018-05-14 NOTE — PLAN OF CARE
Problem: SLP Goal  Goal: SLP Goal  Bedside swallow study completed. No further ST recommended at this time.   Aiyana Monroy CCC-SLP  5/14/2018

## 2018-05-14 NOTE — PLAN OF CARE
Problem: Fall Risk (Adult)  Goal: Identify Related Risk Factors and Signs and Symptoms  Related risk factors and signs and symptoms are identified upon initiation of Human Response Clinical Practice Guideline (CPG)   Outcome: Ongoing (interventions implemented as appropriate)  No acute events overnight. Alert and oriented to self 50% of time, slept between care. Patient understands and agrees with plan of care. Vitals stable and within patient's baseline since admission on room air. Diligent coughing, deep breathing encouraged. No pain or nausea reported overnight. Urine output adequate overnight. No BM overnight. Instructed to call for help as needed, call light in reach. Bed in lowest position and brake set. Frequent rounds made for patient's safety. See flowsheets for detailed assessment. White board in patient's room updated accordingly. Continuing to monitor closely.

## 2018-05-14 NOTE — ASSESSMENT & PLAN NOTE
C/w home depakote, latuda  Psychiatry consult- unlikely to have interaction. Recommend continuation.

## 2018-05-14 NOTE — SUBJECTIVE & OBJECTIVE
Subjective:     Interval History:   MRI Brain was unremarkable  Awaiting EEG hookup    Current Neurological Medications:   Divalproex ER 1 g qhs and 500 mg q am     Current Facility-Administered Medications   Medication Dose Route Frequency Provider Last Rate Last Dose    atorvastatin tablet 40 mg  40 mg Oral Daily Facundo Orona MD   40 mg at 05/14/18 0824    divalproex ER 24 hr tablet 1,000 mg  1,000 mg Oral QHS Facundo Orona MD   1,000 mg at 05/13/18 2043    divalproex ER 24 hr tablet 500 mg  500 mg Oral Daily Facundo Orona MD   500 mg at 05/14/18 0900    doxepin capsule 100 mg  100 mg Oral QHS Facundo Orona MD   100 mg at 05/13/18 2042    enoxaparin injection 40 mg  40 mg Subcutaneous Daily Facundo Orona MD   40 mg at 05/13/18 1637    folic acid tablet 1 mg  1 mg Oral Daily Facundo Orona MD   1 mg at 05/14/18 0824    lurasidone tablet 80 mg  80 mg Oral Daily Facundo Oroan MD   80 mg at 05/14/18 0825    pneumoc 13-jett conj-dip cr(PF) 0.5 mL  0.5 mL Intramuscular vaccine x 1 dose Malissa Vizcarra MD        sodium chloride 0.9% flush 5 mL  5 mL Intravenous PRN Facundo Orona MD        thiamine tablet 100 mg  100 mg Oral Daily Facundo Orona MD   100 mg at 05/14/18 0824     Review of Systems   Neurological: Negative for dizziness, seizures, facial asymmetry, weakness and headaches.     Objective:     Vital Signs (Most Recent):  Temp: 97.9 °F (36.6 °C) (05/14/18 0816)  Pulse: 98 (05/14/18 0855)  Resp: 16 (05/14/18 0855)  BP: 110/77 (05/14/18 0816)  SpO2: 96 % (05/14/18 0816) Vital Signs (24h Range):  Temp:  [97.6 °F (36.4 °C)-98.6 °F (37 °C)] 97.9 °F (36.6 °C)  Pulse:  [] 98  Resp:  [16-18] 16  SpO2:  [92 %-100 %] 96 %  BP: (110-132)/(72-86) 110/77     Weight: 94.8 kg (208 lb 15.9 oz)  Body mass index is 24.78 kg/m².    Physical Exam   Psychiatric: His speech is normal.     NEUROLOGICAL EXAMINATION:     MENTAL STATUS   Follows 2 step commands.    Attention: normal. Concentration: normal.   Speech: speech is normal   Level of consciousness: alert  Knowledge: good.   Normal comprehension.     CRANIAL NERVES     CN II   Visual fields full to confrontation.     CN III, IV, VI   Right pupil: Shape: regular.   Left pupil: Shape: regular.   Nystagmus: none   Ophthalmoparesis: none    CN VII   Facial expression full, symmetric.     CN VIII   Hearing: intact    MOTOR EXAM   Muscle bulk: normal  Overall muscle tone: normal    SENSORY EXAM   Light touch normal.     GAIT AND COORDINATION     Tremor   Resting tremor: absent    Significant Labs:   CBC:   Recent Labs  Lab 05/13/18  0551 05/14/18  0349 05/14/18  1035   WBC 3.77* 3.17* 2.99*   HGB 12.8* 12.1* 13.6*   HCT 38.8* 35.6* 40.9   * 133* 156     CMP:   Recent Labs  Lab 05/12/18  1818 05/13/18  0551   * 83   * 133*   K 3.7 3.9   CL 96 97   CO2 25 29   BUN 8 6*   CREATININE 0.8 0.8   CALCIUM 8.9 9.1   MG 1.8 1.9   PROT 6.6  --    ALBUMIN 2.9*  --    BILITOT 0.4  --    ALKPHOS 104  --    AST 13  --    ALT 10  --    ANIONGAP 9 7*   EGFRNONAA >60.0 >60.0     Inflammatory Markers: No results for input(s): SEDRATE, CRP, PROCAL in the last 48 hours.  Urine Culture: No results for input(s): LABURIN in the last 48 hours.  Urine Studies: No results for input(s): COLORU, APPEARANCEUA, PHUR, SPECGRAV, PROTEINUA, GLUCUA, KETONESU, BILIRUBINUA, OCCULTUA, NITRITE, UROBILINOGEN, LEUKOCYTESUR, RBCUA, WBCUA, BACTERIA, SQUAMEPITHEL, HYALINECASTS in the last 48 hours.    Invalid input(s): WRIGHTSUR  All pertinent lab results from the past 24 hours have been reviewed.    EEG (pending)    Significant Imaging: I have reviewed and interpreted all pertinent imaging results/findings within the past 24 hours.     MRI Brain W WO contrast (5/14/18):  No evidence of acute intracranial pathology.Mild generalized cerebral and hippocampal volume loss

## 2018-05-15 VITALS
RESPIRATION RATE: 18 BRPM | SYSTOLIC BLOOD PRESSURE: 126 MMHG | BODY MASS INDEX: 24.68 KG/M2 | TEMPERATURE: 98 F | DIASTOLIC BLOOD PRESSURE: 85 MMHG | WEIGHT: 209 LBS | HEIGHT: 77 IN | HEART RATE: 91 BPM | OXYGEN SATURATION: 97 %

## 2018-05-15 PROBLEM — E87.1 HYPONATREMIA: Chronic | Status: ACTIVE | Noted: 2018-02-21

## 2018-05-15 PROBLEM — R23.9 ALTERATION IN SKIN INTEGRITY: Chronic | Status: ACTIVE | Noted: 2018-05-14

## 2018-05-15 LAB
ALBUMIN SERPL BCP-MCNC: 2.6 G/DL
ALBUMIN SERPL ELPH-MCNC: 3.23 G/DL
ALP SERPL-CCNC: 87 U/L
ALPHA1 GLOB SERPL ELPH-MCNC: 0.36 G/DL
ALPHA2 GLOB SERPL ELPH-MCNC: 0.74 G/DL
ALT SERPL W/O P-5'-P-CCNC: 7 U/L
AMPHETAMINES SERPL QL: NEGATIVE
ANION GAP SERPL CALC-SCNC: 7 MMOL/L
AST SERPL-CCNC: 11 U/L
B-GLOBULIN SERPL ELPH-MCNC: 1.11 G/DL
BARBITURATES SERPL QL SCN: NEGATIVE
BASOPHILS # BLD AUTO: 0.02 K/UL
BASOPHILS NFR BLD: 0.6 %
BENZODIAZ SERPL QL: NEGATIVE
BILIRUB SERPL-MCNC: 0.2 MG/DL
BUN SERPL-MCNC: 11 MG/DL
CALCIUM SERPL-MCNC: 8.8 MG/DL
CANNABINOIDS SERPL QL: NEGATIVE
CHLORIDE SERPL-SCNC: 98 MMOL/L
CO2 SERPL-SCNC: 30 MMOL/L
COCAINE, BLOOD: NEGATIVE
CREAT SERPL-MCNC: 0.9 MG/DL
DIFFERENTIAL METHOD: ABNORMAL
EOSINOPHIL # BLD AUTO: 0.1 K/UL
EOSINOPHIL NFR BLD: 2.3 %
ERYTHROCYTE [DISTWIDTH] IN BLOOD BY AUTOMATED COUNT: 15 %
EST. GFR  (AFRICAN AMERICAN): >60 ML/MIN/1.73 M^2
EST. GFR  (NON AFRICAN AMERICAN): >60 ML/MIN/1.73 M^2
ETHANOL SERPL-MCNC: NEGATIVE MG/DL
GAMMA GLOB SERPL ELPH-MCNC: 1.25 G/DL
GLUCOSE SERPL-MCNC: 98 MG/DL
HCT VFR BLD AUTO: 35.6 %
HGB BLD-MCNC: 11.8 G/DL
IMM GRANULOCYTES # BLD AUTO: 0.04 K/UL
IMM GRANULOCYTES NFR BLD AUTO: 1.2 %
LYMPHOCYTES # BLD AUTO: 1.3 K/UL
LYMPHOCYTES NFR BLD: 38 %
MCH RBC QN AUTO: 30.6 PG
MCHC RBC AUTO-ENTMCNC: 33.1 G/DL
MCV RBC AUTO: 93 FL
METHADONE SERPL QL SCN: NEGATIVE
MONOCYTES # BLD AUTO: 0.6 K/UL
MONOCYTES NFR BLD: 18.3 %
NEUTROPHILS # BLD AUTO: 1.4 K/UL
NEUTROPHILS NFR BLD: 39.6 %
NRBC BLD-RTO: 0 /100 WBC
OPIATES SERPL QL SCN: NEGATIVE
PATH REV BLD -IMP: NORMAL
PATH REV BLD -IMP: NORMAL
PCP SERPL QL SCN: NEGATIVE
PLATELET # BLD AUTO: 124 K/UL
PMV BLD AUTO: 10.1 FL
POTASSIUM SERPL-SCNC: 4.6 MMOL/L
PROPOXYPH SERPL QL: NEGATIVE
PROT SERPL-MCNC: 6.1 G/DL
PROT SERPL-MCNC: 6.7 G/DL
RBC # BLD AUTO: 3.85 M/UL
SODIUM SERPL-SCNC: 135 MMOL/L
WBC # BLD AUTO: 3.45 K/UL

## 2018-05-15 PROCEDURE — 90471 IMMUNIZATION ADMIN: CPT | Performed by: HOSPITALIST

## 2018-05-15 PROCEDURE — 80053 COMPREHEN METABOLIC PANEL: CPT

## 2018-05-15 PROCEDURE — 97165 OT EVAL LOW COMPLEX 30 MIN: CPT

## 2018-05-15 PROCEDURE — 85025 COMPLETE CBC W/AUTO DIFF WBC: CPT

## 2018-05-15 PROCEDURE — 99238 HOSP IP/OBS DSCHRG MGMT 30/<: CPT | Mod: ,,, | Performed by: HOSPITALIST

## 2018-05-15 PROCEDURE — 63600175 PHARM REV CODE 636 W HCPCS: Performed by: HOSPITALIST

## 2018-05-15 PROCEDURE — 90670 PCV13 VACCINE IM: CPT | Performed by: HOSPITALIST

## 2018-05-15 PROCEDURE — 36415 COLL VENOUS BLD VENIPUNCTURE: CPT

## 2018-05-15 PROCEDURE — 25000003 PHARM REV CODE 250: Performed by: STUDENT IN AN ORGANIZED HEALTH CARE EDUCATION/TRAINING PROGRAM

## 2018-05-15 PROCEDURE — 99239 HOSP IP/OBS DSCHRG MGMT >30: CPT | Mod: ,,, | Performed by: HOSPITALIST

## 2018-05-15 PROCEDURE — 97162 PT EVAL MOD COMPLEX 30 MIN: CPT

## 2018-05-15 RX ADMIN — PNEUMOCOCCAL 13-VALENT CONJUGATE VACCINE 0.5 ML: 2.2; 2.2; 2.2; 2.2; 2.2; 4.4; 2.2; 2.2; 2.2; 2.2; 2.2; 2.2; 2.2 INJECTION, SUSPENSION INTRAMUSCULAR at 03:05

## 2018-05-15 RX ADMIN — THIAMINE HCL (VITAMIN B1) 50 MG TABLET 100 MG: at 08:05

## 2018-05-15 RX ADMIN — DIVALPROEX SODIUM 500 MG: 500 TABLET, EXTENDED RELEASE ORAL at 08:05

## 2018-05-15 RX ADMIN — ATORVASTATIN CALCIUM 40 MG: 20 TABLET, FILM COATED ORAL at 08:05

## 2018-05-15 RX ADMIN — LURASIDONE HYDROCHLORIDE 80 MG: 40 TABLET, FILM COATED ORAL at 09:05

## 2018-05-15 RX ADMIN — FOLIC ACID 1 MG: 1 TABLET ORAL at 08:05

## 2018-05-15 NOTE — PT/OT/SLP EVAL
"Physical Therapy Evaluation    Patient Name:  Mirza Morales   MRN:  [id number]    Recommendations:     Discharge Recommendations:  home health PT   Discharge Equipment Recommendations: none   Barriers to discharge: None    Assessment:     Mirza Morales is a 63 y.o. male admitted with a medical diagnosis of Seizure.  He presents with the following impairments/functional limitations:  impaired endurance, impaired functional mobilty, impaired balance, decreased safety awareness, gait instability, impaired cognition.  During today's session, pt with incr fatigue with limited activity; relied on RW for OOB mobility with pt being overall limited by EEG wiring. Reports utilizing RW over past month due to recent occurrence of seizures. Pt appropriate for discharge home c/ HH PT to address current deficits and improve overall quality of movement.    Rehab Prognosis:  Good; patient would benefit from acute skilled PT services to address these deficits and reach maximum level of function.      Recent Surgery: * No surgery found *      Plan:     During this hospitalization, patient to be seen 2 x/week to address the above listed problems via gait training, therapeutic activities, therapeutic exercises  · Plan of Care Expires:  06/14/18   Plan of Care Reviewed with: patient    Subjective     Communicated with nsg prior to session.  Patient found supine in bed upon PT entry to room, agreeable to evaluation.      Chief Complaint: fatigue with activity  Patient comments/goals: "I have been needing this walker since my surgery" per pt during session.   Pain/Comfort:  · Pain Rating 1: 0/10    Patients cultural, spiritual, Baptism conflicts given the current situation:      Living Environment:  Patient History:  · Home environment: lives with wife and nephew in one story home c/ 3 LILIANA and no HR  · Assistance provided:  Wife (minimally, currently with ankle fracture) and nephew  · Bathroom set up:  Tub/shower    Previous " Level of Mobilty  · Transfers: (I)  · Gait:(I) prior to surgery 1 month ago. Within past month, has req usage of RW    Additional Roles and Hx of Patient  · Working: no  · Driving: no  · Hobbies:watching interesting tv shows  · Hearing or Vision Deficit: no  · Hx of Falls: no    DME: RW  - Bold implies equipment being used      Objective:     Patient found with: EEG, telemetry     General Precautions: Standard, fall   Orthopedic Precautions:N/A   Braces: N/A       Exams:  Cognitive Exam  Patient is oriented to Person, Place, Time and Situation and follows 100% of multi-step commands    Fine Motor Coordination    -       Intact  RLE heel shin and LLE heel shin   Postural Exam Patient presented with the following abnormalities:    -       No postural abnormalities identified   Sensation    -       Intact  light/touch (B) LE   Skin Integrity/Edema     -       Skin integrity: Visible skin intact  -       Edema: None noted in (B) LE   R LE ROM WFL   R LE Strength  WFL   L LE ROM WFL   L LE Strength  WFL       Functional Mobility  Bed Mobility  Scooting: stand by assistance  Supine to Sit: stand by assistance   Sit to Supine: stand by assistance   Transfers Sit to Stand:  stand by assistance with rolling walker for 1 trials     Gait Gait Distance: 20 steps in place, 20 ft in room; limited due to EEG wiring  Assistance Level: stand by assistance  Description: slowed steps. No instability denoted or LOB.          Balance   Static Sitting supervision   Dynamic Sitting supervision   Static Standing stand by assistance   Dynamic Standing stand by assistance     PT assessed multiple higher level balance activities  - tandem stance  - unilateral stance  - perturbations (FWD, BWD, sideways)  - feet together eyes closed  *LOB denoted with bolded items  CGA req for all activities for safety          AM-PAC 6 CLICK MOBILITY  Total Score:17       Therapeutic Activities and Exercises:    PT educated pt on the following  - role of  PT  - PT POC (including frequency and duration while in hospital)  - discharge recommendation (HH PT) and equipment needs (none)  - level of assistance currently req (1 person, RW)and safety precautions with [facility] staff   All questions and concerns answered and addressed. White board updated with pertinent information. Nsg notified.       Patient left supine with all lines intact and call button in reach.    GOALS:    Physical Therapy Goals        Problem: Physical Therapy Goal    Goal Priority Disciplines Outcome Goal Variances Interventions   Physical Therapy Goal     PT/OT, PT Ongoing (interventions implemented as appropriate)     Description:  Goals to be met by: 10 days ( )    Patient will increase functional independence with mobility by performin. Supine to sit with Set-up [place]  2. Sit to supine with Set-up [place]  3. Sit to stand transfer with Supervision  4. Gait  x 150 feet with Supervision using Rolling Walker.   5. Lower extremity exercise program x 30 reps per handout, with independence to improve muscular strength and endurance.   6. Pt will ambulate 3 steps without HR and SBA.                        History:     Past Medical History:   Diagnosis Date    Alcohol abuse     Behavioral problem     Bipolar 1 disorder     Chronic right hip pain     Depression     DJD (degenerative joint disease), lumbar 2015    Fatigue     Hepatitis     pt. denies this    History of psychiatric care     History of psychiatric hospitalization     Hypertension     Karina     Post traumatic stress disorder     Psychiatric problem     Seizures     Suicide attempt     Therapy        Past Surgical History:   Procedure Laterality Date    HERNIA REPAIR      SPINE SURGERY  11-12-15    LAMINECTOMY/LUMBAR/WARE       Clinical Decision Making:     History  Co-morbidities and personal factors that may impact the plan of care Examination  Body Structures and Functions, activity limitations and  participation restrictions that may impact the plan of care Clinical Presentation   Decision Making/ Complexity Score   Co-morbidities:   [] Time since onset of injury / illness / exacerbation  [x] Status of current condition  [x]Patient's cognitive status and safety concerns    [x] Multiple Medical Problems (see med hx)  Personal Factors:   [] Patient's age  [] Prior Level of function   [x] Patient's home situation (environment and family support)  [] Patient's level of motivation  [] Expected progression of patient      HISTORY:(criteria)    [] 61446 - no personal factors/history    [x] 04416 - has 1-2 personal factor/comorbidity     [] 58405 - has >3 personal factor/comorbidity     Body Regions:  [] Objective examination findings  [x] Head     []  Neck  [] Trunk   [] Upper Extremity  [] Lower Extremity    Body Systems:  [x] For communication ability, affect, cognition, language, and learning style: the assessment of the ability to make needs known, consciousness, orientation (person, place, and time), expected emotional /behavioral responses, and learning preferences (eg, learning barriers, education  needs)  [x] For the neuromuscular system: a general assessment of gross coordinated movement (eg, balance, gait, locomotion, transfers, and transitions) and motor function  (motor control and motor learning)  [] For the musculoskeletal system: the assessment of gross symmetry, gross range of motion, gross strength, height, and weight  [] For the integumentary system: the assessment of pliability(texture), presence of scar formation, skin color, and skin integrity  [] For cardiovascular/pulmonary system: the assessment of heart rate, respiratory rate, blood pressure, and edema     Activity limitations:    [x] Patient's cognitive status and saf ety concerns          [] Status of current condition      [] Weight bearing restriction  [] Cardiopulmunary Restriction    Participation Restrictions:   [] Goals and goal  agreement with the patient     [] Rehab potential (prognosis) and probable outcome      Examination of Body System: (criteria)    [] 42986 - addressing 1-2 elements    [x] 54243 - addressing a total of 3 or more elements     [] 92852 -  Addressing a total of 4 or more elements         Clinical Presentation: (criteria)  Evolving - 90290     On examination of body system using standardized tests and measures patient presents with 3 or more elements from any of the following: body structures and functions, activity limitations, and/or participation restrictions.  Leading to a clinical presentation that is considered evolving with changing characteristics                              Clinical Decision Making  (Eval Complexity):  Moderate - 19826     Time Tracking:     PT Received On: 05/15/18  PT Start Time: 0845     PT Stop Time: 0907  PT Total Time (min): 22 min     Billable Minutes: Evaluation 22  Co-evaluation c/ OT    Chantel Tirado, PT, DPT  05/15/2018

## 2018-05-15 NOTE — PLAN OF CARE
Problem: Fall Risk (Adult)  Intervention: Safety Precautions  Pt with EGG monitor at bedside, Pt is AAOx4, Pt states that he is having trouble with short term memory. VSS, no significant overnight events, will continue to monitor.

## 2018-05-15 NOTE — PROCEDURES
DATE OF SERVICE:  05/14/2018    EEG NUMBER: FH-, --2.    REFERRING PHYSICIAN:  Dr. Orona.    This EEG was performed to assess for evidence of underlying epilepsy.    ICU EEG/VIDEO MONITORING REPORT     METHODOLOGY:  Electroencephalographic (EEG) is recorded with electrodes placed   according to the International 10-20 placement system.  Thirty two (32) channels   of digital signal (sampling rate of 512/sec), including T1 and T2, were   simultaneously recorded from the scalp and may include EKG, EMG, and/or eye   monitors.  Recording band pass was 0.1 to 512 Hz.  Digital video recording of   the patient is simultaneously recorded with the EEG.  The patient is instructed   to report clinical symptoms which may occur during the recording session.  EEG   and video recording are stored and archived in digital format.  Activation   procedures, which include photic stimulation, hyperventilation and instructing   patients to perform simple tasks, are done in selected patients.    The EEG is displayed on a monitor screen and can be reviewed using different   montages.  Computer-assisted analysis is employed to detect spike and   electrographic seizure activity.   The entire record is submitted for computer   analysis.  The entire recording is visually reviewed, and the times identified   by computer analysis as being spikes or seizures are reviewed again.    Compressed spectral analysis (CSA) is also performed on the activity recorded   from each individual channel.  This is displayed as a power display of   frequencies from 0 to 30 Hz over time.   The CSA is reviewed looking for   asymmetries in power between homologous areas of the scalp, then compared with   the original EEG recording.    TalkBin software was also utilized in the review of this study.  This software   suite analyzes the EEG recording in multiple domains.  Coherence and rhythmicity   are computed to identify EEG sections which may contain  organized seizures.    Each channel undergoes analysis to detect the presence of spike and sharp waves   which have special and morphological characteristics of epileptic activity.  The   routine EEG recording is converted from special into frequency domain.  This is   then displayed comparing homologous areas to identify areas of significant   asymmetry.  Algorithm to identify non-cortically generated artifact is used to   separate artifact from the EEG.      RECORDING TIMES:  Start on 05/14/2018 at hour 16, minute 37, second 25.  End on 05/15/2018 at hour 7, minute 0, second 5.  Restart on 05/15/2018 at hour 7, minute 0, second 3.  End on 05/15/2018 at hour 9, minute 24, second 33.  Total time of video EEG recording for this study was 16 hours and 35 minutes.    EEG FINDINGS:  The recording was obtained with a number of standard bipolar and   referential montages during wakefulness, drowsiness and sleep.  In the alert   state, the posterior background rhythm was a symmetric, well-modulated 9 to 10   Hz alpha rhythm, which reacted symmetrically to eye opening.  Activation   procedures not performed.  During drowsiness, the background rhythm waxed and   waned and there were periods of slowing.  During stage II sleep, symmetric V   waves, K complexes and sleep spindles were noted.  There were no focal   abnormalities.  There were no interictal epileptiform abnormalities and no   clinical or electrographic seizures were recorded.  The EKG channel revealed   sinus rhythm.    IMPRESSION:  This is a normal EEG during wakefulness, drowsiness and sleep.    CLINICAL CORRELATION:  The patient is a 63-year-old male who is being evaluated   for episodes suggestive of generalized tonic-clonic seizures.  The patient is   currently maintained on valproic acid.  This is a normal EEG during wakefulness,   drowsiness and sleep.  There is no evidence for either cortical dysfunction nor   an epileptic process on this recording.  No  seizures were recorded during this   study.      FAK/HN  dd: 05/15/2018 14:17:09 (CDT)  td: 05/15/2018 14:36:23 (CDT)  Doc ID   #[id number]  Job ID #[id number]    CC:

## 2018-05-15 NOTE — PLAN OF CARE
Problem: Occupational Therapy Goal  Goal: Occupational Therapy Goal  Outcome: Outcome(s) achieved Date Met: 05/15/18  Pt is able to perform all necessary ADLs. OT to d/c.    DOMINIC Bowling  5/15/2018  Pager: [phone]

## 2018-05-15 NOTE — PLAN OF CARE
[facility]    HOME HEALTH ORDERS  FACE TO FACE ENCOUNTER    Patient Name: Mirza Morales  YOB: 1955    PCP: LIDIA Banks MD   PCP Address: [address] / [address]  PCP Phone Number: [phone]  PCP Fax: [phone]    Encounter Date: 05/15/2018    Admit to Home Health    Diagnoses:  Active Hospital Problems    Diagnosis  POA    *Seizure [R56.9]  Yes     Priority: 1 - High    Alcohol dependence in sustained full remission [F10.21]  Yes     Priority: 2      Chronic    Bipolar I disorder [F31.9]  Yes     Priority: 3     Alteration in skin integrity [R23.9]  Yes     Chronic    Hyponatremia [E87.1]  Yes     Chronic    HLD (hyperlipidemia) [E78.5]  Yes     Chronic    Anxiety [F41.9]  Yes     Chronic    Tobacco abuse [Z72.0]  Yes     Chronic      Resolved Hospital Problems    Diagnosis Date Resolved POA   No resolved problems to display.       Future Appointments  Date Time Provider Department Center   5/16/2018 8:00 AM Falguni Fitch II, MD [facility] NEURO [facility]           I have seen and examined this patient face to face today. My clinical findings that support the need for the home health skilled services and home bound status are the following:  Weakness/numbness causing balance and gait disturbance due to Weakness/Debility making it taxing to leave home.  Patient with medication mismanagement issues requiring home bound status as evidenced by  Poor understanding of medication regimen/dosage.  Mental confusion making it unsafe for patient to leave home alone due to  Bipolar Disorder.    Allergies:  Review of patient's allergies indicates:   Allergen Reactions    Gabapentin Other (See Comments)     SEVERE DIZZINESS AND BALANCE PROBLEMS, UNABLE TO WALK.    Nardil [phenelzine]      BECOMES HYPER, LIKE A MANIC EPISODE.    Penicillins Hives    Lamictal [lamotrigine] Rash       Diet: regular diet    Activities: activity as  tolerated    Nursing:   SN to complete comprehensive assessment including routine vital signs. Instruct on disease process and s/s of complications to report to MD. Review/verify medication list sent home with the patient at time of discharge  and instruct patient/caregiver as needed. Frequency may be adjusted depending on start of care date.    Notify MD if SBP > 160 or < 90; DBP > 90 or < 50; HR > 120 or < 50; Temp > 101;       CONSULTS:    Physical Therapy to evaluate and treat. Evaluate for home safety and equipment needs; Establish/upgrade home exercise program. Perform / instruct on therapeutic exercises, gait training, transfer training, and Range of Motion.  Occupational Therapy to evaluate and treat. Evaluate home environment for safety and equipment needs. Perform/Instruct on transfers, ADL training, ROM, and therapeutic exercises.  Aide to provide assistance with personal care, ADLs, and vital signs.    MISCELLANEOUS CARE:  Routine Skin for Bedridden Patients: Instruct patient/caregiver to apply moisture barrier cream to all skin folds and wet areas in perineal area daily and after baths and all bowel movements.    WOUND CARE ORDERS  n/a      Medications: Review discharge medications with patient and family and provide education.      Current Discharge Medication List      CONTINUE these medications which have NOT CHANGED    Details   atorvastatin (LIPITOR) 40 MG tablet TAKE 1 TABLET (40 MG TOTAL) BY MOUTH ONCE DAILY.  Qty: 30 tablet, Refills: 3      beclomethasone (QVAR) 40 mcg/actuation Aero Inhale 1 puff into the lungs 2 (two) times daily. Controller  Qty: 120 each, Refills: 0    Associated Diagnoses: Atelectasis of left lung      divalproex ER (DEPAKOTE) 500 MG Tb24 Take 1 tablet (500mg) every morning and 2 tablets (1000mg) every night.  Qty: 90 tablet, Refills: 3      gabapentin (NEURONTIN) 600 MG tablet Take 600 mg by mouth 3 (three) times daily.      hydrOXYzine pamoate (VISTARIL) 50 MG Cap TAKE 1  CAPSULE (50 MG TOTAL) BY MOUTH EVERY EVENING.  Qty: 30 capsule, Refills: 3      lurasidone (LATUDA) 80 mg Tab tablet Take 80 mg by mouth once daily. Take one tablet daily with dinner.  Qty: 30 tablet, Refills: 3      pantoprazole (PROTONIX) 40 MG tablet Take 1 tablet (40 mg total) by mouth once daily.  Qty: 30 tablet, Refills: 0      acetaminophen (TYLENOL) 325 MG tablet Take 2 tablets (650 mg total) by mouth every 4 (four) hours as needed.  Refills: 0      albuterol 90 mcg/actuation inhaler Inhale 2 puffs into the lungs every 4 (four) hours as needed for Wheezing.  Qty: 1 Inhaler, Refills: 0    Associated Diagnoses: Pneumonia of left lung due to infectious organism, unspecified part of lung; Atelectasis of left lung      doxepin (SINEQUAN) 100 MG capsule TAKE 1 CAPSULE (100 MG TOTAL) BY MOUTH EVERY EVENING.  Qty: 30 capsule, Refills: 3      folic acid (FOLVITE) 1 MG tablet Take 1 tablet (1 mg total) by mouth once daily.  Qty: 30 tablet, Refills: 0      lidocaine (LIDODERM) 5 % Place 1 patch onto the skin once daily. Remove & Discard patch within 12 hours or as directed by MD  Qty: 10 patch, Refills: 0      multivit-min-FA-lycopen-lutein (CENTRUM SILVER ULTRA MEN'S) 300-600-300 mcg Tab Take 1 tablet by mouth once daily at 6am.      nicotine (NICODERM CQ) 14 mg/24 hr Place 1 patch onto the skin once daily at 6am.  Refills: 0      thiamine 100 MG tablet Take 1 tablet (100 mg total) by mouth once daily.             I certify that this patient is confined to his home and needs intermittent skilled nursing care, physical therapy and occupational therapy.          Cal Blackmon MD

## 2018-05-15 NOTE — MEDICAL/APP STUDENT
"Patient Name: Mirza Morales  MRN: [id number]  Patient Class: IP- Inpatient     Admission Date: 5/12/2018  Length of Stay: 3 days  Attending Physician: Darcy Ortiz MD     Hospital Medicine Team: [facility], April Wu, Year 3 medical student    Progress Note  Hospital Medicine    SUBJECTIVE:     History of Present Illness:  64 yo male w h/o bipolar 1 disorder and seizure secondary to alcohol withdrawal presented to ED on 5/12 with 3 episodes of LOC over past 3 days, each lasting <30 seconds. 2 episodes were witnessed by wife, involving limb movements consistent with generalized tonic-clonic seizure activity. Per chart, wife described patient looking blankly at her and walking in a [place] looking at the ceiling, before dropping to the floor. No urinary incontinence was noted during the episode, though patient does have urinary incontinence at other times. Previous seizure activity noted over three years ago in the setting of alcohol withdrawal; however, patient states that he has abstained from alcohol for 4 years (previous H&P on 5/12 states 4 months).    Patient also reports subjective "lightheadness" upon sitting up and standing, which has occurred frequently over the past months.    No acute events overnight. Patient notes he is "sleepy" but has no other complaints.     Past Medical History:   Diagnosis Date    Alcohol abuse     Behavioral problem     Bipolar 1 disorder     Chronic right hip pain     Depression     DJD (degenerative joint disease), lumbar 1/27/2015    Fatigue     Hepatitis     pt. denies this    History of psychiatric care     History of psychiatric hospitalization     Hypertension     Karina     Post traumatic stress disorder     Psychiatric problem     Seizures     Suicide attempt     Therapy      Past Surgical History:   Procedure Laterality Date    HERNIA REPAIR      SPINE SURGERY  11-12-15    LAMINECTOMY/LUMBAR/WARE     Family History   Problem Relation Age of Onset "    Alcohol abuse Mother     Depression Mother     Depression Father     Depression Sister     Suicide Sister     Drug abuse Brother     Anxiety disorder Brother     Bipolar disorder Brother     Depression Brother     Alcohol abuse Maternal Uncle     Alcohol abuse Paternal Uncle     Dementia Maternal Grandmother     Alcohol abuse Cousin     Amblyopia Neg Hx     Blindness Neg Hx     Cancer Neg Hx     Cataracts Neg Hx     Diabetes Neg Hx     Glaucoma Neg Hx     Hypertension Neg Hx     Macular degeneration Neg Hx     Retinal detachment Neg Hx     Strabismus Neg Hx     Stroke Neg Hx     Thyroid disease Neg Hx     Asthma Neg Hx     Emphysema Neg Hx      Social History   Substance Use Topics    Smoking status: Current Some Day Smoker     Packs/day: 0.25     Years: 20.00    Smokeless tobacco: Never Used    Alcohol use No      Comment: quit 4 years ago       Review of patient's allergies indicates:   Allergen Reactions    Gabapentin Other (See Comments)     SEVERE DIZZINESS AND BALANCE PROBLEMS, UNABLE TO WALK.    Nardil [phenelzine]      BECOMES HYPER, LIKE A MANIC EPISODE.    Penicillins Hives    Lamictal [lamotrigine] Rash        Review of Systems   Constitutional: Negative for chills and fever.   Eyes: Negative.  Negative for blurred vision, double vision and photophobia.   Respiratory: Negative for cough and shortness of breath.    Cardiovascular: Negative for chest pain and palpitations.   Gastrointestinal: Negative.    Genitourinary: Negative.    Skin: Negative for rash.   Neurological: Positive for seizures and loss of consciousness. Negative for dizziness, tingling, tremors, sensory change, speech change, focal weakness and headaches.   Psychiatric/Behavioral: Positive for memory loss. Negative for hallucinations and suicidal ideas.           OBJECTIVE:     Vital Signs Range (Last 24H):  Temp:  [96 °F (35.6 °C)-98.1 °F (36.7 °C)]   Pulse:  []   Resp:  [16-20]   BP:  "(111-125)/(62-90)   SpO2:  [94 %-99 %]     Physical Exam   Constitutional: He is oriented to person, place, and time and well-developed, well-nourished, and in no distress.   Eyes: EOM are normal. Pupils are equal, round, and reactive to light.   Neck: Normal range of motion.   Cardiovascular: Normal rate and regular rhythm.    Pulmonary/Chest: Effort normal and breath sounds normal. No respiratory distress.   Abdominal: Soft. Bowel sounds are normal.   Neurological: He is alert and oriented to person, place, and time. He displays normal reflexes. No cranial nerve deficit. Coordination normal.   Skin: No rash noted.   Psychiatric: Affect normal.       Body mass index is 24.78 kg/m².    Laboratory:  {Results:[id number]::"***"}    Diagnostic Results:  {Results; Diagnostics:[id number]::"***"}    EKG: ***    ASSESSMENT/PLAN:     Active Hospital Problems    Diagnosis  POA    *Seizure [R56.9]  Yes    Alteration in skin integrity [R23.9]  Yes    Hyponatremia [E87.1]  Yes    HLD (hyperlipidemia) [E78.5]  Yes    Bipolar I disorder [F31.9]  Yes    Anxiety [F41.9]  Yes    Tobacco abuse [Z72.0]  Yes     Chronic    Alcohol dependence in sustained full remission [F10.21]  Yes     Chronic      Resolved Hospital Problems    Diagnosis Date Resolved POA   No resolved problems to display.     Neurological LOC/Seizure  - 24 hour EEG  - F/U Neurology appt tomorrow    Cardiac causes of syncope  - Telemetry  - Discharge with Holter monitor for 30 days        "

## 2018-05-15 NOTE — PLAN OF CARE
Problem: Physical Therapy Goal  Goal: Physical Therapy Goal  Goals to be met by: 10 days ( )    Patient will increase functional independence with mobility by performin. Supine to sit with Set-up [place]  2. Sit to supine with Set-up [place]  3. Sit to stand transfer with Supervision  4. Gait  x 150 feet with Supervision using Rolling Walker.   5. Lower extremity exercise program x 30 reps per handout, with independence to improve muscular strength and endurance.   6. Pt will ambulate 3 steps without HR and SBA.      Outcome: Ongoing (interventions implemented as appropriate)  PT evaluation completed. POC initiated.    Chantel Tirado, PT, DPT  5/15/2018

## 2018-05-15 NOTE — ASSESSMENT & PLAN NOTE
Loaded w/ keppra in ED. Per neurology, will not give Keppra.   C/w home depakote  Neurology consult  Continue diet, protecting airway  Seizure may be multifactorial- including medication induced (adverse reactions from latuda & doxepin), with background of chronic, mild hyponatremia  Psych consulted- Unlikely to have drug reaction with latuda, doxepin, keppra  Per Neurology f/u 24hr EEG on depakote as patient having episodes on medication & hold off keppra as this may make his bipolar symptoms worse

## 2018-05-15 NOTE — PROGRESS NOTES
[facility] Hospital Medicine  Progress Note    Patient Name: Mirza Morales  MRN: [id number]  Patient Class: IP- Inpatient   Admission Date: 5/12/2018  Length of Stay: 3 days  Attending Physician: Darcy Ortiz MD  Primary Care Provider: LIDIA Banks MD    [facility] Medicine Team: [facility] [place] [facility] Cal Blackmon MD    Subjective:     Principal Problem:Seizure    HPI:  64 y/o M PMhx HTN, Bipolar 1 disorder, Seizures, Depression, Alcohol abuse and Opiate dependency who presents w/ seizure. Patient recently presented to ED on 5/10 for seizures as well. Per wife she believes patient has had at least 3 seizures since he was last evaluated in ED. She states she witnessed one in which he fell to the ground and had tonic clonic movements followed by confusion afterwards. Seizures last ~30 seconds at a time, last witnessed 2PM 5/12. She endorses that he has had urinary incontinence but if unsure of their relationship to the seizures. She describes one episode when she woke up to find patient staring and the bed was wet. Additionally, she is concerned of his worsening confusion and forgetfulness. States patient knows that something is wrong. Patient himself believes that seizures may be due to medications which he is currently taking. However, wife states he has been taking these medications for some times now without any adverse effects.  Per wife patient has had seizures in the past, however, they had all been due to alcohol withdrawal. She denies patient having had any recent alcohol ingestion. Also states that she has been administering his medications due to his confusion to endure that he does not miss any doses.  History obtained from wife (Nga Morales [phone]) as patient poor historian and still somewhat confused.  Patient was scheduled to Neurology Dr. Fitch on 5/16.      Hospital Course:  No notes on file    Interval History: NAEON. No seizure activity noted. EEG overnight, pending  interpretation.     Review of Systems   Constitutional: Positive for fatigue. Negative for chills and fever.   HENT: Negative for congestion and sore throat.    Eyes: Negative for photophobia.   Respiratory: Negative for cough, chest tightness, shortness of breath and wheezing.    Cardiovascular: Negative for chest pain and leg swelling.   Gastrointestinal: Negative for abdominal pain, constipation, diarrhea, nausea and vomiting.   Genitourinary: Negative for dysuria and hematuria.   Musculoskeletal: Negative for arthralgias and myalgias.   Skin: Negative for rash.   Neurological: Positive for tremors, seizures and weakness. Negative for dizziness, speech difficulty, light-headedness, numbness and headaches.   Psychiatric/Behavioral: Negative for confusion.     Objective:     Vital Signs (Most Recent):  Temp: 97.8 °F (36.6 °C) (05/15/18 0758)  Pulse: 84 (05/15/18 0758)  Resp: 18 (05/15/18 0758)  BP: 125/88 (05/15/18 0758)  SpO2: 96 % (05/15/18 0758) Vital Signs (24h Range):  Temp:  [96 °F (35.6 °C)-98.1 °F (36.7 °C)] 97.8 °F (36.6 °C)  Pulse:  [] 84  Resp:  [16-20] 18  SpO2:  [94 %-99 %] 96 %  BP: (111-125)/(62-90) 125/88     Weight: 94.8 kg (208 lb 15.9 oz)  Body mass index is 24.78 kg/m².    Intake/Output Summary (Last 24 hours) at 05/15/18 0920  Last data filed at 05/15/18 0700   Gross per 24 hour   Intake             1100 ml   Output             2000 ml   Net             -900 ml      Physical Exam   Constitutional: He is oriented to person, place, and time. No distress.   HENT:   Head: Normocephalic and atraumatic.   Eyes: EOM are normal. Pupils are equal, round, and reactive to light. No scleral icterus.   Neck: Normal range of motion. Neck supple.   Cardiovascular: Normal rate, regular rhythm and intact distal pulses.    No murmur heard.  Pulmonary/Chest: Effort normal and breath sounds normal. No respiratory distress.   Abdominal: Soft. He exhibits no distension. There is no tenderness. There is no  guarding.   Musculoskeletal: Normal range of motion. He exhibits no edema or tenderness.   Lymphadenopathy:     He has no cervical adenopathy.   Neurological: He is alert and oriented to person, place, and time.   Fully oriented.   More alert than previous exams.    Skin: Skin is warm and dry. No rash noted. He is not diaphoretic.   Abrasion to L shin   Psychiatric:   Confused, saying random words at times, staring w/o response at times & at times answering questions appropriately       Significant Labs:   CBC:   Recent Labs  Lab 05/14/18  0349 05/14/18  1035 05/15/18  0416   WBC 3.17* 2.99* 3.45*   HGB 12.1* 13.6* 11.8*   HCT 35.6* 40.9 35.6*   * 156 124*     CMP:   Recent Labs  Lab 05/15/18  0416   *   K 4.6   CL 98   CO2 30*   GLU 98   BUN 11   CREATININE 0.9   CALCIUM 8.8   PROT 6.1   ALBUMIN 2.6*   BILITOT 0.2   ALKPHOS 87   AST 11   ALT 7*   ANIONGAP 7*   EGFRNONAA >60.0       Significant Imaging: I have reviewed and interpreted all pertinent imaging results/findings within the past 24 hours.    Assessment/Plan:      * Seizure    Loaded w/ keppra in ED. Per neurology, will not give Keppra.   C/w home depakote  Neurology consult  Continue diet, protecting airway  Seizure may be multifactorial- including medication induced (adverse reactions from latuda & doxepin), with background of chronic, mild hyponatremia  Psych consulted- Unlikely to have drug reaction with latuda, doxepin, keppra  Per Neurology f/u 24hr EEG on depakote as patient having episodes on medication & hold off keppra as this may make his bipolar symptoms worse        Alcohol dependence in sustained full remission              Bipolar I disorder    C/w home depakote, latuda  Psychiatry consult- unlikely to have interaction. Recommend continuation.         Hyponatremia    Chronic mild hyponatremia  - Stable.           HLD (hyperlipidemia)    C/w home atorvastatin        DJD (degenerative joint disease), lumbar              Anxiety     C/w home doxepin        Tobacco abuse    Current smoker  .5 pack per day  Smoking cessation counseling          VTE Risk Mitigation         Ordered     enoxaparin injection 40 mg  Daily      05/12/18 2027     IP VTE HIGH RISK PATIENT  Once      05/12/18 2030     Place EMY hose  Until discontinued      05/12/18 2030              Cal Blackmon MD  Department of Hospital Medicine   [facility]

## 2018-05-15 NOTE — PT/OT/SLP EVAL
Occupational Therapy   Evaluation    Name: Mirza Morales  MRN: [id number]  Admitting Diagnosis:  Seizure      Recommendations:     Discharge Recommendations: home health OT  Discharge Equipment Recommendations:  none  Barriers to discharge:  None    History:     Occupational Profile:  Living Environment: pt lives with wife in a 1SH with 3 LILIANA, and has a tub/shower combo  Previous level of function: (I)  Equipment Owned:  walker, rolling, bedside commode, wheelchair, shower chair  Assistance upon Discharge: limited (wife with broken foot)    Past Medical History:   Diagnosis Date    Alcohol abuse     Behavioral problem     Bipolar 1 disorder     Chronic right hip pain     Depression     DJD (degenerative joint disease), lumbar 1/27/2015    Fatigue     Hepatitis     pt. denies this    History of psychiatric care     History of psychiatric hospitalization     Hypertension     Karina     Post traumatic stress disorder     Psychiatric problem     Seizures     Suicide attempt     Therapy        Past Surgical History:   Procedure Laterality Date    HERNIA REPAIR      SPINE SURGERY  11-12-15    LAMINECTOMY/LUMBAR/WARE       Subjective     Chief Complaint: having had a seizure  Patient/Family stated goals: get better  Communicated with: RN prior to session.  Pain/Comfort:  · Pain Rating 1: 0/10  · Pain Rating Post-Intervention 1: 0/10    Patients cultural, spiritual, Hinduism conflicts given the current situation: none stated    Objective:     Patient found with:  (EEG, tele)    General Precautions: Standard, fall   Orthopedic Precautions:N/A   Braces: N/A     Occupational Performance:    Bed Mobility:    · Patient completed Supine to Sit with supervision  · Patient completed Sit to Supine with supervision    Functional Mobility/Transfers:  · Patient completed Sit <> Stand Transfer with stand by assistance  with  rolling walker   · Functional Mobility: SBA with RW    Activities of Daily Living:  · LB  Dressing: stand by assistance doff/donned socks EOB; figure 4 technique    Cognitive/Visual Perceptual:  Cognitive/Psychosocial Skills:     -       Oriented to: Person, Place, Time and Situation   -       Follows Commands/attention:Follows multistep  commands  -       Communication: clear/fluent  -       Memory: No Deficits noted  -       Safety awareness/insight to disability: intact   -       Mood/Affect/Coping skills/emotional control: Appropriate to situation  Visual/Perceptual:      -Intact    Physical Exam:  Balance:    -       Good/Fair  Postural examination/scapula alignment:    -       No postural abnormalities identified  Dominant hand:    -       R  Upper Extremity Range of Motion:     -       Right Upper Extremity: WFL  -       Left Upper Extremity: WFL  Upper Extremity Strength:    -       Right Upper Extremity: WFL  -       Left Upper Extremity: WFL   Strength:    -       Right Upper Extremity: WFL  -       Left Upper Extremity: WFL  Fine Motor Coordination:    -       Intact  Gross motor coordination:   WFL    Patient left HOB elevated with all lines intact and call button in reach    AMPA 6 Click:  [facility] Total Score: 24    Treatment & Education:  Pt ed re OT role and POC. Pt is near his baseline re self care skills, but required SBA and increased time for LBD. Pt able to stand and ambulate with SBA and RW.  Education:    Assessment:     Mirza Morales is a 63 y.o. male with a medical diagnosis of Seizure.  He presents with the following performance deficits affecting function: impaired endurance, impaired self care skills, impaired functional mobilty, gait instability, impaired balance.  Pt participates well and is motivated to regain functional independence. Pt is near baseline re self care and mobility, but would benefit from 1 additional visit to ensure safety with bathroom ADLs.    Rehab Prognosis:  Good; patient would benefit from acute skilled OT services to address these deficits and  "reach maximum level of function.         Clinical Decision Makin.  OT Low:  "Pt evaluation falls under low complexity for evaluation coding due to performance deficits noted in 1-3 areas as stated above and 0 co-morbities affecting current functional status. Data obtained from problem focused assessments. No modifications or assistance was required for completion of evaluation. Only brief occupational profile and history review completed."     Plan:     Patient to be seen 2 x/week to address the above listed problems via self-care/home management  · Plan of Care Expires:    · Plan of Care Reviewed with: patient    This Plan of care has been discussed with the patient who was involved in its development and understands and is in agreement with the identified goals and treatment plan    GOALS:    Occupational Therapy Goals     Not on file          Multidisciplinary Problems (Resolved)        Problem: Occupational Therapy Goal    Goal Priority Disciplines Outcome Interventions   Occupational Therapy Goal   (Resolved)     OT, PT/OT Outcome(s) achieved                    Time Tracking:     OT Date of Treatment: 05/15/18  OT Start Time: 0850  OT Stop Time: 0906  OT Total Time (min): 16 min    Billable Minutes:Evaluation 16 minutes    DOMINIC Bowling  5/15/2018  Pager: [phone]    "

## 2018-05-15 NOTE — NURSING
Discharge instructions given to patient.  Patient verbalized understanding and had no further questions.  Patient's IV, tele removed and vital signs within normal limits.  Patient now awaiting transport from family.  Will continue to follow up.

## 2018-05-15 NOTE — SUBJECTIVE & OBJECTIVE
Interval History: NAEON. No seizure activity noted. EEG overnight, pending interpretation.     Review of Systems   Constitutional: Positive for fatigue. Negative for chills and fever.   HENT: Negative for congestion and sore throat.    Eyes: Negative for photophobia.   Respiratory: Negative for cough, chest tightness, shortness of breath and wheezing.    Cardiovascular: Negative for chest pain and leg swelling.   Gastrointestinal: Negative for abdominal pain, constipation, diarrhea, nausea and vomiting.   Genitourinary: Negative for dysuria and hematuria.   Musculoskeletal: Negative for arthralgias and myalgias.   Skin: Negative for rash.   Neurological: Positive for tremors, seizures and weakness. Negative for dizziness, speech difficulty, light-headedness, numbness and headaches.   Psychiatric/Behavioral: Negative for confusion.     Objective:     Vital Signs (Most Recent):  Temp: 97.8 °F (36.6 °C) (05/15/18 0758)  Pulse: 84 (05/15/18 0758)  Resp: 18 (05/15/18 0758)  BP: 125/88 (05/15/18 0758)  SpO2: 96 % (05/15/18 0758) Vital Signs (24h Range):  Temp:  [96 °F (35.6 °C)-98.1 °F (36.7 °C)] 97.8 °F (36.6 °C)  Pulse:  [] 84  Resp:  [16-20] 18  SpO2:  [94 %-99 %] 96 %  BP: (111-125)/(62-90) 125/88     Weight: 94.8 kg (208 lb 15.9 oz)  Body mass index is 24.78 kg/m².    Intake/Output Summary (Last 24 hours) at 05/15/18 0920  Last data filed at 05/15/18 0700   Gross per 24 hour   Intake             1100 ml   Output             2000 ml   Net             -900 ml      Physical Exam   Constitutional: He is oriented to person, place, and time. No distress.   HENT:   Head: Normocephalic and atraumatic.   Eyes: EOM are normal. Pupils are equal, round, and reactive to light. No scleral icterus.   Neck: Normal range of motion. Neck supple.   Cardiovascular: Normal rate, regular rhythm and intact distal pulses.    No murmur heard.  Pulmonary/Chest: Effort normal and breath sounds normal. No respiratory distress.    Abdominal: Soft. He exhibits no distension. There is no tenderness. There is no guarding.   Musculoskeletal: Normal range of motion. He exhibits no edema or tenderness.   Lymphadenopathy:     He has no cervical adenopathy.   Neurological: He is alert and oriented to person, place, and time.   Fully oriented.   More alert than previous exams.    Skin: Skin is warm and dry. No rash noted. He is not diaphoretic.   Abrasion to L shin   Psychiatric:   Confused, saying random words at times, staring w/o response at times & at times answering questions appropriately       Significant Labs:   CBC:   Recent Labs  Lab 05/14/18  0349 05/14/18  1035 05/15/18  0416   WBC 3.17* 2.99* 3.45*   HGB 12.1* 13.6* 11.8*   HCT 35.6* 40.9 35.6*   * 156 124*     CMP:   Recent Labs  Lab 05/15/18  0416   *   K 4.6   CL 98   CO2 30*   GLU 98   BUN 11   CREATININE 0.9   CALCIUM 8.8   PROT 6.1   ALBUMIN 2.6*   BILITOT 0.2   ALKPHOS 87   AST 11   ALT 7*   ANIONGAP 7*   EGFRNONAA >60.0       Significant Imaging: I have reviewed and interpreted all pertinent imaging results/findings within the past 24 hours.

## 2018-05-16 ENCOUNTER — OFFICE VISIT (OUTPATIENT)
Dept: NEUROLOGY | Facility: CLINIC | Age: 63
End: 2018-05-16
Payer: COMMERCIAL

## 2018-05-16 VITALS
BODY MASS INDEX: 24.93 KG/M2 | SYSTOLIC BLOOD PRESSURE: 112 MMHG | WEIGHT: 211.19 LBS | HEART RATE: 120 BPM | HEIGHT: 77 IN | DIASTOLIC BLOOD PRESSURE: 81 MMHG

## 2018-05-16 DIAGNOSIS — R56.9 SEIZURE: Primary | ICD-10-CM

## 2018-05-16 DIAGNOSIS — F31.9 BIPOLAR 1 DISORDER: ICD-10-CM

## 2018-05-16 DIAGNOSIS — R41.3 MEMORY PROBLEM: ICD-10-CM

## 2018-05-16 LAB
INTERPRETATION SERPL IFE-IMP: NORMAL
PATHOLOGIST INTERPRETATION IFE: NORMAL
PATHOLOGIST INTERPRETATION SPE: NORMAL

## 2018-05-16 PROCEDURE — 99999 PR PBB SHADOW E&M-EST. PATIENT-LVL IV: CPT | Mod: PBBFAC,,, | Performed by: PSYCHIATRY & NEUROLOGY

## 2018-05-16 PROCEDURE — 3008F BODY MASS INDEX DOCD: CPT | Mod: CPTII,S$GLB,, | Performed by: PSYCHIATRY & NEUROLOGY

## 2018-05-16 PROCEDURE — 99215 OFFICE O/P EST HI 40 MIN: CPT | Mod: S$GLB,,, | Performed by: PSYCHIATRY & NEUROLOGY

## 2018-05-16 RX ORDER — LACOSAMIDE 50 MG/1
50 TABLET ORAL EVERY 12 HOURS
Qty: 60 TABLET | Refills: 2 | Status: SHIPPED | OUTPATIENT
Start: 2018-05-16 | End: 2018-05-24

## 2018-05-16 NOTE — PATIENT INSTRUCTIONS
-- Continue Depakote 1000 mg HS.   -- Will start him on Vimpat 50 mg BID.   -- If case of further events will consider EMU admission for better characterization of events.    -- Will order Neuro psych testing    -- Avoid stimulants and excessive caffeine   -- Psychiatric consult
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
## 2018-05-16 NOTE — DISCHARGE SUMMARY
[facility] Hospital Medicine  Discharge Summary      Patient Name: Mirza Morales  MRN: [id number]  Admission Date: 5/12/2018  Hospital Length of Stay: 3 days  Discharge Date and Time:  05/16/2018 4:26 PM  Attending Physician: No att. providers found   Discharging Provider: Cal Blackmon MD  Primary Care Provider: LIDIA Banks MD  [facility] Medicine Team: [facility] [place] [facility]OSP MED 5 Cal Blackmon MD    HPI:   64 y/o M PMhx HTN, Bipolar 1 disorder, Seizures, Depression, Alcohol abuse and Opiate dependency who presents w/ seizure. Patient recently presented to ED on 5/10 for seizures as well. Per wife she believes patient has had at least 3 seizures since he was last evaluated in ED. She states she witnessed one in which he fell to the ground and had tonic clonic movements followed by confusion afterwards. Seizures last ~30 seconds at a time, last witnessed 2PM 5/12. She endorses that he has had urinary incontinence but if unsure of their relationship to the seizures. She describes one episode when she woke up to find patient staring and the bed was wet. Additionally, she is concerned of his worsening confusion and forgetfulness. States patient knows that something is wrong. Patient himself believes that seizures may be due to medications which he is currently taking. However, wife states he has been taking these medications for some times now without any adverse effects.  Per wife patient has had seizures in the past, however, they had all been due to alcohol withdrawal. She denies patient having had any recent alcohol ingestion. Also states that she has been administering his medications due to his confusion to endure that he does not miss any doses.  History obtained from wife (Nga Morales [phone]) as patient poor historian and still somewhat confused.  Patient was scheduled to Neurology Dr. Fitch on 5/16.      * No surgery found *      Hospital Course:   Admitted 5/12/2018, for concern  for recurrent seizures and altered mental status. Mental status improved with supportive care. Full work up for altered mental status was performed, no evidence of organic causes of seizure activity. Noted to have pancytopenia, hypercalcemia, and protein gap, but SPEP and UPEP were unrevealing, peripheral smear was also not concerning. EEG was done to monitor for seizure activity but was negative. Arranged to have neuro follow up the following day after discharge.      Consults:   Consults         Status Ordering Provider     Inpatient consult to Neurology Epilepsy  Once     Provider:  (Not yet assigned)    Completed BARBY PFEIFFER     Inpatient consult to Psychiatry  Once     Provider:  (Not yet assigned)    Completed CHRISTELLE FOX          * Seizure    Loaded w/ keppra in ED. Per neurology, will not give Keppra.   C/w home depakote  Neurology consult  Continue diet, protecting airway  Seizure may be multifactorial- including medication induced (adverse reactions from latuda & doxepin), with background of chronic, mild hyponatremia  Psych consulted- Unlikely to have drug reaction with latuda, doxepin, keppra  Per Neurology f/u 24hr EEG on depakote as patient having episodes on medication & hold off keppra as this may make his bipolar symptoms worse  -EEG was performed and no seizure activity noted.  Mental status on exam improved to baseline 24 hours after admission. Discharged with close neuro follow up (the following day).        Alcohol dependence in sustained full remission              Bipolar I disorder    C/w home depakote, latuda  Psychiatry consult- unlikely to have interaction. Recommend continuation.         Alteration in skin integrity              Hyponatremia    Chronic mild hyponatremia  - Stable.           HLD (hyperlipidemia)    C/w home atorvastatin        Lumbar radicular pain              Anxiety    C/w home doxepin        Tobacco abuse    Current smoker  .5 pack per day  Smoking  cessation counseling        Alcohol withdrawal syndrome with complication                Final Active Diagnoses:    Diagnosis Date Noted POA    PRINCIPAL PROBLEM:  Seizure [R56.9] 12/07/2015 Yes    Alcohol dependence in sustained full remission [F10.21] 10/09/2012 Yes     Chronic    Bipolar I disorder [F31.9] 12/21/2015 Yes    Alteration in skin integrity [R23.9] 05/14/2018 Yes     Chronic    Hyponatremia [E87.1] 02/21/2018 Yes     Chronic    HLD (hyperlipidemia) [E78.5] 01/03/2016 Yes     Chronic    Anxiety [F41.9] 07/02/2013 Yes     Chronic    Tobacco abuse [Z72.0] 02/28/2013 Yes     Chronic      Problems Resolved During this Admission:    Diagnosis Date Noted Date Resolved POA       Discharged Condition: good    Disposition: Home-Health Care [facility]    Follow Up:    Patient Instructions:   No discharge procedures on file.    Significant Diagnostic Studies: Labs:   BMP:   Recent Labs  Lab 05/15/18  0416   GLU 98   *   K 4.6   CL 98   CO2 30*   BUN 11   CREATININE 0.9   CALCIUM 8.8    and CMP   Recent Labs  Lab 05/15/18  0416   *   K 4.6   CL 98   CO2 30*   GLU 98   BUN 11   CREATININE 0.9   CALCIUM 8.8   PROT 6.1   ALBUMIN 2.6*   BILITOT 0.2   ALKPHOS 87   AST 11   ALT 7*   ANIONGAP 7*   ESTGFRAFRICA >60.0   EGFRNONAA >60.0       Pending Diagnostic Studies:     Procedure Component Value Units Date/Time    Phosphatidylethanol (PETH) [[id number]] Collected:  05/13/18 1120    Order Status:  Sent Lab Status:  In process Updated:  05/13/18 1138    Specimen:  Blood from Blood     Protein electrophoresis, urine [[id number]] Collected:  05/14/18 1714    Order Status:  Sent Lab Status:  In process Updated:  05/14/18 1953    Specimen:  Urine from Urine, Clean Catch          Medications:  Reconciled Home Medications:      Medication List      CHANGE how you take these medications    doxepin 100 MG capsule  Commonly known as:  SINEQUAN  TAKE 1 CAPSULE (100 MG TOTAL) BY MOUTH EVERY EVENING.  What changed:   Another medication with the same name was removed. Continue taking this medication, and follow the directions you see here.        CONTINUE taking these medications    albuterol 90 mcg/actuation inhaler  Inhale 2 puffs into the lungs every 4 (four) hours as needed for Wheezing.     atorvastatin 40 MG tablet  Commonly known as:  LIPITOR  TAKE 1 TABLET (40 MG TOTAL) BY MOUTH ONCE DAILY.     beclomethasone 40 mcg/actuation Aero  Commonly known as:  QVAR  Inhale 1 puff into the lungs 2 (two) times daily. Controller     divalproex  MG Tb24  Commonly known as:  DEPAKOTE  Take 1 tablet (500mg) every morning and 2 tablets (1000mg) every night.     folic acid 1 MG tablet  Commonly known as:  FOLVITE  Take 1 tablet (1 mg total) by mouth once daily.     gabapentin 600 MG tablet  Commonly known as:  NEURONTIN  Take 600 mg by mouth 3 (three) times daily.     hydrOXYzine pamoate 50 MG Cap  Commonly known as:  VISTARIL  TAKE 1 CAPSULE (50 MG TOTAL) BY MOUTH EVERY EVENING.     lidocaine 5 %  Commonly known as:  LIDODERM  Place 1 patch onto the skin once daily. Remove & Discard patch within 12 hours or as directed by MD     lurasidone 80 mg Tab tablet  Commonly known as:  LATUDA  Take 80 mg by mouth once daily. Take one tablet daily with dinner.     multivit-min-FA-lycopen-lutein 300-600-300 mcg Tab  Commonly known as:  CENTRUM SILVER ULTRA MEN'S  Take 1 tablet by mouth once daily at 6am.     nicotine 14 mg/24 hr  Commonly known as:  NICODERM CQ  Place 1 patch onto the skin once daily at 6am.     pantoprazole 40 MG tablet  Commonly known as:  PROTONIX  Take 1 tablet (40 mg total) by mouth once daily.     thiamine 100 MG tablet  Take 1 tablet (100 mg total) by mouth once daily.            Indwelling Lines/Drains at time of discharge:   Lines/Drains/Airways          No matching active lines, drains, or airways          Time spent on the discharge of patient: 60 minutes  Patient was seen and examined on the date of discharge and  determined to be suitable for discharge.         Cal Blackmon MD  Department of Hospital Medicine  [facility]

## 2018-05-16 NOTE — ASSESSMENT & PLAN NOTE
Loaded w/ keppra in ED. Per neurology, will not give Keppra.   C/w home depakote  Neurology consult  Continue diet, protecting airway  Seizure may be multifactorial- including medication induced (adverse reactions from latuda & doxepin), with background of chronic, mild hyponatremia  Psych consulted- Unlikely to have drug reaction with latuda, doxepin, keppra  Per Neurology f/u 24hr EEG on depakote as patient having episodes on medication & hold off keppra as this may make his bipolar symptoms worse  -EEG was performed and no seizure activity noted.  Mental status on exam improved to baseline 24 hours after admission. Discharged with close neuro follow up (the following day).

## 2018-05-16 NOTE — PT/OT/SLP DISCHARGE
Physical Therapy Discharge Summary    Name: Mirza Morales  MRN: [id number]   Principal Problem: Seizure     Patient Discharged from acute Physical Therapy on 5/15/2018.  Please refer to prior PT noted date on 5/15/2018 for functional status.     Assessment:     Patient appropriate for care in another setting.    Objective:     GOALS:    Physical Therapy Goals        Problem: Physical Therapy Goal    Goal Priority Disciplines Outcome Goal Variances Interventions   Physical Therapy Goal     PT/OT, PT Ongoing (interventions implemented as appropriate)     Description:  Goals to be met by: 10 days ( )    Patient will increase functional independence with mobility by performin. Supine to sit with Set-up [place]  2. Sit to supine with Set-up [place]  3. Sit to stand transfer with Supervision  4. Gait  x 150 feet with Supervision using Rolling Walker.   5. Lower extremity exercise program x 30 reps per handout, with independence to improve muscular strength and endurance.   6. Pt will ambulate 3 steps without HR and SBA.                        Reasons for Discontinuation of Therapy Services  Transfer to alternate level of care.      Plan:     Patient Discharged to: Home with Home Health Service.    Chantel Tirado, PT, DPT  2018

## 2018-05-16 NOTE — HOSPITAL COURSE
Admitted 5/12/2018, for concern for recurrent seizures and altered mental status. Mental status improved with supportive care. Full work up for altered mental status was performed, no evidence of organic causes of seizure activity. Noted to have pancytopenia, hypercalcemia, and protein gap, but SPEP and UPEP were unrevealing, peripheral smear was also not concerning. EEG was done to monitor for seizure activity but was negative. Arranged to have neuro follow up the following day after discharge.

## 2018-05-17 ENCOUNTER — PATIENT OUTREACH (OUTPATIENT)
Dept: ADMINISTRATIVE | Facility: CLINIC | Age: 63
End: 2018-05-17

## 2018-05-17 ENCOUNTER — TELEPHONE (OUTPATIENT)
Dept: INTERNAL MEDICINE | Facility: CLINIC | Age: 63
End: 2018-05-17

## 2018-05-17 ENCOUNTER — PATIENT MESSAGE (OUTPATIENT)
Dept: NEUROLOGY | Facility: CLINIC | Age: 63
End: 2018-05-17

## 2018-05-17 LAB — PROT PATTERN UR ELPH-IMP: NORMAL

## 2018-05-17 NOTE — TELEPHONE ENCOUNTER
We can use some double uc spots next wed or Thursday afternoon for hosp fol up.  I left msg for him to call us to advise if he can make that.  No appts booked yet.

## 2018-05-17 NOTE — PATIENT INSTRUCTIONS
First Aid: Seizures  A seizure results from a sudden rush of abnormal electrical signals in the brain. Symptoms may range from a minor daze to uncontrollable muscle spasms (convulsions). In many cases, the victim will lose consciousness. A seizure can be caused by a high fever, head injury, drug reaction, or condition such as epilepsy.  1. Protect the head  · Help the victim to the floor if he or she begins losing muscle control. Turn the person on his or her side to prevent choking.  · Protect the victim's head from injury by placing something soft, such as folded clothes, beneath it, and by moving objects away from the victim.  · Don't cause injury by restraining the person or by placing anything in his or her mouth.  · Remove eyeglasses.  2.  Preserve dignity  · Clear away bystanders.  · Reassure the victim, who may be confused, drowsy, or hostile when coming out of the seizure.  · Cover the person or provide dry clothes if muscle spasms have caused a loss of bladder control.  3. Check for injury  · Make sure the victim's mental state has returned to normal. One way to do this is to ask the person his or her name, the year, and your location.  · Injuries can occur to the head, mouth, tongue, or body.   4. Call 911  · If the seizure lasts longer than five minutes (Note: Timing the seizure and recovery time is helpful in many cases.)  · If a second seizure occurs  · If the victim doesnt regain consciousness  · If the victim is pregnant  · If the victim has no history of seizures  · If the person has sustained an injury during the seizure. (Note: If the injury is not severe or life threatening, it may be more appropriate to seek treatment with the primary care provider.)  Date Last Reviewed: 10/19/2015  © 0202-6350 [employer]. [address] [place] [address], [place] [address]. All rights reserved. This information is not intended as a substitute for professional medical care. Always follow your healthcare  "professional's instructions.        Recurrent Seizure (Adult)    You have had another seizure today. A common cause of seizures that keep happening (recurrent seizures) is missing doses of seizure medicine. But sometimes seizures are hard to control even when you take the medicine correctly. If this is the case for you, your healthcare provider may need to increase your dosage. Or you may need to add or change to another medicine.  Home care  Follow these tips when caring for yourself at home. For this seizure:  · Seizures arent predictable. So avoid doing anything that might cause danger to you or other people if you have another seizure. Until the seizures are under good control, take these precautions:  ¨ Dont drive, ride a motorcycle, or ride a bike.  ¨ Dont operate dangerous equipment such as power tools  ¨ Take showers instead of baths.  ¨ Dont swim or climb ladders, trees, or roofs.  · Tell your close friends and relatives about your seizure. Teach them what to do for you if it happens again.  · If medicine was prescribed to prevent seizures, take it exactly as directed. It does not work when taken "as needed." Missing doses will increase the risk of having another seizure.  · If you miss a dose, take the missed dose as soon as you remember. If it is almost time for your next dose, skip the missed dose. Restart the medicine at your next scheduled time. Dont take extra medicine to make up the missed dose.  · Wear a "Medic-Alert" bracelet to let emergency personnel know about your condition.  · Follow a regular sleep schedule such that you get at least 6 to 8 hours of restful sleep every night. This is especially important when you are sick with a cold or flu and/or another type of infection.  For future seizures, if you are alone:  If you feel a seizure coming on, lie down on a bed or on the floor with something soft under your head. Lie on your left side, not on your back. This will keep you from " falling. It will also let fluid drain out of your mouth and prevent choking. Be sure you are clear of any objects that might injure you during the seizure. Call for help if there is time.  For future seizures, if someone is with you:  The person should help you get into a safe position and call for help. The person shouldnt try to force anything in your mouth once the seizure begins. This could harm your teeth or jaw.  Follow-up care  Follow up with your healthcare provider. Keep a seizure calendar to record how often you have a seizure. If you are being started on anti-seizure medicine, make sure that you use additional birth control. Seizure medicine can affect how well birth control pills work, and you could become pregnant. Avoid alcohol until your doctor tells you its OK.  Note: For the safety of yourself and others on the road, certain states require that the treating doctor tell the Public Health Department about any adult who is treated for a seizure and is at risk of more seizures. In this case, the Department of Motor Vehicles will be told. A restriction will be put on your s license until a doctor gives you medical clearance to drive again. Contact your treating doctor to find out if your state requires the reporting of patients with a seizures condition.  When to seek medical advice  Call your healthcare provider right away if any of these occur:  · Seizures happen more often or last longer than usual  · A seizure lasts over 5 minutes  · You dont wake up between seizures  · Confusion that lasts more than 30 minutes after a seizure  · Injury during a seizure  · Fever over 100.4ºF (38.0ºC), or as advised  · Unusual irritability, drowsiness, or confusion  · Stiff or painful neck  · Headache that gets worse   Date Last Reviewed: 8/1/2016  © 6468-7026 "[employer]. [address] [place] [address], [place] [address]. All rights reserved. This information is not intended as a substitute for  professional medical care. Always follow your healthcare professional's instructions.        Lacosamide tablets  What is this medicine?  LACOSAMIDE (la KOE sa mide) is used to control seizures caused by certain types of epilepsy.  How should I use this medicine?  Take this medicine by mouth with a glass of water. You can take it with or without food. Follow the directions on the prescription label. Take your doses at regular intervals. Do not take your medicine more often than directed. Do not stop taking except on the advice of your doctor or health care professional.  A special MedGuide will be given to you by the pharmacist with each prescription and refill. Be sure to read this information carefully each time.  Talk to your pediatrician regarding the use of this medicine in children. Special care may be needed.  What side effects may I notice from receiving this medicine?  Side effects that you should report to your doctor or health care professional as soon as possible:  · allergic reactions like skin rash, itching or hives, swelling of the face, lips, or tongue  · confusion  · feeling faint or lightheaded, falls  · fever  · irregular heart beat  · loss of memory  · suicidal thoughts or other mood changes  · unusually weak or tired  · yellowing of the eyes, skin  Side effects that usually do not require medical attention (report to your doctor or health care professional if they continue or are bothersome):  · constipation  · diarrhea  · drowsiness  · dry mouth  · headache  · nausea  What may interact with this medicine?  · atazanavir  · beta-blockers like metoprolol and propranolol  · calcium channel blockers like diltiazem and verapamil  · digoxin  · dronedarone  · lopinavir/ritonavir  What if I miss a dose?  If you miss a dose, take it as soon as you can. If it is almost time for your next dose, take only that dose. Do not take double or extra doses.  Where should I keep my medicine?  Keep out of the reach  of children. This medicine can be abused. Keep your medicine in a safe place to protect it from theft. Do not share this medicine with anyone. Selling or giving away this medicine is dangerous and against the law.  This medicine may cause accidental overdose and death if it taken by other adults, children, or pets. Mix any unused medicine with a substance like cat litter or coffee grounds. Then throw the medicine away in a sealed container like a sealed bag or a coffee can with a lid. Do not use the medicine after the expiration date.  Store at room temperature between 15 and 30 degrees C (59 and 86 degrees F).  What should I tell my health care provider before I take this medicine?  They need to know if you have any of these conditions:  · dehydration  · heart disease, including heart failure  · history of a drug or alcohol abuse problem  · kidney disease  · liver disease  · suicidal thoughts, plans, or attempt; a previous suicide attempt by you or a family member  · an unusual or allergic reaction to lacosamide, other medicines, foods, dyes, or preservatives  · pregnant or trying to get pregnant  · breast-feeding  What should I watch for while using this medicine?  Visit your doctor or health care professional for regular checks on your progress. This medicine needs careful monitoring.  Wear a medical ID bracelet or chain, and carry a card that describes your disease and details of your medicine and dosage times.  You may get drowsy or dizzy. Do not drive, use machinery, or do anything that needs mental alertness until you know how this medicine affects you. Do not stand or sit up quickly, especially if you are an older patient. This reduces the risk of dizzy or fainting spells. Alcohol may interfere with the effect of this medicine. Avoid alcoholic drinks.  The use of this medicine may increase the chance of suicidal thoughts or actions. Pay special attention to how you are responding while on this medicine. Any  worsening of mood, or thoughts of suicide or dying should be reported to your health care professional right away.  Women who become pregnant while using this medicine may enroll in the [place] American Antiepileptic Drug Pregnancy Registry by calling 1-695.905.6481. This registry collects information about the safety of antiepileptic drug use during pregnancy.  NOTE:This sheet is a summary. It may not cover all possible information. If you have questions about this medicine, talk to your doctor, pharmacist, or health care provider. Copyright© 2017 [employer]

## 2018-05-18 ENCOUNTER — TELEPHONE (OUTPATIENT)
Dept: NEUROLOGY | Facility: CLINIC | Age: 63
End: 2018-05-18

## 2018-05-18 LAB
PATHOLOGIST INTERPRETATION UPE: NORMAL
PHOSPHATIDYLETHANOL (PETH): NEGATIVE NG/ML

## 2018-05-19 NOTE — PLAN OF CARE
Pt to resume h/h with [facility]       05/18/18 1916   Final Note   Assessment Type Final Discharge Note   Discharge Disposition Home-Health

## 2018-05-24 ENCOUNTER — OFFICE VISIT (OUTPATIENT)
Dept: INTERNAL MEDICINE | Facility: CLINIC | Age: 63
End: 2018-05-24
Payer: COMMERCIAL

## 2018-05-24 VITALS
TEMPERATURE: 99 F | DIASTOLIC BLOOD PRESSURE: 76 MMHG | HEIGHT: 77 IN | HEART RATE: 80 BPM | WEIGHT: 209.44 LBS | SYSTOLIC BLOOD PRESSURE: 100 MMHG | BODY MASS INDEX: 24.73 KG/M2

## 2018-05-24 DIAGNOSIS — Z12.5 PROSTATE CANCER SCREENING: ICD-10-CM

## 2018-05-24 DIAGNOSIS — D64.9 ANEMIA, UNSPECIFIED TYPE: ICD-10-CM

## 2018-05-24 DIAGNOSIS — Z12.11 COLON CANCER SCREENING: ICD-10-CM

## 2018-05-24 DIAGNOSIS — K59.00 CONSTIPATION, UNSPECIFIED CONSTIPATION TYPE: Primary | ICD-10-CM

## 2018-05-24 PROCEDURE — 99214 OFFICE O/P EST MOD 30 MIN: CPT | Mod: S$GLB,,, | Performed by: INTERNAL MEDICINE

## 2018-05-24 PROCEDURE — 99999 PR PBB SHADOW E&M-EST. PATIENT-LVL III: CPT | Mod: PBBFAC,,, | Performed by: INTERNAL MEDICINE

## 2018-05-24 PROCEDURE — 3008F BODY MASS INDEX DOCD: CPT | Mod: CPTII,S$GLB,, | Performed by: INTERNAL MEDICINE

## 2018-05-24 RX ORDER — GABAPENTIN 600 MG/1
TABLET ORAL
COMMUNITY
End: 2018-08-08

## 2018-05-24 RX ORDER — DOXEPIN HYDROCHLORIDE 100 MG/1
CAPSULE ORAL
COMMUNITY
End: 2018-08-08 | Stop reason: SDUPTHER

## 2018-05-24 RX ORDER — HYDROXYZINE PAMOATE 50 MG/1
CAPSULE ORAL
COMMUNITY
End: 2018-08-08 | Stop reason: SDUPTHER

## 2018-05-24 RX ORDER — ATORVASTATIN CALCIUM 40 MG/1
TABLET, FILM COATED ORAL
COMMUNITY
End: 2018-08-08 | Stop reason: SDUPTHER

## 2018-05-24 RX ORDER — DIVALPROEX SODIUM 500 MG/1
TABLET, DELAYED RELEASE ORAL
COMMUNITY
End: 2018-08-08 | Stop reason: SDUPTHER

## 2018-05-24 RX ORDER — LACOSAMIDE 50 MG/1
TABLET ORAL
COMMUNITY
End: 2018-08-23 | Stop reason: SDUPTHER

## 2018-05-24 RX ORDER — OXYCODONE HYDROCHLORIDE 10 MG/1
TABLET ORAL
COMMUNITY
End: 2018-05-30

## 2018-05-24 RX ORDER — HYDROCODONE BITARTRATE AND ACETAMINOPHEN 5; 325 MG/1; MG/1
TABLET ORAL
COMMUNITY
End: 2019-07-24

## 2018-05-24 RX ORDER — ALBUTEROL SULFATE 90 UG/1
AEROSOL, METERED RESPIRATORY (INHALATION)
Status: ON HOLD | COMMUNITY
End: 2020-01-08

## 2018-05-24 RX ORDER — CYCLOBENZAPRINE HCL 5 MG
TABLET ORAL
COMMUNITY
End: 2018-08-08 | Stop reason: SDUPTHER

## 2018-05-24 RX ORDER — LURASIDONE HYDROCHLORIDE 80 MG/1
TABLET, FILM COATED ORAL
COMMUNITY
End: 2018-08-08 | Stop reason: SDUPTHER

## 2018-05-25 ENCOUNTER — TELEPHONE (OUTPATIENT)
Dept: INTERNAL MEDICINE | Facility: CLINIC | Age: 63
End: 2018-05-25

## 2018-05-25 RX ORDER — ATORVASTATIN CALCIUM 40 MG/1
TABLET, FILM COATED ORAL
Qty: 30 TABLET | Refills: 3 | Status: SHIPPED | OUTPATIENT
Start: 2018-05-25 | End: 2018-09-11 | Stop reason: SDUPTHER

## 2018-05-25 NOTE — TELEPHONE ENCOUNTER
"----- Message from Krystyna Finnegan sent at 5/25/2018  9:16 AM CDT -----  Contact: Self/[phone]  Patient would like to get medical advice.    Symptoms (please be specific):  No bowel movement    How long has patient had these symptoms:  6 days    Pharmacy name and phone # (DON'T enter "on file" or "in chart"):  [facility] [phone] (Phone)  [phone] (Fax)    Any drug allergies:  Yes    Would you prefer a response via [employer]?:  No    Comments:    "

## 2018-05-25 NOTE — TELEPHONE ENCOUNTER
Spoke with pt who wants to know if he can double the dose of Miralax since he is on day 6 of his constipation.    After conferring with Dr. Banks, pt advised that he can take 2-3 doses daily until he begins to have regular BMs and then taper down.  If he begins to have diarrhea also taper down the dose.    Verbalized understanding.

## 2018-05-25 NOTE — TELEPHONE ENCOUNTER
We discussed this in great detail on our office visit yesterday, that was the main reason for his visit.   ? New issue or question

## 2018-05-29 ENCOUNTER — PATIENT MESSAGE (OUTPATIENT)
Dept: INTERNAL MEDICINE | Facility: CLINIC | Age: 63
End: 2018-05-29

## 2018-05-29 ENCOUNTER — TELEPHONE (OUTPATIENT)
Dept: INTERNAL MEDICINE | Facility: CLINIC | Age: 63
End: 2018-05-29

## 2018-05-29 DIAGNOSIS — R00.0 TACHYCARDIA: Primary | ICD-10-CM

## 2018-05-29 NOTE — TELEPHONE ENCOUNTER
"JOHN message generated to the pt to advise:    "M/M Dr. Darren Morales is requesting clarification:    "Call pt / wife and clarify what heart rate has been doing today".    Please advise of any other heart rate readings throughout the day today.    Thanks."    "

## 2018-05-29 NOTE — TELEPHONE ENCOUNTER
Spoke with Connie PT with [facility] who advises that she got the pt up to walk to the kitchen and he is sitting eating breakfast.    She obtained a HR of 134 and BP of 106/64.    Pt denies chest pain but some slight SOB and depression but denies anxiety at this time.    Please advise.    Thanks.    (contact pt's family at [phone])

## 2018-05-29 NOTE — TELEPHONE ENCOUNTER
----- Message from Yola Washington sent at 5/29/2018  9:16 AM CDT -----  Contact: Connie/[facility]/[phone]  Nurse is calling to let the doctor know that the pt's heart rate is high 134. Please advise.      Thanks

## 2018-05-29 NOTE — TELEPHONE ENCOUNTER
Pt's wife sent the following email:    Dr. Banks, this is Anamaria Morales. I just found out that the occupational therapist with home health has sent you a note about Suman's heart rate being high. I just wanted to let you know what medications he took this morning. I gave him one Latuda, one Atorvastatin (chloresterol pill), one new anti-seizure medication and one depacote.     Anamaria Morales

## 2018-05-30 ENCOUNTER — OFFICE VISIT (OUTPATIENT)
Dept: PSYCHIATRY | Facility: CLINIC | Age: 63
End: 2018-05-30
Payer: COMMERCIAL

## 2018-05-30 VITALS
DIASTOLIC BLOOD PRESSURE: 78 MMHG | SYSTOLIC BLOOD PRESSURE: 117 MMHG | HEART RATE: 118 BPM | WEIGHT: 205 LBS | HEIGHT: 77 IN | BODY MASS INDEX: 24.21 KG/M2

## 2018-05-30 DIAGNOSIS — F31.9 BIPOLAR 1 DISORDER: Primary | ICD-10-CM

## 2018-05-30 PROCEDURE — 99999 PR PBB SHADOW E&M-EST. PATIENT-LVL II: CPT | Mod: PBBFAC,,, | Performed by: PSYCHIATRY & NEUROLOGY

## 2018-05-30 PROCEDURE — 99213 OFFICE O/P EST LOW 20 MIN: CPT | Mod: S$GLB,,, | Performed by: PSYCHIATRY & NEUROLOGY

## 2018-05-30 RX ORDER — DOXEPIN HYDROCHLORIDE 100 MG/1
CAPSULE ORAL
Qty: 30 CAPSULE | Refills: 3 | Status: SHIPPED | OUTPATIENT
Start: 2018-05-30 | End: 2018-08-02 | Stop reason: SDUPTHER

## 2018-05-30 RX ORDER — HYDROXYZINE PAMOATE 50 MG/1
CAPSULE ORAL
Qty: 30 CAPSULE | Refills: 3 | Status: SHIPPED | OUTPATIENT
Start: 2018-05-30 | End: 2018-08-02 | Stop reason: SDUPTHER

## 2018-05-30 RX ORDER — DIVALPROEX SODIUM 500 MG/1
TABLET, FILM COATED, EXTENDED RELEASE ORAL
Qty: 90 TABLET | Refills: 3 | Status: SHIPPED | OUTPATIENT
Start: 2018-05-30 | End: 2018-08-02 | Stop reason: SDUPTHER

## 2018-05-30 RX ORDER — LURASIDONE HYDROCHLORIDE 80 MG/1
TABLET, FILM COATED ORAL
Qty: 30 TABLET | Refills: 3 | Status: SHIPPED | OUTPATIENT
Start: 2018-05-30 | End: 2018-08-02 | Stop reason: SDUPTHER

## 2018-05-30 NOTE — PROGRESS NOTES
"ESTABLISHED OUTPATIENT VISIT   E/M LEVEL 3: [id number]    ENCOUNTER DATE: 5/30/2018  SITE: [facility], [facility]    HISTORY    CHIEF COMPLAINT   Mirza Morales is a 63 y.o. male who presents for follow up of bipolar d/o.    HPI     From recent discharge summary:  "62 y/o M PMhx HTN, Bipolar 1 disorder, Seizures, Depression, Alcohol abuse and Opiate dependency who presents w/ seizure. Patient recently presented to ED on 5/10 for seizures as well. Per wife she believes patient has had at least 3 seizures since he was last evaluated in ED. She states she witnessed one in which he fell to the ground and had tonic clonic movements followed by confusion afterwards. Seizures last ~30 seconds at a time, last witnessed 2PM 5/12. She endorses that he has had urinary incontinence but if unsure of their relationship to the seizures. She describes one episode when she woke up to find patient staring and the bed was wet. Additionally, she is concerned of his worsening confusion and forgetfulness. States patient knows that something is wrong. Patient himself believes that seizures may be due to medications which he is currently taking. However, wife states he has been taking these medications for some times now without any adverse effects.  Per wife patient has had seizures in the past, however, they had all been due to alcohol withdrawal. She denies patient having had any recent alcohol ingestion. Also states that she has been administering his medications due to his confusion to endure that he does not miss any doses.  History obtained from wife (Nga Morales [phone]) as patient poor historian and still somewhat confused.  Patient was scheduled to Neurology Dr. Fitch on 5/16.  * No surgery found *     Hospital Course:   Admitted 5/12/2018, for concern for recurrent seizures and altered mental status. Mental status improved with supportive care. Full work up for altered mental status was performed, no " "evidence of organic causes of seizure activity. Noted to have pancytopenia, hypercalcemia, and protein gap, but SPEP and UPEP were unrevealing, peripheral smear was also not concerning. EEG was done to monitor for seizure activity but was negative. Arranged to have neuro follow up the following day after discharge."      Pt. Today reports some depression related to physical issues.    No alcohol, no marijuana.    ROS   In wheelchair    PAST MEDICAL, FAMILY AND SOCIAL HISTORY: The patient's past medical, family and social history have been reviewed and updated as appropriate within the electronic medical record - see encounter notes    PSYCHOTROPIC MEDICATIONS   Doxepin 100 mg at bedtime, Latuda 80 mg with dinner, Depakote 500 mg qam and 1000 mg at bedtime[prescribed for seizures], Hydroxyzine 50 mg at bedtime    Scheduled and PRN Medications     Current Outpatient Prescriptions:     albuterol (VENTOLIN HFA) 90 mcg/actuation inhaler, Ventolin HFA 90 mcg/actuation aerosol inhaler, Disp: , Rfl:     atorvastatin (LIPITOR) 40 MG tablet, atorvastatin 40 mg tablet, Disp: , Rfl:     atorvastatin (LIPITOR) 40 MG tablet, TAKE 1 TABLET (40 MG TOTAL) BY MOUTH ONCE DAILY., Disp: 30 tablet, Rfl: 3    beclomethasone (QVAR) 40 mcg/actuation Aero, Qvar 40 mcg/actuation Metered Aerosol oral inhaler, Disp: , Rfl:     cyclobenzaprine (FLEXERIL) 5 MG tablet, cyclobenzaprine 5 mg tablet, Disp: , Rfl:     divalproex (DEPAKOTE) 500 MG TbEC, divalproex 500 mg tablet,delayed release  Take 1 tablet twice a day by oral route., Disp: , Rfl:     doxepin (SINEQUAN) 100 MG capsule, doxepin 100 mg capsule, Disp: , Rfl:     folic acid (FOLVITE) 1 MG tablet, Take 1 tablet (1 mg total) by mouth once daily., Disp: 30 tablet, Rfl: 0    gabapentin (NEURONTIN) 600 MG tablet, gabapentin 600 mg tablet, Disp: , Rfl:     HYDROcodone-acetaminophen (NORCO) 5-325 mg per tablet, hydrocodone 5 mg-acetaminophen 325 mg tablet, Disp: , Rfl:     hydrOXYzine " pamoate (VISTARIL) 50 MG Cap, hydroxyzine pamoate 50 mg capsule, Disp: , Rfl:     lacosamide (VIMPAT) 50 mg Tab, Vimpat 50 mg tablet, Disp: , Rfl:     lidocaine (LIDODERM) 5 %, Place 1 patch onto the skin once daily. Remove & Discard patch within 12 hours or as directed by MD, Disp: 10 patch, Rfl: 0    lurasidone (LATUDA) 80 mg Tab tablet, Latuda 80 mg tablet, Disp: , Rfl:     multivit-min-FA-lycopen-lutein (CENTRUM SILVER ULTRA MEN'S) 300-600-300 mcg Tab, Take 1 tablet by mouth once daily at 6am., Disp: , Rfl:     naloxegol (MOVANTIK) tablet, Movantik 25 mg tablet  Take 1 tablet every day by oral route as directed., Disp: , Rfl:     oxyCODONE (ROXICODONE) 10 mg Tab immediate release tablet, oxycodone 10 mg tablet, Disp: , Rfl:     pantoprazole (PROTONIX) 40 MG tablet, Take 1 tablet (40 mg total) by mouth once daily., Disp: 30 tablet, Rfl: 0    thiamine 100 MG tablet, Take 1 tablet (100 mg total) by mouth once daily., Disp: , Rfl:     LABORATORY DATA  Admission on 05/12/2018, Discharged on 05/15/2018   No results displayed because visit has over 200 results.      Admission on 05/10/2018, Discharged on 05/10/2018   Component Date Value Ref Range Status    WBC 05/10/2018 3.30* 3.90 - 12.70 K/uL Final    RBC 05/10/2018 3.90* 4.60 - 6.20 M/uL Final    Hemoglobin 05/10/2018 11.8* 14.0 - 18.0 g/dL Final    Hematocrit 05/10/2018 36.2* 40.0 - 54.0 % Final    MCV 05/10/2018 93  82 - 98 fL Final    MCH 05/10/2018 30.3  27.0 - 31.0 pg Final    MCHC 05/10/2018 32.6  32.0 - 36.0 g/dL Final    RDW 05/10/2018 14.9* 11.5 - 14.5 % Final    Platelets 05/10/2018 105* 150 - 350 K/uL Final    MPV 05/10/2018 9.8  9.2 - 12.9 fL Final    Immature Granulocytes 05/10/2018 0.6* 0.0 - 0.5 % Final    Gran # (ANC) 05/10/2018 2.0  1.8 - 7.7 K/uL Final    Immature Grans (Abs) 05/10/2018 0.02  0.00 - 0.04 K/uL Final    Comment: Mild elevation in immature granulocytes is non specific and   can be seen in a variety of conditions  including stress response,   acute inflammation, trauma and pregnancy. Correlation with other   laboratory and clinical findings is essential.      Lymph # 05/10/2018 0.8* 1.0 - 4.8 K/uL Final    Mono # 05/10/2018 0.4  0.3 - 1.0 K/uL Final    Eos # 05/10/2018 0.0  0.0 - 0.5 K/uL Final    Baso # 05/10/2018 0.01  0.00 - 0.20 K/uL Final    nRBC 05/10/2018 0  0 /100 WBC Final    Gran% 05/10/2018 61.2  38.0 - 73.0 % Final    Lymph% 05/10/2018 25.2  18.0 - 48.0 % Final    Mono% 05/10/2018 12.4  4.0 - 15.0 % Final    Eosinophil% 05/10/2018 0.3  0.0 - 8.0 % Final    Basophil% 05/10/2018 0.3  0.0 - 1.9 % Final    Differential Method 05/10/2018 Automated   Final    Sodium 05/10/2018 129* 136 - 145 mmol/L Final    Potassium 05/10/2018 3.7  3.5 - 5.1 mmol/L Final    Chloride 05/10/2018 97  95 - 110 mmol/L Final    CO2 05/10/2018 23  23 - 29 mmol/L Final    Glucose 05/10/2018 127* 70 - 110 mg/dL Final    BUN, Bld 05/10/2018 9  8 - 23 mg/dL Final    Creatinine 05/10/2018 0.8  0.5 - 1.4 mg/dL Final    Calcium 05/10/2018 9.0  8.7 - 10.5 mg/dL Final    Total Protein 05/10/2018 6.9  6.0 - 8.4 g/dL Final    Albumin 05/10/2018 3.0* 3.5 - 5.2 g/dL Final    Total Bilirubin 05/10/2018 0.5  0.1 - 1.0 mg/dL Final    Comment: For infants and newborns, interpretation of results should be based  on gestational age, weight and in agreement with clinical  observations.  Premature Infant recommended reference ranges:  Up to 24 hours.............<8.0 mg/dL  Up to 48 hours............<12.0 mg/dL  3-5 days..................<15.0 mg/dL  6-29 days.................<15.0 mg/dL      Alkaline Phosphatase 05/10/2018 112  55 - 135 U/L Final    AST 05/10/2018 13  10 - 40 U/L Final    ALT 05/10/2018 11  10 - 44 U/L Final    Anion Gap 05/10/2018 9  8 - 16 mmol/L Final    eGFR if African American 05/10/2018 >60.0  >60 mL/min/1.73 m^2 Final    eGFR if non African American 05/10/2018 >60.0  >60 mL/min/1.73 m^2 Final    Comment:  Calculation used to obtain the estimated glomerular filtration  rate (eGFR) is the CKD-EPI equation.       Valproic Acid Lvl 05/10/2018 72.8  50.0 - 100.0 ug/mL Final    Comment: Valproic Acid (ug/ml)  Toxic:   >100.0 ug/ml      POC Glucose 05/10/2018 95  70 - 110 mg/dL Final    POC BUN 05/10/2018 8  6 - 30 mg/dL Final    POC Creatinine 05/10/2018 0.6  0.5 - 1.4 mg/dL Final    POC Sodium 05/10/2018 130* 136 - 145 mmol/L Final    POC Potassium 05/10/2018 3.8  3.5 - 5.1 mmol/L Final    POC Chloride 05/10/2018 105  95 - 110 mmol/L Final    POC TCO2 (MEASURED) 05/10/2018 19* 23 - 29 mmol/L Final    POC Ionized Calcium 05/10/2018 0.68* 1.06 - 1.42 mmol/L Final    POC Hematocrit 05/10/2018 33* 36 - 54 %PCV Final    Sample 05/10/2018 BHAVYA   Final   Admission on 03/30/2018, Discharged on 04/10/2018   Component Date Value Ref Range Status    WBC 03/31/2018 9.14  3.90 - 12.70 K/uL Final    RBC 03/31/2018 3.78* 4.60 - 6.20 M/uL Final    Hemoglobin 03/31/2018 11.3* 14.0 - 18.0 g/dL Final    Hematocrit 03/31/2018 33.8* 40.0 - 54.0 % Final    MCV 03/31/2018 89  82 - 98 fL Final    MCH 03/31/2018 29.9  27.0 - 31.0 pg Final    MCHC 03/31/2018 33.4  32.0 - 36.0 g/dL Final    RDW 03/31/2018 14.6* 11.5 - 14.5 % Final    Platelets 03/31/2018 253  150 - 350 K/uL Final    MPV 03/31/2018 9.6  9.2 - 12.9 fL Final    Immature Granulocytes 03/31/2018 CANCELED  0.0 - 0.5 % Final    Result canceled by the ancillary    Immature Grans (Abs) 03/31/2018 CANCELED  0.00 - 0.04 K/uL Final    Comment: Mild elevation in immature granulocytes is non specific and   can be seen in a variety of conditions including stress response,   acute inflammation, trauma and pregnancy. Correlation with other   laboratory and clinical findings is essential.    Result canceled by the ancillary      Lymph # 03/31/2018 CANCELED  1.0 - 4.8 K/uL Final    Result canceled by the ancillary    Mono # 03/31/2018 CANCELED  0.3 - 1.0 K/uL Final    Result  canceled by the ancillary    Eos # 03/31/2018 CANCELED  0.0 - 0.5 K/uL Final    Result canceled by the ancillary    Baso # 03/31/2018 CANCELED  0.00 - 0.20 K/uL Final    Result canceled by the ancillary    nRBC 03/31/2018 0  0 /100 WBC Final    Gran% 03/31/2018 57.0  38.0 - 73.0 % Final    Lymph% 03/31/2018 20.0  18.0 - 48.0 % Final    Mono% 03/31/2018 17.0* 4.0 - 15.0 % Final    Eosinophil% 03/31/2018 2.0  0.0 - 8.0 % Final    Basophil% 03/31/2018 1.0  0.0 - 1.9 % Final    Bands 03/31/2018 2.0  % Final    Metamyelocytes 03/31/2018 1.0  % Final    Aniso 03/31/2018 Slight   Final    Poly 03/31/2018 Occasional   Final    Differential Method 03/31/2018 Manual   Final    Sodium 03/31/2018 130* 136 - 145 mmol/L Final    Potassium 03/31/2018 4.6  3.5 - 5.1 mmol/L Final    Chloride 03/31/2018 97  95 - 110 mmol/L Final    CO2 03/31/2018 25  23 - 29 mmol/L Final    Glucose 03/31/2018 106  70 - 110 mg/dL Final    BUN, Bld 03/31/2018 14  8 - 23 mg/dL Final    Creatinine 03/31/2018 0.7  0.5 - 1.4 mg/dL Final    Calcium 03/31/2018 8.4* 8.7 - 10.5 mg/dL Final    Anion Gap 03/31/2018 8  8 - 16 mmol/L Final    eGFR if African American 03/31/2018 >60.0  >60 mL/min/1.73 m^2 Final    eGFR if non African American 03/31/2018 >60.0  >60 mL/min/1.73 m^2 Final    Comment: Calculation used to obtain the estimated glomerular filtration  rate (eGFR) is the CKD-EPI equation.       WBC 04/02/2018 8.37  3.90 - 12.70 K/uL Final    RBC 04/02/2018 3.80* 4.60 - 6.20 M/uL Final    Hemoglobin 04/02/2018 11.3* 14.0 - 18.0 g/dL Final    Hematocrit 04/02/2018 34.6* 40.0 - 54.0 % Final    MCV 04/02/2018 91  82 - 98 fL Final    MCH 04/02/2018 29.7  27.0 - 31.0 pg Final    MCHC 04/02/2018 32.7  32.0 - 36.0 g/dL Final    RDW 04/02/2018 14.5  11.5 - 14.5 % Final    Platelets 04/02/2018 334  150 - 350 K/uL Final    MPV 04/02/2018 9.7  9.2 - 12.9 fL Final    Immature Granulocytes 04/02/2018 CANCELED  0.0 - 0.5 % Final     Result canceled by the ancillary    Immature Grans (Abs) 04/02/2018 CANCELED  0.00 - 0.04 K/uL Final    Comment: Mild elevation in immature granulocytes is non specific and   can be seen in a variety of conditions including stress response,   acute inflammation, trauma and pregnancy. Correlation with other   laboratory and clinical findings is essential.    Result canceled by the ancillary      Lymph # 04/02/2018 CANCELED  1.0 - 4.8 K/uL Final    Result canceled by the ancillary    Mono # 04/02/2018 CANCELED  0.3 - 1.0 K/uL Final    Result canceled by the ancillary    Eos # 04/02/2018 CANCELED  0.0 - 0.5 K/uL Final    Result canceled by the ancillary    Baso # 04/02/2018 CANCELED  0.00 - 0.20 K/uL Final    Result canceled by the ancillary    nRBC 04/02/2018 0  0 /100 WBC Final    Gran% 04/02/2018 66.0  38.0 - 73.0 % Final    Lymph% 04/02/2018 22.0  18.0 - 48.0 % Final    Mono% 04/02/2018 3.0* 4.0 - 15.0 % Final    Eosinophil% 04/02/2018 5.0  0.0 - 8.0 % Final    Basophil% 04/02/2018 0.0  0.0 - 1.9 % Final    Bands 04/02/2018 1.0  % Final    Metamyelocytes 04/02/2018 3.0  % Final    Platelet Estimate 04/02/2018 Appears normal   Final    Aniso 04/02/2018 Slight   Final    Differential Method 04/02/2018 Manual   Final    Sodium 04/02/2018 134* 136 - 145 mmol/L Final    Potassium 04/02/2018 5.2* 3.5 - 5.1 mmol/L Final    Chloride 04/02/2018 97  95 - 110 mmol/L Final    CO2 04/02/2018 31* 23 - 29 mmol/L Final    Glucose 04/02/2018 122* 70 - 110 mg/dL Final    BUN, Bld 04/02/2018 11  8 - 23 mg/dL Final    Creatinine 04/02/2018 0.7  0.5 - 1.4 mg/dL Final    Calcium 04/02/2018 9.1  8.7 - 10.5 mg/dL Final    Anion Gap 04/02/2018 6* 8 - 16 mmol/L Final    eGFR if African American 04/02/2018 >60.0  >60 mL/min/1.73 m^2 Final    eGFR if non African American 04/02/2018 >60.0  >60 mL/min/1.73 m^2 Final    Comment: Calculation used to obtain the estimated glomerular filtration  rate (eGFR) is the CKD-EPI  equation.       Magnesium 04/02/2018 2.1  1.6 - 2.6 mg/dL Final    Phosphorus 04/02/2018 3.8  2.7 - 4.5 mg/dL Final    WBC 04/05/2018 7.17  3.90 - 12.70 K/uL Final    RBC 04/05/2018 3.61* 4.60 - 6.20 M/uL Final    Hemoglobin 04/05/2018 10.8* 14.0 - 18.0 g/dL Final    Hematocrit 04/05/2018 34.2* 40.0 - 54.0 % Final    MCV 04/05/2018 95  82 - 98 fL Final    MCH 04/05/2018 29.9  27.0 - 31.0 pg Final    MCHC 04/05/2018 31.6* 32.0 - 36.0 g/dL Final    RDW 04/05/2018 14.4  11.5 - 14.5 % Final    Platelets 04/05/2018 370* 150 - 350 K/uL Final    MPV 04/05/2018 9.7  9.2 - 12.9 fL Final    Immature Granulocytes 04/05/2018 3.6* 0.0 - 0.5 % Final    Gran # (ANC) 04/05/2018 4.6  1.8 - 7.7 K/uL Final    Immature Grans (Abs) 04/05/2018 0.26* 0.00 - 0.04 K/uL Final    Comment: Mild elevation in immature granulocytes is non specific and   can be seen in a variety of conditions including stress response,   acute inflammation, trauma and pregnancy. Correlation with other   laboratory and clinical findings is essential.      Lymph # 04/05/2018 1.2  1.0 - 4.8 K/uL Final    Mono # 04/05/2018 1.0  0.3 - 1.0 K/uL Final    Eos # 04/05/2018 0.1  0.0 - 0.5 K/uL Final    Baso # 04/05/2018 0.04  0.00 - 0.20 K/uL Final    nRBC 04/05/2018 0  0 /100 WBC Final    Gran% 04/05/2018 64.6  38.0 - 73.0 % Final    Lymph% 04/05/2018 16.3* 18.0 - 48.0 % Final    Mono% 04/05/2018 13.4  4.0 - 15.0 % Final    Eosinophil% 04/05/2018 1.5  0.0 - 8.0 % Final    Basophil% 04/05/2018 0.6  0.0 - 1.9 % Final    Differential Method 04/05/2018 Automated   Final    Sodium 04/05/2018 137  136 - 145 mmol/L Final    Potassium 04/05/2018 3.9  3.5 - 5.1 mmol/L Final    Chloride 04/05/2018 97  95 - 110 mmol/L Final    CO2 04/05/2018 29  23 - 29 mmol/L Final    Glucose 04/05/2018 126* 70 - 110 mg/dL Final    BUN, Bld 04/05/2018 9  8 - 23 mg/dL Final    Creatinine 04/05/2018 0.9  0.5 - 1.4 mg/dL Final    Calcium 04/05/2018 8.7  8.7 - 10.5  mg/dL Final    Anion Gap 04/05/2018 11  8 - 16 mmol/L Final    eGFR if African American 04/05/2018 >60.0  >60 mL/min/1.73 m^2 Final    eGFR if non African American 04/05/2018 >60.0  >60 mL/min/1.73 m^2 Final    Comment: Calculation used to obtain the estimated glomerular filtration  rate (eGFR) is the CKD-EPI equation.       Magnesium 04/05/2018 1.9  1.6 - 2.6 mg/dL Final    Phosphorus 04/05/2018 3.1  2.7 - 4.5 mg/dL Final    WBC 04/09/2018 4.68  3.90 - 12.70 K/uL Final    RBC 04/09/2018 3.65* 4.60 - 6.20 M/uL Final    Hemoglobin 04/09/2018 10.8* 14.0 - 18.0 g/dL Final    Hematocrit 04/09/2018 34.4* 40.0 - 54.0 % Final    MCV 04/09/2018 94  82 - 98 fL Final    MCH 04/09/2018 29.6  27.0 - 31.0 pg Final    MCHC 04/09/2018 31.4* 32.0 - 36.0 g/dL Final    RDW 04/09/2018 13.9  11.5 - 14.5 % Final    Platelets 04/09/2018 295  150 - 350 K/uL Final    MPV 04/09/2018 9.2  9.2 - 12.9 fL Final    Immature Granulocytes 04/09/2018 2.6* 0.0 - 0.5 % Final    Gran # (ANC) 04/09/2018 2.6  1.8 - 7.7 K/uL Final    Immature Grans (Abs) 04/09/2018 0.12* 0.00 - 0.04 K/uL Final    Comment: Mild elevation in immature granulocytes is non specific and   can be seen in a variety of conditions including stress response,   acute inflammation, trauma and pregnancy. Correlation with other   laboratory and clinical findings is essential.      Lymph # 04/09/2018 1.0  1.0 - 4.8 K/uL Final    Mono # 04/09/2018 0.8  0.3 - 1.0 K/uL Final    Eos # 04/09/2018 0.2  0.0 - 0.5 K/uL Final    Baso # 04/09/2018 0.04  0.00 - 0.20 K/uL Final    nRBC 04/09/2018 0  0 /100 WBC Final    Gran% 04/09/2018 55.7  38.0 - 73.0 % Final    Lymph% 04/09/2018 21.2  18.0 - 48.0 % Final    Mono% 04/09/2018 16.2* 4.0 - 15.0 % Final    Eosinophil% 04/09/2018 3.4  0.0 - 8.0 % Final    Basophil% 04/09/2018 0.9  0.0 - 1.9 % Final    Differential Method 04/09/2018 Automated   Final    Sodium 04/09/2018 133* 136 - 145 mmol/L Final    Potassium 04/09/2018  4.4  3.5 - 5.1 mmol/L Final    Chloride 04/09/2018 94* 95 - 110 mmol/L Final    CO2 04/09/2018 33* 23 - 29 mmol/L Final    Glucose 04/09/2018 108  70 - 110 mg/dL Final    BUN, Bld 04/09/2018 10  8 - 23 mg/dL Final    Creatinine 04/09/2018 0.9  0.5 - 1.4 mg/dL Final    Calcium 04/09/2018 8.8  8.7 - 10.5 mg/dL Final    Anion Gap 04/09/2018 6* 8 - 16 mmol/L Final    eGFR if African American 04/09/2018 >60.0  >60 mL/min/1.73 m^2 Final    eGFR if non African American 04/09/2018 >60.0  >60 mL/min/1.73 m^2 Final    Comment: Calculation used to obtain the estimated glomerular filtration  rate (eGFR) is the CKD-EPI equation.       Magnesium 04/09/2018 1.9  1.6 - 2.6 mg/dL Final    Phosphorus 04/09/2018 4.0  2.7 - 4.5 mg/dL Final   Lab Visit on 03/15/2018   Component Date Value Ref Range Status    WBC 03/15/2018 9.45  3.90 - 12.70 K/uL Final    RBC 03/15/2018 4.61  4.60 - 6.20 M/uL Final    Hemoglobin 03/15/2018 14.5  14.0 - 18.0 g/dL Final    Hematocrit 03/15/2018 43.5  40.0 - 54.0 % Final    MCV 03/15/2018 94  82 - 98 fL Final    MCH 03/15/2018 31.5* 27.0 - 31.0 pg Final    MCHC 03/15/2018 33.3  32.0 - 36.0 g/dL Final    RDW 03/15/2018 12.8  11.5 - 14.5 % Final    Platelets 03/15/2018 195  150 - 350 K/uL Final    MPV 03/15/2018 10.3  9.2 - 12.9 fL Final    Immature Granulocytes 03/15/2018 1.1* 0.0 - 0.5 % Final    Gran # (ANC) 03/15/2018 6.5  1.8 - 7.7 K/uL Final    Immature Grans (Abs) 03/15/2018 0.10* 0.00 - 0.04 K/uL Final    Comment: Mild elevation in immature granulocytes is non specific and   can be seen in a variety of conditions including stress response,   acute inflammation, trauma and pregnancy. Correlation with other   laboratory and clinical findings is essential.      Lymph # 03/15/2018 1.8  1.0 - 4.8 K/uL Final    Mono # 03/15/2018 0.8  0.3 - 1.0 K/uL Final    Eos # 03/15/2018 0.2  0.0 - 0.5 K/uL Final    Baso # 03/15/2018 0.05  0.00 - 0.20 K/uL Final    nRBC 03/15/2018 0  0 /100  "WBC Final    Gran% 03/15/2018 68.6  38.0 - 73.0 % Final    Lymph% 03/15/2018 19.2  18.0 - 48.0 % Final    Mono% 03/15/2018 8.3  4.0 - 15.0 % Final    Eosinophil% 03/15/2018 2.3  0.0 - 8.0 % Final    Basophil% 03/15/2018 0.5  0.0 - 1.9 % Final    Differential Method 03/15/2018 Automated   Final    Sodium 03/15/2018 131* 136 - 145 mmol/L Final    Potassium 03/15/2018 5.1  3.5 - 5.1 mmol/L Final    Chloride 03/15/2018 95  95 - 110 mmol/L Final    CO2 03/15/2018 28  23 - 29 mmol/L Final    Glucose 03/15/2018 91  70 - 110 mg/dL Final    BUN, Bld 03/15/2018 5* 8 - 23 mg/dL Final    Creatinine 03/15/2018 1.0  0.5 - 1.4 mg/dL Final    Calcium 03/15/2018 9.7  8.7 - 10.5 mg/dL Final    Anion Gap 03/15/2018 8  8 - 16 mmol/L Final    eGFR if African American 03/15/2018 >60.0  >60 mL/min/1.73 m^2 Final    eGFR if non African American 03/15/2018 >60.0  >60 mL/min/1.73 m^2 Final    Comment: Calculation used to obtain the estimated glomerular filtration  rate (eGFR) is the CKD-EPI equation.       Vitamin B-12 03/15/2018 1162* 210 - 950 pg/mL Final    Iron 03/15/2018 100  45 - 160 ug/dL Final    Transferrin 03/15/2018 287  200 - 375 mg/dL Final    TIBC 03/15/2018 425  250 - 450 ug/dL Final    Saturated Iron 03/15/2018 24  20 - 50 % Final    Ferritin 03/15/2018 186  20.0 - 300.0 ng/mL Final       EXAMINATION    /78   Pulse (!) 118   Ht 6' 5" (1.956 m)   Wt 93 kg (205 lb)   BMI 24.31 kg/m²     Pt. To have vitals and weight done after today's visit.  CONSTITUTIONAL  General Appearance: well nourished    MUSCULOSKELETAL  Muscle Strength and Tone: normal strength and tone  Abnormal Involuntary Movements: no abnormal movement noted  Gait and Station: normal gait    PSYCHIATRIC   Level of Consciousness: alert  Orientation: grossly intact  Grooming: well groomed  Psychomotor Behavior: no restlessness/agitation  Speech: normal in rate, rhythm and volume  Language: normal vocabulary  Mood: stable  Affect: full " range and appropriate  Thought Process: logical and goal directed  Associations: intact associations  Thought Content: no SI/HI  Memory: grossly intact  Attention: intact to content of interview  Fund of Knowledge: appears adequate  Insight: good  Judgement: good    MEDICAL DECISION MAKING    DIAGNOSES  Bipolar d/o      PROBLEM LIST AND MANAGEMENT PLANS    - continue Doxepin, Depakote ER, Latuda, Hydroxyzine as above  - rtc 3 months    Time with patient: 20 min      Ambrosio Loco

## 2018-05-31 NOTE — PROGRESS NOTES
History of present illness:  63-year-old male in today with a couple of issues.  The patient was discharged with couple weeks ago from our institution having presented with seizures.  Etiology uncertain.  He is being maintained on Depakote.  The patient's main complaint today is that of constipation history of similar in the past as recently had a week or so of such.  He is using milk of magnesia p.r.n..  He is not having any melena or hematochezia no abdominal pain nausea vomiting he is on multiple medications.    Current medications:  Medications noted reviewed and updated in the electronic medical record medication list.    Review of systems:  Constitutional:  No fever chills  HEENT no hoarseness dysphagia.  Respiratory:  No cough shortness of breath.  Cardiovascular:  No chest pain no palpitations syncope or presyncope  GI:  No nausea vomiting abdominal pain see HPI.    Past medical history past surgical history family medical history social history is all noted reviewed in the electronic medical record history sections.    Physical examination:  General:  Alert appropriately groomed male no acute distress.  Vital signs:  Blood pressure 1 100/80.  Pulse 80 and regular other vital signs normal  Eyes sclerae white and anicteric.  HEENT:  Normocephalic.  Neck supple no masses no thyromegaly.  Lungs:  Clear to auscultation.  Cardiovascular:  Regular rate and rhythm no significant murmur carotids full bilaterally bruits no significant peripheral edema  GI:  The abdomen is soft benign no masses no tenderness no organomegaly.    Data:  Recent hospital discharge summary noted.  Last colonoscopy was 2008    Impression:  Persistent constipation probably related to medication  He is due for 10 year surveillance colonoscopy.  Seizure disorder being followed by Neurology currently on pharmacologic suppression  Mild anemia noted during hospital stay    Plan:  Discussed using MiraLax and titrate as needed.  Schedule screening  colonoscopy.  One-month recheck CBC and PSA

## 2018-06-11 ENCOUNTER — CLINICAL SUPPORT (OUTPATIENT)
Dept: ELECTROPHYSIOLOGY | Facility: CLINIC | Age: 63
End: 2018-06-11
Attending: INTERNAL MEDICINE
Payer: COMMERCIAL

## 2018-06-11 DIAGNOSIS — R00.0 TACHYCARDIA: ICD-10-CM

## 2018-06-11 PROCEDURE — 93224 XTRNL ECG REC UP TO 48 HRS: CPT | Mod: S$GLB,,, | Performed by: INTERNAL MEDICINE

## 2018-06-13 ENCOUNTER — TELEPHONE (OUTPATIENT)
Dept: INTERNAL MEDICINE | Facility: CLINIC | Age: 63
End: 2018-06-13

## 2018-06-13 ENCOUNTER — TELEPHONE (OUTPATIENT)
Dept: ADMINISTRATIVE | Facility: CLINIC | Age: 63
End: 2018-06-13

## 2018-06-13 NOTE — PROGRESS NOTES
Home Health Recertification with [facility]. Dr. Darcy Ortiz. SN, PT and OT services.

## 2018-06-14 ENCOUNTER — TELEPHONE (OUTPATIENT)
Dept: INTERNAL MEDICINE | Facility: CLINIC | Age: 63
End: 2018-06-14

## 2018-06-14 ENCOUNTER — PATIENT MESSAGE (OUTPATIENT)
Dept: NEUROLOGY | Facility: CLINIC | Age: 63
End: 2018-06-14

## 2018-06-14 NOTE — TELEPHONE ENCOUNTER
----- Message from Aimee Crespo sent at 6/13/2018  2:33 PM CDT -----  Contact: Self [phone]  Patient would like a call back regarding minor seizures that he has been experiencing. Patient also states its been about 2 weeks since his last minor seizure. Patient would like to know what could he do.

## 2018-06-14 NOTE — TELEPHONE ENCOUNTER
He should contact his Neurologist -Dr Fitch-- regarding this issue,  There appears to be an Email in the record today to his neurologist

## 2018-06-14 NOTE — TELEPHONE ENCOUNTER
Pt was last seen in the office on 5/24/18 and there was no mention of seizures at that time.    Nor any messages/calls/emails mentioning seizures.    Please advise if he needs to be seen in the office or ED for evaluation.    Thanks.

## 2018-06-15 RX ORDER — PANTOPRAZOLE SODIUM 40 MG/1
40 TABLET, DELAYED RELEASE ORAL DAILY
Qty: 30 TABLET | Refills: 2 | Status: SHIPPED | OUTPATIENT
Start: 2018-06-15 | End: 2018-09-09 | Stop reason: SDUPTHER

## 2018-06-15 NOTE — TELEPHONE ENCOUNTER
"----- Message from Maricruz Dior sent at 6/15/2018 10:11 AM CDT -----  Contact: self/[phone]  RX request - refill or new RX.  Is this a refill or new RX:  Refill  RX name and strength: pantoprazole (PROTONIX) 40 MG tablet  Directions: : Take 1 tablet (40 mg total) by mouth once daily. - Oral  Is this a 30 day or 90 day RX:  30  Local pharmacy or mail order pharmacy: Local   Pharmacy name and phone # (DON'T enter "on file" or "in chart"): [facility]/pharmacy #[id number] - [place], [place] - [address] [phone] (Phone)  [phone] (Fax)  Comments:  Thank you!!!        "

## 2018-06-25 ENCOUNTER — LAB VISIT (OUTPATIENT)
Dept: LAB | Facility: HOSPITAL | Age: 63
End: 2018-06-25
Attending: INTERNAL MEDICINE
Payer: COMMERCIAL

## 2018-06-25 DIAGNOSIS — Z12.5 PROSTATE CANCER SCREENING: ICD-10-CM

## 2018-06-25 DIAGNOSIS — D64.9 ANEMIA, UNSPECIFIED TYPE: ICD-10-CM

## 2018-06-25 LAB
BASOPHILS # BLD AUTO: 0.02 K/UL
BASOPHILS NFR BLD: 0.3 %
COMPLEXED PSA SERPL-MCNC: 0.28 NG/ML
DIFFERENTIAL METHOD: ABNORMAL
EOSINOPHIL # BLD AUTO: 0.1 K/UL
EOSINOPHIL NFR BLD: 2.3 %
ERYTHROCYTE [DISTWIDTH] IN BLOOD BY AUTOMATED COUNT: 14.6 %
HCT VFR BLD AUTO: 39.3 %
HGB BLD-MCNC: 13 G/DL
IMM GRANULOCYTES # BLD AUTO: 0.05 K/UL
IMM GRANULOCYTES NFR BLD AUTO: 0.8 %
LYMPHOCYTES # BLD AUTO: 2 K/UL
LYMPHOCYTES NFR BLD: 32.5 %
MCH RBC QN AUTO: 32.1 PG
MCHC RBC AUTO-ENTMCNC: 33.1 G/DL
MCV RBC AUTO: 97 FL
MONOCYTES # BLD AUTO: 0.7 K/UL
MONOCYTES NFR BLD: 12 %
NEUTROPHILS # BLD AUTO: 3.1 K/UL
NEUTROPHILS NFR BLD: 52.1 %
NRBC BLD-RTO: 0 /100 WBC
PLATELET # BLD AUTO: 305 K/UL
PMV BLD AUTO: 9.4 FL
RBC # BLD AUTO: 4.05 M/UL
WBC # BLD AUTO: 6 K/UL

## 2018-06-25 PROCEDURE — 36415 COLL VENOUS BLD VENIPUNCTURE: CPT

## 2018-06-25 PROCEDURE — 84153 ASSAY OF PSA TOTAL: CPT

## 2018-06-25 PROCEDURE — 85025 COMPLETE CBC W/AUTO DIFF WBC: CPT

## 2018-07-02 ENCOUNTER — NURSE TRIAGE (OUTPATIENT)
Dept: ADMINISTRATIVE | Facility: CLINIC | Age: 63
End: 2018-07-02

## 2018-07-02 NOTE — TELEPHONE ENCOUNTER
"  Reason for Disposition   Last bowel movement (BM) > 4 days ago    Protocols used:  CONSTIPATION-A-    Pt c/o constipation for 8 days.  Pt states today "a couple of rocks" fell out after pt attempted to manually remove stool. Pt states he takes prescribed opioid medication. Pt states he has been taking taking miralax every other day for approx 2 weeks. Pt advised per protocol and verbalizes understanding.   "

## 2018-07-13 ENCOUNTER — PATIENT MESSAGE (OUTPATIENT)
Dept: PSYCHIATRY | Facility: CLINIC | Age: 63
End: 2018-07-13

## 2018-07-17 DIAGNOSIS — M81.0 OSTEOPOROSIS: Primary | ICD-10-CM

## 2018-08-02 ENCOUNTER — OFFICE VISIT (OUTPATIENT)
Dept: PSYCHIATRY | Facility: CLINIC | Age: 63
End: 2018-08-02
Payer: COMMERCIAL

## 2018-08-02 ENCOUNTER — HOSPITAL ENCOUNTER (OUTPATIENT)
Dept: RADIOLOGY | Facility: CLINIC | Age: 63
Discharge: HOME | End: 2018-08-02
Attending: ORTHOPAEDIC SURGERY
Payer: COMMERCIAL

## 2018-08-02 VITALS
HEART RATE: 109 BPM | WEIGHT: 216.94 LBS | HEIGHT: 77 IN | BODY MASS INDEX: 25.61 KG/M2 | SYSTOLIC BLOOD PRESSURE: 129 MMHG | DIASTOLIC BLOOD PRESSURE: 83 MMHG

## 2018-08-02 DIAGNOSIS — M81.0 OSTEOPOROSIS: ICD-10-CM

## 2018-08-02 DIAGNOSIS — F31.9 BIPOLAR 1 DISORDER: Primary | ICD-10-CM

## 2018-08-02 PROCEDURE — 99999 PR PBB SHADOW E&M-EST. PATIENT-LVL II: CPT | Mod: PBBFAC,,, | Performed by: PSYCHIATRY & NEUROLOGY

## 2018-08-02 PROCEDURE — 77080 DXA BONE DENSITY AXIAL: CPT | Mod: TC

## 2018-08-02 PROCEDURE — 99213 OFFICE O/P EST LOW 20 MIN: CPT | Mod: S$GLB,,, | Performed by: PSYCHIATRY & NEUROLOGY

## 2018-08-02 PROCEDURE — 77080 DXA BONE DENSITY AXIAL: CPT | Mod: 26,,, | Performed by: INTERNAL MEDICINE

## 2018-08-02 RX ORDER — LURASIDONE HYDROCHLORIDE 80 MG/1
TABLET, FILM COATED ORAL
Qty: 30 TABLET | Refills: 3 | Status: SHIPPED | OUTPATIENT
Start: 2018-08-02 | End: 2018-11-07 | Stop reason: SDUPTHER

## 2018-08-02 RX ORDER — DIVALPROEX SODIUM 500 MG/1
TABLET, FILM COATED, EXTENDED RELEASE ORAL
Qty: 90 TABLET | Refills: 3 | Status: SHIPPED | OUTPATIENT
Start: 2018-08-02 | End: 2018-11-07 | Stop reason: SDUPTHER

## 2018-08-02 RX ORDER — HYDROXYZINE PAMOATE 50 MG/1
CAPSULE ORAL
Qty: 30 CAPSULE | Refills: 3 | Status: SHIPPED | OUTPATIENT
Start: 2018-08-02 | End: 2019-10-30 | Stop reason: ALTCHOICE

## 2018-08-02 RX ORDER — DOXEPIN HYDROCHLORIDE 100 MG/1
CAPSULE ORAL
Qty: 30 CAPSULE | Refills: 3 | Status: SHIPPED | OUTPATIENT
Start: 2018-08-02 | End: 2018-11-07 | Stop reason: SDUPTHER

## 2018-08-02 NOTE — PROGRESS NOTES
ESTABLISHED OUTPATIENT VISIT   E/M LEVEL 3: [id number]    ENCOUNTER DATE: 8/2/2018  SITE: [facility]    HISTORY    CHIEF COMPLAINT   Mirza Morales is a 63 y.o. male who presents for follow up of bipolar d/o.    HPI     Pt. Today again reports some depression related to physical issues. Low energy. Feels weak. Plans to start rehab. States, that he is eating well.    No alcohol, no marijuana.    ROS   Came in with cane.  No recent seizure.    PAST MEDICAL, FAMILY AND SOCIAL HISTORY: The patient's past medical, family and social history have been reviewed and updated as appropriate within the electronic medical record - see encounter notes    PSYCHOTROPIC MEDICATIONS   Doxepin 100 mg at bedtime, Latuda 80 mg with dinner, Depakote 500 mg qam and 1000 mg at bedtime[prescribed for seizures], Hydroxyzine 50 mg at bedtime    Scheduled and PRN Medications     Current Outpatient Prescriptions:     albuterol (VENTOLIN HFA) 90 mcg/actuation inhaler, Ventolin HFA 90 mcg/actuation aerosol inhaler, Disp: , Rfl:     atorvastatin (LIPITOR) 40 MG tablet, atorvastatin 40 mg tablet, Disp: , Rfl:     atorvastatin (LIPITOR) 40 MG tablet, TAKE 1 TABLET (40 MG TOTAL) BY MOUTH ONCE DAILY., Disp: 30 tablet, Rfl: 3    beclomethasone (QVAR) 40 mcg/actuation Aero, Qvar 40 mcg/actuation Metered Aerosol oral inhaler, Disp: , Rfl:     cyclobenzaprine (FLEXERIL) 5 MG tablet, cyclobenzaprine 5 mg tablet, Disp: , Rfl:     divalproex (DEPAKOTE) 500 MG TbEC, divalproex 500 mg tablet,delayed release  Take 1 tablet twice a day by oral route., Disp: , Rfl:     divalproex ER (DEPAKOTE) 500 MG Tb24, Take 1 tablet (500mg) every morning and 2 tablets (1000mg) every night., Disp: 90 tablet, Rfl: 3    doxepin (SINEQUAN) 100 MG capsule, doxepin 100 mg capsule, Disp: , Rfl:     doxepin (SINEQUAN) 100 MG capsule, TAKE 1 CAPSULE (100 MG TOTAL) BY MOUTH EVERY EVENING., Disp: 30 capsule, Rfl: 3    folic acid (FOLVITE) 1 MG  tablet, Take 1 tablet (1 mg total) by mouth once daily., Disp: 30 tablet, Rfl: 0    gabapentin (NEURONTIN) 600 MG tablet, gabapentin 600 mg tablet, Disp: , Rfl:     HYDROcodone-acetaminophen (NORCO) 5-325 mg per tablet, hydrocodone 5 mg-acetaminophen 325 mg tablet, Disp: , Rfl:     hydrOXYzine pamoate (VISTARIL) 50 MG Cap, hydroxyzine pamoate 50 mg capsule, Disp: , Rfl:     hydrOXYzine pamoate (VISTARIL) 50 MG Cap, TAKE 1 CAPSULE (50 MG TOTAL) BY MOUTH EVERY EVENING., Disp: 30 capsule, Rfl: 3    lacosamide (VIMPAT) 50 mg Tab, Vimpat 50 mg tablet, Disp: , Rfl:     lidocaine (LIDODERM) 5 %, Place 1 patch onto the skin once daily. Remove & Discard patch within 12 hours or as directed by MD, Disp: 10 patch, Rfl: 0    lurasidone (LATUDA) 80 mg Tab tablet, Latuda 80 mg tablet, Disp: , Rfl:     lurasidone (LATUDA) 80 mg Tab tablet, TAKE 1 TABLET BY MOUTH DAILY WITH DINNER, Disp: 30 tablet, Rfl: 3    multivit-min-FA-lycopen-lutein (CENTRUM SILVER ULTRA MEN'S) 300-600-300 mcg Tab, Take 1 tablet by mouth once daily at 6am., Disp: , Rfl:     pantoprazole (PROTONIX) 40 MG tablet, Take 1 tablet (40 mg total) by mouth once daily., Disp: 30 tablet, Rfl: 2    thiamine 100 MG tablet, Take 1 tablet (100 mg total) by mouth once daily., Disp: , Rfl:     LABORATORY DATA  Lab Visit on 06/25/2018   Component Date Value Ref Range Status    WBC 06/25/2018 6.00  3.90 - 12.70 K/uL Final    RBC 06/25/2018 4.05* 4.60 - 6.20 M/uL Final    Hemoglobin 06/25/2018 13.0* 14.0 - 18.0 g/dL Final    Hematocrit 06/25/2018 39.3* 40.0 - 54.0 % Final    MCV 06/25/2018 97  82 - 98 fL Final    MCH 06/25/2018 32.1* 27.0 - 31.0 pg Final    MCHC 06/25/2018 33.1  32.0 - 36.0 g/dL Final    RDW 06/25/2018 14.6* 11.5 - 14.5 % Final    Platelets 06/25/2018 305  150 - 350 K/uL Final    MPV 06/25/2018 9.4  9.2 - 12.9 fL Final    Immature Granulocytes 06/25/2018 0.8* 0.0 - 0.5 % Final    Gran # (ANC) 06/25/2018 3.1  1.8 - 7.7 K/uL Final     Immature Grans (Abs) 06/25/2018 0.05* 0.00 - 0.04 K/uL Final    Comment: Mild elevation in immature granulocytes is non specific and   can be seen in a variety of conditions including stress response,   acute inflammation, trauma and pregnancy. Correlation with other   laboratory and clinical findings is essential.      Lymph # 06/25/2018 2.0  1.0 - 4.8 K/uL Final    Mono # 06/25/2018 0.7  0.3 - 1.0 K/uL Final    Eos # 06/25/2018 0.1  0.0 - 0.5 K/uL Final    Baso # 06/25/2018 0.02  0.00 - 0.20 K/uL Final    nRBC 06/25/2018 0  0 /100 WBC Final    Gran% 06/25/2018 52.1  38.0 - 73.0 % Final    Lymph% 06/25/2018 32.5  18.0 - 48.0 % Final    Mono% 06/25/2018 12.0  4.0 - 15.0 % Final    Eosinophil% 06/25/2018 2.3  0.0 - 8.0 % Final    Basophil% 06/25/2018 0.3  0.0 - 1.9 % Final    Differential Method 06/25/2018 Automated   Final    PSA, SCREEN 06/25/2018 0.28  0.00 - 4.00 ng/mL Final    Comment: PSA Expected levels:  Hormonal Therapy: <0.05 ng/ml  Prostatectomy: <0.01 ng/ml  Radiation Therapy: <1.00 ng/ml     Admission on 05/12/2018, Discharged on 05/15/2018   No results displayed because visit has over 200 results.      Admission on 05/10/2018, Discharged on 05/10/2018   Component Date Value Ref Range Status    WBC 05/10/2018 3.30* 3.90 - 12.70 K/uL Final    RBC 05/10/2018 3.90* 4.60 - 6.20 M/uL Final    Hemoglobin 05/10/2018 11.8* 14.0 - 18.0 g/dL Final    Hematocrit 05/10/2018 36.2* 40.0 - 54.0 % Final    MCV 05/10/2018 93  82 - 98 fL Final    MCH 05/10/2018 30.3  27.0 - 31.0 pg Final    MCHC 05/10/2018 32.6  32.0 - 36.0 g/dL Final    RDW 05/10/2018 14.9* 11.5 - 14.5 % Final    Platelets 05/10/2018 105* 150 - 350 K/uL Final    MPV 05/10/2018 9.8  9.2 - 12.9 fL Final    Immature Granulocytes 05/10/2018 0.6* 0.0 - 0.5 % Final    Gran # (ANC) 05/10/2018 2.0  1.8 - 7.7 K/uL Final    Immature Grans (Abs) 05/10/2018 0.02  0.00 - 0.04 K/uL Final    Comment: Mild elevation in immature granulocytes is  non specific and   can be seen in a variety of conditions including stress response,   acute inflammation, trauma and pregnancy. Correlation with other   laboratory and clinical findings is essential.      Lymph # 05/10/2018 0.8* 1.0 - 4.8 K/uL Final    Mono # 05/10/2018 0.4  0.3 - 1.0 K/uL Final    Eos # 05/10/2018 0.0  0.0 - 0.5 K/uL Final    Baso # 05/10/2018 0.01  0.00 - 0.20 K/uL Final    nRBC 05/10/2018 0  0 /100 WBC Final    Gran% 05/10/2018 61.2  38.0 - 73.0 % Final    Lymph% 05/10/2018 25.2  18.0 - 48.0 % Final    Mono% 05/10/2018 12.4  4.0 - 15.0 % Final    Eosinophil% 05/10/2018 0.3  0.0 - 8.0 % Final    Basophil% 05/10/2018 0.3  0.0 - 1.9 % Final    Differential Method 05/10/2018 Automated   Final    Sodium 05/10/2018 129* 136 - 145 mmol/L Final    Potassium 05/10/2018 3.7  3.5 - 5.1 mmol/L Final    Chloride 05/10/2018 97  95 - 110 mmol/L Final    CO2 05/10/2018 23  23 - 29 mmol/L Final    Glucose 05/10/2018 127* 70 - 110 mg/dL Final    BUN, Bld 05/10/2018 9  8 - 23 mg/dL Final    Creatinine 05/10/2018 0.8  0.5 - 1.4 mg/dL Final    Calcium 05/10/2018 9.0  8.7 - 10.5 mg/dL Final    Total Protein 05/10/2018 6.9  6.0 - 8.4 g/dL Final    Albumin 05/10/2018 3.0* 3.5 - 5.2 g/dL Final    Total Bilirubin 05/10/2018 0.5  0.1 - 1.0 mg/dL Final    Comment: For infants and newborns, interpretation of results should be based  on gestational age, weight and in agreement with clinical  observations.  Premature Infant recommended reference ranges:  Up to 24 hours.............<8.0 mg/dL  Up to 48 hours............<12.0 mg/dL  3-5 days..................<15.0 mg/dL  6-29 days.................<15.0 mg/dL      Alkaline Phosphatase 05/10/2018 112  55 - 135 U/L Final    AST 05/10/2018 13  10 - 40 U/L Final    ALT 05/10/2018 11  10 - 44 U/L Final    Anion Gap 05/10/2018 9  8 - 16 mmol/L Final    eGFR if African American 05/10/2018 >60.0  >60 mL/min/1.73 m^2 Final    eGFR if non African American  "05/10/2018 >60.0  >60 mL/min/1.73 m^2 Final    Comment: Calculation used to obtain the estimated glomerular filtration  rate (eGFR) is the CKD-EPI equation.       Valproic Acid Lvl 05/10/2018 72.8  50.0 - 100.0 ug/mL Final    Comment: Valproic Acid (ug/ml)  Toxic:   >100.0 ug/ml      POC Glucose 05/10/2018 95  70 - 110 mg/dL Final    POC BUN 05/10/2018 8  6 - 30 mg/dL Final    POC Creatinine 05/10/2018 0.6  0.5 - 1.4 mg/dL Final    POC Sodium 05/10/2018 130* 136 - 145 mmol/L Final    POC Potassium 05/10/2018 3.8  3.5 - 5.1 mmol/L Final    POC Chloride 05/10/2018 105  95 - 110 mmol/L Final    POC TCO2 (MEASURED) 05/10/2018 19* 23 - 29 mmol/L Final    POC Ionized Calcium 05/10/2018 0.68* 1.06 - 1.42 mmol/L Final    POC Hematocrit 05/10/2018 33* 36 - 54 %PCV Final    Sample 05/10/2018 BHAVYA   Final       EXAMINATION    /83   Pulse 109   Ht 6' 5" (1.956 m)   Wt 98.4 kg (216 lb 14.9 oz)   BMI 25.72 kg/m²     Pt. To have vitals and weight done after today's visit.  CONSTITUTIONAL  General Appearance: well nourished    MUSCULOSKELETAL  Muscle Strength and Tone: normal strength and tone  Abnormal Involuntary Movements: no abnormal movement noted  Gait and Station: normal gait    PSYCHIATRIC   Level of Consciousness: alert  Orientation: grossly intact  Grooming: well groomed  Psychomotor Behavior: no restlessness/agitation  Speech: normal in rate, rhythm and volume  Language: normal vocabulary  Mood: stable  Affect: full range and appropriate  Thought Process: logical and goal directed  Associations: intact associations  Thought Content: no SI/HI  Memory: grossly intact  Attention: intact to content of interview  Fund of Knowledge: appears adequate  Insight: good  Judgement: good    MEDICAL DECISION MAKING    DIAGNOSES  Bipolar d/o      PROBLEM LIST AND MANAGEMENT PLANS    - continue Doxepin, Depakote ER, Latuda, Hydroxyzine as above  - rtc 3 months    Time with patient: 20 min      Ambrosio ENRIQUEZ" Claudy

## 2018-08-06 DIAGNOSIS — Z98.1 ARTHRODESIS PRESENT: Primary | ICD-10-CM

## 2018-08-08 ENCOUNTER — OFFICE VISIT (OUTPATIENT)
Dept: INTERNAL MEDICINE | Facility: CLINIC | Age: 63
End: 2018-08-08
Payer: COMMERCIAL

## 2018-08-08 ENCOUNTER — CLINICAL SUPPORT (OUTPATIENT)
Dept: INTERNAL MEDICINE | Facility: CLINIC | Age: 63
End: 2018-08-08
Payer: COMMERCIAL

## 2018-08-08 VITALS
SYSTOLIC BLOOD PRESSURE: 118 MMHG | DIASTOLIC BLOOD PRESSURE: 82 MMHG | WEIGHT: 211.19 LBS | BODY MASS INDEX: 24.93 KG/M2 | HEIGHT: 77 IN | HEART RATE: 111 BPM

## 2018-08-08 DIAGNOSIS — D64.9 ANEMIA, UNSPECIFIED TYPE: ICD-10-CM

## 2018-08-08 DIAGNOSIS — R00.0 TACHYCARDIA: ICD-10-CM

## 2018-08-08 DIAGNOSIS — F31.9 BIPOLAR I DISORDER: ICD-10-CM

## 2018-08-08 DIAGNOSIS — Z00.00 VISIT FOR ANNUAL HEALTH EXAMINATION: Primary | ICD-10-CM

## 2018-08-08 DIAGNOSIS — M48.061 DEGENERATIVE LUMBAR SPINAL STENOSIS: ICD-10-CM

## 2018-08-08 DIAGNOSIS — F10.21 ALCOHOL DEPENDENCE IN SUSTAINED FULL REMISSION: Chronic | ICD-10-CM

## 2018-08-08 DIAGNOSIS — Z12.11 SCREEN FOR COLON CANCER: ICD-10-CM

## 2018-08-08 DIAGNOSIS — Z72.0 TOBACCO ABUSE: Chronic | ICD-10-CM

## 2018-08-08 DIAGNOSIS — R53.83 FATIGUE, UNSPECIFIED TYPE: ICD-10-CM

## 2018-08-08 DIAGNOSIS — F41.9 ANXIETY: Chronic | ICD-10-CM

## 2018-08-08 DIAGNOSIS — K59.03 CONSTIPATION DUE TO PAIN MEDICATION: ICD-10-CM

## 2018-08-08 DIAGNOSIS — Z23 IMMUNIZATION DUE: Primary | ICD-10-CM

## 2018-08-08 DIAGNOSIS — E78.2 MIXED HYPERLIPIDEMIA: ICD-10-CM

## 2018-08-08 DIAGNOSIS — G40.909 SEIZURE DISORDER: ICD-10-CM

## 2018-08-08 DIAGNOSIS — E87.1 HYPONATREMIA: Chronic | ICD-10-CM

## 2018-08-08 DIAGNOSIS — M41.9 SCOLIOSIS OF THORACOLUMBAR SPINE, UNSPECIFIED SCOLIOSIS TYPE: ICD-10-CM

## 2018-08-08 PROBLEM — S82.451A: Status: RESOLVED | Noted: 2017-07-12 | Resolved: 2018-08-08

## 2018-08-08 PROBLEM — R23.9 ALTERATION IN SKIN INTEGRITY: Chronic | Status: RESOLVED | Noted: 2018-05-14 | Resolved: 2018-08-08

## 2018-08-08 PROBLEM — S09.90XA CLOSED HEAD INJURY: Status: RESOLVED | Noted: 2017-07-12 | Resolved: 2018-08-08

## 2018-08-08 PROBLEM — J20.8 ACUTE BRONCHITIS DUE TO OTHER SPECIFIED ORGANISMS: Status: RESOLVED | Noted: 2017-11-27 | Resolved: 2018-08-08

## 2018-08-08 PROCEDURE — 3008F BODY MASS INDEX DOCD: CPT | Mod: CPTII,S$GLB,, | Performed by: INTERNAL MEDICINE

## 2018-08-08 PROCEDURE — 93005 ELECTROCARDIOGRAM TRACING: CPT | Mod: S$GLB,,, | Performed by: INTERNAL MEDICINE

## 2018-08-08 PROCEDURE — 90472 IMMUNIZATION ADMIN EACH ADD: CPT | Mod: S$GLB,,, | Performed by: INTERNAL MEDICINE

## 2018-08-08 PROCEDURE — 99214 OFFICE O/P EST MOD 30 MIN: CPT | Mod: 25,S$GLB,, | Performed by: INTERNAL MEDICINE

## 2018-08-08 PROCEDURE — 90471 IMMUNIZATION ADMIN: CPT | Mod: S$GLB,,, | Performed by: INTERNAL MEDICINE

## 2018-08-08 PROCEDURE — 90732 PPSV23 VACC 2 YRS+ SUBQ/IM: CPT | Mod: S$GLB,,, | Performed by: INTERNAL MEDICINE

## 2018-08-08 PROCEDURE — 93010 ELECTROCARDIOGRAM REPORT: CPT | Mod: S$GLB,,, | Performed by: INTERNAL MEDICINE

## 2018-08-08 PROCEDURE — 90715 TDAP VACCINE 7 YRS/> IM: CPT | Mod: S$GLB,,, | Performed by: INTERNAL MEDICINE

## 2018-08-08 PROCEDURE — 99999 PR PBB SHADOW E&M-EST. PATIENT-LVL III: CPT | Mod: PBBFAC,,, | Performed by: INTERNAL MEDICINE

## 2018-08-08 NOTE — PROGRESS NOTES
INTERNAL MEDICINE INITIAL VISIT NOTE      CHIEF COMPLAINT     Chief Complaint   Patient presents with    Establish Care    Fatigue       HPI     Mirza Morales is a 63 y.o.  male who presents with HLD, sz d/o, bipolar d/o c anxiety, hx EtOH dependence c hx withdrawal in the past, hx opiate overdose, lumbar radiculopathy, tob use, hx R fib fx, hx R clavicular fx 2/2 fall, here today to establish care.    Former pt of Dr. Banks but wishes to change providers.  Sees Dr. Loco for psych issues.    Regarding sz hx, per Neuro note, first sz about 3 yrs ago thought be be 2/2 EtOH withdrawal.  EEG and MRI at that time reportedly normal.  Had another sz in July 2017 where he was admitted at [facility], again presumed to be 2/2 EtOH withdrawal.    Reports no EtOH intake since 7/2017.    Pt was admitted to [facility] May 2018 for recurrent seizures with confusion.  Suspected sz multifactorial, possibly med induced.  Had 24 hr EEG c no sz activity noted and MS was back to baseline at time of discharge.  MRI in may also unremarkable other than mild generalized cerebral and hippocampal volume loss.    Was subsequently seen in clinic in May by Neuro, Dr. Montes De Oca and Dr. Bright who noted tremors, which could be med induced from Depakote vs ET.  Rec cont Depakote as per psych and was started on Vimpat.  Currently due for f/u but does not yet have appt scheduled.    Says he feels he is doing well on the Vimpat and feels his mental state is at his baseline.  No recurrent seizures since.    Of note, while inpatient, pt found to have hypercalcemia, hypoNa, and pancytopenia but SPEP, UPEP, and peripheral smear unrevealing as per discharge summary.  Last labs in June showed normalized wbc and plts and Ca and Na close to normal.    Sees Dr. Kannan Orona for his back (had lumbar fusion around 4/2018) and has been rx'ed Hydrocodone which he takes about every 4-6 hrs.    Today reports feeling more tired than usual, particularly  when exerting himself.  Denies cough or wheezing, still smokes about 1/4 ppd.  Denies sob or cp.  Denies palpitations.    Past Medical History:  Past Medical History:   Diagnosis Date    Alcohol abuse     Behavioral problem     Bipolar 1 disorder     Chronic right hip pain     Depression     DJD (degenerative joint disease), lumbar 1/27/2015    Fatigue     Hepatitis     pt. denies this    History of psychiatric care     History of psychiatric hospitalization     Hypertension     Karina     Post traumatic stress disorder     Psychiatric problem     Seizures     Suicide attempt     Therapy        Past Surgical History:  Past Surgical History:   Procedure Laterality Date    HERNIA REPAIR      SPINE SURGERY  11-12-15    LAMINECTOMY/LUMBAR/WARE       Home Medications:  Prior to Admission medications    Medication Sig Start Date End Date Taking? Authorizing Provider   albuterol (VENTOLIN HFA) 90 mcg/actuation inhaler Ventolin HFA 90 mcg/actuation aerosol inhaler    Historical Provider, MD   atorvastatin (LIPITOR) 40 MG tablet atorvastatin 40 mg tablet    Historical Provider, MD   atorvastatin (LIPITOR) 40 MG tablet TAKE 1 TABLET (40 MG TOTAL) BY MOUTH ONCE DAILY. 5/25/18   PO Banks MD   beclomethasone (QVAR) 40 mcg/actuation Aero Qvar 40 mcg/actuation Metered Aerosol oral inhaler    Historical Provider, MD   cyclobenzaprine (FLEXERIL) 5 MG tablet cyclobenzaprine 5 mg tablet    Historical Provider, MD   divalproex (DEPAKOTE) 500 MG TbEC divalproex 500 mg tablet,delayed release   Take 1 tablet twice a day by oral route.    Historical Provider, MD   divalproex ER (DEPAKOTE) 500 MG Tb24 Take 1 tablet (500mg) every morning and 2 tablets (1000mg) every night. 8/2/18   Amborsio Loco MD   doxepin (SINEQUAN) 100 MG capsule doxepin 100 mg capsule    Historical Provider, MD   doxepin (SINEQUAN) 100 MG capsule TAKE 1 CAPSULE (100 MG TOTAL) BY MOUTH EVERY EVENING. 8/2/18   Ambrosio Loco MD   folic acid  (FOLVITE) 1 MG tablet Take 1 tablet (1 mg total) by mouth once daily. 2/23/18 2/23/19  Layla Fernandez PA-C   gabapentin (NEURONTIN) 600 MG tablet gabapentin 600 mg tablet    Historical Provider, MD   HYDROcodone-acetaminophen (NORCO) 5-325 mg per tablet hydrocodone 5 mg-acetaminophen 325 mg tablet    Historical Provider, MD   hydrOXYzine pamoate (VISTARIL) 50 MG Cap hydroxyzine pamoate 50 mg capsule    Historical Provider, MD   hydrOXYzine pamoate (VISTARIL) 50 MG Cap TAKE 1 CAPSULE (50 MG TOTAL) BY MOUTH EVERY EVENING. 8/2/18   Ambrosio Loco MD   lacosamide (VIMPAT) 50 mg Tab Vimpat 50 mg tablet    Historical Provider, MD   lidocaine (LIDODERM) 5 % Place 1 patch onto the skin once daily. Remove & Discard patch within 12 hours or as directed by MD 7/13/17   Prerna Steward MD   lurasidone (LATUDA) 80 mg Tab tablet Latuda 80 mg tablet    Historical Provider, MD   lurasidone (LATUDA) 80 mg Tab tablet TAKE 1 TABLET BY MOUTH DAILY WITH DINNER 8/2/18   Ambrosio Loco MD   multivit-min-FA-lycopen-lutein (CENTRUM SILVER ULTRA MEN'S) 300-600-300 mcg Tab Take 1 tablet by mouth once daily at 6am. 7/13/17   Prerna Steward MD   pantoprazole (PROTONIX) 40 MG tablet Take 1 tablet (40 mg total) by mouth once daily. 6/15/18 6/15/19  PO Banks MD   thiamine 100 MG tablet Take 1 tablet (100 mg total) by mouth once daily. 7/13/17   Prerna Steward MD       Allergies:  Review of patient's allergies indicates:   Allergen Reactions    Gabapentin Other (See Comments)     SEVERE DIZZINESS AND BALANCE PROBLEMS, UNABLE TO WALK.    Nardil [phenelzine]      BECOMES HYPER, LIKE A MANIC EPISODE.    Penicillins Hives    Lamictal [lamotrigine] Rash       Family History:  Family History   Problem Relation Age of Onset    Alcohol abuse Mother     Depression Mother     Depression Father     Depression Sister     Suicide Sister     Drug abuse Brother     Anxiety disorder Brother     Bipolar disorder Brother      Depression Brother     Alcohol abuse Maternal Uncle     Alcohol abuse Paternal Uncle     Dementia Maternal Grandmother     Alcohol abuse Cousin     Amblyopia Neg Hx     Blindness Neg Hx     Cancer Neg Hx     Cataracts Neg Hx     Diabetes Neg Hx     Glaucoma Neg Hx     Hypertension Neg Hx     Macular degeneration Neg Hx     Retinal detachment Neg Hx     Strabismus Neg Hx     Stroke Neg Hx     Thyroid disease Neg Hx     Asthma Neg Hx     Emphysema Neg Hx        Social History:  Social History   Substance Use Topics    Smoking status: Current Some Day Smoker     Packs/day: 0.25     Years: 10.00    Smokeless tobacco: Never Used    Alcohol use No      Comment: quit 4 years ago       Review of Systems:  Review of Systems   Constitutional: Positive for fatigue. Negative for appetite change, chills, fever and unexpected weight change.   HENT: Negative for congestion, hearing loss and rhinorrhea.    Eyes: Negative for pain and visual disturbance.   Respiratory: Negative for cough, chest tightness, shortness of breath and wheezing.    Cardiovascular: Negative for chest pain, palpitations and leg swelling.   Gastrointestinal: Negative for abdominal distention and abdominal pain.   Endocrine: Negative for polydipsia and polyuria.   Genitourinary: Negative for decreased urine volume, discharge, dysuria and frequency.   Musculoskeletal: Positive for arthralgias and back pain.   Neurological: Negative for dizziness, weakness, numbness and headaches.   Psychiatric/Behavioral: Positive for dysphoric mood and sleep disturbance. Negative for behavioral problems and confusion. The patient is nervous/anxious.        Health Maintenance:   Immunizations:   Influenza is not up to date, rec this Fall  TDap is not up to date, will give today  Pneumovax is not up to date, will give today.  Shingrix rec once back in stock    Cancer Screening:  PSA 6/2018 wnl  Colonoscopy: 11/2008, will schedule      PHYSICAL EXAM     BP  "118/82 (BP Location: Left arm, Patient Position: Sitting, BP Method: Medium (Manual))   Pulse (!) 111   Ht 6' 5" (1.956 m)   Wt 95.8 kg (211 lb 3.2 oz)   BMI 25.04 kg/m²     GEN - A+OX4, NAD   HEENT - PERRL, EOMI, OP clear  Neck - No thyromegaly or cervical LAD. No thyroid masses felt.  CV - RRR, no m/r/g  Chest - CTAB, no wheezing, crackles, or rhonchi  Abd - S/NT/ND/+BS.   Ext - 2+BDP and radial pulses. No C/C/E.  Neuro - 5/5 BUE and BLE strength.  Resting tremor noted in B hands at baseline  LN - No LAD appreciated.  Skin - Normal color and texture, no rash, no skin lesions.      LABS     Lab Results   Component Value Date    WBC 5.07 08/08/2018    HGB 13.2 (L) 08/08/2018    HCT 39.1 (L) 08/08/2018    MCV 97 08/08/2018     08/08/2018     CMP  Sodium   Date Value Ref Range Status   08/08/2018 131 (L) 136 - 145 mmol/L Final     Potassium   Date Value Ref Range Status   08/08/2018 4.1 3.5 - 5.1 mmol/L Final     Chloride   Date Value Ref Range Status   08/08/2018 97 95 - 110 mmol/L Final     CO2   Date Value Ref Range Status   08/08/2018 26 23 - 29 mmol/L Final     Glucose   Date Value Ref Range Status   08/08/2018 119 (H) 70 - 110 mg/dL Final     BUN, Bld   Date Value Ref Range Status   08/08/2018 7 (L) 8 - 23 mg/dL Final     Creatinine   Date Value Ref Range Status   08/08/2018 0.9 0.5 - 1.4 mg/dL Final     Calcium   Date Value Ref Range Status   08/08/2018 9.7 8.7 - 10.5 mg/dL Final     Total Protein   Date Value Ref Range Status   08/08/2018 7.8 6.0 - 8.4 g/dL Final     Albumin   Date Value Ref Range Status   08/08/2018 3.5 3.5 - 5.2 g/dL Final     Total Bilirubin   Date Value Ref Range Status   08/08/2018 0.5 0.1 - 1.0 mg/dL Final     Comment:     For infants and newborns, interpretation of results should be based  on gestational age, weight and in agreement with clinical  observations.  Premature Infant recommended reference ranges:  Up to 24 hours.............<8.0 mg/dL  Up to 48 " hours............<12.0 mg/dL  3-5 days..................<15.0 mg/dL  6-29 days.................<15.0 mg/dL       Alkaline Phosphatase   Date Value Ref Range Status   08/08/2018 83 55 - 135 U/L Final     AST   Date Value Ref Range Status   08/08/2018 17 10 - 40 U/L Final     ALT   Date Value Ref Range Status   08/08/2018 15 10 - 44 U/L Final     Anion Gap   Date Value Ref Range Status   08/08/2018 8 8 - 16 mmol/L Final     eGFR if    Date Value Ref Range Status   08/08/2018 >60 >60 mL/min/1.73 m^2 Final     eGFR if non    Date Value Ref Range Status   08/08/2018 >60 >60 mL/min/1.73 m^2 Final     Comment:     Calculation used to obtain the estimated glomerular filtration  rate (eGFR) is the CKD-EPI equation.        Lab Results   Component Value Date    IRON 100 03/15/2018    TIBC 425 03/15/2018    FERRITIN 186 03/15/2018     Lab Results   Component Value Date    LDLCALC 61.4 (L) 08/08/2018     Lab Results   Component Value Date    HGBA1C 5.1 05/20/2015     Lab Results   Component Value Date    TSH 1.630 05/12/2018     Lab Results   Component Value Date    HEPAIGM Negative 03/12/2013    HEPBIGM Negative 03/12/2013    HEPCAB Negative 02/22/2017       ASSESSMENT/PLAN     Mirza Morales is a 63 y.o. male with  Mirza was seen today for establish care and fatigue.    Diagnoses and all orders for this visit:    Visit for annual health examination  History and physical exam completed.  Health maintenance reviewed as above.    Seizure disorder  As per HPI, hx EtOH withdrawal sz but more recently of unclear etiology, has been seizure-free on Vimpat, cont meds as per Neuro  Will arrange for f/u c Neuro since due  -     Ambulatory Referral to Neurology    Bipolar I disorder  Anxiety  Appears stable on current regimen, cont f/u c psych, meds as per Psych    Alcohol dependence in sustained full remission  Stable, no issues  -     VITAMIN B1; Future; Expected date: 08/08/2018    Mixed  hyperlipidemia  Lab Results   Component Value Date    LDLCALC 61.4 (L) 08/08/2018     Well controlled on meds  Missed doses for about a week but now back on meds  Will repeat labs now  -     Lipid panel; Future; Expected date: 08/08/2018    Hyponatremia  Euvolemic, prev could have been related chronic EtOH; last check closer to normal, could also be 2/2 various meds such as Depakote  -     Comprehensive metabolic panel; Future; Expected date: 08/08/2018  -     Complete PFT with bronchodilator; Future  -     PULSE OXIMETRY WITH REST - PULM; Future    Tobacco abuse  Patient counseled on the need to stop smoking.  Declined referral to smoking cessation    Degenerative lumbar spinal stenosis  Scoliosis of thoracolumbar spine, unspecified scoliosis type  Mgmt as per ortho, advised pt that I do not refill narcotic pain meds  -     Vitamin D; Future; Expected date: 08/08/2018    Anemia, unspecified type  Mild, prev iron studies wnl as well as b12  Low plts and low WBC resolved and spep/upep unremarkable as per discharge note  HIV and Hep neg  -     CBC auto differential; Future; Expected date: 08/08/2018  -     Folate; Future; Expected date: 08/08/2018  -     VITAMIN B1; Future; Expected date: 08/08/2018    Screen for colon cancer  -     Case request GI: COLONOSCOPY    Tachycardia  Asymptomatic, likely due to exertion prior to walking in, EKG c NSR and improved HR  -     IN OFFICE EKG 12-LEAD (to Muse)    Fatigue, unspecified type  Will check echo and PFTs given his RF and hx  -     2D Echo w/ Color Flow Doppler; Future  -     Complete PFT with bronchodilator; Future  -     PULSE OXIMETRY WITH REST - PULM; Future    Constipation due to pain medication  Cont Movantik as rx'ed by outside MD  Likely due to norco    HM as above    RTC in 3 months, sooner if needed and depending on labs.    Jud Mo MD  Department of Internal Medicine - [facility] [address]  08/08/2018

## 2018-08-20 RX ORDER — LACOSAMIDE 50 MG/1
TABLET, FILM COATED ORAL
Qty: 60 TABLET | Refills: 0 | OUTPATIENT
Start: 2018-08-20

## 2018-08-22 ENCOUNTER — TELEPHONE (OUTPATIENT)
Dept: NEUROLOGY | Facility: CLINIC | Age: 63
End: 2018-08-22

## 2018-08-22 NOTE — TELEPHONE ENCOUNTER
I called in one fill per Dr. Ann for vimpat 50mg BID w/one fill. He is coming in this week for appt

## 2018-08-23 ENCOUNTER — OFFICE VISIT (OUTPATIENT)
Dept: NEUROLOGY | Facility: CLINIC | Age: 63
End: 2018-08-23
Payer: COMMERCIAL

## 2018-08-23 VITALS
SYSTOLIC BLOOD PRESSURE: 123 MMHG | HEART RATE: 101 BPM | BODY MASS INDEX: 26.13 KG/M2 | WEIGHT: 221.31 LBS | HEIGHT: 77 IN | DIASTOLIC BLOOD PRESSURE: 83 MMHG

## 2018-08-23 DIAGNOSIS — G40.909 SEIZURE DISORDER: Primary | ICD-10-CM

## 2018-08-23 PROCEDURE — 3008F BODY MASS INDEX DOCD: CPT | Mod: CPTII,S$GLB,, | Performed by: PSYCHIATRY & NEUROLOGY

## 2018-08-23 PROCEDURE — 99215 OFFICE O/P EST HI 40 MIN: CPT | Mod: S$GLB,,, | Performed by: PSYCHIATRY & NEUROLOGY

## 2018-08-23 PROCEDURE — 99999 PR PBB SHADOW E&M-EST. PATIENT-LVL II: CPT | Mod: PBBFAC,,, | Performed by: PSYCHIATRY & NEUROLOGY

## 2018-08-23 RX ORDER — LACOSAMIDE 50 MG/1
50 TABLET ORAL EVERY 12 HOURS
Qty: 60 TABLET | Refills: 5 | Status: SHIPPED | OUTPATIENT
Start: 2018-08-23 | End: 2019-07-24

## 2018-08-23 NOTE — PROGRESS NOTES
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 was spent on face-to-face counseling about:  1. Diagnosis, plans, prognosis, medications and possible side-effects, risks and benefits of treatment, other alternatives to AEDs.  2. Risks related to continued seizures, status epilepticus, SUDEP, driving restrictions and seizure precautions ( no baths but showers are ok, no swimming unsupervised, no use of heavy machinery, no use of sharp moving objects, avoid heights).   3. Issues related to pregnancy, OCP and breast feeding as it relates to epilepsy (in female patients).  4. The potential of teratogenicity (for female patients) and suicidal risks of anti-epileptic medications.  5.Avoid any activity that compromise patient safety related to seizures.    Questions and concerns raised by the patient and family/care-giver(s) were addressed and they indicated understanding of everything discussed and agreed to plans as above.      Return in 6 months or earlier prn    Yashira Ann MD, DANIEL(), FACNS, FAES.  Neurology-Epilepsy.  [facility].

## 2018-08-27 NOTE — ASSESSMENT & PLAN NOTE
64yo M with hx of seizures x 2014  - most seizures related to etoh use  - last sz was possibly in 5/2018 and none thereafter  - maintained on VPA 1000mg q has and Lacosamide 50mg bid     Plan:  Continue current AEDs without any change  - out of LCS - need to continue  - discussed importance of compliance    Seizure log  Seizure precautions/restrictions    Plan of care was discussed in detail with patient.

## 2018-09-05 ENCOUNTER — PATIENT MESSAGE (OUTPATIENT)
Dept: NEUROLOGY | Facility: CLINIC | Age: 63
End: 2018-09-05

## 2018-09-10 RX ORDER — PANTOPRAZOLE SODIUM 40 MG/1
TABLET, DELAYED RELEASE ORAL
Qty: 30 TABLET | Refills: 2 | Status: SHIPPED | OUTPATIENT
Start: 2018-09-10 | End: 2018-10-25 | Stop reason: SDUPTHER

## 2018-09-11 RX ORDER — ATORVASTATIN CALCIUM 40 MG/1
TABLET, FILM COATED ORAL
Qty: 30 TABLET | Refills: 3 | Status: SHIPPED | OUTPATIENT
Start: 2018-09-11 | End: 2018-11-05 | Stop reason: SDUPTHER

## 2018-09-22 DIAGNOSIS — G40.909 SEIZURE DISORDER: ICD-10-CM

## 2018-09-22 RX ORDER — LACOSAMIDE 50 MG/1
TABLET, FILM COATED ORAL
Qty: 60 TABLET | Refills: 0 | OUTPATIENT
Start: 2018-09-22

## 2018-09-24 ENCOUNTER — PATIENT MESSAGE (OUTPATIENT)
Dept: NEUROLOGY | Facility: CLINIC | Age: 63
End: 2018-09-24

## 2018-09-24 ENCOUNTER — TELEPHONE (OUTPATIENT)
Dept: NEUROLOGY | Facility: CLINIC | Age: 63
End: 2018-09-24

## 2018-09-24 RX ORDER — HYDROXYZINE PAMOATE 50 MG/1
CAPSULE ORAL
Qty: 30 CAPSULE | Refills: 3 | Status: SHIPPED | OUTPATIENT
Start: 2018-09-24 | End: 2018-11-07 | Stop reason: SDUPTHER

## 2018-09-24 NOTE — TELEPHONE ENCOUNTER
----- Message from Valdemar Harrington sent at 9/24/2018  3:03 PM CDT -----  Contact: Patient @ [phone]  Rx Refill/Request     Is this a Refill or New Rx:  Yes   Rx Name and Strength:  lacosamide (VIMPAT) 50 mg Tab    Preferred Pharmacy with phone number:     [facility]/pharmacy #[id number] - [facility] [address]  [address]  [place] [address]  Phone: [phone] Fax: [phone]

## 2018-10-08 ENCOUNTER — OFFICE VISIT (OUTPATIENT)
Dept: INTERNAL MEDICINE | Facility: CLINIC | Age: 63
End: 2018-10-08
Payer: COMMERCIAL

## 2018-10-08 ENCOUNTER — HOSPITAL ENCOUNTER (OUTPATIENT)
Dept: RADIOLOGY | Facility: HOSPITAL | Age: 63
Discharge: HOME | End: 2018-10-08
Attending: NURSE PRACTITIONER
Payer: COMMERCIAL

## 2018-10-08 VITALS
HEIGHT: 77 IN | HEART RATE: 114 BPM | TEMPERATURE: 99 F | OXYGEN SATURATION: 95 % | BODY MASS INDEX: 25.36 KG/M2 | DIASTOLIC BLOOD PRESSURE: 78 MMHG | WEIGHT: 214.75 LBS | SYSTOLIC BLOOD PRESSURE: 122 MMHG

## 2018-10-08 DIAGNOSIS — R07.81 RIB PAIN ON RIGHT SIDE: ICD-10-CM

## 2018-10-08 DIAGNOSIS — R06.89 BREATH SOUNDS, ABNORMAL: ICD-10-CM

## 2018-10-08 DIAGNOSIS — K59.00 CONSTIPATION, UNSPECIFIED CONSTIPATION TYPE: ICD-10-CM

## 2018-10-08 DIAGNOSIS — R07.81 RIB PAIN ON LEFT SIDE: ICD-10-CM
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
## 2018-10-08 DIAGNOSIS — F17.200 SMOKER: ICD-10-CM

## 2018-10-08 DIAGNOSIS — K59.00 CONSTIPATION, UNSPECIFIED CONSTIPATION TYPE: Primary | ICD-10-CM

## 2018-10-08 PROCEDURE — 74019 RADEX ABDOMEN 2 VIEWS: CPT | Mod: TC

## 2018-10-08 PROCEDURE — 3008F BODY MASS INDEX DOCD: CPT | Mod: CPTII,S$GLB,, | Performed by: NURSE PRACTITIONER

## 2018-10-08 PROCEDURE — 71046 X-RAY EXAM CHEST 2 VIEWS: CPT | Mod: TC

## 2018-10-08 PROCEDURE — 71110 X-RAY EXAM RIBS BIL 3 VIEWS: CPT | Mod: TC

## 2018-10-08 PROCEDURE — 71110 X-RAY EXAM RIBS BIL 3 VIEWS: CPT | Mod: 26,,, | Performed by: RADIOLOGY

## 2018-10-08 PROCEDURE — 71046 X-RAY EXAM CHEST 2 VIEWS: CPT | Mod: 26,,, | Performed by: RADIOLOGY

## 2018-10-08 PROCEDURE — 99999 PR PBB SHADOW E&M-EST. PATIENT-LVL V: CPT | Mod: PBBFAC,,, | Performed by: NURSE PRACTITIONER

## 2018-10-08 PROCEDURE — 74019 RADEX ABDOMEN 2 VIEWS: CPT | Mod: 26,,, | Performed by: RADIOLOGY

## 2018-10-08 PROCEDURE — 99213 OFFICE O/P EST LOW 20 MIN: CPT | Mod: S$GLB,,, | Performed by: NURSE PRACTITIONER

## 2018-10-08 RX ORDER — LACTULOSE 10 G/15ML
20 SOLUTION ORAL 2 TIMES DAILY PRN
Qty: 600 ML | Refills: 0 | Status: SHIPPED | OUTPATIENT
Start: 2018-10-08 | End: 2018-10-18

## 2018-10-08 NOTE — PROGRESS NOTES
Subjective:       Patient ID: Mirza Morales is a 63 y.o. male.    Chief Complaint: Constipation (back pain due to a fall)    Mr Morales presents today, accompanied by his wife, for constipation, now on his 8th day without a bowel movement. After 1-2 days of feeling constipated, he began using psyllium husk fiber as a laxative, which was unhelpful.  He has also tried a dulcolax suppository, which did not help.     In addition to constipation he complains of left mid back pain, at the area where he struck himself while falling.       Constipation   This is a new problem. The current episode started 1 to 4 weeks ago. The problem is unchanged. His stool frequency is 1 time per week or less. The patient is not on a high fiber diet. He does not exercise regularly. There has been adequate water intake. Associated symptoms include anorexia, back pain and weight loss. Pertinent negatives include no abdominal pain, bloating, diarrhea, difficulty urinating, fecal incontinence, fever, flatus, hematochezia, hemorrhoids, melena, nausea, rectal pain or vomiting.     Review of Systems   Constitutional: Positive for weight loss. Negative for fever.   HENT: Negative for facial swelling.    Eyes: Negative for visual disturbance.   Respiratory: Negative for shortness of breath.    Cardiovascular: Negative for chest pain.   Gastrointestinal: Positive for anorexia and constipation. Negative for abdominal pain, bloating, diarrhea, flatus, hematochezia, hemorrhoids, melena, nausea, rectal pain and vomiting.   Genitourinary: Negative for difficulty urinating.   Musculoskeletal: Positive for back pain.   Skin: Negative for rash.   Neurological: Negative for headaches.   Psychiatric/Behavioral: Negative for confusion.       Objective:      Physical Exam   Constitutional: He is oriented to person, place, and time. He appears well-developed and well-nourished. No distress.   HENT:   Head: Atraumatic.   Eyes: No scleral icterus.    Cardiovascular: Normal rate, regular rhythm and normal heart sounds.   Pulmonary/Chest: Effort normal. No respiratory distress.   Coarse rhonchi at bases L>R   Abdominal: Soft. Bowel sounds are normal. He exhibits no distension and no mass. There is no tenderness. There is no rebound and no guarding.   Neurological: He is alert and oriented to person, place, and time.   Skin: Skin is warm and dry. He is not diaphoretic. There is pallor.        Psychiatric: He has a normal mood and affect. His behavior is normal.   Nursing note and vitals reviewed.      Assessment:       1. Constipation, unspecified constipation type    2. Fall, initial encounter    3. Rib pain on left side    4. Breath sounds, abnormal    5. Smoker        Plan:   1. Constipation, unspecified constipation type  - X-Ray Abdomen Flat And Erect; Future  - lactulose (CHRONULAC) 20 gram/30 mL Soln; Take 30 mLs (20 g total) by mouth 2 (two) times daily as needed.  Dispense: 600 mL; Refill: 0    2. Fall, initial encounter  - X-Ray Ribs 3 Views Bilateral; Future    3. Rib pain on left side  - X-Ray Chest PA And Lateral; Future  - X-Ray Ribs 3 Views Bilateral; Future    4. Breath sounds, abnormal  - X-Ray Chest PA And Lateral; Future    5. Smoker  - X-Ray Chest PA And Lateral; Future      Pt has been given instructions populated from [employer] database and has verbalized understanding of the after visit summary and information contained wherein.    Follow up with a primary care provider. May go to ER for acute shortness of breath, lightheadedness, fever, or any other emergent complaints or changes in condition.

## 2018-10-12 ENCOUNTER — PATIENT MESSAGE (OUTPATIENT)
Dept: PSYCHIATRY | Facility: CLINIC | Age: 63
End: 2018-10-12

## 2018-10-24 ENCOUNTER — PATIENT MESSAGE (OUTPATIENT)
Dept: PSYCHIATRY | Facility: CLINIC | Age: 63
End: 2018-10-24

## 2018-10-25 RX ORDER — PANTOPRAZOLE SODIUM 40 MG/1
40 TABLET, DELAYED RELEASE ORAL DAILY
Qty: 90 TABLET | Refills: 1 | Status: ON HOLD | OUTPATIENT
Start: 2018-10-25 | End: 2020-01-08

## 2018-10-25 NOTE — TELEPHONE ENCOUNTER
Faxed request for 90 day rx.   Last filled 30x2 times 9/10/18.  Last visit w/ dr zamudio  8/8/18.    thanks

## 2018-11-05 RX ORDER — ATORVASTATIN CALCIUM 40 MG/1
40 TABLET, FILM COATED ORAL DAILY
Qty: 90 TABLET | Refills: 3 | Status: SHIPPED | OUTPATIENT
Start: 2018-11-05 | End: 2019-11-11 | Stop reason: SDUPTHER

## 2018-11-07 ENCOUNTER — OFFICE VISIT (OUTPATIENT)
Dept: PSYCHIATRY | Facility: CLINIC | Age: 63
End: 2018-11-07
Payer: COMMERCIAL

## 2018-11-07 VITALS
DIASTOLIC BLOOD PRESSURE: 55 MMHG | HEART RATE: 115 BPM | WEIGHT: 213.75 LBS | BODY MASS INDEX: 25.35 KG/M2 | SYSTOLIC BLOOD PRESSURE: 96 MMHG

## 2018-11-07 DIAGNOSIS — F31.9 BIPOLAR 1 DISORDER: Primary | ICD-10-CM

## 2018-11-07 PROCEDURE — 99213 OFFICE O/P EST LOW 20 MIN: CPT | Mod: S$GLB,,, | Performed by: PSYCHIATRY & NEUROLOGY

## 2018-11-07 PROCEDURE — 99999 PR PBB SHADOW E&M-EST. PATIENT-LVL II: CPT | Mod: PBBFAC,,, | Performed by: PSYCHIATRY & NEUROLOGY

## 2018-11-07 RX ORDER — HYDROXYZINE PAMOATE 50 MG/1
CAPSULE ORAL
Qty: 30 CAPSULE | Refills: 3 | Status: SHIPPED | OUTPATIENT
Start: 2018-11-07 | End: 2019-02-20 | Stop reason: SDUPTHER

## 2018-11-07 RX ORDER — DOXEPIN HYDROCHLORIDE 100 MG/1
CAPSULE ORAL
Qty: 30 CAPSULE | Refills: 3 | Status: SHIPPED | OUTPATIENT
Start: 2018-11-07 | End: 2019-02-20 | Stop reason: SDUPTHER

## 2018-11-07 RX ORDER — DIVALPROEX SODIUM 500 MG/1
TABLET, FILM COATED, EXTENDED RELEASE ORAL
Qty: 90 TABLET | Refills: 3 | Status: SHIPPED | OUTPATIENT
Start: 2018-11-07 | End: 2019-02-20 | Stop reason: SDUPTHER

## 2018-11-07 RX ORDER — LURASIDONE HYDROCHLORIDE 80 MG/1
TABLET, FILM COATED ORAL
Qty: 30 TABLET | Refills: 3 | Status: SHIPPED | OUTPATIENT
Start: 2018-11-07 | End: 2019-02-20 | Stop reason: ALTCHOICE

## 2018-11-07 NOTE — PROGRESS NOTES
ESTABLISHED OUTPATIENT VISIT   E/M LEVEL 3: [id number]    ENCOUNTER DATE: 11/7/2018  SITE: [facility] [address]    HISTORY    CHIEF COMPLAINT   Mirza Morales is a 63 y.o. male who presents for follow up of bipolar d/o.    HPI     Physical problems[back pain] are troublesome. Low energy. Depression. SI at times, no active SI.    States, that he has a primary care MD at [facility].    No alcohol, no marijuana.    ROS   Came in with cane.  Unsure when most recent seizure was.    PAST MEDICAL, FAMILY AND SOCIAL HISTORY: The patient's past medical, family and social history have been reviewed and updated as appropriate within the electronic medical record - see encounter notes    PSYCHOTROPIC MEDICATIONS   Doxepin 100 mg at bedtime, Latuda 80 mg with dinner, Depakote 500 mg qam and 1000 mg at bedtime[prescribed for seizures], Hydroxyzine 50 mg at bedtime    Scheduled and PRN Medications     Current Outpatient Medications:     albuterol (VENTOLIN HFA) 90 mcg/actuation inhaler, Ventolin HFA 90 mcg/actuation aerosol inhaler, Disp: , Rfl:     atorvastatin (LIPITOR) 40 MG tablet, Take 1 tablet (40 mg total) by mouth once daily., Disp: 90 tablet, Rfl: 3    divalproex ER (DEPAKOTE) 500 MG Tb24, Take 1 tablet (500mg) every morning and 2 tablets (1000mg) every night., Disp: 90 tablet, Rfl: 3    doxepin (SINEQUAN) 100 MG capsule, TAKE 1 CAPSULE (100 MG TOTAL) BY MOUTH EVERY EVENING., Disp: 30 capsule, Rfl: 3    HYDROcodone-acetaminophen (NORCO) 5-325 mg per tablet, hydrocodone 5 mg-acetaminophen 325 mg tablet, Disp: , Rfl:     hydrOXYzine pamoate (VISTARIL) 50 MG Cap, TAKE 1 CAPSULE (50 MG TOTAL) BY MOUTH EVERY EVENING., Disp: 30 capsule, Rfl: 3    hydrOXYzine pamoate (VISTARIL) 50 MG Cap, TAKE 1 CAPSULE (50 MG TOTAL) BY MOUTH EVERY EVENING., Disp: 30 capsule, Rfl: 3    lacosamide (VIMPAT) 50 mg Tab, Take 1 tablet (50 mg total) by mouth every 12 (twelve) hours., Disp: 60 tablet, Rfl: 5    lidocaine  (LIDODERM) 5 %, Place 1 patch onto the skin once daily. Remove & Discard patch within 12 hours or as directed by MD, Disp: 10 patch, Rfl: 0    lurasidone (LATUDA) 80 mg Tab tablet, TAKE 1 TABLET BY MOUTH DAILY WITH DINNER, Disp: 30 tablet, Rfl: 3    multivit-min-FA-lycopen-lutein (CENTRUM SILVER ULTRA MEN'S) 300-600-300 mcg Tab, Take 1 tablet by mouth once daily at 6am., Disp: , Rfl:     naloxegol 12.5 mg Tab, Take 12.5 mg by mouth daily as needed., Disp: 30 tablet, Rfl: 0    pantoprazole (PROTONIX) 40 MG tablet, Take 1 tablet (40 mg total) by mouth once daily., Disp: 90 tablet, Rfl: 1    pneumococcal 23-jett ps vaccine 25 mcg/0.5 mL, Inject into the muscle., Disp: 0.5 mL, Rfl: 0    LABORATORY DATA  Lab Visit on 08/08/2018   Component Date Value Ref Range Status    WBC 08/08/2018 5.07  3.90 - 12.70 K/uL Final    RBC 08/08/2018 4.04* 4.60 - 6.20 M/uL Final    Hemoglobin 08/08/2018 13.2* 14.0 - 18.0 g/dL Final    Hematocrit 08/08/2018 39.1* 40.0 - 54.0 % Final    MCV 08/08/2018 97  82 - 98 fL Final    MCH 08/08/2018 32.7* 27.0 - 31.0 pg Final    MCHC 08/08/2018 33.8  32.0 - 36.0 g/dL Final    RDW 08/08/2018 12.6  11.5 - 14.5 % Final    Platelets 08/08/2018 245  150 - 350 K/uL Final    MPV 08/08/2018 9.1* 9.2 - 12.9 fL Final    Gran # (ANC) 08/08/2018 3.5  1.8 - 7.7 K/uL Final    Lymph # 08/08/2018 1.0  1.0 - 4.8 K/uL Final    Mono # 08/08/2018 0.5  0.3 - 1.0 K/uL Final    Eos # 08/08/2018 0.0  0.0 - 0.5 K/uL Final    Baso # 08/08/2018 0.01  0.00 - 0.20 K/uL Final    Gran% 08/08/2018 68.6  38.0 - 73.0 % Final    Lymph% 08/08/2018 20.3  18.0 - 48.0 % Final    Mono% 08/08/2018 9.5  4.0 - 15.0 % Final    Eosinophil% 08/08/2018 0.8  0.0 - 8.0 % Final    Basophil% 08/08/2018 0.2  0.0 - 1.9 % Final    Differential Method 08/08/2018 Automated   Final    Sodium 08/08/2018 131* 136 - 145 mmol/L Final    Potassium 08/08/2018 4.1  3.5 - 5.1 mmol/L Final    Chloride 08/08/2018 97  95 - 110 mmol/L Final     CO2 08/08/2018 26  23 - 29 mmol/L Final    Glucose 08/08/2018 119* 70 - 110 mg/dL Final    BUN, Bld 08/08/2018 7* 8 - 23 mg/dL Final    Creatinine 08/08/2018 0.9  0.5 - 1.4 mg/dL Final    Calcium 08/08/2018 9.7  8.7 - 10.5 mg/dL Final    Total Protein 08/08/2018 7.8  6.0 - 8.4 g/dL Final    Albumin 08/08/2018 3.5  3.5 - 5.2 g/dL Final    Total Bilirubin 08/08/2018 0.5  0.1 - 1.0 mg/dL Final    Comment: For infants and newborns, interpretation of results should be based  on gestational age, weight and in agreement with clinical  observations.  Premature Infant recommended reference ranges:  Up to 24 hours.............<8.0 mg/dL  Up to 48 hours............<12.0 mg/dL  3-5 days..................<15.0 mg/dL  6-29 days.................<15.0 mg/dL      Alkaline Phosphatase 08/08/2018 83  55 - 135 U/L Final    AST 08/08/2018 17  10 - 40 U/L Final    ALT 08/08/2018 15  10 - 44 U/L Final    Anion Gap 08/08/2018 8  8 - 16 mmol/L Final    eGFR if  08/08/2018 >60  >60 mL/min/1.73 m^2 Final    eGFR if non African American 08/08/2018 >60  >60 mL/min/1.73 m^2 Final    Comment: Calculation used to obtain the estimated glomerular filtration  rate (eGFR) is the CKD-EPI equation.       Vit D, 25-Hydroxy 08/08/2018 31  30 - 96 ng/mL Final    Comment: Vitamin D deficiency.........<10 ng/mL                              Vitamin D insufficiency......10-29 ng/mL       Vitamin D sufficiency........> or equal to 30 ng/mL  Vitamin D toxicity............>100 ng/mL      Cholesterol 08/08/2018 126  120 - 199 mg/dL Final    Comment: The National Cholesterol Education Program (NCEP) has set the  following guidelines (reference ranges) for Cholesterol:  Optimal.....................<200 mg/dL  Borderline High.............200-239 mg/dL  High........................> or = 240 mg/dL      Triglycerides 08/08/2018 113  30 - 150 mg/dL Final    Comment: The National Cholesterol Education Program (NCEP) has set  the  following guidelines (reference values) for triglycerides:  Normal......................<150 mg/dL  Borderline High.............150-199 mg/dL  High........................200-499 mg/dL      HDL 08/08/2018 42  40 - 75 mg/dL Final    Comment: The National Cholesterol Education Program (NCEP) has set the  following guidelines (reference values) for HDL Cholesterol:  Low...............<40 mg/dL  Optimal...........>60 mg/dL      LDL Cholesterol 08/08/2018 61.4* 63.0 - 159.0 mg/dL Final    Comment: The National Cholesterol Education Program (NCEP) has set the  following guidelines (reference values) for LDL Cholesterol:  Optimal.......................<130 mg/dL  Borderline High...............130-159 mg/dL  High..........................160-189 mg/dL  Very High.....................>190 mg/dL      HDL/Chol Ratio 08/08/2018 33.3  20.0 - 50.0 % Final    Total Cholesterol/HDL Ratio 08/08/2018 3.0  2.0 - 5.0 Final    Non-HDL Cholesterol 08/08/2018 84  mg/dL Final    Comment: Risk category and Non-HDL cholesterol goals:  Coronary heart disease (CHD)or equivalent (10-year risk of CHD >20%):  Non-HDL cholesterol goal     <130 mg/dL  Two or more CHD risk factors and 10-year risk of CHD <= 20%:  Non-HDL cholesterol goal     <160 mg/dL  0 to 1 CHD risk factor:  Non-HDL cholesterol goal     <190 mg/dL      Folate 08/08/2018 15.9  4.0 - 24.0 ng/mL Final    Thiamine 08/08/2018 74  38 - 122 ug/L Final    Comment: This test was developed and the performance   characteristics determined by [facility].   It has not been cleared or approved by the FDA.   The laboratory is regulated under CLIA as qualified to   perform high-complexity testing. This test is used for   patient testing purposes. It should not be regarded   as investigational or for research.  Test performed at [facility],  [address], [place]  [address]     [phone]  Kingsley Truong MD  - Medical Director          EXAMINATION    BP (!) 96/55   Pulse (!) 115   Wt 97 kg (213 lb 11.8 oz)   BMI 25.35 kg/m²     Pt. To have vitals and weight done after today's visit.  CONSTITUTIONAL  General Appearance: well nourished    MUSCULOSKELETAL  Muscle Strength and Tone: normal strength and tone  Abnormal Involuntary Movements: no abnormal movement noted  Gait and Station: normal gait    PSYCHIATRIC   Level of Consciousness: alert  Orientation: grossly intact  Grooming: well groomed  Psychomotor Behavior: no restlessness/agitation  Speech: normal in rate, rhythm and volume  Language: normal vocabulary  Mood: stable  Affect: full range and appropriate  Thought Process: logical and goal directed  Associations: intact associations  Thought Content: no SI/HI  Memory: grossly intact  Attention: intact to content of interview  Fund of Knowledge: appears adequate  Insight: good  Judgement: good    MEDICAL DECISION MAKING    DIAGNOSES  Bipolar d/o      PROBLEM LIST AND MANAGEMENT PLANS    - continue Doxepin, Depakote ER, Latuda, Hydroxyzine as above  - rtc 3 months    Time with patient: 20 min      Ambrosio Loco

## 2018-12-02 ENCOUNTER — PATIENT MESSAGE (OUTPATIENT)
Dept: PSYCHIATRY | Facility: CLINIC | Age: 63
End: 2018-12-02

## 2018-12-04 ENCOUNTER — LAB VISIT (OUTPATIENT)
Dept: LAB | Facility: HOSPITAL | Age: 63
End: 2018-12-04
Attending: INTERNAL MEDICINE
Payer: COMMERCIAL

## 2018-12-04 ENCOUNTER — OFFICE VISIT (OUTPATIENT)
Dept: INTERNAL MEDICINE | Facility: CLINIC | Age: 63
End: 2018-12-04
Payer: COMMERCIAL

## 2018-12-04 ENCOUNTER — IMMUNIZATION (OUTPATIENT)
Dept: INTERNAL MEDICINE | Facility: CLINIC | Age: 63
End: 2018-12-04
Payer: COMMERCIAL

## 2018-12-04 VITALS
WEIGHT: 209 LBS | HEIGHT: 77 IN | BODY MASS INDEX: 24.68 KG/M2 | DIASTOLIC BLOOD PRESSURE: 82 MMHG | HEART RATE: 110 BPM | SYSTOLIC BLOOD PRESSURE: 116 MMHG | OXYGEN SATURATION: 99 %

## 2018-12-04 DIAGNOSIS — E87.1 HYPONATREMIA: Chronic | ICD-10-CM

## 2018-12-04 DIAGNOSIS — R00.0 SINUS TACHYCARDIA: ICD-10-CM

## 2018-12-04 DIAGNOSIS — G40.909 SEIZURE DISORDER: ICD-10-CM

## 2018-12-04 DIAGNOSIS — D64.9 ANEMIA, UNSPECIFIED TYPE: ICD-10-CM

## 2018-12-04 DIAGNOSIS — Z72.0 TOBACCO ABUSE: Chronic | ICD-10-CM

## 2018-12-04 DIAGNOSIS — M54.16 LUMBAR BACK PAIN WITH RADICULOPATHY AFFECTING RIGHT LOWER EXTREMITY: ICD-10-CM

## 2018-12-04 DIAGNOSIS — F31.9 BIPOLAR I DISORDER: Primary | ICD-10-CM

## 2018-12-04 DIAGNOSIS — E78.2 MIXED HYPERLIPIDEMIA: ICD-10-CM

## 2018-12-04 LAB
ALBUMIN SERPL BCP-MCNC: 3.5 G/DL
ALP SERPL-CCNC: 75 U/L
ALT SERPL W/O P-5'-P-CCNC: 11 U/L
ANION GAP SERPL CALC-SCNC: 7 MMOL/L
AST SERPL-CCNC: 14 U/L
BILIRUB SERPL-MCNC: 0.5 MG/DL
BUN SERPL-MCNC: 10 MG/DL
CALCIUM SERPL-MCNC: 9.4 MG/DL
CHLORIDE SERPL-SCNC: 98 MMOL/L
CO2 SERPL-SCNC: 30 MMOL/L
CREAT SERPL-MCNC: 1 MG/DL
EST. GFR  (AFRICAN AMERICAN): >60 ML/MIN/1.73 M^2
EST. GFR  (NON AFRICAN AMERICAN): >60 ML/MIN/1.73 M^2
GLUCOSE SERPL-MCNC: 93 MG/DL
POTASSIUM SERPL-SCNC: 5 MMOL/L
PROT SERPL-MCNC: 7 G/DL
SODIUM SERPL-SCNC: 135 MMOL/L

## 2018-12-04 PROCEDURE — 80053 COMPREHEN METABOLIC PANEL: CPT

## 2018-12-04 PROCEDURE — 99214 OFFICE O/P EST MOD 30 MIN: CPT | Mod: 25,S$GLB,, | Performed by: INTERNAL MEDICINE

## 2018-12-04 PROCEDURE — 99999 PR PBB SHADOW E&M-EST. PATIENT-LVL III: CPT | Mod: PBBFAC,,, | Performed by: INTERNAL MEDICINE

## 2018-12-04 PROCEDURE — 90471 IMMUNIZATION ADMIN: CPT | Mod: S$GLB,,, | Performed by: INTERNAL MEDICINE

## 2018-12-04 PROCEDURE — 3008F BODY MASS INDEX DOCD: CPT | Mod: CPTII,S$GLB,, | Performed by: INTERNAL MEDICINE

## 2018-12-04 PROCEDURE — 36415 COLL VENOUS BLD VENIPUNCTURE: CPT

## 2018-12-04 PROCEDURE — 90686 IIV4 VACC NO PRSV 0.5 ML IM: CPT | Mod: S$GLB,,, | Performed by: INTERNAL MEDICINE

## 2018-12-04 RX ORDER — OXYCODONE HYDROCHLORIDE 10 MG/1
10 TABLET ORAL
Refills: 0 | COMMUNITY
Start: 2018-11-05 | End: 2019-07-24

## 2018-12-04 RX ORDER — LACTULOSE 10 G/15ML
SOLUTION ORAL
Refills: 0 | COMMUNITY
Start: 2018-11-06 | End: 2019-10-04 | Stop reason: SDUPTHER

## 2018-12-04 NOTE — PROGRESS NOTES
INTERNAL MEDICINE ESTABLISHED PATIENT VISIT NOTE    Subjective:     Chief Complaint: Follow-up  HLD, sz, bipolar d/o     Patient ID: Mirza Morales is a 63 y.o. male with HLD, sz d/o, bipolar d/o c anxiety, hx EtOH dependence c hx withdrawal in the past, hx opiate overdose, lumbar radiculopathy c hx lumbar fusion around 4/2018 followed by Dr. Kannan Orona who is managing his pain), tob use, hx R fib fx, hx R clavicular fx 2/2 fall, last seen by me in Aug, here today for f/u.    See initial H&P for details regarding sz hx.  Previous sz in the past associated c EtOH withdrawal but sz 5/2018 suspected to be possibly med-induced.    Also had tremors which they suspected was due to Depakote vs ET and cont depakote as per psych and started on Vimpat which he is still taking.    Had possible seizure back in Sept and had called Neuro who thought it could have been related to Oxycodone which he was taking for his back.  Was advised to schedule an appt for f/u but appt in Sept was canceled, pt unsure of details.    Past Medical History:  Past Medical History:   Diagnosis Date    Alcohol abuse     Behavioral problem     Bipolar 1 disorder     Chronic right hip pain     Depression     DJD (degenerative joint disease), lumbar 1/27/2015    Fatigue     Hepatitis     pt. denies this    History of psychiatric care     History of psychiatric hospitalization     Hypertension     Karina     Post traumatic stress disorder     Psychiatric problem     Seizures     Suicide attempt     Therapy        Home Medications:  Prior to Admission medications    Medication Sig Start Date End Date Taking? Authorizing Provider   albuterol (VENTOLIN HFA) 90 mcg/actuation inhaler Ventolin HFA 90 mcg/actuation aerosol inhaler   Yes Historical Provider, MD   atorvastatin (LIPITOR) 40 MG tablet Take 1 tablet (40 mg total) by mouth once daily. 11/5/18  Yes Jud Mo MD   divalproex ER (DEPAKOTE) 500 MG Tb24 Take 1 tablet (500mg)  every morning and 2 tablets (1000mg) every night. 11/7/18  Yes Ambrosio Loco MD   doxepin (SINEQUAN) 100 MG capsule TAKE 1 CAPSULE (100 MG TOTAL) BY MOUTH EVERY EVENING. 11/7/18  Yes Ambrosio Loco MD   HYDROcodone-acetaminophen (NORCO) 5-325 mg per tablet hydrocodone 5 mg-acetaminophen 325 mg tablet   Yes Historical Provider, MD   hydrOXYzine pamoate (VISTARIL) 50 MG Cap TAKE 1 CAPSULE (50 MG TOTAL) BY MOUTH EVERY EVENING. 8/2/18  Yes Ambrosio Loco MD   hydrOXYzine pamoate (VISTARIL) 50 MG Cap Take one capsule at bedtime 11/7/18  Yes Ambrosio Loco MD   lacosamide (VIMPAT) 50 mg Tab Take 1 tablet (50 mg total) by mouth every 12 (twelve) hours. 8/23/18 8/23/19 Yes Yashira Ann MD   lactulose (CHRONULAC) 20 gram/30 mL Soln TAKE 30 MLS BY MOUTH 2 (TWO) TIMES DAILY AS NEEDED. 11/6/18  Yes Historical Provider, MD   lidocaine (LIDODERM) 5 % Place 1 patch onto the skin once daily. Remove & Discard patch within 12 hours or as directed by MD 7/13/17  Yes Prerna Steward MD   lurasidone (LATUDA) 80 mg Tab tablet TAKE 1 TABLET BY MOUTH DAILY WITH DINNER 11/7/18  Yes Ambrosio Loco MD   multivit-min-FA-lycopen-lutein (CENTRUM SILVER ULTRA MEN'S) 300-600-300 mcg Tab Take 1 tablet by mouth once daily at 6am. 7/13/17  Yes Prerna Steward MD   naloxegol 12.5 mg Tab Take 12.5 mg by mouth daily as needed. 8/8/18  Yes Jud Mo MD   oxyCODONE (ROXICODONE) 10 mg Tab immediate release tablet Take 10 mg by mouth every 6 to 8 hours as needed. 11/5/18  Yes Historical Provider, MD   pantoprazole (PROTONIX) 40 MG tablet Take 1 tablet (40 mg total) by mouth once daily. 10/25/18  Yes Jud Mo MD   pneumococcal 23-jett ps vaccine 25 mcg/0.5 mL Inject into the muscle. 8/8/18 12/4/18  Jud Mo MD       Allergies:  Review of patient's allergies indicates:   Allergen Reactions    Gabapentin Other (See Comments)     SEVERE DIZZINESS AND BALANCE PROBLEMS, UNABLE TO WALK.    Nardil [phenelzine]      BECOMES HYPER, LIKE A MANIC  "EPISODE.    Penicillins Hives    Lamictal [lamotrigine] Rash       Social History:  Social History     Tobacco Use    Smoking status: Current Some Day Smoker     Packs/day: 0.25     Years: 10.00     Pack years: 2.50    Smokeless tobacco: Never Used   Substance Use Topics    Alcohol use: No     Alcohol/week: 10.0 oz     Types: 20 Standard drinks or equivalent per week     Comment: quit 4 years ago    Drug use: No        Review of Systems   Constitutional: Negative for chills, fatigue and fever.   Respiratory: Negative for cough, chest tightness and shortness of breath.    Cardiovascular: Negative for chest pain.   Gastrointestinal: Negative for abdominal pain and blood in stool.   Genitourinary: Negative for dysuria and frequency.   Musculoskeletal: Positive for arthralgias and back pain.         Health Maintenance:     Immunizations:   Influenza today  TDap 8/2018  Pneumovax 8/2018  Shingrix rec once back in stock     Cancer Screening:  PSA 6/2018 wnl  Colonoscopy: 11/2008, ordered at last appt, advised to schedule.     Objective:   /82 (BP Location: Right arm, Patient Position: Sitting, BP Method: Large (Manual))   Pulse 110   Ht 6' 5" (1.956 m)   Wt 94.8 kg (208 lb 15.9 oz)   SpO2 99%   BMI 24.78 kg/m²        General: AAO x3, no apparent distress  CV: RRR, no m/r/g  Pulm: Lungs CTAB, no crackles, no wheezes  Abd: s/NT/ND +BS  Extremities: no c/c/e    Labs:     Lab Results   Component Value Date    WBC 5.07 08/08/2018    HGB 13.2 (L) 08/08/2018    HCT 39.1 (L) 08/08/2018    MCV 97 08/08/2018     08/08/2018     Lab Results   Component Value Date    IRON 100 03/15/2018    TIBC 425 03/15/2018    FERRITIN 186 03/15/2018     Lab Results   Component Value Date    HGDLZSIJ75 891 05/13/2018     Lab Results   Component Value Date    FOLATE 15.9 08/08/2018         Assessment/Plan     Mirza was seen today for follow-up.    Diagnoses and all orders for this visit:    Bipolar I disorder  Stable on meds, " cont f/u c Dr. Loco.    Lumbar back pain with radiculopathy affecting right lower extremity  Mgmt as per Ortho, advised to minimize use of narcotics given his hx.    Mixed hyperlipidemia  Lab Results   Component Value Date    LDLCALC 61.4 (L) 08/08/2018     At goal on meds, cont current regimen.    Seizure disorder  As per HPI  Advised to schedule f/u c Neuro    Tobacco abuse  Patient counseled on the need to stop smoking.    Hyponatremia  Low on last check but close to baseline, suspect 2/2 depakote.  will monitor for now.  -     Comprehensive metabolic panel; Future    Anemia, unspecified type  Lab Results   Component Value Date    WBC 5.07 08/08/2018    HGB 13.2 (L) 08/08/2018    HCT 39.1 (L) 08/08/2018    MCV 97 08/08/2018     08/08/2018     Lab Results   Component Value Date    IRON 100 03/15/2018    TIBC 425 03/15/2018    FERRITIN 186 03/15/2018     C/w AoCD  Also due for csc which he was advised to schedule    Sinus tachycardia  Prev EKG c SR and improvement of HR c rest.  Had ennis 6/2018 which showed SR  bpm, single PVC, no supraventricular ectopic activity, no evidence of SA node block  Can also be s/e of Doxepin  -     Transthoracic echo (TTE) complete (Cupid Only); Future    HM as above  RTC in 6 mos for f/u, sooner if needed, labs today for f/u Na levels.    Jud Mo MD  Department of Internal Medicine - [facility] [address]  12/04/2018

## 2018-12-10 ENCOUNTER — TELEPHONE (OUTPATIENT)
Dept: INTERNAL MEDICINE | Facility: CLINIC | Age: 63
End: 2018-12-10

## 2018-12-10 NOTE — TELEPHONE ENCOUNTER
----- Message from Estephania Reza sent at 12/10/2018 10:01 AM CST -----  Contact: [phone]  Patient is requesting a call from the office in regards to getting his heart monitored.    Please advise, thanks

## 2018-12-12 ENCOUNTER — HOSPITAL ENCOUNTER (OUTPATIENT)
Dept: CARDIOLOGY | Facility: CLINIC | Age: 63
Discharge: HOME | End: 2018-12-12
Attending: INTERNAL MEDICINE
Payer: COMMERCIAL

## 2018-12-12 ENCOUNTER — TELEPHONE (OUTPATIENT)
Dept: INTERNAL MEDICINE | Facility: CLINIC | Age: 63
End: 2018-12-12

## 2018-12-12 VITALS
HEART RATE: 90 BPM | DIASTOLIC BLOOD PRESSURE: 88 MMHG | HEIGHT: 77 IN | WEIGHT: 208 LBS | SYSTOLIC BLOOD PRESSURE: 138 MMHG | BODY MASS INDEX: 24.56 KG/M2

## 2018-12-12 DIAGNOSIS — R00.0 SINUS TACHYCARDIA: ICD-10-CM

## 2018-12-12 LAB
ASCENDING AORTA: 2.97 CM
AV INDEX (PROSTH): 0.91
AV MEAN GRADIENT: 2.64 MMHG
AV PEAK GRADIENT: 5.38 MMHG
AV VALVE AREA: 3.87 CM2
BSA FOR ECHO PROCEDURE: 2.26 M2
CV ECHO LV RWT: 0.4 CM
DOP CALC AO PEAK VEL: 1.16 M/S
DOP CALC AO VTI: 17.91 CM
DOP CALC LVOT AREA: 4.26 CM2
DOP CALC LVOT DIAMETER: 2.33 CM
DOP CALC LVOT STROKE VOLUME: 69.34 CM3
DOP CALCLVOT PEAK VEL VTI: 16.27 CM
E WAVE DECELERATION TIME: 111.98 MSEC
E/A RATIO: 0.6
E/E' RATIO: 6.35
ECHO LV POSTERIOR WALL: 0.79 CM (ref 0.6–1.1)
FRACTIONAL SHORTENING: 29 % (ref 28–44)
INTERVENTRICULAR SEPTUM: 0.81 CM (ref 0.6–1.1)
IVRT: 0.08 MSEC
LA MAJOR: 4.65 CM
LA WIDTH: 4.15 CM
LEFT ATRIUM SIZE: 3.27 CM
LEFT INTERNAL DIMENSION IN SYSTOLE: 2.78 CM (ref 2.1–4)
LEFT VENTRICLE DIASTOLIC VOLUME INDEX: 29.35 ML/M2
LEFT VENTRICLE DIASTOLIC VOLUME: 66.75 ML
LEFT VENTRICLE MASS INDEX: 39.8 G/M2
LEFT VENTRICLE SYSTOLIC VOLUME INDEX: 12.7 ML/M2
LEFT VENTRICLE SYSTOLIC VOLUME: 28.93 ML
LEFT VENTRICULAR INTERNAL DIMENSION IN DIASTOLE: 3.92 CM (ref 3.5–6)
LEFT VENTRICULAR MASS: 90.42 G
LV LATERAL E/E' RATIO: 4.91
LV SEPTAL E/E' RATIO: 9
MV PEAK A VEL: 0.9 M/S
MV PEAK E VEL: 0.54 M/S
PISA TR MAX VEL: 1.86 M/S
PULM VEIN S/D RATIO: 1.23
PV PEAK D VEL: 0.39 M/S
PV PEAK S VEL: 0.48 M/S
RA MAJOR: 4.72 CM
RA PRESSURE: 3 MMHG
RA WIDTH: 4.14 CM
RIGHT VENTRICULAR END-DIASTOLIC DIMENSION: 4.58 CM
RV TISSUE DOPPLER FREE WALL SYSTOLIC VELOCITY 1 (APICAL 4 CHAMBER VIEW): 10.65 M/S
SINUS: 3.27 CM
STJ: 2.73 CM
TDI LATERAL: 0.11
TDI SEPTAL: 0.06
TDI: 0.09
TR MAX PG: 13.84 MMHG
TRICUSPID ANNULAR PLANE SYSTOLIC EXCURSION: 1.99 CM
TV REST PULMONARY ARTERY PRESSURE: 16.84 MMHG

## 2018-12-12 PROCEDURE — 93306 TTE W/DOPPLER COMPLETE: CPT | Mod: S$GLB,,, | Performed by: INTERNAL MEDICINE

## 2018-12-12 NOTE — TELEPHONE ENCOUNTER
----- Message from Jagjit Gupta sent at 12/12/2018  4:28 PM CST -----  Contact: self [phone]  Patient is returning a phone call.  Who left a message for the patient: not sure   Does patient know what this is regarding:  Not sure   Comments:

## 2019-01-24 ENCOUNTER — PATIENT MESSAGE (OUTPATIENT)
Dept: PSYCHIATRY | Facility: CLINIC | Age: 64
End: 2019-01-24

## 2019-02-04 ENCOUNTER — TELEPHONE (OUTPATIENT)
Dept: ENDOSCOPY | Facility: HOSPITAL | Age: 64
End: 2019-02-04

## 2019-02-18 ENCOUNTER — TELEPHONE (OUTPATIENT)
Dept: INTERNAL MEDICINE | Facility: CLINIC | Age: 64
End: 2019-02-18

## 2019-02-18 NOTE — TELEPHONE ENCOUNTER
----- Message from Yola Washington sent at 2/18/2019  9:52 AM CST -----  Contact: self/[phone]  Pt is calling to speak with the doctor in regards to getting some medication sent to his pharmacy . Pt states that he has been constipated for about a week now. Pt states that he is not in any pain or anything but just needs to make a bowel movement. Please advise.      Thank You

## 2019-02-20 ENCOUNTER — OFFICE VISIT (OUTPATIENT)
Dept: PSYCHIATRY | Facility: CLINIC | Age: 64
End: 2019-02-20
Payer: COMMERCIAL

## 2019-02-20 VITALS
SYSTOLIC BLOOD PRESSURE: 131 MMHG | HEART RATE: 119 BPM | HEIGHT: 77 IN | DIASTOLIC BLOOD PRESSURE: 81 MMHG | WEIGHT: 210.13 LBS | BODY MASS INDEX: 24.81 KG/M2

## 2019-02-20 DIAGNOSIS — F31.9 BIPOLAR 1 DISORDER: Primary | ICD-10-CM

## 2019-02-20 PROCEDURE — 99213 OFFICE O/P EST LOW 20 MIN: CPT | Mod: S$GLB,,, | Performed by: PSYCHIATRY & NEUROLOGY

## 2019-02-20 PROCEDURE — 99213 PR OFFICE/OUTPT VISIT, EST, LEVL III, 20-29 MIN: ICD-10-PCS | Mod: S$GLB,,, | Performed by: PSYCHIATRY & NEUROLOGY

## 2019-02-20 PROCEDURE — 99999 PR PBB SHADOW E&M-EST. PATIENT-LVL II: ICD-10-PCS | Mod: PBBFAC,,, | Performed by: PSYCHIATRY & NEUROLOGY

## 2019-02-20 PROCEDURE — 99999 PR PBB SHADOW E&M-EST. PATIENT-LVL II: CPT | Mod: PBBFAC,,, | Performed by: PSYCHIATRY & NEUROLOGY

## 2019-02-20 RX ORDER — DIVALPROEX SODIUM 500 MG/1
TABLET, FILM COATED, EXTENDED RELEASE ORAL
Qty: 90 TABLET | Refills: 3 | Status: SHIPPED | OUTPATIENT
Start: 2019-02-20 | End: 2019-06-20 | Stop reason: SDUPTHER

## 2019-02-20 RX ORDER — DOXEPIN HYDROCHLORIDE 100 MG/1
CAPSULE ORAL
Qty: 30 CAPSULE | Refills: 3 | Status: SHIPPED | OUTPATIENT
Start: 2019-02-20 | End: 2019-06-20 | Stop reason: SDUPTHER

## 2019-02-20 RX ORDER — HYDROXYZINE PAMOATE 50 MG/1
CAPSULE ORAL
Qty: 30 CAPSULE | Refills: 3 | Status: SHIPPED | OUTPATIENT
Start: 2019-02-20 | End: 2019-06-20 | Stop reason: SDUPTHER

## 2019-02-20 NOTE — PROGRESS NOTES
ESTABLISHED OUTPATIENT VISIT   E/M LEVEL 3: 70137    ENCOUNTER DATE: 2/20/2019  SITE: [facility]    HISTORY    CHIEF COMPLAINT   Mirza Morales is a 63 y.o. male who presents for follow up of bipolar d/o.    HPI     Pt. Reports doing well psychiatrically. He appears psychiatrically stable today.  Stopped taking Latuda due to nausea 1-2 months ago.    No alcohol, no marijuana. Smoking 5-10 cigarettes per day.    Relationship with wife Anamaria is good. In contact with nephew Ayana PALACIOS   Denies recent seizure.  Walking without a cane today.    PAST MEDICAL, FAMILY AND SOCIAL HISTORY: The patient's past medical, family and social history have been reviewed and updated as appropriate within the electronic medical record - see encounter notes    PSYCHOTROPIC MEDICATIONS   Doxepin 100 mg at bedtime, Depakote 500 mg qam and 1000 mg at bedtime[prescribed for seizures], Hydroxyzine 50 mg at bedtime    Scheduled and PRN Medications     Current Outpatient Medications:     albuterol (VENTOLIN HFA) 90 mcg/actuation inhaler, Ventolin HFA 90 mcg/actuation aerosol inhaler, Disp: , Rfl:     atorvastatin (LIPITOR) 40 MG tablet, Take 1 tablet (40 mg total) by mouth once daily., Disp: 90 tablet, Rfl: 3    divalproex ER (DEPAKOTE) 500 MG Tb24, Take 1 tablet (500mg) every morning and 2 tablets (1000mg) every night., Disp: 90 tablet, Rfl: 3    doxepin (SINEQUAN) 100 MG capsule, TAKE 1 CAPSULE (100 MG TOTAL) BY MOUTH EVERY EVENING., Disp: 30 capsule, Rfl: 3    HYDROcodone-acetaminophen (NORCO) 5-325 mg per tablet, hydrocodone 5 mg-acetaminophen 325 mg tablet, Disp: , Rfl:     hydrOXYzine pamoate (VISTARIL) 50 MG Cap, TAKE 1 CAPSULE (50 MG TOTAL) BY MOUTH EVERY EVENING., Disp: 30 capsule, Rfl: 3    hydrOXYzine pamoate (VISTARIL) 50 MG Cap, Take one capsule at bedtime, Disp: 30 capsule, Rfl: 3    lacosamide (VIMPAT) 50 mg Tab, Take 1 tablet (50 mg total) by mouth every 12 (twelve) hours., Disp: 60  tablet, Rfl: 5    lactulose (CHRONULAC) 20 gram/30 mL Soln, TAKE 30 MLS BY MOUTH 2 (TWO) TIMES DAILY AS NEEDED., Disp: , Rfl: 0    lidocaine (LIDODERM) 5 %, Place 1 patch onto the skin once daily. Remove & Discard patch within 12 hours or as directed by MD, Disp: 10 patch, Rfl: 0    lurasidone (LATUDA) 80 mg Tab tablet, TAKE 1 TABLET BY MOUTH DAILY WITH DINNER, Disp: 30 tablet, Rfl: 3    multivit-min-FA-lycopen-lutein (CENTRUM SILVER ULTRA MEN'S) 300-600-300 mcg Tab, Take 1 tablet by mouth once daily at 6am., Disp: , Rfl:     naloxegol 12.5 mg Tab, Take 12.5 mg by mouth daily as needed., Disp: 30 tablet, Rfl: 0    oxyCODONE (ROXICODONE) 10 mg Tab immediate release tablet, Take 10 mg by mouth every 6 to 8 hours as needed., Disp: , Rfl: 0    pantoprazole (PROTONIX) 40 MG tablet, Take 1 tablet (40 mg total) by mouth once daily., Disp: 90 tablet, Rfl: 1    LABORATORY DATA  Hospital Outpatient Visit on 12/12/2018   Component Date Value Ref Range Status    Ascending aorta 12/12/2018 2.97  cm Final    STJ 12/12/2018 2.73  cm Final    AV mean gradient 12/12/2018 2.64  mmHg Final    Ao peak filiberto 12/12/2018 1.16  m/s Final    Ao VTI 12/12/2018 17.91  cm Final    IVRT 12/12/2018 0.08  msec Final    IVS 12/12/2018 0.81  0.6 - 1.1 cm Final    LA size 12/12/2018 3.27  cm Final    Left Atrium Major Axis 12/12/2018 4.65  cm Final    LVIDD 12/12/2018 3.92  3.5 - 6.0 cm Final    LVIDS 12/12/2018 2.78  2.1 - 4.0 cm Final    LVOT diameter 12/12/2018 2.33  cm Final    LVOT peak VTI 12/12/2018 16.27  cm Final    PW 12/12/2018 0.79  0.6 - 1.1 cm Final    MV Peak A Filiberto 12/12/2018 0.90  m/s Final    E wave decelartion time 12/12/2018 111.98  msec Final    MV Peak E Filiberto 12/12/2018 0.54  m/s Final    PV Peak D Filiberto 12/12/2018 0.39  m/s Final    PV Peak S Filiberto 12/12/2018 0.48  m/s Final    RA Major Axis 12/12/2018 4.72  cm Final    RA Width 12/12/2018 4.14  cm Final    RVDD 12/12/2018 4.58  cm Final    Sinus  12/12/2018 3.27  cm Final    TAPSE 12/12/2018 1.99  cm Final    TR Max Filiberto 12/12/2018 1.86  m/s Final    TDI LATERAL 12/12/2018 0.11   Final    TDI SEPTAL 12/12/2018 0.06   Final    LA WIDTH 12/12/2018 4.15  cm Final    LV Diastolic Volume 12/12/2018 66.75  mL Final    LV Systolic Volume 12/12/2018 28.93  mL Final    RV S' 12/12/2018 10.65  m/s Final    LV LATERAL E/E' RATIO 12/12/2018 4.91   Final    LV SEPTAL E/E' RATIO 12/12/2018 9.00   Final    FS 12/12/2018 29  % Final    LV mass 12/12/2018 90.42  g Final    Left Ventricle Relative Wall Thick* 12/12/2018 0.40  cm Final    AV valve area 12/12/2018 3.87  cm2 Final    AV index (prosthetic) 12/12/2018 0.91   Final    E/A ratio 12/12/2018 0.60   Final    Mean e' 12/12/2018 0.09   Final    Pulm vein S/D ratio 12/12/2018 1.23   Final    LVOT area 12/12/2018 4.26  cm2 Final    LVOT stroke volume 12/12/2018 69.34  cm3 Final    AV peak gradient 12/12/2018 5.38  mmHg Final    E/E' ratio 12/12/2018 6.35   Final    Triscuspid Valve Regurgitation Pea* 12/12/2018 13.84  mmHg Final    BSA 12/12/2018 2.26  m2 Final    LV Systolic Volume Index 12/12/2018 12.7  mL/m2 Final    LV Diastolic Volume Index 12/12/2018 29.35  mL/m2 Final    LV Mass Index 12/12/2018 39.8  g/m2 Final    Right Atrial Pressure (from IVC) 12/12/2018 3  mmHg Final    TV rest pulmonary artery pressure 12/12/2018 16.84  mmHg Final   Lab Visit on 12/04/2018   Component Date Value Ref Range Status    Sodium 12/04/2018 135* 136 - 145 mmol/L Final    Potassium 12/04/2018 5.0  3.5 - 5.1 mmol/L Final    Chloride 12/04/2018 98  95 - 110 mmol/L Final    CO2 12/04/2018 30* 23 - 29 mmol/L Final    Glucose 12/04/2018 93  70 - 110 mg/dL Final    BUN, Bld 12/04/2018 10  8 - 23 mg/dL Final    Creatinine 12/04/2018 1.0  0.5 - 1.4 mg/dL Final    Calcium 12/04/2018 9.4  8.7 - 10.5 mg/dL Final    Total Protein 12/04/2018 7.0  6.0 - 8.4 g/dL Final    Albumin 12/04/2018 3.5  3.5 - 5.2 g/dL  "Final    Total Bilirubin 12/04/2018 0.5  0.1 - 1.0 mg/dL Final    Comment: For infants and newborns, interpretation of results should be based  on gestational age, weight and in agreement with clinical  observations.  Premature Infant recommended reference ranges:  Up to 24 hours.............<8.0 mg/dL  Up to 48 hours............<12.0 mg/dL  3-5 days..................<15.0 mg/dL  6-29 days.................<15.0 mg/dL      Alkaline Phosphatase 12/04/2018 75  55 - 135 U/L Final    AST 12/04/2018 14  10 - 40 U/L Final    ALT 12/04/2018 11  10 - 44 U/L Final    Anion Gap 12/04/2018 7* 8 - 16 mmol/L Final    eGFR if African American 12/04/2018 >60  >60 mL/min/1.73 m^2 Final    eGFR if non African American 12/04/2018 >60  >60 mL/min/1.73 m^2 Final    Comment: Calculation used to obtain the estimated glomerular filtration  rate (eGFR) is the CKD-EPI equation.          EXAMINATION    /81   Pulse (!) 119   Ht 6' 5" (1.956 m)   Wt 95.3 kg (210 lb 1.6 oz)   BMI 24.91 kg/m²     CONSTITUTIONAL  General Appearance: well nourished    MUSCULOSKELETAL  Muscle Strength and Tone: normal strength and tone  Abnormal Involuntary Movements: no abnormal movement noted  Gait and Station: normal gait    PSYCHIATRIC   Level of Consciousness: alert  Orientation: grossly intact  Grooming: well groomed  Psychomotor Behavior: no restlessness/agitation  Speech: normal in rate, rhythm and volume  Language: normal vocabulary  Mood: stable  Affect: full range and appropriate  Thought Process: logical and goal directed  Associations: intact associations  Thought Content: no SI/HI  Memory: grossly intact  Attention: intact to content of interview  Fund of Knowledge: appears adequate  Insight: good  Judgement: good    MEDICAL DECISION MAKING    DIAGNOSES  Bipolar d/o      PROBLEM LIST AND MANAGEMENT PLANS    - continue Doxepin, Depakote ER, Hydroxyzine as above  - rtc 3 months    Time with patient: 15 min      Ambrosio Loco          "

## 2019-02-21 ENCOUNTER — TELEPHONE (OUTPATIENT)
Dept: ENDOSCOPY | Facility: HOSPITAL | Age: 64
End: 2019-02-21

## 2019-03-29 ENCOUNTER — TELEPHONE (OUTPATIENT)
Dept: INTERNAL MEDICINE | Facility: CLINIC | Age: 64
End: 2019-03-29

## 2019-03-29 DIAGNOSIS — G40.909 SEIZURE DISORDER: ICD-10-CM

## 2019-03-29 RX ORDER — LACOSAMIDE 50 MG/1
TABLET, FILM COATED ORAL
Qty: 180 TABLET | Refills: 0 | OUTPATIENT
Start: 2019-03-29

## 2019-03-29 NOTE — TELEPHONE ENCOUNTER
----- Message from Lobito Lyles sent at 3/29/2019 11:20 AM CDT -----  Contact: Patient [phone]  Is this a refill or new RX:  Refill    RX name and strength: lacosamide (VIMPAT) 50 mg Tab    Pharmacy name and phone # [id number] - [place] [address] [address] [phone] (Phone) [phone] (Fax)

## 2019-03-29 NOTE — TELEPHONE ENCOUNTER
----- Message from Estephania Reza sent at 3/29/2019  4:33 PM CDT -----  Contact: [phone]  RX request - refill or new RX.  Is this a refill or new RX:  Refill   RX name and strength: lacosamide (VIMPAT) 50 mg Tab    Local pharmacy or mail order pharmacy: [id number] [place] [address] [address].   [phone] (Phone)  [phone] (Fax)    Comments:  Please advise, thanks

## 2019-04-01 ENCOUNTER — TELEPHONE (OUTPATIENT)
Dept: NEUROLOGY | Facility: CLINIC | Age: 64
End: 2019-04-01

## 2019-04-02 ENCOUNTER — TELEPHONE (OUTPATIENT)
Dept: NEUROLOGY | Facility: CLINIC | Age: 64
End: 2019-04-02

## 2019-04-02 NOTE — TELEPHONE ENCOUNTER
I called in script for vimpat to [facility] pharmacy per Dr. Ann for free trial, and separate for continuous w/5 refills. Pharmacy has savings info for free trial and also separate info for continuous savings on monthly fills

## 2019-05-22 ENCOUNTER — PATIENT MESSAGE (OUTPATIENT)
Dept: PSYCHIATRY | Facility: CLINIC | Age: 64
End: 2019-05-22

## 2019-05-30 ENCOUNTER — PATIENT MESSAGE (OUTPATIENT)
Dept: PSYCHIATRY | Facility: CLINIC | Age: 64
End: 2019-05-30

## 2019-05-31 ENCOUNTER — PATIENT MESSAGE (OUTPATIENT)
Dept: PSYCHIATRY | Facility: CLINIC | Age: 64
End: 2019-05-31

## 2019-06-01 ENCOUNTER — NURSE TRIAGE (OUTPATIENT)
Dept: ADMINISTRATIVE | Facility: CLINIC | Age: 64
End: 2019-06-01

## 2019-06-02 NOTE — TELEPHONE ENCOUNTER
"    Reason for Disposition   Second seizure occurs on the same day    Protocols used: [id number]    Caller states her father has had a few seizures today, she believes he may have skipped his medications, she also states he has been drinking alcohol, advised her to call 911 but she stakes they are going to "ride it out" and she will call if it happens again, she states pt is back to normal and is denying any seizure activity.  "

## 2019-06-03 ENCOUNTER — PATIENT MESSAGE (OUTPATIENT)
Dept: PSYCHIATRY | Facility: CLINIC | Age: 64
End: 2019-06-03

## 2019-06-07 ENCOUNTER — TELEPHONE (OUTPATIENT)
Dept: NEUROLOGY | Facility: CLINIC | Age: 64
End: 2019-06-07

## 2019-06-07 NOTE — TELEPHONE ENCOUNTER
Ailyn was able to provide discount copay and pt was notified    p----- Message from Rossi Stephens MA sent at 6/7/2019 12:37 PM CDT -----      ----- Message -----  From: Tony Banda  Sent: 6/7/2019  12:32 PM  To: Marissa Ac Staff    Rx Refill/Request     Is this a Refill or New Rx: Refill  Rx Name and Strength: lacosamide (VIMPAT) 100 mg Tab   Preferred Pharmacy with phone number: see below  Communication Preference: [phone]  Additional Information:     [facility] [place] [address] [facility] [address]  Phone: [phone] Fax: [phone]

## 2019-06-20 ENCOUNTER — OFFICE VISIT (OUTPATIENT)
Dept: PSYCHIATRY | Facility: CLINIC | Age: 64
End: 2019-06-20
Payer: COMMERCIAL

## 2019-06-20 VITALS
HEIGHT: 72 IN | BODY MASS INDEX: 29.04 KG/M2 | HEART RATE: 95 BPM | WEIGHT: 214.38 LBS | SYSTOLIC BLOOD PRESSURE: 124 MMHG | DIASTOLIC BLOOD PRESSURE: 75 MMHG

## 2019-06-20 DIAGNOSIS — F31.9 BIPOLAR 1 DISORDER: Primary | ICD-10-CM

## 2019-06-20 PROCEDURE — 99999 PR PBB SHADOW E&M-EST. PATIENT-LVL II: ICD-10-PCS | Mod: PBBFAC,,, | Performed by: PSYCHIATRY & NEUROLOGY

## 2019-06-20 PROCEDURE — 99213 PR OFFICE/OUTPT VISIT, EST, LEVL III, 20-29 MIN: ICD-10-PCS | Mod: S$GLB,,, | Performed by: PSYCHIATRY & NEUROLOGY

## 2019-06-20 PROCEDURE — 99213 OFFICE O/P EST LOW 20 MIN: CPT | Mod: S$GLB,,, | Performed by: PSYCHIATRY & NEUROLOGY

## 2019-06-20 PROCEDURE — 99999 PR PBB SHADOW E&M-EST. PATIENT-LVL II: CPT | Mod: PBBFAC,,, | Performed by: PSYCHIATRY & NEUROLOGY

## 2019-06-20 RX ORDER — HYDROXYZINE PAMOATE 50 MG/1
CAPSULE ORAL
Qty: 30 CAPSULE | Refills: 3 | Status: SHIPPED | OUTPATIENT
Start: 2019-06-20 | End: 2019-07-24 | Stop reason: SDUPTHER

## 2019-06-20 RX ORDER — DOXEPIN HYDROCHLORIDE 100 MG/1
CAPSULE ORAL
Qty: 30 CAPSULE | Refills: 3 | Status: ON HOLD | OUTPATIENT
Start: 2019-06-20 | End: 2019-10-03 | Stop reason: HOSPADM

## 2019-06-20 RX ORDER — DIVALPROEX SODIUM 500 MG/1
TABLET, FILM COATED, EXTENDED RELEASE ORAL
Qty: 90 TABLET | Refills: 3 | Status: ON HOLD | OUTPATIENT
Start: 2019-06-20 | End: 2019-10-03 | Stop reason: HOSPADM

## 2019-06-20 NOTE — PROGRESS NOTES
ESTABLISHED OUTPATIENT VISIT   E/M LEVEL 3: [id number]    ENCOUNTER DATE: 6/20/2019  SITE: [facility]    HISTORY    CHIEF COMPLAINT   Mirza Morales is a 64 y.o. male who presents for follow up of bipolar d/o.    HPI     Pt reports some recent depression.    For a time was drinking alcohol heavily. No alcohol in the past 2 weeks, no marijuana. Smoking 15 cigarettes per day.    Sleep is good.    ROS   Walking without a cane today.    PAST MEDICAL, FAMILY AND SOCIAL HISTORY: The patient's past medical, family and social history have been reviewed and updated as appropriate within the electronic medical record - see encounter notes    PSYCHOTROPIC MEDICATIONS   Doxepin 100 mg at bedtime, Depakote 500 mg qam and 1000 mg at bedtime[prescribed for seizures], Hydroxyzine 50 mg at bedtime    Scheduled and PRN Medications     Current Outpatient Medications:     albuterol (VENTOLIN HFA) 90 mcg/actuation inhaler, Ventolin HFA 90 mcg/actuation aerosol inhaler, Disp: , Rfl:     atorvastatin (LIPITOR) 40 MG tablet, Take 1 tablet (40 mg total) by mouth once daily., Disp: 90 tablet, Rfl: 3    divalproex ER (DEPAKOTE) 500 MG Tb24, Take 1 tablet (500mg) every morning and 2 tablets (1000mg) every night., Disp: 90 tablet, Rfl: 3    doxepin (SINEQUAN) 100 MG capsule, TAKE 1 CAPSULE (100 MG TOTAL) BY MOUTH EVERY EVENING., Disp: 30 capsule, Rfl: 3    HYDROcodone-acetaminophen (NORCO) 5-325 mg per tablet, hydrocodone 5 mg-acetaminophen 325 mg tablet, Disp: , Rfl:     hydrOXYzine pamoate (VISTARIL) 50 MG Cap, TAKE 1 CAPSULE (50 MG TOTAL) BY MOUTH EVERY EVENING., Disp: 30 capsule, Rfl: 3    hydrOXYzine pamoate (VISTARIL) 50 MG Cap, Take one capsule at bedtime, Disp: 30 capsule, Rfl: 3    lacosamide (VIMPAT) 100 mg Tab, take 1 tablet by mouth twice daily, Disp: 60 tablet, Rfl: 0    lacosamide (VIMPAT) 100 mg Tab, TAKE 1 TABLET BY MOUTH TWICE DAILY, Disp: 60 tablet, Rfl: 5    lacosamide (VIMPAT) 50 mg Tab,  Take 1 tablet (50 mg total) by mouth every 12 (twelve) hours., Disp: 60 tablet, Rfl: 5    lactulose (CHRONULAC) 20 gram/30 mL Soln, TAKE 30 MLS BY MOUTH 2 (TWO) TIMES DAILY AS NEEDED., Disp: , Rfl: 0    lidocaine (LIDODERM) 5 %, Place 1 patch onto the skin once daily. Remove & Discard patch within 12 hours or as directed by MD, Disp: 10 patch, Rfl: 0    multivit-min-FA-lycopen-lutein (CENTRUM SILVER ULTRA MEN'S) 300-600-300 mcg Tab, Take 1 tablet by mouth once daily at 6am., Disp: , Rfl:     naloxegol 12.5 mg Tab, Take 12.5 mg by mouth daily as needed., Disp: 30 tablet, Rfl: 0    oxyCODONE (ROXICODONE) 10 mg Tab immediate release tablet, Take 10 mg by mouth every 6 to 8 hours as needed., Disp: , Rfl: 0    pantoprazole (PROTONIX) 40 MG tablet, Take 1 tablet (40 mg total) by mouth once daily., Disp: 90 tablet, Rfl: 1    LABORATORY DATA  No visits with results within 3 Month(s) from this visit.   Latest known visit with results is:   Hospital Outpatient Visit on 12/12/2018   Component Date Value Ref Range Status    Ascending aorta 12/12/2018 2.97  cm Final    STJ 12/12/2018 2.73  cm Final    AV mean gradient 12/12/2018 2.64  mmHg Final    Ao peak filiberto 12/12/2018 1.16  m/s Final    Ao VTI 12/12/2018 17.91  cm Final    IVRT 12/12/2018 0.08  msec Final    IVS 12/12/2018 0.81  0.6 - 1.1 cm Final    LA size 12/12/2018 3.27  cm Final    Left Atrium Major Axis 12/12/2018 4.65  cm Final    LVIDD 12/12/2018 3.92  3.5 - 6.0 cm Final    LVIDS 12/12/2018 2.78  2.1 - 4.0 cm Final    LVOT diameter 12/12/2018 2.33  cm Final    LVOT peak VTI 12/12/2018 16.27  cm Final    PW 12/12/2018 0.79  0.6 - 1.1 cm Final    MV Peak A Filiberto 12/12/2018 0.90  m/s Final    E wave decelartion time 12/12/2018 111.98  msec Final    MV Peak E Filiberto 12/12/2018 0.54  m/s Final    PV Peak D Filiberto 12/12/2018 0.39  m/s Final    PV Peak S Filiberto 12/12/2018 0.48  m/s Final    RA Major Axis 12/12/2018 4.72  cm Final    RA Width 12/12/2018 4.14   cm Final    RVDD 12/12/2018 4.58  cm Final    Sinus 12/12/2018 3.27  cm Final    TAPSE 12/12/2018 1.99  cm Final    TR Max Filiberto 12/12/2018 1.86  m/s Final    TDI LATERAL 12/12/2018 0.11   Final    TDI SEPTAL 12/12/2018 0.06   Final    LA WIDTH 12/12/2018 4.15  cm Final    LV Diastolic Volume 12/12/2018 66.75  mL Final    LV Systolic Volume 12/12/2018 28.93  mL Final    RV S' 12/12/2018 10.65  m/s Final    LV LATERAL E/E' RATIO 12/12/2018 4.91   Final    LV SEPTAL E/E' RATIO 12/12/2018 9.00   Final    FS 12/12/2018 29  % Final    LV mass 12/12/2018 90.42  g Final    Left Ventricle Relative Wall Thick* 12/12/2018 0.40  cm Final    AV valve area 12/12/2018 3.87  cm2 Final    AV index (prosthetic) 12/12/2018 0.91   Final    E/A ratio 12/12/2018 0.60   Final    Mean e' 12/12/2018 0.09   Final    Pulm vein S/D ratio 12/12/2018 1.23   Final    LVOT area 12/12/2018 4.26  cm2 Final    LVOT stroke volume 12/12/2018 69.34  cm3 Final    AV peak gradient 12/12/2018 5.38  mmHg Final    E/E' ratio 12/12/2018 6.35   Final    Triscuspid Valve Regurgitation Pea* 12/12/2018 13.84  mmHg Final    BSA 12/12/2018 2.26  m2 Final    LV Systolic Volume Index 12/12/2018 12.7  mL/m2 Final    LV Diastolic Volume Index 12/12/2018 29.35  mL/m2 Final    LV Mass Index 12/12/2018 39.8  g/m2 Final    Right Atrial Pressure (from IVC) 12/12/2018 3  mmHg Final    TV rest pulmonary artery pressure 12/12/2018 16.84  mmHg Final       EXAMINATION    /75 (BP Location: Left arm, Patient Position: Sitting, BP Method: Large (Automatic))   Pulse 95   Ht 6' (1.829 m)   Wt 97.3 kg (214 lb 6.4 oz)   BMI 29.08 kg/m²     CONSTITUTIONAL  General Appearance: well nourished    MUSCULOSKELETAL  Muscle Strength and Tone: normal strength and tone  Abnormal Involuntary Movements: no abnormal movement noted  Gait and Station: normal gait    PSYCHIATRIC   Level of Consciousness: alert  Orientation: grossly intact  Grooming: well  groomed  Psychomotor Behavior: no restlessness/agitation  Speech: normal in rate, rhythm and volume  Language: normal vocabulary  Mood: some depression  Affect: full range and appropriate  Thought Process: logical and goal directed  Associations: intact associations  Thought Content: no SI/HI  Memory: grossly intact  Attention: intact to content of interview  Fund of Knowledge: appears adequate  Insight: good  Judgement: good    MEDICAL DECISION MAKING    DIAGNOSES  Bipolar d/o  Alcohol Use[pt. Currently does not desire treatment]    PROBLEM LIST AND MANAGEMENT PLANS    - continue Doxepin, Depakote ER, Hydroxyzine as above  - consider re-starting Latuda[pt. Intends to discuss this with his wife]  - rtc 3 months    Time with patient: 20 min      Ambrosio Loco

## 2019-06-26 RX ORDER — LURASIDONE HYDROCHLORIDE 80 MG/1
TABLET, FILM COATED ORAL
Qty: 30 TABLET | Refills: 3 | Status: ON HOLD | OUTPATIENT
Start: 2019-06-26 | End: 2019-10-03 | Stop reason: HOSPADM

## 2019-07-02 ENCOUNTER — PATIENT MESSAGE (OUTPATIENT)
Dept: PSYCHIATRY | Facility: CLINIC | Age: 64
End: 2019-07-02

## 2019-07-23 ENCOUNTER — TELEPHONE (OUTPATIENT)
Dept: INTERNAL MEDICINE | Facility: CLINIC | Age: 64
End: 2019-07-23

## 2019-07-23 NOTE — TELEPHONE ENCOUNTER
----- Message from Era Barnard sent at 7/23/2019 12:43 PM CDT -----  Contact: Pt  Pt is requesting an appt due to body feeling exhausted    Pt can be reached at [phone]

## 2019-07-24 ENCOUNTER — OFFICE VISIT (OUTPATIENT)
Dept: INTERNAL MEDICINE | Facility: CLINIC | Age: 64
End: 2019-07-24
Payer: COMMERCIAL

## 2019-07-24 VITALS
DIASTOLIC BLOOD PRESSURE: 78 MMHG | OXYGEN SATURATION: 98 % | HEIGHT: 72 IN | BODY MASS INDEX: 28.84 KG/M2 | HEART RATE: 97 BPM | WEIGHT: 212.94 LBS | SYSTOLIC BLOOD PRESSURE: 124 MMHG

## 2019-07-24 DIAGNOSIS — E87.1 HYPONATREMIA: Chronic | ICD-10-CM

## 2019-07-24 DIAGNOSIS — Z12.11 SCREEN FOR COLON CANCER: ICD-10-CM

## 2019-07-24 DIAGNOSIS — G40.909 SEIZURE DISORDER: ICD-10-CM

## 2019-07-24 DIAGNOSIS — M48.061 DEGENERATIVE LUMBAR SPINAL STENOSIS: ICD-10-CM

## 2019-07-24 DIAGNOSIS — Z12.5 SCREENING PSA (PROSTATE SPECIFIC ANTIGEN): ICD-10-CM

## 2019-07-24 DIAGNOSIS — R53.83 FATIGUE, UNSPECIFIED TYPE: Primary | ICD-10-CM

## 2019-07-24 DIAGNOSIS — F31.9 BIPOLAR I DISORDER: ICD-10-CM

## 2019-07-24 DIAGNOSIS — D63.8 ANEMIA OF CHRONIC DISEASE: ICD-10-CM

## 2019-07-24 PROCEDURE — 99999 PR PBB SHADOW E&M-EST. PATIENT-LVL III: ICD-10-PCS | Mod: PBBFAC,,, | Performed by: INTERNAL MEDICINE

## 2019-07-24 PROCEDURE — 99214 OFFICE O/P EST MOD 30 MIN: CPT | Mod: S$GLB,,, | Performed by: INTERNAL MEDICINE

## 2019-07-24 PROCEDURE — 3008F PR BODY MASS INDEX (BMI) DOCUMENTED: ICD-10-PCS | Mod: CPTII,S$GLB,, | Performed by: INTERNAL MEDICINE

## 2019-07-24 PROCEDURE — 3008F BODY MASS INDEX DOCD: CPT | Mod: CPTII,S$GLB,, | Performed by: INTERNAL MEDICINE

## 2019-07-24 PROCEDURE — 99214 PR OFFICE/OUTPT VISIT, EST, LEVL IV, 30-39 MIN: ICD-10-PCS | Mod: S$GLB,,, | Performed by: INTERNAL MEDICINE

## 2019-07-24 PROCEDURE — 99999 PR PBB SHADOW E&M-EST. PATIENT-LVL III: CPT | Mod: PBBFAC,,, | Performed by: INTERNAL MEDICINE

## 2019-07-24 RX ORDER — MELOXICAM 7.5 MG/1
7.5 TABLET ORAL DAILY
Qty: 30 TABLET | Refills: 0 | Status: SHIPPED | OUTPATIENT
Start: 2019-07-24 | End: 2019-08-21 | Stop reason: SDUPTHER

## 2019-07-24 NOTE — PROGRESS NOTES
"INTERNAL MEDICINE ESTABLISHED PATIENT VISIT NOTE    Subjective:     Chief Complaint: Fatigue and Back Pain       Patient ID: Mirza Morales is a 64 y.o. male with HLD, sz d/o, bipolar d/o c anxiety, hx EtOH dependence c hx withdrawal in the past, hx opiate overdose, lumbar radiculopathy c hx lumbar fusion around 4/2018 followed by Dr. Kannan Orona who is managing his pain), tob use, hx R fib fx, hx R clavicular fx 2/2 fall, last seen by me in Dec, here today for f/u.    See initial H&P for details regarding sz hx.  Previous sz in the past associated c EtOH withdrawal but sz 5/2018 suspected to be possibly med-induced.     Also had tremors which they suspected was due to Depakote vs ET and cont depakote as per psych and started on Vimpat which he is still taking.    Today c c/o fatigue and generalized weakness.  States he has been feeling tired for the past few weeks.  Of note, was started on Latuda last month which he feels has helped c bipolar sx.      States that at times, he gets "shaky" c tremors in his hands and feels weak, sx appear to improve p eating.    Reports he has been sleeping well and does not snore, per his wife.    Also states he has some recurrent issues c his lower back.  Says he was going to  some groceries recently and pulled his back, pain currently at 8/10 and requesting "something for the pain."    Past Medical History:  Past Medical History:   Diagnosis Date    Alcohol abuse     Behavioral problem     Bipolar 1 disorder     Chronic right hip pain     Depression     DJD (degenerative joint disease), lumbar 1/27/2015    Fatigue     Hepatitis     pt. denies this    History of psychiatric care     History of psychiatric hospitalization     Hypertension     Karina     Post traumatic stress disorder     Psychiatric problem     Seizures     Suicide attempt     Therapy        Home Medications:  Prior to Admission medications    Medication Sig Start Date End Date " Taking? Authorizing Provider   albuterol (VENTOLIN HFA) 90 mcg/actuation inhaler Ventolin HFA 90 mcg/actuation aerosol inhaler   Yes Historical Provider, MD   atorvastatin (LIPITOR) 40 MG tablet Take 1 tablet (40 mg total) by mouth once daily. 11/5/18  Yes Jud Mo MD   divalproex ER (DEPAKOTE) 500 MG Tb24 Take 1 tablet (500mg) every morning and 2 tablets (1000mg) every night. 6/20/19  Yes Ambrosio Loco MD   doxepin (SINEQUAN) 100 MG capsule TAKE 1 CAPSULE (100 MG TOTAL) BY MOUTH EVERY EVENING. 6/20/19  Yes Ambrosio Loco MD   hydrOXYzine pamoate (VISTARIL) 50 MG Cap TAKE 1 CAPSULE (50 MG TOTAL) BY MOUTH EVERY EVENING. 8/2/18  Yes Ambrosio Loco MD   lacosamide (VIMPAT) 100 mg Tab take 1 tablet by mouth twice daily 4/1/19  Yes Yashira Ann MD   lactulose (CHRONULAC) 20 gram/30 mL Soln TAKE 30 MLS BY MOUTH 2 (TWO) TIMES DAILY AS NEEDED. 11/6/18  Yes Historical Provider, MD   lurasidone (LATUDA) 80 mg Tab tablet Take half of a tablet with dinner for 7 days, then take one tablet with dinner. 6/26/19  Yes Ambrosio Loco MD   multivit-min-FA-lycopen-lutein (CENTRUM SILVER ULTRA MEN'S) 300-600-300 mcg Tab Take 1 tablet by mouth once daily at 6am. 7/13/17  Yes Prerna Steward MD   naloxegol 12.5 mg Tab Take 12.5 mg by mouth daily as needed. 8/8/18  Yes Jud Mo MD   pantoprazole (PROTONIX) 40 MG tablet Take 1 tablet (40 mg total) by mouth once daily. 10/25/18  Yes Jud Mo MD   HYDROcodone-acetaminophen (NORCO) 5-325 mg per tablet hydrocodone 5 mg-acetaminophen 325 mg tablet  7/24/19  Historical Provider, MD   hydrOXYzine pamoate (VISTARIL) 50 MG Cap Take one capsule at bedtime 6/20/19 7/24/19  Ambrosio Loco MD   lacosamide (VIMPAT) 100 mg Tab TAKE 1 TABLET BY MOUTH TWICE DAILY 4/1/19 7/24/19  Yashira Ann MD   lacosamide (VIMPAT) 50 mg Tab Take 1 tablet (50 mg total) by mouth every 12 (twelve) hours. 8/23/18 7/24/19  Yashira Ann MD   lidocaine (LIDODERM) 5 % Place 1 patch onto the skin once daily. Remove &  Discard patch within 12 hours or as directed by MD 7/13/17 7/24/19  Prerna Steward MD   oxyCODONE (ROXICODONE) 10 mg Tab immediate release tablet Take 10 mg by mouth every 6 to 8 hours as needed. 11/5/18 7/24/19  Historical Provider, MD       Allergies:  Review of patient's allergies indicates:   Allergen Reactions    Gabapentin Other (See Comments)     SEVERE DIZZINESS AND BALANCE PROBLEMS, UNABLE TO WALK.    Nardil [phenelzine]      BECOMES HYPER, LIKE A MANIC EPISODE.    Penicillins Hives    Lamictal [lamotrigine] Rash       Social History:  Social History     Tobacco Use    Smoking status: Current Some Day Smoker     Packs/day: 0.25     Years: 10.00     Pack years: 2.50    Smokeless tobacco: Never Used   Substance Use Topics    Alcohol use: No     Alcohol/week: 10.0 oz     Types: 20 Standard drinks or equivalent per week     Comment: quit 4 years ago    Drug use: No        Review of Systems   Constitutional: Positive for fatigue. Negative for chills and fever.   Respiratory: Negative for cough, chest tightness and shortness of breath.    Cardiovascular: Negative for chest pain.   Gastrointestinal: Negative for abdominal pain and blood in stool.   Genitourinary: Negative for dysuria and frequency.   Musculoskeletal: Positive for back pain.   Neurological: Positive for weakness.         Health Maintenance:     Immunizations:   Influenza 12/2018  TDap 8/2018  Pneumovax 8/2018  Shingrix rec once back in stock     Cancer Screening:  PSA 6/2018 wnl  Colonoscopy: 11/2008, ordered at last appt, advised to schedule.    Objective:   /78 (BP Location: Left arm, Patient Position: Sitting, BP Method: Medium (Manual))   Pulse 97   Ht 6' (1.829 m)   Wt 96.6 kg (212 lb 15.4 oz)   SpO2 98%   BMI 28.88 kg/m²        General: AAO x3, no apparent distress  HEENT: PERRL, OP clear  CV: RRR, no m/r/g  Pulm: Lungs CTAB, no crackles, no wheezes  Abd: s/NT/ND +BS  Extremities: no c/c/e    Labs:       Assessment/Plan      Mirza was seen today for fatigue and back pain.    Diagnoses and all orders for this visit:    Fatigue, unspecified type  Unclear etiology, possibly related to Latuda based on timing.  Will check basic labs since due.  -     Hemoglobin A1c; Future  -     TSH; Future  -     Vitamin B12; Future  -     Folate; Future  -     Vitamin D; Future  -     T4, free; Future    Degenerative lumbar spinal stenosis  As per HPI  Based on his hx, advised that I would not recommend any type of narcotic pain meds.  Will try mobic to see if this helps.  -     meloxicam (MOBIC) 7.5 MG tablet; Take 1 tablet (7.5 mg total) by mouth once daily.    Hyponatremia  Improved on last check, possibly related to depakote, will repeat labs  -     Comprehensive metabolic panel; Future    Anemia of chronic disease  Slightly better on labs last year  Will repeat now  Also advised to schedule csc as previously discussed.  -     CBC auto differential; Future    Seizure disorder  As per HPI  Advised he is due for Neuro f/u, has had some persistent tremors as well.  F/u scheduled.    Bipolar I disorder  As per HPI  Overall doing better on current regimen, mgmt as per psych.    Screening PSA (prostate specific antigen)  -     PSA, Screening; Future      HM as above  RTC in 4 mos for f/u, sooner if needed.  Labs today.    Jud Mo MD  Department of Internal Medicine - [facility]  07/24/2019

## 2019-08-01 DIAGNOSIS — Z12.11 SPECIAL SCREENING FOR MALIGNANT NEOPLASMS, COLON: Primary | ICD-10-CM

## 2019-08-01 RX ORDER — POLYETHYLENE GLYCOL 3350, SODIUM SULFATE ANHYDROUS, SODIUM BICARBONATE, SODIUM CHLORIDE, POTASSIUM CHLORIDE 236; 22.74; 6.74; 5.86; 2.97 G/4L; G/4L; G/4L; G/4L; G/4L
4 POWDER, FOR SOLUTION ORAL ONCE
Qty: 4000 ML | Refills: 0 | Status: SHIPPED | OUTPATIENT
Start: 2019-08-01 | End: 2019-08-01

## 2019-08-14 ENCOUNTER — ANESTHESIA EVENT (OUTPATIENT)
Dept: ENDOSCOPY | Facility: HOSPITAL | Age: 64
End: 2019-08-14
Payer: COMMERCIAL

## 2019-08-14 ENCOUNTER — HOSPITAL ENCOUNTER (OUTPATIENT)
Facility: HOSPITAL | Age: 64
Discharge: HOME | End: 2019-08-14
Attending: COLON & RECTAL SURGERY | Admitting: COLON & RECTAL SURGERY
Payer: COMMERCIAL

## 2019-08-14 ENCOUNTER — ANESTHESIA (OUTPATIENT)
Dept: ENDOSCOPY | Facility: HOSPITAL | Age: 64
End: 2019-08-14
Payer: COMMERCIAL

## 2019-08-14 VITALS
OXYGEN SATURATION: 99 % | HEART RATE: 101 BPM | TEMPERATURE: 98 F | HEIGHT: 76 IN | DIASTOLIC BLOOD PRESSURE: 89 MMHG | SYSTOLIC BLOOD PRESSURE: 155 MMHG | WEIGHT: 215 LBS | RESPIRATION RATE: 16 BRPM | BODY MASS INDEX: 26.18 KG/M2

## 2019-08-14 DIAGNOSIS — Z98.890 S/P COLONOSCOPY: ICD-10-CM

## 2019-08-14 DIAGNOSIS — Z12.11 SCREENING FOR MALIGNANT NEOPLASM OF COLON: Primary | ICD-10-CM

## 2019-08-14 PROCEDURE — 63600175 PHARM REV CODE 636 W HCPCS: Performed by: NURSE ANESTHETIST, CERTIFIED REGISTERED

## 2019-08-14 PROCEDURE — G0121 COLON CA SCRN NOT HI RSK IND: HCPCS | Performed by: COLON & RECTAL SURGERY

## 2019-08-14 PROCEDURE — 37000008 HC ANESTHESIA 1ST 15 MINUTES: Performed by: COLON & RECTAL SURGERY

## 2019-08-14 PROCEDURE — E9220 PRA ENDO ANESTHESIA: HCPCS | Mod: ,,, | Performed by: NURSE ANESTHETIST, CERTIFIED REGISTERED

## 2019-08-14 PROCEDURE — 37000009 HC ANESTHESIA EA ADD 15 MINS: Performed by: COLON & RECTAL SURGERY

## 2019-08-14 PROCEDURE — E9220 PRA ENDO ANESTHESIA: ICD-10-PCS | Mod: ,,, | Performed by: NURSE ANESTHETIST, CERTIFIED REGISTERED

## 2019-08-14 PROCEDURE — 25000003 PHARM REV CODE 250: Performed by: NURSE ANESTHETIST, CERTIFIED REGISTERED

## 2019-08-14 PROCEDURE — G0121 COLON CA SCRN NOT HI RSK IND: ICD-10-PCS | Mod: ,,, | Performed by: COLON & RECTAL SURGERY

## 2019-08-14 PROCEDURE — 63600175 PHARM REV CODE 636 W HCPCS: Performed by: COLON & RECTAL SURGERY

## 2019-08-14 PROCEDURE — G0121 COLON CA SCRN NOT HI RSK IND: HCPCS | Mod: ,,, | Performed by: COLON & RECTAL SURGERY

## 2019-08-14 RX ORDER — LIDOCAINE HCL/PF 100 MG/5ML
SYRINGE (ML) INTRAVENOUS
Status: DISCONTINUED | OUTPATIENT
Start: 2019-08-14 | End: 2019-08-14

## 2019-08-14 RX ORDER — PROPOFOL 10 MG/ML
VIAL (ML) INTRAVENOUS
Status: DISCONTINUED | OUTPATIENT
Start: 2019-08-14 | End: 2019-08-14

## 2019-08-14 RX ORDER — SODIUM CHLORIDE 9 MG/ML
INJECTION, SOLUTION INTRAVENOUS CONTINUOUS
Status: DISCONTINUED | OUTPATIENT
Start: 2019-08-14 | End: 2019-08-14 | Stop reason: HOSPADM

## 2019-08-14 RX ORDER — GLYCOPYRROLATE 0.2 MG/ML
INJECTION INTRAMUSCULAR; INTRAVENOUS
Status: DISCONTINUED | OUTPATIENT
Start: 2019-08-14 | End: 2019-08-14

## 2019-08-14 RX ORDER — PROPOFOL 10 MG/ML
VIAL (ML) INTRAVENOUS CONTINUOUS PRN
Status: DISCONTINUED | OUTPATIENT
Start: 2019-08-14 | End: 2019-08-14

## 2019-08-14 RX ADMIN — PROPOFOL 40 MG: 10 INJECTION, EMULSION INTRAVENOUS at 09:08

## 2019-08-14 RX ADMIN — PROPOFOL 150 MCG/KG/MIN: 10 INJECTION, EMULSION INTRAVENOUS at 09:08

## 2019-08-14 RX ADMIN — LIDOCAINE HYDROCHLORIDE 40 MG: 20 INJECTION, SOLUTION INTRAVENOUS at 09:08

## 2019-08-14 RX ADMIN — SODIUM CHLORIDE: 0.9 INJECTION, SOLUTION INTRAVENOUS at 08:08

## 2019-08-14 RX ADMIN — GLYCOPYRROLATE 0.2 MG: 0.2 INJECTION, SOLUTION INTRAMUSCULAR; INTRAVENOUS at 09:08

## 2019-08-14 NOTE — DISCHARGE INSTRUCTIONS
Colonoscopy     A camera attached to a flexible tube with a viewing lens is used to take video pictures.     Colonoscopy is a test to view the inside of your lower digestive tract (colon and rectum). Sometimes it can show the last part of the small intestine (ileum). During the test, small pieces of tissue may be removed for testing. This is called a biopsy. Small growths, such as polyps, may also be removed.   Why is colonoscopy done?  The test is done to help look for colon cancer. And it can help find the source of abdominal pain, bleeding, and changes in bowel habits. It may be needed once a year, depending on factors such as your:  · Age  · Health history  · Family health history  · Symptoms  · Results from any prior colonoscopy  Risks and possible complications  These include:  · Bleeding               · A puncture or tear in the colon   · Risks of anesthesia  · A cancer lesion not being seen  Getting ready   To prepare for the test:  · Talk with your healthcare provider about the risks of the test (see below). Also ask your healthcare provider about alternatives to the test.  · Tell your healthcare provider about any medicines you take. Also tell him or her about any health conditions you may have.  · Make sure your rectum and colon are empty for the test. Follow the diet and bowel prep instructions exactly. If you dont, the test may need to be rescheduled.  · Plan for a friend or family member to drive you home after the test.     Colonoscopy provides an inside view of the entire colon.     You may discuss the results with your doctor right away or at a future visit.  During the test   The test is usually done in the hospital on an outpatient basis. This means you go home the same day. The procedure takes about 30 minutes. During that time:  · You are given relaxing (sedating) medicine through an IV line. You may be drowsy, or fully asleep.  · The healthcare provider will first give you a physical exam to  check for anal and rectal problems.  · Then the anus is lubricated and the scope inserted.  · If you are awake, you may have a feeling similar to needing to have a bowel movement. You may also feel pressure as air is pumped into the colon. Its OK to pass gas during the procedure.  · Biopsy, polyp removal, or other treatments may be done during the test.  After the test   You may have gas right after the test. It can help to try to pass it to help prevent later bloating. Your healthcare provider may discuss the results with you right away. Or you may need to schedule a follow-up visit to talk about the results. After the test, you can go back to your normal eating and other activities. You may be tired from the sedation and need to rest for a few hours.  Date Last Reviewed: 11/1/2016 © 2000-2017 [employer]. [address] [place] [address], [place] [address]. All rights reserved. This information is not intended as a substitute for professional medical care. Always follow your healthcare professional's instructions.

## 2019-08-14 NOTE — ANESTHESIA PREPROCEDURE EVALUATION
08/14/2019  Mirza Morales is a 64 y.o., male.    Anesthesia Evaluation         Review of Systems  Anesthesia Hx:  No problems with previous Anesthesia   Cardiovascular:   Hypertension    Pulmonary:   Hemidiaphragm paresis   Hepatic/GI:   Liver Disease, Hepatitis    Musculoskeletal:   Arthritis     Neurological:   Neuromuscular Disease, Seizures, well controlled    Endocrine:  Endocrine Normal    Psych:   Psychiatric History          Physical Exam  General:  Well nourished    Airway/Jaw/Neck:  Airway Findings: Mouth Opening: Normal Tongue: Normal  General Airway Assessment: Adult  Mallampati: II  TM Distance: Normal, at least 6 cm      Dental:  Dental Findings: Periodontal disease, Severe    Chest/Lungs:  Chest/Lungs Clear    Heart/Vascular:  Heart Findings: Normal            Anesthesia Plan  Type of Anesthesia, risks & benefits discussed:  Anesthesia Type:  general  Patient's Preference:   Intra-op Monitoring Plan: standard ASA monitors  Intra-op Monitoring Plan Comments:   Post Op Pain Control Plan: IV/PO Opioids PRN  Post Op Pain Control Plan Comments: Opi  Induction:   IV  Beta Blocker:  Patient is not currently on a Beta-Blocker (No further documentation required).       Informed Consent: Patient understands risks and agrees with Anesthesia plan.  Questions answered. Anesthesia consent signed with patient.  ASA Score: 2     Day of Surgery Review of History & Physical:    H&P update referred to the surgeon.     Anesthesia Plan Notes: I have personally evaluated the patient and discussed risk/benefits/alternatives of general anesthesia.        Ready For Surgery From Anesthesia Perspective.

## 2019-08-14 NOTE — ANESTHESIA POSTPROCEDURE EVALUATION
Anesthesia Post Evaluation    Patient: Mirza Morales    Procedure(s) Performed: Procedure(s) (LRB):  COLONOSCOPY (N/A)    Final Anesthesia Type: general  Patient location during evaluation: GI PACU  Patient participation: Yes- Able to Participate  Level of consciousness: awake and alert and oriented  Post-procedure vital signs: reviewed and stable  Pain management: adequate  Airway patency: patent  PONV status at discharge: No PONV  Anesthetic complications: no      Cardiovascular status: blood pressure returned to baseline  Respiratory status: unassisted, spontaneous ventilation and room air  Hydration status: euvolemic  Follow-up not needed.          Vitals Value Taken Time   /89 8/14/2019 10:28 AM   Temp 36.7 °C (98 °F) 8/14/2019 10:08 AM   Pulse 101 8/14/2019 10:28 AM   Resp 16 8/14/2019 10:28 AM   SpO2 99 % 8/14/2019 10:28 AM         Event Time     Out of Recovery 10:28:56          Pain/Liss Score: Liss Score: 10 (8/14/2019 10:28 AM)

## 2019-08-14 NOTE — [employer] PATIENT INSTRUCTIONS
Discharge Summary/Instructions after an Endoscopic Procedure  Patient Name: Mirza Morales  Patient MRN: [id number]  Patient YOB: 1955 Wednesday, August 14, 2019  Tammy Duval MD  RESTRICTIONS:  During your procedure today, you received medications for sedation.  These   medications may affect your judgment, balance and coordination.  Therefore,   for 24 hours, you have the following restrictions:   - DO NOT drive a car, operate machinery, make legal/financial decisions,   sign important papers or drink alcohol.    ACTIVITY:  Today: no heavy lifting, straining or running due to procedural   sedation/anesthesia.  The following day: return to full activity including work.  DIET:  Eat and drink normally unless instructed otherwise.     TREATMENT FOR COMMON SIDE EFFECTS:  - Mild abdominal pain, nausea, belching, bloating or excessive gas:  rest,   eat lightly and use a heating pad.  - Sore Throat: treat with throat lozenges and/or gargle with warm salt   water.  - Because air was used during the procedure, expelling large amounts of air   from your rectum or belching is normal.  - If a bowel prep was taken, you may not have a bowel movement for 1-3 days.    This is normal.  SYMPTOMS TO WATCH FOR AND REPORT TO YOUR PHYSICIAN:  1. Abdominal pain or bloating, other than gas cramps.  2. Chest pain.  3. Back pain.  4. Signs of infection such as: chills or fever occurring within 24 hours   after the procedure.  5. Rectal bleeding, which would show as bright red, maroon, or black stools.   (A tablespoon of blood from the rectum is not serious, especially if   hemorrhoids are present.)  6. Vomiting.  7. Weakness or dizziness.  GO DIRECTLY TO THE NEAREST EMERGENCY ROOM IF YOU HAVE ANY OF THE FOLLOWING:      Difficulty breathing              Chills and/or fever over 101 F   Persistent vomiting and/or vomiting blood   Severe abdominal pain   Severe chest pain   Black, tarry stools   Bleeding- more than one  tablespoon   Any other symptom or condition that you feel may need urgent attention  Your doctor recommends these additional instructions:  If any biopsies were taken, your doctors clinic will contact you in 1 to 2   weeks with any results.  - Discharge patient to home.   - Resume previous diet.   - Continue present medications.   - Repeat colonoscopy in 10 years for screening purposes.   - Return to referring physician.   - Written discharge instructions were provided to the patient.   - The signs and symptoms of potential delayed complications were discussed   with the patient.   - Patient has a contact number available for emergencies.   - Return to normal activities tomorrow.  For questions, problems or results please call your physician - Tammy Duval MD at Work:  [phone].  [facility], EMERGENCY ROOM PHONE NUMBER: [phone]  IF A COMPLICATION OR EMERGENCY SITUATION ARISES AND YOU ARE UNABLE TO REACH   YOUR PHYSICIAN - GO DIRECTLY TO THE EMERGENCY ROOM.  Tammy Duval MD  8/14/2019 10:05:05 AM  This report has been verified and signed electronically.  [employer]

## 2019-08-14 NOTE — TRANSFER OF CARE
"Anesthesia Transfer of Care Note    Patient: Mirza Morales    Procedure(s) Performed: Procedure(s) (LRB):  COLONOSCOPY (N/A)    Patient location: PACU    Anesthesia Type: general    Transport from OR: Transported from OR on room air with adequate spontaneous ventilation    Post pain: adequate analgesia    Post assessment: no apparent anesthetic complications    Post vital signs: stable    Level of consciousness: awake    Nausea/Vomiting: no nausea/vomiting    Complications: none    Transfer of care protocol was followed      Last vitals:   Visit Vitals  /67 (BP Location: Left arm, Patient Position: Lying)   Pulse 100   Temp 36.7 °C (98 °F) (Temporal)   Resp 17   Ht 6' 4" (1.93 m)   Wt 97.5 kg (215 lb)   SpO2 98%   BMI 26.17 kg/m²     "

## 2019-08-14 NOTE — H&P
COLONOSCOPY HISTORY AND PHYSICAL EXAM    Procedure : Colonoscopy      INDICATIONS: asymptomatic screening exam    Family Hx of CRC: None    Last Colonoscopy:  2008  Findings: Normal       Past Medical History:   Diagnosis Date    Alcohol abuse     Behavioral problem     Bipolar 1 disorder     Chronic right hip pain     Depression     DJD (degenerative joint disease), lumbar 1/27/2015    Fatigue     Hepatitis     pt. denies this    History of psychiatric care     History of psychiatric hospitalization     Hypertension     Karina     Post traumatic stress disorder     Psychiatric problem     Seizures     Suicide attempt     Therapy      Sedation Problems: NO  Family History   Problem Relation Age of Onset    Alcohol abuse Mother     Depression Mother     Depression Father     Depression Sister     Suicide Sister     Drug abuse Brother     Anxiety disorder Brother     Bipolar disorder Brother     Depression Brother     Alcohol abuse Maternal Uncle     Alcohol abuse Paternal Uncle     Dementia Maternal Grandmother     Alcohol abuse Cousin     Amblyopia Neg Hx     Blindness Neg Hx     Cancer Neg Hx     Cataracts Neg Hx     Diabetes Neg Hx     Glaucoma Neg Hx     Hypertension Neg Hx     Macular degeneration Neg Hx     Retinal detachment Neg Hx     Strabismus Neg Hx     Stroke Neg Hx     Thyroid disease Neg Hx     Asthma Neg Hx     Emphysema Neg Hx      Fam Hx of Sedation Problems: NO  Social History     Socioeconomic History    Marital status:      Spouse name: Not on file    Number of children: 2    Years of education: 14    Highest education level: Not on file   Occupational History    Occupation: disabilty   Social Needs    Financial resource strain: Not on file    Food insecurity:     Worry: Not on file     Inability: Not on file    Transportation needs:     Medical: Not on file     Non-medical: Not on file   Tobacco Use    Smoking status: Current Some Day  Smoker     Packs/day: 0.25     Years: 10.00     Pack years: 2.50    Smokeless tobacco: Never Used   Substance and Sexual Activity    Alcohol use: No     Alcohol/week: 10.0 oz     Types: 20 Standard drinks or equivalent per week     Comment: quit 4 years ago    Drug use: No    Sexual activity: Yes     Partners: Female     Comment: with wife; monogamous    Lifestyle    Physical activity:     Days per week: Not on file     Minutes per session: Not on file    Stress: Not on file   Relationships    Social connections:     Talks on phone: Not on file     Gets together: Not on file     Attends Congregation service: Not on file     Active member of club or organization: Not on file     Attends meetings of clubs or organizations: Not on file     Relationship status: Not on file   Other Topics Concern    Caffeine Use: Excessive Not Asked    Financial Status: Unemployed No    Legal: Other Not Asked    Childhood History: Adopted No    Financial Status: Other Not Asked    Leisure: Exercise Not Asked    Childhood History: Early trauma Yes    Firearms: Does patient have access to a firearm? Yes    Leisure: Fishing Yes    Childhood History: Raised by parents No    Home situation: homeless No    Leisure: Hunting Not Asked    Childhood History: Uneventful Not Asked    Home situation: lives alone No    Leisure: Movie Watching Not Asked    Childhood History: Other Not Asked    Home situation: lives in group home No    Leisure: Shopping Not Asked    Education: Unfinished High School No    Home situation: lives in nursing home No    Leisure: Sports Not Asked    Education: High School Graduate Yes    Home situation: lives in shelter No    Leisure: Time with family Not Asked    Education: Unfinished college Yes    Home situation: lives with family No     Service No    Education: Trade School No    Home situation: lives with friends No    Spirituality: Active Participation No    Education:  "Associate's Degree No    Home situation: lives with significant other No    Spirituality: Organized Rastafarian Yes    Education: Bachelor's Degree No    Home situation: lives with spouse Yes    Spirituality: Private Participation Not Asked    Education: More than one Bachelor's or Professional No    Legal consequences of chemical use Not Asked    Patient feels they ought to cut down on drinking/drug use Yes    Education: Master's, PhD No    Legal: Arrest history Yes    Patient annoyed by others criticizing their drinking/drug use No    Financial Status: Disabled Yes    Legal: Involved in civil litigation Yes    Patient has felt bad or guilty about drinking/drug use Yes    Financial Status: Employed No    Legal: Involved in criminal litigation Not Asked    Patient has had a drink/used drugs as an eye opener in the AM No   Social History Narrative    Not on file       Review of Systems - Negative except   Respiratory ROS: no dyspnea  Cardiovascular ROS: no exertional chest pain  Gastrointestinal ROS: NO abdominal discomfort,  NO rectal bleeding  Musculoskeletal ROS: no muscular pain  Neurological ROS: no recent stroke    Physical Exam:  /82 (BP Location: Left arm, Patient Position: Lying)   Pulse 85   Temp 97.9 °F (36.6 °C) (Temporal)   Resp 18   Ht 6' 4" (1.93 m)   Wt 97.5 kg (215 lb)   SpO2 95%   BMI 26.17 kg/m²   General: no distress  Head: normocephalic  Mallampati Score   Neck: supple, symmetrical, trachea midline  Lungs:  clear to auscultation bilaterally and normal respiratory effort  Heart: regular rate and rhythm and no murmur  Abdomen: soft, non-tender non-distented; bowel sounds normal; no masses,  no organomegaly  Extremities: no cyanosis or edema, or clubbing    ASA:  II    PLAN  COLONOSCOPY.    SedationPlan :MAC    The details of the procedure, the possible need for biopsy or polypectomy and the potential risks including bleeding, perforation, missed polyps were discussed in " detail.

## 2019-08-21 ENCOUNTER — TELEPHONE (OUTPATIENT)
Dept: ENDOSCOPY | Facility: HOSPITAL | Age: 64
End: 2019-08-21

## 2019-08-21 DIAGNOSIS — M48.061 DEGENERATIVE LUMBAR SPINAL STENOSIS: ICD-10-CM

## 2019-08-21 RX ORDER — MELOXICAM 7.5 MG/1
TABLET ORAL
Qty: 30 TABLET | Refills: 0 | Status: SHIPPED | OUTPATIENT
Start: 2019-08-21 | End: 2019-10-04 | Stop reason: SDUPTHER

## 2019-09-09 ENCOUNTER — PATIENT MESSAGE (OUTPATIENT)
Dept: INTERNAL MEDICINE | Facility: CLINIC | Age: 64
End: 2019-09-09

## 2019-09-09 DIAGNOSIS — B02.9 HERPES ZOSTER WITHOUT COMPLICATION: Primary | ICD-10-CM

## 2019-09-09 RX ORDER — VALACYCLOVIR HYDROCHLORIDE 1 G/1
1000 TABLET, FILM COATED ORAL EVERY 8 HOURS
Qty: 21 TABLET | Refills: 0 | Status: SHIPPED | OUTPATIENT
Start: 2019-09-09 | End: 2020-01-29

## 2019-09-09 NOTE — TELEPHONE ENCOUNTER
Pt c rash x 3 days c/w shingles based on picture sent via portal message.   Spoke to his wife.  Pt unable to get appt here and says [facility] had a 3 hr wait since one of the doctors called in sick.  Pt left because wait was too long.  Will rx valtrex for 7 days.  Discussed precautions.  Avoid picking.  Tylenol prn pain.  Pt allergic to gabapentin.

## 2019-09-25 ENCOUNTER — PATIENT MESSAGE (OUTPATIENT)
Dept: PSYCHIATRY | Facility: CLINIC | Age: 64
End: 2019-09-25

## 2019-09-29 ENCOUNTER — HOSPITAL ENCOUNTER (INPATIENT)
Facility: HOSPITAL | Age: 64
LOS: 1 days | Discharge: HOME | DRG: 880 | End: 2019-10-03
Attending: EMERGENCY MEDICINE | Admitting: HOSPITALIST
Payer: COMMERCIAL
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
## 2019-09-29 DIAGNOSIS — S01.01XA SCALP LACERATION, INITIAL ENCOUNTER: ICD-10-CM

## 2019-09-29 DIAGNOSIS — R05.9 COUGH: ICD-10-CM

## 2019-09-29 DIAGNOSIS — R56.9 SEIZURE: Primary | ICD-10-CM

## 2019-09-29 DIAGNOSIS — F31.31 BIPOLAR AFFECTIVE DISORDER, CURRENTLY DEPRESSED, MILD: ICD-10-CM

## 2019-09-29 DIAGNOSIS — R41.0 DELIRIUM: ICD-10-CM

## 2019-09-29 PROBLEM — B02.8 HERPES ZOSTER DERMATITIS: Status: ACTIVE | Noted: 2019-09-29

## 2019-09-29 PROBLEM — L30.8 HERPES ZOSTER DERMATITIS: Status: ACTIVE | Noted: 2019-09-29

## 2019-09-29 LAB
ALBUMIN SERPL BCP-MCNC: 3.5 G/DL (ref 3.5–5.2)
ALP SERPL-CCNC: 78 U/L (ref 55–135)
ALT SERPL W/O P-5'-P-CCNC: 12 U/L (ref 10–44)
ANION GAP SERPL CALC-SCNC: 9 MMOL/L (ref 8–16)
AST SERPL-CCNC: 20 U/L (ref 10–40)
BACTERIA #/AREA URNS AUTO: ABNORMAL /HPF
BASOPHILS # BLD AUTO: 0.04 K/UL (ref 0–0.2)
BASOPHILS NFR BLD: 0.3 % (ref 0–1.9)
BILIRUB SERPL-MCNC: 0.5 MG/DL (ref 0.1–1)
BILIRUB UR QL STRIP: NEGATIVE
BUN SERPL-MCNC: 18 MG/DL (ref 8–23)
CALCIUM SERPL-MCNC: 9.5 MG/DL (ref 8.7–10.5)
CHLORIDE SERPL-SCNC: 98 MMOL/L (ref 95–110)
CLARITY UR REFRACT.AUTO: CLEAR
CO2 SERPL-SCNC: 25 MMOL/L (ref 23–29)
COLOR UR AUTO: YELLOW
CREAT SERPL-MCNC: 1 MG/DL (ref 0.5–1.4)
DIFFERENTIAL METHOD: ABNORMAL
EOSINOPHIL # BLD AUTO: 0 K/UL (ref 0–0.5)
EOSINOPHIL NFR BLD: 0.3 % (ref 0–8)
ERYTHROCYTE [DISTWIDTH] IN BLOOD BY AUTOMATED COUNT: 13.2 % (ref 11.5–14.5)
EST. GFR  (AFRICAN AMERICAN): >60 ML/MIN/1.73 M^2
EST. GFR  (NON AFRICAN AMERICAN): >60 ML/MIN/1.73 M^2
GLUCOSE SERPL-MCNC: 121 MG/DL (ref 70–110)
GLUCOSE UR QL STRIP: NEGATIVE
HCT VFR BLD AUTO: 43.4 % (ref 40–54)
HGB BLD-MCNC: 15.1 G/DL (ref 14–18)
HGB UR QL STRIP: ABNORMAL
HYALINE CASTS UR QL AUTO: 7 /LPF
IMM GRANULOCYTES # BLD AUTO: 0.16 K/UL (ref 0–0.04)
IMM GRANULOCYTES NFR BLD AUTO: 1.4 % (ref 0–0.5)
KETONES UR QL STRIP: ABNORMAL
LEUKOCYTE ESTERASE UR QL STRIP: NEGATIVE
LYMPHOCYTES # BLD AUTO: 1.4 K/UL (ref 1–4.8)
LYMPHOCYTES NFR BLD: 11.6 % (ref 18–48)
MCH RBC QN AUTO: 33.2 PG (ref 27–31)
MCHC RBC AUTO-ENTMCNC: 34.8 G/DL (ref 32–36)
MCV RBC AUTO: 95 FL (ref 82–98)
MICROSCOPIC COMMENT: ABNORMAL
MONOCYTES # BLD AUTO: 0.8 K/UL (ref 0.3–1)
MONOCYTES NFR BLD: 7 % (ref 4–15)
NEUTROPHILS # BLD AUTO: 9.4 K/UL (ref 1.8–7.7)
NEUTROPHILS NFR BLD: 79.4 % (ref 38–73)
NITRITE UR QL STRIP: NEGATIVE
NRBC BLD-RTO: 0 /100 WBC
PH UR STRIP: 6 [PH] (ref 5–8)
PLATELET # BLD AUTO: 158 K/UL (ref 150–350)
PMV BLD AUTO: 10.5 FL (ref 9.2–12.9)
POTASSIUM SERPL-SCNC: 4.3 MMOL/L (ref 3.5–5.1)
PROT SERPL-MCNC: 7.5 G/DL (ref 6–8.4)
PROT UR QL STRIP: ABNORMAL
RBC # BLD AUTO: 4.55 M/UL (ref 4.6–6.2)
RBC #/AREA URNS AUTO: 1 /HPF (ref 0–4)
SODIUM SERPL-SCNC: 132 MMOL/L (ref 136–145)
SP GR UR STRIP: 1.02 (ref 1–1.03)
URN SPEC COLLECT METH UR: ABNORMAL
VALPROATE SERPL-MCNC: 48.2 UG/ML (ref 50–100)
WBC # BLD AUTO: 11.77 K/UL (ref 3.9–12.7)
WBC #/AREA URNS AUTO: 1 /HPF (ref 0–5)

## 2019-09-29 PROCEDURE — 25000003 PHARM REV CODE 250: Performed by: STUDENT IN AN ORGANIZED HEALTH CARE EDUCATION/TRAINING PROGRAM

## 2019-09-29 PROCEDURE — G0378 HOSPITAL OBSERVATION PER HR: HCPCS

## 2019-09-29 PROCEDURE — 12002 PR RESUP NPTERF WND BODY 2.6-7.5 CM: ICD-10-PCS | Mod: ,,, | Performed by: EMERGENCY MEDICINE

## 2019-09-29 PROCEDURE — 93010 ELECTROCARDIOGRAM REPORT: CPT | Mod: ,,, | Performed by: INTERNAL MEDICINE

## 2019-09-29 PROCEDURE — 12002 RPR S/N/AX/GEN/TRNK2.6-7.5CM: CPT

## 2019-09-29 PROCEDURE — 25000003 PHARM REV CODE 250: Performed by: EMERGENCY MEDICINE

## 2019-09-29 PROCEDURE — 93005 ELECTROCARDIOGRAM TRACING: CPT

## 2019-09-29 PROCEDURE — 81001 URINALYSIS AUTO W/SCOPE: CPT

## 2019-09-29 PROCEDURE — 99285 EMERGENCY DEPT VISIT HI MDM: CPT | Mod: 25,,, | Performed by: EMERGENCY MEDICINE

## 2019-09-29 PROCEDURE — 63600175 PHARM REV CODE 636 W HCPCS: Performed by: STUDENT IN AN ORGANIZED HEALTH CARE EDUCATION/TRAINING PROGRAM

## 2019-09-29 PROCEDURE — 80053 COMPREHEN METABOLIC PANEL: CPT

## 2019-09-29 PROCEDURE — 80164 ASSAY DIPROPYLACETIC ACD TOT: CPT

## 2019-09-29 PROCEDURE — 99285 PR EMERGENCY DEPT VISIT,LEVEL V: ICD-10-PCS | Mod: 25,,, | Performed by: EMERGENCY MEDICINE

## 2019-09-29 PROCEDURE — 63600175 PHARM REV CODE 636 W HCPCS: Performed by: PHYSICIAN ASSISTANT

## 2019-09-29 PROCEDURE — 99285 EMERGENCY DEPT VISIT HI MDM: CPT | Mod: 25

## 2019-09-29 PROCEDURE — 99220 PR INITIAL OBSERVATION CARE,LEVL III: CPT | Mod: ,,, | Performed by: PHYSICIAN ASSISTANT

## 2019-09-29 PROCEDURE — 25000003 PHARM REV CODE 250: Performed by: PHYSICIAN ASSISTANT

## 2019-09-29 PROCEDURE — 93010 EKG 12-LEAD: ICD-10-PCS | Mod: ,,, | Performed by: INTERNAL MEDICINE

## 2019-09-29 PROCEDURE — 12002 RPR S/N/AX/GEN/TRNK2.6-7.5CM: CPT | Mod: ,,, | Performed by: EMERGENCY MEDICINE

## 2019-09-29 PROCEDURE — 85025 COMPLETE CBC W/AUTO DIFF WBC: CPT

## 2019-09-29 PROCEDURE — 99220 PR INITIAL OBSERVATION CARE,LEVL III: ICD-10-PCS | Mod: ,,, | Performed by: PHYSICIAN ASSISTANT

## 2019-09-29 RX ORDER — ATORVASTATIN CALCIUM 20 MG/1
40 TABLET, FILM COATED ORAL DAILY
Status: DISCONTINUED | OUTPATIENT
Start: 2019-09-30 | End: 2019-10-03 | Stop reason: HOSPADM

## 2019-09-29 RX ORDER — SODIUM CHLORIDE 0.9 % (FLUSH) 0.9 %
5 SYRINGE (ML) INJECTION
Status: DISCONTINUED | OUTPATIENT
Start: 2019-09-29 | End: 2019-10-03 | Stop reason: HOSPADM

## 2019-09-29 RX ORDER — MUPIROCIN 20 MG/G
OINTMENT TOPICAL 2 TIMES DAILY
Status: DISCONTINUED | OUTPATIENT
Start: 2019-09-30 | End: 2019-10-03 | Stop reason: HOSPADM

## 2019-09-29 RX ORDER — DOXEPIN HYDROCHLORIDE 100 MG/1
100 CAPSULE ORAL NIGHTLY
Status: DISCONTINUED | OUTPATIENT
Start: 2019-09-29 | End: 2019-09-30

## 2019-09-29 RX ORDER — ACETAMINOPHEN 500 MG
1000 TABLET ORAL
Status: COMPLETED | OUTPATIENT
Start: 2019-09-29 | End: 2019-09-29

## 2019-09-29 RX ORDER — RAMELTEON 8 MG/1
8 TABLET ORAL NIGHTLY PRN
Status: DISCONTINUED | OUTPATIENT
Start: 2019-09-29 | End: 2019-10-03 | Stop reason: HOSPADM

## 2019-09-29 RX ORDER — KETOROLAC TROMETHAMINE 30 MG/ML
15 INJECTION, SOLUTION INTRAMUSCULAR; INTRAVENOUS EVERY 6 HOURS PRN
Status: DISPENSED | OUTPATIENT
Start: 2019-09-29 | End: 2019-10-02

## 2019-09-29 RX ORDER — ONDANSETRON 4 MG/1
4 TABLET, ORALLY DISINTEGRATING ORAL EVERY 8 HOURS PRN
Status: DISCONTINUED | OUTPATIENT
Start: 2019-09-29 | End: 2019-10-03 | Stop reason: HOSPADM

## 2019-09-29 RX ORDER — IPRATROPIUM BROMIDE AND ALBUTEROL SULFATE 2.5; .5 MG/3ML; MG/3ML
3 SOLUTION RESPIRATORY (INHALATION) EVERY 4 HOURS PRN
Status: DISCONTINUED | OUTPATIENT
Start: 2019-09-29 | End: 2019-10-03 | Stop reason: HOSPADM

## 2019-09-29 RX ORDER — ONDANSETRON 2 MG/ML
4 INJECTION INTRAMUSCULAR; INTRAVENOUS EVERY 8 HOURS PRN
Status: DISCONTINUED | OUTPATIENT
Start: 2019-09-29 | End: 2019-10-03 | Stop reason: HOSPADM

## 2019-09-29 RX ORDER — LACOSAMIDE 50 MG/1
100 TABLET ORAL EVERY 12 HOURS
Status: DISCONTINUED | OUTPATIENT
Start: 2019-09-29 | End: 2019-09-30

## 2019-09-29 RX ORDER — ACETAMINOPHEN 325 MG/1
650 TABLET ORAL EVERY 4 HOURS PRN
Status: DISCONTINUED | OUTPATIENT
Start: 2019-09-29 | End: 2019-10-03 | Stop reason: HOSPADM

## 2019-09-29 RX ORDER — DIVALPROEX SODIUM 250 MG/1
1000 TABLET, DELAYED RELEASE ORAL NIGHTLY
Status: DISCONTINUED | OUTPATIENT
Start: 2019-09-29 | End: 2019-09-30

## 2019-09-29 RX ORDER — GLUCAGON 1 MG
1 KIT INJECTION
Status: DISCONTINUED | OUTPATIENT
Start: 2019-09-29 | End: 2019-10-03 | Stop reason: HOSPADM

## 2019-09-29 RX ORDER — IBUPROFEN 200 MG
16 TABLET ORAL
Status: DISCONTINUED | OUTPATIENT
Start: 2019-09-29 | End: 2019-10-03 | Stop reason: HOSPADM

## 2019-09-29 RX ORDER — LIDOCAINE HYDROCHLORIDE 10 MG/ML
5 INJECTION, SOLUTION EPIDURAL; INFILTRATION; INTRACAUDAL; PERINEURAL
Status: COMPLETED | OUTPATIENT
Start: 2019-09-29 | End: 2019-09-29

## 2019-09-29 RX ORDER — IBUPROFEN 200 MG
24 TABLET ORAL
Status: DISCONTINUED | OUTPATIENT
Start: 2019-09-29 | End: 2019-10-03 | Stop reason: HOSPADM

## 2019-09-29 RX ORDER — AMOXICILLIN 250 MG
1 CAPSULE ORAL 2 TIMES DAILY PRN
Status: DISCONTINUED | OUTPATIENT
Start: 2019-09-29 | End: 2019-10-03 | Stop reason: HOSPADM

## 2019-09-29 RX ORDER — PANTOPRAZOLE SODIUM 40 MG/1
40 TABLET, DELAYED RELEASE ORAL DAILY
Status: DISCONTINUED | OUTPATIENT
Start: 2019-09-30 | End: 2019-10-03 | Stop reason: HOSPADM

## 2019-09-29 RX ORDER — LURASIDONE HYDROCHLORIDE 40 MG/1
40 TABLET, FILM COATED ORAL DAILY
Status: DISCONTINUED | OUTPATIENT
Start: 2019-09-30 | End: 2019-10-03 | Stop reason: HOSPADM

## 2019-09-29 RX ORDER — DIVALPROEX SODIUM 250 MG/1
500 TABLET, DELAYED RELEASE ORAL DAILY
Status: DISCONTINUED | OUTPATIENT
Start: 2019-09-30 | End: 2019-09-30

## 2019-09-29 RX ORDER — SODIUM CHLORIDE 9 MG/ML
INJECTION, SOLUTION INTRAVENOUS CONTINUOUS
Status: DISCONTINUED | OUTPATIENT
Start: 2019-09-29 | End: 2019-09-30

## 2019-09-29 RX ORDER — LIDOCAINE HYDROCHLORIDE 10 MG/ML
5 INJECTION, SOLUTION EPIDURAL; INFILTRATION; INTRACAUDAL; PERINEURAL
Status: DISCONTINUED | OUTPATIENT
Start: 2019-09-29 | End: 2019-09-29

## 2019-09-29 RX ADMIN — ACETAMINOPHEN 1000 MG: 500 TABLET ORAL at 08:09

## 2019-09-29 RX ADMIN — LIDOCAINE HYDROCHLORIDE 50 MG: 10 INJECTION, SOLUTION EPIDURAL; INFILTRATION; INTRACAUDAL; PERINEURAL at 06:09

## 2019-09-29 RX ADMIN — DEXTROSE 1000 MG: 50 INJECTION, SOLUTION INTRAVENOUS at 10:09

## 2019-09-29 RX ADMIN — THIAMINE HYDROCHLORIDE 100 MG: 100 INJECTION, SOLUTION INTRAMUSCULAR; INTRAVENOUS at 10:09

## 2019-09-29 RX ADMIN — SODIUM CHLORIDE: 0.9 INJECTION, SOLUTION INTRAVENOUS at 10:09

## 2019-09-29 NOTE — ED TRIAGE NOTES
"Pt is a 64 year ols male admitted for seizure activity witness at home by family members. Pt states he read on the Internet that he could "ween himself off of Depakote" because he doesn't like how it makes him feel. He states "it makes me really shaky and I'm usually as sturdy as a board!" Pt denies chest pain, abdominal pain, nausea vomiting, or loss of bowel control. Pt's wife at bedside.     LOC: The patient is awake, alert, and aware of environment. The patient is oriented x 3, but seems confused.   APPEARANCE: No acute distress noted.   PSYCHOSOCIAL: Patient is calm and cooperative.   SKIN: The skin is warm, dry. Pt has a laceration to the back of his head, currently wrapped in kerlex by EMS.  RESPIRATORY: Airway is open and patent. Bilateral chest rise and fall. Respirations are spontaneous, even and unlabored. Normal effort and rate noted. No accessory muscle use noted.   CARDIAC: Patient has a normal rate and rhythm. Denies chest pain or SOB.   ABDOMEN: Soft and non tender to palpation. No distention noted.   URINARY:  Voids independently.   EXTREMITIES: No swelling noted.   NEUROLOGIC: Eyes open spontaneously. Speech clear. Tolerating saliva secretions well. Able to follow commands, demonstrating ability to actively and appropriately communicate within context of current conversation. Symmetrical facial muscles. Moving all extremities well. Movement is purposeful.   MUSCULOSKELETAL: No obvious deformities noted.     "

## 2019-09-29 NOTE — ED PROVIDER NOTES
Encounter Date: 9/29/2019       History     Chief Complaint   Patient presents with    Seizures     pt has been weaning himself off his depakote, seizure hx. Found wandering neighborhood earlier and has seizure this  after  afternoon.  Has head lac      Mirza Morales is a 64 y.o. Male with history of bipolar 1 (Depakote, Latuda, doxepin), and seizure disorder (Vimpat) presents to ED after a witnessed seizure episode around 4:30 PM today. Patient was walking up the stairs and around about the 2nd stair he began to have a seizure and fell hitting his Right parietal scalp.  Patient's wife still states that she arrived around 20 sec after he began seizing and noticed tonic-clonic movements in his upper and lower extremities and his eyes open, however the patient was not responsive.  The episode lasted around 1 min.  The patient began bleeding profusely from a scalp laceration which eventually stopped with pressure.  EMS was called in the patient was transferred to our facility.  The patient has not ambulated since the incident.  The wife states that the patient had been severely depressed for the past 4-5 weeks.  He has not been getting out of bed, has been eating less, and refusing to go to his scheduled medical appointments.  The wife states that for the past few days the patient has been stating that he is going to wean himself off of Depakote because he does not like how it makes him feel and tremors that it causes him.  Patient's wife states that the last 2 days he has also had episodes of waxing waning confusion, including this morning when he was found wandering the neighborhood and brought home by his neighbors.  Patient currently endorses headache, and nausea.  Patient denies any other injuries during the fall and denies pain anywhere else besides his head. Patient denies blurred vision, chest pain, shortness of breath, abdominal pain, constipation, diarrhea, dysuria, SI, and HI.  The patient has also  had a chronic cough for the past few weeks.          Review of patient's allergies indicates:   Allergen Reactions    Gabapentin Other (See Comments)     SEVERE DIZZINESS AND BALANCE PROBLEMS, UNABLE TO WALK.    Nardil [phenelzine]      BECOMES HYPER, LIKE A MANIC EPISODE.    Penicillins Hives    Lamictal [lamotrigine] Rash     Past Medical History:   Diagnosis Date    Alcohol abuse     Behavioral problem     Bipolar 1 disorder     Chronic right hip pain     Depression     DJD (degenerative joint disease), lumbar 1/27/2015    Fatigue     Hepatitis     pt. denies this    History of psychiatric care     History of psychiatric hospitalization     Hypertension     Karina     Post traumatic stress disorder     Psychiatric problem     Seizures     Suicide attempt     Therapy      Past Surgical History:   Procedure Laterality Date    COLONOSCOPY N/A 8/14/2019    Procedure: COLONOSCOPY;  Surgeon: Tammy Duval MD;  Location: [facility] ([address] [place] [address]);  Service: Endoscopy;  Laterality: N/A;    HERNIA REPAIR      SPINE SURGERY  11-12-15    LAMINECTOMY/LUMBAR/WARE     Family History   Problem Relation Age of Onset    Alcohol abuse Mother     Depression Mother     Depression Father     Depression Sister     Suicide Sister     Drug abuse Brother     Anxiety disorder Brother     Bipolar disorder Brother     Depression Brother     Alcohol abuse Maternal Uncle     Alcohol abuse Paternal Uncle     Dementia Maternal Grandmother     Alcohol abuse Cousin     Amblyopia Neg Hx     Blindness Neg Hx     Cancer Neg Hx     Cataracts Neg Hx     Diabetes Neg Hx     Glaucoma Neg Hx     Hypertension Neg Hx     Macular degeneration Neg Hx     Retinal detachment Neg Hx     Strabismus Neg Hx     Stroke Neg Hx     Thyroid disease Neg Hx     Asthma Neg Hx     Emphysema Neg Hx      Social History     Tobacco Use    Smoking status: Current Some Day Smoker     Packs/day: 0.25     Years: 10.00     Pack  years: 2.50    Smokeless tobacco: Never Used   Substance Use Topics    Alcohol use: No     Alcohol/week: 16.7 standard drinks     Types: 20 Standard drinks or equivalent per week     Comment: quit 4 years ago    Drug use: No     Review of Systems   Constitutional: Positive for fatigue. Negative for fever.   HENT: Negative for rhinorrhea.    Eyes: Negative for pain.   Respiratory: Positive for cough. Negative for chest tightness and shortness of breath.    Cardiovascular: Negative for chest pain.   Gastrointestinal: Negative for abdominal pain.   Endocrine: Negative.    Genitourinary: Negative for dysuria.   Musculoskeletal: Positive for back pain. Negative for arthralgias and neck pain.   Skin: Positive for wound.   Allergic/Immunologic: Negative.    Neurological: Positive for seizures and syncope. Negative for dizziness and light-headedness.   Hematological: Bruises/bleeds easily.   Psychiatric/Behavioral: Positive for confusion, decreased concentration and dysphoric mood. Negative for agitation, behavioral problems, hallucinations, self-injury, sleep disturbance and suicidal ideas. The patient is not nervous/anxious and is not hyperactive.         Depression, increased sleep       Physical Exam     Initial Vitals [09/29/19 1658]   BP Pulse Resp Temp SpO2   (!) 169/98 (!) 117 20 98.8 °F (37.1 °C) 95 %      MAP       --         Physical Exam    Vitals reviewed.  Constitutional: He appears well-developed and well-nourished.   HENT:   Head: Normocephalic.   The patient has a 3 cm linear laceration to the R parietal scalp and small underlying hematoma   Eyes: Pupils are equal, round, and reactive to light. No scleral icterus.   Neck: Neck supple.   Cardiovascular: Normal rate, regular rhythm, normal heart sounds and intact distal pulses.   Pulmonary/Chest: Breath sounds normal. No stridor. No respiratory distress. He has no wheezes. He has no rhonchi. He has no rales. He exhibits no tenderness.   Abdominal: Soft.  Bowel sounds are normal.   Musculoskeletal: Normal range of motion. He exhibits no edema or tenderness.   No midline CTL TTP   Lymphadenopathy:     He has no cervical adenopathy.   Neurological: He has normal strength. No cranial nerve deficit or sensory deficit.   Patient has waxing and waning alertness. Oriented x2 to person and place, not time   Skin: Skin is warm. Capillary refill takes less than 2 seconds. No rash noted.   Wound as in HENT   Psychiatric:   Patient has intermittent confusion and decreased concentration. Is able to follow directions, but intermittently loses focus.         ED Course   Lac Repair  Date/Time: 9/29/2019 7:15 PM  Performed by: Hunter Gutierrez MD  Authorized by: Isabelle Vasquez MD   Body area: head/neck  Location details: scalp  Foreign bodies: no foreign bodies  Tendon involvement: none  Nerve involvement: none  Vascular damage: no  Anesthesia: local infiltration    Anesthesia:  Local anesthesia used: yes  Local Anesthetic: lidocaine 1% without epinephrine  Anesthetic total: 5 mL  Patient sedated: no  Irrigation solution: saline  Irrigation method: jet lavage  Amount of cleaning: standard  Debridement: none  Skin closure: staples  Approximation: close  Dressing: 4x4 sterile gauze, Xeroform gauze and adhesive bandage        Labs Reviewed   CBC W/ AUTO DIFFERENTIAL - Abnormal; Notable for the following components:       Result Value    RBC 4.55 (*)     Mean Corpuscular Hemoglobin 33.2 (*)     Immature Granulocytes 1.4 (*)     Gran # (ANC) 9.4 (*)     Immature Grans (Abs) 0.16 (*)     Gran% 79.4 (*)     Lymph% 11.6 (*)     All other components within normal limits   COMPREHENSIVE METABOLIC PANEL - Abnormal; Notable for the following components:    Sodium 132 (*)     Glucose 121 (*)     All other components within normal limits   URINALYSIS, REFLEX TO URINE CULTURE - Abnormal; Notable for the following components:    Protein, UA 1+ (*)     Ketones, UA Trace (*)     Occult Blood UA  1+ (*)     All other components within normal limits    Narrative:     Preferred Collection Type->Urine, Clean Catch   VALPROIC ACID - Abnormal; Notable for the following components:    Valproic Acid Level 48.2 (*)     All other components within normal limits   URINALYSIS MICROSCOPIC - Abnormal; Notable for the following components:    Hyaline Casts, UA 7 (*)     All other components within normal limits    Narrative:     Preferred Collection Type->Urine, Clean Catch        ECG Results          EKG 12-lead (Final result)  Result time 09/30/19 18:03:29    Final result by Interface, Lab In [facility] (09/30/19 18:03:29)                 Narrative:    Test Reason : W19.XXXA,    Vent. Rate : 109 BPM     Atrial Rate : 109 BPM     P-R Int : 168 ms          QRS Dur : 092 ms      QT Int : 342 ms       P-R-T Axes : 065 073 069 degrees     QTc Int : 460 ms    Sinus tachycardia  Otherwise normal ECG  When compared with ECG of 08-AUG-2018 08:49,  No significant change was found  Confirmed by MARGARET JAEGER MD ([id number]) on 9/30/2019 6:03:17 PM    Referred By: GINA   SELF           Confirmed By:MARGARET JAEGER MD                            Imaging Results          X-Ray Pelvis Routine AP (Final result)  Result time 09/29/19 18:52:30    Final result by Mario Campos MD (09/29/19 18:52:30)                 Impression:      No fracture or malalignment.      Electronically signed by: Mario Campos  Date:    09/29/2019  Time:    18:52             Narrative:    EXAMINATION:  XR PELVIS ROUTINE AP    CLINICAL HISTORY:  fall;    TECHNIQUE:  AP view of the pelvis was performed.    COMPARISON:  07/12/2017    FINDINGS:  Single frontal view of the pelvis.    Hip joint spaces are symmetric and preserved unchanged.  No fracture or osteonecrosis or malalignment.  Pubic rami and symphysis appear intact.  There is bilateral lumbosacral spinal fusion hardware and disc spacer devices of the lumbar spine.  Visualized bowel gas pattern appears normal.   Subjectively bone density appears at least mildly decreased.                               X-Ray Chest 1 View (Final result)  Result time 09/29/19 18:55:06    Final result by Mario Campos MD (09/29/19 18:55:06)                 Impression:      Small inspiratory exam without definite failure or pneumonia.      Electronically signed by: Mario Campos  Date:    09/29/2019  Time:    18:55             Narrative:    EXAMINATION:  XR CHEST 1 VIEW    CLINICAL HISTORY:  Cough    TECHNIQUE:  Single frontal view of the chest was performed.    COMPARISON:  10/08/2018 and 05/13/2018 chest films.    FINDINGS:  Single frontal portable view at 18:29.    There is thoracic scoliosis convex to the left unchanged.  Heart is probably normal in size although not well demonstrated with modest inspiratory exam.  No interstitial edema or pneumothorax or pleural effusion or consolidation.  There is slight reticular subpleural opacities of the superolateral right lung probably unchanged from 05/13/2018. there is mild hazy density at the left base which could be hypoventilatory changes                               CT Head Without Contrast (Final result)  Result time 09/29/19 18:28:18    Final result by Mario Campos MD (09/29/19 18:28:18)                 Impression:      Right parietal scalp soft tissue swelling and increased density likely from scalp hematoma.    No fracture.    Normal intracranial head CT for age, unchanged.      Electronically signed by: Mario Campos  Date:    09/29/2019  Time:    18:28             Narrative:    EXAMINATION:  CT HEAD WITHOUT CONTRAST    CLINICAL HISTORY:  Head trauma, minor, GCS>=13, NOC/NEXUS/CCR neg, first study;    TECHNIQUE:  Low dose axial images were obtained through the head.  Coronal and sagittal reformations were also performed. Contrast was not administered.    COMPARISON:  Head CT from 05/10/2018.  Brain MRI from 05/14/2018.    FINDINGS:  The overall gray-white interface and  hemispherical morphology appears normal.  The ventricles and basal cisterns and sulci are proportionally sized and normal for age similar with previous exam.  No midline shift or mass effect.  The corpus callosum shows overall normal morphology.  The pituitary is somewhat diminutive probably normal for age.    No acute infarct or hemorrhage or mass or vasogenic edema.  No subdural collection.    No skull fracture.  There is soft tissue swelling overlying the right parietal region consistent with scalp contusion.  There may be a scalp hematoma.  There is moderate mucoperiosteal thickening and mucous appearing debris within the right maxillary sinus.  The zygomatic arches appear intact.  The visualized mastoid sinuses and middle ears appear normal.  The orbits and globes and lenses appear normal.                               CT Cervical Spine Without Contrast (Final result)  Result time 09/29/19 18:32:58    Final result by Mario Campos MD (09/29/19 18:32:58)                 Impression:      Degenerative changes.  No fracture or subluxation or soft tissue swelling.      Electronically signed by: Mario Campos  Date:    09/29/2019  Time:    18:32             Narrative:    EXAMINATION:  CT CERVICAL SPINE WITHOUT CONTRAST    CLINICAL HISTORY:  C-spine trauma, NEXUS/CCR negative, low risk;    TECHNIQUE:  Low dose axial images, sagittal and coronal reformations were performed though the cervical spine.  Contrast was not administered.    COMPARISON:  None    FINDINGS:  The C1 ring and odontoid show no fracture.  There is segmentation incomplete posterior C1 ring, developmental of incidental note.  The lamina and transverse processes and spinous processes appear intact.  The visualized ribs appear intact.  There appears to be an old healed medial right clavicle fracture.  No acute clavicle fracture.  The visualized mastoid sinuses and middle ears appear normal.  There is mucoperiosteal thickening and presumed mucus  material in the right maxillary sinus partially demonstrated.  There is moderate degenerative change of the atlantal dens interval without widening or erosion.  There is moderate to severe degenerative disc changes and disc space narrowing of the C3-4 and C5-6 and C6-7 and C7-T1 level.  There is mild degenerative change of the C4-5 level.  There is bilateral facet arthropathy without malalignment or fracture.  No compression deformity or endplate erosion.  No prevertebral soft tissue swelling.  The epiglottis appears normal.    The visualized mandible appears intact with normal position and morphology of the condyles.  No adenopathy found.  The visualized thyroid appears normal.  Visualized lungs are normal.  No pneumothorax or pleural effusion found.  There is atherosclerotic calcification of the carotid bifurcations.                                 Medical Decision Making:   History:   Old Medical Records: I decided to obtain old medical records.  Old Records Summarized: records from previous admission(s).       <> Summary of Records: Patient had prior admission for seizure in 05/2018.  Initial Assessment:   Mirza Morales is a 64 y.o. Male with history of bipolar 1 (Depakote, Latuda, doxepin), and seizure disorder (Vimpat) presents to ED after a witnessed seizure episode around 4:30 PM today lasting 1 min. Patient patient does endorse head trauma during the fall, has a headache and has a 3 cm linear laceration to the right parietal scalp. No neuro deficits on exam.  Wife states the patient has been extremely depressed for the past 4-5 weeks, not getting out of bed, refusing to eat, and has not been taking his medications. Patient has also had intermittent confusion and was found by a neighbor earlier today confused and wandering the neighborhood.  Differential Diagnosis:   1. Medication non-compliance  2. Electrolyte abnormality  3. Infection  4. Depressive bipolar episode  Clinical Tests:   Lab Tests:  Ordered and Reviewed  Radiological Study: Ordered and Reviewed  ED Management:  Mirza Morales is a 64 y.o. male presenting after partially witnessed seizure episode and head trauma.  Patient has been severely depressed over the past few weeks and has had intermittent confusion.  Patient 3 cm linear laceration to the right parietal scalp and he does have headache and nausea, but no neuro deficits on exam.  We will look for possible causes for his decompensation.  Workup will include CBC, CMP, UA, chest x-ray, valproate level, CT head noncontrast, CT C-spine noncontrast, EKG and x-ray pelvis. Head CT and CT-C spine are negative; C-collar removed.     7:14 PM   Explained R/B of laceration repair. Patient and wife provided verbal consent. Patient tolerated procedure without complications. During procedure patient became catatonic and not responding to any question. Does respond to painful stimuli.    9:02 PM  Patients Valproate level is sub-therapeutic (48.2). Will admit to Obs for stabilization of valproate level and seizure precautions. Patient is also in need for in-patient psychiatric consult for his severe, catatonic depressive episode, and will likely need medical stabilization given the severity of his depressive episode.   Other:   I have discussed this case with another health care provider.              Attending Attestation:   Physician Attestation Statement for Resident:  As the supervising MD   Physician Attestation Statement: I have personally seen and examined this patient.   I agree with the above history. -:   64M with [facility] seizure d/o, bipolar, presenting with seizure. Pt has sz every few months despite being on Depakote. Has  been very depressed from bipolar, not leaving the bed, and has been noncompliant with depakote in the past few days because he is discouraged from side effects. Today and yesterday pt appeared confused, found by jemma wandering the streets yesterday. Today while walking  up the stairs he had seizure, dfouynd by wife a few seconds later at base of stairs still seizing, lasting 30 sec.     Wife reports that patient has exhibited symptoms of depression over the last several weeks, not wanting to leave his bed or engage, however he is usually verbal and today ate a sandwich that she had given him.       As the supervising MD I agree with the above PE.   -:   On exam patient is altered, staring straight ahead and only intermittently following commands, intermittently answering no when asked about pain, however also lifting left arm when asked to follow finger with eyes.  Pupils equally reactive 4 mm with EOMI, 3 cm linear lac on right parietal scalp surrounded by large hematoma, no midline tenderness to palpation, ranging bilateral upper and lower extremities without weakness.   As the supervising MD I agree with the above treatment, course, plan, and disposition.   -:   My exam differs drastically from residence exam where patient was interactive and following commands, may represent worsening intracranial hemorrhage versus psychiatric disorder, and CT head will be expedited.    Differential diagnosis includes but not limited to:  Postictal after seizure, catatonia from bipolar depression, infection, encephalopathy, intracranial hemorrhage, fracture, contusion, laceration    8:45 PM  CT head and C-spine negative, scalp laceration stapled without complication.  CBC, CMP normal, valproic acid level slightly low at 48.  Urinalysis pending.    MDM Complexity Points:   Problem Points:  1.New problem, with additional ED work-up planned - Seizure  2.New problem, with additional ED work-up planned - Delirium  2.New problem, with additional ED work-up planned - Scalp laceration    Data Points:  Review or order clinical lab tests, Review or order radiology test, Review or order medicine test (PFTs, EKG, cardiac echo or catheterization), Independent review of image, tracing, or specimen, Decision  to obtain old records (in the EHR), Obtaining history from Someone else other than the patient.  and Discuss test with performing physician/consulting physician    Risk:  High Risk  I was personally present during the critical portions of the procedure(s) performed by the resident and was immediately available in the ED to provide services and assistance as needed during the entire procedure.  I have reviewed and agree with the residents interpretation of the following: lab data, x-rays and CT scans.  I have reviewed the following: old records at this facility and old CTs.                       Clinical Impression:       ICD-10-CM ICD-9-CM   1. Seizure R56.9 780.39   2. Cough R05 786.2   3. Fall W19.XXXA E888.9   4. Delirium R41.0 780.09   5. Bipolar affective disorder, currently depressed, mild F31.31 296.51   6. Scalp laceration, initial encounter S01.01XA 873.0                                Hunter Gutierrez MD  Resident  09/29/19 2137       Isabelle Vasquez MD  10/07/19 [id number]

## 2019-09-30 PROBLEM — R41.0 DELIRIUM: Status: ACTIVE | Noted: 2019-09-30

## 2019-09-30 PROBLEM — R29.818 TRANSIENT NEUROLOGICAL SYMPTOMS: Status: ACTIVE | Noted: 2019-09-30

## 2019-09-30 LAB
ANION GAP SERPL CALC-SCNC: 11 MMOL/L (ref 8–16)
BASOPHILS # BLD AUTO: 0.04 K/UL (ref 0–0.2)
BASOPHILS NFR BLD: 0.5 % (ref 0–1.9)
BUN SERPL-MCNC: 14 MG/DL (ref 8–23)
CALCIUM SERPL-MCNC: 8.6 MG/DL (ref 8.7–10.5)
CHLORIDE SERPL-SCNC: 98 MMOL/L (ref 95–110)
CO2 SERPL-SCNC: 22 MMOL/L (ref 23–29)
CREAT SERPL-MCNC: 0.8 MG/DL (ref 0.5–1.4)
DIFFERENTIAL METHOD: ABNORMAL
EOSINOPHIL # BLD AUTO: 0.1 K/UL (ref 0–0.5)
EOSINOPHIL NFR BLD: 0.6 % (ref 0–8)
ERYTHROCYTE [DISTWIDTH] IN BLOOD BY AUTOMATED COUNT: 13.2 % (ref 11.5–14.5)
EST. GFR  (AFRICAN AMERICAN): >60 ML/MIN/1.73 M^2
EST. GFR  (NON AFRICAN AMERICAN): >60 ML/MIN/1.73 M^2
GLUCOSE SERPL-MCNC: 135 MG/DL (ref 70–110)
HCT VFR BLD AUTO: 38.1 % (ref 40–54)
HGB BLD-MCNC: 13.3 G/DL (ref 14–18)
IMM GRANULOCYTES # BLD AUTO: 0.08 K/UL (ref 0–0.04)
IMM GRANULOCYTES NFR BLD AUTO: 1 % (ref 0–0.5)
LYMPHOCYTES # BLD AUTO: 1.8 K/UL (ref 1–4.8)
LYMPHOCYTES NFR BLD: 22.8 % (ref 18–48)
MAGNESIUM SERPL-MCNC: 1.8 MG/DL (ref 1.6–2.6)
MCH RBC QN AUTO: 34.3 PG (ref 27–31)
MCHC RBC AUTO-ENTMCNC: 34.9 G/DL (ref 32–36)
MCV RBC AUTO: 98 FL (ref 82–98)
MONOCYTES # BLD AUTO: 0.7 K/UL (ref 0.3–1)
MONOCYTES NFR BLD: 8.3 % (ref 4–15)
NEUTROPHILS # BLD AUTO: 5.3 K/UL (ref 1.8–7.7)
NEUTROPHILS NFR BLD: 66.8 % (ref 38–73)
NRBC BLD-RTO: 0 /100 WBC
PHOSPHATE SERPL-MCNC: 2.9 MG/DL (ref 2.7–4.5)
PLATELET # BLD AUTO: 114 K/UL (ref 150–350)
PMV BLD AUTO: 10.9 FL (ref 9.2–12.9)
POTASSIUM SERPL-SCNC: 3.5 MMOL/L (ref 3.5–5.1)
RBC # BLD AUTO: 3.88 M/UL (ref 4.6–6.2)
SODIUM SERPL-SCNC: 131 MMOL/L (ref 136–145)
WBC # BLD AUTO: 7.91 K/UL (ref 3.9–12.7)

## 2019-09-30 PROCEDURE — 80048 BASIC METABOLIC PNL TOTAL CA: CPT

## 2019-09-30 PROCEDURE — 25000003 PHARM REV CODE 250: Performed by: PHYSICIAN ASSISTANT

## 2019-09-30 PROCEDURE — 80299 QUANTITATIVE ASSAY DRUG: CPT

## 2019-09-30 PROCEDURE — 99214 PR OFFICE/OUTPT VISIT, EST, LEVL IV, 30-39 MIN: ICD-10-PCS | Mod: ,,, | Performed by: PSYCHIATRY & NEUROLOGY

## 2019-09-30 PROCEDURE — 83735 ASSAY OF MAGNESIUM: CPT

## 2019-09-30 PROCEDURE — 99226 PR SUBSEQUENT OBSERVATION CARE,LEVEL III: CPT | Mod: ,,, | Performed by: PHYSICIAN ASSISTANT

## 2019-09-30 PROCEDURE — G0378 HOSPITAL OBSERVATION PER HR: HCPCS

## 2019-09-30 PROCEDURE — 99223 1ST HOSP IP/OBS HIGH 75: CPT | Mod: ,,, | Performed by: PSYCHIATRY & NEUROLOGY

## 2019-09-30 PROCEDURE — 85025 COMPLETE CBC W/AUTO DIFF WBC: CPT

## 2019-09-30 PROCEDURE — 99226 PR SUBSEQUENT OBSERVATION CARE,LEVEL III: ICD-10-PCS | Mod: ,,, | Performed by: PHYSICIAN ASSISTANT

## 2019-09-30 PROCEDURE — 99214 OFFICE O/P EST MOD 30 MIN: CPT | Mod: ,,, | Performed by: PSYCHIATRY & NEUROLOGY

## 2019-09-30 PROCEDURE — 99223 PR INITIAL HOSPITAL CARE,LEVL III: ICD-10-PCS | Mod: ,,, | Performed by: PSYCHIATRY & NEUROLOGY

## 2019-09-30 PROCEDURE — 84100 ASSAY OF PHOSPHORUS: CPT

## 2019-09-30 PROCEDURE — 36415 COLL VENOUS BLD VENIPUNCTURE: CPT

## 2019-09-30 RX ORDER — DOXEPIN HYDROCHLORIDE 75 MG/1
75 CAPSULE ORAL NIGHTLY
Status: DISCONTINUED | OUTPATIENT
Start: 2019-09-30 | End: 2019-10-01

## 2019-09-30 RX ORDER — DIVALPROEX SODIUM 250 MG/1
500 TABLET, DELAYED RELEASE ORAL EVERY 12 HOURS
Status: DISCONTINUED | OUTPATIENT
Start: 2019-09-30 | End: 2019-09-30

## 2019-09-30 RX ORDER — LACOSAMIDE 50 MG/1
200 TABLET ORAL EVERY 12 HOURS
Status: DISCONTINUED | OUTPATIENT
Start: 2019-09-30 | End: 2019-10-03 | Stop reason: HOSPADM

## 2019-09-30 RX ADMIN — LACOSAMIDE 100 MG: 50 TABLET, FILM COATED ORAL at 01:09

## 2019-09-30 RX ADMIN — RAMELTEON 8 MG: 8 TABLET ORAL at 08:09

## 2019-09-30 RX ADMIN — MUPIROCIN: 20 OINTMENT TOPICAL at 09:09

## 2019-09-30 RX ADMIN — PANTOPRAZOLE SODIUM 40 MG: 40 TABLET, DELAYED RELEASE ORAL at 08:09

## 2019-09-30 RX ADMIN — LACOSAMIDE 200 MG: 50 TABLET, FILM COATED ORAL at 08:09

## 2019-09-30 RX ADMIN — DOXEPIN HYDROCHLORIDE 75 MG: 75 CAPSULE ORAL at 11:09

## 2019-09-30 RX ADMIN — DIVALPROEX SODIUM 500 MG: 250 TABLET, DELAYED RELEASE ORAL at 08:09

## 2019-09-30 RX ADMIN — LACOSAMIDE 100 MG: 50 TABLET, FILM COATED ORAL at 08:09

## 2019-09-30 RX ADMIN — ATORVASTATIN CALCIUM 40 MG: 20 TABLET, FILM COATED ORAL at 08:09

## 2019-09-30 RX ADMIN — LURASIDONE HYDROCHLORIDE 40 MG: 40 TABLET, FILM COATED ORAL at 04:09

## 2019-09-30 RX ADMIN — RAMELTEON 8 MG: 8 TABLET ORAL at 01:09

## 2019-09-30 RX ADMIN — MUPIROCIN: 20 OINTMENT TOPICAL at 04:09

## 2019-09-30 RX ADMIN — ACETAMINOPHEN 650 MG: 325 TABLET ORAL at 07:09

## 2019-09-30 RX ADMIN — DOXEPIN HYDROCHLORIDE 100 MG: 100 CAPSULE ORAL at 01:09

## 2019-09-30 NOTE — PLAN OF CARE
POC reviewed with pt and family at 1730. Pt verbalized understanding. 75 ml/hr NS discontinued. Questions and concerns addressed. No acute events today. Pt progressing toward goals. Will continue to monitor. See flowsheets for full assessment and VS info.

## 2019-09-30 NOTE — HPI
"Mirza Morales is a 64 y.o. male with a past psychiatric history of Bipolar Disorder who presented to [facility] due to Witnessed seizure-like activity. Psychiatry was consulted for "noncompliance with Depakote."     Per Primary Team:  Mirza Morales is a 64 y.o. male with scoliosis s/p lumbar fusion (direct lateral and posterior L4-5, L3-4), L2-3, Bipolar I (on Depakote, Latuda, doxepin), and seizure disorder (Vimpat+depakote) presented to ED after a witnessed seizure episode.    "

## 2019-09-30 NOTE — HOSPITAL COURSE
Patient admitted to observation for seizure like activity. Psychiatry and Neurology consulted. Depakote discontinued. Neurology increased Vimpat to 200 mg BID without further seizure activity. Patient delirious in hospital.  Infectious work up negative, including blood cultures, respiratory panel, UA, CXR X 2. EEG with nonfocal encephalopathy, negative for epileptic process, consistent with delirium. Doxepin decreased then discontinued with improvement in delerium. Patient with a few episodes of isolated, asymptomatic tachycardia while in hospital. EKG with sinus tach, Telemetry unremarkable. TSH WNL. Previously noted in history, Nothing to do for now, follow up with PCP. Patient discharged on latuda 40 mg and vimpat 200 mg daily. Follow up with PCP between 10/6-10/8 for staple removal and hospital follow up. Follow up with psychiatry in 2-3 weeks. Discussed with patient and wife. Patient verbalized undestanding. All questions answered.

## 2019-09-30 NOTE — ASSESSMENT & PLAN NOTE
Patient is a 64 year old man with history of Bipolar I Disorder and Seizure Disorder, Alcohol Abuse with hx of Alcohol Induced Seizures (sobriety for the past month- per patient and collateral). Fell down during tonic clonic seizure yesterday with laceration to scalp. Psychiatry was consulted because patient is refusing to take Depakote, said it was causing him to have tremors, which has been documented with past visit with Neurology outpatient and also confirmed by wife as a side effect he is having.     Primary concern from psychiatric standpoint currently is that the patient seems to be delirious. The patient has reportedly been spending most of his time in bed for the past month, in a deep depression but not suicidal, not eating well and not performing ADLs. Wife manages medication. On interview today, the patient was oriented to person and place but not to time. He also was not linear and had significant thought blocking. Would recommend further clarification of patient's baseline. Current differential would be Delirium with underlying dementia vs. Postictal confusion.     Recommendations:   - Reduce Doxepin from 100 mg to 75 mg, due to risk for anticholinergic delirium in addition to complaint of dizziness   - Please consult Neurology for management of Depakote. Would recommend from psychiatric standpoint to use another mood stabilizer due to complaint of tremulousness but defer to Neuro for AED management and if Depakote is necessary for this pt.   - Psychiatry will continue to follow this patient.

## 2019-09-30 NOTE — HOSPITAL COURSE
"Subjective:    On examination today, Mr. Morales is subjectively less confused. He is frustrated he is on delirium precautions and that there have been alterations in his medications from his usual home dosages.  Feels that the medications are making him "go nuts" as he is still seeing shapes and shadows and notes his sleep has been poor in the hospital. He denies SI/HI/AH. He is oriented to location, year, but has difficulty reporting the month, date or day of the week.      Collateral: spoke with wife, Anamaria on 10/2/19 ([phone])     Per wife, patient 'has come out of his depression.'  When asked for clarification she reports that he is less confused, able to engage in conversation more, and less forgetful than when he was admitted.  States that he has been complaining more recently which is not usual for him.  She did express some frustration with her role as his caregiver and seems to have some caregiver fatigue.  Reviewed patient's current medication regimen with her.   "

## 2019-09-30 NOTE — SUBJECTIVE & OBJECTIVE
Past Medical History:   Diagnosis Date    Alcohol abuse     Behavioral problem     Bipolar 1 disorder     Chronic right hip pain     Depression     DJD (degenerative joint disease), lumbar 1/27/2015    Fatigue     Hepatitis     pt. denies this    History of psychiatric care     History of psychiatric hospitalization     Hypertension     Karina     Post traumatic stress disorder     Psychiatric problem     Seizures     Suicide attempt     Therapy      Past Surgical History:   Procedure Laterality Date    COLONOSCOPY N/A 8/14/2019    Procedure: COLONOSCOPY;  Surgeon: Tammy Duval MD;  Location: [address]);  Service: Endoscopy;  Laterality: N/A;    HERNIA REPAIR      SPINE SURGERY  11-12-15    LAMINECTOMY/LUMBAR/WARE     Review of patient's allergies indicates:   Allergen Reactions    Gabapentin Other (See Comments)     SEVERE DIZZINESS AND BALANCE PROBLEMS, UNABLE TO WALK.    Nardil [phenelzine]      BECOMES HYPER, LIKE A MANIC EPISODE.    Penicillins Hives    Lamictal [lamotrigine] Rash     No current facility-administered medications on file prior to encounter.      Current Outpatient Medications on File Prior to Encounter   Medication Sig    atorvastatin (LIPITOR) 40 MG tablet Take 1 tablet (40 mg total) by mouth once daily.    divalproex ER (DEPAKOTE) 500 MG Tb24 Take 1 tablet (500mg) every morning and 2 tablets (1000mg) every night.    doxepin (SINEQUAN) 100 MG capsule TAKE 1 CAPSULE (100 MG TOTAL) BY MOUTH EVERY EVENING.    multivit-min-FA-lycopen-lutein (CENTRUM SILVER ULTRA MEN'S) 300-600-300 mcg Tab Take 1 tablet by mouth once daily at 6am.    albuterol (VENTOLIN HFA) 90 mcg/actuation inhaler Ventolin HFA 90 mcg/actuation aerosol inhaler    hydrOXYzine pamoate (VISTARIL) 50 MG Cap TAKE 1 CAPSULE (50 MG TOTAL) BY MOUTH EVERY EVENING.    lacosamide (VIMPAT) 100 mg Tab take 1 tablet by mouth twice daily    lactulose (CHRONULAC) 20 gram/30 mL Soln TAKE 30 MLS BY  MOUTH 2 (TWO) TIMES DAILY AS NEEDED.    lurasidone (LATUDA) 80 mg Tab tablet Take half of a tablet with dinner for 7 days, then take one tablet with dinner.    meloxicam (MOBIC) 7.5 MG tablet TAKE 1 TABLET BY MOUTH EVERY DAY    naloxegol 12.5 mg Tab Take 12.5 mg by mouth daily as needed.    pantoprazole (PROTONIX) 40 MG tablet Take 1 tablet (40 mg total) by mouth once daily.    valACYclovir (VALTREX) 1000 MG tablet Take 1 tablet (1,000 mg total) by mouth every 8 (eight) hours. for 7 days     Family History     Problem Relation (Age of Onset)    Alcohol abuse Mother, Maternal Uncle, Paternal Uncle, Cousin    Anxiety disorder Brother    Bipolar disorder Brother    Dementia Maternal Grandmother    Depression Mother, Father, Sister, Brother    Drug abuse Brother    Suicide Sister        Tobacco Use    Smoking status: Current Some Day Smoker     Packs/day: 0.25     Years: 10.00     Pack years: 2.50    Smokeless tobacco: Never Used   Substance and Sexual Activity    Alcohol use: No     Alcohol/week: 16.7 standard drinks     Types: 20 Standard drinks or equivalent per week     Comment: quit 4 years ago    Drug use: No    Sexual activity: Yes     Partners: Female     Comment: with wife; monogamous      Review of Systems   Respiratory: Negative for shortness of breath.    Cardiovascular: Negative for chest pain.   Gastrointestinal: Negative for nausea and vomiting.   Neurological: Positive for dizziness, tremors and seizures.   Psychiatric/Behavioral: Positive for confusion, decreased concentration, dysphoric mood and sleep disturbance. The patient is nervous/anxious.      Objective:     Vital Signs (Most Recent):  Temp: 97.5 °F (36.4 °C) (09/30/19 1125)  Pulse: 84 (09/30/19 1125)  Resp: (!) 22 (09/30/19 1125)  BP: 120/83 (09/30/19 1125)  SpO2: 96 % (09/30/19 1125) Vital Signs (24h Range):  Temp:  [97.5 °F (36.4 °C)-98.8 °F (37.1 °C)] 97.5 °F (36.4 °C)  Pulse:  [] 84  Resp:  [11-22] 22  SpO2:  [94 %-99 %] 96  %  BP: (116-169)/(71-99) 120/83     Weight: 97.5 kg (214 lb 15.2 oz)  Body mass index is 26.16 kg/m².    Physical Exam   Eyes: EOM are normal.   Psychiatric: His speech is normal.     NEUROLOGICAL EXAMINATION:     MENTAL STATUS   Oriented to person.   Oriented to place.   Follows 1 step commands.   Speech: speech is normal   Level of consciousness: alert    CRANIAL NERVES     CN III, IV, VI   Extraocular motions are normal.   Nystagmus: none   Ophthalmoparesis: none    CN VII   Facial expression full, symmetric.     CN VIII   Hearing: intact    MOTOR EXAM   Muscle bulk: normal       Moves all extremities equally   No abnormal movements noted      GAIT AND COORDINATION     Tremor   Resting tremor: absent    Significant Labs:   CBC:   Recent Labs   Lab 09/29/19 1758 09/30/19  0334   WBC 11.77 7.91   HGB 15.1 13.3*   HCT 43.4 38.1*    114*     CMP:   Recent Labs   Lab 09/29/19 1758 09/30/19  0334   * 135*   * 131*   K 4.3 3.5   CL 98 98   CO2 25 22*   BUN 18 14   CREATININE 1.0 0.8   CALCIUM 9.5 8.6*   MG  --  1.8   PROT 7.5  --    ALBUMIN 3.5  --    BILITOT 0.5  --    ALKPHOS 78  --    AST 20  --    ALT 12  --    ANIONGAP 9 11   EGFRNONAA >60.0 >60.0     Inflammatory Markers: No results for input(s): SEDRATE, CRP, PROCAL in the last 48 hours.  Urine Culture: No results for input(s): LABURIN in the last 48 hours.  Urine Studies:   Recent Labs   Lab 09/29/19 2003   COLORU Yellow   APPEARANCEUA Clear   PHUR 6.0   SPECGRAV 1.020   PROTEINUA 1+*   GLUCUA Negative   KETONESU Trace*   BILIRUBINUA Negative   OCCULTUA 1+*   NITRITE Negative   LEUKOCYTESUR Negative   RBCUA 1   WBCUA 1   BACTERIA Rare   HYALINECASTS 7*     All pertinent lab results from the past 24 hours have been reviewed.    Significant Imaging: I have reviewed and interpreted all pertinent imaging results/findings within the past 24 hours.     CT Head WO contrast (9/29/19):  Right parietal scalp soft tissue swelling and increased  density likely from scalp hematoma.  No fracture. Normal intracranial head CT for age, unchanged.

## 2019-09-30 NOTE — SUBJECTIVE & OBJECTIVE
Past Medical History:   Diagnosis Date    Alcohol abuse     Behavioral problem     Bipolar 1 disorder     Chronic right hip pain     Depression     DJD (degenerative joint disease), lumbar 1/27/2015    Fatigue     Hepatitis     pt. denies this    History of psychiatric care     History of psychiatric hospitalization     Hypertension     Karina     Post traumatic stress disorder     Psychiatric problem     Seizures     Suicide attempt     Therapy        Past Surgical History:   Procedure Laterality Date    COLONOSCOPY N/A 8/14/2019    Procedure: COLONOSCOPY;  Surgeon: Tammy Duval MD;  Location: [address]);  Service: Endoscopy;  Laterality: N/A;    HERNIA REPAIR      SPINE SURGERY  11-12-15    LAMINECTOMY/LUMBAR/WARE       Review of patient's allergies indicates:   Allergen Reactions    Gabapentin Other (See Comments)     SEVERE DIZZINESS AND BALANCE PROBLEMS, UNABLE TO WALK.    Nardil [phenelzine]      BECOMES HYPER, LIKE A MANIC EPISODE.    Penicillins Hives    Lamictal [lamotrigine] Rash       No current facility-administered medications on file prior to encounter.      Current Outpatient Medications on File Prior to Encounter   Medication Sig    atorvastatin (LIPITOR) 40 MG tablet Take 1 tablet (40 mg total) by mouth once daily.    divalproex ER (DEPAKOTE) 500 MG Tb24 Take 1 tablet (500mg) every morning and 2 tablets (1000mg) every night.    doxepin (SINEQUAN) 100 MG capsule TAKE 1 CAPSULE (100 MG TOTAL) BY MOUTH EVERY EVENING.    multivit-min-FA-lycopen-lutein (CENTRUM SILVER ULTRA MEN'S) 300-600-300 mcg Tab Take 1 tablet by mouth once daily at 6am.    albuterol (VENTOLIN HFA) 90 mcg/actuation inhaler Ventolin HFA 90 mcg/actuation aerosol inhaler    hydrOXYzine pamoate (VISTARIL) 50 MG Cap TAKE 1 CAPSULE (50 MG TOTAL) BY MOUTH EVERY EVENING.    lacosamide (VIMPAT) 100 mg Tab take 1 tablet by mouth twice daily    lactulose (CHRONULAC) 20 gram/30 mL Soln TAKE 30 MLS  BY MOUTH 2 (TWO) TIMES DAILY AS NEEDED.    lurasidone (LATUDA) 80 mg Tab tablet Take half of a tablet with dinner for 7 days, then take one tablet with dinner.    meloxicam (MOBIC) 7.5 MG tablet TAKE 1 TABLET BY MOUTH EVERY DAY    naloxegol 12.5 mg Tab Take 12.5 mg by mouth daily as needed.    pantoprazole (PROTONIX) 40 MG tablet Take 1 tablet (40 mg total) by mouth once daily.    valACYclovir (VALTREX) 1000 MG tablet Take 1 tablet (1,000 mg total) by mouth every 8 (eight) hours. for 7 days     Family History     Problem Relation (Age of Onset)    Alcohol abuse Mother, Maternal Uncle, Paternal Uncle, Cousin    Anxiety disorder Brother    Bipolar disorder Brother    Dementia Maternal Grandmother    Depression Mother, Father, Sister, Brother    Drug abuse Brother    Suicide Sister        Tobacco Use    Smoking status: Current Some Day Smoker     Packs/day: 0.25     Years: 10.00     Pack years: 2.50    Smokeless tobacco: Never Used   Substance and Sexual Activity    Alcohol use: No     Alcohol/week: 16.7 standard drinks     Types: 20 Standard drinks or equivalent per week     Comment: quit 4 years ago    Drug use: No    Sexual activity: Yes     Partners: Female     Comment: with wife; monogamous      Review of Systems   Constitutional: Positive for appetite change (decreased). Negative for chills and fever.        + poor intake   HENT: Negative for ear pain and sore throat.    Eyes: Negative for pain and visual disturbance.   Respiratory: Negative for cough and shortness of breath.    Cardiovascular: Negative for chest pain and palpitations.   Gastrointestinal: Negative for abdominal pain, diarrhea, nausea and vomiting.   Endocrine: Negative for heat intolerance and polydipsia.   Genitourinary: Negative for dysuria, frequency and urgency.   Musculoskeletal: Negative for back pain, gait problem and neck stiffness.        Head pain, improving  Neck pain, resolved   Skin: Positive for wound (head). Negative for  "rash.   Neurological: Positive for seizures and syncope. Negative for dizziness and weakness.   Psychiatric/Behavioral: Negative for confusion. The patient is not nervous/anxious.      Objective:     Vital Signs (Most Recent):  Temp: 98.8 °F (37.1 °C) (09/29/19 1658)  Pulse: 100 (09/29/19 2101)  Resp: 11 (09/29/19 2101)  BP: (!) 153/93 (09/29/19 2101)  SpO2: 99 % (09/29/19 2101) Vital Signs (24h Range):  Temp:  [98.8 °F (37.1 °C)] 98.8 °F (37.1 °C)  Pulse:  [100-117] 100  Resp:  [11-20] 11  SpO2:  [95 %-99 %] 99 %  BP: (144-169)/(93-99) 153/93     Weight: 97.5 kg (215 lb)  Body mass index is 26.17 kg/m².    Physical Exam   Constitutional: He is oriented to person, place, and time. He appears well-developed and well-nourished.   Elderly, chronically ill-appearing male in NAD   HENT:   Head: Normocephalic and atraumatic.   Eyes: Conjunctivae and EOM are normal.   PERRLA, although R pupil was initially sluggish   Neck: Normal range of motion. Neck supple.   Cardiovascular: Normal rate, regular rhythm, normal heart sounds and intact distal pulses.   Pulmonary/Chest: Effort normal and breath sounds normal. No respiratory distress. He has no wheezes.   Abdominal: Soft. Bowel sounds are normal. He exhibits no distension. There is no tenderness.   Musculoskeletal: Normal range of motion.   No TTP to posterior head, BL shoulders and neck ROM intact   Neurological: He is alert and oriented to person, place, and time.   Skin: Skin is warm and dry. There is pallor (mild).   Skin lac to R posterior parietal region with about 5 staples.  Bleeding controlled.   Oval, elongated, papular lesions in dermatome configuration with different stages of healing but no obvious vesicles. Some whitish slough to region on back.   Skin dry   Psychiatric: His speech is delayed (initially). He is withdrawn (initially). He exhibits a depressed mood (frustrated about free ).   Perseverating about the "free " at his house and the " "issues with having dogs live on his property.    Initial exam vastly different from end of exam  Oriented to president but states year is 2001.  Month is "just turned October" and "breaking heat records its so hot" He is inattentive (initially).   Nursing note and vitals reviewed.                    CRANIAL NERVES     CN III, IV, VI   Extraocular motions are normal.        Significant Labs:   CBC:   Recent Labs   Lab 09/29/19  1758   WBC 11.77   HGB 15.1   HCT 43.4        CMP:   Recent Labs   Lab 09/29/19  1758   *   K 4.3   CL 98   CO2 25   *   BUN 18   CREATININE 1.0   CALCIUM 9.5   PROT 7.5   ALBUMIN 3.5   BILITOT 0.5   ALKPHOS 78   AST 20   ALT 12   ANIONGAP 9   EGFRNONAA >60.0     Lactic Acid: No results for input(s): LACTATE in the last 48 hours.  Urine Studies:   Recent Labs   Lab 09/29/19 2003   COLORU Yellow   APPEARANCEUA Clear   PHUR 6.0   SPECGRAV 1.020   PROTEINUA 1+*   GLUCUA Negative   KETONESU Trace*   BILIRUBINUA Negative   OCCULTUA 1+*   NITRITE Negative   LEUKOCYTESUR Negative   RBCUA 1   WBCUA 1   BACTERIA Rare   HYALINECASTS 7*       Significant Imaging: I have reviewed all pertinent imaging results/findings within the past 24 hours.     X-ray Chest 1 View    Result Date: 9/29/2019  EXAMINATION: XR CHEST 1 VIEW CLINICAL HISTORY: Cough TECHNIQUE: Single frontal view of the chest was performed. COMPARISON: 10/08/2018 and 05/13/2018 chest films. FINDINGS: Single frontal portable view at 18:29. There is thoracic scoliosis convex to the left unchanged.  Heart is probably normal in size although not well demonstrated with modest inspiratory exam.  No interstitial edema or pneumothorax or pleural effusion or consolidation.  There is slight reticular subpleural opacities of the superolateral right lung probably unchanged from 05/13/2018. there is mild hazy density at the left base which could be hypoventilatory changes     Small inspiratory exam without definite failure or " pneumonia.     Ct Head Without Contrast    Result Date: 9/29/2019  EXAMINATION: CT HEAD WITHOUT CONTRAST CLINICAL HISTORY: Head trauma, minor, GCS>=13, NOC/NEXUS/CCR neg, first study; TECHNIQUE: Low dose axial images were obtained through the head.  Coronal and sagittal reformations were also performed. Contrast was not administered. COMPARISON: Head CT from 05/10/2018.  Brain MRI from 05/14/2018. FINDINGS: The overall gray-white interface and hemispherical morphology appears normal.  The ventricles and basal cisterns and sulci are proportionally sized and normal for age similar with previous exam.  No midline shift or mass effect.  The corpus callosum shows overall normal morphology.  The pituitary is somewhat diminutive probably normal for age. No acute infarct or hemorrhage or mass or vasogenic edema.  No subdural collection. No skull fracture.  There is soft tissue swelling overlying the right parietal region consistent with scalp contusion.  There may be a scalp hematoma.  There is moderate mucoperiosteal thickening and mucous appearing debris within the right maxillary sinus.  The zygomatic arches appear intact.  The visualized mastoid sinuses and middle ears appear normal.  The orbits and globes and lenses appear normal.     Right parietal scalp soft tissue swelling and increased density likely from scalp hematoma. No fracture. Normal intracranial head CT for age, unchanged.     Ct Cervical Spine Without Contrast    Result Date: 9/29/2019  EXAMINATION: CT CERVICAL SPINE WITHOUT CONTRAST CLINICAL HISTORY: C-spine trauma, NEXUS/CCR negative, low risk; TECHNIQUE: Low dose axial images, sagittal and coronal reformations were performed though the cervical spine.  Contrast was not administered. COMPARISON: None FINDINGS: The C1 ring and odontoid show no fracture.  There is segmentation incomplete posterior C1 ring, developmental of incidental note.  The lamina and transverse processes and spinous processes appear  intact.  The visualized ribs appear intact.  There appears to be an old healed medial right clavicle fracture.  No acute clavicle fracture.  The visualized mastoid sinuses and middle ears appear normal.  There is mucoperiosteal thickening and presumed mucus material in the right maxillary sinus partially demonstrated.  There is moderate degenerative change of the atlantal dens interval without widening or erosion.  There is moderate to severe degenerative disc changes and disc space narrowing of the C3-4 and C5-6 and C6-7 and C7-T1 level.  There is mild degenerative change of the C4-5 level.  There is bilateral facet arthropathy without malalignment or fracture.  No compression deformity or endplate erosion.  No prevertebral soft tissue swelling.  The epiglottis appears normal. The visualized mandible appears intact with normal position and morphology of the condyles.  No adenopathy found.  The visualized thyroid appears normal.  Visualized lungs are normal.  No pneumothorax or pleural effusion found.  There is atherosclerotic calcification of the carotid bifurcations.     Degenerative changes.  No fracture or subluxation or soft tissue swelling.     X-ray Pelvis Routine Ap    Result Date: 9/29/2019  EXAMINATION: XR PELVIS ROUTINE AP CLINICAL HISTORY: fall; TECHNIQUE: AP view of the pelvis was performed. COMPARISON: 07/12/2017 FINDINGS: Single frontal view of the pelvis. Hip joint spaces are symmetric and preserved unchanged.  No fracture or osteonecrosis or malalignment.  Pubic rami and symphysis appear intact.  There is bilateral lumbosacral spinal fusion hardware and disc spacer devices of the lumbar spine.  Visualized bowel gas pattern appears normal.  Subjectively bone density appears at least mildly decreased.     No fracture or malalignment.

## 2019-09-30 NOTE — ASSESSMENT & PLAN NOTE
Witnessed tonic-clonic seizure episode with hx of focal seizures (usually related to etoh).  Noncompliant with depakote/vimpat, attempting to wean off.   Valproic acid subtherapeutic at 48.  Loaded with 1g depakote in ED.  CTH unremarkable.  CXR/UA without evidence of infection.   - Neurology consulted and appreciate recs.   - Appears dehydrated on exam, likely contributing.  - spot EEG, seizure precautions; normal vEEG in 5/2018  - c/w depakote 500/500 for now; c/w vimpat 100 mg Q12hr  - depakote does have tremors listed as adverse effect (1-57% reported) but also could be 2/2 psych medications.   - neuro checks, delirium/fall precautions  - will need collateral from wife.

## 2019-09-30 NOTE — ASSESSMENT & PLAN NOTE
"Hx of seizures since age 60, mostly attributed to ETOH withdrawal  Follows with Dr. Ann, last visit August 2018 on Vimpat 100 mg BID and  mg BID at home  Presents 9/29/19 after witnessed tonic clonic seizure at home in the setting of patient weaning himself off VPA 2/2 report of tremors   VPA level subtherapeutic on admission    --ok to d/c Depakote given reported s/e  --increase Vimpat to 200 mg BID  --psych following for bipolar disorder and giving recs for mood stabilization   "Latuda 40mg qd, will consider increasing this once mental status improves, instead of Depakote as pt is complaining of side effects"  --f/u in clinic with Dr. Julien scheduled for 10/3/19    "

## 2019-09-30 NOTE — PLAN OF CARE
Reviewed plan of care with patient and available family, verbalized understanding. Vital signs stable throughout shift, neuro status remains unchanged.  Patient encouraged to use call light for assistance to ensure fall safety. Fall and safety precautions intact. Patient lying quietly in bed, bed in lowest position, side rails up x 2, call light in reach.  Will continue to monitor.

## 2019-09-30 NOTE — H&P
"[facility] Hospital Medicine  History & Physical    Patient Name: Mirza Morales  MRN: [id number]  Admission Date: 9/29/2019  Attending Physician: MARY ANN Malloy MD   Primary Care Provider: Jud Mo MD    [facility] Medicine Team: [facility] MED F Zach Joshi PA-C     Patient information was obtained from patient, past medical records and ER records.     Subjective:     Principal Problem:Witnessed seizure-like activity    Chief Complaint:   Chief Complaint   Patient presents with    Seizures     pt has been weaning himself off his depakote, seizure hx. Found wandering neighborhood earlier and has seizure this  after  afternoon.  Has head lac         HPI: Mirza Morales is a 64 y.o. male with scoliosis s/p lumbar fusion (direct lateral and posterior L4-5, L3-4), L2-3, bipolar 1 (Depakote, Latuda, doxepin), and seizure disorder (Vimpat+depakote) presents to ED after a witnessed seizure episode around 4:30 PM today.      Patient poor historian in ED:  "Patient was walking up the stairs and around about the 2nd stair he began to have a seizure and fell hitting his Right parietal scalp.  Patient's wife still states that she arrived around 20 sec after he began seizing and noticed tonic-clonic movements in his upper and lower extremities and his eyes open, however the patient was not responsive.  The episode lasted around 1 min.  The patient began bleeding profusely from a scalp laceration which eventually stopped with pressure.  EMS was called in the patient was transferred to our facility.  The patient has not ambulated since the incident.  The wife states that the patient had been severely depressed for the past 4-5 weeks.  He has not been getting out of bed, has been eating less, and refusing to go to his scheduled medical appointments.  The wife states that for the past few days the patient has been stating that he is going to wean himself off of Depakote because he does not like " "how it makes him feel and tremors that it causes him.  Patient's wife states that the last 2 days he has also had episodes of waxing waning confusion, including this morning when he was found wandering the neighborhood and brought home by his neighbors.  Patient currently endorses headache, and nausea.  Patient denies any other injuries during the fall and denies pain anywhere else besides his head. Patient denies blurred vision, chest pain, shortness of breath, abdominal pain, constipation, diarrhea, dysuria, SI, and HI.  The patient has also had a chronic cough for the past few weeks. "    Initially, patient with delayed and limited responses, mostly watching the tv in the room and not acknowledging provider.  He denied any depression.  His response to "are you feeling okay" is not really, but wouldn't elaborate.  He repeated "that is just incredible" while watching replays from the [place] game.  As this provider stayed silent in the room, within 3-4 minutes, patient became more interactive.  He reports hx of shingles which has recently started scabbing over.  He denies taking any medication for it. He also is c/o "free-" at house who "won't leave" and have dogs that cause issues at his house.      Patient reports walking up the stairs too quickly which he attributes to hitting his head, but he does report he feels like he had a seizure.  He denies any tongue biting, bowel/bladder incontinence.  He reports his neighbor saw the event and said his leg was shaking.  He reports taking himself off depakote "a few days" ago because of the side effects (tremors)  He reports his psychiatrist is Dr. Ziegler whom patient says won't take him off depakote.  Would like to see a different doctor. Reports hx of remote hx of marielena with an MAOI inhibitor.  He reports last seizure approx 6 months.  He has quit drinking and trying to wean himself off coca-cola, coffee, cigarettes.  Smokes only half/cigarette a day (American " "spirit).  He denies drug/etoh use.  He enjoys watching winter golf and "when the saints win".  He denies any SI/HI.     Per chart review, patient seen by Dr. Ann with neurology 8/2018.  Patient reports ~10 seizures since a diagnosis in [id number].  He reported previous seizures "associated with etoh use; but states that not all sz were associated with etoh - says that he stopped completely since 5/2018".  Seizures appeared well-controlled on vimpat and depakote and last reported possibly 5/2018.  Seizure disorder classified as "Focal onset seizures with dyscognitive symptoms and secondarily generalization ".    ED: AF, tachycardic to 100s, HDS.  Na 132, UA shows 7 hyaline casts. CT C-spine unremarkable.  CTH shows R parietal scalp hematoma.  CXR with poor inspiration, limiting exam.  XR pelvis AP without evidence of fracture/dislocation.     Past Medical History:   Diagnosis Date    Alcohol abuse     Behavioral problem     Bipolar 1 disorder     Chronic right hip pain     Depression     DJD (degenerative joint disease), lumbar 1/27/2015    Fatigue     Hepatitis     pt. denies this    History of psychiatric care     History of psychiatric hospitalization     Hypertension     Karina     Post traumatic stress disorder     Psychiatric problem     Seizures     Suicide attempt     Therapy        Past Surgical History:   Procedure Laterality Date    COLONOSCOPY N/A 8/14/2019    Procedure: COLONOSCOPY;  Surgeon: Tammy Duval MD;  Location: [facility] ([address] [place] [address]);  Service: Endoscopy;  Laterality: N/A;    HERNIA REPAIR      SPINE SURGERY  11-12-15    LAMINECTOMY/LUMBAR/WARE       Review of patient's allergies indicates:   Allergen Reactions    Gabapentin Other (See Comments)     SEVERE DIZZINESS AND BALANCE PROBLEMS, UNABLE TO WALK.    Nardil [phenelzine]      BECOMES HYPER, LIKE A MANIC EPISODE.    Penicillins Hives    Lamictal [lamotrigine] Rash       No current facility-administered medications " on file prior to encounter.      Current Outpatient Medications on File Prior to Encounter   Medication Sig    atorvastatin (LIPITOR) 40 MG tablet Take 1 tablet (40 mg total) by mouth once daily.    divalproex ER (DEPAKOTE) 500 MG Tb24 Take 1 tablet (500mg) every morning and 2 tablets (1000mg) every night.    doxepin (SINEQUAN) 100 MG capsule TAKE 1 CAPSULE (100 MG TOTAL) BY MOUTH EVERY EVENING.    multivit-min-FA-lycopen-lutein (CENTRUM SILVER ULTRA MEN'S) 300-600-300 mcg Tab Take 1 tablet by mouth once daily at 6am.    albuterol (VENTOLIN HFA) 90 mcg/actuation inhaler Ventolin HFA 90 mcg/actuation aerosol inhaler    hydrOXYzine pamoate (VISTARIL) 50 MG Cap TAKE 1 CAPSULE (50 MG TOTAL) BY MOUTH EVERY EVENING.    lacosamide (VIMPAT) 100 mg Tab take 1 tablet by mouth twice daily    lactulose (CHRONULAC) 20 gram/30 mL Soln TAKE 30 MLS BY MOUTH 2 (TWO) TIMES DAILY AS NEEDED.    lurasidone (LATUDA) 80 mg Tab tablet Take half of a tablet with dinner for 7 days, then take one tablet with dinner.    meloxicam (MOBIC) 7.5 MG tablet TAKE 1 TABLET BY MOUTH EVERY DAY    naloxegol 12.5 mg Tab Take 12.5 mg by mouth daily as needed.    pantoprazole (PROTONIX) 40 MG tablet Take 1 tablet (40 mg total) by mouth once daily.    valACYclovir (VALTREX) 1000 MG tablet Take 1 tablet (1,000 mg total) by mouth every 8 (eight) hours. for 7 days     Family History     Problem Relation (Age of Onset)    Alcohol abuse Mother, Maternal Uncle, Paternal Uncle, Cousin    Anxiety disorder Brother    Bipolar disorder Brother    Dementia Maternal Grandmother    Depression Mother, Father, Sister, Brother    Drug abuse Brother    Suicide Sister        Tobacco Use    Smoking status: Current Some Day Smoker     Packs/day: 0.25     Years: 10.00     Pack years: 2.50    Smokeless tobacco: Never Used   Substance and Sexual Activity    Alcohol use: No     Alcohol/week: 16.7 standard drinks     Types: 20 Standard drinks or equivalent per week      Comment: quit 4 years ago    Drug use: No    Sexual activity: Yes     Partners: Female     Comment: with wife; monogamous      Review of Systems   Constitutional: Positive for appetite change (decreased). Negative for chills and fever.        + poor intake   HENT: Negative for ear pain and sore throat.    Eyes: Negative for pain and visual disturbance.   Respiratory: Negative for cough and shortness of breath.    Cardiovascular: Negative for chest pain and palpitations.   Gastrointestinal: Negative for abdominal pain, diarrhea, nausea and vomiting.   Endocrine: Negative for heat intolerance and polydipsia.   Genitourinary: Negative for dysuria, frequency and urgency.   Musculoskeletal: Negative for back pain, gait problem and neck stiffness.        Head pain, improving  Neck pain, resolved   Skin: Positive for wound (head). Negative for rash.   Neurological: Positive for seizures and syncope. Negative for dizziness and weakness.   Psychiatric/Behavioral: Negative for confusion. The patient is not nervous/anxious.      Objective:     Vital Signs (Most Recent):  Temp: 98.8 °F (37.1 °C) (09/29/19 1658)  Pulse: 100 (09/29/19 2101)  Resp: 11 (09/29/19 2101)  BP: (!) 153/93 (09/29/19 2101)  SpO2: 99 % (09/29/19 2101) Vital Signs (24h Range):  Temp:  [98.8 °F (37.1 °C)] 98.8 °F (37.1 °C)  Pulse:  [100-117] 100  Resp:  [11-20] 11  SpO2:  [95 %-99 %] 99 %  BP: (144-169)/(93-99) 153/93     Weight: 97.5 kg (215 lb)  Body mass index is 26.17 kg/m².    Physical Exam   Constitutional: He is oriented to person, place, and time. He appears well-developed and well-nourished.   Elderly, chronically ill-appearing male in NAD   HENT:   Head: Normocephalic and atraumatic.   Eyes: Conjunctivae and EOM are normal.   PERRLA, although R pupil was initially sluggish   Neck: Normal range of motion. Neck supple.   Cardiovascular: Normal rate, regular rhythm, normal heart sounds and intact distal pulses.   Pulmonary/Chest: Effort normal and  "breath sounds normal. No respiratory distress. He has no wheezes.   Abdominal: Soft. Bowel sounds are normal. He exhibits no distension. There is no tenderness.   Musculoskeletal: Normal range of motion.   No TTP to posterior head, BL shoulders and neck ROM intact   Neurological: He is alert and oriented to person, place, and time.   Skin: Skin is warm and dry. There is pallor (mild).   Skin lac to R posterior parietal region with about 5 staples.  Bleeding controlled.   Oval, elongated, papular lesions in dermatome configuration with different stages of healing but no obvious vesicles. Some whitish slough to region on back.   Skin dry   Psychiatric: His speech is delayed (initially). He is withdrawn (initially). He exhibits a depressed mood (frustrated about free ).   Perseverating about the "free " at his house and the issues with having dogs live on his property.    Initial exam vastly different from end of exam  Oriented to president but states year is 2001.  Month is "just turned October" and "breaking heat records its so hot" He is inattentive (initially).   Nursing note and vitals reviewed.                    CRANIAL NERVES     CN III, IV, VI   Extraocular motions are normal.        Significant Labs:   CBC:   Recent Labs   Lab 09/29/19  1758   WBC 11.77   HGB 15.1   HCT 43.4        CMP:   Recent Labs   Lab 09/29/19  1758   *   K 4.3   CL 98   CO2 25   *   BUN 18   CREATININE 1.0   CALCIUM 9.5   PROT 7.5   ALBUMIN 3.5   BILITOT 0.5   ALKPHOS 78   AST 20   ALT 12   ANIONGAP 9   EGFRNONAA >60.0     Lactic Acid: No results for input(s): LACTATE in the last 48 hours.  Urine Studies:   Recent Labs   Lab 09/29/19 2003   COLORU Yellow   APPEARANCEUA Clear   PHUR 6.0   SPECGRAV 1.020   PROTEINUA 1+*   GLUCUA Negative   KETONESU Trace*   BILIRUBINUA Negative   OCCULTUA 1+*   NITRITE Negative   LEUKOCYTESUR Negative   RBCUA 1   WBCUA 1   BACTERIA Rare   HYALINECASTS 7* "       Significant Imaging: I have reviewed all pertinent imaging results/findings within the past 24 hours.     X-ray Chest 1 View    Result Date: 9/29/2019  EXAMINATION: XR CHEST 1 VIEW CLINICAL HISTORY: Cough TECHNIQUE: Single frontal view of the chest was performed. COMPARISON: 10/08/2018 and 05/13/2018 chest films. FINDINGS: Single frontal portable view at 18:29. There is thoracic scoliosis convex to the left unchanged.  Heart is probably normal in size although not well demonstrated with modest inspiratory exam.  No interstitial edema or pneumothorax or pleural effusion or consolidation.  There is slight reticular subpleural opacities of the superolateral right lung probably unchanged from 05/13/2018. there is mild hazy density at the left base which could be hypoventilatory changes     Small inspiratory exam without definite failure or pneumonia.     Ct Head Without Contrast    Result Date: 9/29/2019  EXAMINATION: CT HEAD WITHOUT CONTRAST CLINICAL HISTORY: Head trauma, minor, GCS>=13, NOC/NEXUS/CCR neg, first study; TECHNIQUE: Low dose axial images were obtained through the head.  Coronal and sagittal reformations were also performed. Contrast was not administered. COMPARISON: Head CT from 05/10/2018.  Brain MRI from 05/14/2018. FINDINGS: The overall gray-white interface and hemispherical morphology appears normal.  The ventricles and basal cisterns and sulci are proportionally sized and normal for age similar with previous exam.  No midline shift or mass effect.  The corpus callosum shows overall normal morphology.  The pituitary is somewhat diminutive probably normal for age. No acute infarct or hemorrhage or mass or vasogenic edema.  No subdural collection. No skull fracture.  There is soft tissue swelling overlying the right parietal region consistent with scalp contusion.  There may be a scalp hematoma.  There is moderate mucoperiosteal thickening and mucous appearing debris within the right maxillary  sinus.  The zygomatic arches appear intact.  The visualized mastoid sinuses and middle ears appear normal.  The orbits and globes and lenses appear normal.     Right parietal scalp soft tissue swelling and increased density likely from scalp hematoma. No fracture. Normal intracranial head CT for age, unchanged.     Ct Cervical Spine Without Contrast    Result Date: 9/29/2019  EXAMINATION: CT CERVICAL SPINE WITHOUT CONTRAST CLINICAL HISTORY: C-spine trauma, NEXUS/CCR negative, low risk; TECHNIQUE: Low dose axial images, sagittal and coronal reformations were performed though the cervical spine.  Contrast was not administered. COMPARISON: None FINDINGS: The C1 ring and odontoid show no fracture.  There is segmentation incomplete posterior C1 ring, developmental of incidental note.  The lamina and transverse processes and spinous processes appear intact.  The visualized ribs appear intact.  There appears to be an old healed medial right clavicle fracture.  No acute clavicle fracture.  The visualized mastoid sinuses and middle ears appear normal.  There is mucoperiosteal thickening and presumed mucus material in the right maxillary sinus partially demonstrated.  There is moderate degenerative change of the atlantal dens interval without widening or erosion.  There is moderate to severe degenerative disc changes and disc space narrowing of the C3-4 and C5-6 and C6-7 and C7-T1 level.  There is mild degenerative change of the C4-5 level.  There is bilateral facet arthropathy without malalignment or fracture.  No compression deformity or endplate erosion.  No prevertebral soft tissue swelling.  The epiglottis appears normal. The visualized mandible appears intact with normal position and morphology of the condyles.  No adenopathy found.  The visualized thyroid appears normal.  Visualized lungs are normal.  No pneumothorax or pleural effusion found.  There is atherosclerotic calcification of the carotid bifurcations.      Degenerative changes.  No fracture or subluxation or soft tissue swelling.     X-ray Pelvis Routine Ap    Result Date: 9/29/2019  EXAMINATION: XR PELVIS ROUTINE AP CLINICAL HISTORY: fall; TECHNIQUE: AP view of the pelvis was performed. COMPARISON: 07/12/2017 FINDINGS: Single frontal view of the pelvis. Hip joint spaces are symmetric and preserved unchanged.  No fracture or osteonecrosis or malalignment.  Pubic rami and symphysis appear intact.  There is bilateral lumbosacral spinal fusion hardware and disc spacer devices of the lumbar spine.  Visualized bowel gas pattern appears normal.  Subjectively bone density appears at least mildly decreased.     No fracture or malalignment.    Assessment/Plan:     * Witnessed seizure-like activity  Witnessed tonic-clonic seizure episode with hx of focal seizures (usually related to etoh).  Noncompliant with depakote/vimpat, attempting to wean off.   Valproic acid subtherapeutic at 48.  Loaded with 1g depakote in ED.  CTH unremarkable.  CXR/UA without evidence of infection.   - Neurology consulted and appreciate recs.   - Appears dehydrated on exam, likely contributing.  - spot EEG, seizure precautions; normal vEEG in 5/2018  - c/w depakote 500/500 for now; c/w vimpat 100 mg Q12hr  - depakote does have tremors listed as adverse effect (1-57% reported) but also could be 2/2 psych medications.   - neuro checks, delirium/fall precautions  - will need collateral from wife.     Transient neurological symptoms  2 reported episodes (one by admitting provider) where patient staring off, minimally interactive, but able to answer certain questions with limited responses.  No convulsive activity or limb rigidity.  He quickly re-orients within seconds-minutes and has appropriate speech and interactive.  Unclear if this is of neurologic or psych etiology.      Bipolar affective disorder, currently depressed, mild  Initially withdrawn, inattentive and delayed verbal responses.  (see  above).  Will need to assess neurological function while admitted. Wife reports severe depression but pt denies these sxs to me. He has capacity on admission and appears frustrated with issues at home.  Will hold off on consulting psych for now.  He still desires to continue medications for seizures by seeking alternative to depakote.  - c/w latuda 40 mg PO with dinner, doxepin 100 mg PO QHS, depakote    Laceration of scalp without foreign body  - remove staples in 7-10 days from 9/29  - CT C spine without evidence of fx/dislocation from fall.  No intracranial bleeding on CTH.   - no gross deficits s/p fall    Herpes zoster dermatitis  Lesions appear ruptured and scabbing.  Now appears to have some slough/discharge on his back. Will treat with mupirocin ointment BID and have wound care evaluate.   - Patient denies treatment for it, but appears healing and rx was sent for valtex per wife's description.  - Wound care consulted and appreciate recs.     Hyponatremia  Appears chronic, but also with evidence of dehydration  - c/w mIVF    Tobacco abuse  - wants to quit, but only smokes half/cig daily  - continue to wean    VTE Risk Mitigation (From admission, onward)         Ordered     IP VTE LOW RISK PATIENT  Once      09/29/19 2211     Place EMY hose  Until discontinued      09/29/19 2211     Place sequential compression device  Until discontinued      09/29/19 2211                   Zach Joshi PA-C  Department of Hospital Medicine   [facility]

## 2019-09-30 NOTE — ASSESSMENT & PLAN NOTE
Lesions appear ruptured and scabbing.  Now appears to have some slough/discharge on his back. Will treat with mupirocin ointment BID and have wound care evaluate.   - Patient denies treatment for it, but appears healing and rx was sent for valtex per wife's description.  - Wound care consulted and appreciate recs.

## 2019-09-30 NOTE — SUBJECTIVE & OBJECTIVE
Interval History: No events overnight. Seen by Psych and neurology, adjustments made.    Review of Systems   Constitutional: Positive for appetite change (decreased). Negative for fever.        + poor intake   Respiratory: Negative for cough and shortness of breath.    Cardiovascular: Negative for chest pain and palpitations.   Gastrointestinal: Negative for abdominal pain and vomiting.   Skin: Positive for wound (head). Negative for rash.   Neurological: Positive for seizures and syncope. Negative for weakness.   Psychiatric/Behavioral: Negative for confusion. The patient is not nervous/anxious.      Objective:     Vital Signs (Most Recent):  Temp: 97.6 °F (36.4 °C) (09/30/19 1642)  Pulse: 88 (09/30/19 1642)  Resp: 20 (09/30/19 1642)  BP: 130/76 (09/30/19 1642)  SpO2: 96 % (09/30/19 1642) Vital Signs (24h Range):  Temp:  [97.5 °F (36.4 °C)-98.1 °F (36.7 °C)] 97.6 °F (36.4 °C)  Pulse:  [] 88  Resp:  [11-22] 20  SpO2:  [94 %-99 %] 96 %  BP: (116-153)/(71-99) 130/76     Weight: 97.5 kg (214 lb 15.2 oz)  Body mass index is 26.16 kg/m².    Intake/Output Summary (Last 24 hours) at 9/30/2019 1659  Last data filed at 9/30/2019 1647  Gross per 24 hour   Intake 240 ml   Output 1015 ml   Net -775 ml      Physical Exam   Constitutional: He is oriented to person, place, and time. He appears well-developed and well-nourished.   Cardiovascular: Normal rate and regular rhythm.   Pulmonary/Chest: Effort normal and breath sounds normal.   Abdominal: Soft. Bowel sounds are normal.   Musculoskeletal: Normal range of motion.   No TTP to posterior head, BL shoulders and neck ROM intact   Neurological: He is alert and oriented to person, place, and time.   Skin: Skin is warm and dry. There is pallor (mild).   Skin lac to R posterior parietal region with about 5 staples.  Bleeding controlled.   Oval, elongated, papular lesions in dermatome configuration with different stages of healing but no obvious vesicles. Some whitish slough to  "region on back.    Psychiatric: His speech is delayed (initially). He is withdrawn (initially). He exhibits a depressed mood (frustrated about free ).   Perseverating about the "free " at his house and the issues with having dogs live on his property.    Initial exam vastly different from end of exam  Oriented to president but states year is 2001.  Month is "just turned October" and "breaking heat records its so hot" He is inattentive (initially).   Nursing note and vitals reviewed.      Significant Labs:   BMP:   Recent Labs   Lab 09/30/19  0334   *   *   K 3.5   CL 98   CO2 22*   BUN 14   CREATININE 0.8   CALCIUM 8.6*   MG 1.8     CBC:   Recent Labs   Lab 09/29/19  1758 09/30/19  0334   WBC 11.77 7.91   HGB 15.1 13.3*   HCT 43.4 38.1*    114*       Significant Imaging: I have reviewed all pertinent imaging results/findings within the past 24 hours.  "

## 2019-09-30 NOTE — PROGRESS NOTES
"[facility] Hospital Medicine  Progress Note    Patient Name: Mirza Morales  MRN: [id number]  Patient Class: OP- Observation   Admission Date: 9/29/2019  Length of Stay: 0 days  Attending Physician: MARY ANN Malloy MD  Primary Care Provider: Jud Mo MD    [facility] Medicine Team: [facility] MED F Milton Roberts PA-C    Subjective:     Principal Problem:Witnessed seizure-like activity        HPI:  Mirza Morales is a 64 y.o. male with scoliosis s/p lumbar fusion (direct lateral and posterior L4-5, L3-4), L2-3, bipolar 1 (Depakote, Latuda, doxepin), and seizure disorder (Vimpat+depakote) presents to ED after a witnessed seizure episode around 4:30 PM today.      Patient poor historian in ED:  "Patient was walking up the stairs and around about the 2nd stair he began to have a seizure and fell hitting his Right parietal scalp.  Patient's wife still states that she arrived around 20 sec after he began seizing and noticed tonic-clonic movements in his upper and lower extremities and his eyes open, however the patient was not responsive.  The episode lasted around 1 min.  The patient began bleeding profusely from a scalp laceration which eventually stopped with pressure.  EMS was called in the patient was transferred to our facility.  The patient has not ambulated since the incident.  The wife states that the patient had been severely depressed for the past 4-5 weeks.  He has not been getting out of bed, has been eating less, and refusing to go to his scheduled medical appointments.  The wife states that for the past few days the patient has been stating that he is going to wean himself off of Depakote because he does not like how it makes him feel and tremors that it causes him.  Patient's wife states that the last 2 days he has also had episodes of waxing waning confusion, including this morning when he was found wandering the neighborhood and brought home by his neighbors.  Patient " "currently endorses headache, and nausea.  Patient denies any other injuries during the fall and denies pain anywhere else besides his head. Patient denies blurred vision, chest pain, shortness of breath, abdominal pain, constipation, diarrhea, dysuria, SI, and HI.  The patient has also had a chronic cough for the past few weeks. "    Initially, patient with delayed and limited responses, mostly watching the tv in the room and not acknowledging provider.  He denied any depression.  His response to "are you feeling okay" is not really, but wouldn't elaborate.  He repeated "that is just incredible" while watching replays from the [place] game.  As this provider stayed silent in the room, within 3-4 minutes, patient became more interactive.  He reports hx of shingles which has recently started scabbing over.  He denies taking any medication for it. He also is c/o "free-" at house who "won't leave" and have dogs that cause issues at his house.      Patient reports walking up the stairs too quickly which he attributes to hitting his head, but he does report he feels like he had a seizure.  He denies any tongue biting, bowel/bladder incontinence.  He reports his neighbor saw the event and said his leg was shaking.  He reports taking himself off depakote "a few days" ago because of the side effects (tremors)  He reports his psychiatrist is Dr. Ziegler whom patient says won't take him off depakote.  Would like to see a different doctor. Reports hx of remote hx of marielena with an MAOI inhibitor.  He reports last seizure approx 6 months.  He has quit drinking and trying to wean himself off coca-cola, coffee, cigarettes.  Smokes only half/cigarette a day (American spirit).  He denies drug/etoh use.  He enjoys watching winter golf and "when the saints win".  He denies any SI/HI.     Per chart review, patient seen by Dr. Ann with neurology 8/2018.  Patient reports ~10 seizures since a diagnosis in [id number].  He reported " "previous seizures "associated with etoh use; but states that not all sz were associated with etoh - says that he stopped completely since 5/2018".  Seizures appeared well-controlled on vimpat and depakote and last reported possibly 5/2018.  Seizure disorder classified as "Focal onset seizures with dyscognitive symptoms and secondarily generalization ".    ED: AF, tachycardic to 100s, HDS.  Na 132, UA shows 7 hyaline casts. CT C-spine unremarkable.  CTH shows R parietal scalp hematoma.  CXR with poor inspiration, limiting exam.  XR pelvis AP without evidence of fracture/dislocation.     Overview/Hospital Course:  Patient admitted to observation for seizure like activity. Psychiatry and Neurology consulted.    Interval History: No events overnight. Seen by Psych and neurology, adjustments made.    Review of Systems   Constitutional: Positive for appetite change (decreased). Negative for fever.        + poor intake   Respiratory: Negative for cough and shortness of breath.    Cardiovascular: Negative for chest pain and palpitations.   Gastrointestinal: Negative for abdominal pain and vomiting.   Skin: Positive for wound (head). Negative for rash.   Neurological: Positive for seizures and syncope. Negative for weakness.   Psychiatric/Behavioral: Negative for confusion. The patient is not nervous/anxious.      Objective:     Vital Signs (Most Recent):  Temp: 97.6 °F (36.4 °C) (09/30/19 1642)  Pulse: 88 (09/30/19 1642)  Resp: 20 (09/30/19 1642)  BP: 130/76 (09/30/19 1642)  SpO2: 96 % (09/30/19 1642) Vital Signs (24h Range):  Temp:  [97.5 °F (36.4 °C)-98.1 °F (36.7 °C)] 97.6 °F (36.4 °C)  Pulse:  [] 88  Resp:  [11-22] 20  SpO2:  [94 %-99 %] 96 %  BP: (116-153)/(71-99) 130/76     Weight: 97.5 kg (214 lb 15.2 oz)  Body mass index is 26.16 kg/m².    Intake/Output Summary (Last 24 hours) at 9/30/2019 1659  Last data filed at 9/30/2019 1647  Gross per 24 hour   Intake 240 ml   Output 1015 ml   Net -775 ml      Physical " "Exam   Constitutional: He is oriented to person, place, and time. He appears well-developed and well-nourished.   Cardiovascular: Normal rate and regular rhythm.   Pulmonary/Chest: Effort normal and breath sounds normal.   Abdominal: Soft. Bowel sounds are normal.   Musculoskeletal: Normal range of motion.   No TTP to posterior head, BL shoulders and neck ROM intact   Neurological: He is alert and oriented to person, place, and time.   Skin: Skin is warm and dry. There is pallor (mild).   Skin lac to R posterior parietal region with about 5 staples.  Bleeding controlled.   Oval, elongated, papular lesions in dermatome configuration with different stages of healing but no obvious vesicles. Some whitish slough to region on back.    Psychiatric: His speech is delayed (initially). He is withdrawn (initially). He exhibits a depressed mood (frustrated about free ).   Perseverating about the "free " at his house and the issues with having dogs live on his property.    Initial exam vastly different from end of exam  Oriented to president but states year is 2001.  Month is "just turned October" and "breaking heat records its so hot" He is inattentive (initially).   Nursing note and vitals reviewed.      Significant Labs:   BMP:   Recent Labs   Lab 09/30/19  0334   *   *   K 3.5   CL 98   CO2 22*   BUN 14   CREATININE 0.8   CALCIUM 8.6*   MG 1.8     CBC:   Recent Labs   Lab 09/29/19  1758 09/30/19  0334   WBC 11.77 7.91   HGB 15.1 13.3*   HCT 43.4 38.1*    114*       Significant Imaging: I have reviewed all pertinent imaging results/findings within the past 24 hours.      Assessment/Plan:      * Witnessed seizure-like activity  Delirium    Witnessed tonic-clonic seizure episode with hx of focal seizures (usually related to etoh).  Noncompliant with depakote/vimpat, attempting to wean off.   Valproic acid subtherapeutic at 48.  Loaded with 1g depakote in ED.  CTH unremarkable.  CXR/UA without " evidence of infection.   - Neurology consulted, will discontinue Depakote due to side effects   - increase Vimpat to 200mg BID  - Appears dehydrated on exam, likely contributing.  - seizure precautions; normal vEEG in 5/2018  - depakote does have tremors listed as adverse effect (1-57% reported) but also could be 2/2 psych medications.   - neuro checks, delirium/fall precautions    Bipolar affective disorder, currently depressed, mild  Initially withdrawn, inattentive and delayed verbal responses.  (see above).  Will need to assess neurological function while admitted. Wife reports severe depression but pt denies these sxs to me. He has capacity on admission and appears frustrated with issues at home.   - Psych consulted, recommend decreasing doxepin to 75mg qhs, stopping depakote  - continue Latuda 40mg with dinner  - will continue to follow    Transient neurological symptoms  2 reported episodes (one by admitting provider) where patient staring off, minimally interactive, but able to answer certain questions with limited responses.  No convulsive activity or limb rigidity.  He quickly re-orients within seconds-minutes and has appropriate speech and interactive.  Unclear if this is of neurologic or psych etiology.      Laceration of scalp without foreign body  - remove staples in 7-10 days from 9/29  - CT C spine without evidence of fx/dislocation from fall.  No intracranial bleeding on CTH.   - no gross deficits s/p fall    Herpes zoster dermatitis  Lesions appear ruptured and scabbing.  Now appears to have some slough/discharge on his back. Will treat with mupirocin ointment BID and have wound care evaluate.   - Patient denies treatment for it, but appears healing and rx was sent for valtex per wife's description.  - Wound care consulted and appreciate recs.     Hyponatremia  Appears chronic, but also with evidence of dehydration  - can be seen with depakote    Tobacco abuse  - wants to quit, but only smokes  half/cig daily  - continue to wean    VTE Risk Mitigation (From admission, onward)         Ordered     IP VTE LOW RISK PATIENT  Once      09/29/19 2211     Place EMY hose  Until discontinued      09/29/19 2211     Place sequential compression device  Until discontinued      09/29/19 2211                      Milton Roberts PA-C  Department of Hospital Medicine   [facility]

## 2019-09-30 NOTE — CONSULTS
"[facility]  Neurology  Consult Note    Patient Name: Mirza Morales  MRN: [id number]  Admission Date: 9/29/2019  Hospital Length of Stay: 0 days  Code Status: Prior   Attending Provider: MARY ANN Malloy MD   Consulting Provider: Jayna Butler PA-C  Primary Care Physician: Jud Mo MD  Principal Problem:Witnessed seizure-like activity    Inpatient consult to Neurology  Consult performed by: Jayna Butler PA-C  Consult ordered by: Zach Joshi PA-C         Subjective:     Chief Complaint:  seizure     HPI:   64 y.o. Male with bipolar disorder, depression and seizures presented to ED 9/29/19 after seizure earlier in the afternoon. Per chart review, seizure occurred as patient was climbing stairs at home. Wife heard thud and arrived ~20 sec later to witness tonic-clonic activity. Eyes noted to be open and he was unresponsive. Entire episode lasted around 1 minute. Seizure resulted in head laceration. EMS was called and bleeding was stopped with application of pressure. Collateral was obtained from wife who reports pt has been depressed the past few weeks to the point of not getting out the bed or eating much. He has also been trying to wean himself off Depakote because he attributes his tremors to this medication. In ED, VPA level was subtherapeutic at 48. He received 1 g of Depakote and was continued on 500 mg BID in addition to Vimpat 100 mg BID. Per chart review, pt has hx of seizures since age 60--focal with secondary generalization. Was last seen by Dr. Ann 8/23/18 and plan was to continue Vimpat 100 mg BID and  mg BID with 6 month follow-up. He has previously tried, but unable to tolerate: Lamictal, Gabapentin and Keppra. This admission, psych was consulted to address delirium and bipolar med recs. Psych plans to "reduce Doxepin from 100 mg to 75 mg, due to risk for anticholinergic delirium in addition to complaint of dizziness." Neurology consulted 9/30/19 for AED " recs.      Past Medical History:   Diagnosis Date    Alcohol abuse     Behavioral problem     Bipolar 1 disorder     Chronic right hip pain     Depression     DJD (degenerative joint disease), lumbar 1/27/2015    Fatigue     Hepatitis     pt. denies this    History of psychiatric care     History of psychiatric hospitalization     Hypertension     Karina     Post traumatic stress disorder     Psychiatric problem     Seizures     Suicide attempt     Therapy      Past Surgical History:   Procedure Laterality Date    COLONOSCOPY N/A 8/14/2019    Procedure: COLONOSCOPY;  Surgeon: Tammy Duval MD;  Location: [address]);  Service: Endoscopy;  Laterality: N/A;    HERNIA REPAIR      SPINE SURGERY  11-12-15    LAMINECTOMY/LUMBAR/WARE     Review of patient's allergies indicates:   Allergen Reactions    Gabapentin Other (See Comments)     SEVERE DIZZINESS AND BALANCE PROBLEMS, UNABLE TO WALK.    Nardil [phenelzine]      BECOMES HYPER, LIKE A MANIC EPISODE.    Penicillins Hives    Lamictal [lamotrigine] Rash     No current facility-administered medications on file prior to encounter.      Current Outpatient Medications on File Prior to Encounter   Medication Sig    atorvastatin (LIPITOR) 40 MG tablet Take 1 tablet (40 mg total) by mouth once daily.    divalproex ER (DEPAKOTE) 500 MG Tb24 Take 1 tablet (500mg) every morning and 2 tablets (1000mg) every night.    doxepin (SINEQUAN) 100 MG capsule TAKE 1 CAPSULE (100 MG TOTAL) BY MOUTH EVERY EVENING.    multivit-min-FA-lycopen-lutein (CENTRUM SILVER ULTRA MEN'S) 300-600-300 mcg Tab Take 1 tablet by mouth once daily at 6am.    albuterol (VENTOLIN HFA) 90 mcg/actuation inhaler Ventolin HFA 90 mcg/actuation aerosol inhaler    hydrOXYzine pamoate (VISTARIL) 50 MG Cap TAKE 1 CAPSULE (50 MG TOTAL) BY MOUTH EVERY EVENING.    lacosamide (VIMPAT) 100 mg Tab take 1 tablet by mouth twice daily    lactulose (CHRONULAC) 20 gram/30 mL Soln TAKE 30  MLS BY MOUTH 2 (TWO) TIMES DAILY AS NEEDED.    lurasidone (LATUDA) 80 mg Tab tablet Take half of a tablet with dinner for 7 days, then take one tablet with dinner.    meloxicam (MOBIC) 7.5 MG tablet TAKE 1 TABLET BY MOUTH EVERY DAY    naloxegol 12.5 mg Tab Take 12.5 mg by mouth daily as needed.    pantoprazole (PROTONIX) 40 MG tablet Take 1 tablet (40 mg total) by mouth once daily.    valACYclovir (VALTREX) 1000 MG tablet Take 1 tablet (1,000 mg total) by mouth every 8 (eight) hours. for 7 days     Family History     Problem Relation (Age of Onset)    Alcohol abuse Mother, Maternal Uncle, Paternal Uncle, Cousin    Anxiety disorder Brother    Bipolar disorder Brother    Dementia Maternal Grandmother    Depression Mother, Father, Sister, Brother    Drug abuse Brother    Suicide Sister        Tobacco Use    Smoking status: Current Some Day Smoker     Packs/day: 0.25     Years: 10.00     Pack years: 2.50    Smokeless tobacco: Never Used   Substance and Sexual Activity    Alcohol use: No     Alcohol/week: 16.7 standard drinks     Types: 20 Standard drinks or equivalent per week     Comment: quit 4 years ago    Drug use: No    Sexual activity: Yes     Partners: Female     Comment: with wife; monogamous      Review of Systems   Respiratory: Negative for shortness of breath.    Cardiovascular: Negative for chest pain.   Gastrointestinal: Negative for nausea and vomiting.   Neurological: Positive for dizziness, tremors and seizures.   Psychiatric/Behavioral: Positive for confusion, decreased concentration, dysphoric mood and sleep disturbance. The patient is nervous/anxious.      Objective:     Vital Signs (Most Recent):  Temp: 97.5 °F (36.4 °C) (09/30/19 1125)  Pulse: 84 (09/30/19 1125)  Resp: (!) 22 (09/30/19 1125)  BP: 120/83 (09/30/19 1125)  SpO2: 96 % (09/30/19 1125) Vital Signs (24h Range):  Temp:  [97.5 °F (36.4 °C)-98.8 °F (37.1 °C)] 97.5 °F (36.4 °C)  Pulse:  [] 84  Resp:  [11-22] 22  SpO2:  [94 %-99  %] 96 %  BP: (116-169)/(71-99) 120/83     Weight: 97.5 kg (214 lb 15.2 oz)  Body mass index is 26.16 kg/m².    Physical Exam   Eyes: EOM are normal.   Psychiatric: His speech is normal.     NEUROLOGICAL EXAMINATION:     MENTAL STATUS   Oriented to person.   Oriented to place.   Follows 1 step commands.   Speech: speech is normal   Level of consciousness: alert    CRANIAL NERVES     CN III, IV, VI   Extraocular motions are normal.   Nystagmus: none   Ophthalmoparesis: none    CN VII   Facial expression full, symmetric.     CN VIII   Hearing: intact    MOTOR EXAM   Muscle bulk: normal       Moves all extremities equally   No abnormal movements noted      GAIT AND COORDINATION     Tremor   Resting tremor: absent    Significant Labs:   CBC:   Recent Labs   Lab 09/29/19 1758 09/30/19  0334   WBC 11.77 7.91   HGB 15.1 13.3*   HCT 43.4 38.1*    114*     CMP:   Recent Labs   Lab 09/29/19 1758 09/30/19  0334   * 135*   * 131*   K 4.3 3.5   CL 98 98   CO2 25 22*   BUN 18 14   CREATININE 1.0 0.8   CALCIUM 9.5 8.6*   MG  --  1.8   PROT 7.5  --    ALBUMIN 3.5  --    BILITOT 0.5  --    ALKPHOS 78  --    AST 20  --    ALT 12  --    ANIONGAP 9 11   EGFRNONAA >60.0 >60.0     Inflammatory Markers: No results for input(s): SEDRATE, CRP, PROCAL in the last 48 hours.  Urine Culture: No results for input(s): LABURIN in the last 48 hours.  Urine Studies:   Recent Labs   Lab 09/29/19 2003   COLORU Yellow   APPEARANCEUA Clear   PHUR 6.0   SPECGRAV 1.020   PROTEINUA 1+*   GLUCUA Negative   KETONESU Trace*   BILIRUBINUA Negative   OCCULTUA 1+*   NITRITE Negative   LEUKOCYTESUR Negative   RBCUA 1   WBCUA 1   BACTERIA Rare   HYALINECASTS 7*     All pertinent lab results from the past 24 hours have been reviewed.    Significant Imaging: I have reviewed and interpreted all pertinent imaging results/findings within the past 24 hours.     CT Head WO contrast (9/29/19):  Right parietal scalp soft tissue swelling and increased  "density likely from scalp hematoma.  No fracture. Normal intracranial head CT for age, unchanged.    Assessment and Plan:     * Witnessed seizure-like activity  Hx of seizures since age 60, mostly attributed to ETOH withdrawal  Follows with Dr. Ann, last visit August 2018 on Vimpat 100 mg BID and  mg BID at home  Presents 9/29/19 after witnessed tonic clonic seizure at home in the setting of patient weaning himself off VPA 2/2 report of tremors   VPA level subtherapeutic on admission    --ok to d/c Depakote given reported s/e  --increase Vimpat to 200 mg BID  --psych following for bipolar disorder and giving recs for mood stabilization   "Latuda 40mg qd, will consider increasing this once mental status improves, instead of Depakote as pt is complaining of side effects"  --f/u in clinic with Dr. Julien scheduled for 10/3/19      Delirium  Continue strict delirium precautions    Laceration of scalp without foreign body  S/p witnessed seizure at home 9/29  CT head unremarkable for acute intracranial pathology    VTE Risk Mitigation (From admission, onward)         Ordered     IP VTE LOW RISK PATIENT  Once      09/29/19 2211     Place EMY hose  Until discontinued      09/29/19 2211     Place sequential compression device  Until discontinued      09/29/19 2211              Thank you for your consult. I will sign off. Please contact us if you have any additional questions.    Jayna Butler PA-C  General Neurology Consult  [facility] # [id number]  "

## 2019-09-30 NOTE — NURSING
Received report from ER nurse on Mr. Morales who was reported to be AAOx4 and ambulatory with assist. Upon arrival to the unit, the patient had extremely delayed verbal and physical responses, was disoriented and only able to tell me his name. I immediately paged the MD on call for Med JOSE, Zach Joshi came to the bedside as well as stroke MD on call.  The patient suddenly became responsive, awake alert and oriented x 3, and able to use all extremities with MDs at bedside. Vitals stable. Will continue to monitor.

## 2019-09-30 NOTE — ASSESSMENT & PLAN NOTE
Initially withdrawn, inattentive and delayed verbal responses.  (see above).  Will need to assess neurological function while admitted. Wife reports severe depression but pt denies these sxs to me. He has capacity on admission and appears frustrated with issues at home.  Will hold off on consulting psych for now.  He still desires to continue medications for seizures by seeking alternative to depakote.  - c/w latuda 40 mg PO with dinner, doxepin 100 mg PO QHS, depakote

## 2019-09-30 NOTE — SUBJECTIVE & OBJECTIVE
Patient History           Medical as of 9/30/2019     Past Medical History     Diagnosis Date Comments Source    Alcohol abuse -- -- Provider    Behavioral problem -- -- Provider    Bipolar 1 disorder -- -- Provider    Chronic right hip pain -- -- Provider    Depression -- -- Provider    DJD (degenerative joint disease), lumbar 1/27/2015 -- Provider    Fatigue -- -- Provider    Hepatitis -- pt. denies this Provider    History of psychiatric care -- -- Provider    History of psychiatric hospitalization -- -- Provider    Hypertension -- -- Provider    Karina -- -- Provider    Post traumatic stress disorder -- -- Provider    Psychiatric problem -- -- Provider    Seizures -- -- Provider    Suicide attempt -- -- Provider    Therapy -- -- Provider          Pertinent Negatives     Diagnosis Date Noted Comments Source    Amblyopia 05/30/2013 -- Provider    Cataract 05/30/2013 -- Provider    Diabetes mellitus 05/30/2013 -- Provider    Diabetic retinopathy 05/30/2013 -- Provider    Glaucoma 05/30/2013 -- Provider    Macular degeneration 05/30/2013 -- Provider    Retinal detachment 05/30/2013 -- Provider    Self-harming behavior 03/11/2013 -- Provider    Strabismus 05/30/2013 -- Provider    Uveitis 05/30/2013 -- Provider                  Surgical as of 9/30/2019     Past Surgical History     Procedure Laterality Date Comments Source    HERNIA REPAIR -- -- -- Provider    SPINE SURGERY -- 11-12-15 LAMINECTOMY/LUMBAR/WARE Provider    COLONOSCOPY N/A 8/14/2019 Procedure: COLONOSCOPY;  Surgeon: Tammy Duval MD;  Location: [address];  Service: Endoscopy;  Laterality: N/A; Provider                  Family as of 9/30/2019     Problem Relation Name Age of Onset Comments Source    Alcohol abuse Mother -- -- -- Provider    Depression Mother -- -- -- Provider    Depression Father -- -- -- Provider    Depression Sister -- -- -- Provider    Suicide Sister -- -- -- Provider    Drug abuse Brother -- -- -- Provider     Anxiety disorder Brother -- -- -- Provider    Bipolar disorder Brother -- -- -- Provider    Depression Brother -- -- -- Provider    Alcohol abuse Maternal Uncle -- -- -- Provider    Alcohol abuse Paternal Uncle -- -- -- Provider    Dementia Maternal Grandmother -- -- -- Provider    Alcohol abuse Cousin -- -- -- Provider    Amblyopia Neg Hx -- -- -- Provider    Blindness Neg Hx -- -- -- Provider    Cancer Neg Hx -- -- -- Provider    Cataracts Neg Hx -- -- -- Provider    Diabetes Neg Hx -- -- -- Provider    Glaucoma Neg Hx -- -- -- Provider    Hypertension Neg Hx -- -- -- Provider    Macular degeneration Neg Hx -- -- -- Provider    Retinal detachment Neg Hx -- -- -- Provider    Strabismus Neg Hx -- -- -- Provider    Stroke Neg Hx -- -- -- Provider    Thyroid disease Neg Hx -- -- -- Provider    Asthma Neg Hx -- -- -- Provider    Emphysema Neg Hx -- -- -- Provider            Tobacco Use as of 9/30/2019     Smoking Status Smoking Start Date Smoking Quit Date Packs/Day Years Used    Current Some Day Smoker -- -- 0.25 10.00    Types Comments Smokeless Tobacco Status Smokeless Tobacco Quit Date Source    -- -- Never Used -- Provider            Alcohol Use as of 9/30/2019     Alcohol Use Drinks/Week Alcohol/Week Comments Source    No 20 Standard drinks or equivalent 16.7 standard drinks quit 4 years ago Provider    Frequency Standard Drinks Binge Drinking        -- -- --              Drug Use as of 9/30/2019     Drug Use Types Frequency Comments Source    No -- -- -- Provider            Sexual Activity as of 9/30/2019     Sexually Active Birth Control Partners Comments Source    Yes -- Female with wife; monogamous  Provider            Activities of Daily Living as of 9/30/2019     Activities of Daily Living Question Response Comments Source    Caffeine Use: Excessive Not Asked -- Provider    Financial Status: Unemployed No -- Provider    Legal: Other Not Asked -- Provider    Childhood History: Adopted No -- Provider     Financial Status: Other Not Asked -- Provider    Leisure: Exercise Not Asked -- Provider    Childhood History: Early trauma Yes -- Provider    Firearms: Does patient have access to a firearm? Yes -- Provider    Leisure: Fishing Yes -- Provider    Childhood History: Raised by parents No -- Provider    Home situation: homeless No -- Provider    Leisure: Hunting Not Asked -- Provider    Childhood History: Uneventful Not Asked -- Provider    Home situation: lives alone No -- Provider    Leisure: Movie Watching Not Asked -- Provider    Childhood History: Other Not Asked -- Provider    Home situation: lives in group home No -- Provider    Leisure: Shopping Not Asked -- Provider    Education: Unfinished High School No -- Provider    Home situation: lives in nursing home No -- Provider    Leisure: Sports Not Asked -- Provider    Education: High School Graduate Yes -- Provider    Home situation: lives in shelter No -- Provider    Leisure: Time with family Not Asked -- Provider    Education: Unfinished college Yes -- Provider    Home situation: lives with family No -- Provider     Service No -- Provider    Education: Trade School No -- Provider    Home situation: lives with friends No -- Provider    Spirituality: Active Participation No -- Provider    Education: Associate's Degree No -- Provider    Home situation: lives with significant other No -- Provider    Spirituality: Organized Restorationist Yes -- Provider    Education: Bachelor's Degree No -- Provider    Home situation: lives with spouse Yes -- Provider    Spirituality: Private Participation Not Asked -- Provider    Education: More than one Bachelor's or Professional No -- Provider    Legal consequences of chemical use Not Asked -- Provider    Patient feels they ought to cut down on drinking/drug use Yes -- Provider    Education: Master's, PhD No -- Provider    Legal: Arrest history Yes -- Provider    Patient annoyed by others criticizing their drinking/drug use No  -- Provider    Financial Status: Disabled Yes -- Provider    Legal: Involved in civil litigation Yes -- Provider    Patient has felt bad or guilty about drinking/drug use Yes -- Provider    Financial Status: Employed No -- Provider    Legal: Involved in criminal litigation Not Asked -- Provider    Patient has had a drink/used drugs as an eye opener in the AM No -- Provider            Social Documentation as of 9/30/2019    None           Occupational as of 9/30/2019     Occupation Employer Comments Source    disabilty -- -- Provider            Socioeconomic as of 9/30/2019     Marital Status Spouse Name Number of Children Years Education Education Level Preferred Language Ethnicity Race Source     -- 2 14 -- English /White White Provider    Financial Resource Strain Food Insecurity: Worry Food Insecurity: Inability Transportation Needs: Medical Transportation Needs: Non-medical    -- -- -- -- --            Pertinent History     Question Response Comments    Lives with spouse --    Place in Birth Order 4th --    Lives in home --    Number of Siblings 3 --    Raised by biological parents bio mom and step father    Legal Involvement none --    Childhood Trauma uneventful --    Criminal History of -- DUI- approximately in 2001    Financial Status disabled --    Highest Level of Education unfinished college --    Does patient have access to a firearm? No --     Service No --    Primary Leisure Activity fishing/hunting fish, golf, garden, play cheFluoresentric    Spirituality non-practicing Roman Catholic        Past Medical History:   Diagnosis Date    Alcohol abuse     Behavioral problem     Bipolar 1 disorder     Chronic right hip pain     Depression     DJD (degenerative joint disease), lumbar 1/27/2015    Fatigue     Hepatitis     pt. denies this    History of psychiatric care     History of psychiatric hospitalization     Hypertension     Karina     Post traumatic stress disorder     Psychiatric  problem     Seizures     Suicide attempt     Therapy      Past Surgical History:   Procedure Laterality Date    COLONOSCOPY N/A 8/14/2019    Procedure: COLONOSCOPY;  Surgeon: Tammy Duval MD;  Location: [facility] ([address] [place] [address]);  Service: Endoscopy;  Laterality: N/A;    HERNIA REPAIR      SPINE SURGERY  11-12-15    LAMINECTOMY/LUMBAR/WARE     Family History     Problem Relation (Age of Onset)    Alcohol abuse Mother, Maternal Uncle, Paternal Uncle, Cousin    Anxiety disorder Brother    Bipolar disorder Brother    Dementia Maternal Grandmother    Depression Mother, Father, Sister, Brother    Drug abuse Brother    Suicide Sister        Tobacco Use    Smoking status: Current Some Day Smoker     Packs/day: 0.25     Years: 10.00     Pack years: 2.50    Smokeless tobacco: Never Used   Substance and Sexual Activity    Alcohol use: No     Alcohol/week: 16.7 standard drinks     Types: 20 Standard drinks or equivalent per week     Comment: quit 4 years ago    Drug use: No    Sexual activity: Yes     Partners: Female     Comment: with wife; monogamous      Review of patient's allergies indicates:   Allergen Reactions    Gabapentin Other (See Comments)     SEVERE DIZZINESS AND BALANCE PROBLEMS, UNABLE TO WALK.    Nardil [phenelzine]      BECOMES HYPER, LIKE A MANIC EPISODE.    Penicillins Hives    Lamictal [lamotrigine] Rash       No current facility-administered medications on file prior to encounter.      Current Outpatient Medications on File Prior to Encounter   Medication Sig    atorvastatin (LIPITOR) 40 MG tablet Take 1 tablet (40 mg total) by mouth once daily.    divalproex ER (DEPAKOTE) 500 MG Tb24 Take 1 tablet (500mg) every morning and 2 tablets (1000mg) every night.    doxepin (SINEQUAN) 100 MG capsule TAKE 1 CAPSULE (100 MG TOTAL) BY MOUTH EVERY EVENING.    multivit-min-FA-lycopen-lutein (CENTRUM SILVER ULTRA MEN'S) 300-600-300 mcg Tab Take 1 tablet by mouth once daily at 6am.    albuterol  "(VENTOLIN HFA) 90 mcg/actuation inhaler Ventolin HFA 90 mcg/actuation aerosol inhaler    hydrOXYzine pamoate (VISTARIL) 50 MG Cap TAKE 1 CAPSULE (50 MG TOTAL) BY MOUTH EVERY EVENING.    lacosamide (VIMPAT) 100 mg Tab take 1 tablet by mouth twice daily    lactulose (CHRONULAC) 20 gram/30 mL Soln TAKE 30 MLS BY MOUTH 2 (TWO) TIMES DAILY AS NEEDED.    lurasidone (LATUDA) 80 mg Tab tablet Take half of a tablet with dinner for 7 days, then take one tablet with dinner.    meloxicam (MOBIC) 7.5 MG tablet TAKE 1 TABLET BY MOUTH EVERY DAY    naloxegol 12.5 mg Tab Take 12.5 mg by mouth daily as needed.    pantoprazole (PROTONIX) 40 MG tablet Take 1 tablet (40 mg total) by mouth once daily.    valACYclovir (VALTREX) 1000 MG tablet Take 1 tablet (1,000 mg total) by mouth every 8 (eight) hours. for 7 days     Psychotherapeutics (From admission, onward)    Start     Stop Route Frequency Ordered    09/30/19 1700  lurasidone tablet 40 mg      -- Oral Daily 09/29/19 2211 09/29/19 2315  doxepin capsule 100 mg      -- Oral Nightly 09/29/19 2211 09/29/19 2310  ramelteon tablet 8 mg      -- Oral Nightly PRN 09/29/19 2211        Review of Systems  Strengths and Liabilities: Liability: Patient has poor judgment, Liability: Patient is unstable., Liability: Patient has possible cognitive impairment.    Objective:     Vital Signs (Most Recent):  Temp: 97.5 °F (36.4 °C) (09/30/19 1125)  Pulse: 84 (09/30/19 1125)  Resp: (!) 22 (09/30/19 1125)  BP: 120/83 (09/30/19 1125)  SpO2: 96 % (09/30/19 1125) Vital Signs (24h Range):  Temp:  [97.5 °F (36.4 °C)-98.8 °F (37.1 °C)] 97.5 °F (36.4 °C)  Pulse:  [] 84  Resp:  [11-22] 22  SpO2:  [94 %-99 %] 96 %  BP: (116-169)/(71-99) 120/83     Height: 6' 4" (193 cm)  Weight: 97.5 kg (214 lb 15.2 oz)  Body mass index is 26.16 kg/m².      Intake/Output Summary (Last 24 hours) at 9/30/2019 1429  Last data filed at 9/30/2019 1200  Gross per 24 hour   Intake 240 ml   Output 365 ml   Net -125 ml "       Physical Exam   Psychiatric:   Mental Status Exam:  Appearance: older than stated age  Behavior/Cooperation: cooperative  Speech: slowed, increased latency of response  Mood: anxious  Affect: constricted  Thought Process: blocked, circumstantial  Thought Content: normal, no suicidality, no homicidality, delusions, or paranoia   Orientation: person, place, year  Memory: Impaired to some degree  Attention Span/Concentration: Easily distracted  Insight: limited  Judgment: limited        Significant Labs: All pertinent labs within the past 24 hours have been reviewed.    Significant Imaging: I have reviewed all pertinent imaging results/findings within the past 24 hours.

## 2019-09-30 NOTE — HPI
"64 y.o. Male with bipolar disorder, depression and seizures presented to ED 9/29/19 after seizure earlier in the afternoon. Per chart review, seizure occurred as patient was climbing stairs at home. Wife heard thud and arrived ~20 sec later to witness tonic-clonic activity. Eyes noted to be open and he was unresponsive. Entire episode lasted around 1 minute. Seizure resulted in head laceration. EMS was called and bleeding was stopped with application of pressure. Collateral was obtained from wife who reports pt has been depressed the past few weeks to the point of not getting out the bed or eating much. He has also been trying to wean himself off Depakote because he attributes his tremors to this medication. In ED, VPA level was subtherapeutic at 48. He received 1 g of Depakote and was continued on 500 mg BID in addition to Vimpat 100 mg BID. Per chart review, pt has hx of seizures since age 60--focal with secondary generalization. Was last seen by Dr. Ann 8/23/18 and plan was to continue Vimpat 100 mg BID and  mg BID with 6 month follow-up. He has previously tried, but unable to tolerate: Lamictal, Gabapentin and Keppra. This admission, psych was consulted to address delirium and bipolar med recs. Psych plans to "reduce Doxepin from 100 mg to 75 mg, due to risk for anticholinergic delirium in addition to complaint of dizziness." Neurology consulted 9/30/19 for AED recs.              "Patient was walking up the stairs and around about the 2nd stair he began to have a seizure and fell hitting his Right parietal scalp.  Patient's wife still states that she arrived around 20 sec after he began seizing and noticed tonic-clonic movements in his upper and lower extremities and his eyes open, however the patient was not responsive.  The episode lasted around 1 min.  The patient began bleeding profusely from a scalp laceration which eventually stopped with pressure.  EMS was called in the patient was transferred to " "our facility.  The patient has not ambulated since the incident.  The wife states that the patient had been severely depressed for the past 4-5 weeks.  He has not been getting out of bed, has been eating less, and refusing to go to his scheduled medical appointments.  The wife states that for the past few days the patient has been stating that he is going to wean himself off of Depakote because he does not like how it makes him feel and tremors that it causes him.  Patient's wife states that the last 2 days he has also had episodes of waxing and waning confusion, including this morning when he was found wandering the neighborhood and brought home by his neighbors.  Patient currently endorses headache, and nausea.  Patient denies any other injuries during the fall and denies pain anywhere else besides his head. Patient denies blurred vision, chest pain, shortness of breath, abdominal pain, constipation, diarrhea, dysuria, SI, and HI.  The patient has also had a chronic cough for the past few weeks. "     Initially, patient with delayed and limited responses, mostly watching the tv in the room and not acknowledging provider.  He denied any depression.  His response to "are you feeling okay" was "not really", but wouldn't elaborate.  He repeated "that is just incredible" while watching replays from the [place] game.  As this provider stayed silent in the room, within 3-4 minutes, patient became more interactive.  He reports hx of shingles which has recently started scabbing over.  He denies taking any medication for it. He also is c/o "free-" at house who "won't leave" and have dogs that cause issues at his house.       Patient reported walking up the stairs too quickly which he attributes to why he hit his head, but he did report he feels like he had a seizure.  He denied any tongue biting, bowel/bladder incontinence.  He reported his neighbor saw the event and said his leg was shaking.  He reported taking " "himself off depakote "a few days" ago because of the side effects (tremors).  He reported his psychiatrist is Dr. Loco whom patient says won't take him off Depakote.  Would like to see a different Doctor. Reports remote hx of marielena with an MAOI.  He reported last seizure approx 6 months ago.  He has quit drinking and trying to wean himself off coca-cola, coffee, cigarettes.  Smokes only half/cigarette a day (American spirit).  He denied drug use.  He enjoyed watching winter golf and "when the [place] win". He denied any SI/HI.      Dr. Ann with Neurology 8/2018.  Patient reported ~10 seizures since a diagnosis in [id number].  He reported previous seizures "associated with etoh use; but states that not all sz were associated with etoh - says that he stopped completely since 5/2018".  Seizures appeared well-controlled on vimpat and depakote and last reported possibly 5/2018.  Seizure disorder classified as "Focal onset seizures with dyscognitive symptoms and secondarily generalization ".     previous psychiatric hospitalization (Nardil overdose) and has been evaluated for depression, bipolar and previous suicide attempts.   does not like taking his Depakote because he believes it causes him to be depressed, gives him nightmares as well as hand tremors. Wife also endorses Depakote causing him tremors. Pt endorses feeling depressed, low energy, low appetite, no desire to shower or get out of bed for roughly 4 weeks. This coincides with the pt weaning his depakote and vimpat. Pt is unsure if he stopped taking the depakote altogether or just decreased his dose.      Yesterday, Pt was admitted for seizure and subsequent head hematoma and laceration.       previous seizures are EtOH-induced (but hasn't had a drink in 1 month) but this seizure was from "weaning those drugs".  Pt has been weaning off himself off Vimpat and Depakote because it causes the patient's tremors. Upcoming neurology appt., Oct 3rd.      "

## 2019-09-30 NOTE — MEDICAL/APP STUDENT
"Interval History:  09/30/2019   Mr. Mirza Morales is a 65 y/o male with past medical history of seizures and HTN. In addition, he has a history of previous psychiatric hospitalization (Nardil overdose) and has been evaluated for depression, bipolar and previous suicide attempts. Upon entering the room, pt was sitting in bed watching TV; he was alert and responsive to my introduction. He confirmed that he does not like taking his Depakote because he believes it causes him to be depressed; gives him nightmares and hand tremors. Wife also endorses epakote causing him tremors but Dr. Ziegler doesn't want to it off him because it manages pt's bipolar and seizures.  Pt endorses feeling depressed, low energy, low appetite, no desire to shower or get out of bed for roughly 4 weeks. This coincides with the pt weaning his depakote and vimpat. Pt is unsure if he stopped taking the depakote altogether or just decreased his dose. Pt has had thoughts of overdosing in the past 2 weeks but does not want to harm himself or commit suicide right now.     Yesterday, Pt was admitted for seizure and subsequent head hematoma and laceration. Today he describes his mood as much better than its been in the last couple weeks and feels much better with an increased appetite and increased to desire to get some rest as he feels that he hasn't been able to sleep in the past 12 hrs because of disturbances from hospital staff and not being comfortable in the hospital bed. He hasn't been out of bed because primary team/nursing staff think he's too wobbly.     Further collateral provided by the wife, Anamaria. Wife says previous seizures are EtOH-induced (but hasn't had a drink in 1 month) but this seizure from "weaning those drugs".  Pt has been weaning off himself off Vimpat and Depakote because it causes the patient's tremors. Asked wife if this was a suicide attempt, wife replies "Call it what you want". Upcoming neurology appt., Oct 3rd with  " "Elisabet. On disability for mental health reasons since 1994.    Pt sees psychiatrist Dr. Ziegler; wants to maybe try a different psychiatrist and perhaps try a different medication beyond Depakote.     After informing the patient about the plan to return with the psych consult team later today, the patient comprehended and the interview was over.     Modified CAM-ICU:  1. Acute change and/or fluctuating course of mental status: Stable mental status  2. Inattention (SAVEAHAART): No  · "Squeeze my hand, only when you hear, the letter 'A'."  3. Altered Level of Consciousness: No  4. Disorganized Thinking (Errors >1/6): Yes, No  · "Will a stone float on water?"  · "Are there fish in the sea?"  · "Does one pound weigh more than two?"  · "Can you use a hammer to pound a nail?"  · Command(s):  · "Hold up 2 fingers."  · "Now do the same thing with the other hand."    Score: 1+2 AND, either 3 or 4 present = Positive CAM-ICU    Mental Status Exam:  Appearance: age appropriate, bearded, lying in bed  Level of Consciousness: Appropriate  Behavior/Cooperation: cooperative  Psychomotor: psychomotor retardation   Speech: slowed, delayed, increased latency of response  Language: english, fluid  Orientation: person, place, situation, day of week, year  Attention Span/Concentration: spelled "WORLD" forwards and backwards  Memory: Registers and recalls 3/3 objects at 1 and 5 minutes  Mood: "fine and neutral"  Affect: blunted and flat  Thought Process: linear, normal and logical, tangential  Associations: concrete  Thought Content: normal, no suicidality, no homicidality, delusions, or paranoia  Fund of Knowledge: Vocabulary appropriate   Abstraction: proverbs were concrete  Insight: fair  Judgment: fair  "

## 2019-09-30 NOTE — ASSESSMENT & PLAN NOTE
2 reported episodes (one by admitting provider) where patient staring off, minimally interactive, but able to answer certain questions with limited responses.  No convulsive activity or limb rigidity.  He quickly re-orients within seconds-minutes and has appropriate speech and interactive.  Unclear if this is of neurologic or psych etiology.

## 2019-09-30 NOTE — HPI
"Mirza Morales is a 64 y.o. male with scoliosis s/p lumbar fusion (direct lateral and posterior L4-5, L3-4), L2-3, bipolar 1 (Depakote, Latuda, doxepin), and seizure disorder (Vimpat+depakote) presents to ED after a witnessed seizure episode around 4:30 PM today.      Patient poor historian in ED:  "Patient was walking up the stairs and around about the 2nd stair he began to have a seizure and fell hitting his Right parietal scalp.  Patient's wife still states that she arrived around 20 sec after he began seizing and noticed tonic-clonic movements in his upper and lower extremities and his eyes open, however the patient was not responsive.  The episode lasted around 1 min.  The patient began bleeding profusely from a scalp laceration which eventually stopped with pressure.  EMS was called in the patient was transferred to our facility.  The patient has not ambulated since the incident.  The wife states that the patient had been severely depressed for the past 4-5 weeks.  He has not been getting out of bed, has been eating less, and refusing to go to his scheduled medical appointments.  The wife states that for the past few days the patient has been stating that he is going to wean himself off of Depakote because he does not like how it makes him feel and tremors that it causes him.  Patient's wife states that the last 2 days he has also had episodes of waxing waning confusion, including this morning when he was found wandering the neighborhood and brought home by his neighbors.  Patient currently endorses headache, and nausea.  Patient denies any other injuries during the fall and denies pain anywhere else besides his head. Patient denies blurred vision, chest pain, shortness of breath, abdominal pain, constipation, diarrhea, dysuria, SI, and HI.  The patient has also had a chronic cough for the past few weeks. "    Initially, patient with delayed and limited responses, mostly watching the tv in the room and " "not acknowledging provider.  He denied any depression.  His response to "are you feeling okay" is not really, but wouldn't elaborate.  He repeated "that is just incredible" while watching replays from the [place] game.  As this provider stayed silent in the room, within 3-4 minutes, patient became more interactive.  He reports hx of shingles which has recently started scabbing over.  He denies taking any medication for it. He also is c/o "free-" at house who "won't leave" and have dogs that cause issues at his house.      Patient reports walking up the stairs too quickly which he attributes to hitting his head, but he does report he feels like he had a seizure.  He denies any tongue biting, bowel/bladder incontinence.  He reports his neighbor saw the event and said his leg was shaking.  He reports taking himself off depakote "a few days" ago because of the side effects (tremors)  He reports his psychiatrist is Dr. Ziegler whom patient says won't take him off depakote.  Would like to see a different doctor. Reports hx of remote hx of marielena with an MAOI inhibitor.  He reports last seizure approx 6 months.  He has quit drinking and trying to wean himself off coca-cola, coffee, cigarettes.  Smokes only half/cigarette a day (American spirit).  He denies drug/etoh use.  He enjoys watching winter golf and "when the [place] win".  He denies any SI/HI.     Per chart review, patient seen by Dr. Ann with neurology 8/2018.  Patient reports ~10 seizures since a diagnosis in [id number].  He reported previous seizures "associated with etoh use; but states that not all sz were associated with etoh - says that he stopped completely since 5/2018".  Seizures appeared well-controlled on vimpat and depakote and last reported possibly 5/2018.  Seizure disorder classified as "Focal onset seizures with dyscognitive symptoms and secondarily generalization ".    ED: AF, tachycardic to 100s, HDS.  Na 132, UA shows 7 hyaline casts. CT " C-spine unremarkable.  CTH shows R parietal scalp hematoma.  CXR with poor inspiration, limiting exam.  XR pelvis AP without evidence of fracture/dislocation.

## 2019-09-30 NOTE — NURSING
Spoke with on-call physician for Med Team F, informed provider of sanguinous drainage noted from R head laceration site. MD stated okay to place gauze/tape at incision site. Will continue to monitor.

## 2019-09-30 NOTE — CONSULTS
PLAN OF CARE NOTE     Received a consult from primary team regarding patient not wanting to take his Depakote and having uncontrolled seizures. Reportedly takes Depakote for both seizures and bipolar disorder. Current Depakote level is slightly subtherapeutic- 48.2.     Reportedly started taking Latuda at home. We asked primary team to have a detailed discussion with the patient as to why he did not want to take Depakote. Of note, he is also on Vimpat, so he does have alternate AED on board. Latuda will also help with bipolar mood symptoms, but it is not an AED.     Please have consult psychiatry on board only when there is a clear need for discussion about medication compliance and/or the patient is having severely uncontrolled symptoms related to bipolar disorder (SI, HI,AVH) that would need further stabilization in the inpatient setting.       Kyler Cuevas MD   Psychiatry

## 2019-09-30 NOTE — Clinical Note
9/30/2019 1:25 PM   Mirza Morales   1955   [id number]            PSYCHIATRY INITIAL EVALUATION MEDICAL STUDENT NOTE        HISTORY OF PRESENT ILLNESS:    Mirza Morales, a 64 y.o. male, with Past psychiatric history of bipolar, depression, suicide attempts and subsequent psychiatric hospitalization and past medical history of presented to [facility] with seizure and psychiatry consulted for noncompliance with Depakote and Vimpat.  .   Met with mother.    Today,  Mr. Mirza Morales is a 65 y/o male with past medical history of seizures and HTN. In addition, he has a history of previous psychiatric hospitalization (Nardil overdose) and has been evaluated for depression, bipolar and previous suicide attempts. Upon entering the room, pt was sitting in bed watching TV; he was alert and responsive to my introduction. He confirmed that he does not like taking his Depakote because he believes it causes him to be depressed; gives him nightmares and hand tremors. Wife also endorses epakote causing him tremors but Dr. Ziegler doesn't want to it off him because it manages pt's bipolar and seizures.  Pt endorses feeling depressed, low energy, low appetite, no desire to shower or get out of bed for roughly 4 weeks. This coincides with the pt weaning his depakote and vimpat. Pt is unsure if he stopped taking the depakote altogether or just decreased his dose. Pt has had thoughts of overdosing in the past 2 weeks but does not want to harm himself or commit suicide right now.      Yesterday, Pt was admitted for seizure and subsequent head hematoma and laceration. Today he describes his mood as much better than its been in the last couple weeks and feels much better with an increased appetite and increased to desire to get some rest as he feels that he hasn't been able to sleep in the past 12 hrs because of disturbances from hospital staff and not being comfortable in the hospital bed. He hasn't been out of bed  "because primary team/nursing staff think he's too wobbly.      After informing the patient about the plan to return with the psych consult team later today, the patient comprehended and the interview was over.       Psychosocial stressors:  Health ? Financial/ work? Relationships? Other       Collateral :- With pts permission spoke to wifeAnamaria    Further collateral provided by the wife, Anamaria. Wife says previous seizures are EtOH-induced (but hasn't had a drink in 1 month) but this seizure from "weaning those drugs".  Pt has been weaning off himself off Vimpat and Depakote because it causes the patient's tremors. Asked wife if this was a suicide attempt, wife replies "Call it what you want". Upcoming neurology appt., Oct 3rd with Dr. Bhandari. On disability for mental health reasons since 1994.     Pt sees psychiatrist Dr. Ziegler; wants to maybe try a different psychiatrist and perhaps try a different medication beyond Depakote.     PSYCHIATRIC REVIEW OF SYSTEMS - (Is patient experiencing or having changes in the following currently or previously):    Symptoms of Depression: diminished mood or loss of interest/anhedonia; irritability, diminished energy, change in sleep, change in appetite, diminished concentration or cognition or indecisiveness, PMA/R, excessive guilt or hopelessness or worthlessness, suicidal ideations - Passive/ Active ?, Suicide attempt- method  Onset was approximately 1 day ago. Symptoms have been unchanged since that time.   Current symptoms include:    Symptoms of NATALIA: excessive anxiety/worry/fear, more days than not, about numerous issues, difficult to control, with restlessness, fatigue, poor concentration, irritability, muscle tension, sleep disturbance; causes functionally impairing distress   Onset was approximately {1-10:42660} {time; units:36001} ago. Symptoms have been {clinical course:17::"unchanged"} since that time.   Current symptoms include:    Symptoms of marielena or hypomania: " "elevated, expansive, or irritable mood with increased energy or activity; with inflated self-esteem or grandiosity, decreased need for sleep, increased rate of speech,  racing thoughts, distractibility, increased goal directed activity or PMA, risky/disinhibited behavior  Onset was approximately {1-10:72891} {time; units:19140} ago. Symptoms have been {clinical course:17::"unchanged"} since that time.   Current symptoms include:    Symptoms of psychosis: hallucinations, delusions, disorganized thinking, disorganized behavior or abnormal motor behavior, or negative symptoms (diminshed emotional expression, avolition, anhedonia, alogia, asociality   Onset was approximately {1-10:99489} {time; units:59179} ago. Symptoms have been {clinical course:17::"unchanged"} since that time.   Current symptoms include:    Sleep: initiation, maintenance, early morning awakening with inability to return to sleep    Risk Parameters:  {parameters:60523::"Patient reports no suicidal ideation","Patient reports no homicidal ideation","Patient reports no self-injurious behavior","Patient reports no violent behavior"}    Other Psychiatric symptoms    Symptoms of Panic Disorder: recurrent panic attacks, precipitated or un-precipitated, source of worry and/or behavioral changes secondary; with or without agoraphobia    Symptoms of PTSD: h/o trauma? Physical/ Sexual/ witnessed traumatic situation ; re-experiencing/intrusive symptoms, avoidant behavior, negative alterations in cognition or mood, or hyperarousal symptoms; with or without dissociative symptoms     Symptoms of OCD: obsessions, compulsions or ruminations    Symptoms of Eating Disorders: anorexia, bulimia or binging    Symptoms of ADHD: inattention or hyperactivity or dx in childhood?    HISTORY     CURRENT HOME  MEDS  Home Psychiatric Meds: ***  OTC Meds: *** herbal supplements***    PAST PSYCHIATRIC HISTORY  Previous Psychiatric Hospitalizations: ***   Previous Medication Trials: " ***  Previous Suicide Attempts: ***/ Methods ?  History of Violence: ***   Psychiatric Care (current & past): ***  History of Psychotherapy: ***    SUBSTANCE ABUSE HISTORY   Tobacco: ***  Alcohol: ***  Illicit Substances: ***  Misuse of Prescription Medications: ***  Detoxes: ***  Rehabs: ***  12 Step Meetings: ***  Periods of Sobriety: ***  Withdrawal: ***    Substance Abuse/ Dependence: intoxication, withdrawal, tolerance, used in larger amounts or duration than intended, unsuccessful attempts to limit or quit, increased time engaging in or seeking out, cravings or strong desire to use, failure to fulfill obligations, negative consequences in social/interpersonal/occupational,/recreational areas, use in dangerous situations, medical or psychological consequences       FAMILY PSYCHIATRIC HISTORY       SOCIAL HISTORY  Developmental/Childhood: ***  History of Physical/Sexual Abuse: ***  Education: ***  , [facility]-  Employment: ***   Financial: ***   Relationship Status/Sexual Orientation: ***   Children: ***   Housing Status: ***  Christianity: ***   History: ***   Recreational Activities: ***  Access to Gun: ***     LEGAL HISTORY   Past Charges/Incarcerations:***   Pending Charges: ***    PAST MEDICAL & SURGICAL HISTORY   Past Medical History:   Diagnosis Date    Alcohol abuse     Behavioral problem     Bipolar 1 disorder     Chronic right hip pain     Depression     DJD (degenerative joint disease), lumbar 1/27/2015    Fatigue     Hepatitis     pt. denies this    History of psychiatric care     History of psychiatric hospitalization     Hypertension     Karina     Post traumatic stress disorder     Psychiatric problem     Seizures     Suicide attempt     Therapy      Past Surgical History:   Procedure Laterality Date    COLONOSCOPY N/A 8/14/2019    Procedure: COLONOSCOPY;  Surgeon: Tammy Duval MD;  Location: [address]);  Service: Endoscopy;  Laterality: N/A;    HERNIA REPAIR  "     SPINE SURGERY  11-12-15    LAMINECTOMY/LUMBAR/WARE       NEUROLOGIC HISTORY  Seizures: ***   Head trauma: ***     OBJECTIVE       Constitutional  Vitals:  Most recent vital signs, dated {PSY VITALS:90031} 90 days prior to this appointment, {WERE / WERE NOT:32768} reviewed.    Vitals:    09/29/19 1658 09/29/19 1826 09/29/19 2101 09/30/19 0011   BP: (!) 169/98 (!) 144/99 (!) 153/93 (!) 135/94   Pulse: (!) 117 104 100 94   Resp: 20 16 11 16   Temp: 98.8 °F (37.1 °C)   98.1 °F (36.7 °C)   TempSrc: Oral      SpO2: 95% 98% 99% (!) 94%   Weight: 97.5 kg (215 lb)   97.5 kg (214 lb 15.2 oz)   Height: 6' 4" (1.93 m)   6' 4" (1.93 m)    09/30/19 0452 09/30/19 0855 09/30/19 1125   BP: 123/71 116/72 120/83   Pulse: 85 89 84   Resp: 18 (!) 21 (!) 22   Temp: 97.6 °F (36.4 °C) 97.5 °F (36.4 °C) 97.5 °F (36.4 °C)   TempSrc:  Oral Oral   SpO2: (!) 94% (!) 94% 96%   Weight:      Height:               Laboratory Data:   Pertinent Psych Labs- CBC, CMP, Thyroid, UTOX, EKG, Head CT?    Current Psych Medications:      Allergy:  Review of patient's allergies indicates:   Allergen Reactions    Gabapentin Other (See Comments)     SEVERE DIZZINESS AND BALANCE PROBLEMS, UNABLE TO WALK.    Nardil [phenelzine]      BECOMES HYPER, LIKE A MANIC EPISODE.    Penicillins Hives    Lamictal [lamotrigine] Rash       Mental Status Exam:  Appearance: {appearance:78066::"unremarkable","age appropriate"}  Behavior/Cooperation:  {behavior:33294::"normal","cooperative"}  Speech: {findings; speech:61793::"normal tone","normal rate","normal pitch","normal volume"}  Language: uses words appropriately; NO aphasia or dysarthria  Mood: "  "  Affect:  congruent with mood and appropriate to situation/content  Thought Process:  {thought process:77283::"normal and logical"}  Thought Content: {normal:68449::"normal, no suicidality, no homicidality, delusions, or paranoia"}  Sensorium:  Awake/ Drowsy/ Somnolent  Orientation: Alert and Oriented x 4?  Memory:  " "Intact   3/3 immediate, 3/3 at 5 minutes    Recent:  Impaired/  Limited/ Intact; able to report recent events   Remote:  Impaired/  Limited/ Intact; Named 4/4 past presidents   Attention/concentration: appropriate for age/education/ impaired/ Tested- able to spell word "HOUSE" forwards and backwards  Abstraction: Able to abstract/ Unable to abstract/ concrete  Insight: Intact/ limited/ impaired  Judgment:Intact/ limited/ impaired    [facility] ICU- Positive? Negative?      ASSESSMENT     Impression:     Primary diagnosis    Differential diagnosis      RECOMMENDATIONS      "

## 2019-09-30 NOTE — CONSULTS
"[facility]  Psychiatry  Consult Note    Patient Name: Mirza Morales  MRN: [id number]   Code Status: Prior  Admission Date: 9/29/2019  Hospital Length of Stay: 0 days  Attending Physician: MARY ANN Malloy MD  Primary Care Provider: Jud Mo MD    Current Legal Status: N/A    Patient information was obtained from patient, spouse/SO and past medical records.   Consults  Subjective:     Principal Problem:Witnessed seizure-like activity    Chief Complaint: Medication noncompliance      HPI:   Mirza Morales is a 64 y.o. male with a past psychiatric history of Bipolar Disorder who presented to [facility] due to Witnessed seizure-like activity. Psychiatry was consulted for "noncompliance with Depakote."     Per Primary Team:  Mirza Morales is a 64 y.o. male with scoliosis s/p lumbar fusion (direct lateral and posterior L4-5, L3-4), L2-3, Bipolar I (on Depakote, Latuda, doxepin), and seizure disorder (Vimpat+depakote) presented to ED after a witnessed seizure episode around 4:30 PM today.       Patient poor historian in ED:  "Patient was walking up the stairs and around about the 2nd stair he began to have a seizure and fell hitting his Right parietal scalp.  Patient's wife still states that she arrived around 20 sec after he began seizing and noticed tonic-clonic movements in his upper and lower extremities and his eyes open, however the patient was not responsive.  The episode lasted around 1 min.  The patient began bleeding profusely from a scalp laceration which eventually stopped with pressure.  EMS was called in the patient was transferred to our facility.  The patient has not ambulated since the incident.  The wife states that the patient had been severely depressed for the past 4-5 weeks.  He has not been getting out of bed, has been eating less, and refusing to go to his scheduled medical appointments.  The wife states that for the past few days the patient has been stating that he " "is going to wean himself off of Depakote because he does not like how it makes him feel and tremors that it causes him.  Patient's wife states that the last 2 days he has also had episodes of waxing and waning confusion, including this morning when he was found wandering the neighborhood and brought home by his neighbors.  Patient currently endorses headache, and nausea.  Patient denies any other injuries during the fall and denies pain anywhere else besides his head. Patient denies blurred vision, chest pain, shortness of breath, abdominal pain, constipation, diarrhea, dysuria, SI, and HI.  The patient has also had a chronic cough for the past few weeks. "     Initially, patient with delayed and limited responses, mostly watching the tv in the room and not acknowledging provider.  He denied any depression.  His response to "are you feeling okay" was "not really", but wouldn't elaborate.  He repeated "that is just incredible" while watching replays from the [place] game.  As this provider stayed silent in the room, within 3-4 minutes, patient became more interactive.  He reports hx of shingles which has recently started scabbing over.  He denies taking any medication for it. He also is c/o "free-" at house who "won't leave" and have dogs that cause issues at his house.       Patient reported walking up the stairs too quickly which he attributes to why he hit his head, but he did report he feels like he had a seizure.  He denied any tongue biting, bowel/bladder incontinence.  He reported his neighbor saw the event and said his leg was shaking.  He reported taking himself off depakote "a few days" ago because of the side effects (tremors).  He reported his psychiatrist is Dr. Loco whom patient says won't take him off Depakote.  Would like to see a different Doctor. Reports remote hx of marielena with an MAOI.  He reported last seizure approx 6 months ago.  He has quit drinking and trying to wean himself off " "c[employer], coffee, cigarettes.  Smokes only half/cigarette a day (American spirit).  He denied drug use.  He enjoyed watching winter golf and "when the [place] win". He denied any SI/HI.      Per chart review, patient seen by Dr. Ann with Neurology 8/2018.  Patient reported ~10 seizures since a diagnosis in [id number].  He reported previous seizures "associated with etoh use; but states that not all sz were associated with etoh - says that he stopped completely since 5/2018".  Seizures appeared well-controlled on vimpat and depakote and last reported possibly 5/2018.  Seizure disorder classified as "Focal onset seizures with dyscognitive symptoms and secondarily generalization ".    Per Psychiatry:   Mr. Mirza Morales is a 63 y/o male with past medical history of seizures and HTN. In addition, he has a history of previous psychiatric hospitalization (Nardil overdose) and has been evaluated for depression, bipolar and previous suicide attempts. Upon entering the room, pt was sitting in bed watching TV. He was alert and responsive to my introduction. He confirmed that he does not like taking his Depakote because he believes it causes him to be depressed, gives him nightmares as well as hand tremors. Wife also endorses Depakote causing him tremors. Pt endorses feeling depressed, low energy, low appetite, no desire to shower or get out of bed for roughly 4 weeks. This coincides with the pt weaning his depakote and vimpat. Pt is unsure if he stopped taking the depakote altogether or just decreased his dose. Pt has had thoughts of overdosing in the past 2 weeks but does not want to harm himself or attempt suicide right now.      Yesterday, Pt was admitted for seizure and subsequent head hematoma and laceration. Today he describes his mood as much better than its been in the last couple weeks and feels much better with an increased appetite and increased to desire to get some rest as he feels that he hasn't been able to " "sleep in the past 12 hrs because of disturbances from hospital staff and not being comfortable in the hospital bed. He hasn't been out of bed because primary team/nursing staff think he's too wobbly.      Further collateral provided by the wife, Anamaria. Wife says previous seizures are EtOH-induced (but hasn't had a drink in 1 month) but this seizure was from "weaning those drugs".  Pt has been weaning off himself off Vimpat and Depakote because it causes the patient's tremors. Upcoming neurology appt., Oct 3rd. On disability for mental illness since 1994.    Collateral:   See above     Medical Review of Systems:  Pertinent items are noted in HPI.    Psychiatric Review of Systems-is patient experiencing or having changes in  sleep: yes  appetite: yes  weight: no  energy/anergy: yes  interest/pleasure/anhedonia: yes  somatic symptoms: yes  libido: no  anxiety/panic: yes  guilty/hopelessness: no  concentration: no  S.I.B.s/risky behavior: no  any drugs: no  alcohol: yes, last drink a month ago    Allergies:  Gabapentin; Nardil [phenelzine]; Penicillins; and Lamictal [lamotrigine]    Past Medical/Surgical History:  Past Medical History:   Diagnosis Date    Alcohol abuse     Behavioral problem     Bipolar 1 disorder     Chronic right hip pain     Depression     DJD (degenerative joint disease), lumbar 1/27/2015    Fatigue     Hepatitis     pt. denies this    History of psychiatric care     History of psychiatric hospitalization     Hypertension     Karina     Post traumatic stress disorder     Psychiatric problem     Seizures     Suicide attempt     Therapy      Past Surgical History:   Procedure Laterality Date    COLONOSCOPY N/A 8/14/2019    Procedure: COLONOSCOPY;  Surgeon: Tammy Duval MD;  Location: [facility] ([address] [place] [address]);  Service: Endoscopy;  Laterality: N/A;    HERNIA REPAIR      SPINE SURGERY  11-12-15    LAMINECTOMY/LUMBAR/WARE       Past Psychiatric History:  Previous Medication Trials: " yes- Nardil, Depakote, Latuda, Doxepin   Previous Psychiatric Hospitalizations: yes   Previous Suicide Attempts: yes   History of Violence: no  Outpatient Psychiatrist: yes, Dr. Loco, last seen 6/2019     Social History:  Marital Status:   Children: 0   Employment Status/Info: on disability  Education: unknown  Special Ed: unknown  Housing Status: with wife   History of phys/sexual abuse: unknown  Access to gun: unknown    Substance Abuse History:  Recreational Drugs: denies   Use of Alcohol: denied for the past month. Hx of heavy use leading to alcohol withdrawal sz, diagnosed 4-5 years ago   Rehab History: unknown   Tobacco Use: yes      Legal History:  Past Charges/Incarcerations: unknown   Pending charges: unknown     Family Psychiatric History:   Unknown     Psychosocial Stressors: family, health and drug and alcohol  Functioning Relationships: good support system    Hospital Course: 9/30/2019 see HPI for details regarding today's visit          Patient History           Medical as of 9/30/2019     Past Medical History     Diagnosis Date Comments Source    Alcohol abuse -- -- Provider    Behavioral problem -- -- Provider    Bipolar 1 disorder -- -- Provider    Chronic right hip pain -- -- Provider    Depression -- -- Provider    DJD (degenerative joint disease), lumbar 1/27/2015 -- Provider    Fatigue -- -- Provider    Hepatitis -- pt. denies this Provider    History of psychiatric care -- -- Provider    History of psychiatric hospitalization -- -- Provider    Hypertension -- -- Provider    Karina -- -- Provider    Post traumatic stress disorder -- -- Provider    Psychiatric problem -- -- Provider    Seizures -- -- Provider    Suicide attempt -- -- Provider    Therapy -- -- Provider          Pertinent Negatives     Diagnosis Date Noted Comments Source    Amblyopia 05/30/2013 -- Provider    Cataract 05/30/2013 -- Provider    Diabetes mellitus 05/30/2013 -- Provider    Diabetic retinopathy 05/30/2013 --  Provider    Glaucoma 05/30/2013 -- Provider    Macular degeneration 05/30/2013 -- Provider    Retinal detachment 05/30/2013 -- Provider    Self-harming behavior 03/11/2013 -- Provider    Strabismus 05/30/2013 -- Provider    Uveitis 05/30/2013 -- Provider                  Surgical as of 9/30/2019     Past Surgical History     Procedure Laterality Date Comments Source    HERNIA REPAIR -- -- -- Provider    SPINE SURGERY -- 11-12-15 LAMINECTOMY/LUMBAR/WARE Provider    COLONOSCOPY N/A 8/14/2019 Procedure: COLONOSCOPY;  Surgeon: Tammy Duval MD;  Location: [facility] ([address] [place] [address]);  Service: Endoscopy;  Laterality: N/A; Provider                  Family as of 9/30/2019     Problem Relation Name Age of Onset Comments Source    Alcohol abuse Mother -- -- -- Provider    Depression Mother -- -- -- Provider    Depression Father -- -- -- Provider    Depression Sister -- -- -- Provider    Suicide Sister -- -- -- Provider    Drug abuse Brother -- -- -- Provider    Anxiety disorder Brother -- -- -- Provider    Bipolar disorder Brother -- -- -- Provider    Depression Brother -- -- -- Provider    Alcohol abuse Maternal Uncle -- -- -- Provider    Alcohol abuse Paternal Uncle -- -- -- Provider    Dementia Maternal Grandmother -- -- -- Provider    Alcohol abuse Cousin -- -- -- Provider    Amblyopia Neg Hx -- -- -- Provider    Blindness Neg Hx -- -- -- Provider    Cancer Neg Hx -- -- -- Provider    Cataracts Neg Hx -- -- -- Provider    Diabetes Neg Hx -- -- -- Provider    Glaucoma Neg Hx -- -- -- Provider    Hypertension Neg Hx -- -- -- Provider    Macular degeneration Neg Hx -- -- -- Provider    Retinal detachment Neg Hx -- -- -- Provider    Strabismus Neg Hx -- -- -- Provider    Stroke Neg Hx -- -- -- Provider    Thyroid disease Neg Hx -- -- -- Provider    Asthma Neg Hx -- -- -- Provider    Emphysema Neg Hx -- -- -- Provider            Tobacco Use as of 9/30/2019     Smoking Status Smoking Start Date Smoking Quit Date Packs/Day Years  Used    Current Some Day Smoker -- -- 0.25 10.00    Types Comments Smokeless Tobacco Status Smokeless Tobacco Quit Date Source    -- -- Never Used -- Provider            Alcohol Use as of 9/30/2019     Alcohol Use Drinks/Week Alcohol/Week Comments Source    No 20 Standard drinks or equivalent 16.7 standard drinks quit 4 years ago Provider    Frequency Standard Drinks Binge Drinking        -- -- --              Drug Use as of 9/30/2019     Drug Use Types Frequency Comments Source    No -- -- -- Provider            Sexual Activity as of 9/30/2019     Sexually Active Birth Control Partners Comments Source    Yes -- Female with wife; monogamous  Provider            Activities of Daily Living as of 9/30/2019     Activities of Daily Living Question Response Comments Source    Caffeine Use: Excessive Not Asked -- Provider    Financial Status: Unemployed No -- Provider    Legal: Other Not Asked -- Provider    Childhood History: Adopted No -- Provider    Financial Status: Other Not Asked -- Provider    Leisure: Exercise Not Asked -- Provider    Childhood History: Early trauma Yes -- Provider    Firearms: Does patient have access to a firearm? Yes -- Provider    Leisure: Fishing Yes -- Provider    Childhood History: Raised by parents No -- Provider    Home situation: homeless No -- Provider    Leisure: Hunting Not Asked -- Provider    Childhood History: Uneventful Not Asked -- Provider    Home situation: lives alone No -- Provider    Leisure: Movie Watching Not Asked -- Provider    Childhood History: Other Not Asked -- Provider    Home situation: lives in group home No -- Provider    Leisure: Shopping Not Asked -- Provider    Education: Unfinished High School No -- Provider    Home situation: lives in nursing home No -- Provider    Leisure: Sports Not Asked -- Provider    Education: High School Graduate Yes -- Provider    Home situation: lives in shelter No -- Provider    Leisure: Time with family Not Asked -- Provider     Education: Unfinished college Yes -- Provider    Home situation: lives with family No -- Provider     Service No -- Provider    Education: Trade School No -- Provider    Home situation: lives with friends No -- Provider    Spirituality: Active Participation No -- Provider    Education: Associate's Degree No -- Provider    Home situation: lives with significant other No -- Provider    Spirituality: Organized Pentecostalism Yes -- Provider    Education: Bachelor's Degree No -- Provider    Home situation: lives with spouse Yes -- Provider    Spirituality: Private Participation Not Asked -- Provider    Education: More than one Bachelor's or Professional No -- Provider    Legal consequences of chemical use Not Asked -- Provider    Patient feels they ought to cut down on drinking/drug use Yes -- Provider    Education: Master's, PhD No -- Provider    Legal: Arrest history Yes -- Provider    Patient annoyed by others criticizing their drinking/drug use No -- Provider    Financial Status: Disabled Yes -- Provider    Legal: Involved in civil litigation Yes -- Provider    Patient has felt bad or guilty about drinking/drug use Yes -- Provider    Financial Status: Employed No -- Provider    Legal: Involved in criminal litigation Not Asked -- Provider    Patient has had a drink/used drugs as an eye opener in the AM No -- Provider            Social Documentation as of 9/30/2019    None           Occupational as of 9/30/2019     Occupation Employer Comments Source    disabilty -- -- Provider            Socioeconomic as of 9/30/2019     Marital Status Spouse Name Number of Children Years Education Education Level Preferred Language Ethnicity Race Source     -- 2 14 -- English /White White Provider    Financial Resource Strain Food Insecurity: Worry Food Insecurity: Inability Transportation Needs: Medical Transportation Needs: Non-medical    -- -- -- -- --            Pertinent History     Question Response Comments     Lives with spouse --    Place in Birth Order 4th --    Lives in home --    Number of Siblings 3 --    Raised by biological parents bio mom and step father    Legal Involvement none --    Childhood Trauma uneventful --    Criminal History of -- DUI- approximately in 2001    Financial Status disabled --    Highest Level of Education unfinished college --    Does patient have access to a firearm? No --     Service No --    Primary Leisure Activity fishing/hunting fish, golf, garden, play chess    Spirituality non-practicing Islam        Past Medical History:   Diagnosis Date    Alcohol abuse     Behavioral problem     Bipolar 1 disorder     Chronic right hip pain     Depression     DJD (degenerative joint disease), lumbar 1/27/2015    Fatigue     Hepatitis     pt. denies this    History of psychiatric care     History of psychiatric hospitalization     Hypertension     Karina     Post traumatic stress disorder     Psychiatric problem     Seizures     Suicide attempt     Therapy      Past Surgical History:   Procedure Laterality Date    COLONOSCOPY N/A 8/14/2019    Procedure: COLONOSCOPY;  Surgeon: Tammy Duval MD;  Location: [facility] ([address] [place] [address]);  Service: Endoscopy;  Laterality: N/A;    HERNIA REPAIR      SPINE SURGERY  11-12-15    LAMINECTOMY/LUMBAR/WARE     Family History     Problem Relation (Age of Onset)    Alcohol abuse Mother, Maternal Uncle, Paternal Uncle, Cousin    Anxiety disorder Brother    Bipolar disorder Brother    Dementia Maternal Grandmother    Depression Mother, Father, Sister, Brother    Drug abuse Brother    Suicide Sister        Tobacco Use    Smoking status: Current Some Day Smoker     Packs/day: 0.25     Years: 10.00     Pack years: 2.50    Smokeless tobacco: Never Used   Substance and Sexual Activity    Alcohol use: No     Alcohol/week: 16.7 standard drinks     Types: 20 Standard drinks or equivalent per week     Comment: quit 4 years ago    Drug  use: No    Sexual activity: Yes     Partners: Female     Comment: with wife; monogamous      Review of patient's allergies indicates:   Allergen Reactions    Gabapentin Other (See Comments)     SEVERE DIZZINESS AND BALANCE PROBLEMS, UNABLE TO WALK.    Nardil [phenelzine]      BECOMES HYPER, LIKE A MANIC EPISODE.    Penicillins Hives    Lamictal [lamotrigine] Rash       No current facility-administered medications on file prior to encounter.      Current Outpatient Medications on File Prior to Encounter   Medication Sig    atorvastatin (LIPITOR) 40 MG tablet Take 1 tablet (40 mg total) by mouth once daily.    divalproex ER (DEPAKOTE) 500 MG Tb24 Take 1 tablet (500mg) every morning and 2 tablets (1000mg) every night.    doxepin (SINEQUAN) 100 MG capsule TAKE 1 CAPSULE (100 MG TOTAL) BY MOUTH EVERY EVENING.    multivit-min-FA-lycopen-lutein (CENTRUM SILVER ULTRA MEN'S) 300-600-300 mcg Tab Take 1 tablet by mouth once daily at 6am.    albuterol (VENTOLIN HFA) 90 mcg/actuation inhaler Ventolin HFA 90 mcg/actuation aerosol inhaler    hydrOXYzine pamoate (VISTARIL) 50 MG Cap TAKE 1 CAPSULE (50 MG TOTAL) BY MOUTH EVERY EVENING.    lacosamide (VIMPAT) 100 mg Tab take 1 tablet by mouth twice daily    lactulose (CHRONULAC) 20 gram/30 mL Soln TAKE 30 MLS BY MOUTH 2 (TWO) TIMES DAILY AS NEEDED.    lurasidone (LATUDA) 80 mg Tab tablet Take half of a tablet with dinner for 7 days, then take one tablet with dinner.    meloxicam (MOBIC) 7.5 MG tablet TAKE 1 TABLET BY MOUTH EVERY DAY    naloxegol 12.5 mg Tab Take 12.5 mg by mouth daily as needed.    pantoprazole (PROTONIX) 40 MG tablet Take 1 tablet (40 mg total) by mouth once daily.    valACYclovir (VALTREX) 1000 MG tablet Take 1 tablet (1,000 mg total) by mouth every 8 (eight) hours. for 7 days     Psychotherapeutics (From admission, onward)    Start     Stop Route Frequency Ordered    09/30/19 1700  lurasidone tablet 40 mg      -- Oral Daily 09/29/19 4819     "09/29/19 2315  doxepin capsule 100 mg      -- Oral Nightly 09/29/19 2211 09/29/19 2310  ramelteon tablet 8 mg      -- Oral Nightly PRN 09/29/19 2211        Review of Systems  Strengths and Liabilities: Liability: Patient has poor judgment, Liability: Patient is unstable., Liability: Patient has possible cognitive impairment.    Objective:     Vital Signs (Most Recent):  Temp: 97.5 °F (36.4 °C) (09/30/19 1125)  Pulse: 84 (09/30/19 1125)  Resp: (!) 22 (09/30/19 1125)  BP: 120/83 (09/30/19 1125)  SpO2: 96 % (09/30/19 1125) Vital Signs (24h Range):  Temp:  [97.5 °F (36.4 °C)-98.8 °F (37.1 °C)] 97.5 °F (36.4 °C)  Pulse:  [] 84  Resp:  [11-22] 22  SpO2:  [94 %-99 %] 96 %  BP: (116-169)/(71-99) 120/83     Height: 6' 4" (193 cm)  Weight: 97.5 kg (214 lb 15.2 oz)  Body mass index is 26.16 kg/m².      Intake/Output Summary (Last 24 hours) at 9/30/2019 1429  Last data filed at 9/30/2019 1200  Gross per 24 hour   Intake 240 ml   Output 365 ml   Net -125 ml       Physical Exam   Psychiatric:   Mental Status Exam:  Appearance: older than stated age  Behavior/Cooperation: cooperative  Speech: slowed, increased latency of response  Mood: anxious  Affect: constricted  Thought Process: blocked, circumstantial  Thought Content: normal, no suicidality, no homicidality, delusions, or paranoia   Orientation: person, place, year  Memory: Impaired to some degree  Attention Span/Concentration: Easily distracted  Insight: limited  Judgment: limited        Significant Labs: All pertinent labs within the past 24 hours have been reviewed.    Significant Imaging: I have reviewed all pertinent imaging results/findings within the past 24 hours.    Assessment/Plan:     Delirium  Patient is a 64 year old man with history of Bipolar I Disorder and Seizure Disorder, Alcohol Abuse with hx of Alcohol Induced Seizures (sobriety for the past month- per patient and collateral). Fell down during tonic clonic seizure yesterday with laceration to " scalp. Psychiatry was consulted because patient is refusing to take Depakote, said it was causing him to have tremors, which has been documented with past visit with Neurology outpatient and also confirmed by wife as a side effect he is having.     Primary concern from psychiatric standpoint currently is that the patient seems to be delirious. The patient has reportedly been spending most of his time in bed for the past month, in a deep depression but not suicidal, not eating well and not performing ADLs. Wife manages medication. On interview today, the patient was oriented to person and place but not to time. He also was not linear and had significant thought blocking. Would recommend further clarification of patient's baseline. Current differential would be Delirium with underlying dementia vs. Postictal confusion.     Recommendations:   - Reduce Doxepin from 100 mg to 75 mg, due to risk for anticholinergic delirium in addition to complaint of dizziness   - Please consult Neurology for management of Depakote. Would recommend from psychiatric standpoint to use another mood stabilizer due to complaint of tremulousness but defer to Neuro for AED management and if Depakote is necessary for this pt.   - Psychiatry will continue to follow this patient.          Total Time:  60 minutes      Kyler Cuevas MD   Psychiatry  [facility]  Pager [phone]

## 2019-09-30 NOTE — ASSESSMENT & PLAN NOTE
- remove staples in 7-10 days from 9/29  - CT C spine without evidence of fx/dislocation from fall.  No intracranial bleeding on CTH.   - no gross deficits s/p fall

## 2019-10-01 ENCOUNTER — PATIENT OUTREACH (OUTPATIENT)
Dept: ADMINISTRATIVE | Facility: OTHER | Age: 64
End: 2019-10-01

## 2019-10-01 PROBLEM — R23.9 ALTERATION IN SKIN INTEGRITY: Status: ACTIVE | Noted: 2019-10-01

## 2019-10-01 LAB
AMPHET+METHAMPHET UR QL: NEGATIVE
ANION GAP SERPL CALC-SCNC: 7 MMOL/L (ref 8–16)
BARBITURATES UR QL SCN>200 NG/ML: NEGATIVE
BASOPHILS # BLD AUTO: 0.05 K/UL (ref 0–0.2)
BASOPHILS NFR BLD: 0.6 % (ref 0–1.9)
BENZODIAZ UR QL SCN>200 NG/ML: NEGATIVE
BILIRUB UR QL STRIP: NEGATIVE
BUN SERPL-MCNC: 11 MG/DL (ref 8–23)
BZE UR QL SCN: NEGATIVE
CALCIUM SERPL-MCNC: 9 MG/DL (ref 8.7–10.5)
CANNABINOIDS UR QL SCN: NEGATIVE
CHLORIDE SERPL-SCNC: 101 MMOL/L (ref 95–110)
CLARITY UR REFRACT.AUTO: CLEAR
CO2 SERPL-SCNC: 26 MMOL/L (ref 23–29)
COLOR UR AUTO: YELLOW
CREAT SERPL-MCNC: 0.8 MG/DL (ref 0.5–1.4)
CREAT UR-MCNC: 91 MG/DL (ref 23–375)
DIFFERENTIAL METHOD: ABNORMAL
EOSINOPHIL # BLD AUTO: 0.1 K/UL (ref 0–0.5)
EOSINOPHIL NFR BLD: 0.8 % (ref 0–8)
ERYTHROCYTE [DISTWIDTH] IN BLOOD BY AUTOMATED COUNT: 13.2 % (ref 11.5–14.5)
EST. GFR  (AFRICAN AMERICAN): >60 ML/MIN/1.73 M^2
EST. GFR  (NON AFRICAN AMERICAN): >60 ML/MIN/1.73 M^2
ETHANOL UR-MCNC: <10 MG/DL
GLUCOSE SERPL-MCNC: 92 MG/DL (ref 70–110)
GLUCOSE UR QL STRIP: NEGATIVE
HCT VFR BLD AUTO: 40.2 % (ref 40–54)
HGB BLD-MCNC: 13.4 G/DL (ref 14–18)
HGB UR QL STRIP: ABNORMAL
IMM GRANULOCYTES # BLD AUTO: 0.09 K/UL (ref 0–0.04)
IMM GRANULOCYTES NFR BLD AUTO: 1 % (ref 0–0.5)
KETONES UR QL STRIP: NEGATIVE
LEUKOCYTE ESTERASE UR QL STRIP: NEGATIVE
LYMPHOCYTES # BLD AUTO: 1.5 K/UL (ref 1–4.8)
LYMPHOCYTES NFR BLD: 17.4 % (ref 18–48)
MCH RBC QN AUTO: 33.2 PG (ref 27–31)
MCHC RBC AUTO-ENTMCNC: 33.3 G/DL (ref 32–36)
MCV RBC AUTO: 100 FL (ref 82–98)
METHADONE UR QL SCN>300 NG/ML: NEGATIVE
MICROSCOPIC COMMENT: ABNORMAL
MONOCYTES # BLD AUTO: 0.8 K/UL (ref 0.3–1)
MONOCYTES NFR BLD: 8.7 % (ref 4–15)
NEUTROPHILS # BLD AUTO: 6.2 K/UL (ref 1.8–7.7)
NEUTROPHILS NFR BLD: 71.5 % (ref 38–73)
NITRITE UR QL STRIP: NEGATIVE
NRBC BLD-RTO: 0 /100 WBC
OPIATES UR QL SCN: NEGATIVE
PCP UR QL SCN>25 NG/ML: NEGATIVE
PH UR STRIP: 7 [PH] (ref 5–8)
PLATELET # BLD AUTO: 118 K/UL (ref 150–350)
PMV BLD AUTO: 11 FL (ref 9.2–12.9)
POTASSIUM SERPL-SCNC: 4.9 MMOL/L (ref 3.5–5.1)
PROT UR QL STRIP: NEGATIVE
RBC # BLD AUTO: 4.04 M/UL (ref 4.6–6.2)
RBC #/AREA URNS AUTO: 6 /HPF (ref 0–4)
SODIUM SERPL-SCNC: 134 MMOL/L (ref 136–145)
SP GR UR STRIP: 1.01 (ref 1–1.03)
TOXICOLOGY INFORMATION: NORMAL
URN SPEC COLLECT METH UR: ABNORMAL
WBC # BLD AUTO: 8.63 K/UL (ref 3.9–12.7)
WBC #/AREA URNS AUTO: 0 /HPF (ref 0–5)

## 2019-10-01 PROCEDURE — 63600175 PHARM REV CODE 636 W HCPCS: Performed by: PHYSICIAN ASSISTANT

## 2019-10-01 PROCEDURE — 99232 SBSQ HOSP IP/OBS MODERATE 35: CPT | Mod: ,,, | Performed by: PSYCHIATRY & NEUROLOGY

## 2019-10-01 PROCEDURE — 25000003 PHARM REV CODE 250: Performed by: PHYSICIAN ASSISTANT

## 2019-10-01 PROCEDURE — 85025 COMPLETE CBC W/AUTO DIFF WBC: CPT

## 2019-10-01 PROCEDURE — 95816 EEG AWAKE AND DROWSY: CPT | Mod: 26,,, | Performed by: PSYCHIATRY & NEUROLOGY

## 2019-10-01 PROCEDURE — 80307 DRUG TEST PRSMV CHEM ANLYZR: CPT

## 2019-10-01 PROCEDURE — 80048 BASIC METABOLIC PNL TOTAL CA: CPT

## 2019-10-01 PROCEDURE — 99226 PR SUBSEQUENT OBSERVATION CARE,LEVEL III: ICD-10-PCS | Mod: ,,, | Performed by: PHYSICIAN ASSISTANT

## 2019-10-01 PROCEDURE — 95816 EEG AWAKE AND DROWSY: CPT

## 2019-10-01 PROCEDURE — G0378 HOSPITAL OBSERVATION PER HR: HCPCS

## 2019-10-01 PROCEDURE — 95816 PR EEG,W/AWAKE & DROWSY RECORD: ICD-10-PCS | Mod: 26,,, | Performed by: PSYCHIATRY & NEUROLOGY

## 2019-10-01 PROCEDURE — 99232 PR SUBSEQUENT HOSPITAL CARE,LEVL II: ICD-10-PCS | Mod: ,,, | Performed by: PSYCHIATRY & NEUROLOGY

## 2019-10-01 PROCEDURE — 99226 PR SUBSEQUENT OBSERVATION CARE,LEVEL III: CPT | Mod: ,,, | Performed by: PHYSICIAN ASSISTANT

## 2019-10-01 PROCEDURE — 36415 COLL VENOUS BLD VENIPUNCTURE: CPT

## 2019-10-01 PROCEDURE — 87040 BLOOD CULTURE FOR BACTERIA: CPT | Mod: 59

## 2019-10-01 PROCEDURE — 81001 URINALYSIS AUTO W/SCOPE: CPT

## 2019-10-01 PROCEDURE — 87633 RESP VIRUS 12-25 TARGETS: CPT

## 2019-10-01 RX ORDER — DOXEPIN HYDROCHLORIDE 50 MG/1
50 CAPSULE ORAL NIGHTLY
Status: DISCONTINUED | OUTPATIENT
Start: 2019-10-01 | End: 2019-10-03

## 2019-10-01 RX ADMIN — LURASIDONE HYDROCHLORIDE 40 MG: 40 TABLET, FILM COATED ORAL at 04:10

## 2019-10-01 RX ADMIN — MUPIROCIN: 20 OINTMENT TOPICAL at 09:10

## 2019-10-01 RX ADMIN — LACOSAMIDE 200 MG: 50 TABLET, FILM COATED ORAL at 09:10

## 2019-10-01 RX ADMIN — ATORVASTATIN CALCIUM 40 MG: 20 TABLET, FILM COATED ORAL at 09:10

## 2019-10-01 RX ADMIN — SODIUM CHLORIDE, SODIUM LACTATE, POTASSIUM CHLORIDE, AND CALCIUM CHLORIDE 500 ML: .6; .31; .03; .02 INJECTION, SOLUTION INTRAVENOUS at 01:10

## 2019-10-01 RX ADMIN — DOXEPIN HYDROCHLORIDE 50 MG: 50 CAPSULE ORAL at 09:10

## 2019-10-01 RX ADMIN — LACOSAMIDE 200 MG: 50 TABLET, FILM COATED ORAL at 08:10

## 2019-10-01 RX ADMIN — PANTOPRAZOLE SODIUM 40 MG: 40 TABLET, DELAYED RELEASE ORAL at 09:10

## 2019-10-01 NOTE — ASSESSMENT & PLAN NOTE
Patient is a 64 year old man with history of Bipolar I Disorder and Seizure Disorder, Alcohol Abuse with hx of Alcohol Induced Seizures (sobriety for the past month- per patient and collateral). Fell down during tonic clonic seizure yesterday with laceration to scalp. Psychiatry was consulted because patient is refusing to take Depakote, said it was causing him to have tremors, which has been documented with past visit with Neurology outpatient and also confirmed by wife as a side effect he is having.     Primary concern from psychiatric standpoint currently is that the patient is actively delirious. Would recommend further work up for possible etiology of delirium.  Current differential would be Delirium with underlying dementia vs. Postictal confusion. Recommend blood cultures, EEG and repeat UA.  CXR completed on admit. Will continue to decrease doxepin.  Recommend sitter as patient is a fall risk.      Recommendations:   - Reduce Doxepin from 75 mg to 50mg, due to risk for anticholinergic delirium in addition to complaint of dizziness \  - f/u Delirium w/u blood cultures, EEG and repeat UA  - recommend 1:1 sitter given fall risk and waxing and waning mentation   - Awaiting Neurology eval and recommendations for management of Depakote. Would recommend from psychiatric standpoint to use another mood stabilizer due to complaint of tremulousness but defer to Neuro for AED management and if Depakote is necessary for this pt.   - Psychiatry will continue to follow this patient.

## 2019-10-01 NOTE — PROGRESS NOTES
Wound care consult for shingles.    PMH: scoliosis s/p lumbar fusion (direct lateral and posterior L4-5, L3-4), L2-3, bipolar 1 (Depakote, Latuda, doxepin), and seizure disorder (Vimpat+depakote)       Patient with shingles to the back. There is some full thickness open areas within the shingled area. No odor or purulence noted. No cellulitis noted,The lesions extend from back to the abdomen.  Recommend medihoney daily to the open area to promote wound healing.   Stapled laceration to the head with small open area noted. Area cleansed with warm water.  Patient is somewhat confused and appears to be scratching at wound on head and back.   Bedside nurse present and will dress wounds with medihoney when it arriveds. Would keep area covered to prevent patient from touching the open wounds.     Discussed with Shanon HERNANDEZ and recs approved.     Recommend:  Medihoney daily to the head and back, cover with border foam    Wound care to follow PRN.  Sofia Odonnell RN [facility]   [phone]                 10/01/19 0845        Wound 09/30/19 0000 Laceration Head   Date First Assessed/Time First Assessed: 09/30/19 0000   Pre-existing: Yes  Primary Wound Type: Laceration  Side: Right  Location: Head   Wound Image    Wound WDL ex   Dressing Appearance Open to air   Drainage Amount Small   Drainage Characteristics/Odor Sanguineous   Appearance Red   Tissue loss description Full thickness   Red (%), Wound Tissue Color 100 %   Periwound Area Other (see comments)  (stapled)   Wound Edges Undefined   Wound Length (cm) 0.5 cm   Wound Width (cm) 0.5 cm   Wound Depth (cm) 0.2 cm   Wound Volume (cm^3) 0.05 cm^3   Wound Surface Area (cm^2) 0.25 cm^2   Care Cleansed with:;Sterile normal saline   Dressing Removed;Applied;Changed        Wound 10/01/19 0800 Non pressure chronic ulcer lower Back   Date First Assessed/Time First Assessed: 10/01/19 0800   Pre-existing: Yes  Primary Wound Type: Non pressure chronic ulcer  Side: Right  Orientation:  lower  Location: Back   Wound Image    Wound WDL ex   Dressing Appearance Open to air   Drainage Amount Scant   Drainage Characteristics/Odor Serosanguineous   Appearance Fibrin   Tissue loss description Full thickness   Red (%), Wound Tissue Color 40 %   Yellow (%), Wound Tissue Color 60 %   Periwound Area Redness   Wound Edges Undefined   Wound Length (cm) 4 cm   Wound Width (cm) 4 cm   Wound Depth (cm) 0.1 cm   Wound Volume (cm^3) 1.6 cm^3   Wound Surface Area (cm^2) 16 cm^2

## 2019-10-01 NOTE — SUBJECTIVE & OBJECTIVE
"  Family History     Problem Relation (Age of Onset)    Alcohol abuse Mother, Maternal Uncle, Paternal Uncle, Cousin    Anxiety disorder Brother    Bipolar disorder Brother    Dementia Maternal Grandmother    Depression Mother, Father, Sister, Brother    Drug abuse Brother    Suicide Sister        Tobacco Use    Smoking status: Current Some Day Smoker     Packs/day: 0.25     Years: 10.00     Pack years: 2.50    Smokeless tobacco: Never Used   Substance and Sexual Activity    Alcohol use: No     Alcohol/week: 16.7 standard drinks     Types: 20 Standard drinks or equivalent per week     Comment: quit 4 years ago    Drug use: No    Sexual activity: Yes     Partners: Female     Comment: with wife; monogamous      Psychotherapeutics (From admission, onward)    Start     Stop Route Frequency Ordered    09/30/19 2100  doxepin capsule 75 mg      -- Oral Nightly 09/30/19 1534    09/30/19 1700  lurasidone tablet 40 mg      -- Oral Daily 09/29/19 2211    09/29/19 2310  ramelteon tablet 8 mg      -- Oral Nightly PRN 09/29/19 2211           Review of Systems   Neurological: Negative.      Objective:     Vital Signs (Most Recent):  Temp: 97.5 °F (36.4 °C) (10/01/19 0728)  Pulse: 100 (10/01/19 0904)  Resp: 20 (10/01/19 0728)  BP: (!) 142/91 (10/01/19 0728)  SpO2: (!) 94 % (10/01/19 0728) Vital Signs (24h Range):  Temp:  [97.5 °F (36.4 °C)-98 °F (36.7 °C)] 97.5 °F (36.4 °C)  Pulse:  [] 100  Resp:  [18-22] 20  SpO2:  [94 %-97 %] 94 %  BP: (120-153)/(76-91) 142/91     Height: 6' 4" (193 cm)  Weight: 97.5 kg (214 lb 15.2 oz)  Body mass index is 26.16 kg/m².      Intake/Output Summary (Last 24 hours) at 10/1/2019 1031  Last data filed at 10/1/2019 0951  Gross per 24 hour   Intake 120 ml   Output 1315 ml   Net -1195 ml       Physical Exam   Psychiatric:   Appearance: Appears stated age, adequately groomed   Behavior: calm and cooperative with interview, intermittent eye contact  Motor: PMA/PMR, no tics or tremors  Speech: " "slow rate, normal volume, normal quantity  Mood: "I'm having a hard time focusing"  Affect:, blunted, despondent   Thought Content: -SI, -HI, no delusional content elicited   Thought Process: thought blocking, some disorganization   Perceptual Disturbances: -AH, +VH, not actively responding to internal stimuli  Orientation: Oriented to person, year, and hospital, disoriented to day, month and date.   Memory: impaired recent, intact remote  Attention: failed SAVEAHAART, unable to spell WORLD backwards, unable to recite the months of the year backwards  Insight: limited   Judgement: limited         Significant Labs: All pertinent labs within the past 24 hours have been reviewed.    Significant Imaging: I have reviewed all pertinent imaging results/findings within the past 24 hours.  "

## 2019-10-01 NOTE — ASSESSMENT & PLAN NOTE
Initially withdrawn, inattentive and delayed verbal responses.  (see above).  Will need to assess neurological function while admitted. Wife reports severe depression but pt denies these sxs to me. He has capacity on admission and appears frustrated with issues at home.   - Psych consulted, recommend decreasing doxepin to 75mg qhs--> 50 mg qhs, stopping depakote  - continue Latuda 40mg with dinner  - will continue to follow

## 2019-10-01 NOTE — PROGRESS NOTES
"[facility] Medicine  Progress Note    Patient Name: Mirza Morales  MRN: [id number]  Patient Class: OP- Observation   Admission Date: 9/29/2019  Length of Stay: 0 days  Attending Physician: Hilario Thomas MD  Primary Care Provider: Jud Mo MD    [facility] Medicine Team: [facility] MED  Shanon Wade PA-C    Subjective:     Principal Problem:Delirium        HPI:  Mirza Morales is a 64 y.o. male with scoliosis s/p lumbar fusion (direct lateral and posterior L4-5, L3-4), L2-3, bipolar 1 (Depakote, Latuda, doxepin), and seizure disorder (Vimpat+depakote) presents to ED after a witnessed seizure episode around 4:30 PM today.      Patient poor historian in ED:  "Patient was walking up the stairs and around about the 2nd stair he began to have a seizure and fell hitting his Right parietal scalp.  Patient's wife still states that she arrived around 20 sec after he began seizing and noticed tonic-clonic movements in his upper and lower extremities and his eyes open, however the patient was not responsive.  The episode lasted around 1 min.  The patient began bleeding profusely from a scalp laceration which eventually stopped with pressure.  EMS was called in the patient was transferred to our facility.  The patient has not ambulated since the incident.  The wife states that the patient had been severely depressed for the past 4-5 weeks.  He has not been getting out of bed, has been eating less, and refusing to go to his scheduled medical appointments.  The wife states that for the past few days the patient has been stating that he is going to wean himself off of Depakote because he does not like how it makes him feel and tremors that it causes him.  Patient's wife states that the last 2 days he has also had episodes of waxing waning confusion, including this morning when he was found wandering the neighborhood and brought home by his neighbors.  Patient currently endorses " "headache, and nausea.  Patient denies any other injuries during the fall and denies pain anywhere else besides his head. Patient denies blurred vision, chest pain, shortness of breath, abdominal pain, constipation, diarrhea, dysuria, SI, and HI.  The patient has also had a chronic cough for the past few weeks. "    Initially, patient with delayed and limited responses, mostly watching the tv in the room and not acknowledging provider.  He denied any depression.  His response to "are you feeling okay" is not really, but wouldn't elaborate.  He repeated "that is just incredible" while watching replays from the [place] game.  As this provider stayed silent in the room, within 3-4 minutes, patient became more interactive.  He reports hx of shingles which has recently started scabbing over.  He denies taking any medication for it. He also is c/o "free-" at house who "won't leave" and have dogs that cause issues at his house.      Patient reports walking up the stairs too quickly which he attributes to hitting his head, but he does report he feels like he had a seizure.  He denies any tongue biting, bowel/bladder incontinence.  He reports his neighbor saw the event and said his leg was shaking.  He reports taking himself off depakote "a few days" ago because of the side effects (tremors)  He reports his psychiatrist is Dr. Ziegler whom patient says won't take him off depakote.  Would like to see a different doctor. Reports hx of remote hx of marielena with an MAOI inhibitor.  He reports last seizure approx 6 months.  He has quit drinking and trying to wean himself off coca-cola, coffee, cigarettes.  Smokes only half/cigarette a day (American spirit).  He denies drug/etoh use.  He enjoys watching winter golf and "when the saints win".  He denies any SI/HI.     Per chart review, patient seen by Dr. Ann with neurology 8/2018.  Patient reports ~10 seizures since a diagnosis in [id number].  He reported previous seizures " ""associated with etoh use; but states that not all sz were associated with etoh - says that he stopped completely since 5/2018".  Seizures appeared well-controlled on vimpat and depakote and last reported possibly 5/2018.  Seizure disorder classified as "Focal onset seizures with dyscognitive symptoms and secondarily generalization ".    ED: AF, tachycardic to 100s, HDS.  Na 132, UA shows 7 hyaline casts. CT C-spine unremarkable.  CTH shows R parietal scalp hematoma.  CXR with poor inspiration, limiting exam.  XR pelvis AP without evidence of fracture/dislocation.     Overview/Hospital Course:  Patient admitted to observation for seizure like activity. Psychiatry and Neurology consulted. Depakote discontinued. Neurology increased Vimpat to 200 mg BID. Psychiatry decreased doxpein.     Interval History: Patient endorsing cough X 3 days. Also complaining of black "dots" in right eye only that started yesterday evening. Respiratory viral panel ordered. On assessment this morning, patient AAOX3, able to report day and date. However, upon psychiatry assessment, patient unable to report date and endorsing confusion, hallucinations. Blood culture, repeat UA, EEG pending to evaluate for delirium. Sitter ordered. Doxepin decreased from 75--> 50 mg. Will continue to monitor. Wound care evaluated patient, recommend medihoney for lesions as patient seems to be scratching at them.     Review of Systems   Constitutional: Positive for appetite change (decreased). Negative for fever.        + poor intake   HENT: Negative for congestion and rhinorrhea.    Eyes: Positive for visual disturbance ("black dots").   Respiratory: Positive for cough. Negative for shortness of breath.    Cardiovascular: Negative for chest pain and palpitations.   Gastrointestinal: Negative for abdominal pain and vomiting.   Genitourinary: Negative for difficulty urinating, flank pain and hematuria.   Musculoskeletal: Negative for arthralgias and back pain. "   Skin: Positive for wound (head; scabs on R torso from shingles). Negative for rash.   Neurological: Positive for seizures and syncope. Negative for weakness.   Psychiatric/Behavioral: Positive for confusion, decreased concentration and hallucinations. Negative for agitation, behavioral problems and dysphoric mood. The patient is not nervous/anxious.      Objective:     Vital Signs (Most Recent):  Temp: 97.5 °F (36.4 °C) (10/01/19 0728)  Pulse: 100 (10/01/19 0904)  Resp: 20 (10/01/19 0728)  BP: (!) 142/91 (10/01/19 0728)  SpO2: (!) 94 % (10/01/19 0728) Vital Signs (24h Range):  Temp:  [97.5 °F (36.4 °C)-98 °F (36.7 °C)] 97.5 °F (36.4 °C)  Pulse:  [] 100  Resp:  [18-20] 20  SpO2:  [94 %-97 %] 94 %  BP: (130-153)/(76-91) 142/91     Weight: 97.5 kg (214 lb 15.2 oz)  Body mass index is 26.16 kg/m².    Intake/Output Summary (Last 24 hours) at 10/1/2019 1128  Last data filed at 10/1/2019 0951  Gross per 24 hour   Intake 120 ml   Output 1315 ml   Net -1195 ml      Physical Exam   Constitutional: He appears well-developed and well-nourished.   HENT:   Head: Normocephalic and atraumatic.   Eyes: Pupils are equal, round, and reactive to light. EOM are normal.   Neck: Normal range of motion. Neck supple.   Cardiovascular: Normal rate, regular rhythm, normal heart sounds and intact distal pulses.   Pulmonary/Chest: Effort normal and breath sounds normal.   Abdominal: Soft. Bowel sounds are normal.   Musculoskeletal: Normal range of motion. He exhibits no edema or tenderness.   Neurological: He is alert.   AlAOX3 upon morning assessment, per psych, pt delirious, waxing and waning   Skin: Capillary refill takes less than 2 seconds.   Skin lac to R posterior parietal region with about 5 staples.  Bleeding controlled.   Oval, elongated, papular lesions in dermatome configuration with different stages of healing but no obvious vesicles. Some whitish slough to region on back.     Psychiatric: He is actively hallucinating. He  is inattentive.   Nursing note and vitals reviewed.      Significant Labs:   CBC:   Recent Labs   Lab 09/29/19 1758 09/30/19 0334 10/01/19  0329   WBC 11.77 7.91 8.63   HGB 15.1 13.3* 13.4*   HCT 43.4 38.1* 40.2    114* 118*     CMP:   Recent Labs   Lab 09/29/19 1758 09/30/19 0334 10/01/19  0329   * 131* 134*   K 4.3 3.5 4.9   CL 98 98 101   CO2 25 22* 26   * 135* 92   BUN 18 14 11   CREATININE 1.0 0.8 0.8   CALCIUM 9.5 8.6* 9.0   PROT 7.5  --   --    ALBUMIN 3.5  --   --    BILITOT 0.5  --   --    ALKPHOS 78  --   --    AST 20  --   --    ALT 12  --   --    ANIONGAP 9 11 7*   EGFRNONAA >60.0 >60.0 >60.0     Magnesium:   Recent Labs   Lab 09/30/19  0334   MG 1.8     POCT Glucose: No results for input(s): POCTGLUCOSE in the last 48 hours.    Significant Imaging: I have reviewed all pertinent imaging results/findings within the past 24 hours.      Assessment/Plan:      * Delirium  Patient waxing and waning, endorsing visual hallucinations and sleep disturbances  - Psych decreased Doxepin from 75 mg --> 50 mg qhs due to risk of anticholinergic delirium/ dizziness  - Blood culture, repeat UA, EEG pending for delirium work up  - delirium precautions  - sitter  - will continue to monitor    Transient neurological symptoms  2 reported episodes (one by admitting provider) where patient staring off, minimally interactive, but able to answer certain questions with limited responses.  No convulsive activity or limb rigidity.  He quickly re-orients within seconds-minutes and has appropriate speech and interactive.  Unclear if this is of neurologic or psych etiology.      Laceration of scalp without foreign body  - remove staples in 7-10 days from 9/29  - CT C spine without evidence of fx/dislocation from fall.  No intracranial bleeding on CTH.   - no gross deficits s/p fall    Herpes zoster dermatitis  Lesions appear ruptured and scabbing.  Now appears to have some slough/discharge on his back. Will  treat with mupirocin ointment BID and have wound care evaluate.   - Patient denies treatment for it, but appears healing and rx was sent for valtex per wife's description.  - Wound care consulted and appreciate recs- medihoney to lesions and cover to prevent scratching    Hyponatremia  Appears chronic, but also with evidence of dehydration  - can be seen with depakote  - improving since depakote d/c  - continue to monitor    Bipolar affective disorder, currently depressed, mild  Initially withdrawn, inattentive and delayed verbal responses.  (see above).  Will need to assess neurological function while admitted. Wife reports severe depression but pt denies these sxs to me. He has capacity on admission and appears frustrated with issues at home.   - Psych consulted, recommend decreasing doxepin to 75mg qhs--> 50 mg qhs, stopping depakote  - continue Latuda 40mg with dinner  - will continue to follow    Witnessed seizure-like activity  Witnessed tonic-clonic seizure episode with hx of focal seizures (usually related to etoh).  Noncompliant with depakote/vimpat, attempting to wean off.   Valproic acid subtherapeutic at 48.  Loaded with 1g depakote in ED.  CTH unremarkable.  CXR/UA without evidence of infection.   - Neurology consulted, will discontinue Depakote due to side effects   - increase Vimpat to 200mg BID   - Appears dehydrated on exam, likely contributing.  - seizure precautions; normal vEEG in 5/2018  - depakote does have tremors listed as adverse effect (1-57% reported) but also could be 2/2 psych medications.    - neuro checks, delirium/fall precautions    Tobacco abuse  - wants to quit, but only smokes half/cig daily  - continue to wean      VTE Risk Mitigation (From admission, onward)         Ordered     IP VTE LOW RISK PATIENT  Once      09/29/19 2211     Place EMY hose  Until discontinued      09/29/19 2211     Place sequential compression device  Until discontinued      09/29/19 2211                       Shanon Wade PA-C  Department of [facility] Medicine   [facility]

## 2019-10-01 NOTE — ASSESSMENT & PLAN NOTE
Witnessed tonic-clonic seizure episode with hx of focal seizures (usually related to etoh).  Noncompliant with depakote/vimpat, attempting to wean off.   Valproic acid subtherapeutic at 48.  Loaded with 1g depakote in ED.  CTH unremarkable.  CXR/UA without evidence of infection.   - Neurology consulted, will discontinue Depakote due to side effects   - increase Vimpat to 200mg BID   - Appears dehydrated on exam, likely contributing.  - seizure precautions; normal vEEG in 5/2018  - depakote does have tremors listed as adverse effect (1-57% reported) but also could be 2/2 psych medications.    - neuro checks, delirium/fall precautions

## 2019-10-01 NOTE — PLAN OF CARE
met patient to obtain discharge planning assessment.  Information obtained from the patient.  Patient's transportation home will be provided by his spouse.   name and number written on the board at the bedside.    Jud Mo MD       [facility]/pharmacy #[id number] - [place], [place] - [address]  [address]  [place] [address]  Phone: [phone] Fax: [phone]    [facility]  [address] [facility] [address]  Phone: [phone] Fax: [phone]    Payor: [facility] / Plan: [facility] CHOICE PLUS / Product Type: Commercial /       09/30/19 1510   Discharge Assessment   Assessment Type Discharge Planning Assessment   Confirmed/corrected address and phone number on facesheet? Yes   Assessment information obtained from? Patient   Expected Length of Stay (days) 1   Communicated expected length of stay with patient/caregiver yes   Prior to hospitilization cognitive status: Alert/Oriented   Prior to hospitalization functional status: Independent   Current cognitive status: Alert/Oriented   Current Functional Status: Independent   Lives With spouse   Able to Return to Prior Arrangements yes   Is patient able to care for self after discharge? Yes   Who are your caregiver(s) and their phone number(s)? Anamaria Morales-Spouse    [phone]   Patient's perception of discharge disposition home or selfcare   Readmission Within the Last 30 Days no previous admission in last 30 days   Patient currently being followed by outpatient case management? No   Patient currently receives any other outside agency services? No   Equipment Currently Used at Home none   Do you have any problems affording any of your prescribed medications? No   Is the patient taking medications as prescribed? yes   Does the patient have transportation home? Yes   Transportation Anticipated family or friend will provide   Dialysis Name and Scheduled days N/A   Does the patient receive services at  the [facility]? No   Discharge Plan A Home   Discharge Plan B Home with family   DME Needed Upon Discharge    (TBD)   Patient/Family in Agreement with Plan yes

## 2019-10-01 NOTE — PLAN OF CARE
10/01/19 0944   Post-Acute Status   Post-Acute Authorization Other   Other Status No Post-Acute Service Needs

## 2019-10-01 NOTE — SUBJECTIVE & OBJECTIVE
"Interval History: Patient endorsing cough X 3 days. Also complaining of black "dots" in right eye only that started yesterday evening. Respiratory viral panel ordered. On assessment this morning, patient AAOX3, able to report day and date. However, upon psychiatry assessment, patient unable to report date and endorsing confusion, hallucinations. Blood culture, repeat UA, EEG pending to evaluate for delirium. Sitter ordered. Doxepin decreased from 75--> 50 mg. Will continue to monitor. Wound care evaluated patient, recommend medihoney for lesions as patient seems to be scratching at them.     Review of Systems   Constitutional: Positive for appetite change (decreased). Negative for fever.        + poor intake   HENT: Negative for congestion and rhinorrhea.    Eyes: Positive for visual disturbance ("black dots").   Respiratory: Positive for cough. Negative for shortness of breath.    Cardiovascular: Negative for chest pain and palpitations.   Gastrointestinal: Negative for abdominal pain and vomiting.   Genitourinary: Negative for difficulty urinating, flank pain and hematuria.   Musculoskeletal: Negative for arthralgias and back pain.   Skin: Positive for wound (head; scabs on R torso from shingles). Negative for rash.   Neurological: Positive for seizures and syncope. Negative for weakness.   Psychiatric/Behavioral: Positive for confusion, decreased concentration and hallucinations. Negative for agitation, behavioral problems and dysphoric mood. The patient is not nervous/anxious.      Objective:     Vital Signs (Most Recent):  Temp: 97.5 °F (36.4 °C) (10/01/19 0728)  Pulse: 100 (10/01/19 0904)  Resp: 20 (10/01/19 0728)  BP: (!) 142/91 (10/01/19 0728)  SpO2: (!) 94 % (10/01/19 0728) Vital Signs (24h Range):  Temp:  [97.5 °F (36.4 °C)-98 °F (36.7 °C)] 97.5 °F (36.4 °C)  Pulse:  [] 100  Resp:  [18-20] 20  SpO2:  [94 %-97 %] 94 %  BP: (130-153)/(76-91) 142/91     Weight: 97.5 kg (214 lb 15.2 oz)  Body mass index " is 26.16 kg/m².    Intake/Output Summary (Last 24 hours) at 10/1/2019 1128  Last data filed at 10/1/2019 0951  Gross per 24 hour   Intake 120 ml   Output 1315 ml   Net -1195 ml      Physical Exam   Constitutional: He appears well-developed and well-nourished.   HENT:   Head: Normocephalic and atraumatic.   Eyes: Pupils are equal, round, and reactive to light. EOM are normal.   Neck: Normal range of motion. Neck supple.   Cardiovascular: Normal rate, regular rhythm, normal heart sounds and intact distal pulses.   Pulmonary/Chest: Effort normal and breath sounds normal.   Abdominal: Soft. Bowel sounds are normal.   Musculoskeletal: Normal range of motion. He exhibits no edema or tenderness.   Neurological: He is alert.   AlAOX3 upon morning assessment, per psych, pt delirious, waxing and waning   Skin: Capillary refill takes less than 2 seconds.   Skin lac to R posterior parietal region with about 5 staples.  Bleeding controlled.   Oval, elongated, papular lesions in dermatome configuration with different stages of healing but no obvious vesicles. Some whitish slough to region on back.     Psychiatric: He is actively hallucinating. He is inattentive.   Nursing note and vitals reviewed.      Significant Labs:   CBC:   Recent Labs   Lab 09/29/19 1758 09/30/19 0334 10/01/19  0329   WBC 11.77 7.91 8.63   HGB 15.1 13.3* 13.4*   HCT 43.4 38.1* 40.2    114* 118*     CMP:   Recent Labs   Lab 09/29/19 1758 09/30/19  0334 10/01/19  0329   * 131* 134*   K 4.3 3.5 4.9   CL 98 98 101   CO2 25 22* 26   * 135* 92   BUN 18 14 11   CREATININE 1.0 0.8 0.8   CALCIUM 9.5 8.6* 9.0   PROT 7.5  --   --    ALBUMIN 3.5  --   --    BILITOT 0.5  --   --    ALKPHOS 78  --   --    AST 20  --   --    ALT 12  --   --    ANIONGAP 9 11 7*   EGFRNONAA >60.0 >60.0 >60.0     Magnesium:   Recent Labs   Lab 09/30/19  0334   MG 1.8     POCT Glucose: No results for input(s): POCTGLUCOSE in the last 48 hours.    Significant Imaging: I  have reviewed all pertinent imaging results/findings within the past 24 hours.

## 2019-10-01 NOTE — PROGRESS NOTES
"[facility]  Psychiatry  Progress Note    Patient Name: Mirza Morales  MRN: [id number]   Code Status: Prior  Admission Date: 9/29/2019  Hospital Length of Stay: 0 days  Expected Discharge Date: 10/1/2019  Attending Physician: Hilario Thomas MD  Primary Care Provider: Jud Mo MD    Current Legal Status: N/A    Patient information was obtained from patient and ER records.     Subjective:     Principal Problem:Witnessed seizure-like activity    Chief Complaint: AMS    HPI: Mirza Morales is a 64 y.o. male with a past psychiatric history of Bipolar Disorder who [place] [facility] [place] to Witnessed seizure-like activity. Psychiatry was consulted for "noncompliance with Depakote."     Per Primary Team:  Mirza Morales is a 64 y.o. male with scoliosis s/p lumbar fusion (direct lateral and posterior L4-5, L3-4), L2-3, Bipolar I (on Depakote, Latuda, doxepin), and seizure disorder (Vimpat+depakote) presented to ED after a witnessed seizure episode.      Hospital Course: Subjective:   No acute events overnight.  Patient reports that he feels subjectively confused and that he can't focus or concentrate. States that this started last Wednesday.  Denies SI/HI/AH.  Reports that he is seeing irregular shapes and shadows about the room.  States that he slept 4 hours overnight, usually sleeps six.  Reports that it is 2019, but is unable to provide the month, date or day of the week.  Reports that he is in [facility] in [facility].  Unable to provide clear details about what happened that he was admitted to the hospital.      Wife [phone] - called, no answer.       Family History     Problem Relation (Age of Onset)    Alcohol abuse Mother, Maternal Uncle, Paternal Uncle, Cousin    Anxiety disorder Brother    Bipolar disorder Brother    Dementia Maternal Grandmother    Depression Mother, Father, Sister, Brother    Drug abuse Brother    Suicide Sister        Tobacco Use    " "Smoking status: Current Some Day Smoker     Packs/day: 0.25     Years: 10.00     Pack years: 2.50    Smokeless tobacco: Never Used   Substance and Sexual Activity    Alcohol use: No     Alcohol/week: 16.7 standard drinks     Types: 20 Standard drinks or equivalent per week     Comment: quit 4 years ago    Drug use: No    Sexual activity: Yes     Partners: Female     Comment: with wife; monogamous      Psychotherapeutics (From admission, onward)    Start     Stop Route Frequency Ordered    09/30/19 2100  doxepin capsule 75 mg      -- Oral Nightly 09/30/19 1534    09/30/19 1700  lurasidone tablet 40 mg      -- Oral Daily 09/29/19 2211    09/29/19 2310  ramelteon tablet 8 mg      -- Oral Nightly PRN 09/29/19 2211           Review of Systems   Neurological: Negative.      Objective:     Vital Signs (Most Recent):  Temp: 97.5 °F (36.4 °C) (10/01/19 0728)  Pulse: 100 (10/01/19 0904)  Resp: 20 (10/01/19 0728)  BP: (!) 142/91 (10/01/19 0728)  SpO2: (!) 94 % (10/01/19 0728) Vital Signs (24h Range):  Temp:  [97.5 °F (36.4 °C)-98 °F (36.7 °C)] 97.5 °F (36.4 °C)  Pulse:  [] 100  Resp:  [18-22] 20  SpO2:  [94 %-97 %] 94 %  BP: (120-153)/(76-91) 142/91     Height: 6' 4" (193 cm)  Weight: 97.5 kg (214 lb 15.2 oz)  Body mass index is 26.16 kg/m².      Intake/Output Summary (Last 24 hours) at 10/1/2019 1031  Last data filed at 10/1/2019 0951  Gross per 24 hour   Intake 120 ml   Output 1315 ml   Net -1195 ml       Physical Exam   Psychiatric:   Appearance: Appears stated age, adequately groomed   Behavior: calm and cooperative with interview, intermittent eye contact  Motor: PMA/PMR, no tics or tremors  Speech: slow rate, normal volume, normal quantity  Mood: "I'm having a hard time focusing"  Affect:, blunted, despondent   Thought Content: -SI, -HI, no delusional content elicited   Thought Process: thought blocking, some disorganization   Perceptual Disturbances: -AH, +VH, not actively responding to internal " stimuli  Orientation: Oriented to person, year, and hospital, disoriented to day, month and date.   Memory: impaired recent, intact remote  Attention: failed SAVEAHAART, unable to spell WORLD backwards, unable to recite the months of the year backwards  Insight: limited   Judgement: limited         Significant Labs: All pertinent labs within the past 24 hours have been reviewed.    Significant Imaging: I have reviewed all pertinent imaging results/findings within the past 24 hours.    Assessment/Plan:     Delirium  Patient is a 64 year old man with history of Bipolar I Disorder and Seizure Disorder, Alcohol Abuse with hx of Alcohol Induced Seizures (sobriety for the past month- per patient and collateral). Fell down during tonic clonic seizure yesterday with laceration to scalp. Psychiatry was consulted because patient is refusing to take Depakote, said it was causing him to have tremors, which has been documented with past visit with Neurology outpatient and also confirmed by wife as a side effect he is having.     Primary concern from psychiatric standpoint currently is that the patient is actively delirious. Would recommend further work up for possible etiology of delirium.  Current differential would be Delirium with underlying dementia vs. Postictal confusion. Recommend blood cultures, EEG and repeat UA.  CXR completed on admit. Will continue to decrease doxepin.  Recommend sitter as patient is a fall risk.      Recommendations:   - Reduce Doxepin from 75 mg to 50mg, due to risk for anticholinergic delirium in addition to complaint of dizziness \  - f/u Delirium w/u blood cultures, EEG and repeat UA  - recommend 1:1 sitter given fall risk and waxing and waning mentation   - Awaiting Neurology eval and recommendations for management of Depakote. Would recommend from psychiatric standpoint to use another mood stabilizer due to complaint of tremulousness but defer to Neuro for AED management and if Depakote is  necessary for this pt.   - Psychiatry will continue to follow this patient.          La Nena Rosales MD   Psychiatry  [facility]

## 2019-10-01 NOTE — MEDICAL/APP STUDENT
"Interval History:  10/01/2019 - Overnight the patient, per the chart, the patient has been stable with no acute events/seizures overnight. Upon entering the room the patient was eating breakfast, Pt says he slept roughly 6 hrs; improved from the night before. Pt now complains of "crazy abstractions" on the ceiling says they look like black dots dancing around. He denies seeing any other objects or people in the room though. He also denies hearing voices or someone talking to him that is not there. He denies any hand tremors that have been previously bothering him prior to admission. His mood is unchanged since yesterday which is overall improved since the last few weeks. Pt cannot remember if has gotten out of bed today or yesterday. He endorses a good appetite. He denies any suicidal ideation or thoughts of self-harm.         Modified CAM-ICU:  1. Acute change and/or fluctuating course of mental status: Yes  2. Inattention (SAVEAHAART): Yes  · "Squeeze my hand, only when you hear, the letter 'A'."  3. Altered Level of Consciousness: Yes  4. Disorganized Thinking (Errors >1/6): No  · "Will a stone float on water?"  · "Are there fish in the sea?"  · "Does one pound weigh more than two?"  · "Can you use a hammer to pound a nail?"  · Command(s):  · "Hold up 2 fingers."  · "Now do the same thing with the other hand."    Score: 1+2 AND, either 3 or 4 present = Positive CAM-ICU    Mental Status Exam:  Appearance: bearded, disheveled, lying in bed  Level of Consciousness: Aware  Behavior/Cooperation: psychomotor retardation, inattentive and slow to respond to questioning, impaired recall  Psychomotor: psychomotor retardation   Speech: normal pitch, slowed, delayed, increased latency of response  Language: english, fluid  Orientation: person, place, situation, time/date, year  Attention Span/Concentration: unable to spell "WORLD" backwards  Memory: Impaired to some degree  Mood: Neutral  Affect: blunted and flat  Thought " "Process: linear, normal and logical, tangential  Associations: tangential  Thought Content: hallucinations: (visual: yes), "crazy abstraction on the ceiling" look like black dots moving around  Fund of Knowledge: Impaired  Abstraction: proverbs were concrete  Insight: poor  Judgment: fair  "

## 2019-10-01 NOTE — PLAN OF CARE
POC reviewed with pt at 1700. Pt verbalized understanding. 500 ml bolus of LR administered. Urinalysis, reflex to urine culture to be completed. Respiratory infection panel complete. Questions and concerns addressed. No acute events today. Pt progressing toward goals. Will continue to monitor. See flowsheets for full assessment and VS info.

## 2019-10-01 NOTE — ASSESSMENT & PLAN NOTE
Appears chronic, but also with evidence of dehydration  - can be seen with depakote  - improving since depakote d/c  - continue to monitor

## 2019-10-01 NOTE — ASSESSMENT & PLAN NOTE
Patient waxing and waning, endorsing visual hallucinations and sleep disturbances  - Psych decreased Doxepin from 75 mg --> 50 mg qhs due to risk of anticholinergic delirium/ dizziness  - Blood culture, repeat UA, EEG pending for delirium work up  - delirium precautions  - sitter  - will continue to monitor

## 2019-10-01 NOTE — ASSESSMENT & PLAN NOTE
Initially withdrawn, inattentive and delayed verbal responses.  (see above).  Will need to assess neurological function while admitted. Wife reports severe depression but pt denies these sxs to me. He has capacity on admission and appears frustrated with issues at home.   - Psych consulted, recommend decreasing doxepin to 75mg qhs, stopping depakote  - continue Latuda 40mg with dinner  - will continue to follow

## 2019-10-01 NOTE — ASSESSMENT & PLAN NOTE
Lesions appear ruptured and scabbing.  Now appears to have some slough/discharge on his back. Will treat with mupirocin ointment BID and have wound care evaluate.   - Patient denies treatment for it, but appears healing and rx was sent for valtex per wife's description.  - Wound care consulted and appreciate recs- medihoney to lesions and cover to prevent scratching

## 2019-10-01 NOTE — ASSESSMENT & PLAN NOTE
Delirium    Witnessed tonic-clonic seizure episode with hx of focal seizures (usually related to etoh).  Noncompliant with depakote/vimpat, attempting to wean off.   Valproic acid subtherapeutic at 48.  Loaded with 1g depakote in ED.  CTH unremarkable.  CXR/UA without evidence of infection.   - Neurology consulted, will discontinue Depakote due to side effects   - increase Vimpat to 200mg BID  - Appears dehydrated on exam, likely contributing.  - seizure precautions; normal vEEG in 5/2018  - depakote does have tremors listed as adverse effect (1-57% reported) but also could be 2/2 psych medications.   - neuro checks, delirium/fall precautions

## 2019-10-02 ENCOUNTER — PATIENT MESSAGE (OUTPATIENT)
Dept: NEUROLOGY | Facility: CLINIC | Age: 64
End: 2019-10-02

## 2019-10-02 LAB
ADENOVIRUS: NOT DETECTED
ANION GAP SERPL CALC-SCNC: 10 MMOL/L (ref 8–16)
BASOPHILS # BLD AUTO: 0.05 K/UL (ref 0–0.2)
BASOPHILS NFR BLD: 0.6 % (ref 0–1.9)
BORDETELLA PARAPERTUSSIS (IS1001): NOT DETECTED
BORDETELLA PERTUSSIS (PTXP): NOT DETECTED
BUN SERPL-MCNC: 9 MG/DL (ref 8–23)
CALCIUM SERPL-MCNC: 9.1 MG/DL (ref 8.7–10.5)
CHLAMYDIA PNEUMONIAE: NOT DETECTED
CHLORIDE SERPL-SCNC: 99 MMOL/L (ref 95–110)
CO2 SERPL-SCNC: 24 MMOL/L (ref 23–29)
CORONAVIRUS 229E, COMMON COLD VIRUS: NOT DETECTED
CORONAVIRUS HKU1, COMMON COLD VIRUS: NOT DETECTED
CORONAVIRUS NL63, COMMON COLD VIRUS: NOT DETECTED
CORONAVIRUS OC43, COMMON COLD VIRUS: NOT DETECTED
CREAT SERPL-MCNC: 0.8 MG/DL (ref 0.5–1.4)
DIFFERENTIAL METHOD: ABNORMAL
EOSINOPHIL # BLD AUTO: 0.1 K/UL (ref 0–0.5)
EOSINOPHIL NFR BLD: 1.2 % (ref 0–8)
ERYTHROCYTE [DISTWIDTH] IN BLOOD BY AUTOMATED COUNT: 13.2 % (ref 11.5–14.5)
EST. GFR  (AFRICAN AMERICAN): >60 ML/MIN/1.73 M^2
EST. GFR  (NON AFRICAN AMERICAN): >60 ML/MIN/1.73 M^2
FLUBV RNA NPH QL NAA+NON-PROBE: NOT DETECTED
GLUCOSE SERPL-MCNC: 91 MG/DL (ref 70–110)
HCT VFR BLD AUTO: 39.6 % (ref 40–54)
HGB BLD-MCNC: 13.4 G/DL (ref 14–18)
HPIV1 RNA NPH QL NAA+NON-PROBE: NOT DETECTED
HPIV2 RNA NPH QL NAA+NON-PROBE: NOT DETECTED
HPIV3 RNA NPH QL NAA+NON-PROBE: NOT DETECTED
HPIV4 RNA NPH QL NAA+NON-PROBE: NOT DETECTED
HUMAN METAPNEUMOVIRUS: NOT DETECTED
IMM GRANULOCYTES # BLD AUTO: 0.06 K/UL (ref 0–0.04)
IMM GRANULOCYTES NFR BLD AUTO: 0.7 % (ref 0–0.5)
INFLUENZA A (SUBTYPES H1,H1-2009,H3): NOT DETECTED
LACOSAMIDE: 2.5 MCG/ML (ref 1–10)
LYMPHOCYTES # BLD AUTO: 1.6 K/UL (ref 1–4.8)
LYMPHOCYTES NFR BLD: 19.4 % (ref 18–48)
MCH RBC QN AUTO: 32.9 PG (ref 27–31)
MCHC RBC AUTO-ENTMCNC: 33.8 G/DL (ref 32–36)
MCV RBC AUTO: 97 FL (ref 82–98)
MONOCYTES # BLD AUTO: 0.9 K/UL (ref 0.3–1)
MONOCYTES NFR BLD: 10.8 % (ref 4–15)
MYCOPLASMA PNEUMONIAE: NOT DETECTED
NEUTROPHILS # BLD AUTO: 5.5 K/UL (ref 1.8–7.7)
NEUTROPHILS NFR BLD: 67.3 % (ref 38–73)
NRBC BLD-RTO: 0 /100 WBC
PLATELET # BLD AUTO: 130 K/UL (ref 150–350)
PMV BLD AUTO: 10.7 FL (ref 9.2–12.9)
POTASSIUM SERPL-SCNC: 3.8 MMOL/L (ref 3.5–5.1)
RBC # BLD AUTO: 4.07 M/UL (ref 4.6–6.2)
RESPIRATORY INFECTION PANEL SOURCE: NORMAL
RSV RNA NPH QL NAA+NON-PROBE: NOT DETECTED
RV+EV RNA NPH QL NAA+NON-PROBE: NOT DETECTED
SODIUM SERPL-SCNC: 133 MMOL/L (ref 136–145)
WBC # BLD AUTO: 8.21 K/UL (ref 3.9–12.7)

## 2019-10-02 PROCEDURE — 99233 PR SUBSEQUENT HOSPITAL CARE,LEVL III: ICD-10-PCS | Mod: ,,, | Performed by: PHYSICIAN ASSISTANT

## 2019-10-02 PROCEDURE — 36415 COLL VENOUS BLD VENIPUNCTURE: CPT

## 2019-10-02 PROCEDURE — 99233 SBSQ HOSP IP/OBS HIGH 50: CPT | Mod: ,,, | Performed by: PHYSICIAN ASSISTANT

## 2019-10-02 PROCEDURE — 25000003 PHARM REV CODE 250: Performed by: PHYSICIAN ASSISTANT

## 2019-10-02 PROCEDURE — 85025 COMPLETE CBC W/AUTO DIFF WBC: CPT

## 2019-10-02 PROCEDURE — 20600001 HC STEP DOWN PRIVATE ROOM

## 2019-10-02 PROCEDURE — 80048 BASIC METABOLIC PNL TOTAL CA: CPT

## 2019-10-02 RX ORDER — OLANZAPINE 2.5 MG/1
5 TABLET ORAL EVERY 8 HOURS PRN
Status: DISCONTINUED | OUTPATIENT
Start: 2019-10-02 | End: 2019-10-03 | Stop reason: HOSPADM

## 2019-10-02 RX ADMIN — ATORVASTATIN CALCIUM 40 MG: 20 TABLET, FILM COATED ORAL at 08:10

## 2019-10-02 RX ADMIN — LACOSAMIDE 200 MG: 50 TABLET, FILM COATED ORAL at 08:10

## 2019-10-02 RX ADMIN — MUPIROCIN: 20 OINTMENT TOPICAL at 09:10

## 2019-10-02 RX ADMIN — PANTOPRAZOLE SODIUM 40 MG: 40 TABLET, DELAYED RELEASE ORAL at 08:10

## 2019-10-02 RX ADMIN — MUPIROCIN: 20 OINTMENT TOPICAL at 10:10

## 2019-10-02 RX ADMIN — LURASIDONE HYDROCHLORIDE 40 MG: 40 TABLET, FILM COATED ORAL at 05:10

## 2019-10-02 RX ADMIN — LACOSAMIDE 200 MG: 50 TABLET, FILM COATED ORAL at 10:10

## 2019-10-02 RX ADMIN — RAMELTEON 8 MG: 8 TABLET ORAL at 10:10

## 2019-10-02 NOTE — MEDICAL/APP STUDENT
"   Principal Problem: Witnessed seizure-like activity     Chief Complaint: AMS     HPI: Mirza Morales is a 64 y.o. male with a past psychiatric history of Bipolar Disorder who presented to [facility] due to Witnessed seizure-like activity. Psychiatry was consulted for "noncompliance with Depakote."      Per Primary Team:  Mirza Morales is a 64 y.o. male with scoliosis s/p lumbar fusion (direct lateral and posterior L4-5, L3-4), L2-3, Bipolar I (on Depakote, Latuda, doxepin), and seizure disorder (Vimpat+depakote) presented to ED after a witnessed seizure episode.       Hospital Course:   Subjective:   No acute events overnight.  Patient reports that he feels subjectively confused and that he can't focus or concentrate. States that this started last Wednesday.  Denies SI/HI/AH.  Reports that he is seeing irregular shapes and shadows about the room.  States that he slept 4 hours overnight, usually sleeps six.  Reports that it is 2019, but is unable to provide the month, date or day of the week.  Reports that he is in [facility] in [facility].  Unable to provide clear details about what happened that he was admitted to the hospital.      Interval History:  10/02/2019     Mr. Morales is a 64 y.o. male with scoliosis s/p lumbar fusion (direct lateral and posterior L4-5, L3-4), L2-3, Bipolar I (on Depakote, Latuda, doxepin), and seizure disorder (Vimpat+depakote) presented to ED after a witnessed seizure episode. Psychiatry was consulted for "noncompliance with Depakote." He was placed on delirium precautions due to his confusion and lack of orientation yesterday.    On examination today, Mr. Morales is subjectively less confused. He is frustrated he is on delirium precautions and that there have been alterations in his medications from his usual home dosages. He notes his Latuda dose is "making him go nuts," and that he wants to leave. He is still seeing shapes and shadows and notes his sleep has been poor " "in the hospital. He denies SI/HI. He is oriented to location, year, month, and date but not day of the week.     He is on Doxepin 50mg, Latuda 40mg, and Lacosamide 200mg. He d/c'd Depakote on 9/30.      Collateral: spoke with wife, Anamaria on 10/2/19 ([phone])    Per wife, patient has had trouble remembering much of anything over the last 1-2 weeks. He had trouble remembering their address and has had problems getting lost. On Saturday, the patient's neighbor found him walking barefoot down the street and returned him home. When questioned why he was doing that, the patient just stared off blankly, with no response. He has had problems with getting lost before several years ago, as he was found pulling out plants and police had to bring him back home. Since Labor Day this year, his wife notes he has been largely inactive and resting in bed most of the day, as he has been stressed regarding his upcoming dental implant as well as having had Shingles.     The patient sees Dr. Loco, who manages his psychiatric medications. He is usually able to drive himself to the appointments. His wife helps sort his medications in the mornings and evenings but she is unsure of his home dosages. She reports the patient has not had something to drink in >1 month.    Modified CAM-ICU: NEGATIVE  1. Acute change and/or fluctuating course of mental status: No  2. Inattention (SAVEAHAART): No  · "Squeeze my hand, only when you hear, the letter 'A'."  3. Altered Level of Consciousness: No  4. Disorganized Thinking (Errors >1/6): No  · "Will a stone float on water?"  · "Are there fish in the sea?"  · "Does one pound weigh more than two?"  · "Can you use a hammer to pound a nail?"  · Command(s):  · "Hold up 2 fingers."  · "Now do the same thing with the other hand."    Score: 1+2 AND, either 3 or 4 present = Positive CAM-ICU    Mental Status Exam:  Appearance: lying in bed  Level of Consciousness: alert and oriented " "X3  Behavior/Cooperation: cooperative  Psychomotor: unremarkable   Speech: slow rate but normal volume  Language: english, fluid  Orientation: oriented to person, place, year, month, and date but not day of the week   Attention Span/Concentration: spelled "WORLD" forwards and backwards  Memory: Impaired to some degree, able to recall 3/3 items immediately and 2/3 items at 3 minutes  Mood: "frustrated"  Affect: blunted  Thought Process: slightly disorganized and tangential  Associations: unable to assess   Thought Content: normal, no suicidality, no homicidality, delusions, or paranoia  Fund of Knowledge: unable to assess  Insight: limited  Judgment: limited     Assessment:     Mr. Morales is a 65 y/o man with past psychiatric history of Bipolar I disorder with PMHx of seizure disorder and alcohol abuse. He presented to [facility] with a head injury secondary to a tonic-clonic seizure. On today's exam, he is CAM-ICU negative, with improvement in concentration and orientation.     EEG is disorganized with predominant theta component indicating the presence of a a generalized non specific and nonfocal encephalopathy.  There is also no evidence for an irritative or epileptic process      "

## 2019-10-02 NOTE — PROGRESS NOTES
"[facility]  Psychiatry  Progress Note    Patient Name: Mirza Morales  MRN: [id number]   Code Status: Prior  Admission Date: 9/29/2019  Hospital Length of Stay: 0 days  Expected Discharge Date: 10/4/2019  Attending Physician: Hilario Thomas MD  Primary Care Provider: Jud Mo MD    Current Legal Status: N/A    Patient information was obtained from patient.     Subjective:     Principal Problem:Delirium    Chief Complaint: AMS    HPI: Mirza Morales is a 64 y.o. male with a past psychiatric history of Bipolar Disorder who [place] [facility] [place] to Witnessed seizure-like activity. Psychiatry was consulted for "noncompliance with Depakote."     Per Primary Team:  Mirza Morales is a 64 y.o. male with scoliosis s/p lumbar fusion (direct lateral and posterior L4-5, L3-4), L2-3, Bipolar I (on Depakote, Latuda, doxepin), and seizure disorder (Vimpat+depakote) presented to ED after a witnessed seizure episode.      Hospital Course: Subjective:    On examination today, Mr. Morales is subjectively less confused. He is frustrated he is on delirium precautions and that there have been alterations in his medications from his usual home dosages.  Feels that the medications are making him "go nuts" as he is still seeing shapes and shadows and notes his sleep has been poor in the hospital. He denies SI/HI/AH. He is oriented to location, year, but has difficulty reporting the month, date or day of the week.      Collateral: spoke with wife, Anamaria on 10/2/19 ([phone])     Per wife, patient has had trouble remembering much of anything over the last 1-2 weeks. He had trouble remembering their address and has had problems getting lost. On Saturday, the patient's neighbor found him walking barefoot down the street and returned him home. When questioned why he was doing that, the patient just stared off blankly, with no response. He has had problems with getting lost before several years ago, as " he was found pulling out plants and police had to bring him back home. Since Labor Day this year, his wife notes he has been largely inactive and resting in bed most of the day, as he has been stressed regarding his upcoming dental implant as well as having had Shingles.      The patient sees Dr. Loco, who manages his psychiatric medications. He is usually able to drive himself to the appointments. His wife helps sort his medications in the mornings and evenings but she is unsure of his home dosages. She reports the patient has not had something to drink in >1 month.        Family History     Problem Relation (Age of Onset)    Alcohol abuse Mother, Maternal Uncle, Paternal Uncle, Cousin    Anxiety disorder Brother    Bipolar disorder Brother    Dementia Maternal Grandmother    Depression Mother, Father, Sister, Brother    Drug abuse Brother    Suicide Sister        Tobacco Use    Smoking status: Current Some Day Smoker     Packs/day: 0.25     Years: 10.00     Pack years: 2.50    Smokeless tobacco: Never Used   Substance and Sexual Activity    Alcohol use: No     Alcohol/week: 16.7 standard drinks     Types: 20 Standard drinks or equivalent per week     Comment: quit 4 years ago    Drug use: No    Sexual activity: Yes     Partners: Female     Comment: with wife; monogamous      Psychotherapeutics (From admission, onward)    Start     Stop Route Frequency Ordered    10/01/19 2100  doxepin capsule 50 mg      -- Oral Nightly 10/01/19 1112    09/30/19 1700  lurasidone tablet 40 mg      -- Oral Daily 09/29/19 2211    09/29/19 2310  ramelteon tablet 8 mg      -- Oral Nightly PRN 09/29/19 2211           Review of Systems  Objective:     Vital Signs (Most Recent):  Temp: 98.9 °F (37.2 °C) (10/02/19 0741)  Pulse: 109 (10/02/19 0741)  Resp: 18 (10/02/19 0741)  BP: 130/87 (10/02/19 0741)  SpO2: 98 % (10/01/19 1910) Vital Signs (24h Range):  Temp:  [97.4 °F (36.3 °C)-98.9 °F (37.2 °C)] 98.9 °F (37.2 °C)  Pulse:  []  "109  Resp:  [16-18] 18  SpO2:  [97 %-98 %] 98 %  BP: (130-164)/(87-98) 130/87     Height: 6' 4" (193 cm)  Weight: 97.5 kg (214 lb 15.2 oz)  Body mass index is 26.16 kg/m².      Intake/Output Summary (Last 24 hours) at 10/2/2019 0903  Last data filed at 10/2/2019 0540  Gross per 24 hour   Intake 995 ml   Output 1750 ml   Net -755 ml       Physical Exam   Psychiatric:   Mental Status Exam:  Appearance: lying in bed, full beard, appears stated age, adequately groomed   Level of Consciousness: alert and oriented X3  Behavior/Cooperation: calm and cooperative  Psychomotor: No tics, tremors noted   Speech: increased latency, slow rate but normal volume  Language: English, fluid  Orientation: oriented to person, place, year, but disoriented to month, date and day of the week         Attention Span/Concentration: spelled "WORLD" forwards un able to spell backwards   Memory: Impaired to some degree, able to recall 3/3 items immediately and 2/3 items at 3 minutes  Mood: "frustrated"  Affect: blunted  Thought Process: disorganized, derailment   Thought Content: no suicidality, no homicidality, no delusions, or paranoia  Fund of Knowledge: unable to assess  Insight: poor  Judgment: limited         Significant Labs: All pertinent labs within the past 24 hours have been reviewed.    Significant Imaging: I have reviewed all pertinent imaging results/findings within the past 24 hours.     EEG Impression (10/1):   Abnormal EEG-background is disorganized with predominant theta component indicating the presence of a generalized non specific and nonfocal encephalopathy.  There is also no evidence for an irritative or epileptic process.      Assessment/Plan:     * Delirium  Patient is a 64 year old man with history of Bipolar I Disorder and Seizure Disorder, Alcohol Abuse with hx of Alcohol Induced Seizures (sobriety for the past month- per patient and collateral). Fell down during tonic clonic seizure with laceration to scalp. " Psychiatry was consulted because patient is refusing to take Depakote, said it was causing him to have tremors, which has been documented with past visit with Neurology outpatient and also confirmed by wife as a side effect he is having.     Primary concern from psychiatric standpoint currently is that the patient is actively delirious. Would recommend further work up for possible etiology of delirium.  Current differential would be Delirium with underlying dementia vs. Postictal confusion. EEG concerning for generalized non specific and nonfocal encephalopathy.  Will discontinue doxepin and start PRN zyprexa for agitation.  Continue to recommend sitter as patient is a fall risk.      Recommendations:   - Continue Latuda 40mg PO daily   - Discontinue doxepin due to risk for anticholinergic delirium in addition to complaint of dizziness  - Zyprexa 5mg q8h PO/IM for nonredirectable agitation (if patient receives please monitor QTc) - not to be given within 2 hours of lorazepam  - continue w/u for delirium (EEG with nonfocal encephalopathy, negative for epileptic process)   UA WNL   CBC, CMP WNL   VS WNL   - continue Delirium precautions   - recommend 1:1 sitter given fall risk and waxing and waning mentation   - Psychiatry will continue to follow this patient.            La Nena Rosales MD   Psychiatry  [facility]

## 2019-10-02 NOTE — NURSING
Patient states that he is going home today and wanting to call his wife to come get him.   Instructed patient that orders have not been received by his physician and we are still monitoring his medication.  Patient states he can go home any time he wants and does not need a physician to discharged.    and physician notified.   RN spoke with patients wife who is aware of patient wanting discharge.  Patient instructed on leaving AMA and instructed that he may end up back in ER if he leaves early.  Patient in agreement to stay for now.  Will cont POC and treatment.

## 2019-10-02 NOTE — SUBJECTIVE & OBJECTIVE
"    Family History     Problem Relation (Age of Onset)    Alcohol abuse Mother, Maternal Uncle, Paternal Uncle, Cousin    Anxiety disorder Brother    Bipolar disorder Brother    Dementia Maternal Grandmother    Depression Mother, Father, Sister, Brother    Drug abuse Brother    Suicide Sister        Tobacco Use    Smoking status: Current Some Day Smoker     Packs/day: 0.25     Years: 10.00     Pack years: 2.50    Smokeless tobacco: Never Used   Substance and Sexual Activity    Alcohol use: No     Alcohol/week: 16.7 standard drinks     Types: 20 Standard drinks or equivalent per week     Comment: quit 4 years ago    Drug use: No    Sexual activity: Yes     Partners: Female     Comment: with wife; monogamous      Psychotherapeutics (From admission, onward)    Start     Stop Route Frequency Ordered    10/01/19 2100  doxepin capsule 50 mg      -- Oral Nightly 10/01/19 1112    09/30/19 1700  lurasidone tablet 40 mg      -- Oral Daily 09/29/19 2211 09/29/19 2310  ramelteon tablet 8 mg      -- Oral Nightly PRN 09/29/19 2211           Review of Systems  Objective:     Vital Signs (Most Recent):  Temp: 98.9 °F (37.2 °C) (10/02/19 0741)  Pulse: 109 (10/02/19 0741)  Resp: 18 (10/02/19 0741)  BP: 130/87 (10/02/19 0741)  SpO2: 98 % (10/01/19 1910) Vital Signs (24h Range):  Temp:  [97.4 °F (36.3 °C)-98.9 °F (37.2 °C)] 98.9 °F (37.2 °C)  Pulse:  [] 109  Resp:  [16-18] 18  SpO2:  [97 %-98 %] 98 %  BP: (130-164)/(87-98) 130/87     Height: 6' 4" (193 cm)  Weight: 97.5 kg (214 lb 15.2 oz)  Body mass index is 26.16 kg/m².      Intake/Output Summary (Last 24 hours) at 10/2/2019 0980  Last data filed at 10/2/2019 0540  Gross per 24 hour   Intake 995 ml   Output 1750 ml   Net -755 ml       Physical Exam   Psychiatric:   Mental Status Exam:  Appearance: lying in bed, full beard, appears stated age, adequately groomed   Level of Consciousness: alert and oriented X3  Behavior/Cooperation: calm and cooperative  Psychomotor: No " "tics, tremors noted   Speech: increased latency, slow rate but normal volume  Language: English, fluid  Orientation: oriented to person, place, year, but disoriented to month, date and day of the week         Attention Span/Concentration: spelled "WORLD" forwards un able to spell backwards   Memory: Impaired to some degree, able to recall 3/3 items immediately and 2/3 items at 3 minutes  Mood: "frustrated"  Affect: blunted  Thought Process: disorganized, derailment   Thought Content: no suicidality, no homicidality, no delusions, or paranoia  Fund of Knowledge: unable to assess  Insight: poor  Judgment: limited         Significant Labs: All pertinent labs within the past 24 hours have been reviewed.    Significant Imaging: I have reviewed all pertinent imaging results/findings within the past 24 hours.     EEG Impression (10/1):   Abnormal EEG-background is disorganized with predominant theta component indicating the presence of a generalized non specific and nonfocal encephalopathy.  There is also no evidence for an irritative or epileptic process.    "

## 2019-10-02 NOTE — SUBJECTIVE & OBJECTIVE
"Interval History: Patient reports he is frustrated with being in the hospital. He reports that he has been seeing "shadows" in his vision for multiple years, though he told psych this started a few weeks ago. He is AAOX3. On my assessment he reports he is in the hospital because of seizures. Speech is tangential. He reports the medications he is on make him feel "off", but then reports he feels like his normal self, but  his normal self is different from other people's normal. He reports feeling like he is on an "LSD" trip, but unable to elaborate, then denies he feels different than his baseline. Unclear etiology. Psych discontinuing doxepin. VSS. Infectious work up negative. EEG with generalized encephalopathy, consistent with delirium.     Review of Systems   Constitutional: Positive for appetite change (decreased). Negative for fever.        + poor intake   HENT: Negative for congestion and rhinorrhea.    Eyes: Positive for visual disturbance ("black dots").   Respiratory: Positive for cough. Negative for shortness of breath.    Cardiovascular: Negative for chest pain and palpitations.   Gastrointestinal: Negative for abdominal pain and vomiting.   Genitourinary: Negative for difficulty urinating, flank pain and hematuria.   Musculoskeletal: Negative for arthralgias and back pain.   Skin: Positive for wound (head; scabs on R torso from shingles). Negative for rash.   Neurological: Negative for seizures, syncope and weakness.   Psychiatric/Behavioral: Positive for confusion, decreased concentration and hallucinations. Negative for agitation, behavioral problems and dysphoric mood. The patient is not nervous/anxious.      Objective:     Vital Signs (Most Recent):  Temp: 97.8 °F (36.6 °C) (10/02/19 1130)  Pulse: 101 (10/02/19 1133)  Resp: 18 (10/02/19 1130)  BP: (!) 137/92 (10/02/19 1130)  SpO2: 97 % (10/02/19 1130) Vital Signs (24h Range):  Temp:  [97.4 °F (36.3 °C)-98.9 °F (37.2 °C)] 97.8 °F (36.6 °C)  Pulse:  " [] 101  Resp:  [16-18] 18  SpO2:  [97 %-98 %] 97 %  BP: (130-164)/(87-98) 137/92     Weight: 97.5 kg (214 lb 15.2 oz)  Body mass index is 26.16 kg/m².    Intake/Output Summary (Last 24 hours) at 10/2/2019 1446  Last data filed at 10/2/2019 0540  Gross per 24 hour   Intake 375 ml   Output 1450 ml   Net -1075 ml      Physical Exam   Constitutional: He is oriented to person, place, and time. He appears well-developed and well-nourished.   HENT:   Head: Normocephalic and atraumatic.   Eyes: Pupils are equal, round, and reactive to light. EOM are normal.   Neck: Normal range of motion. Neck supple.   Cardiovascular: Normal rate, regular rhythm, normal heart sounds and intact distal pulses.   Pulmonary/Chest: Effort normal and breath sounds normal.   Abdominal: Soft. Bowel sounds are normal.   Musculoskeletal: Normal range of motion. He exhibits no edema or tenderness.   Neurological: He is alert and oriented to person, place, and time.   AAOX3 upon morning assessment, per psych, pt delirious, waxing and waning   Skin: Capillary refill takes less than 2 seconds.   Skin lac to R posterior parietal region with about 5 staples.  Bleeding controlled.   Oval, elongated, papular lesions in dermatome configuration with different stages of healing but no obvious vesicles. Some whitish slough to region on back.     Psychiatric: His speech is tangential. He is not actively hallucinating. He is inattentive.   Nursing note and vitals reviewed.      Significant Labs:   CBC:   Recent Labs   Lab 10/01/19  0329 10/02/19  0358   WBC 8.63 8.21   HGB 13.4* 13.4*   HCT 40.2 39.6*   * 130*     CMP:   Recent Labs   Lab 10/01/19  0329 10/02/19  0358   * 133*   K 4.9 3.8    99   CO2 26 24   GLU 92 91   BUN 11 9   CREATININE 0.8 0.8   CALCIUM 9.0 9.1   ANIONGAP 7* 10   EGFRNONAA >60.0 >60.0     Magnesium: No results for input(s): MG in the last 48 hours.  POCT Glucose: No results for input(s): POCTGLUCOSE in the last 48  hours.  Urine Studies:   Recent Labs   Lab 10/01/19  1814   COLORU Yellow   APPEARANCEUA Clear   PHUR 7.0   SPECGRAV 1.015   PROTEINUA Negative   GLUCUA Negative   KETONESU Negative   BILIRUBINUA Negative   OCCULTUA 1+*   NITRITE Negative   LEUKOCYTESUR Negative   RBCUA 6*   WBCUA 0       Significant Imaging: I have reviewed all pertinent imaging results/findings within the past 24 hours.

## 2019-10-02 NOTE — PROCEDURES
EEG Report  ELECTROENCEPHALOGRAM REPORT    DATE OF SERVICE:  10/01/2019  EEG NUMBER: [id number]   REQUESTED BY:    LOCATION OF SERVICE:  Room 742    METHODOLOGY   Electroencephalographic (EEG) recording is with electrodes placed according to the International 10-20 placement system.  Thirty two (32) channels of digital signal (sampling rate of 512/sec) including T1 and T2 was simultaneously recorded from the scalp and may include  EKG, EMG, and/or eye monitors.  Recording band pass was 0.1 to 512 hz.  Digital video recording of the patient is simultaneously recorded with the EEG.  The patient is instructed report clinical symptoms which may occur during the recording session.  EEG and video recording is stored and archived in digital format. Activation procedures which include photic stimulation, hyperventilation and instructing patients to perform simple task are done in selected patients.    The EEG is displayed on a monitor screen and can be reviewed using different montages.  Computer assisted analysis is employed to detect spike and electrographic seizure activity.   The entire record is submitted for computer analysis.  The entire recording is visually reviewed and the times identified by computer analysis as being spikes or seizures are reviewed again.  Compresses spectral analysis (CSA) is also performed on the activity recorded from each individual channel.  This is displayed as a power display of frequencies from 0 to 30 Hz over time.   The CSA is reviewed looking for asymmetries in power between homologous areas of the scalp and then compared with the original EEG recording.     Macrotherapy software was also utilized in the review of this study.  This software suite analyzes the EEG recording in multiple domains.  Coherence and rhythmicity is computed to identify EEG sections which may contain organized seizures.  Each channel undergoes analysis to detect presence of spike and sharp waves which have  special and morphological characteristic of epileptic activity.  The routine EEG recording is converted from spacial into frequency domain.  This is then displayed comparing homologous areas to identify areas of significant asymmetry.  Algorithm to identify non-cortically generated artifact is used to separate eye movement, EMG and other artifact from the EEG    EEG FINGINGS  Recording was obtained at the patient's bedside in his hospital room.  Patient was awake looking around in interacting with individuals in the room.  Background is a mixture of frequencies.  There is an irregular theta with superimposed low and mid-range beta activity which is noted diffusely.  Theta frequency is predominantly 5-6 hertz.  There is no clear posterior dominant rhythm. No lateralized or focal changes are noted no spike or sharp wave activity was seen.  Activation procedures were not carried out.  Patient was ask his location in the responded [address], [facility].  Lizbeth was able to count backwards from 10-1 normally.  Other activation procedures were not performed.  Sleep was not recorded.      IMPRESSION:  Abnormal EEG-background is disorganized with predominant theta component indicating the presence of a a generalized non specific and nonfocal encephalopathy.  There is also no evidence for an irritative or epileptic process.

## 2019-10-02 NOTE — ASSESSMENT & PLAN NOTE
Seizure  Witnessed tonic-clonic seizure episode with hx of focal seizures (usually related to etoh).  Noncompliant with depakote/vimpat, attempting to wean off.   Valproic acid subtherapeutic at 48.  Loaded with 1g depakote in ED.  CTH unremarkable.  CXR/UA without evidence of infection.   - Neurology consulted, will discontinue Depakote due to side effects   - increase Vimpat to 200mg BID   - Appears dehydrated on exam, likely contributing.  - seizure precautions; normal vEEG in 5/2018  - depakote does have tremors listed as adverse effect (1-57% reported) but also could be 2/2 psych medications.    - neuro checks, delirium/fall precautions

## 2019-10-02 NOTE — PROGRESS NOTES
"[facility] Hospital Medicine  Progress Note    Patient Name: Mirza Morales  MRN: [id number]  Patient Class: IP- Inpatient   Admission Date: 9/29/2019  Length of Stay: 0 days  Attending Physician: Hilario Thomas MD  Primary Care Provider: Jud Mo MD    [facility] Medicine Team: [facility] MED  Shanon Wade PA-C    Subjective:     Principal Problem:Delirium        HPI:  Mirza Morales is a 64 y.o. male with scoliosis s/p lumbar fusion (direct lateral and posterior L4-5, L3-4), L2-3, bipolar 1 (Depakote, Latuda, doxepin), and seizure disorder (Vimpat+depakote) presents to ED after a witnessed seizure episode around 4:30 PM today.      Patient poor historian in ED:  "Patient was walking up the stairs and around about the 2nd stair he began to have a seizure and fell hitting his Right parietal scalp.  Patient's wife still states that she arrived around 20 sec after he began seizing and noticed tonic-clonic movements in his upper and lower extremities and his eyes open, however the patient was not responsive.  The episode lasted around 1 min.  The patient began bleeding profusely from a scalp laceration which eventually stopped with pressure.  EMS was called in the patient was transferred to our facility.  The patient has not ambulated since the incident.  The wife states that the patient had been severely depressed for the past 4-5 weeks.  He has not been getting out of bed, has been eating less, and refusing to go to his scheduled medical appointments.  The wife states that for the past few days the patient has been stating that he is going to wean himself off of Depakote because he does not like how it makes him feel and tremors that it causes him.  Patient's wife states that the last 2 days he has also had episodes of waxing waning confusion, including this morning when he was found wandering the neighborhood and brought home by his neighbors.  Patient currently endorses " "headache, and nausea.  Patient denies any other injuries during the fall and denies pain anywhere else besides his head. Patient denies blurred vision, chest pain, shortness of breath, abdominal pain, constipation, diarrhea, dysuria, SI, and HI.  The patient has also had a chronic cough for the past few weeks. "    Initially, patient with delayed and limited responses, mostly watching the tv in the room and not acknowledging provider.  He denied any depression.  His response to "are you feeling okay" is not really, but wouldn't elaborate.  He repeated "that is just incredible" while watching replays from the [place] game.  As this provider stayed silent in the room, within 3-4 minutes, patient became more interactive.  He reports hx of shingles which has recently started scabbing over.  He denies taking any medication for it. He also is c/o "free-" at house who "won't leave" and have dogs that cause issues at his house.      Patient reports walking up the stairs too quickly which he attributes to hitting his head, but he does report he feels like he had a seizure.  He denies any tongue biting, bowel/bladder incontinence.  He reports his neighbor saw the event and said his leg was shaking.  He reports taking himself off depakote "a few days" ago because of the side effects (tremors)  He reports his psychiatrist is Dr. Ziegler whom patient says won't take him off depakote.  Would like to see a different doctor. Reports hx of remote hx of marielena with an MAOI inhibitor.  He reports last seizure approx 6 months.  He has quit drinking and trying to wean himself off coca-cola, coffee, cigarettes.  Smokes only half/cigarette a day (American spirit).  He denies drug/etoh use.  He enjoys watching winter golf and "when the [place] win".  He denies any SI/HI.     Per chart review, patient seen by Dr. Ann with neurology 8/2018.  Patient reports ~10 seizures since a diagnosis in [id number].  He reported previous seizures " ""associated with etoh use; but states that not all sz were associated with etoh - says that he stopped completely since 5/2018".  Seizures appeared well-controlled on vimpat and depakote and last reported possibly 5/2018.  Seizure disorder classified as "Focal onset seizures with dyscognitive symptoms and secondarily generalization ".    ED: AF, tachycardic to 100s, HDS.  Na 132, UA shows 7 hyaline casts. CT C-spine unremarkable.  CTH shows R parietal scalp hematoma.  CXR with poor inspiration, limiting exam.  XR pelvis AP without evidence of fracture/dislocation.     Overview/Hospital Course:  Patient admitted to observation for seizure like activity. Psychiatry and Neurology consulted. Depakote discontinued. Neurology increased Vimpat to 200 mg BID without further seizure activity. Patient with delirium per psychiatry. Infectious work up negative. Psychiatry decreased doxpein.     Interval History: Patient reports he is frustrated with being in the hospital. He reports that he has been seeing "shadows" in his vision for multiple years, though he told psych this started a few weeks ago. He is AAOX3. On my assessment he reports he is in the hospital because of seizures. Speech is tangential. He reports the medications he is on make him feel "off", but then reports he feels like his normal self, but  his normal self is different from other people's normal. He reports feeling like he is on an "LSD" trip, but unable to elaborate, then denies he feels different than his baseline. Unclear etiology. Psych discontinuing doxepin. VSS. Infectious work up negative. EEG with generalized encephalopathy, consistent with delirium.     Review of Systems   Constitutional: Positive for appetite change (decreased). Negative for fever.        + poor intake   HENT: Negative for congestion and rhinorrhea.    Eyes: Positive for visual disturbance ("black dots").   Respiratory: Positive for cough. Negative for shortness of breath.  "   Cardiovascular: Negative for chest pain and palpitations.   Gastrointestinal: Negative for abdominal pain and vomiting.   Genitourinary: Negative for difficulty urinating, flank pain and hematuria.   Musculoskeletal: Negative for arthralgias and back pain.   Skin: Positive for wound (head; scabs on R torso from shingles). Negative for rash.   Neurological: Negative for seizures, syncope and weakness.   Psychiatric/Behavioral: Positive for confusion, decreased concentration and hallucinations. Negative for agitation, behavioral problems and dysphoric mood. The patient is not nervous/anxious.      Objective:     Vital Signs (Most Recent):  Temp: 97.8 °F (36.6 °C) (10/02/19 1130)  Pulse: 101 (10/02/19 1133)  Resp: 18 (10/02/19 1130)  BP: (!) 137/92 (10/02/19 1130)  SpO2: 97 % (10/02/19 1130) Vital Signs (24h Range):  Temp:  [97.4 °F (36.3 °C)-98.9 °F (37.2 °C)] 97.8 °F (36.6 °C)  Pulse:  [] 101  Resp:  [16-18] 18  SpO2:  [97 %-98 %] 97 %  BP: (130-164)/(87-98) 137/92     Weight: 97.5 kg (214 lb 15.2 oz)  Body mass index is 26.16 kg/m².    Intake/Output Summary (Last 24 hours) at 10/2/2019 1446  Last data filed at 10/2/2019 0540  Gross per 24 hour   Intake 375 ml   Output 1450 ml   Net -1075 ml      Physical Exam   Constitutional: He is oriented to person, place, and time. He appears well-developed and well-nourished.   HENT:   Head: Normocephalic and atraumatic.   Eyes: Pupils are equal, round, and reactive to light. EOM are normal.   Neck: Normal range of motion. Neck supple.   Cardiovascular: Normal rate, regular rhythm, normal heart sounds and intact distal pulses.   Pulmonary/Chest: Effort normal and breath sounds normal.   Abdominal: Soft. Bowel sounds are normal.   Musculoskeletal: Normal range of motion. He exhibits no edema or tenderness.   Neurological: He is alert and oriented to person, place, and time.   AAOX3 upon morning assessment, per psych, pt delirious, waxing and waning   Skin: Capillary  refill takes less than 2 seconds.   Skin lac to R posterior parietal region with about 5 staples.  Bleeding controlled.   Oval, elongated, papular lesions in dermatome configuration with different stages of healing but no obvious vesicles. Some whitish slough to region on back.     Psychiatric: His speech is tangential. He is not actively hallucinating. He is inattentive.   Nursing note and vitals reviewed.      Significant Labs:   CBC:   Recent Labs   Lab 10/01/19  0329 10/02/19  0358   WBC 8.63 8.21   HGB 13.4* 13.4*   HCT 40.2 39.6*   * 130*     CMP:   Recent Labs   Lab 10/01/19  0329 10/02/19  0358   * 133*   K 4.9 3.8    99   CO2 26 24   GLU 92 91   BUN 11 9   CREATININE 0.8 0.8   CALCIUM 9.0 9.1   ANIONGAP 7* 10   EGFRNONAA >60.0 >60.0     Magnesium: No results for input(s): MG in the last 48 hours.  POCT Glucose: No results for input(s): POCTGLUCOSE in the last 48 hours.  Urine Studies:   Recent Labs   Lab 10/01/19  1814   COLORU Yellow   APPEARANCEUA Clear   PHUR 7.0   SPECGRAV 1.015   PROTEINUA Negative   GLUCUA Negative   KETONESU Negative   BILIRUBINUA Negative   OCCULTUA 1+*   NITRITE Negative   LEUKOCYTESUR Negative   RBCUA 6*   WBCUA 0       Significant Imaging: I have reviewed all pertinent imaging results/findings within the past 24 hours.      Assessment/Plan:      * Delirium  Patient waxing and waning, endorsing visual hallucinations and sleep disturbances. Mildly improved on exam today. Unlcear etiology   - Psych discontinuing doxepin after decreasing Doxepin from 75 mg --> 50 mg qhs due to risk of anticholinergic delirium/ dizziness  - Blood culture NGTD, repeat UA negative  - EEG with generalized encephalopathy, consistent with delirium  - delirium precautions  - sitter  - will continue to monitor    Transient neurological symptoms  2 reported episodes (one by admitting provider) where patient staring off, minimally interactive, but able to answer certain questions with  limited responses.  No convulsive activity or limb rigidity.  He quickly re-orients within seconds-minutes and has appropriate speech and interactive.  Unclear if this is of neurologic or psych etiology.      Laceration of scalp without foreign body  - remove staples in 7-10 days from 9/29  - CT C spine without evidence of fx/dislocation from fall.  No intracranial bleeding on CTH.   - no gross deficits s/p fall    Herpes zoster dermatitis  Lesions appear ruptured and scabbing.  Now appears to have some slough/discharge on his back. Will treat with mupirocin ointment BID and have wound care evaluate.   - Patient denies treatment for it, but appears healing and rx was sent for valtex per wife's description.  - Wound care consulted and appreciate recs- medihoney to lesions and cover to prevent scratching    Hyponatremia  Appears chronic, but also with evidence of dehydration  - can be seen with depakote  - improving since depakote d/c  - continue to monitor    Witnessed seizure-like activity  Seizure  Witnessed tonic-clonic seizure episode with hx of focal seizures (usually related to etoh).  Noncompliant with depakote/vimpat, attempting to wean off.   Valproic acid subtherapeutic at 48.  Loaded with 1g depakote in ED.  CTH unremarkable.  CXR/UA without evidence of infection.   - Neurology consulted, will discontinue Depakote due to side effects   - increase Vimpat to 200mg BID   - Appears dehydrated on exam, likely contributing.  - seizure precautions; normal vEEG in 5/2018  - depakote does have tremors listed as adverse effect (1-57% reported) but also could be 2/2 psych medications.    - neuro checks, delirium/fall precautions    Tobacco abuse  - wants to quit, but only smokes half/cig daily  - continue to wean      VTE Risk Mitigation (From admission, onward)         Ordered     IP VTE LOW RISK PATIENT  Once      09/29/19 2211     Place EMY hose  Until discontinued      09/29/19 2211     Place sequential  compression device  Until discontinued      09/29/19 0792                      Shanon Wade PA-C  Department of Hospital Medicine   [facility]

## 2019-10-02 NOTE — ASSESSMENT & PLAN NOTE
Patient is a 64 year old man with history of Bipolar I Disorder and Seizure Disorder, Alcohol Abuse with hx of Alcohol Induced Seizures (sobriety for the past month- per patient and collateral). Fell down during tonic clonic seizure with laceration to scalp. Psychiatry was consulted because patient is refusing to take Depakote, said it was causing him to have tremors, which has been documented with past visit with Neurology outpatient and also confirmed by wife as a side effect he is having.     Primary concern from psychiatric standpoint currently is that the patient is actively delirious. Would recommend further work up for possible etiology of delirium.  Current differential would be Delirium with underlying dementia vs. Postictal confusion. EEG concerning for generalized non specific and nonfocal encephalopathy.  Will discontinue doxepin and start PRN zyprexa for agitation.  Continue to recommend sitter as patient is a fall risk.      Recommendations:   - Continue Latuda 40mg PO daily   - Discontinue doxepin due to risk for anticholinergic delirium in addition to complaint of dizziness  - Zyprexa 5mg q8h PO/IM for nonredirectable agitation (if patient receives please monitor QTc) - not to be given within 2 hours of lorazepam  - continue w/u for delirium (EEG with nonfocal encephalopathy, negative for epileptic process)   UA WNL   CBC, CMP WNL   VS WNL   - continue Delirium precautions   - recommend 1:1 sitter given fall risk and waxing and waning mentation   - Psychiatry will continue to follow this patient.

## 2019-10-02 NOTE — ASSESSMENT & PLAN NOTE
Patient waxing and waning, endorsing visual hallucinations and sleep disturbances. Mildly improved on exam today. Unlcear etiology   - Psych discontinuing doxepin after decreasing Doxepin from 75 mg --> 50 mg qhs due to risk of anticholinergic delirium/ dizziness  - Blood culture NGTD, repeat UA negative  - EEG with generalized encephalopathy, consistent with delirium  - delirium precautions  - sitter  - will continue to monitor

## 2019-10-02 NOTE — PLAN OF CARE
10/02/19 0924   Post-Acute Status   Post-Acute Authorization Other   Other Status No Post-Acute Service Needs

## 2019-10-03 ENCOUNTER — PATIENT MESSAGE (OUTPATIENT)
Dept: INTERNAL MEDICINE | Facility: CLINIC | Age: 64
End: 2019-10-03

## 2019-10-03 VITALS
TEMPERATURE: 99 F | RESPIRATION RATE: 17 BRPM | HEART RATE: 86 BPM | SYSTOLIC BLOOD PRESSURE: 134 MMHG | HEIGHT: 76 IN | OXYGEN SATURATION: 96 % | WEIGHT: 214.94 LBS | DIASTOLIC BLOOD PRESSURE: 88 MMHG | BODY MASS INDEX: 26.18 KG/M2

## 2019-10-03 DIAGNOSIS — M48.061 DEGENERATIVE LUMBAR SPINAL STENOSIS: ICD-10-CM

## 2019-10-03 LAB
ANION GAP SERPL CALC-SCNC: 12 MMOL/L (ref 8–16)
BASOPHILS # BLD AUTO: 0.06 K/UL (ref 0–0.2)
BASOPHILS NFR BLD: 0.8 % (ref 0–1.9)
BUN SERPL-MCNC: 11 MG/DL (ref 8–23)
CALCIUM SERPL-MCNC: 9.6 MG/DL (ref 8.7–10.5)
CHLORIDE SERPL-SCNC: 99 MMOL/L (ref 95–110)
CO2 SERPL-SCNC: 23 MMOL/L (ref 23–29)
CREAT SERPL-MCNC: 0.8 MG/DL (ref 0.5–1.4)
DIFFERENTIAL METHOD: ABNORMAL
EOSINOPHIL # BLD AUTO: 0.1 K/UL (ref 0–0.5)
EOSINOPHIL NFR BLD: 1.8 % (ref 0–8)
ERYTHROCYTE [DISTWIDTH] IN BLOOD BY AUTOMATED COUNT: 13.2 % (ref 11.5–14.5)
EST. GFR  (AFRICAN AMERICAN): >60 ML/MIN/1.73 M^2
EST. GFR  (NON AFRICAN AMERICAN): >60 ML/MIN/1.73 M^2
GLUCOSE SERPL-MCNC: 123 MG/DL (ref 70–110)
HCT VFR BLD AUTO: 39.7 % (ref 40–54)
HGB BLD-MCNC: 13.4 G/DL (ref 14–18)
IMM GRANULOCYTES # BLD AUTO: 0.06 K/UL (ref 0–0.04)
IMM GRANULOCYTES NFR BLD AUTO: 0.8 % (ref 0–0.5)
LYMPHOCYTES # BLD AUTO: 1.7 K/UL (ref 1–4.8)
LYMPHOCYTES NFR BLD: 21.3 % (ref 18–48)
MCH RBC QN AUTO: 32.8 PG (ref 27–31)
MCHC RBC AUTO-ENTMCNC: 33.8 G/DL (ref 32–36)
MCV RBC AUTO: 97 FL (ref 82–98)
MONOCYTES # BLD AUTO: 0.9 K/UL (ref 0.3–1)
MONOCYTES NFR BLD: 11.4 % (ref 4–15)
NEUTROPHILS # BLD AUTO: 4.9 K/UL (ref 1.8–7.7)
NEUTROPHILS NFR BLD: 63.9 % (ref 38–73)
NRBC BLD-RTO: 0 /100 WBC
PLATELET # BLD AUTO: 139 K/UL (ref 150–350)
PMV BLD AUTO: 10.1 FL (ref 9.2–12.9)
POTASSIUM SERPL-SCNC: 4.5 MMOL/L (ref 3.5–5.1)
RBC # BLD AUTO: 4.09 M/UL (ref 4.6–6.2)
RPR SER QL: NORMAL
SODIUM SERPL-SCNC: 134 MMOL/L (ref 136–145)
TSH SERPL DL<=0.005 MIU/L-ACNC: 3.89 UIU/ML (ref 0.4–4)
WBC # BLD AUTO: 7.73 K/UL (ref 3.9–12.7)

## 2019-10-03 PROCEDURE — 25000003 PHARM REV CODE 250: Performed by: PHYSICIAN ASSISTANT

## 2019-10-03 PROCEDURE — 85025 COMPLETE CBC W/AUTO DIFF WBC: CPT

## 2019-10-03 PROCEDURE — 99239 PR HOSPITAL DISCHARGE DAY,>30 MIN: ICD-10-PCS | Mod: ,,, | Performed by: PHYSICIAN ASSISTANT

## 2019-10-03 PROCEDURE — 86592 SYPHILIS TEST NON-TREP QUAL: CPT

## 2019-10-03 PROCEDURE — 97161 PT EVAL LOW COMPLEX 20 MIN: CPT

## 2019-10-03 PROCEDURE — 99231 PR SUBSEQUENT HOSPITAL CARE,LEVL I: ICD-10-PCS | Mod: ,,, | Performed by: PSYCHIATRY & NEUROLOGY

## 2019-10-03 PROCEDURE — 99239 HOSP IP/OBS DSCHRG MGMT >30: CPT | Mod: ,,, | Performed by: PHYSICIAN ASSISTANT

## 2019-10-03 PROCEDURE — 84443 ASSAY THYROID STIM HORMONE: CPT

## 2019-10-03 PROCEDURE — 99231 SBSQ HOSP IP/OBS SF/LOW 25: CPT | Mod: ,,, | Performed by: PSYCHIATRY & NEUROLOGY

## 2019-10-03 PROCEDURE — 80048 BASIC METABOLIC PNL TOTAL CA: CPT

## 2019-10-03 PROCEDURE — 36415 COLL VENOUS BLD VENIPUNCTURE: CPT

## 2019-10-03 RX ORDER — SODIUM CHLORIDE 0.9 % (FLUSH) 0.9 %
10 SYRINGE (ML) INJECTION
Status: DISCONTINUED | OUTPATIENT
Start: 2019-10-03 | End: 2019-10-03 | Stop reason: HOSPADM

## 2019-10-03 RX ORDER — MUPIROCIN 20 MG/G
OINTMENT TOPICAL 2 TIMES DAILY
Qty: 1 TUBE | Refills: 0 | Status: SHIPPED | OUTPATIENT
Start: 2019-10-03 | End: 2020-01-29

## 2019-10-03 RX ORDER — LACOSAMIDE 200 MG/1
200 TABLET ORAL EVERY 12 HOURS
Qty: 60 TABLET | Refills: 5 | Status: SHIPPED | OUTPATIENT
Start: 2019-10-03 | End: 2020-07-08

## 2019-10-03 RX ORDER — LURASIDONE HYDROCHLORIDE 40 MG/1
40 TABLET, FILM COATED ORAL DAILY
Qty: 30 TABLET | Refills: 11 | Status: SHIPPED | OUTPATIENT
Start: 2019-10-03 | End: 2020-07-29

## 2019-10-03 RX ADMIN — ATORVASTATIN CALCIUM 40 MG: 20 TABLET, FILM COATED ORAL at 09:10

## 2019-10-03 RX ADMIN — PANTOPRAZOLE SODIUM 40 MG: 40 TABLET, DELAYED RELEASE ORAL at 09:10

## 2019-10-03 RX ADMIN — LACOSAMIDE 200 MG: 50 TABLET, FILM COATED ORAL at 09:10

## 2019-10-03 RX ADMIN — MUPIROCIN: 20 OINTMENT TOPICAL at 09:10

## 2019-10-03 RX ADMIN — LURASIDONE HYDROCHLORIDE 40 MG: 40 TABLET, FILM COATED ORAL at 04:10

## 2019-10-03 NOTE — MEDICAL/APP STUDENT
"Interval History:  10/03/2019    On exam today, Mr. Morales notes he is no longer confused and would like to be discharged. He slept well and is no longer seeing shapes/shadows on the walls. He is oriented to year and location and was able to figure out the month/day from the whiteboard. He is feeling better now that his medication doses have been altered. There is no SI/HI.       Modified CAM-ICU: NEGATIVE  1. Acute change and/or fluctuating course of mental status: No   2. Inattention (SAVEAHAART): No  · "Squeeze my hand, only when you hear, the letter 'A'."  3. Altered Level of Consciousness: No  4. Disorganized Thinking (Errors >1/6): No  · "Will a stone float on water?"  · "Are there fish in the sea?"  · "Does one pound weigh more than two?"  · "Can you use a hammer to pound a nail?"  · Command(s):  · "Hold up 2 fingers."  · "Now do the same thing with the other hand."    Score: 1+2 AND, either 3 or 4 present = Positive CAM-ICU    Mental Status Exam:  Appearance: lying in bed, in no acute distress  Level of Consciousness: alert and oriented X3  Behavior/Cooperation: cooperative  Psychomotor: unremarkable   Speech: slow rate but normal volume  Language: english, fluid  Orientation: oriented to person, place, year   Attention Span/Concentration: spelled "HOUSE" forwards and backwards  Memory: Impaired to some degree, able to recall 3/3 items immediately and at 5 minutes  Mood: "frustrated"  Affect: blunted  Thought Process: slightly disorganized   Associations: unable to assess   Thought Content: normal, no suicidality, no homicidality, delusions, or paranoia  Fund of Knowledge: unable to assess  Insight: limited  Judgment: limited      Assessment:  Mr. Morales is a 65 y/o man with past psychiatric history of Bipolar I disorder with PMHx of seizure disorder and alcohol abuse. He presented to [facility] with a head injury secondary to a tonic-clonic seizure. On today's exam, he is CAM-ICU negative, with improvement in " concentration and orientation.

## 2019-10-03 NOTE — ASSESSMENT & PLAN NOTE
Seizure  Witnessed tonic-clonic seizure episode with hx of focal seizures (usually related to etoh).  Noncompliant with depakote/vimpat, attempting to wean off.   Valproic acid subtherapeutic at 48.  Loaded with 1g depakote in ED.  CTH unremarkable.  CXR/UA without evidence of infection.   - Neurology consulted, will discontinue Depakote due to side effects   - increase Vimpat to 200mg BID   - Appears dehydrated on exam, likely contributing.  - seizure precautions; normal vEEG in 5/2018  - depakote does have tremors listed as adverse effect (1-57% reported) but also could be 2/2 psych medications.    - neuro checks, delirium/fall precautions  - discharged on Vimpat 200 mg daily  - follow up with neurology

## 2019-10-03 NOTE — ASSESSMENT & PLAN NOTE
Resolving  Patient waxing and waning, endorsing visual hallucinations and sleep disturbances. Unlcear etiology, but improved with simplification of medication regimen  - Psych discontined doxepin after decreasing Doxepin from 75 mg --> 50 mg qhs due to risk of anticholinergic delirium/ dizziness with improvement in delerium  - Blood culture NGTD, repeat UA negative, repeat CXR negative  - EEG with generalized encephalopathy, consistent with delirium  - delirium improved with simplification of medication regimen  - Discharged on Latuda 40 mg qhs, and vimpat 200 mg daily  - follow up with psychiatry as outpatient

## 2019-10-03 NOTE — PLAN OF CARE
Patient is AAO x4. POC reviewed with patient. Patient verbalized understanding. Patient's breathing is unlabored with equal chest expansion. Patient denies any numbness and tingling. Patient voids per urinal. Bed in lowest position,bed alarm on, Avasys in place, side rails up x3, no complaints or signs of distress. WCTM.

## 2019-10-03 NOTE — SUBJECTIVE & OBJECTIVE
"  Family History     Problem Relation (Age of Onset)    Alcohol abuse Mother, Maternal Uncle, Paternal Uncle, Cousin    Anxiety disorder Brother    Bipolar disorder Brother    Dementia Maternal Grandmother    Depression Mother, Father, Sister, Brother    Drug abuse Brother    Suicide Sister        Tobacco Use    Smoking status: Current Some Day Smoker     Packs/day: 0.25     Years: 10.00     Pack years: 2.50    Smokeless tobacco: Never Used   Substance and Sexual Activity    Alcohol use: No     Alcohol/week: 16.7 standard drinks     Types: 20 Standard drinks or equivalent per week     Comment: quit 4 years ago    Drug use: No    Sexual activity: Yes     Partners: Female     Comment: with wife; monogamous      Psychotherapeutics (From admission, onward)    Start     Stop Route Frequency Ordered    10/02/19 1754  OLANZapine tablet 5 mg      -- Oral Every 8 hours PRN 10/02/19 1655    10/01/19 2100  doxepin capsule 50 mg      -- Oral Nightly 10/01/19 1112    09/30/19 1700  lurasidone tablet 40 mg      -- Oral Daily 09/29/19 2211    09/29/19 2310  ramelteon tablet 8 mg      -- Oral Nightly PRN 09/29/19 2211           Review of Systems  Objective:     Vital Signs (Most Recent):  Temp: 97.4 °F (36.3 °C) (10/03/19 0448)  Pulse: (!) 113 (10/03/19 0740)  Resp: 18 (10/03/19 0448)  BP: (!) 139/93 (10/03/19 0448)  SpO2: 95 % (10/03/19 0500) Vital Signs (24h Range):  Temp:  [96.3 °F (35.7 °C)-98.1 °F (36.7 °C)] 97.4 °F (36.3 °C)  Pulse:  [] 113  Resp:  [18] 18  SpO2:  [93 %-97 %] 95 %  BP: (137-164)/(89-93) 139/93     Height: 6' 4" (193 cm)  Weight: 97.5 kg (214 lb 15.2 oz)  Body mass index is 26.16 kg/m².      Intake/Output Summary (Last 24 hours) at 10/3/2019 1031  Last data filed at 10/3/2019 0131  Gross per 24 hour   Intake 60 ml   Output 1150 ml   Net -1090 ml       Physical Exam   Psychiatric:     Appearance: lying in bed, in no acute distress  Behavior/Cooperation: calm and cooperative  Psychomotor: slight " "resting tremor noted in UE   Speech: slow rate but normal volume and quantity  Language: English, fluid  Orientation: oriented to person, place, year, situation   Attention Span/Concentration: spelled "HOUSE" forwards and backwards  Memory: Impaired to some degree, able to recall 3/3 items immediately and at 5 minutes  Mood: "frustrated"  Affect: blunted, mood congruent   Thought Process: slightly disorganized   Perceptual Disturbances: -AVH  Thought Content: normal, no suicidality, no homicidality, delusions, or paranoia  Fund of Knowledge: unable to assess  Insight: limited  Judgment: limited         Significant Labs: All pertinent labs within the past 24 hours have been reviewed.    Significant Imaging: I have reviewed all pertinent imaging results/findings within the past 24 hours.  "

## 2019-10-03 NOTE — NURSING
Orders received for discharge.  Discharge orders reviewed with patient and family to include medication,follow up [facility]., and activity.   IV's removed with out redness or edema, patient heena well. Tele box removed. Medications delivered by pharmacy.  Patient transport via wheelchair.  Will cont POC and treatment.

## 2019-10-03 NOTE — PT/OT/SLP EVAL
Physical Therapy Evaluation and Discharge Note    Patient Name:  Mirza Morales   MRN:  [id number]    Recommendations:     Discharge Recommendations:  home with home health   Discharge Equipment Recommendations: none   Barriers to discharge: Decreased caregiver support    Assessment:     Mirza Morales is a 64 y.o. male admitted with a medical diagnosis of Delirium. Pt states that prior to admin that he was independent with all activities at home and is currently on disability and has not been able to drive. Pt demonstrated good functional strength and mobility during today's tx session. Pt demonstrated no deficits amb with RW, however, he demonstrated unsteadiness and reported B knee pain without an AD. Recommending pt use RW owned at discharge. At this time, patient appears to be functioning close to baseline and does not require further acute PT services. Recommending pt be discharged to home with home health PT.    Recent Surgery: * No surgery found *      Plan:     During this hospitalization, patient does not require further acute PT services.  Please re-consult if situation changes.      Subjective     Chief Complaint: Pt denied any pain during today's tx session  Pain/Comfort:  · Pain Rating 1: 0/10    Patients cultural, spiritual, Mosque conflicts given the current situation: no    Living Environment:  Pt lives with wife in [facility] with 0 LILIANA  Prior to admission, patient reports that he was independent with all activities, but was on disability and did not drive.  Equipment used at home: none.  DME owned (not currently used): rolling walker and single point cane.  Upon discharge, patient will have limited assistance from spouse.   No family or spouse present to confirm subjective hx    Objective:     Communicated with RN prior to session.  Patient found HOB elevated with telemetry, peripheral IV, SCD upon PT entry to room.    General Precautions: Standard, fall, seizure   Orthopedic Precautions:N/A    Braces: N/A     Exams:  · Cognitive Exam:  Patient is oriented to Person, Place, Time and Situation  · Sensation:    · -       Intact  · RLE ROM: WFL  · RLE Strength: WFL  · LLE ROM: WFL  · LLE Strength: WFL    Functional Mobility:    Bed Mobility:   · Supine > Sit: Independent  · Sit > Supine: SBA     Transfers:   · Sit <> Stand Transfer: Independent from EOB with RW x1 trial  · Sit <> Stand Transfer: SBA from EOB without an AD x1 trial    Standing Balance:   · Assistance level: SBA with and without an AD   · Postural deviation(s) noted: Pt denied any dizziness, lightheadedness, or visual changes during static standing. Pt demonstrated no LOB                 Gait:  · Trial 1: Pt amb ~300ft with SBA with RW. Pt demonstrated heel to toe gait pattern and demonstrated no gait deficits while conversing with therapist.  · Trial 2: Pt amb~15ft from bedside <> hallway with CGA without an AD. Pt demonstrated unsteady gait pattern and reported B knee pain.     Therapeutic Activities and Exercises:  Pt educated on proper technique during transfers, gait training. Pt educated on equipment recommendations at discharge. Pt educated on PT POC/discharge from PT. Pt safe to walk with nurse during admin using RW.     AM-PAC 6 CLICK MOBILITY  Total Score:21     Patient left HOB elevated with all lines intact, call button in reach, bed alarm on and RN notified.    GOALS:   Multidisciplinary Problems     Physical Therapy Goals        Problem: Physical Therapy Goal    Goal Priority Disciplines Outcome Goal Variances Interventions   Physical Therapy Goal     PT, PT/OT                      History:     Past Medical History:   Diagnosis Date    Alcohol abuse     Behavioral problem     Bipolar 1 disorder     Chronic right hip pain     Depression     DJD (degenerative joint disease), lumbar 1/27/2015    Fatigue     Hepatitis     pt. denies this    History of psychiatric care     History of psychiatric hospitalization      Hypertension     Karina     Post traumatic stress disorder     Psychiatric problem     Seizures     Suicide attempt     Therapy        Past Surgical History:   Procedure Laterality Date    COLONOSCOPY N/A 8/14/2019    Procedure: COLONOSCOPY;  Surgeon: Tammy Duval MD;  Location: [facility] ([address] [place] [address]);  Service: Endoscopy;  Laterality: N/A;    HERNIA REPAIR      SPINE SURGERY  11-12-15    LAMINECTOMY/LUMBAR/WARE       Time Tracking:     PT Received On: 10/03/19  PT Start Time: 1422     PT Stop Time: 1443  PT Total Time (min): 21 min     Billable Minutes: Evaluation 21      ALEX Krishnan  10/03/2019

## 2019-10-03 NOTE — ASSESSMENT & PLAN NOTE
Appears chronic, but also with evidence of dehydration  - can be seen with depakote  - mild improvement since discontinuing depakote  - stable around 134

## 2019-10-03 NOTE — PLAN OF CARE
10/03/19 0834   Post-Acute Status   Post-Acute Authorization Other   Other Status No Post-Acute Service Needs

## 2019-10-03 NOTE — PROGRESS NOTES
"[facility]  Psychiatry  Progress Note    Patient Name: Mirza Moralse  MRN: [id number]   Code Status: Prior  Admission Date: 9/29/2019  Hospital Length of Stay: 1 days  Expected Discharge Date: 10/4/2019  Attending Physician: Hilario Thomas MD  Primary Care Provider: Jud Mo MD    Current Legal Status: N/A    Patient information was obtained from patient and spouse/SO.     Subjective:     Principal Problem:Delirium    Chief Complaint: AMS    HPI: Mirza Morales is a 64 y.o. male with a past psychiatric history of Bipolar Disorder who presented to [facility] due to Witnessed seizure-like activity. Psychiatry was consulted for "noncompliance with Depakote."     Per Primary Team:  Mirza Morales is a 64 y.o. male with scoliosis s/p lumbar fusion (direct lateral and posterior L4-5, L3-4), L2-3, Bipolar I (on Depakote, Latuda, doxepin), and seizure disorder (Vimpat+depakote) presented to ED after a witnessed seizure episode.      Hospital Course: Subjective:    On examination today, Mr. Morales is subjectively less confused. He is frustrated he is on delirium precautions and that there have been alterations in his medications from his usual home dosages.  Feels that the medications are making him "go nuts" as he is still seeing shapes and shadows and notes his sleep has been poor in the hospital. He denies SI/HI/AH. He is oriented to location, year, but has difficulty reporting the month, date or day of the week.      Collateral: spoke with wife, Anamaria on 10/2/19 ([phone])     Per wife, patient 'has come out of his depression.'  When asked for clarification she reports that he is less confused, able to engage in conversation more, and less forgetful than when he was admitted.  States that he has been complaining more recently which is not usual for him.  She did express some frustration with her role as his caregiver and seems to have some caregiver fatigue.  Reviewed patient's " "current medication regimen with her.       Family History     Problem Relation (Age of Onset)    Alcohol abuse Mother, Maternal Uncle, Paternal Uncle, Cousin    Anxiety disorder Brother    Bipolar disorder Brother    Dementia Maternal Grandmother    Depression Mother, Father, Sister, Brother    Drug abuse Brother    Suicide Sister        Tobacco Use    Smoking status: Current Some Day Smoker     Packs/day: 0.25     Years: 10.00     Pack years: 2.50    Smokeless tobacco: Never Used   Substance and Sexual Activity    Alcohol use: No     Alcohol/week: 16.7 standard drinks     Types: 20 Standard drinks or equivalent per week     Comment: quit 4 years ago    Drug use: No    Sexual activity: Yes     Partners: Female     Comment: with wife; monogamous      Psychotherapeutics (From admission, onward)    Start     Stop Route Frequency Ordered    10/02/19 1754  OLANZapine tablet 5 mg      -- Oral Every 8 hours PRN 10/02/19 1655    10/01/19 2100  doxepin capsule 50 mg      -- Oral Nightly 10/01/19 1112    09/30/19 1700  lurasidone tablet 40 mg      -- Oral Daily 09/29/19 2211    09/29/19 2310  ramelteon tablet 8 mg      -- Oral Nightly PRN 09/29/19 2211           Review of Systems  Objective:     Vital Signs (Most Recent):  Temp: 97.4 °F (36.3 °C) (10/03/19 0448)  Pulse: (!) 113 (10/03/19 0740)  Resp: 18 (10/03/19 0448)  BP: (!) 139/93 (10/03/19 0448)  SpO2: 95 % (10/03/19 0500) Vital Signs (24h Range):  Temp:  [96.3 °F (35.7 °C)-98.1 °F (36.7 °C)] 97.4 °F (36.3 °C)  Pulse:  [] 113  Resp:  [18] 18  SpO2:  [93 %-97 %] 95 %  BP: (137-164)/(89-93) 139/93     Height: 6' 4" (193 cm)  Weight: 97.5 kg (214 lb 15.2 oz)  Body mass index is 26.16 kg/m².      Intake/Output Summary (Last 24 hours) at 10/3/2019 1031  Last data filed at 10/3/2019 0131  Gross per 24 hour   Intake 60 ml   Output 1150 ml   Net -1090 ml       Physical Exam   Psychiatric:     Appearance: lying in bed, in no acute distress  Behavior/Cooperation: calm " "and cooperative  Psychomotor: slight resting tremor noted in UE   Speech: slow rate but normal volume and quantity  Language: English, fluid  Orientation: oriented to person, place, year, situation   Attention Span/Concentration: spelled "HOUSE" forwards and backwards  Memory: Impaired to some degree, able to recall 3/3 items immediately and at 5 minutes  Mood: "frustrated"  Affect: blunted, mood congruent   Thought Process: slightly disorganized   Perceptual Disturbances: -AVH  Thought Content: normal, no suicidality, no homicidality, delusions, or paranoia  Fund of Knowledge: unable to assess  Insight: limited  Judgment: limited         Significant Labs: All pertinent labs within the past 24 hours have been reviewed.    Significant Imaging: I have reviewed all pertinent imaging results/findings within the past 24 hours.    Assessment/Plan:     * Delirium  Patient is a 64 year old man with history of Bipolar I Disorder and Seizure Disorder, Alcohol Abuse with hx of Alcohol Induced Seizures (sobriety for the past month- per patient and collateral). Fell down during tonic clonic seizure with laceration to scalp. Psychiatry was consulted because patient is refusing to take Depakote, said it was causing him to have tremors, which has been documented with past visit with Neurology outpatient and also confirmed by wife as a side effect he is having.     Patient's mentation continues to improve with simplification of medication regimen.  Today he is oriented and passes all attention questions. Will recommend current medication recommendations without change.  Differential for delirium includes post-ictal confusion and/or poor cognition exacerbated by anticholinergic medications. Per collateral patient has a history of taking more medications than prescribed.  Advised wife to limit patient's access to medications.       Recommendations:   - Continue Latuda 40mg PO daily   - Discontinue doxepin due to risk for " anticholinergic delirium in addition to complaint of dizziness  - w/u for delirium (EEG with nonfocal encephalopathy, negative for epileptic process)   UA WNL   CBC, CMP WNL   CXR WNL x2  - Psychiatry will sign off at this time.  If patient with worsening mentation while in the hospital, please do no hesitate to re-consult psychiatry.            La Nena Rosales MD   Psychiatry  [facility]

## 2019-10-03 NOTE — ASSESSMENT & PLAN NOTE
Patient is a 64 year old man with history of Bipolar I Disorder and Seizure Disorder, Alcohol Abuse with hx of Alcohol Induced Seizures (sobriety for the past month- per patient and collateral). Fell down during tonic clonic seizure with laceration to scalp. Psychiatry was consulted because patient is refusing to take Depakote, said it was causing him to have tremors, which has been documented with past visit with Neurology outpatient and also confirmed by wife as a side effect he is having.     Patient's mentation continues to improve with simplification of medication regimen.  Today he is oriented and passes all attention questions. Will recommend current medication recommendations without change.  Differential for delirium includes post-ictal confusion and/or poor cognition exacerbated by anticholinergic medications. Per collateral patient has a history of taking more medications than prescribed.  Advised wife to limit patient's access to medications.       Recommendations:   - Continue Latuda 40mg PO daily   - Discontinue doxepin due to risk for anticholinergic delirium in addition to complaint of dizziness  - w/u for delirium (EEG with nonfocal encephalopathy, negative for epileptic process)   UA WNL   CBC, CMP WNL   CXR WNL x2  - Psychiatry will sign off at this time.  If patient with worsening mentation while in the hospital, please do no hesitate to re-consult psychiatry.

## 2019-10-03 NOTE — DISCHARGE SUMMARY
"[facility] Hospital Medicine  Discharge Summary      Patient Name: Mirza Morales  MRN: [id number]  Admission Date: 9/29/2019  Hospital Length of Stay: 1 days  Discharge Date and Time:  10/04/2019 3:24 PM  Attending Physician: Hilario Thomas MD   Discharging Provider: Shanon Wade PA-C  Primary Care Provider: Jud Mo MD  [facility] Medicine Team: [facility] MED F Shanon Wade PA-C    HPI:   Mirza Morales is a 64 y.o. male with scoliosis s/p lumbar fusion (direct lateral and posterior L4-5, L3-4), L2-3, bipolar 1 (Depakote, Latuda, doxepin), and seizure disorder (Vimpat+depakote) presents to ED after a witnessed seizure episode around 4:30 PM today.      Patient poor historian in ED:  "Patient was walking up the stairs and around about the 2nd stair he began to have a seizure and fell hitting his Right parietal scalp.  Patient's wife still states that she arrived around 20 sec after he began seizing and noticed tonic-clonic movements in his upper and lower extremities and his eyes open, however the patient was not responsive.  The episode lasted around 1 min.  The patient began bleeding profusely from a scalp laceration which eventually stopped with pressure.  EMS was called in the patient was transferred to our facility.  The patient has not ambulated since the incident.  The wife states that the patient had been severely depressed for the past 4-5 weeks.  He has not been getting out of bed, has been eating less, and refusing to go to his scheduled medical appointments.  The wife states that for the past few days the patient has been stating that he is going to wean himself off of Depakote because he does not like how it makes him feel and tremors that it causes him.  Patient's wife states that the last 2 days he has also had episodes of waxing waning confusion, including this morning when he was found wandering the neighborhood and brought home by his neighbors.  Patient " "currently endorses headache, and nausea.  Patient denies any other injuries during the fall and denies pain anywhere else besides his head. Patient denies blurred vision, chest pain, shortness of breath, abdominal pain, constipation, diarrhea, dysuria, SI, and HI.  The patient has also had a chronic cough for the past few weeks. "    Initially, patient with delayed and limited responses, mostly watching the tv in the room and not acknowledging provider.  He denied any depression.  His response to "are you feeling okay" is not really, but wouldn't elaborate.  He repeated "that is just incredible" while watching replays from the [place] game.  As this provider stayed silent in the room, within 3-4 minutes, patient became more interactive.  He reports hx of shingles which has recently started scabbing over.  He denies taking any medication for it. He also is c/o "free-" at house who "won't leave" and have dogs that cause issues at his house.      Patient reports walking up the stairs too quickly which he attributes to hitting his head, but he does report he feels like he had a seizure.  He denies any tongue biting, bowel/bladder incontinence.  He reports his neighbor saw the event and said his leg was shaking.  He reports taking himself off depakote "a few days" ago because of the side effects (tremors)  He reports his psychiatrist is Dr. Ziegler whom patient says won't take him off depakote.  Would like to see a different doctor. Reports hx of remote hx of marielena with an MAOI inhibitor.  He reports last seizure approx 6 months.  He has quit drinking and trying to wean himself off coca-cola, coffee, cigarettes.  Smokes only half/cigarette a day (American spirit).  He denies drug/etoh use.  He enjoys watching winter golf and "when the [place] win".  He denies any SI/HI.     Per chart review, patient seen by Dr. Ann with neurology 8/2018.  Patient reports ~10 seizures since a diagnosis in [id number].  He reported " "previous seizures "associated with etoh use; but states that not all sz were associated with etoh - says that he stopped completely since 5/2018".  Seizures appeared well-controlled on vimpat and depakote and last reported possibly 5/2018.  Seizure disorder classified as "Focal onset seizures with dyscognitive symptoms and secondarily generalization ".    ED: AF, tachycardic to 100s, HDS.  Na 132, UA shows 7 hyaline casts. CT C-spine unremarkable.  CTH shows R parietal scalp hematoma.  CXR with poor inspiration, limiting exam.  XR pelvis AP without evidence of fracture/dislocation.     * No surgery found *      Hospital Course:   Patient admitted to observation for seizure like activity. Psychiatry and Neurology consulted. Depakote discontinued. Neurology increased Vimpat to 200 mg BID without further seizure activity. Patient delirious in hospital.  Infectious work up negative, including blood cultures, respiratory panel, UA, CXR X 2. EEG with nonfocal encephalopathy, negative for epileptic process, consistent with delirium. Doxepin decreased then discontinued with improvement in delerium. Patient with a few episodes of isolated, asymptomatic tachycardia while in hospital. EKG with sinus tach, Telemetry unremarkable. TSH WNL. Previously noted in history, Nothing to do for now, follow up with PCP. Patient discharged on latuda 40 mg and vimpat 200 mg daily. Follow up with PCP between 10/6-10/8 for staple removal and hospital follow up. Follow up with psychiatry in 2-3 weeks. Discussed with patient and wife. Patient verbalized undestanding. All questions answered.      Consults:   Consults (From admission, onward)        Status Ordering Provider     Inpatient consult to Neurology  Once     Provider:  (Not yet assigned)    Completed KELI GODINEZ     Inpatient consult to Psychiatry  Once     Provider:  (Not yet assigned)    Completed JOSEPH TREJO JR     Inpatient consult to Psychiatry  Once     Provider:  " (Not yet assigned)    Completed JOSEPH TREJO JR          * Delirium  Resolving  Patient waxing and waning, endorsing visual hallucinations and sleep disturbances. Unlcear etiology, but improved with simplification of medication regimen  - Psych discontined doxepin after decreasing Doxepin from 75 mg --> 50 mg qhs due to risk of anticholinergic delirium/ dizziness with improvement in delerium  - Blood culture NGTD, repeat UA negative, repeat CXR negative  - EEG with generalized encephalopathy, consistent with delirium  - delirium improved with simplification of medication regimen  - Discharged on Latuda 40 mg qhs, and vimpat 200 mg daily  - follow up with psychiatry as outpatient     Transient neurological symptoms  2 reported episodes (one by admitting provider) where patient staring off, minimally interactive, but able to answer certain questions with limited responses.  No convulsive activity or limb rigidity.  He quickly re-orients within seconds-minutes and has appropriate speech and interactive.  Unclear if this is of neurologic or psych etiology.      Laceration of scalp without foreign body  - remove staples in 7-10 days from 9/29  - CT C spine without evidence of fx/dislocation from fall.  No intracranial bleeding on CTH.   - no gross deficits s/p fall    Herpes zoster dermatitis  Lesions appear ruptured and scabbing.  Now appears to have some slough/discharge on his back. Will treat with mupirocin ointment BID and have wound care evaluate.   - Patient denies treatment for it, but appears healing and rx was sent for valtex per wife's description.  - Wound care consulted and appreciate recs- medihoney to lesions and cover to prevent scratching    Hyponatremia  Appears chronic, but also with evidence of dehydration  - can be seen with depakote  - mild improvement since discontinuing depakote  - stable around 134    Witnessed seizure-like activity  Seizure  Witnessed tonic-clonic seizure episode with hx  of focal seizures (usually related to etoh).  Noncompliant with depakote/vimpat, attempting to wean off.   Valproic acid subtherapeutic at 48.  Loaded with 1g depakote in ED.  CTH unremarkable.  CXR/UA without evidence of infection.   - Neurology consulted, will discontinue Depakote due to side effects   - increase Vimpat to 200mg BID   - Appears dehydrated on exam, likely contributing.  - seizure precautions; normal vEEG in 5/2018  - depakote does have tremors listed as adverse effect (1-57% reported) but also could be 2/2 psych medications.    - neuro checks, delirium/fall precautions  - discharged on Vimpat 200 mg daily  - follow up with neurology    Tobacco abuse  - wants to quit, but only smokes half/cig daily  - continue to wean      Final Active Diagnoses:    Diagnosis Date Noted POA    PRINCIPAL PROBLEM:  Delirium [R41.0] 09/30/2019 Yes    Alteration in skin integrity [R23.9] 10/01/2019 Yes    Transient neurological symptoms [R29.818] 09/30/2019 Yes    Herpes zoster dermatitis [B02.8, L30.8] 09/29/2019 Yes    Laceration of scalp without foreign body [S01.01XA] 09/29/2019 Yes    Hyponatremia [E87.1] 02/21/2018 Yes     Chronic    Seizure [R56.9] 12/22/2015 Yes    Witnessed seizure-like activity [R56.9] 12/07/2015 Yes    Tobacco abuse [Z72.0] 02/28/2013 Yes     Chronic      Problems Resolved During this Admission:    Diagnosis Date Noted Date Resolved POA    Bipolar affective disorder, currently depressed, mild [F31.31] 12/31/2015 10/02/2019 Yes       Discharged Condition: good    Disposition:     Follow Up:    Patient Instructions:   No discharge procedures on file.    Significant Diagnostic Studies: Labs:   CMP   Recent Labs   Lab 10/02/19  0358 10/03/19  0501   * 134*   K 3.8 4.5   CL 99 99   CO2 24 23   GLU 91 123*   BUN 9 11   CREATININE 0.8 0.8   CALCIUM 9.1 9.6   ANIONGAP 10 12   ESTGFRAFRICA >60.0 >60.0   EGFRNONAA >60.0 >60.0   , CBC   Recent Labs   Lab 10/02/19  0358 10/03/19  0501    WBC 8.21 7.73   HGB 13.4* 13.4*   HCT 39.6* 39.7*   * 139*     Microbiology:   Blood Culture   Lab Results   Component Value Date    LABBLOO No Growth to date 10/01/2019    LABBLOO No Growth to date 10/01/2019    LABBLOO No Growth to date 10/01/2019    LABBLOO No Growth to date 10/01/2019    LABBLOO No Growth to date 10/01/2019    LABBLOO No Growth to date 10/01/2019       Pending Diagnostic Studies:     Procedure Component Value Units Date/Time    RPR [[id number]] Collected:  10/03/19 0912    Order Status:  Sent Lab Status:  In process Updated:  10/03/19 0932    Specimen:  Blood          Medications:  Reconciled Home Medications:      Medication List      START taking these medications    mupirocin 2 % ointment  Commonly known as:  BACTROBAN  Apply topically 2 (two) times daily. Apply to back region with area of cellulitis/slough        CHANGE how you take these medications    lacosamide 50 mg Tab  Commonly known as:  VIMPAT  Take 4 tablets (200 mg total) by mouth every 12 (twelve) hours.  What changed:    · medication strength  · how much to take  · how to take this  · when to take this     lurasidone 40 mg Tab tablet  Commonly known as:  LATUDA  Take 1 tablet (40 mg total) by mouth Daily.  What changed:    · medication strength  · how much to take  · how to take this  · when to take this  · additional instructions        CONTINUE taking these medications    atorvastatin 40 MG tablet  Commonly known as:  LIPITOR  Take 1 tablet (40 mg total) by mouth once daily.     hydrOXYzine pamoate 50 MG Cap  Commonly known as:  VISTARIL  TAKE 1 CAPSULE (50 MG TOTAL) BY MOUTH EVERY EVENING.     lactulose 20 gram/30 mL Soln  Commonly known as:  CHRONULAC  TAKE 30 MLS BY MOUTH 2 (TWO) TIMES DAILY AS NEEDED.     meloxicam 7.5 MG tablet  Commonly known as:  MOBIC  TAKE 1 TABLET BY MOUTH EVERY DAY     multivit-min-FA-lycopen-lutein 300-600-300 mcg Tab  Commonly known as:  CENTRUM SILVER ULTRA MEN'S  Take 1 tablet by mouth  once daily at 6am.     naloxegol 12.5 mg Tab  Commonly known as:  MOVANTIK  Take 12.5 mg by mouth daily as needed.     pantoprazole 40 MG tablet  Commonly known as:  PROTONIX  Take 1 tablet (40 mg total) by mouth once daily.     valACYclovir 1000 MG tablet  Commonly known as:  VALTREX  Take 1 tablet (1,000 mg total) by mouth every 8 (eight) hours. for 7 days     VENTOLIN HFA 90 mcg/actuation inhaler  Generic drug:  albuterol  Ventolin HFA 90 mcg/actuation aerosol inhaler        STOP taking these medications    divalproex  MG Tb24  Commonly known as:  DEPAKOTE     doxepin 100 MG capsule  Commonly known as:  SINEQUAN            Indwelling Lines/Drains at time of discharge:   Lines/Drains/Airways     None                 Time spent on the discharge of patient: 36 minutes  Patient was seen and examined on the date of discharge and determined to be suitable for discharge.         Shanon Wade PA-C  Department of Hospital Medicine  [facility]

## 2019-10-03 NOTE — PLAN OF CARE
PT evaluation completed- pt appears close to functional baseline, with no acute deficits identified.  PT orders discontinued. Recommending pt discharge home with HHPT evaluation.     Stefania Mcclendon PT, DPT   10/3/2019  Pager: [phone]

## 2019-10-04 ENCOUNTER — PATIENT MESSAGE (OUTPATIENT)
Dept: INTERNAL MEDICINE | Facility: CLINIC | Age: 64
End: 2019-10-04

## 2019-10-04 DIAGNOSIS — K59.03 CONSTIPATION DUE TO PAIN MEDICATION: Primary | ICD-10-CM

## 2019-10-04 DIAGNOSIS — K59.03 CONSTIPATION DUE TO PAIN MEDICATION: ICD-10-CM

## 2019-10-04 RX ORDER — MELOXICAM 7.5 MG/1
7.5 TABLET ORAL DAILY
Qty: 30 TABLET | Refills: 0 | Status: ON HOLD | OUTPATIENT
Start: 2019-10-04 | End: 2020-01-08

## 2019-10-04 RX ORDER — LACTULOSE 10 G/15ML
SOLUTION ORAL
Qty: 290 ML | Refills: 1 | Status: ON HOLD | OUTPATIENT
Start: 2019-10-04 | End: 2020-01-08

## 2019-10-04 NOTE — PHYSICIAN QUERY
PT Name: Mirza Morales  MR #: [id number]     Physician Query Form - Documentation Clarification      CDS/: JIAN Carney, RN, CCDS               Contact information: [email or web address]    This form is a permanent document in the medical record.     Query Date: October 4, 2019    By submitting this query, we are merely seeking further clarification of documentation. Please utilize your independent clinical judgment when addressing the question(s) below.    The Medical record reflects the following:    Supporting Clinical Findings Location in Medical Record     presents to ED after a witnessed seizure episode around 4:30 PM today.       admitted to observation for seizure like activity. Psychiatry and Neurology consulted. Depakote discontinued. Neurology increased Vimpat to 200 mg BID without further seizure activity. Patient delirious in hospital.  Infectious work up negative    EEG with nonfocal encephalopathy, negative for epileptic process, consistent with delirium. Doxepin decreased then discontinued with improvement in delerium. Patient with a few episodes of isolated, asymptomatic tachycardia while in hospital    Delirium  Resolving  Patient waxing and waning, endorsing visual hallucinations and sleep disturbances. Unlcear etiology, but improved with simplification of medication regimen  - Psych discontined doxepin after decreasing Doxepin from 75 mg --> 50 mg qhs due to risk of anticholinergic delirium/ dizziness with improvement in delerium  - EEG with generalized encephalopathy, consistent with delirium  - delirium improved with simplification of medication regimen    Transient neurological symptoms  2 reported episodes (one by admitting provider) where patient staring off, minimally interactive, but able to answer certain questions with limited responses.  No convulsive activity or limb rigidity.  He quickly re-orients within seconds-minutes and has appropriate speech and interactive.   Unclear if this is of neurologic or psych etiology.      Witnessed seizure-like activity  Seizure  Witnessed tonic-clonic seizure episode with hx of focal seizures (usually related to etoh).  Noncompliant with depakote/vimpat, attempting to wean off.     DCS: Dr. Thomas 10/3                                                                              Doctor, Please clarify condition requiring transition to inpatient status:  Provider Use Only  [ x ] doxepin drug induced delirium  [  ] delirium due to generalized medical condition  [  ] delirium, unspecified  [  ] seizure  [  ] other: __________________________________________                                                                                                              [  ] Clinically Undetermined

## 2019-10-04 NOTE — PHYSICIAN QUERY
"PT Name: Mirza Morales  MR #: [id number]     Physician Query Form - Documentation Clarification      CDS/: JIAN Carney, RN, CCDS               Contact information: [email or web address]    This form is a permanent document in the medical record.     Query Date: October 4, 2019    By submitting this query, we are merely seeking further clarification of documentation. Please utilize your independent clinical judgment when addressing the question(s) below.    The Medical record reflects the following:    Supporting Clinical Findings Location in Medical Record     Patient poor historian in ED:  "Patient was walking up the stairs and around about the 2nd stair he began to have a seizure and fell hitting his Right parietal scalp.  Patient's wife still states that she arrived around 20 sec after he began seizing and noticed tonic-clonic movements in his upper and lower extremities and his eyes open, however the patient was not responsive.  The episode lasted around 1 min.    He denies any tongue biting, bowel/bladder incontinence.  He reports his neighbor saw the event and said his leg was shaking.  He reports taking himself off depakote "a few days" ago because of the side effects (tremors)     Patient reports ~10 seizures since a diagnosis in [id number].  He reported previous seizures "associated with etoh use; but states that not all sz were associated with etoh - says that he stopped completely since 5/2018".      Seizures appeared well-controlled on vimpat and depakote and last reported possibly 5/2018.  Seizure disorder classified as "Focal onset seizures with dyscognitive symptoms and secondarily generalization ".    Seizure  Witnessed tonic-clonic seizure episode with hx of focal seizures (usually related to etoh).  Noncompliant with depakote/vimpat, attempting to wean off.      depakote does have tremors listed as adverse effect (1-57% reported) but also could be 2/2 psych medications    admitted to " observation for seizure like activity. Psychiatry and Neurology consulted. Depakote discontinued. Neurology increased Vimpat to 200 mg BID without further seizure activity. Patient delirious in hospital  EEG with nonfocal encephalopathy, negative for epileptic process, consistent with delirium. Doxepin decreased then discontinued with improvement in delerium.       PN: Dr. Thomas 10/2                                DCS: Dr. Thomas 10/3                                                                                  Doctor, Please clarify type of seizure:    Provider Use Only    [ x ] generalized tonic-clonic  [  ] focal  [  ] other: _________________________________                                                                                                             [  ] Clinically Undetermined

## 2019-10-04 NOTE — PHYSICIAN QUERY
"PT Name: Mirza Morales  MR #: [id number]     CDS/: JIAN Carney, RN, CCDS               Contact information: [email or web address]  This form is a permanent document in the medical record.     Query Date: October 4, 2019    Physician Query - Neurological Condition Clarification    By submitting this query, we are merely seeking further clarification of documentation to reflect the severity of illness of your patient. Please utilize your independent clinical judgment when addressing the question(s) below.    The Medical record reflects the following:     Indicators   Supporting Clinical Findings Location in Medical Record   X AMS, Confusion,  LOC, etc.  admitted to observation for seizure like activity  Patient with delirium per psychiatry. Infectious work up negative. Psychiatry decreased doxpein.     reports feeling like he is on an "LSD" trip, but unable to elaborate, then denies he feels different than his baseline. Unclear etiology. Psych discontinuing doxepin. VSS. Infectious work up negative. EEG with generalized encephalopathy, consistent with delirium    Delirium  Patient waxing and waning, endorsing visual hallucinations and sleep disturbances. Mildly improved on exam today. Unlcear etiology   - Psych discontinuing doxepin after decreasing Doxepin from 75 mg --> 50 mg qhs due to risk of anticholinergic delirium/ dizziness  - Blood culture NGTD, repeat UA negative  - EEG with generalized encephalopathy, consistent with delirium    Patient's mentation continues to improve with simplification of medication regimen  Differential for delirium includes post-ictal confusion and/or poor cognition exacerbated by anticholinergic medications. Per collateral patient has a history of taking more medications than prescribed.    PN: Dr. Thomas 10/2                        Psych PN: Dr. Mendez 10/3   X Acute / Chronic Illness Seizure, Transient neurological symptoms, herpes zoster    PN: Dr. Thomas 10/2    " "Radiology Findings      Electrolyte Imbalance      Medication      Treatment             X Other reports the medications he is on make him feel "off",      Feels that the medications are making him "go nuts" as he is still seeing shapes and shadows  HM PN: Dr. Thomas 10/2    Psych PN: Dr. Mendez 10/3       Encephalopathy- is a general term for any diffuse disease of the brain that alters brain function or structure. Treatment of the cognitive dysfunction varies but is ultimately dependent on the treatment of the underlying condition.    Major Symptoms of Encephalopathy - Decreased level of consciousness, fluctuating alertness/concentration, confusion, agitation, lethargy, somnolence, drowsiness, obtundation, stupor, or coma.         References: National Institutes of Healths (NIH) National Ord of Neurological Disorders and Strokes;  HCPro 2016; Advisory Board     Clinical Guidelines:   These guidelines will set system standards to assist providers in managing, documentation, and coding of encephalopathy. The intent of this document is to serve as a system guideline, not replace the providers clinical judgment:    Provider, please specify type of encephalopathy:    [ x  ] Toxic Encephalopathy - Due to drugs, chemicals, or other toxic substances due to doxepin   [   ] Other Encephalopathy - Includes uremic encephalopathy   [   ] Unspecified Encephalopathy      [   ] Other Neurological Condition-  Includes Post-ictal altered mental status. (please specify condition): _________   [   ]  Clinically Undetermined     Please document in your progress notes daily for the duration of treatment until resolved, and include in your discharge summary.  "

## 2019-10-06 LAB
BACTERIA BLD CULT: NORMAL
BACTERIA BLD CULT: NORMAL

## 2019-10-07 ENCOUNTER — PATIENT OUTREACH (OUTPATIENT)
Dept: ADMINISTRATIVE | Facility: CLINIC | Age: 64
End: 2019-10-07

## 2019-10-07 ENCOUNTER — PATIENT MESSAGE (OUTPATIENT)
Dept: ADMINISTRATIVE | Facility: CLINIC | Age: 64
End: 2019-10-07

## 2019-10-07 NOTE — PATIENT INSTRUCTIONS
Partial Seizures: Know What to Do  Because seizures may happen at any time, it helps to be prepared. This is true even if medicine usually keeps your seizures under control. Start by telling those you live and work with about your health condition. Make sure they know what to do if a seizure happens.    Steps for your protection  Most partial seizures last from a few seconds to a few minutes. During that time, those around you should help keep you safe. What those witnessing the seizure should do is listed below.  What to do  Seek medical attention right away if you:   Are hurt during the seizure   Begin choking   Have a seizure that lasts more than 5 minutes   Have multiple seizures in a row   Don't fully regain consciousness after the seizure is over  Otherwise, they should do the following:   Move any hard or sharp objects away from you.   Turn you on your left side if you seem unconscious.   Talk to you afterward to relieve your confusion.   Note how long the seizure lasted. Note what you were doing before, during, and after the seizure.  What NOT to do  It is important that they don't do the following:   Don't try to stop any jerking or twisting.   Don't put anything in the mouth.   Don't try to hold the tongue.  Prepare family, friends, and coworkers  It may seem awkward to talk to others about seizures. But telling family, friends, and coworkers about your seizures can help them react to a seizure in a way that will help--and not hurt--you. Describe to them what happens before, during, and after you have a seizure. Explain what they should and should not do.  © 6198-8708 [employer]. [address] [place] [address], [place] [address]. All rights reserved. This information is not intended as a substitute for professional medical care. Always follow your healthcare professional's instructions.  First Aid: Seizures  A seizure results from a sudden rush of abnormal electrical signals in the  brain. Symptoms may range from a minor daze to uncontrollable muscle spasms (convulsions). In many cases, the victim will lose consciousness. A seizure can be caused by a high fever, head injury, drug reaction, or condition such as epilepsy.  Step 1. Protect the head   Help the victim to the floor if he or she starts losing muscle control. Turn the person on his or her side for better breathing and to prevent choking and aspiration.   Protect the victim's head from injury by placing something soft, such as folded clothes, beneath it, and by moving objects away from the victim.   Don't cause injury by restraining the person or by placing anything in his or her mouth. Don't try to hold the person's tongue.   Remove eyeglasses.  Step 2.  Preserve dignity   Clear away bystanders.   Reassure the victim, who may be confused, drowsy, or hostile when coming out of the seizure.   Cover the person or provide dry clothes if muscle spasms have caused a loss of bladder control.  Step 3. Check for injury   Make sure the victim's mental state has returned to normal. One way to do this is to ask the person his or her name, the year, and your location.   Injuries can occur to the head, mouth, tongue, or body.   Step 4. Call 911   If the seizure lasts longer than 5 minutes. Timing the seizure and recovery time is helpful in many cases.   If a second seizure occurs   If the victim doesnt regain consciousness   If the victim is pregnant, has diabetes, or heart disease   If the victim has no history of seizures   If the person has sustained an injury during the seizure.   © 7458-5405 [employer]. [address] [place] [address], [place] [address]. All rights reserved. This information is not intended as a substitute for professional medical care. Always follow your healthcare professional's instructions.  Recognizing Delirium: A Checklist for Caregivers  Delirium is a sudden change in a persons mental state that  fluctuates over short periods of time. It can cause a person to have a hard time paying attention or following a conversation. Thinking and speech may be confused, illogical, unclear, and random. A persons mental state may vary from being restless and alert to sluggish and sleepy.  Delirium can sometimes be mistaken for dementia. Often the two conditions occur together. But delirium happens over a short time - hours to days. Dementia happens more gradually, over months or years.  Delirium is a medical emergency. If delirium is not diagnosed and treated, it can lead to permanent problems or even death.  A Checklist of Delirium Signs  Below is a checklist of the signs of delirium. Its important to remember that the signs of delirium happen quickly, over the course of hours or days. They can come and go, and may be worse later in the day. And a person may shift between the signs.  Does the person:  Seem acutely more sleepy and quiet than usual OR more restless and disturbed?  Seem suddenly confused or disoriented?  Act in a violent way?  Have trouble paying attention and focusing on a conversation?  See things that others cant see?  Hear things that others cant hear?  Believe things that arent known to be true?  Think that people want to harm them?  Change the subject too often while talking?  Talk about things that others find strange?  Have anxiety, be sad or tearful, or have intense feelings of leo and excitement (euphoria) that changes rapidly?  Seem unsteady while walking and not a result of a prior medical condition?  Have a hard time focusing on simple tasks?  If youve answered yes to any of the signs above, your loved one may have delirium. If you think the person has any of the signs above, you can do two simple tests to check their mental state. One tests their attention. The other tests how clearly they are thinking.  Attention Test  Say to the person, I am going to read you a series of 10 letters.  Each time you hear the letter A, squeeze my hand. Read these letters 3 seconds apart: S A V E A H A A R T. Count each time the person does not squeeze your hand when you say the letter A. Count each time he or she squeezes your hand on a letter other than A. If he or she has 3 or more errors, the person may have delirium.  Thinking Test  Ask the person these four questions:  1. Will a stone float on water? (Correct answer: No)  2. Are there fish in the sea? (Correct answer: Yes)  3. Does one pound weigh more than two? (Correct answer: No)  4. Can you use a hammer to pound a nail? (Correct answer: Yes)  Count each time he or she gives a wrong answer. If he or she has 2 or more errors, and they could previously answer these questions, the person may have delirium.  When to Get Medical Help  If someone is showing signs of delirium, call his or her health care provider right away. Or call 911 or your local emergency number. Tell the health care provider or emergency services person about the signs of delirium you have seen.  © 9213-7892 [employer]. [address] [place] [address], [place] [address]. All rights reserved. This information is not intended as a substitute for professional medical care. Always follow your healthcare professional's instructions.

## 2019-10-10 ENCOUNTER — PATIENT MESSAGE (OUTPATIENT)
Dept: INTERNAL MEDICINE | Facility: CLINIC | Age: 64
End: 2019-10-10

## 2019-10-10 ENCOUNTER — TELEPHONE (OUTPATIENT)
Dept: INTERNAL MEDICINE | Facility: CLINIC | Age: 64
End: 2019-10-10

## 2019-10-10 NOTE — TELEPHONE ENCOUNTER
----- Message from Conchita Scott sent at 10/10/2019  1:12 PM CDT -----  Contact: Pt request via [facility]   Message     Appointment Request From: Mirza Morales    With Provider: Jud Mo MD [[facility] - Internal Medicine]    Preferred Date Range: 10/14/2019 - 10/18/2019    Preferred Times: Monday Afternoon, Tuesday Afternoon, Wednesday Afternoon, Thursday Afternoon, Friday Afternoon    Reason for visit: Existing Patient    Comments:  Follow-up visit after hospital stay. Staples need to be removed from head gash. Please schedule with nurse practitioner if she is available.

## 2019-10-13 RX ORDER — HYDROXYZINE PAMOATE 50 MG/1
CAPSULE ORAL
Qty: 30 CAPSULE | Refills: 3 | OUTPATIENT
Start: 2019-10-13

## 2019-10-16 ENCOUNTER — OFFICE VISIT (OUTPATIENT)
Dept: INTERNAL MEDICINE | Facility: CLINIC | Age: 64
End: 2019-10-16
Payer: COMMERCIAL

## 2019-10-16 ENCOUNTER — IMMUNIZATION (OUTPATIENT)
Dept: INTERNAL MEDICINE | Facility: CLINIC | Age: 64
End: 2019-10-16
Payer: COMMERCIAL

## 2019-10-16 VITALS
HEIGHT: 77 IN | HEART RATE: 72 BPM | SYSTOLIC BLOOD PRESSURE: 126 MMHG | BODY MASS INDEX: 24.18 KG/M2 | OXYGEN SATURATION: 99 % | WEIGHT: 204.81 LBS | DIASTOLIC BLOOD PRESSURE: 84 MMHG

## 2019-10-16 DIAGNOSIS — S01.01XD LACERATION OF SCALP WITHOUT FOREIGN BODY, SUBSEQUENT ENCOUNTER: Primary | ICD-10-CM

## 2019-10-16 DIAGNOSIS — L30.8 HERPES ZOSTER DERMATITIS: ICD-10-CM

## 2019-10-16 DIAGNOSIS — R56.9 WITNESSED SEIZURE-LIKE ACTIVITY: ICD-10-CM

## 2019-10-16 DIAGNOSIS — D63.8 ANEMIA OF CHRONIC DISEASE: ICD-10-CM

## 2019-10-16 DIAGNOSIS — B02.8 HERPES ZOSTER DERMATITIS: ICD-10-CM

## 2019-10-16 DIAGNOSIS — F31.9 BIPOLAR I DISORDER: ICD-10-CM

## 2019-10-16 PROCEDURE — 90471 FLU VACCINE (QUAD) GREATER THAN OR EQUAL TO 3YO PRESERVATIVE FREE IM: ICD-10-PCS | Mod: S$GLB,,, | Performed by: NURSE PRACTITIONER

## 2019-10-16 PROCEDURE — 99999 PR PBB SHADOW E&M-EST. PATIENT-LVL III: ICD-10-PCS | Mod: PBBFAC,,, | Performed by: NURSE PRACTITIONER

## 2019-10-16 PROCEDURE — 90686 FLU VACCINE (QUAD) GREATER THAN OR EQUAL TO 3YO PRESERVATIVE FREE IM: ICD-10-PCS | Mod: S$GLB,,, | Performed by: NURSE PRACTITIONER

## 2019-10-16 PROCEDURE — 99214 OFFICE O/P EST MOD 30 MIN: CPT | Mod: 25,S$GLB,, | Performed by: NURSE PRACTITIONER

## 2019-10-16 PROCEDURE — 3008F BODY MASS INDEX DOCD: CPT | Mod: CPTII,S$GLB,, | Performed by: NURSE PRACTITIONER

## 2019-10-16 PROCEDURE — 90471 IMMUNIZATION ADMIN: CPT | Mod: S$GLB,,, | Performed by: NURSE PRACTITIONER

## 2019-10-16 PROCEDURE — 99214 PR OFFICE/OUTPT VISIT, EST, LEVL IV, 30-39 MIN: ICD-10-PCS | Mod: 25,S$GLB,, | Performed by: NURSE PRACTITIONER

## 2019-10-16 PROCEDURE — 90686 IIV4 VACC NO PRSV 0.5 ML IM: CPT | Mod: S$GLB,,, | Performed by: NURSE PRACTITIONER

## 2019-10-16 PROCEDURE — 99999 PR PBB SHADOW E&M-EST. PATIENT-LVL III: CPT | Mod: PBBFAC,,, | Performed by: NURSE PRACTITIONER

## 2019-10-16 PROCEDURE — 3008F PR BODY MASS INDEX (BMI) DOCUMENTED: ICD-10-PCS | Mod: CPTII,S$GLB,, | Performed by: NURSE PRACTITIONER

## 2019-10-16 NOTE — PROGRESS NOTES
INTERNAL MEDICINE PROGRESS NOTE    CHIEF COMPLAINT     Chief Complaint   Patient presents with    Suture / Staple Removal       HPI     Mirza Morales is a 64 y.o. male male with HLD, sz d/o, bipolar d/o c anxiety, hx EtOH dependence c hx withdrawal in the past, hx opiate overdose, lumbar radiculopathy c hx lumbar fusion around 4/2018 followed by Dr. Kannan Orona who is managing his pain), tob use, hx R fib fx, hx R clavicular fx 2/2 fal who presents for a follow up visit today.    Seen in the ED for witness seizure 9/29/2019.   Pt is a poor historian but ED notes state: Patient was walking up the stairs and around about the 2nd stair he began to have a seizure and fell hitting his Right parietal scalp.  Patient's wife still states that she arrived around 20 sec after he began seizing and noticed tonic-clonic movements in his upper and lower extremities and his eyes open, however the patient was not responsive.  The episode lasted around 1 min.  The patient began bleeding profusely from a scalp laceration which eventually stopped with pressure.  EMS was called in the patient was transferred to our facility.    The wife states that the patient had been severely depressed for the past 4-5 weeks.  He has not been getting out of bed, has been eating less, and refusing to go to his scheduled medical appointments.  The wife states that for the past few days the patient has been stating that he is going to wean himself off of Depakote because he does not like how it makes him feel and tremors that it causes him.  Patient's wife states that the last 2 days he has also had episodes of waxing waning confusion, including this morning when he was found wandering the neighborhood and brought home by his neighbors    Hospital course:  Patient admitted to observation for seizure like activity. Psychiatry and Neurology consulted. Depakote discontinued. Neurology increased Vimpat to 200 mg BID without further seizure  activity. Patient delirious in hospital.  Infectious work up negative, including blood cultures, respiratory panel, UA, CXR X 2. EEG with nonfocal encephalopathy, negative for epileptic process, consistent with delirium. Doxepin decreased then discontinued with improvement in delerium. Patient with a few episodes of isolated, asymptomatic tachycardia while in hospital. EKG with sinus tach, Telemetry unremarkable. TSH WNL.     Patient discharged on latuda 40 mg and vimpat 200 mg daily. Follow up with PCP between 10/6-10/8 for staple removal and hospital follow up. Follow up with psychiatry in 2-3 weeks.     Delirium:  - Discharged on Latuda 40 mg qhs, and vimpat 200 mg daily  - follow up with psychiatry as outpatient     Transient neurological symptoms      Laceration of the scalp   -remove staples in 7-10 days   -CT C spine without evidence of fx/dislocation from fall.  No intracranial bleeding on CTH    Witnessed seizure- like activity -   - discharged on Vimpat 200 mg daily  - follow up with neurology  -today denies seizure like activity  -c/o trouble sleeping - was taking hydroxizine as needed for sleep      Past Medical History:  Past Medical History:   Diagnosis Date    Alcohol abuse     Behavioral problem     Bipolar 1 disorder     Chronic right hip pain     Depression     DJD (degenerative joint disease), lumbar 1/27/2015    Fatigue     Hepatitis     pt. denies this    History of psychiatric care     History of psychiatric hospitalization     Hypertension     Karina     Post traumatic stress disorder     Psychiatric problem     Seizures     Suicide attempt     Therapy        Home Medications:  Prior to Admission medications    Medication Sig Start Date End Date Taking? Authorizing Provider   albuterol (VENTOLIN HFA) 90 mcg/actuation inhaler Ventolin HFA 90 mcg/actuation aerosol inhaler   Yes Historical Provider, MD   atorvastatin (LIPITOR) 40 MG tablet Take 1 tablet (40 mg total) by mouth once  daily. 11/5/18  Yes Jud Mo MD   hydrOXYzine pamoate (VISTARIL) 50 MG Cap TAKE 1 CAPSULE (50 MG TOTAL) BY MOUTH EVERY EVENING. 8/2/18  Yes Ambrosio Loco MD   lacosamide (VIMPAT) 200 mg Tab tablet Take 1 tablet (200 mg total) by mouth every 12 (twelve) hours. 10/3/19 10/2/20 Yes Hilario Thomas MD   lactulose (CHRONULAC) 20 gram/30 mL Soln TAKE 30 MLS BY MOUTH 2 (TWO) TIMES DAILY AS NEEDED. 10/4/19  Yes Eduarda Calderon NP   lurasidone (LATUDA) 40 mg Tab tablet Take 1 tablet (40 mg total) by mouth Daily. 10/3/19 10/2/20 Yes Shanon Wade PA-C   meloxicam (MOBIC) 7.5 MG tablet Take 1 tablet (7.5 mg total) by mouth once daily. 10/4/19  Yes Eduarda Calderon NP   multivit-min-FA-lycopen-lutein (CENTRUM SILVER ULTRA MEN'S) 300-600-300 mcg Tab Take 1 tablet by mouth once daily at 6am. 7/13/17  Yes Prerna Steward MD   mupirocin (BACTROBAN) 2 % ointment Apply topically 2 (two) times daily. Apply to back region with area of cellulitis/slough 10/3/19  Yes Shanon Wade PA-C   naloxegol (MOVANTIK) 12.5 mg Tab Take 12.5 mg by mouth daily as needed. 10/4/19  Yes Eduarda Calderon NP   pantoprazole (PROTONIX) 40 MG tablet Take 1 tablet (40 mg total) by mouth once daily. 10/25/18  Yes Jud Mo MD   valACYclovir (VALTREX) 1000 MG tablet Take 1 tablet (1,000 mg total) by mouth every 8 (eight) hours. for 7 days 9/9/19 9/16/19  Jud Mo MD       Review of Systems:  Review of Systems   Constitutional: Negative for activity change, appetite change, chills, diaphoresis, fatigue, fever and unexpected weight change.   Respiratory: Negative for cough, shortness of breath and wheezing.    Cardiovascular: Negative for chest pain and palpitations.   Gastrointestinal: Negative for abdominal pain, constipation, diarrhea, nausea and vomiting.   Skin: Positive for wound. Negative for rash.   Neurological: Positive for tremors. Negative for seizures and weakness.   Psychiatric/Behavioral: Positive for sleep  "disturbance.       Health Maintainence:   Immunizations:  Health Maintenance       Date Due Completion Date    Shingles Vaccine (1 of 2) 04/22/2006 ---    Influenza Vaccine (1) 09/01/2019 12/4/2018    Lipid Panel 08/08/2023 8/8/2018    TETANUS VACCINE 08/08/2028 8/8/2018    Colonoscopy 08/14/2029 8/14/2019           PHYSICAL EXAM     /84 (BP Location: Right arm, Patient Position: Sitting, BP Method: Large (Manual))   Pulse 72   Ht 6' 5" (1.956 m)   Wt 92.9 kg (204 lb 12.9 oz)   SpO2 99%   BMI 24.29 kg/m²     Physical Exam   Constitutional: He is oriented to person, place, and time. He appears well-developed and well-nourished.   HENT:   Head: Normocephalic. Head is with laceration.       Eyes: Pupils are equal, round, and reactive to light.   Cardiovascular: Normal rate and regular rhythm.   Pulmonary/Chest: Effort normal.   Neurological: He is alert and oriented to person, place, and time.   Psychiatric: He has a normal mood and affect.   Vitals reviewed.      LABS     Lab Results   Component Value Date    HGBA1C 4.8 07/24/2019     CMP  Sodium   Date Value Ref Range Status   10/03/2019 134 (L) 136 - 145 mmol/L Final     Potassium   Date Value Ref Range Status   10/03/2019 4.5 3.5 - 5.1 mmol/L Final     Chloride   Date Value Ref Range Status   10/03/2019 99 95 - 110 mmol/L Final     CO2   Date Value Ref Range Status   10/03/2019 23 23 - 29 mmol/L Final     Glucose   Date Value Ref Range Status   10/03/2019 123 (H) 70 - 110 mg/dL Final     BUN, Bld   Date Value Ref Range Status   10/03/2019 11 8 - 23 mg/dL Final     Creatinine   Date Value Ref Range Status   10/03/2019 0.8 0.5 - 1.4 mg/dL Final     Calcium   Date Value Ref Range Status   10/03/2019 9.6 8.7 - 10.5 mg/dL Final     Total Protein   Date Value Ref Range Status   09/29/2019 7.5 6.0 - 8.4 g/dL Final     Albumin   Date Value Ref Range Status   09/29/2019 3.5 3.5 - 5.2 g/dL Final     Total Bilirubin   Date Value Ref Range Status   09/29/2019 0.5 " 0.1 - 1.0 mg/dL Final     Comment:     For infants and newborns, interpretation of results should be based  on gestational age, weight and in agreement with clinical  observations.  Premature Infant recommended reference ranges:  Up to 24 hours.............<8.0 mg/dL  Up to 48 hours............<12.0 mg/dL  3-5 days..................<15.0 mg/dL  6-29 days.................<15.0 mg/dL       Alkaline Phosphatase   Date Value Ref Range Status   09/29/2019 78 55 - 135 U/L Final     AST   Date Value Ref Range Status   09/29/2019 20 10 - 40 U/L Final     ALT   Date Value Ref Range Status   09/29/2019 12 10 - 44 U/L Final     Anion Gap   Date Value Ref Range Status   10/03/2019 12 8 - 16 mmol/L Final     eGFR if    Date Value Ref Range Status   10/03/2019 >60.0 >60 mL/min/1.73 m^2 Final     eGFR if non    Date Value Ref Range Status   10/03/2019 >60.0 >60 mL/min/1.73 m^2 Final     Comment:     Calculation used to obtain the estimated glomerular filtration  rate (eGFR) is the CKD-EPI equation.        Lab Results   Component Value Date    WBC 7.73 10/03/2019    HGB 13.4 (L) 10/03/2019    HCT 39.7 (L) 10/03/2019    MCV 97 10/03/2019     (L) 10/03/2019     Lab Results   Component Value Date    CHOL 126 08/08/2018    CHOL 127 02/22/2017    CHOL 145 05/20/2015     Lab Results   Component Value Date    HDL 42 08/08/2018    HDL 46 02/22/2017    HDL 49 05/20/2015     Lab Results   Component Value Date    LDLCALC 61.4 (L) 08/08/2018    LDLCALC 54.4 (L) 02/22/2017    LDLCALC 57.6 (L) 05/20/2015     Lab Results   Component Value Date    TRIG 113 08/08/2018    TRIG 133 02/22/2017    TRIG 192 (H) 05/20/2015     Lab Results   Component Value Date    CHOLHDL 33.3 08/08/2018    CHOLHDL 36.2 02/22/2017    CHOLHDL 33.8 05/20/2015     Lab Results   Component Value Date    TSH 3.887 10/03/2019       ASSESSMENT/PLAN     Mirza Morales is a 64 y.o. male with  Past Medical History:   Diagnosis Date     Alcohol abuse     Behavioral problem     Bipolar 1 disorder     Chronic right hip pain     Depression     DJD (degenerative joint disease), lumbar 1/27/2015    Fatigue     Hepatitis     pt. denies this    History of psychiatric care     History of psychiatric hospitalization     Hypertension     Karina     Post traumatic stress disorder     Psychiatric problem     Seizures     Suicide attempt     Therapy      Laceration of scalp without foreign body, subsequent encounter- sutures removed. Pt tolerated well. Covered with gauze and tape.     Witnessed seizure-like activity- stable. No new seizure activity. Will cont keppra     Herpes zoster dermatitis- resolved.     Anemia of chronic disease- stable. Will cont multivitamin     Bipolar I disorder- stable. Will cont latuda and needs to f/u with new psych - number given to call and schedule     Insomnia- will cont hydroxyzine as needed       Follow up with PCP    Patient education provided from [facility]. Patient was counseled on when and how to seek emergent care.       Eduarda BECKWITH, BETTY, FNP-c   Department of Internal Medicine - [facility]  3:34 PM

## 2019-10-28 ENCOUNTER — PATIENT MESSAGE (OUTPATIENT)
Dept: NEUROLOGY | Facility: CLINIC | Age: 64
End: 2019-10-28

## 2019-10-29 ENCOUNTER — PATIENT MESSAGE (OUTPATIENT)
Dept: PSYCHIATRY | Facility: CLINIC | Age: 64
End: 2019-10-29

## 2019-10-30 ENCOUNTER — OFFICE VISIT (OUTPATIENT)
Dept: PSYCHIATRY | Facility: CLINIC | Age: 64
End: 2019-10-30
Payer: COMMERCIAL

## 2019-10-30 VITALS
HEART RATE: 110 BPM | DIASTOLIC BLOOD PRESSURE: 78 MMHG | SYSTOLIC BLOOD PRESSURE: 125 MMHG | BODY MASS INDEX: 24.8 KG/M2 | WEIGHT: 209.13 LBS

## 2019-10-30 DIAGNOSIS — F31.9 BIPOLAR 1 DISORDER: Primary | ICD-10-CM

## 2019-10-30 PROCEDURE — 99213 PR OFFICE/OUTPT VISIT, EST, LEVL III, 20-29 MIN: ICD-10-PCS | Mod: S$GLB,,, | Performed by: PSYCHIATRY & NEUROLOGY

## 2019-10-30 PROCEDURE — 99999 PR PBB SHADOW E&M-EST. PATIENT-LVL II: ICD-10-PCS | Mod: PBBFAC,,, | Performed by: PSYCHIATRY & NEUROLOGY

## 2019-10-30 PROCEDURE — 99999 PR PBB SHADOW E&M-EST. PATIENT-LVL II: CPT | Mod: PBBFAC,,, | Performed by: PSYCHIATRY & NEUROLOGY

## 2019-10-30 PROCEDURE — 99213 OFFICE O/P EST LOW 20 MIN: CPT | Mod: S$GLB,,, | Performed by: PSYCHIATRY & NEUROLOGY

## 2019-10-30 RX ORDER — TRAZODONE HYDROCHLORIDE 100 MG/1
TABLET ORAL
Qty: 30 TABLET | Refills: 3 | Status: SHIPPED | OUTPATIENT
Start: 2019-10-30 | End: 2019-12-04 | Stop reason: SDUPTHER

## 2019-10-30 NOTE — PROGRESS NOTES
ESTABLISHED OUTPATIENT VISIT   E/M LEVEL 3: [id number]    ENCOUNTER DATE: 10/30/2019  SITE: [facility]    HISTORY    CHIEF COMPLAINT   Mirza Morales is a 64 y.o. male who presents for follow up of bipolar d/o.    HPI     No alcohol since discharge from hospital. No marijuana. Smoking about 10 cigarettes per day.  Poor sleep.    Pt reports some recent depression, negative thoughts.    ROS   Walking without a cane today.    PAST MEDICAL, FAMILY AND SOCIAL HISTORY: The patient's past medical, family and social history have been reviewed and updated as appropriate within the electronic medical record - see encounter notes    PSYCHOTROPIC MEDICATIONS   Latuda 40 mg in the evening, Hydroxyzine 50 mg at bedtime  Scheduled and PRN Medications     Current Outpatient Medications:     albuterol (VENTOLIN HFA) 90 mcg/actuation inhaler, Ventolin HFA 90 mcg/actuation aerosol inhaler, Disp: , Rfl:     atorvastatin (LIPITOR) 40 MG tablet, Take 1 tablet (40 mg total) by mouth once daily., Disp: 90 tablet, Rfl: 3    hydrOXYzine pamoate (VISTARIL) 50 MG Cap, TAKE 1 CAPSULE (50 MG TOTAL) BY MOUTH EVERY EVENING., Disp: 30 capsule, Rfl: 3    lacosamide (VIMPAT) 200 mg Tab tablet, Take 1 tablet (200 mg total) by mouth every 12 (twelve) hours., Disp: 60 tablet, Rfl: 5    lactulose (CHRONULAC) 20 gram/30 mL Soln, TAKE 30 MLS BY MOUTH 2 (TWO) TIMES DAILY AS NEEDED., Disp: 290 mL, Rfl: 1    lurasidone (LATUDA) 40 mg Tab tablet, Take 1 tablet (40 mg total) by mouth Daily., Disp: 30 tablet, Rfl: 11    meloxicam (MOBIC) 7.5 MG tablet, Take 1 tablet (7.5 mg total) by mouth once daily., Disp: 30 tablet, Rfl: 0    multivit-min-FA-lycopen-lutein (CENTRUM SILVER ULTRA MEN'S) 300-600-300 mcg Tab, Take 1 tablet by mouth once daily at 6am., Disp: , Rfl:     mupirocin (BACTROBAN) 2 % ointment, Apply topically 2 (two) times daily. Apply to back region with area of cellulitis/slough, Disp: 1 Tube, Rfl: 0    naloxegol  (MOVANTIK) 12.5 mg Tab, Take 12.5 mg by mouth daily as needed., Disp: 30 tablet, Rfl: 0    pantoprazole (PROTONIX) 40 MG tablet, Take 1 tablet (40 mg total) by mouth once daily., Disp: 90 tablet, Rfl: 1    valACYclovir (VALTREX) 1000 MG tablet, Take 1 tablet (1,000 mg total) by mouth every 8 (eight) hours. for 7 days, Disp: 21 tablet, Rfl: 0    LABORATORY DATA  No results displayed because visit has over 200 results.        EXAMINATION    /78   Pulse 110   Wt 94.8 kg (209 lb 1.7 oz)   BMI 24.80 kg/m²     CONSTITUTIONAL  General Appearance: well nourished    MUSCULOSKELETAL  Muscle Strength and Tone: normal strength and tone  Abnormal Involuntary Movements: no abnormal movement noted  Gait and Station: normal gait    PSYCHIATRIC   Level of Consciousness: alert  Orientation: grossly intact  Grooming: well groomed  Psychomotor Behavior: no restlessness/agitation  Speech: normal in rate, rhythm and volume  Language: normal vocabulary  Mood: some depression  Affect: full range and appropriate  Thought Process: logical and goal directed  Associations: intact associations  Thought Content: no SI/HI  Memory: grossly intact  Attention: intact to content of interview  Fund of Knowledge: appears adequate  Insight: good  Judgement: good    MEDICAL DECISION MAKING    DIAGNOSES  Bipolar d/o  Alcohol Use[pt. Currently does not desire treatment]    PROBLEM LIST AND MANAGEMENT PLANS    - continue Latuda as above  - discontinue Hydroxyzine  - start Trazodone  mg at bedtime  - rtc 1-3 months    Time with patient: 20 min      Ambrosio Loco

## 2019-11-06 ENCOUNTER — PATIENT MESSAGE (OUTPATIENT)
Dept: PSYCHIATRY | Facility: CLINIC | Age: 64
End: 2019-11-06

## 2019-11-11 ENCOUNTER — OFFICE VISIT (OUTPATIENT)
Dept: NEUROLOGY | Facility: CLINIC | Age: 64
End: 2019-11-11
Payer: COMMERCIAL

## 2019-11-11 VITALS
WEIGHT: 209 LBS | HEIGHT: 77 IN | HEART RATE: 87 BPM | BODY MASS INDEX: 24.68 KG/M2 | SYSTOLIC BLOOD PRESSURE: 144 MMHG | DIASTOLIC BLOOD PRESSURE: 94 MMHG

## 2019-11-11 DIAGNOSIS — G40.909 SEIZURE DISORDER: Primary | ICD-10-CM

## 2019-11-11 PROCEDURE — 3008F BODY MASS INDEX DOCD: CPT | Mod: CPTII,S$GLB,, | Performed by: PSYCHIATRY & NEUROLOGY

## 2019-11-11 PROCEDURE — 99999 PR PBB SHADOW E&M-EST. PATIENT-LVL III: ICD-10-PCS | Mod: PBBFAC,,, | Performed by: PSYCHIATRY & NEUROLOGY

## 2019-11-11 PROCEDURE — 99999 PR PBB SHADOW E&M-EST. PATIENT-LVL III: CPT | Mod: PBBFAC,,, | Performed by: PSYCHIATRY & NEUROLOGY

## 2019-11-11 PROCEDURE — 99215 PR OFFICE/OUTPT VISIT, EST, LEVL V, 40-54 MIN: ICD-10-PCS | Mod: S$GLB,,, | Performed by: PSYCHIATRY & NEUROLOGY

## 2019-11-11 PROCEDURE — 3008F PR BODY MASS INDEX (BMI) DOCUMENTED: ICD-10-PCS | Mod: CPTII,S$GLB,, | Performed by: PSYCHIATRY & NEUROLOGY

## 2019-11-11 PROCEDURE — 99215 OFFICE O/P EST HI 40 MIN: CPT | Mod: S$GLB,,, | Performed by: PSYCHIATRY & NEUROLOGY

## 2019-11-11 RX ORDER — ATORVASTATIN CALCIUM 40 MG/1
TABLET, FILM COATED ORAL
Qty: 90 TABLET | Refills: 3 | Status: SHIPPED | OUTPATIENT
Start: 2019-11-11 | End: 2020-06-17

## 2019-11-11 NOTE — PROGRESS NOTES
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 Employed No    Legal: Involved in criminal litigation Not Asked    Patient has had a drink/used drugs as an eye opener in the AM No   Social History Narrative    Not on file      a) Marital status:                                                     b) Living situation: patient lives with wife, her niece and family   c) Employed/Unemployed/Other: Disabled - worked in corporate security    DRIVING HISTORY:  Currently driving: Yes      LEVEL OF EDUCATION: 2 years of college    SUBSTANCE USE: hx of etoh use    ALLERGIES: Gabapentin; Nardil [phenelzine]; Penicillins; and Lamictal [lamotrigine]     REVIEW OF SYSTEMS:  Review of Systems   Constitutional: Negative for appetite change and fatigue.   HENT: Negative for dental problem and sore throat.    Eyes: Negative for photophobia and visual disturbance.   Respiratory: Negative for cough and shortness of breath.    Cardiovascular: Negative for chest pain and palpitations.   Gastrointestinal: Negative for nausea and vomiting.   Endocrine: Negative for polydipsia and polyuria.   Genitourinary: Negative for frequency and urgency.   Musculoskeletal: Negative for arthralgias and joint swelling.   Skin: Negative for color change and rash.   Allergic/Immunologic: Negative for immunocompromised state.   All other systems reviewed and are  negative.  - except as per hpi    GENERAL EXAMINATION:  There were no vitals taken for this visit.     GENERAL EXAMINATION:  General Appearance:    This is an average built male who appears well.  HEENT: There are no dysmorphic features  Skin: There are no obvious stigmata for neurocutaneous disorders.  Neck: not assessed  Cardiovascular: not assessed  Lungs: not assessed  Abdomen: deferred  Spine: not assessed  Extremities:not assessed     NEUROLOGICAL EXAMINATION:  Mental status: Alert and oriented x 4; pleasant and cooperative with exam  Memory: Recent memory intact, Remote memory intact, Age correct, Month correct  Attention and concentration: intact  Fund of knowledge: adequate  Speech: normal  Dysarthria: No   Eyes: PERRL; EOM intact; No nystagmus.The visual pursuits  were smooth with normal saccadic eye movements.   Fundoscopic Exam: deferred  No facial asymmetry. Intact facial sensation bilaterally.  Hearing was intact bilaterally   Tongue and palate was in the midline; Intact SCM and trapezii bilaterally     Motor examination:  Normal bulk and tone bilaterally. Power: no pronater drift; 5/5 bilaterally symmetric UE/LE  Abnormal movements: mild bilateral hand tremor  Deep tendon reflexes: 2+ bilaterally symmetric UE/LE with flexor plantars  Dysmetria: No     Sensory examination:   - not assessed    Gait:  Normal gait and station; unable able to tandem walk     IMPRESSION:  The patient's history is consistent with:   Seizure disorder  65yo M with hx of seizures x 2014  - most seizures related to etoh use  - last sz: 9/29/19  - maintained on VPA 1000mg q has and Lacosamide 50mg bid     Plan:  - continue LCS 100mg bid  - chk levels   - cautioned abt sudden d/c AED   - discussed importance of compliance    Seizure log  Seizure precautions/restrictions    Plan of care was discussed in detail with patient.    The patient was asked to call me/the clinic 1 week after the test(s) are done to obtain results.    More  than 50% of the time with the patient (as well as family/caregiver(s) was spent on face-to-face counseling about:  1. Diagnosis, plans, prognosis, medications and possible side-effects, risks and benefits of treatment, other alternatives to AEDs.  2. Risks related to continued seizures, status epilepticus, SUDEP, driving restrictions and seizure precautions ( no baths but showers are ok, no swimming unsupervised, no use of heavy machinery, no use of sharp moving objects, avoid heights).   3. Issues related to pregnancy, OCP and breast feeding as it relates to epilepsy (in female patients).  4. The potential of teratogenicity (for female patients) and suicidal risks of anti-epileptic medications.  5.Avoid any activity that compromise patient safety related to seizures.    Questions and concerns raised by the patient and family/care-giver(s) were addressed and they indicated understanding of everything discussed and agreed to plans as above.    Return in 6 months or earlier chana Ann MD, DANIEL(), FACNS, FAPAOLA.  Neurology-Epilepsy.  [facility].

## 2019-11-18 NOTE — ASSESSMENT & PLAN NOTE
63yo M with hx of seizures x 2014  - most seizures related to etoh use  - last sz: 9/29/19  - maintained on VPA 1000mg q has and Lacosamide 50mg bid     Plan:  - continue LCS 100mg bid  - chk levels   - cautioned abt sudden d/c AED   - discussed importance of compliance    Seizure log  Seizure precautions/restrictions    Plan of care was discussed in detail with patient.

## 2019-11-26 ENCOUNTER — TELEPHONE (OUTPATIENT)
Dept: PSYCHIATRY | Facility: CLINIC | Age: 64
End: 2019-11-26

## 2019-11-27 ENCOUNTER — PATIENT MESSAGE (OUTPATIENT)
Dept: PSYCHIATRY | Facility: CLINIC | Age: 64
End: 2019-11-27

## 2019-12-04 RX ORDER — TRAZODONE HYDROCHLORIDE 100 MG/1
TABLET ORAL
Qty: 90 TABLET | Refills: 3 | Status: SHIPPED | OUTPATIENT
Start: 2019-12-04 | End: 2020-01-29

## 2019-12-23 ENCOUNTER — PATIENT MESSAGE (OUTPATIENT)
Dept: PSYCHIATRY | Facility: CLINIC | Age: 64
End: 2019-12-23

## 2019-12-30 ENCOUNTER — TELEPHONE (OUTPATIENT)
Dept: PSYCHIATRY | Facility: CLINIC | Age: 64
End: 2019-12-30

## 2019-12-30 NOTE — TELEPHONE ENCOUNTER
"The pt's wife, Anamaria, called me back and clarified "this has been going on for years. We live about 5 mins from the main ER and I will have an ambulance take him there if i'm concerned about his safety. He's not doing this to end his life right away, it's just the addiction. We've been through it over and over and over again."    I do ask her who is managing the opioids and encourage her to let [facility] know that there is abuse of that medication occurring. She expresses understanding and agrees to relay this information to his "back doctor"-     I will alert  to this interaction, per pt's wife, "I just wanted  aware of the circumstances but he's very clued in to all this with my ."     She expresses gratitude for the call- denies imminent safety concerns but does acknowledge that er visit may be necessary in near future and it is a process with which she is familiar.   "

## 2020-01-05 ENCOUNTER — PATIENT MESSAGE (OUTPATIENT)
Dept: PSYCHIATRY | Facility: CLINIC | Age: 65
End: 2020-01-05

## 2020-01-07 ENCOUNTER — HOSPITAL ENCOUNTER (INPATIENT)
Facility: HOSPITAL | Age: 65
LOS: 7 days | Discharge: PSYCHIATRIC OR SUBSTANCE USE FACILITY | DRG: 897 | End: 2020-01-14
Attending: EMERGENCY MEDICINE | Admitting: INTERNAL MEDICINE
Payer: COMMERCIAL

## 2020-01-07 DIAGNOSIS — R53.81 DEBILITY: ICD-10-CM

## 2020-01-07 DIAGNOSIS — F10.939 ALCOHOL WITHDRAWAL: ICD-10-CM

## 2020-01-07 DIAGNOSIS — F10.930 ALCOHOL WITHDRAWAL SYNDROME WITHOUT COMPLICATION: ICD-10-CM

## 2020-01-07 DIAGNOSIS — G40.909 SEIZURE DISORDER: ICD-10-CM

## 2020-01-07 DIAGNOSIS — F10.920 ALCOHOLIC INTOXICATION WITHOUT COMPLICATION: Primary | ICD-10-CM

## 2020-01-07 DIAGNOSIS — F10.20 ALCOHOL USE DISORDER, SEVERE, DEPENDENCE: Chronic | ICD-10-CM

## 2020-01-07 DIAGNOSIS — F10.939 ALCOHOL WITHDRAWAL SYNDROME WITH COMPLICATION: ICD-10-CM

## 2020-01-07 DIAGNOSIS — F32.2 SEVERE DEPRESSION: ICD-10-CM

## 2020-01-07 LAB
ALBUMIN SERPL BCP-MCNC: 3.5 G/DL (ref 3.5–5.2)
ALP SERPL-CCNC: 115 U/L (ref 55–135)
ALT SERPL W/O P-5'-P-CCNC: 21 U/L (ref 10–44)
AMPHET+METHAMPHET UR QL: NEGATIVE
ANION GAP SERPL CALC-SCNC: 15 MMOL/L (ref 8–16)
APTT BLDCRRT: 23.6 SEC (ref 21–32)
AST SERPL-CCNC: 28 U/L (ref 10–40)
BACTERIA #/AREA URNS AUTO: ABNORMAL /HPF
BARBITURATES UR QL SCN>200 NG/ML: NEGATIVE
BASOPHILS # BLD AUTO: 0.06 K/UL (ref 0–0.2)
BASOPHILS NFR BLD: 0.7 % (ref 0–1.9)
BENZODIAZ UR QL SCN>200 NG/ML: NEGATIVE
BILIRUB SERPL-MCNC: 0.5 MG/DL (ref 0.1–1)
BILIRUB UR QL STRIP: NEGATIVE
BUN SERPL-MCNC: 21 MG/DL (ref 8–23)
BZE UR QL SCN: NEGATIVE
CALCIUM SERPL-MCNC: 9.3 MG/DL (ref 8.7–10.5)
CANNABINOIDS UR QL SCN: NEGATIVE
CHLORIDE SERPL-SCNC: 100 MMOL/L (ref 95–110)
CK SERPL-CCNC: 106 U/L (ref 20–200)
CLARITY UR REFRACT.AUTO: ABNORMAL
CO2 SERPL-SCNC: 21 MMOL/L (ref 23–29)
COLOR UR AUTO: YELLOW
CREAT SERPL-MCNC: 0.8 MG/DL (ref 0.5–1.4)
CREAT UR-MCNC: 157 MG/DL (ref 23–375)
DIFFERENTIAL METHOD: ABNORMAL
EOSINOPHIL # BLD AUTO: 0.1 K/UL (ref 0–0.5)
EOSINOPHIL NFR BLD: 0.7 % (ref 0–8)
ERYTHROCYTE [DISTWIDTH] IN BLOOD BY AUTOMATED COUNT: 11.9 % (ref 11.5–14.5)
EST. GFR  (AFRICAN AMERICAN): >60 ML/MIN/1.73 M^2
EST. GFR  (NON AFRICAN AMERICAN): >60 ML/MIN/1.73 M^2
ESTIMATED AVG GLUCOSE: 91 MG/DL (ref 68–131)
ETHANOL SERPL-MCNC: 196 MG/DL
ETHANOL SERPL-MCNC: 67 MG/DL
GLUCOSE SERPL-MCNC: 101 MG/DL (ref 70–110)
GLUCOSE UR QL STRIP: NEGATIVE
HBA1C MFR BLD HPLC: 4.8 % (ref 4–5.6)
HCT VFR BLD AUTO: 48.6 % (ref 40–54)
HGB BLD-MCNC: 16.8 G/DL (ref 14–18)
HGB UR QL STRIP: ABNORMAL
HYALINE CASTS UR QL AUTO: 12 /LPF
IMM GRANULOCYTES # BLD AUTO: 0.1 K/UL (ref 0–0.04)
IMM GRANULOCYTES NFR BLD AUTO: 1.2 % (ref 0–0.5)
INR PPP: 1.1 (ref 0.8–1.2)
KETONES UR QL STRIP: ABNORMAL
LEUKOCYTE ESTERASE UR QL STRIP: NEGATIVE
LIPASE SERPL-CCNC: 4 U/L (ref 4–60)
LYMPHOCYTES # BLD AUTO: 1.4 K/UL (ref 1–4.8)
LYMPHOCYTES NFR BLD: 16.3 % (ref 18–48)
MCH RBC QN AUTO: 34.3 PG (ref 27–31)
MCHC RBC AUTO-ENTMCNC: 34.6 G/DL (ref 32–36)
MCV RBC AUTO: 99 FL (ref 82–98)
METHADONE UR QL SCN>300 NG/ML: NEGATIVE
MICROSCOPIC COMMENT: ABNORMAL
MONOCYTES # BLD AUTO: 0.4 K/UL (ref 0.3–1)
MONOCYTES NFR BLD: 4.8 % (ref 4–15)
NEUTROPHILS # BLD AUTO: 6.4 K/UL (ref 1.8–7.7)
NEUTROPHILS NFR BLD: 76.3 % (ref 38–73)
NITRITE UR QL STRIP: NEGATIVE
NRBC BLD-RTO: 0 /100 WBC
OPIATES UR QL SCN: NEGATIVE
PCP UR QL SCN>25 NG/ML: NEGATIVE
PH UR STRIP: 5 [PH] (ref 5–8)
PLATELET # BLD AUTO: 188 K/UL (ref 150–350)
PMV BLD AUTO: 9 FL (ref 9.2–12.9)
POTASSIUM SERPL-SCNC: 3.7 MMOL/L (ref 3.5–5.1)
PROCALCITONIN SERPL IA-MCNC: 0.02 NG/ML
PROT SERPL-MCNC: 7.2 G/DL (ref 6–8.4)
PROT UR QL STRIP: ABNORMAL
PROTHROMBIN TIME: 11 SEC (ref 9–12.5)
RBC # BLD AUTO: 4.9 M/UL (ref 4.6–6.2)
RBC #/AREA URNS AUTO: 1 /HPF (ref 0–4)
SODIUM SERPL-SCNC: 136 MMOL/L (ref 136–145)
SP GR UR STRIP: 1.02 (ref 1–1.03)
SQUAMOUS #/AREA URNS AUTO: 0 /HPF
TOXICOLOGY INFORMATION: NORMAL
TROPONIN I SERPL DL<=0.01 NG/ML-MCNC: 0.01 NG/ML (ref 0–0.03)
TSH SERPL DL<=0.005 MIU/L-ACNC: 0.74 UIU/ML (ref 0.4–4)
URN SPEC COLLECT METH UR: ABNORMAL
WBC # BLD AUTO: 8.4 K/UL (ref 3.9–12.7)
WBC #/AREA URNS AUTO: 3 /HPF (ref 0–5)

## 2020-01-07 PROCEDURE — 80307 DRUG TEST PRSMV CHEM ANLYZR: CPT

## 2020-01-07 PROCEDURE — 63600175 PHARM REV CODE 636 W HCPCS: Performed by: EMERGENCY MEDICINE

## 2020-01-07 PROCEDURE — 25000003 PHARM REV CODE 250: Performed by: STUDENT IN AN ORGANIZED HEALTH CARE EDUCATION/TRAINING PROGRAM

## 2020-01-07 PROCEDURE — 84145 PROCALCITONIN (PCT): CPT

## 2020-01-07 PROCEDURE — 99284 EMERGENCY DEPT VISIT MOD MDM: CPT | Mod: ,,, | Performed by: EMERGENCY MEDICINE

## 2020-01-07 PROCEDURE — 96375 TX/PRO/DX INJ NEW DRUG ADDON: CPT

## 2020-01-07 PROCEDURE — 99291 PR CRITICAL CARE, E/M 30-74 MINUTES: ICD-10-PCS | Mod: ,,, | Performed by: EMERGENCY MEDICINE

## 2020-01-07 PROCEDURE — 90791 PSYCH DIAGNOSTIC EVALUATION: CPT | Mod: ,,, | Performed by: NURSE PRACTITIONER

## 2020-01-07 PROCEDURE — 81001 URINALYSIS AUTO W/SCOPE: CPT

## 2020-01-07 PROCEDURE — 83036 HEMOGLOBIN GLYCOSYLATED A1C: CPT

## 2020-01-07 PROCEDURE — 11000001 HC ACUTE MED/SURG PRIVATE ROOM

## 2020-01-07 PROCEDURE — 90791 PR PSYCHIATRIC DIAGNOSTIC EVALUATION: ICD-10-PCS | Mod: ,,, | Performed by: NURSE PRACTITIONER

## 2020-01-07 PROCEDURE — 25000003 PHARM REV CODE 250: Performed by: EMERGENCY MEDICINE

## 2020-01-07 PROCEDURE — 85610 PROTHROMBIN TIME: CPT

## 2020-01-07 PROCEDURE — 93010 EKG 12-LEAD: ICD-10-PCS | Mod: ,,, | Performed by: INTERNAL MEDICINE

## 2020-01-07 PROCEDURE — 80053 COMPREHEN METABOLIC PANEL: CPT

## 2020-01-07 PROCEDURE — 99291 CRITICAL CARE FIRST HOUR: CPT | Mod: 25

## 2020-01-07 PROCEDURE — 93005 ELECTROCARDIOGRAM TRACING: CPT

## 2020-01-07 PROCEDURE — 80074 ACUTE HEPATITIS PANEL: CPT

## 2020-01-07 PROCEDURE — 85025 COMPLETE CBC W/AUTO DIFF WBC: CPT

## 2020-01-07 PROCEDURE — 99284 PR EMERGENCY DEPT VISIT,LEVEL IV: ICD-10-PCS | Mod: ,,, | Performed by: EMERGENCY MEDICINE

## 2020-01-07 PROCEDURE — 80320 DRUG SCREEN QUANTALCOHOLS: CPT | Mod: 91

## 2020-01-07 PROCEDURE — 63600175 PHARM REV CODE 636 W HCPCS: Performed by: STUDENT IN AN ORGANIZED HEALTH CARE EDUCATION/TRAINING PROGRAM

## 2020-01-07 PROCEDURE — 93010 ELECTROCARDIOGRAM REPORT: CPT | Mod: ,,, | Performed by: INTERNAL MEDICINE

## 2020-01-07 PROCEDURE — 96374 THER/PROPH/DIAG INJ IV PUSH: CPT

## 2020-01-07 PROCEDURE — 82550 ASSAY OF CK (CPK): CPT

## 2020-01-07 PROCEDURE — 86703 HIV-1/HIV-2 1 RESULT ANTBDY: CPT

## 2020-01-07 PROCEDURE — 99291 CRITICAL CARE FIRST HOUR: CPT | Mod: ,,, | Performed by: EMERGENCY MEDICINE

## 2020-01-07 PROCEDURE — 80235 DRUG ASSAY LACOSAMIDE: CPT

## 2020-01-07 PROCEDURE — 85730 THROMBOPLASTIN TIME PARTIAL: CPT

## 2020-01-07 PROCEDURE — 96361 HYDRATE IV INFUSION ADD-ON: CPT

## 2020-01-07 PROCEDURE — 84484 ASSAY OF TROPONIN QUANT: CPT

## 2020-01-07 PROCEDURE — 83690 ASSAY OF LIPASE: CPT

## 2020-01-07 PROCEDURE — 84443 ASSAY THYROID STIM HORMONE: CPT

## 2020-01-07 RX ORDER — DIAZEPAM 10 MG/2ML
5 INJECTION INTRAMUSCULAR
Status: COMPLETED | OUTPATIENT
Start: 2020-01-07 | End: 2020-01-07

## 2020-01-07 RX ORDER — DEXMEDETOMIDINE HYDROCHLORIDE 4 UG/ML
0.2 INJECTION, SOLUTION INTRAVENOUS CONTINUOUS
Status: DISCONTINUED | OUTPATIENT
Start: 2020-01-07 | End: 2020-01-07

## 2020-01-07 RX ORDER — ENOXAPARIN SODIUM 100 MG/ML
40 INJECTION SUBCUTANEOUS EVERY 24 HOURS
Status: DISCONTINUED | OUTPATIENT
Start: 2020-01-07 | End: 2020-01-14 | Stop reason: HOSPADM

## 2020-01-07 RX ORDER — FOLIC ACID 1 MG/1
1 TABLET ORAL DAILY
Status: DISCONTINUED | OUTPATIENT
Start: 2020-01-08 | End: 2020-01-14 | Stop reason: HOSPADM

## 2020-01-07 RX ORDER — CHLORDIAZEPOXIDE HYDROCHLORIDE 25 MG/1
50 CAPSULE, GELATIN COATED ORAL
Status: COMPLETED | OUTPATIENT
Start: 2020-01-07 | End: 2020-01-07

## 2020-01-07 RX ORDER — LORAZEPAM 1 MG/1
2 TABLET ORAL EVERY 4 HOURS PRN
Status: DISCONTINUED | OUTPATIENT
Start: 2020-01-07 | End: 2020-01-10

## 2020-01-07 RX ORDER — GLUCAGON 1 MG
1 KIT INJECTION
Status: DISCONTINUED | OUTPATIENT
Start: 2020-01-07 | End: 2020-01-14 | Stop reason: HOSPADM

## 2020-01-07 RX ORDER — LACOSAMIDE 50 MG/1
200 TABLET ORAL EVERY 12 HOURS
Status: DISCONTINUED | OUTPATIENT
Start: 2020-01-07 | End: 2020-01-14 | Stop reason: HOSPADM

## 2020-01-07 RX ORDER — DIAZEPAM 5 MG/1
10 TABLET ORAL EVERY 6 HOURS
Status: DISCONTINUED | OUTPATIENT
Start: 2020-01-07 | End: 2020-01-11

## 2020-01-07 RX ORDER — IPRATROPIUM BROMIDE AND ALBUTEROL SULFATE 2.5; .5 MG/3ML; MG/3ML
3 SOLUTION RESPIRATORY (INHALATION) EVERY 4 HOURS PRN
Status: DISCONTINUED | OUTPATIENT
Start: 2020-01-07 | End: 2020-01-14 | Stop reason: HOSPADM

## 2020-01-07 RX ORDER — PROMETHAZINE HYDROCHLORIDE 25 MG/1
25 TABLET ORAL EVERY 6 HOURS PRN
Status: DISCONTINUED | OUTPATIENT
Start: 2020-01-07 | End: 2020-01-14 | Stop reason: HOSPADM

## 2020-01-07 RX ORDER — SODIUM CHLORIDE 0.9 % (FLUSH) 0.9 %
10 SYRINGE (ML) INJECTION
Status: DISCONTINUED | OUTPATIENT
Start: 2020-01-07 | End: 2020-01-14 | Stop reason: HOSPADM

## 2020-01-07 RX ORDER — IBUPROFEN 200 MG
24 TABLET ORAL
Status: DISCONTINUED | OUTPATIENT
Start: 2020-01-07 | End: 2020-01-14 | Stop reason: HOSPADM

## 2020-01-07 RX ORDER — ACETAMINOPHEN 325 MG/1
650 TABLET ORAL EVERY 4 HOURS PRN
Status: DISCONTINUED | OUTPATIENT
Start: 2020-01-07 | End: 2020-01-14 | Stop reason: HOSPADM

## 2020-01-07 RX ORDER — IBUPROFEN 200 MG
16 TABLET ORAL
Status: DISCONTINUED | OUTPATIENT
Start: 2020-01-07 | End: 2020-01-14 | Stop reason: HOSPADM

## 2020-01-07 RX ORDER — LORAZEPAM 2 MG/ML
2 INJECTION INTRAMUSCULAR EVERY 4 HOURS PRN
Status: DISCONTINUED | OUTPATIENT
Start: 2020-01-07 | End: 2020-01-07

## 2020-01-07 RX ORDER — ONDANSETRON 2 MG/ML
4 INJECTION INTRAMUSCULAR; INTRAVENOUS EVERY 8 HOURS PRN
Status: DISCONTINUED | OUTPATIENT
Start: 2020-01-07 | End: 2020-01-14 | Stop reason: HOSPADM

## 2020-01-07 RX ORDER — ATORVASTATIN CALCIUM 20 MG/1
40 TABLET, FILM COATED ORAL DAILY
Status: DISCONTINUED | OUTPATIENT
Start: 2020-01-08 | End: 2020-01-14 | Stop reason: HOSPADM

## 2020-01-07 RX ORDER — PANTOPRAZOLE SODIUM 40 MG/1
40 TABLET, DELAYED RELEASE ORAL DAILY
Status: DISCONTINUED | OUTPATIENT
Start: 2020-01-08 | End: 2020-01-14 | Stop reason: HOSPADM

## 2020-01-07 RX ORDER — THIAMINE HCL 100 MG
100 TABLET ORAL DAILY
Status: DISCONTINUED | OUTPATIENT
Start: 2020-01-08 | End: 2020-01-14 | Stop reason: HOSPADM

## 2020-01-07 RX ADMIN — SODIUM CHLORIDE 1000 ML: 0.9 INJECTION, SOLUTION INTRAVENOUS at 08:01

## 2020-01-07 RX ADMIN — CHLORDIAZEPOXIDE HYDROCHLORIDE 50 MG: 25 CAPSULE ORAL at 01:01

## 2020-01-07 RX ADMIN — CHLORDIAZEPOXIDE HYDROCHLORIDE 50 MG: 25 CAPSULE ORAL at 03:01

## 2020-01-07 RX ADMIN — DIAZEPAM 5 MG: 5 INJECTION, SOLUTION INTRAMUSCULAR; INTRAVENOUS at 03:01

## 2020-01-07 RX ADMIN — LACOSAMIDE 200 MG: 50 TABLET, FILM COATED ORAL at 12:01

## 2020-01-07 RX ADMIN — DIAZEPAM 10 MG: 5 TABLET ORAL at 07:01

## 2020-01-07 RX ADMIN — DIAZEPAM 5 MG: 5 INJECTION, SOLUTION INTRAMUSCULAR; INTRAVENOUS at 12:01

## 2020-01-07 RX ADMIN — ENOXAPARIN SODIUM 40 MG: 100 INJECTION SUBCUTANEOUS at 07:01

## 2020-01-07 RX ADMIN — LACOSAMIDE 200 MG: 50 TABLET, FILM COATED ORAL at 10:01

## 2020-01-07 NOTE — ED NOTES
Patient still having tremors depsite medications given.  Dr. Morales made aware.  Awaiting further orders

## 2020-01-07 NOTE — CONSULTS
"[facility]  Psychiatry  Consult Note    Patient Name: Mirza Morales  MRN: [id number]   Code Status: Prior  Admission Date: 1/7/2020  Hospital Length of Stay: 0 days  Attending Physician: Scot Morales MD  Primary Care Provider: Jud Mo MD    Current Legal Status: [facility]    Patient information was obtained from patient, spouse/SO and ER records.   Inpatient consult to Psychiatry  Consult performed by: Isabela Jonas NP  Consult ordered by: Scot Morales MD  Reason for consult: severe depression        Subjective:     Principal Problem: alcohol intoxication    Chief Complaint: , "well, I feel very depressed. I cannot go outdoors anymore. I am isolated, I lost my well. I drink to feel better. I am tired of living. I feel it's my time. t if I get depressed enough I would fade away, quit living and just stop functioning and my body would die."    HPI:   Per ED Note:      Alcohol Intoxication        Pt to ER via EMS from home for alcohol intoxication. Unsure who called EMS. Pt is A, A, O x 4. He was able to ambulate to stretcher with assistance at home. He drank a 5th of vodka or more. Hx of alcoholism.       HPI  Mr. Morales is a 64 y.o. male with HTN, chronic pain on oxycodone, seizures (on vimpat), alcoholism, and depression here today with alcohol intoxication. History mildly limited due to alcohol intoxication. States he has been drinking a lot recently because he feels he is at the end of his life. States he does not want to kill himself, but he no longer wants to live. Feels very depressed. States he thinks his dopamine levels are off. Denies fevers, chills, n/v, abd pain, chest pain, SOB, numbness, tingling, weakness, AVH, HI, SI.    On My Interview:  Mr. Morales is a 64 y.o. male with HTN, chronic pain on oxycodone, seizures (on vimpat), alcoholism, and depression  presented to ED with alcohol intoxication. Met with patient while patient was lying in his ED bed. He seemed " "tremulous, shaking all over, very anxious, dysphoric, and restless. He complained of alcohol withdrawal symptoms of nausea, abdominal pain, severe tremors, severe nervousness and diarrhea. He reported feeling very depressed, hopeless and stated that she feels he is at the end of his life. When he was asked if he is having suicidal thoughts he replied, "well, I feel very depressed. I cannot go outdoors anymore. I am isolated, I lost my well. I drink to feel better. I am tired of living. I feel it's my time. t if I get depressed enough I would fade away, quit living and just stop functioning and my body would die." He reported one prior suicide attempt by overdosing on oxycodone but stated it was not intentional. He stated that his current medications of latuda and trazodone are not working, He endorsed depressive symptoms of low mood, amotivation, anhedonia, decreased concentration, feeling of guilt, worthlessness, hopelessness and fatigue. He denied HI/AVH and no paranoia or delusions reported. Per chart he has diagnosis of bipolar but he denied having marielena or hypomania like episodes. He admitted to drinking 2 fifth or vodka daily, duration 30 years but with periods of sobriety, and his\ last use was this morning. He reported history of alcohol withdrawal seizures and Dt's.          Anamaria [phone]  Spoke with Anamaria, patient wife stated "my  has seizures and I am worried about him going through a grand mal seizure and dying. He has been gradually lowing his alcohol intake and he will be getting into seizures and I am afraid he will die. He mentioned a couple of times to me that he said he wants to die in his own bed. I could not let him lie there and die in his own bed. He is getting progressively worse. He sees a psychiatrics is Dr. Loco. I worry about his drinking, having seizures and wanting to die."    Psychiatric Review Of Systems - Is patient experiencing or having changes in:  sleep: " "yes  appetite: yes  weight: yes. Lost 15 lbs in a month  energy/anergy: yes   interest/pleasure/anhedonia: yes  somatic symptoms: yes  anxiety/panic: yes  guilty/hopelessness: yes  concentration: yes  S.I.B.s/risky behavior: no  Irritability: yes  Racing thoughts: yes  Impulsive behaviors: no  Paranoia:no  AVH:no  Suicidal thoughts/plan/intent: no    Past Medical/Surgical History  Past Medical History:   Diagnosis Date    Alcohol abuse     Behavioral problem     Bipolar 1 disorder     Chronic right hip pain     Depression     DJD (degenerative joint disease), lumbar 1/27/2015    Fatigue     Hepatitis     pt. denies this    History of psychiatric care     History of psychiatric hospitalization     Hypertension     Karina     Post traumatic stress disorder     Psychiatric problem     Seizures     Suicide attempt     Therapy      Past Surgical History:   Procedure Laterality Date    COLONOSCOPY N/A 8/14/2019    Procedure: COLONOSCOPY;  Surgeon: Tammy Duval MD;  Location: [facility] ([address] [place] [address]);  Service: Endoscopy;  Laterality: N/A;    HERNIA REPAIR      SPINE SURGERY  11-12-15    LAMINECTOMY/LUMBAR/WARE       Past Psychiatric History:  Previous Medication Trials: yes . Latuda, trazodone  Previous Psychiatric Hospitalizations: yes   Previous Suicide Attempts: yes . One attempt by taking overdosing on oxycodone  History of Violence: no  Outpatient Psychiatrist: yes. Dr Marroquin at [facility]    Social History:  Marital Status:   Children: 2   Employment Status/Info: retired  Education: some college  Special Ed: no  Housing Status: Live at home with his wife  History of phys/sexual abuse: no  Access to gun: no    Substance Abuse History:  Recreational Drugs: denied  Use of Alcohol: He drinks 2 fifths of vodia daily, duration 30 years "on and off", last use was this morning  Tobacco Use: yes. 1ppd  Rehab History: no     Legal History:  Past Charges/Incarcerations: yes, "DWI"   Pending charges: " no     Family Psychiatric History: Mother has depression    Discharge Mental Status Exam  Appearance: age appropriate  Behavior/Cooperation: cooperative, restless and fidgety   Speech: soft, slow  Mood:  anxious  Affect: anxious, dysphoric   Thought Process: goal-directed  Thought Content: suicidal thoughts: (passive-yes)  Orientation:  grossly intact, person, place, situation, time/date, day of week, month of year  Memory: Intact and Registers and recalls 3/3 objects at 3/3 and > 3 minutes  Attention/Concentration:  Decreased   Cognition: grossly intact, fund of knowledge intact and appropriate to age and level of education, 3 of 4 recent presidents, memory > 3 objects at five mins, able to remember recent events- yes and proverbs were abstract  Insight: fair per patient understanding of illness  Judgement: poor per patient current behavior          Hospital Course: No notes on file         Patient History           Medical as of 1/7/2020     Past Medical History     Diagnosis Date Comments Source    Alcohol abuse -- -- Provider    Behavioral problem -- -- Provider    Bipolar 1 disorder -- -- Provider    Chronic right hip pain -- -- Provider    Depression -- -- Provider    DJD (degenerative joint disease), lumbar 1/27/2015 -- Provider    Fatigue -- -- Provider    Hepatitis -- pt. denies this Provider    History of psychiatric care -- -- Provider    History of psychiatric hospitalization -- -- Provider    Hypertension -- -- Provider    Karina -- -- Provider    Post traumatic stress disorder -- -- Provider    Psychiatric problem -- -- Provider    Seizures -- -- Provider    Suicide attempt -- -- Provider    Therapy -- -- Provider          Pertinent Negatives     Diagnosis Date Noted Comments Source    Amblyopia 05/30/2013 -- Provider    Cataract 05/30/2013 -- Provider    Diabetes mellitus 05/30/2013 -- Provider    Diabetic retinopathy 05/30/2013 -- Provider    Glaucoma 05/30/2013 -- Provider    Macular degeneration  05/30/2013 -- Provider    Retinal detachment 05/30/2013 -- Provider    Self-harming behavior 03/11/2013 -- Provider    Strabismus 05/30/2013 -- Provider    Uveitis 05/30/2013 -- Provider                  Surgical as of 1/7/2020     Past Surgical History     Procedure Laterality Date Comments Source    HERNIA REPAIR -- -- -- Provider    SPINE SURGERY -- 11-12-15 LAMINECTOMY/LUMBAR/WARE Provider    COLONOSCOPY N/A 8/14/2019 Procedure: COLONOSCOPY;  Surgeon: Tammy Duval MD;  Location: [address];  Service: Endoscopy;  Laterality: N/A; Provider                  Family as of 1/7/2020     Problem Relation Name Age of Onset Comments Source    Alcohol abuse Mother -- -- -- Provider    Depression Mother -- -- -- Provider    Depression Father -- -- -- Provider    Depression Sister -- -- -- Provider    Suicide Sister -- -- -- Provider    Drug abuse Brother -- -- -- Provider    Anxiety disorder Brother -- -- -- Provider    Bipolar disorder Brother -- -- -- Provider    Depression Brother -- -- -- Provider    Alcohol abuse Maternal Uncle -- -- -- Provider    Alcohol abuse Paternal Uncle -- -- -- Provider    Dementia Maternal Grandmother -- -- -- Provider    Alcohol abuse Cousin -- -- -- Provider    Amblyopia Neg Hx -- -- -- Provider    Blindness Neg Hx -- -- -- Provider    Cancer Neg Hx -- -- -- Provider    Cataracts Neg Hx -- -- -- Provider    Diabetes Neg Hx -- -- -- Provider    Glaucoma Neg Hx -- -- -- Provider    Hypertension Neg Hx -- -- -- Provider    Macular degeneration Neg Hx -- -- -- Provider    Retinal detachment Neg Hx -- -- -- Provider    Strabismus Neg Hx -- -- -- Provider    Stroke Neg Hx -- -- -- Provider    Thyroid disease Neg Hx -- -- -- Provider    Asthma Neg Hx -- -- -- Provider    Emphysema Neg Hx -- -- -- Provider            Tobacco Use as of 1/7/2020     Smoking Status Smoking Start Date Smoking Quit Date Packs/Day Years Used    Current Some Day Smoker -- -- 0.25 10.00    Types Comments  Smokeless Tobacco Status Smokeless Tobacco Quit Date Source    -- -- Never Used -- Provider            Alcohol Use as of 1/7/2020     Alcohol Use Drinks/Week Alcohol/Week Comments Source    No 20 Standard drinks or equivalent 16.7 standard drinks quit 4 years ago Provider    Frequency Standard Drinks Binge Drinking        -- -- --              Drug Use as of 1/7/2020     Drug Use Types Frequency Comments Source    No -- -- -- Provider            Sexual Activity as of 1/7/2020     Sexually Active Birth Control Partners Comments Source    Yes -- Female with wife; monogamous  Provider            Activities of Daily Living as of 1/7/2020     Activities of Daily Living Question Response Comments Source    Caffeine Use: Excessive Not Asked -- Provider    Financial Status: Unemployed No -- Provider    Legal: Other Not Asked -- Provider    Childhood History: Adopted No -- Provider    Financial Status: Other Not Asked -- Provider    Leisure: Exercise Not Asked -- Provider    Childhood History: Early trauma Yes -- Provider    Firearms: Does patient have access to a firearm? Yes -- Provider    Leisure: Fishing Yes -- Provider    Childhood History: Raised by parents No -- Provider    Home situation: homeless No -- Provider    Leisure: Hunting Not Asked -- Provider    Childhood History: Uneventful Not Asked -- Provider    Home situation: lives alone No -- Provider    Leisure: Movie Watching Not Asked -- Provider    Childhood History: Other Not Asked -- Provider    Home situation: lives in group home No -- Provider    Leisure: Shopping Not Asked -- Provider    Education: Unfinished High School No -- Provider    Home situation: lives in nursing home No -- Provider    Leisure: Sports Not Asked -- Provider    Education: High School Graduate Yes -- Provider    Home situation: lives in shelter No -- Provider    Leisure: Time with family Not Asked -- Provider    Education: Unfinished college Yes -- Provider    Home situation: lives  with family No -- Provider     Service No -- Provider    Education: Trade School No -- Provider    Home situation: lives with friends No -- Provider    Spirituality: Active Participation No -- Provider    Education: Associate's Degree No -- Provider    Home situation: lives with significant other No -- Provider    Spirituality: Organized Cheondoism Yes -- Provider    Education: Bachelor's Degree No -- Provider    Home situation: lives with spouse Yes -- Provider    Spirituality: Private Participation Not Asked -- Provider    Education: More than one Bachelor's or Professional No -- Provider    Legal consequences of chemical use Not Asked -- Provider    Patient feels they ought to cut down on drinking/drug use Yes -- Provider    Education: Master's, PhD No -- Provider    Legal: Arrest history Yes -- Provider    Patient annoyed by others criticizing their drinking/drug use No -- Provider    Financial Status: Disabled Yes -- Provider    Legal: Involved in civil litigation Yes -- Provider    Patient has felt bad or guilty about drinking/drug use Yes -- Provider    Financial Status: Employed No -- Provider    Legal: Involved in criminal litigation Not Asked -- Provider    Patient has had a drink/used drugs as an eye opener in the AM No -- Provider            Social Documentation as of 1/7/2020    None           Occupational as of 1/7/2020     Occupation Employer Comments Source    disabilty -- -- Provider            Socioeconomic as of 1/7/2020     Marital Status Spouse Name Number of Children Years Education Education Level Preferred Language Ethnicity Race Source     -- 2 14 -- English /White White Provider    Financial Resource Strain Food Insecurity: Worry Food Insecurity: Inability Transportation Needs: Medical Transportation Needs: Non-medical    -- -- -- -- --            Pertinent History     Question Response Comments    Lives with spouse --    Place in Birth Order 4th --    Lives in home --     Number of Siblings 3 --    Raised by biological parents bio mom and step father    Legal Involvement none --    Childhood Trauma uneventful --    Criminal History of -- DUI- approximately in 2001    Financial Status disabled --    Highest Level of Education unfinished college --    Does patient have access to a firearm? No --     Service No --    Primary Leisure Activity fishing/hunting fish, golf, garden, play chess    Spirituality non-practicing Orthodox        Past Medical History:   Diagnosis Date    Alcohol abuse     Behavioral problem     Bipolar 1 disorder     Chronic right hip pain     Depression     DJD (degenerative joint disease), lumbar 1/27/2015    Fatigue     Hepatitis     pt. denies this    History of psychiatric care     History of psychiatric hospitalization     Hypertension     Karina     Post traumatic stress disorder     Psychiatric problem     Seizures     Suicide attempt     Therapy      Past Surgical History:   Procedure Laterality Date    COLONOSCOPY N/A 8/14/2019    Procedure: COLONOSCOPY;  Surgeon: Tammy Duval MD;  Location: [facility] ([address] [place] [address]);  Service: Endoscopy;  Laterality: N/A;    HERNIA REPAIR      SPINE SURGERY  11-12-15    LAMINECTOMY/LUMBAR/WARE     Family History     Problem Relation (Age of Onset)    Alcohol abuse Mother, Maternal Uncle, Paternal Uncle, Cousin    Anxiety disorder Brother    Bipolar disorder Brother    Dementia Maternal Grandmother    Depression Mother, Father, Sister, Brother    Drug abuse Brother    Suicide Sister        Tobacco Use    Smoking status: Current Some Day Smoker     Packs/day: 0.25     Years: 10.00     Pack years: 2.50    Smokeless tobacco: Never Used   Substance and Sexual Activity    Alcohol use: No     Alcohol/week: 16.7 standard drinks     Types: 20 Standard drinks or equivalent per week     Comment: quit 4 years ago    Drug use: No    Sexual activity: Yes     Partners: Female     Comment: with wife;  monogamous      Review of patient's allergies indicates:   Allergen Reactions    Gabapentin Other (See Comments)     SEVERE DIZZINESS AND BALANCE PROBLEMS, UNABLE TO WALK.    Nardil [phenelzine]      BECOMES HYPER, LIKE A MANIC EPISODE.    Penicillins Hives    Lamictal [lamotrigine] Rash       No current facility-administered medications on file prior to encounter.      Current Outpatient Medications on File Prior to Encounter   Medication Sig    albuterol (VENTOLIN HFA) 90 mcg/actuation inhaler Ventolin HFA 90 mcg/actuation aerosol inhaler    atorvastatin (LIPITOR) 40 MG tablet TAKE 1 TABLET BY MOUTH EVERY DAY    lacosamide (VIMPAT) 200 mg Tab tablet Take 1 tablet (200 mg total) by mouth every 12 (twelve) hours.    lactulose (CHRONULAC) 20 gram/30 mL Soln TAKE 30 MLS BY MOUTH 2 (TWO) TIMES DAILY AS NEEDED.    lurasidone (LATUDA) 40 mg Tab tablet Take 1 tablet (40 mg total) by mouth Daily.    meloxicam (MOBIC) 7.5 MG tablet Take 1 tablet (7.5 mg total) by mouth once daily.    multivit-min-FA-lycopen-lutein (CENTRUM SILVER ULTRA MEN'S) 300-600-300 mcg Tab Take 1 tablet by mouth once daily at 6am.    mupirocin (BACTROBAN) 2 % ointment Apply topically 2 (two) times daily. Apply to back region with area of cellulitis/slough    naloxegol (MOVANTIK) 12.5 mg Tab Take 12.5 mg by mouth daily as needed.    pantoprazole (PROTONIX) 40 MG tablet Take 1 tablet (40 mg total) by mouth once daily.    traZODone (DESYREL) 100 MG tablet Take 200 or 300 mg at bedtime    valACYclovir (VALTREX) 1000 MG tablet Take 1 tablet (1,000 mg total) by mouth every 8 (eight) hours. for 7 days     Psychotherapeutics (From admission, onward)    Start     Stop Route Frequency Ordered    01/07/20 1500  dexmedetomidine (PRECEDEX) 400mcg/100mL 0.9% NaCL infusion     Question Answer Comment   Titrate by (in mcg/kg/hr): 0.5    Titrate interval: (in minutes) 1    To maintain a RASS goal of: RASS (0) Alert and calm    Maximum dose of (in  "mcg/kg/hr): 5        -- IV Continuous 01/07/20 1444        Review of Systems   Psychiatric/Behavioral: Positive for decreased concentration, dysphoric mood, sleep disturbance and suicidal ideas.     Strengths and Liabilities: Strength: Patient has positive support network., Liability: Patient is impulsive., Liability: Patient has poor health., Liability: Patient has poor judgment, Liability: Patient is unstable., Liability: Patient lacks coping skills.    Objective:     Vital Signs (Most Recent):  Temp: 98.2 °F (36.8 °C) (01/07/20 0650)  Pulse: 104 (01/07/20 1233)  Resp: 20 (01/07/20 1233)  BP: (!) 140/83 (01/07/20 1233)  SpO2: 96 % (01/07/20 1233) Vital Signs (24h Range):  Temp:  [98.2 °F (36.8 °C)] 98.2 °F (36.8 °C)  Pulse:  [104-118] 104  Resp:  [20] 20  SpO2:  [96 %-97 %] 96 %  BP: (140-141)/() 140/83     Height: 6' 5" (195.6 cm)  Weight: 99.8 kg (220 lb)  Body mass index is 26.09 kg/m².      Intake/Output Summary (Last 24 hours) at 1/7/2020 1511  Last data filed at 1/7/2020 1018  Gross per 24 hour   Intake 1000 ml   Output --   Net 1000 ml       Physical Exam   Constitutional: He is oriented to person, place, and time.   Neurological: He is oriented to person, place, and time.     NEUROLOGICAL EXAMINATION:     MENTAL STATUS   Oriented to person, place, and time.   Level of consciousness: alert    Significant Labs: All pertinent labs within the past 24 hours have been reviewed.    Significant Imaging: I have reviewed all pertinent imaging results/findings within the past 24 hours.    Assessment/Plan:     Alcohol withdrawal  1. Dispo/Legal Status:  PEC patient at this time as the pt is a danger to self due to an acute psychiatric illness. Seek inpt bed for pt safety and stabilization when/if medically cleared by the ER MD. Patient is also having alcohol withdrawals and has history of ET OH seizures and DT's and would benefit form alcohol withdrawal protocol and ICU consult  2. Scheduled Medications: Defer " any non-psych meds to the ER MD.  3. PRN Medications: Defer to ER MD for alcohol withdrawal treatment  ALCOHOL/BENZO WITHDRAWAL PRECAUTIONS  Valium 10 mg q 6 hours with plan to taper and d/c   Ativan 2 mg q4 hours prn SBP> 160, DBP>105 and HR> 110. Notify primary MD.  Vital signs q4 hours  Thiamine, Folic acid, Vit B12 and Multivitamin supplementation  .4. Precautions/Nursing: suicide and seizure precautions  5. To-Do: Continue to observe pt's behavior while in the ER and will reassess the pt daily until placement is found.                Total Time:  60 minutes      Isabela Jonas NP   Psychiatry  [facility]

## 2020-01-07 NOTE — ED PROVIDER NOTES
Encounter Date: 1/7/2020       History     Chief Complaint   Patient presents with    Alcohol Intoxication     Pt to ER via EMS from home for alcohol intoxication. Unsure who called EMS. Pt is A, A, O x 4. He was able to ambulate to stretcher with assistance at home. He drank a 5th of vodka or more. Hx of alcoholism.      HPI  Mr. Morales is a 64 y.o. male with HTN, chronic pain on oxycodone, seizures (on vimpat), alcoholism, and depression here today with alcohol intoxication. History mildly limited due to alcohol intoxication. States he has been drinking a lot recently because he feels he is at the end of his life. States he does not want to kill himself, but he no longer wants to live. Feels very depressed. States he thinks his dopamine levels are off. Denies fevers, chills, n/v, abd pain, chest pain, SOB, numbness, tingling, weakness, AVH, HI, SI.     Review of patient's allergies indicates:   Allergen Reactions    Gabapentin Other (See Comments)     SEVERE DIZZINESS AND BALANCE PROBLEMS, UNABLE TO WALK.    Nardil [phenelzine]      BECOMES HYPER, LIKE A MANIC EPISODE.    Penicillins Hives    Lamictal [lamotrigine] Rash     Past Medical History:   Diagnosis Date    Alcohol abuse     Behavioral problem     Bipolar 1 disorder     Chronic right hip pain     Depression     DJD (degenerative joint disease), lumbar 1/27/2015    Fatigue     Hepatitis     pt. denies this    History of psychiatric care     History of psychiatric hospitalization     Hypertension     Karina     Post traumatic stress disorder     Psychiatric problem     Seizures     Suicide attempt     Therapy      Past Surgical History:   Procedure Laterality Date    COLONOSCOPY N/A 8/14/2019    Procedure: COLONOSCOPY;  Surgeon: Tammy Duval MD;  Location: [facility] ([address] [place] [address]);  Service: Endoscopy;  Laterality: N/A;    HERNIA REPAIR      SPINE SURGERY  11-12-15    LAMINECTOMY/LUMBAR/WARE     Family History   Problem Relation Age  of Onset    Alcohol abuse Mother     Depression Mother     Depression Father     Depression Sister     Suicide Sister     Drug abuse Brother     Anxiety disorder Brother     Bipolar disorder Brother     Depression Brother     Alcohol abuse Maternal Uncle     Alcohol abuse Paternal Uncle     Dementia Maternal Grandmother     Alcohol abuse Cousin     Amblyopia Neg Hx     Blindness Neg Hx     Cancer Neg Hx     Cataracts Neg Hx     Diabetes Neg Hx     Glaucoma Neg Hx     Hypertension Neg Hx     Macular degeneration Neg Hx     Retinal detachment Neg Hx     Strabismus Neg Hx     Stroke Neg Hx     Thyroid disease Neg Hx     Asthma Neg Hx     Emphysema Neg Hx      Social History     Tobacco Use    Smoking status: Current Some Day Smoker     Packs/day: 0.25     Years: 10.00     Pack years: 2.50    Smokeless tobacco: Never Used   Substance Use Topics    Alcohol use: No     Alcohol/week: 16.7 standard drinks     Types: 20 Standard drinks or equivalent per week     Comment: quit 4 years ago    Drug use: No     Review of Systems   Constitutional: Negative for fatigue and fever.   HENT: Negative for sore throat.    Eyes: Negative for visual disturbance.   Respiratory: Negative for cough and shortness of breath.    Cardiovascular: Negative for chest pain and leg swelling.   Gastrointestinal: Negative for abdominal distention, abdominal pain, nausea and vomiting.   Genitourinary: Negative for dysuria.   Musculoskeletal: Negative for back pain.   Skin: Negative for rash.   Neurological: Negative for weakness.   Hematological: Does not bruise/bleed easily.   Psychiatric/Behavioral: Negative for agitation, hallucinations and suicidal ideas.        +depression and lack of desire to live       Physical Exam     Initial Vitals [01/07/20 0650]   BP Pulse Resp Temp SpO2   (!) 141/100 (!) 118 20 98.2 °F (36.8 °C) 97 %      MAP       --         Physical Exam    Nursing note and vitals reviewed.  Constitutional:  He appears well-developed and well-nourished. He is not diaphoretic. No distress.   HENT:   Head: Normocephalic and atraumatic.   Right Ear: External ear normal.   Left Ear: External ear normal.   Neck: Neck supple.   Cardiovascular: Regular rhythm, normal heart sounds and intact distal pulses. Tachycardia present.    Pulmonary/Chest: Breath sounds normal. No respiratory distress. He has no wheezes. He has no rhonchi. He has no rales.   Abdominal: Soft. He exhibits no distension. There is no tenderness. There is no rebound and no guarding.   Neurological: He is alert and oriented to person, place, and time. GCS score is 15. GCS eye subscore is 4. GCS verbal subscore is 5. GCS motor subscore is 6.   Baseline tremor present. Mildly slurred speech   Skin: Skin is warm. Capillary refill takes less than 2 seconds. No rash noted.   Psychiatric: He is slowed. He exhibits a depressed mood. He expresses no suicidal plans and no homicidal plans.         ED Course   Procedures  Labs Reviewed   CBC W/ AUTO DIFFERENTIAL - Abnormal; Notable for the following components:       Result Value    Mean Corpuscular Volume 99 (*)     Mean Corpuscular Hemoglobin 34.3 (*)     MPV 9.0 (*)     Immature Granulocytes 1.2 (*)     Immature Grans (Abs) 0.10 (*)     Gran% 76.3 (*)     Lymph% 16.3 (*)     All other components within normal limits   COMPREHENSIVE METABOLIC PANEL - Abnormal; Notable for the following components:    CO2 21 (*)     All other components within normal limits   URINALYSIS, REFLEX TO URINE CULTURE - Abnormal; Notable for the following components:    Appearance, UA Hazy (*)     Protein, UA 1+ (*)     Ketones, UA Trace (*)     Occult Blood UA 2+ (*)     All other components within normal limits    Narrative:     Preferred Collection Type->Urine, Clean Catch   ALCOHOL,MEDICAL (ETHANOL) - Abnormal; Notable for the following components:    Alcohol, Medical, Serum 196 (*)     All other components within normal limits    ALCOHOL,MEDICAL (ETHANOL) - Abnormal; Notable for the following components:    Alcohol, Medical, Serum 67 (*)     All other components within normal limits   URINALYSIS MICROSCOPIC - Abnormal; Notable for the following components:    Hyaline Casts, UA 12 (*)     All other components within normal limits    Narrative:     Preferred Collection Type->Urine, Clean Catch   TSH   DRUG SCREEN PANEL, URINE EMERGENCY    Narrative:     Preferred Collection Type->Urine, Clean Catch   ALCOHOL,MEDICAL (ETHANOL)          Imaging Results    None          Medical Decision Making:   History:   Old Medical Records: I decided to obtain old medical records.  Clinical Tests:   Lab Tests: Ordered and Reviewed  ED Management:  Tachycardic. Mildly hypertensive. Here w/ alcohol intoxication. Clinically depressed on exam. Uncertain if he is threat to self as he is clinically intoxicated. No indication for PEC yet, but will monitor. Will obtain CBC, CMP, EtOH, TSH, UA, UDS. Will give 1 L NS.    Labs reviewed; grossly WNL w/o actionable values except elevated EtOH level.    While awaiting repeat labs, pt became tremulous and agitated. Continued to be mildly tachycardic despite 1 L NS. I have clinical concern for alcohol withdrawals despite alcohol intoxication earlier. 5 mg IV valium given.    Discussed with psych who will evaluated.  Continues to be very tremulous additional 5 mg IV valium given as well as PO librium.    Discussed with MICU who will evaluated given persistent need for benzos.    Psych believes pt to be high risk for threat to self harm and recommends enacting PEC, which I completed and signed on chart.    Signed out to Dr. Jordan at shift change to f/u MICU dispo and overall disposition. Patient will need admission for medical clearance prior to transfer for psychiatric treatment.    Other:   I have discussed this case with another health care provider.       <> Summary of the Discussion: Psychiatry, MICU, and oncoming ED  physician.              Attending Attestation:         Attending Critical Care:   Critical Care Times:   ==============================================================  · Total Critical Care Time - exclusive of procedural time: 40 minutes.  ==============================================================  Critical care reasons: alcohol withdrawal requiring numerous doses of IV benzos.   Critical care was time spent personally by me on the following activities: obtaining history from patient or relative, examination of patient, review of old charts, ordering lab, x-rays, and/or EKG, development of treatment plan with patient or relative, ordering and performing treatments and interventions, evaluation of patient's response to treatment, discussion with consultants, interpretation of cardiac measurements and re-evaluation of patient's conition.   Critical Care Condition: potentially life-threatening                             Clinical Impression:       ICD-10-CM ICD-9-CM   1. Alcoholic intoxication without complication F10.920 305.00   2. Alcohol withdrawal syndrome without complication F10.230 291.81   3. Severe depression F32.2 311         Disposition:   Disposition: Admitted  Condition: Key Morales MD  01/07/20 1721

## 2020-01-07 NOTE — SUBJECTIVE & OBJECTIVE
Patient History           Medical as of 1/7/2020     Past Medical History     Diagnosis Date Comments Source    Alcohol abuse -- -- Provider    Behavioral problem -- -- Provider    Bipolar 1 disorder -- -- Provider    Chronic right hip pain -- -- Provider    Depression -- -- Provider    DJD (degenerative joint disease), lumbar 1/27/2015 -- Provider    Fatigue -- -- Provider    Hepatitis -- pt. denies this Provider    History of psychiatric care -- -- Provider    History of psychiatric hospitalization -- -- Provider    Hypertension -- -- Provider    Karina -- -- Provider    Post traumatic stress disorder -- -- Provider    Psychiatric problem -- -- Provider    Seizures -- -- Provider    Suicide attempt -- -- Provider    Therapy -- -- Provider          Pertinent Negatives     Diagnosis Date Noted Comments Source    Amblyopia 05/30/2013 -- Provider    Cataract 05/30/2013 -- Provider    Diabetes mellitus 05/30/2013 -- Provider    Diabetic retinopathy 05/30/2013 -- Provider    Glaucoma 05/30/2013 -- Provider    Macular degeneration 05/30/2013 -- Provider    Retinal detachment 05/30/2013 -- Provider    Self-harming behavior 03/11/2013 -- Provider    Strabismus 05/30/2013 -- Provider    Uveitis 05/30/2013 -- Provider                  Surgical as of 1/7/2020     Past Surgical History     Procedure Laterality Date Comments Source    HERNIA REPAIR -- -- -- Provider    SPINE SURGERY -- 11-12-15 LAMINECTOMY/LUMBAR/WARE Provider    COLONOSCOPY N/A 8/14/2019 Procedure: COLONOSCOPY;  Surgeon: Tammy Duval MD;  Location: [address];  Service: Endoscopy;  Laterality: N/A; Provider                  Family as of 1/7/2020     Problem Relation Name Age of Onset Comments Source    Alcohol abuse Mother -- -- -- Provider    Depression Mother -- -- -- Provider    Depression Father -- -- -- Provider    Depression Sister -- -- -- Provider    Suicide Sister -- -- -- Provider    Drug abuse Brother -- -- -- Provider    Anxiety  disorder Brother -- -- -- Provider    Bipolar disorder Brother -- -- -- Provider    Depression Brother -- -- -- Provider    Alcohol abuse Maternal Uncle -- -- -- Provider    Alcohol abuse Paternal Uncle -- -- -- Provider    Dementia Maternal Grandmother -- -- -- Provider    Alcohol abuse Cousin -- -- -- Provider    Amblyopia Neg Hx -- -- -- Provider    Blindness Neg Hx -- -- -- Provider    Cancer Neg Hx -- -- -- Provider    Cataracts Neg Hx -- -- -- Provider    Diabetes Neg Hx -- -- -- Provider    Glaucoma Neg Hx -- -- -- Provider    Hypertension Neg Hx -- -- -- Provider    Macular degeneration Neg Hx -- -- -- Provider    Retinal detachment Neg Hx -- -- -- Provider    Strabismus Neg Hx -- -- -- Provider    Stroke Neg Hx -- -- -- Provider    Thyroid disease Neg Hx -- -- -- Provider    Asthma Neg Hx -- -- -- Provider    Emphysema Neg Hx -- -- -- Provider            Tobacco Use as of 1/7/2020     Smoking Status Smoking Start Date Smoking Quit Date Packs/Day Years Used    Current Some Day Smoker -- -- 0.25 10.00    Types Comments Smokeless Tobacco Status Smokeless Tobacco Quit Date Source    -- -- Never Used -- Provider            Alcohol Use as of 1/7/2020     Alcohol Use Drinks/Week Alcohol/Week Comments Source    No 20 Standard drinks or equivalent 16.7 standard drinks quit 4 years ago Provider    Frequency Standard Drinks Binge Drinking        -- -- --              Drug Use as of 1/7/2020     Drug Use Types Frequency Comments Source    No -- -- -- Provider            Sexual Activity as of 1/7/2020     Sexually Active Birth Control Partners Comments Source    Yes -- Female with wife; monogamous  Provider            Activities of Daily Living as of 1/7/2020     Activities of Daily Living Question Response Comments Source    Caffeine Use: Excessive Not Asked -- Provider    Financial Status: Unemployed No -- Provider    Legal: Other Not Asked -- Provider    Childhood History: Adopted No -- Provider    Financial Status:  Other Not Asked -- Provider    Leisure: Exercise Not Asked -- Provider    Childhood History: Early trauma Yes -- Provider    Firearms: Does patient have access to a firearm? Yes -- Provider    Leisure: Fishing Yes -- Provider    Childhood History: Raised by parents No -- Provider    Home situation: homeless No -- Provider    Leisure: Hunting Not Asked -- Provider    Childhood History: Uneventful Not Asked -- Provider    Home situation: lives alone No -- Provider    Leisure: Movie Watching Not Asked -- Provider    Childhood History: Other Not Asked -- Provider    Home situation: lives in group home No -- Provider    Leisure: Shopping Not Asked -- Provider    Education: Unfinished High School No -- Provider    Home situation: lives in nursing home No -- Provider    Leisure: Sports Not Asked -- Provider    Education: High School Graduate Yes -- Provider    Home situation: lives in shelter No -- Provider    Leisure: Time with family Not Asked -- Provider    Education: Unfinished college Yes -- Provider    Home situation: lives with family No -- Provider     Service No -- Provider    Education: Trade School No -- Provider    Home situation: lives with friends No -- Provider    Spirituality: Active Participation No -- Provider    Education: Associate's Degree No -- Provider    Home situation: lives with significant other No -- Provider    Spirituality: Organized Restorationist Yes -- Provider    Education: Bachelor's Degree No -- Provider    Home situation: lives with spouse Yes -- Provider    Spirituality: Private Participation Not Asked -- Provider    Education: More than one Bachelor's or Professional No -- Provider    Legal consequences of chemical use Not Asked -- Provider    Patient feels they ought to cut down on drinking/drug use Yes -- Provider    Education: Master's, PhD No -- Provider    Legal: Arrest history Yes -- Provider    Patient annoyed by others criticizing their drinking/drug use No -- Provider     Financial Status: Disabled Yes -- Provider    Legal: Involved in civil litigation Yes -- Provider    Patient has felt bad or guilty about drinking/drug use Yes -- Provider    Financial Status: Employed No -- Provider    Legal: Involved in criminal litigation Not Asked -- Provider    Patient has had a drink/used drugs as an eye opener in the AM No -- Provider            Social Documentation as of 1/7/2020    None           Occupational as of 1/7/2020     Occupation Employer Comments Source    disabilty -- -- Provider            Socioeconomic as of 1/7/2020     Marital Status Spouse Name Number of Children Years Education Education Level Preferred Language Ethnicity Race Source     -- 2 14 -- English /White White Provider    Financial Resource Strain Food Insecurity: Worry Food Insecurity: Inability Transportation Needs: Medical Transportation Needs: Non-medical    -- -- -- -- --            Pertinent History     Question Response Comments    Lives with spouse --    Place in Birth Order 4th --    Lives in home --    Number of Siblings 3 --    Raised by biological parents bio mom and step father    Legal Involvement none --    Childhood Trauma uneventful --    Criminal History of -- DUI- approximately in 2001    Financial Status disabled --    Highest Level of Education unfinished college --    Does patient have access to a firearm? No --     Service No --    Primary Leisure Activity fishing/hunting fish, golf, garden, play cheMinted    Spirituality non-practicing Alevism        Past Medical History:   Diagnosis Date    Alcohol abuse     Behavioral problem     Bipolar 1 disorder     Chronic right hip pain     Depression     DJD (degenerative joint disease), lumbar 1/27/2015    Fatigue     Hepatitis     pt. denies this    History of psychiatric care     History of psychiatric hospitalization     Hypertension     Karina     Post traumatic stress disorder     Psychiatric problem      Seizures     Suicide attempt     Therapy      Past Surgical History:   Procedure Laterality Date    COLONOSCOPY N/A 8/14/2019    Procedure: COLONOSCOPY;  Surgeon: Tammy Duval MD;  Location: [facility] ([address] [place] [address]);  Service: Endoscopy;  Laterality: N/A;    HERNIA REPAIR      SPINE SURGERY  11-12-15    LAMINECTOMY/LUMBAR/WARE     Family History     Problem Relation (Age of Onset)    Alcohol abuse Mother, Maternal Uncle, Paternal Uncle, Cousin    Anxiety disorder Brother    Bipolar disorder Brother    Dementia Maternal Grandmother    Depression Mother, Father, Sister, Brother    Drug abuse Brother    Suicide Sister        Tobacco Use    Smoking status: Current Some Day Smoker     Packs/day: 0.25     Years: 10.00     Pack years: 2.50    Smokeless tobacco: Never Used   Substance and Sexual Activity    Alcohol use: No     Alcohol/week: 16.7 standard drinks     Types: 20 Standard drinks or equivalent per week     Comment: quit 4 years ago    Drug use: No    Sexual activity: Yes     Partners: Female     Comment: with wife; monogamous      Review of patient's allergies indicates:   Allergen Reactions    Gabapentin Other (See Comments)     SEVERE DIZZINESS AND BALANCE PROBLEMS, UNABLE TO WALK.    Nardil [phenelzine]      BECOMES HYPER, LIKE A MANIC EPISODE.    Penicillins Hives    Lamictal [lamotrigine] Rash       No current facility-administered medications on file prior to encounter.      Current Outpatient Medications on File Prior to Encounter   Medication Sig    albuterol (VENTOLIN HFA) 90 mcg/actuation inhaler Ventolin HFA 90 mcg/actuation aerosol inhaler    atorvastatin (LIPITOR) 40 MG tablet TAKE 1 TABLET BY MOUTH EVERY DAY    lacosamide (VIMPAT) 200 mg Tab tablet Take 1 tablet (200 mg total) by mouth every 12 (twelve) hours.    lactulose (CHRONULAC) 20 gram/30 mL Soln TAKE 30 MLS BY MOUTH 2 (TWO) TIMES DAILY AS NEEDED.    lurasidone (LATUDA) 40 mg Tab tablet Take 1 tablet (40 mg total) by  "mouth Daily.    meloxicam (MOBIC) 7.5 MG tablet Take 1 tablet (7.5 mg total) by mouth once daily.    multivit-min-FA-lycopen-lutein (CENTRUM SILVER ULTRA MEN'S) 300-600-300 mcg Tab Take 1 tablet by mouth once daily at 6am.    mupirocin (BACTROBAN) 2 % ointment Apply topically 2 (two) times daily. Apply to back region with area of cellulitis/slough    naloxegol (MOVANTIK) 12.5 mg Tab Take 12.5 mg by mouth daily as needed.    pantoprazole (PROTONIX) 40 MG tablet Take 1 tablet (40 mg total) by mouth once daily.    traZODone (DESYREL) 100 MG tablet Take 200 or 300 mg at bedtime    valACYclovir (VALTREX) 1000 MG tablet Take 1 tablet (1,000 mg total) by mouth every 8 (eight) hours. for 7 days     Psychotherapeutics (From admission, onward)    Start     Stop Route Frequency Ordered    01/07/20 1500  dexmedetomidine (PRECEDEX) 400mcg/100mL 0.9% NaCL infusion     Question Answer Comment   Titrate by (in mcg/kg/hr): 0.5    Titrate interval: (in minutes) 1    To maintain a RASS goal of: RASS (0) Alert and calm    Maximum dose of (in mcg/kg/hr): 5        -- IV Continuous 01/07/20 1444        Review of Systems   Psychiatric/Behavioral: Positive for decreased concentration, dysphoric mood, sleep disturbance and suicidal ideas.     Strengths and Liabilities: Strength: Patient has positive support network., Liability: Patient is impulsive., Liability: Patient has poor health., Liability: Patient has poor judgment, Liability: Patient is unstable., Liability: Patient lacks coping skills.    Objective:     Vital Signs (Most Recent):  Temp: 98.2 °F (36.8 °C) (01/07/20 0650)  Pulse: 104 (01/07/20 1233)  Resp: 20 (01/07/20 1233)  BP: (!) 140/83 (01/07/20 1233)  SpO2: 96 % (01/07/20 1233) Vital Signs (24h Range):  Temp:  [98.2 °F (36.8 °C)] 98.2 °F (36.8 °C)  Pulse:  [104-118] 104  Resp:  [20] 20  SpO2:  [96 %-97 %] 96 %  BP: (140-141)/() 140/83     Height: 6' 5" (195.6 cm)  Weight: 99.8 kg (220 lb)  Body mass index is 26.09 " kg/m².      Intake/Output Summary (Last 24 hours) at 1/7/2020 1511  Last data filed at 1/7/2020 1018  Gross per 24 hour   Intake 1000 ml   Output --   Net 1000 ml       Physical Exam   Constitutional: He is oriented to person, place, and time.   Neurological: He is oriented to person, place, and time.     NEUROLOGICAL EXAMINATION:     MENTAL STATUS   Oriented to person, place, and time.   Level of consciousness: alert    Significant Labs: All pertinent labs within the past 24 hours have been reviewed.    Significant Imaging: I have reviewed all pertinent imaging results/findings within the past 24 hours.

## 2020-01-07 NOTE — ED NOTES
Dr. Morales made aware that patient feels like he is about to have a seizure.  Patient having tremors and sweating profusely. Awaiting further orders

## 2020-01-07 NOTE — ED NOTES
Patient arrived via EMS for ETOH+  Patient states he may have drank a fifth or two of absolut vodka.  States last drink was 0200 today

## 2020-01-07 NOTE — CONSULTS
"Critical Care Consult    Reason for consult: Concern for Etoh withdrawal    HPI: This is a 64 year old white male who presented to the ED at the request of his wife who had been concerned about the frequency and amount of alcohol consumption since the holidays. He states he has been drinking approximately 2 fifths of vodka daily since xmas and last drink was this morning before coming to ED. He states that his reason for increasing etoh consumption is worsening depression symptoms. He denies suicidal ideations, but does not feel life is worth living. He does want to get help for his drinking. In the ED he was noted to be tremulous and was treated with two doses of valium and was also given a can of beer. He is feeling a little better since arriving. He has a notable bilateral hand/wrist tremor, however he states this is not new and has beeen occurring for "sometime". Does have history of etoh withdrawal and associated seziure in the past.     Review of Systems - General ROS: positive for  - fatigue and sleep disturbance  negative for - fever or night sweats  Psychological ROS: positive for - depression and sleep disturbances  negative for - irritability or suicidal ideation  Ophthalmic ROS: negative  Respiratory ROS: no cough, shortness of breath, or wheezing  Cardiovascular ROS: no chest pain or dyspnea on exertion  Gastrointestinal ROS: positive for - diarrhea  negative for - abdominal pain, constipation, hematemesis, melena, nausea/vomiting or swallowing difficulty/pain    Complete 10 system ROS otherwsie negative    On exam:  BP (!) 163/93   Pulse 106   Temp 98.2 °F (36.8 °C) (Oral)   Resp 20   Ht 6' 5" (1.956 m)   Wt 99.8 kg (220 lb)   SpO2 96%   BMI 26.09 kg/m²     GEN: NAD, uncomfortable appearing, looks anxious  EYES: no icterus, horizontal nystagmus noted, PERRLA  NECK: no JVD, no LAD, normal ROM  MOUTH: Poor dentition, no ulcerations or erythema  CHEST: Clear breath sounds bilaterally, No increased " WOB  HEART: Tachycardic, no murmur, good peripheral pulses  ABDOMEN: ND NT NBS  EXT: Warm and well perfused, no edema  NEURO: No focal deficits, PERRLA, nystagmus noted, AAOx4, GCS 15, bilateral hand tremor   PSYCH: Depressed, flat affect    Labs and imaging reviewed.     ETOH 196 --> 67    Urine tox nl    Assessment:    Major depression with concomitant etoh abuse disorder  Acute alcohol intoxication with risk for severe alcohol withdrawal   Likely some mild etoh withdrawal presently  Upper extremity tremor, acute on chronic -- etiology not clear (etoh related? Psych drug related?)    Plan:    Thiamine  Folate  MV  CIWA driven valium dosing  Recommend admission to general medical hamm vs SDU as his mental status and hemodynamics are currently well controlled.   Agree with psych admission once he has been safely detoxed.    Please call with question or concerns or if clinical condition changes    Discussed with staff    Rafael Adrian MD   [employer] [facility] CCM Fellow PGY 6  [phone]

## 2020-01-07 NOTE — HPI
"Per ED Note:      Alcohol Intoxication        Pt to ER via EMS from home for alcohol intoxication. Unsure who called EMS. Pt is A, A, O x 4. He was able to ambulate to stretcher with assistance at home. He drank a 5th of vodka or more. Hx of alcoholism.       HPI  Mr. Morales is a 64 y.o. male with HTN, chronic pain on oxycodone, seizures (on vimpat), alcoholism, and depression here today with alcohol intoxication. History mildly limited due to alcohol intoxication. States he has been drinking a lot recently because he feels he is at the end of his life. States he does not want to kill himself, but he no longer wants to live. Feels very depressed. States he thinks his dopamine levels are off. Denies fevers, chills, n/v, abd pain, chest pain, SOB, numbness, tingling, weakness, AVH, HI, SI.    On My Interview:  Mr. Morales is a 64 y.o. male with HTN, chronic pain on oxycodone, seizures (on vimpat), alcoholism, and depression presented to ED with alcohol intoxication. Met with patient while patient was lying in his ED bed. He seemed tremulous, shaking all over, very anxious, dysphoric, and restless. He complained of alcohol withdrawal symptoms of nausea, abdominal pain, severe tremors, severe nervousness and diarrhea. He reported feeling very depressed, hopeless and stated that she feels he is at the end of his life. When he was asked if he is having suicidal thoughts he replied, "well, I feel very depressed. I cannot go outdoors anymore. I am isolated, I lost my well. I drink to feel better. I am tired of living. I feel it's my time. t if I get depressed enough I would fade away, quit living and just stop functioning and my body would die." He reported one prior suicide attempt by overdosing on oxycodone but stated it was not intentional. He stated that his current medications of latuda and trazodone are not working, He endorsed depressive symptoms of low mood, amotivation, anhedonia, decreased concentration, feeling of " "guilt, worthlessness, hopelessness and fatigue. He denied HI/AVH and no paranoia or delusions reported. Per chart he has diagnosis of bipolar but he denied having marielena or hypomania like episodes. He admitted to drinking 2 fifth or vodka daily, duration 30 years but with periods of sobriety, and his\ last use was this morning. He reported history of alcohol withdrawal seizures and Dt's.          Anamaria [phone]  Spoke with Anamaria, patient wife stated "my  has seizures and I am worried about him going through a grand mal seizure and dying. He has been gradually lowing his alcohol intake and he will be getting into seizures and I am afraid he will die. He mentioned a couple of times to me that he said he wants to die in his own bed. I could not let him lie there and die in his own bed. He is getting progressively worse. He sees a psychiatrics is Dr. Loco. I worry about his drinking, having seizures and wanting to die. I think it is not safe for him to come home and he needs treatment or he would die."    Psychiatric Review Of Systems - Is patient experiencing or having changes in:  sleep: yes  appetite: yes  weight: yes. Lost 15 lbs in a month  energy/anergy: yes   interest/pleasure/anhedonia: yes  somatic symptoms: yes  anxiety/panic: yes  guilty/hopelessness: yes  concentration: yes  S.I.B.s/risky behavior: no  Irritability: yes  Racing thoughts: yes  Impulsive behaviors: no  Paranoia:no  AVH:no  Suicidal thoughts/plan/intent: no    "

## 2020-01-07 NOTE — PROVIDER PROGRESS NOTES - EMERGENCY DEPT.
Encounter Date: 1/7/2020    ED Physician Progress Notes             Patient discussed with Dr. Morales.  Patient is under PEC per psychiatric recommendation.  Had been consulted to the ICU for admission for alcohol withdrawal.  He was evaluated by ICU fellow who feels he is stable for the floor.  At this time the patient is not significantly tachycardic or hypertensive.  No indication for process infusion.  Oral benzos are being given.  Admitted to Hospital Medicine.

## 2020-01-07 NOTE — ED NOTES
Charge nurse made aware of patients withdrawal symptoms and critical care consult.  charge nurse in process of finding patient a monitored bed,

## 2020-01-07 NOTE — ED NOTES
CARE ASSUMED OF PATIENT. ICU MD AT BEDSIDE.     Patient identifiers verified verbally with PATIENT AND ARMBAND and correct for Mirza Morales.    LOC/ APPEARANCE: The patient is AAOx4. Pt is speaking appropriately, speech shakey. Pt remains in hospital gown. Continuous cardiac monitor, cont pulse ox, and auto BP cuff on patient. Pt is clean and well groomed. No JVD visible. Pt updated on POC. Bed low and locked with side rails up x2, call bell in pt reach. Patient being admitted for alcohol withdrawal, last drink this AM.   SKIN: Skin is warm dry and intact, and color is consistent with ethnicity. Capillary refill <3 seconds. No breakdown or brusing visible. Mucus membranes dry, acyanotic.  RESPIRATORY: Airway is open and patent. Respirations-spontaneous, unlabored, regular rate, equal bilaterally on inspiration and expiration. No accessory muscle use noted. Lungs clear to auscultation in all fields bilaterally anterior and posterior.   CARDIAC: Patient tachycardic with . No peripheral edema noted, and patient has no c/o chest pain. Peripheral pulses present equal and strong throughout.  ABDOMEN: Soft and non-tender to palpation with no distention noted.   NEUROLOGIC: Eyes open spontaneously and facial expression symmetrical. Pt behavior appropriate to situation, and pt follows commands. Pt reports sensation present in all extremities when touched with a finger, denies any numbness or tingling. PERRLA  MUSCULOSKELETAL: Spontaneous movement noted to all extremities. Tremors noted to BUE.

## 2020-01-08 LAB
ALBUMIN SERPL BCP-MCNC: 3 G/DL (ref 3.5–5.2)
ALP SERPL-CCNC: 91 U/L (ref 55–135)
ALT SERPL W/O P-5'-P-CCNC: 16 U/L (ref 10–44)
ANION GAP SERPL CALC-SCNC: 10 MMOL/L (ref 8–16)
AST SERPL-CCNC: 20 U/L (ref 10–40)
BASOPHILS # BLD AUTO: 0.03 K/UL (ref 0–0.2)
BASOPHILS NFR BLD: 0.5 % (ref 0–1.9)
BILIRUB SERPL-MCNC: 0.9 MG/DL (ref 0.1–1)
BUN SERPL-MCNC: 17 MG/DL (ref 8–23)
CALCIUM SERPL-MCNC: 8.9 MG/DL (ref 8.7–10.5)
CHLORIDE SERPL-SCNC: 99 MMOL/L (ref 95–110)
CO2 SERPL-SCNC: 24 MMOL/L (ref 23–29)
CREAT SERPL-MCNC: 0.8 MG/DL (ref 0.5–1.4)
DIFFERENTIAL METHOD: ABNORMAL
EOSINOPHIL # BLD AUTO: 0.1 K/UL (ref 0–0.5)
EOSINOPHIL NFR BLD: 2.2 % (ref 0–8)
ERYTHROCYTE [DISTWIDTH] IN BLOOD BY AUTOMATED COUNT: 11.9 % (ref 11.5–14.5)
EST. GFR  (AFRICAN AMERICAN): >60 ML/MIN/1.73 M^2
EST. GFR  (NON AFRICAN AMERICAN): >60 ML/MIN/1.73 M^2
GLUCOSE SERPL-MCNC: 133 MG/DL (ref 70–110)
HAV IGM SERPL QL IA: NEGATIVE
HBV CORE IGM SERPL QL IA: NEGATIVE
HBV SURFACE AG SERPL QL IA: NEGATIVE
HCT VFR BLD AUTO: 40.1 % (ref 40–54)
HCV AB SERPL QL IA: NEGATIVE
HGB BLD-MCNC: 13.8 G/DL (ref 14–18)
HIV 1+2 AB+HIV1 P24 AG SERPL QL IA: NEGATIVE
IMM GRANULOCYTES # BLD AUTO: 0.06 K/UL (ref 0–0.04)
IMM GRANULOCYTES NFR BLD AUTO: 1 % (ref 0–0.5)
LYMPHOCYTES # BLD AUTO: 1.1 K/UL (ref 1–4.8)
LYMPHOCYTES NFR BLD: 18.7 % (ref 18–48)
MAGNESIUM SERPL-MCNC: 1.8 MG/DL (ref 1.6–2.6)
MCH RBC QN AUTO: 34.6 PG (ref 27–31)
MCHC RBC AUTO-ENTMCNC: 34.4 G/DL (ref 32–36)
MCV RBC AUTO: 101 FL (ref 82–98)
MONOCYTES # BLD AUTO: 0.7 K/UL (ref 0.3–1)
MONOCYTES NFR BLD: 12.4 % (ref 4–15)
NEUTROPHILS # BLD AUTO: 3.9 K/UL (ref 1.8–7.7)
NEUTROPHILS NFR BLD: 65.2 % (ref 38–73)
NRBC BLD-RTO: 0 /100 WBC
PHOSPHATE SERPL-MCNC: 2.4 MG/DL (ref 2.7–4.5)
PLATELET # BLD AUTO: 146 K/UL (ref 150–350)
PMV BLD AUTO: 9.6 FL (ref 9.2–12.9)
POCT GLUCOSE: 128 MG/DL (ref 70–110)
POCT GLUCOSE: 130 MG/DL (ref 70–110)
POCT GLUCOSE: 158 MG/DL (ref 70–110)
POTASSIUM SERPL-SCNC: 3.7 MMOL/L (ref 3.5–5.1)
PROT SERPL-MCNC: 5.9 G/DL (ref 6–8.4)
RBC # BLD AUTO: 3.99 M/UL (ref 4.6–6.2)
SODIUM SERPL-SCNC: 133 MMOL/L (ref 136–145)
WBC # BLD AUTO: 5.95 K/UL (ref 3.9–12.7)

## 2020-01-08 PROCEDURE — 11000001 HC ACUTE MED/SURG PRIVATE ROOM

## 2020-01-08 PROCEDURE — 25000003 PHARM REV CODE 250: Performed by: EMERGENCY MEDICINE

## 2020-01-08 PROCEDURE — 99223 PR INITIAL HOSPITAL CARE,LEVL III: ICD-10-PCS | Mod: ,,, | Performed by: INTERNAL MEDICINE

## 2020-01-08 PROCEDURE — 97535 SELF CARE MNGMENT TRAINING: CPT

## 2020-01-08 PROCEDURE — 83735 ASSAY OF MAGNESIUM: CPT

## 2020-01-08 PROCEDURE — S4991 NICOTINE PATCH NONLEGEND: HCPCS | Performed by: STUDENT IN AN ORGANIZED HEALTH CARE EDUCATION/TRAINING PROGRAM

## 2020-01-08 PROCEDURE — 80053 COMPREHEN METABOLIC PANEL: CPT

## 2020-01-08 PROCEDURE — 25000003 PHARM REV CODE 250: Performed by: STUDENT IN AN ORGANIZED HEALTH CARE EDUCATION/TRAINING PROGRAM

## 2020-01-08 PROCEDURE — 84100 ASSAY OF PHOSPHORUS: CPT

## 2020-01-08 PROCEDURE — 97165 OT EVAL LOW COMPLEX 30 MIN: CPT

## 2020-01-08 PROCEDURE — 85025 COMPLETE CBC W/AUTO DIFF WBC: CPT

## 2020-01-08 PROCEDURE — 99223 1ST HOSP IP/OBS HIGH 75: CPT | Mod: ,,, | Performed by: INTERNAL MEDICINE

## 2020-01-08 PROCEDURE — 97116 GAIT TRAINING THERAPY: CPT

## 2020-01-08 PROCEDURE — 97161 PT EVAL LOW COMPLEX 20 MIN: CPT

## 2020-01-08 PROCEDURE — 63600175 PHARM REV CODE 636 W HCPCS: Performed by: STUDENT IN AN ORGANIZED HEALTH CARE EDUCATION/TRAINING PROGRAM

## 2020-01-08 PROCEDURE — 36415 COLL VENOUS BLD VENIPUNCTURE: CPT

## 2020-01-08 RX ORDER — IBUPROFEN 200 MG
1 TABLET ORAL DAILY
Status: DISCONTINUED | OUTPATIENT
Start: 2020-01-09 | End: 2020-01-08

## 2020-01-08 RX ORDER — IBUPROFEN 200 MG
1 TABLET ORAL DAILY
Status: DISCONTINUED | OUTPATIENT
Start: 2020-01-08 | End: 2020-01-14 | Stop reason: HOSPADM

## 2020-01-08 RX ORDER — AMOXICILLIN 250 MG
1 CAPSULE ORAL DAILY
Status: DISCONTINUED | OUTPATIENT
Start: 2020-01-08 | End: 2020-01-14 | Stop reason: HOSPADM

## 2020-01-08 RX ORDER — SODIUM,POTASSIUM PHOSPHATES 280-250MG
2 POWDER IN PACKET (EA) ORAL EVERY 4 HOURS
Status: COMPLETED | OUTPATIENT
Start: 2020-01-08 | End: 2020-01-08

## 2020-01-08 RX ORDER — SENNOSIDES 8.6 MG/1
8.6 TABLET ORAL DAILY
Status: DISCONTINUED | OUTPATIENT
Start: 2020-01-08 | End: 2020-01-08

## 2020-01-08 RX ORDER — OXYCODONE HYDROCHLORIDE 10 MG/1
10 TABLET ORAL DAILY PRN
Status: ON HOLD | COMMUNITY
End: 2020-01-13 | Stop reason: HOSPADM

## 2020-01-08 RX ADMIN — Medication 100 MG: at 09:01

## 2020-01-08 RX ADMIN — ATORVASTATIN CALCIUM 40 MG: 20 TABLET, FILM COATED ORAL at 09:01

## 2020-01-08 RX ADMIN — LORAZEPAM 2 MG: 1 TABLET ORAL at 08:01

## 2020-01-08 RX ADMIN — THERA TABS 1 TABLET: TAB at 09:01

## 2020-01-08 RX ADMIN — DIAZEPAM 10 MG: 5 TABLET ORAL at 05:01

## 2020-01-08 RX ADMIN — PANTOPRAZOLE SODIUM 40 MG: 40 TABLET, DELAYED RELEASE ORAL at 09:01

## 2020-01-08 RX ADMIN — DIAZEPAM 10 MG: 5 TABLET ORAL at 12:01

## 2020-01-08 RX ADMIN — DIAZEPAM 10 MG: 5 TABLET ORAL at 06:01

## 2020-01-08 RX ADMIN — ENOXAPARIN SODIUM 40 MG: 100 INJECTION SUBCUTANEOUS at 04:01

## 2020-01-08 RX ADMIN — LACOSAMIDE 200 MG: 50 TABLET, FILM COATED ORAL at 09:01

## 2020-01-08 RX ADMIN — POTASSIUM & SODIUM PHOSPHATES POWDER PACK 280-160-250 MG 2 PACKET: 280-160-250 PACK at 02:01

## 2020-01-08 RX ADMIN — NICOTINE 1 PATCH: 21 PATCH, EXTENDED RELEASE TRANSDERMAL at 09:01

## 2020-01-08 RX ADMIN — POTASSIUM & SODIUM PHOSPHATES POWDER PACK 280-160-250 MG 2 PACKET: 280-160-250 PACK at 11:01

## 2020-01-08 RX ADMIN — LORAZEPAM 2 MG: 1 TABLET ORAL at 04:01

## 2020-01-08 RX ADMIN — LORAZEPAM 2 MG: 1 TABLET ORAL at 09:01

## 2020-01-08 RX ADMIN — FOLIC ACID 1 MG: 1 TABLET ORAL at 09:01

## 2020-01-08 RX ADMIN — DIAZEPAM 10 MG: 5 TABLET ORAL at 11:01

## 2020-01-08 NOTE — ASSESSMENT & PLAN NOTE
Patient with a history of seizures with alcohol withdrawal    Plan  Check vimpat level  Continue home vimpat

## 2020-01-08 NOTE — PLAN OF CARE
01/08/20 1115   Discharge Assessment   Assessment Type Discharge Planning Assessment   Confirmed/corrected address and phone number on facesheet? Yes   Assessment information obtained from? Patient   Expected Length of Stay (days) 3   Communicated expected length of stay with patient/caregiver yes   Prior to hospitilization cognitive status: Alert/Oriented   Prior to hospitalization functional status: Assistive Equipment   Current cognitive status: Alert/Oriented   Current Functional Status: Needs Assistance   Lives With spouse  (spouse, Anamaria Morales ([phone]))   Able to Return to Prior Arrangements other (see comments)  (unsure)   Is patient able to care for self after discharge? Unable to determine at this time (comments)   Patient's perception of discharge disposition psychiatric hospital   Readmission Within the Last 30 Days no previous admission in last 30 days   Patient currently being followed by outpatient case management? No   Patient currently receives any other outside agency services? No   Equipment Currently Used at Home cane, straight;walker, rolling   Do you have any problems affording any of your prescribed medications? No   Is the patient taking medications as prescribed? yes   Does the patient have transportation home? Yes   Transportation Anticipated family or friend will provide  (spouse)   Does the patient receive services at the [facility]? No   Discharge Plan A Psychiatric hospital   Discharge Plan B Home with family   DME Needed Upon Discharge  other (see comments)  (tbd)   Patient/Family in Agreement with Plan yes     Dx: ETOH withdrawal  Consult: psych & PT/OT  Pharm: [facility]  Hosp f/u appt: Dr. Us (PCP) on 1/29/2020 at 1000    Patient awake & alert in bed with sitter at the bedside when CM rounded. Active PEC order noted. Patient stated that he has been very depressed, is followed by Dr. Loco ([facility] psych), does not feel that his antidepressants are working, & has  been admitted to the [facility] in-pt psych unit in the past.     Will continue to follow.

## 2020-01-08 NOTE — ED NOTES
Pt transported to floor via ED staff, sitter and security x2. NAD noted upon transport. Pt belongings and original PEC with pt.

## 2020-01-08 NOTE — ASSESSMENT & PLAN NOTE
64 y.o. male with HTN, chronic pain on oxycodone, seizures (on vimpat), alcoholism, and depression who presents today after decreasing his alcohol intake. Patient has been binge drinking for months and has a long history of alcohol abuse. He has decreased his intake and is starting to have tremors, anxiety, and depression. Patient is displaying classic signs of alcohol withdrawal. Psych consulted in the ER and recommending hospital medicine admission for withdrawal. Vital signs have been stable    Plan  [facility] following and appreciate recs.   ALCOHOL/BENZO WITHDRAWAL PRECAUTIONS  Valium 10 mg q 6 hours with plan to taper and d/c   Ativan 2 mg q4 hours prn SBP> 160, DBP>105 and HR> 110. Notify primary MD.  Vital signs q4 hours  Thiamine, Folic acid, Vit B12 and Multivitamin supplementation  Patient will have a sitter as well  On wa

## 2020-01-08 NOTE — ED NOTES
Seizure precautions implemented at this time. Pt on cardiac monitor, automatic blood pressure cuff, and pulse oximeter; suction at bedside, side rails up x 2 and padded, head of bed elevated to 75 degrees, call light within reach, bed low and locked.

## 2020-01-08 NOTE — HPI
64 y.o. male with HTN, chronic pain on oxycodone, seizures (on vimpat), alcoholism, and depression here today with alcohol intoxication. Patient has been drinking about a fifth of vodka a day for weeks. According to his wife he has actually lowered his alcohol intake the past few days and is worried about him going through withdrawal. Says he has a history of seizures when he stops drinking. Patient says he has been anxious, having tremors, nausea, and depressed. He has been depressed for quite sometime and now he knows needs help.     In the ED, the patient was evaluated by psych and the ICU. Psyched PEC the patient and want to have him admitted to hospital medicine for alcohol withdrawal. The  ICU evaluated and state the patient is hemodynamically stable on the floor. On evaluation by hospital medicine, patient is lying in bed with his wife at bedside. He is agreeable to come into the hospital for treatment.

## 2020-01-08 NOTE — PLAN OF CARE
Pt turns and repositions independently. No skin breakdown noted. Pt pain and safety monitored q 1-2 hrs this shift . Blood glucose monitored through shift . Bed locked and in lowest position. Rails elevated x 2 . Brakes on. Call light and personal belongings in reach. Sitter in the room Will continue monitor.

## 2020-01-08 NOTE — PT/OT/SLP EVAL
"Occupational Therapy   Evaluation    Name: Mirza Morales  MRN: [id number]  Admitting Diagnosis:  Alcohol withdrawal      Recommendations:     Discharge Recommendations: home  Discharge Equipment Recommendations:  Pt already owns recommended DME   Barriers to discharge:  None    Assessment:     Mirza Morales is a 64 y.o. male with a medical diagnosis of Alcohol withdrawal.  He presents with withdrawal tremors which appear to be limiting his independence with self-care and functional mobility. Performance deficits affecting function: impaired balance, impaired functional mobilty, impaired self care skills, impaired coordination, impaired fine motor.      Rehab Prognosis: Good; patient would benefit from acute skilled OT services to address these deficits and reach maximum level of function.       Plan:     Patient to be seen 3 x/week to address the above listed problems via self-care/home management, therapeutic activities, therapeutic exercises  · Plan of Care Expires: 02/08/20  · Plan of Care Reviewed with: patient    Subjective     "It feels good to sit up straight"     Chief Complaint: Pt complains of impaired balance and coordination during functional mobility, and tremors during self-care tasks.   Patient/Family Comments/goals: Return to independence with functional mobility and ADLs    Occupational Profile:  Living Environment: Pt lives in one story home with wife. He requires 3 steps with bilateral rails to enter home.   Previous level of function: Pt was independent with ADLs prior to hospitalization.   Roles and Routines:    Equipment Used at Home:  walker, rolling, cane, straight  Assistance upon Discharge: Pt may require SBA-CGA upon discharge to ensure safety during functional mobility. He can benefit from the use of RW during his transition home to promote his independence and safety. Pt was educated on compensatory strategies to implement during self-care tasks. Pt demonstrated " understanding of education of strategies provided.     Pain/Comfort:  · Pain Rating 1: 0/10    Patients cultural, spiritual, Taoism conflicts given the current situation: no    Objective:     Communicated with: RN prior to session.  Patient found supine upon OT entry to room.    General Precautions: Standard, fall   Orthopedic Precautions:    Braces:       Occupational Performance:    Bed Mobility:    · Patient completed Supine to Sit with contact guard assistance    Functional Mobility/Transfers:  · Patient completed Sit <> Stand Transfer with contact guard assistance  with  rolling walker   · Patient completed Toilet Transfer Step Transfer technique with minimum assistance with  rolling walker  · Functional Mobility: Pt ambulated from EOB to toilet requiring RW and CGA. Minimal LOB noted at this time.     Activities of Daily Living:  · Feeding:  modified independence with use of compensatory strategies to reduce affects of tremors with use of utensils  · Grooming: contact guard assistance and RW for support while standing at sink    · Bathing: minimum assistance -not formally assessed however pt would require min A due to poor standing balance and tremors  · Upper Body Dressing: stand by assistance for gown, but pt anticipated to have difficulty with buttons and/or pullover due to tremors which are expected to resolve with medical treatment.  · Lower Body Dressing: minimum assistance with donning socks  · Toileting: stand by assistance during hygiene and transfer (standard height toilet)    Cognitive/Visual Perceptual:  · Pt presents with intact cognition and orientation as he was able to follow safety recommendations and simple commands during evaluation. Pt presents with good insight into condition as he voiced need for seated grooming activity as opposed to standing to complete grooming activity.     Physical Exam:  · UE ROM: WFL  · UE Strength: WFL  · UE Fine Motor: Impaired s/p withdrawal tremors      AMPAC 6 Click ADL:  AMPAC Total Score: 20    Treatment & Education:  Upon initial evaluation, pt was assessed on bed mobility, functional mobility, and self-care task performance. Pt required min A-CGA during most functional tasks, s/p incoordination, impaired balance, and weakness. Pt was encouraged to take seated rest breaks and complete self-care tasks in seated position as opposed to standing when needed to maintain safety, increase independence, conserve energy and prevent future injury. Pt demonstrated understanding and compliance of education provided. During seated grooming activity, pt was educated on compensatory strategies to reduce affects of withdrawal tremors and increase fluidity of fine motor movement during this task, and other grooming or feeding tasks. During session, pt benefitted from implementing mentioned compensatory strategies.   Education:    Patient left up in chair with call button in reach    GOALS:   Multidisciplinary Problems     Occupational Therapy Goals        Problem: Occupational Therapy Goal    Goal Priority Disciplines Outcome Interventions   Occupational Therapy Goal     OT, PT/OT     Description:  Goals to be met by: 1/18/2020     Patient will increase functional independence with ADLs by performing:    LE Dressing with Stand-by Assistance in seated position.  Grooming while standing at sink with Stand-by Assistance.   Bathing with Stand-by Assistance.  Toilet transfer to toilet with Contact Guard Assistance.                        History:     Past Medical History:   Diagnosis Date    Alcohol abuse     Behavioral problem     Bipolar 1 disorder     Chronic right hip pain     Depression     DJD (degenerative joint disease), lumbar 1/27/2015    Fatigue     Hepatitis     pt. denies this    History of psychiatric care     History of psychiatric hospitalization     Hypertension     Karina     Post traumatic stress disorder     Psychiatric problem     Seizures      Suicide attempt     Therapy        Past Surgical History:   Procedure Laterality Date    COLONOSCOPY N/A 8/14/2019    Procedure: COLONOSCOPY;  Surgeon: Tammy Duval MD;  Location: [facility] ([address] [place] [address]);  Service: Endoscopy;  Laterality: N/A;    HERNIA REPAIR      SPINE SURGERY  11-12-15    LAMINECTOMY/LUMBAR/WARE       Time Tracking:     OT Date of Treatment: 01/08/20  OT Start Time: 0956  OT Stop Time: 1021  OT Total Time (min): 25 min    Billable Minutes:Evaluation 10  Self Care/Home Management 15    MARK DawsonR  1/8/2020

## 2020-01-08 NOTE — ASSESSMENT & PLAN NOTE
Psych already consulted on the patient. We will help get patient medically stable to allow for psychiatric treatment

## 2020-01-08 NOTE — PLAN OF CARE
No goals set, pt does not require additional acute PT services at this time.     Pt educated on asking medical staff for PT consult if changes in functional status occurs.     - Salomón Bolaños, PT, DPT  1/8/2020

## 2020-01-08 NOTE — H&P
[facility] Hospital Medicine  History & Physical    Patient Name: Mirza Morales  MRN: [id number]  Admission Date: 1/7/2020  Attending Physician: Shona Jacinto MD   Primary Care Provider: Jud Mo MD    [facility] Medicine Team: [facility] 2 Cal Quintero MD     Patient information was obtained from patient, spouse/SO, past medical records and ER records.     Subjective:     Principal Problem:Alcohol withdrawal    Chief Complaint:   Chief Complaint   Patient presents with    Alcohol Intoxication     Pt to ER via EMS from home for alcohol intoxication. Unsure who called EMS. Pt is A, A, O x 4. He was able to ambulate to stretcher with assistance at home. He drank a 5th of vodka or more. Hx of alcoholism.         HPI: 64 y.o. male with HTN, chronic pain on oxycodone, seizures (on vimpat), alcoholism, and depression here today with alcohol intoxication. Patient has been drinking about a fifth of vodka a day for weeks. According to his wife he has actually lowered his alcohol intake the past few days and is worried about him going through withdrawal. Says he has a history of seizures when he stops drinking. Patient says he has been anxious, having tremors, nausea, and depressed. He has been depressed for quite sometime and now he knows needs help.     In the ED, the patient was evaluated by psych and the ICU. Psyched PEC the patient and want to have him admitted to hospital medicine for alcohol withdrawal. The  ICU evaluated and state the patient is hemodynamically stable on the floor. On evaluation by hospital medicine, patient is lying in bed with his wife at bedside. He is agreeable to come into the hospital for treatment.       Past Medical History:   Diagnosis Date    Alcohol abuse     Behavioral problem     Bipolar 1 disorder     Chronic right hip pain     Depression     DJD (degenerative joint disease), lumbar 1/27/2015    Fatigue     Hepatitis     pt. denies this     History of psychiatric care     History of psychiatric hospitalization     Hypertension     Karina     Post traumatic stress disorder     Psychiatric problem     Seizures     Suicide attempt     Therapy        Past Surgical History:   Procedure Laterality Date    COLONOSCOPY N/A 8/14/2019    Procedure: COLONOSCOPY;  Surgeon: Tammy Duval MD;  Location: [facility] ([address] [place] [address]);  Service: Endoscopy;  Laterality: N/A;    HERNIA REPAIR      SPINE SURGERY  11-12-15    LAMINECTOMY/LUMBAR/WARE       Review of patient's allergies indicates:   Allergen Reactions    Gabapentin Other (See Comments)     SEVERE DIZZINESS AND BALANCE PROBLEMS, UNABLE TO WALK.    Nardil [phenelzine]      BECOMES HYPER, LIKE A MANIC EPISODE.    Penicillins Hives    Lamictal [lamotrigine] Rash       No current facility-administered medications on file prior to encounter.      Current Outpatient Medications on File Prior to Encounter   Medication Sig    albuterol (VENTOLIN HFA) 90 mcg/actuation inhaler Ventolin HFA 90 mcg/actuation aerosol inhaler    atorvastatin (LIPITOR) 40 MG tablet TAKE 1 TABLET BY MOUTH EVERY DAY    lacosamide (VIMPAT) 200 mg Tab tablet Take 1 tablet (200 mg total) by mouth every 12 (twelve) hours.    lactulose (CHRONULAC) 20 gram/30 mL Soln TAKE 30 MLS BY MOUTH 2 (TWO) TIMES DAILY AS NEEDED.    lurasidone (LATUDA) 40 mg Tab tablet Take 1 tablet (40 mg total) by mouth Daily.    meloxicam (MOBIC) 7.5 MG tablet Take 1 tablet (7.5 mg total) by mouth once daily.    multivit-min-FA-lycopen-lutein (CENTRUM SILVER ULTRA MEN'S) 300-600-300 mcg Tab Take 1 tablet by mouth once daily at 6am.    mupirocin (BACTROBAN) 2 % ointment Apply topically 2 (two) times daily. Apply to back region with area of cellulitis/slough    naloxegol (MOVANTIK) 12.5 mg Tab Take 12.5 mg by mouth daily as needed.    pantoprazole (PROTONIX) 40 MG tablet Take 1 tablet (40 mg total) by mouth once daily.    traZODone (DESYREL) 100 MG  tablet Take 200 or 300 mg at bedtime    valACYclovir (VALTREX) 1000 MG tablet Take 1 tablet (1,000 mg total) by mouth every 8 (eight) hours. for 7 days     Family History     Problem Relation (Age of Onset)    Alcohol abuse Mother, Maternal Uncle, Paternal Uncle, Cousin    Anxiety disorder Brother    Bipolar disorder Brother    Dementia Maternal Grandmother    Depression Mother, Father, Sister, Brother    Drug abuse Brother    Suicide Sister        Tobacco Use    Smoking status: Current Some Day Smoker     Packs/day: 0.25     Years: 10.00     Pack years: 2.50    Smokeless tobacco: Never Used   Substance and Sexual Activity    Alcohol use: No     Alcohol/week: 16.7 standard drinks     Types: 20 Standard drinks or equivalent per week     Comment: quit 4 years ago    Drug use: No    Sexual activity: Yes     Partners: Female     Comment: with wife; monogamous      Review of Systems   Constitutional: Negative for chills and fever.   HENT: Negative for congestion and sore throat.    Respiratory: Negative for chest tightness and shortness of breath.    Cardiovascular: Negative for chest pain and palpitations.   Gastrointestinal: Positive for abdominal pain and nausea. Negative for vomiting.   Endocrine: Negative for cold intolerance and heat intolerance.   Genitourinary: Negative for difficulty urinating and urgency.   Musculoskeletal: Positive for arthralgias and myalgias.   Skin: Negative for color change and rash.   Neurological: Positive for tremors and light-headedness. Negative for seizures.   Psychiatric/Behavioral: Positive for agitation and confusion. Negative for suicidal ideas. The patient is nervous/anxious.      Objective:     Vital Signs (Most Recent):  Temp: 99 °F (37.2 °C) (01/07/20 1545)  Pulse: 92 (01/07/20 1747)  Resp: 16 (01/07/20 1747)  BP: (!) 148/87 (01/07/20 1747)  SpO2: 97 % (01/07/20 1747) Vital Signs (24h Range):  Temp:  [98.2 °F (36.8 °C)-99 °F (37.2 °C)] 99 °F (37.2 °C)  Pulse:  []  92  Resp:  [15-20] 16  SpO2:  [95 %-97 %] 97 %  BP: (140-164)/() 148/87     Weight: 99.8 kg (220 lb)  Body mass index is 26.09 kg/m².    Physical Exam   Constitutional: He is oriented to person, place, and time. He appears well-developed and well-nourished. He appears distressed.   Patient extremely anxious   HENT:   Head: Normocephalic and atraumatic.   Eyes: EOM are normal.   Neck: Normal range of motion. No JVD present.   Cardiovascular: Normal rate, regular rhythm, normal heart sounds and intact distal pulses. Exam reveals no gallop and no friction rub.   No murmur heard.  Pulmonary/Chest: Effort normal and breath sounds normal. No respiratory distress.   Abdominal: Soft. Bowel sounds are normal. He exhibits no distension. There is no tenderness.   Musculoskeletal: Normal range of motion. He exhibits no edema.   Neurological: He is alert and oriented to person, place, and time. No cranial nerve deficit.   Bilateral tremor in hands. Tongue fasiculations   Skin: Skin is warm and dry. No rash noted.   Psychiatric:   Patient anxious and remorseful        Significant Labs:   Recent Results (from the past 48 hour(s))   CBC auto differential    Collection Time: 01/07/20  8:36 AM   Result Value Ref Range    WBC 8.40 3.90 - 12.70 K/uL    RBC 4.90 4.60 - 6.20 M/uL    Hemoglobin 16.8 14.0 - 18.0 g/dL    Hematocrit 48.6 40.0 - 54.0 %    Mean Corpuscular Volume 99 (H) 82 - 98 fL    Mean Corpuscular Hemoglobin 34.3 (H) 27.0 - 31.0 pg    Mean Corpuscular Hemoglobin Conc 34.6 32.0 - 36.0 g/dL    RDW 11.9 11.5 - 14.5 %    Platelets 188 150 - 350 K/uL    MPV 9.0 (L) 9.2 - 12.9 fL    Immature Granulocytes 1.2 (H) 0.0 - 0.5 %    Gran # (ANC) 6.4 1.8 - 7.7 K/uL    Immature Grans (Abs) 0.10 (H) 0.00 - 0.04 K/uL    Lymph # 1.4 1.0 - 4.8 K/uL    Mono # 0.4 0.3 - 1.0 K/uL    Eos # 0.1 0.0 - 0.5 K/uL    Baso # 0.06 0.00 - 0.20 K/uL    nRBC 0 0 /100 WBC    Gran% 76.3 (H) 38.0 - 73.0 %    Lymph% 16.3 (L) 18.0 - 48.0 %    Mono% 4.8  4.0 - 15.0 %    Eosinophil% 0.7 0.0 - 8.0 %    Basophil% 0.7 0.0 - 1.9 %    Differential Method Automated    Comprehensive metabolic panel    Collection Time: 01/07/20  8:36 AM   Result Value Ref Range    Sodium 136 136 - 145 mmol/L    Potassium 3.7 3.5 - 5.1 mmol/L    Chloride 100 95 - 110 mmol/L    CO2 21 (L) 23 - 29 mmol/L    Glucose 101 70 - 110 mg/dL    BUN, Bld 21 8 - 23 mg/dL    Creatinine 0.8 0.5 - 1.4 mg/dL    Calcium 9.3 8.7 - 10.5 mg/dL    Total Protein 7.2 6.0 - 8.4 g/dL    Albumin 3.5 3.5 - 5.2 g/dL    Total Bilirubin 0.5 0.1 - 1.0 mg/dL    Alkaline Phosphatase 115 55 - 135 U/L    AST 28 10 - 40 U/L    ALT 21 10 - 44 U/L    Anion Gap 15 8 - 16 mmol/L    eGFR if African American >60.0 >60 mL/min/1.73 m^2    eGFR if non African American >60.0 >60 mL/min/1.73 m^2   TSH    Collection Time: 01/07/20  8:36 AM   Result Value Ref Range    TSH 0.743 0.400 - 4.000 uIU/mL   Ethanol    Collection Time: 01/07/20  8:36 AM   Result Value Ref Range    Alcohol, Medical, Serum 196 (H) <10 mg/dL   Urinalysis, Reflex to Urine Culture Urine, Clean Catch    Collection Time: 01/07/20 11:33 AM   Result Value Ref Range    Specimen UA Urine, Clean Catch     Color, UA Yellow Yellow, Straw, Emily    Appearance, UA Hazy (A) Clear    pH, UA 5.0 5.0 - 8.0    Specific Gravity, UA 1.020 1.005 - 1.030    Protein, UA 1+ (A) Negative    Glucose, UA Negative Negative    Ketones, UA Trace (A) Negative    Bilirubin (UA) Negative Negative    Occult Blood UA 2+ (A) Negative    Nitrite, UA Negative Negative    Leukocytes, UA Negative Negative   Drug screen panel, emergency    Collection Time: 01/07/20 11:33 AM   Result Value Ref Range    Benzodiazepines Negative     Methadone metabolites Negative     Cocaine (Metab.) Negative     Opiate Scrn, Ur Negative     Barbiturate Screen, Ur Negative     Amphetamine Screen, Ur Negative     THC Negative     Phencyclidine Negative     Creatinine, Random Ur 157.0 23.0 - 375.0 mg/dL    Toxicology Information  SEE COMMENT    Urinalysis Microscopic    Collection Time: 01/07/20 11:33 AM   Result Value Ref Range    RBC, UA 1 0 - 4 /hpf    WBC, UA 3 0 - 5 /hpf    Bacteria Rare None-Occ /hpf    Squam Epithel, UA 0 /hpf    Hyaline Casts, UA 12 (A) 0-1/lpf /lpf    Microscopic Comment SEE COMMENT    Ethanol    Collection Time: 01/07/20 12:32 PM   Result Value Ref Range    Alcohol, Medical, Serum 67 (H) <10 mg/dL         Significant Imaging:   Imaging Results    None           Assessment/Plan:     * Alcohol withdrawal  64 y.o. male with HTN, chronic pain on oxycodone, seizures (on vimpat), alcoholism, and depression who presents today after decreasing his alcohol intake. Patient has been binge drinking for months and has a long history of alcohol abuse. He has decreased his intake and is starting to have tremors, anxiety, and depression. Patient is displaying classic signs of alcohol withdrawal. Psych consulted in the ER and recommending hospital medicine admission for withdrawal. Vital signs have been stable    Plan  [facility] following and appreciate recs.   ALCOHOL/BENZO WITHDRAWAL PRECAUTIONS  Valium 10 mg q 6 hours with plan to taper and d/c   Ativan 2 mg q4 hours prn SBP> 160, DBP>105 and HR> 110. Notify primary MD.  Vital signs q4 hours  Thiamine, Folic acid, Vit B12 and Multivitamin supplementation  Patient will have a sitter as well  On [facility]      Seizure disorder  Patient with a history of seizures with alcohol withdrawal    Plan  Check vimpat level  Continue home vimpat      Severe depression  Psych already consulted on the patient. We will help get patient medically stable to allow for psychiatric treatment        VTE Risk Mitigation (From admission, onward)         Ordered     enoxaparin injection 40 mg  Daily      01/07/20 1800                   Cal Quintero MD  Department of Hospital Medicine   [facility]

## 2020-01-08 NOTE — ED NOTES
Received report from CHANEL Logan. Assume care of pt. Pt resting in stretcher. NAD. Respirations even and unlabored. No tremors noted. Pt denies needs at this time.

## 2020-01-08 NOTE — SUBJECTIVE & OBJECTIVE
Past Medical History:   Diagnosis Date    Alcohol abuse     Behavioral problem     Bipolar 1 disorder     Chronic right hip pain     Depression     DJD (degenerative joint disease), lumbar 1/27/2015    Fatigue     Hepatitis     pt. denies this    History of psychiatric care     History of psychiatric hospitalization     Hypertension     Karina     Post traumatic stress disorder     Psychiatric problem     Seizures     Suicide attempt     Therapy        Past Surgical History:   Procedure Laterality Date    COLONOSCOPY N/A 8/14/2019    Procedure: COLONOSCOPY;  Surgeon: Tammy Duval MD;  Location: [address]);  Service: Endoscopy;  Laterality: N/A;    HERNIA REPAIR      SPINE SURGERY  11-12-15    LAMINECTOMY/LUMBAR/WARE       Review of patient's allergies indicates:   Allergen Reactions    Gabapentin Other (See Comments)     SEVERE DIZZINESS AND BALANCE PROBLEMS, UNABLE TO WALK.    Nardil [phenelzine]      BECOMES HYPER, LIKE A MANIC EPISODE.    Penicillins Hives    Lamictal [lamotrigine] Rash       No current facility-administered medications on file prior to encounter.      Current Outpatient Medications on File Prior to Encounter   Medication Sig    albuterol (VENTOLIN HFA) 90 mcg/actuation inhaler Ventolin HFA 90 mcg/actuation aerosol inhaler    atorvastatin (LIPITOR) 40 MG tablet TAKE 1 TABLET BY MOUTH EVERY DAY    lacosamide (VIMPAT) 200 mg Tab tablet Take 1 tablet (200 mg total) by mouth every 12 (twelve) hours.    lactulose (CHRONULAC) 20 gram/30 mL Soln TAKE 30 MLS BY MOUTH 2 (TWO) TIMES DAILY AS NEEDED.    lurasidone (LATUDA) 40 mg Tab tablet Take 1 tablet (40 mg total) by mouth Daily.    meloxicam (MOBIC) 7.5 MG tablet Take 1 tablet (7.5 mg total) by mouth once daily.    multivit-min-FA-lycopen-lutein (CENTRUM SILVER ULTRA MEN'S) 300-600-300 mcg Tab Take 1 tablet by mouth once daily at 6am.    mupirocin (BACTROBAN) 2 % ointment Apply topically 2 (two) times  daily. Apply to back region with area of cellulitis/slough    naloxegol (MOVANTIK) 12.5 mg Tab Take 12.5 mg by mouth daily as needed.    pantoprazole (PROTONIX) 40 MG tablet Take 1 tablet (40 mg total) by mouth once daily.    traZODone (DESYREL) 100 MG tablet Take 200 or 300 mg at bedtime    valACYclovir (VALTREX) 1000 MG tablet Take 1 tablet (1,000 mg total) by mouth every 8 (eight) hours. for 7 days     Family History     Problem Relation (Age of Onset)    Alcohol abuse Mother, Maternal Uncle, Paternal Uncle, Cousin    Anxiety disorder Brother    Bipolar disorder Brother    Dementia Maternal Grandmother    Depression Mother, Father, Sister, Brother    Drug abuse Brother    Suicide Sister        Tobacco Use    Smoking status: Current Some Day Smoker     Packs/day: 0.25     Years: 10.00     Pack years: 2.50    Smokeless tobacco: Never Used   Substance and Sexual Activity    Alcohol use: No     Alcohol/week: 16.7 standard drinks     Types: 20 Standard drinks or equivalent per week     Comment: quit 4 years ago    Drug use: No    Sexual activity: Yes     Partners: Female     Comment: with wife; monogamous      Review of Systems   Constitutional: Negative for chills and fever.   HENT: Negative for congestion and sore throat.    Respiratory: Negative for chest tightness and shortness of breath.    Cardiovascular: Negative for chest pain and palpitations.   Gastrointestinal: Positive for abdominal pain and nausea. Negative for vomiting.   Endocrine: Negative for cold intolerance and heat intolerance.   Genitourinary: Negative for difficulty urinating and urgency.   Musculoskeletal: Positive for arthralgias and myalgias.   Skin: Negative for color change and rash.   Neurological: Positive for tremors and light-headedness. Negative for seizures.   Psychiatric/Behavioral: Positive for agitation and confusion. Negative for suicidal ideas. The patient is nervous/anxious.      Objective:     Vital Signs (Most  Recent):  Temp: 99 °F (37.2 °C) (01/07/20 1545)  Pulse: 92 (01/07/20 1747)  Resp: 16 (01/07/20 1747)  BP: (!) 148/87 (01/07/20 1747)  SpO2: 97 % (01/07/20 1747) Vital Signs (24h Range):  Temp:  [98.2 °F (36.8 °C)-99 °F (37.2 °C)] 99 °F (37.2 °C)  Pulse:  [] 92  Resp:  [15-20] 16  SpO2:  [95 %-97 %] 97 %  BP: (140-164)/() 148/87     Weight: 99.8 kg (220 lb)  Body mass index is 26.09 kg/m².    Physical Exam   Constitutional: He is oriented to person, place, and time. He appears well-developed and well-nourished. He appears distressed.   Patient extremely anxious   HENT:   Head: Normocephalic and atraumatic.   Eyes: EOM are normal.   Neck: Normal range of motion. No JVD present.   Cardiovascular: Normal rate, regular rhythm, normal heart sounds and intact distal pulses. Exam reveals no gallop and no friction rub.   No murmur heard.  Pulmonary/Chest: Effort normal and breath sounds normal. No respiratory distress.   Abdominal: Soft. Bowel sounds are normal. He exhibits no distension. There is no tenderness.   Musculoskeletal: Normal range of motion. He exhibits no edema.   Neurological: He is alert and oriented to person, place, and time. No cranial nerve deficit.   Bilateral tremor in hands. Tongue fasiculations   Skin: Skin is warm and dry. No rash noted.   Psychiatric:   Patient anxious and remorseful        Significant Labs:   Recent Results (from the past 48 hour(s))   CBC auto differential    Collection Time: 01/07/20  8:36 AM   Result Value Ref Range    WBC 8.40 3.90 - 12.70 K/uL    RBC 4.90 4.60 - 6.20 M/uL    Hemoglobin 16.8 14.0 - 18.0 g/dL    Hematocrit 48.6 40.0 - 54.0 %    Mean Corpuscular Volume 99 (H) 82 - 98 fL    Mean Corpuscular Hemoglobin 34.3 (H) 27.0 - 31.0 pg    Mean Corpuscular Hemoglobin Conc 34.6 32.0 - 36.0 g/dL    RDW 11.9 11.5 - 14.5 %    Platelets 188 150 - 350 K/uL    MPV 9.0 (L) 9.2 - 12.9 fL    Immature Granulocytes 1.2 (H) 0.0 - 0.5 %    Gran # (ANC) 6.4 1.8 - 7.7 K/uL     Immature Grans (Abs) 0.10 (H) 0.00 - 0.04 K/uL    Lymph # 1.4 1.0 - 4.8 K/uL    Mono # 0.4 0.3 - 1.0 K/uL    Eos # 0.1 0.0 - 0.5 K/uL    Baso # 0.06 0.00 - 0.20 K/uL    nRBC 0 0 /100 WBC    Gran% 76.3 (H) 38.0 - 73.0 %    Lymph% 16.3 (L) 18.0 - 48.0 %    Mono% 4.8 4.0 - 15.0 %    Eosinophil% 0.7 0.0 - 8.0 %    Basophil% 0.7 0.0 - 1.9 %    Differential Method Automated    Comprehensive metabolic panel    Collection Time: 01/07/20  8:36 AM   Result Value Ref Range    Sodium 136 136 - 145 mmol/L    Potassium 3.7 3.5 - 5.1 mmol/L    Chloride 100 95 - 110 mmol/L    CO2 21 (L) 23 - 29 mmol/L    Glucose 101 70 - 110 mg/dL    BUN, Bld 21 8 - 23 mg/dL    Creatinine 0.8 0.5 - 1.4 mg/dL    Calcium 9.3 8.7 - 10.5 mg/dL    Total Protein 7.2 6.0 - 8.4 g/dL    Albumin 3.5 3.5 - 5.2 g/dL    Total Bilirubin 0.5 0.1 - 1.0 mg/dL    Alkaline Phosphatase 115 55 - 135 U/L    AST 28 10 - 40 U/L    ALT 21 10 - 44 U/L    Anion Gap 15 8 - 16 mmol/L    eGFR if African American >60.0 >60 mL/min/1.73 m^2    eGFR if non African American >60.0 >60 mL/min/1.73 m^2   TSH    Collection Time: 01/07/20  8:36 AM   Result Value Ref Range    TSH 0.743 0.400 - 4.000 uIU/mL   Ethanol    Collection Time: 01/07/20  8:36 AM   Result Value Ref Range    Alcohol, Medical, Serum 196 (H) <10 mg/dL   Urinalysis, Reflex to Urine Culture Urine, Clean Catch    Collection Time: 01/07/20 11:33 AM   Result Value Ref Range    Specimen UA Urine, Clean Catch     Color, UA Yellow Yellow, Straw, Emily    Appearance, UA Hazy (A) Clear    pH, UA 5.0 5.0 - 8.0    Specific Gravity, UA 1.020 1.005 - 1.030    Protein, UA 1+ (A) Negative    Glucose, UA Negative Negative    Ketones, UA Trace (A) Negative    Bilirubin (UA) Negative Negative    Occult Blood UA 2+ (A) Negative    Nitrite, UA Negative Negative    Leukocytes, UA Negative Negative   Drug screen panel, emergency    Collection Time: 01/07/20 11:33 AM   Result Value Ref Range    Benzodiazepines Negative     Methadone  metabolites Negative     Cocaine (Metab.) Negative     Opiate Scrn, Ur Negative     Barbiturate Screen, Ur Negative     Amphetamine Screen, Ur Negative     THC Negative     Phencyclidine Negative     Creatinine, Random Ur 157.0 23.0 - 375.0 mg/dL    Toxicology Information SEE COMMENT    Urinalysis Microscopic    Collection Time: 01/07/20 11:33 AM   Result Value Ref Range    RBC, UA 1 0 - 4 /hpf    WBC, UA 3 0 - 5 /hpf    Bacteria Rare None-Occ /hpf    Squam Epithel, UA 0 /hpf    Hyaline Casts, UA 12 (A) 0-1/lpf /lpf    Microscopic Comment SEE COMMENT    Ethanol    Collection Time: 01/07/20 12:32 PM   Result Value Ref Range    Alcohol, Medical, Serum 67 (H) <10 mg/dL         Significant Imaging:   Imaging Results    None

## 2020-01-08 NOTE — PHARMACY MED REC
"Admission Medication Reconciliation - Pharmacy Consult Note    The home medication history was taken by Yajaira Dickey CPhT.  Based on information gathered and subsequent review by the clinical pharmacist, the items below may need attention.     You may go to "Admission" then "Reconcile Home Medications" tabs to review and/or act upon these items.     Potentially problematic discrepancies with current MAR  o Patient IS taking the following which was not ordered upon admit  o Lurasidone 40 mg tab PO QD   o Trazodone 200 or 300 mg tab PO QPM      Please address this information as you see fit.  Feel free to contact us if you have any questions or require assistance.    Madalyn Arora, PGY-1 Resident   [id number]                .    .            "

## 2020-01-08 NOTE — PT/OT/SLP EVAL
Physical Therapy Evaluation and Discharge Note    Patient Name:  Mirza Morales   MRN:  [id number]    Recommendations:     Discharge Recommendations:  home   Discharge Equipment Recommendations: none   Barriers to discharge: None    Assessment:     Mirza Morales is a 64 y.o. male admitted with a medical diagnosis of Alcohol withdrawal. At this time, patient  does not require further acute PT services. Pt primarily limited on this date by tremors and weakness d/t withdrawals.     Recent Surgery: * No surgery found *      Plan:     During this hospitalization, patient does not require further acute PT services.  Please re-consult if situation changes.      Subjective     Chief Complaint: Tremors   Pain/Comfort:  · Pain Rating 1: 0/10    Patients cultural, spiritual, Yazidi conflicts given the current situation: no    Living Environment:  Pt lives w/wife in [facility] w/3 LILIANA and bilat handrails.   Prior to admission, patients level of function was Ind.  Equipment used at home: walker, rolling, cane, straight.  DME owned (not currently used): none.  Upon discharge, patient will have assistance from wife.    Objective:     Communicated with NSG prior to session.  Patient found supine with (sitter) upon PT entry to room.    General Precautions: Standard, fall   Orthopedic Precautions:N/A   Braces: N/A     Exams:  · Cognitive Exam:  Patient is oriented to Person, Place, Time and Situation  · Gross Motor Coordination:  WFL  · RLE ROM: WNL  · RLE Strength: WFL  · LLE ROM: WNL  · LLE Strength: WFL    Functional Mobility:  · Bed Mobility:     · Supine to Sit: minimum assistance  · Transfers:     · Sit to Stand:  minimum assistance with rolling walker  · Toilet Transfer: minimum assistance with  rolling walker  using  Step Transfer  · Gait: 16ft x 2 w/RW CGA demonstrating tremors, decreased step height, and decreased step length.   · Balance: Sitting: Sup.     Standing: CGA    AM-PAC 6 CLICK MOBILITY  Total  Score:18       Therapeutic Activities and Exercises:   - Pt educated on:   -PT roles, expectations, and POC    -Safety with mobility   -Benefits of OOB activities to increase strength and functional mobility    -Performing ther ex for increasing LE ROM and strength   -Discharge recommendations     AM-PAC 6 CLICK MOBILITY  Total Score:18     Patient left up in chair with call button in reach.    GOALS:   Multidisciplinary Problems     Physical Therapy Goals     Not on file          Multidisciplinary Problems (Resolved)        Problem: Physical Therapy Goal    Goal Priority Disciplines Outcome Goal Variances Interventions   Physical Therapy Goal   (Resolved)     PT, PT/OT Met                     History:     Past Medical History:   Diagnosis Date    Alcohol abuse     Behavioral problem     Bipolar 1 disorder     Chronic right hip pain     Depression     DJD (degenerative joint disease), lumbar 1/27/2015    Fatigue     Hepatitis     pt. denies this    History of psychiatric care     History of psychiatric hospitalization     Hypertension     Karina     Post traumatic stress disorder     Psychiatric problem     Seizures     Suicide attempt     Therapy        Past Surgical History:   Procedure Laterality Date    COLONOSCOPY N/A 8/14/2019    Procedure: COLONOSCOPY;  Surgeon: Tammy Duval MD;  Location: [address];  Service: Endoscopy;  Laterality: N/A;    HERNIA REPAIR      SPINE SURGERY  11-12-15    LAMINECTOMY/LUMBAR/WARE       Time Tracking:     PT Received On: 01/08/20  PT Start Time: 0956     PT Stop Time: 1022  PT Total Time (min): 26 min     Billable Minutes: Evaluation 10 and Gait Training 16      Karl Bolaños, PT  01/08/2020

## 2020-01-09 PROBLEM — R53.81 DEBILITY: Status: ACTIVE | Noted: 2020-01-09

## 2020-01-09 LAB
ALBUMIN SERPL BCP-MCNC: 3.1 G/DL (ref 3.5–5.2)
ALP SERPL-CCNC: 93 U/L (ref 55–135)
ALT SERPL W/O P-5'-P-CCNC: 15 U/L (ref 10–44)
ANION GAP SERPL CALC-SCNC: 9 MMOL/L (ref 8–16)
AST SERPL-CCNC: 23 U/L (ref 10–40)
BASOPHILS # BLD AUTO: 0.03 K/UL (ref 0–0.2)
BASOPHILS NFR BLD: 0.5 % (ref 0–1.9)
BILIRUB SERPL-MCNC: 0.7 MG/DL (ref 0.1–1)
BUN SERPL-MCNC: 11 MG/DL (ref 8–23)
CALCIUM SERPL-MCNC: 9.4 MG/DL (ref 8.7–10.5)
CHLORIDE SERPL-SCNC: 97 MMOL/L (ref 95–110)
CO2 SERPL-SCNC: 27 MMOL/L (ref 23–29)
CREAT SERPL-MCNC: 0.7 MG/DL (ref 0.5–1.4)
DIFFERENTIAL METHOD: ABNORMAL
EOSINOPHIL # BLD AUTO: 0.1 K/UL (ref 0–0.5)
EOSINOPHIL NFR BLD: 2.5 % (ref 0–8)
ERYTHROCYTE [DISTWIDTH] IN BLOOD BY AUTOMATED COUNT: 11.7 % (ref 11.5–14.5)
EST. GFR  (AFRICAN AMERICAN): >60 ML/MIN/1.73 M^2
EST. GFR  (NON AFRICAN AMERICAN): >60 ML/MIN/1.73 M^2
FOLATE SERPL-MCNC: >40 NG/ML (ref 4–24)
GLUCOSE SERPL-MCNC: 110 MG/DL (ref 70–110)
HCT VFR BLD AUTO: 38.8 % (ref 40–54)
HGB BLD-MCNC: 13.5 G/DL (ref 14–18)
IMM GRANULOCYTES # BLD AUTO: 0.04 K/UL (ref 0–0.04)
IMM GRANULOCYTES NFR BLD AUTO: 0.7 % (ref 0–0.5)
LYMPHOCYTES # BLD AUTO: 1.3 K/UL (ref 1–4.8)
LYMPHOCYTES NFR BLD: 23 % (ref 18–48)
MAGNESIUM SERPL-MCNC: 1.9 MG/DL (ref 1.6–2.6)
MCH RBC QN AUTO: 35.1 PG (ref 27–31)
MCHC RBC AUTO-ENTMCNC: 34.8 G/DL (ref 32–36)
MCV RBC AUTO: 101 FL (ref 82–98)
MONOCYTES # BLD AUTO: 0.6 K/UL (ref 0.3–1)
MONOCYTES NFR BLD: 9.9 % (ref 4–15)
NEUTROPHILS # BLD AUTO: 3.5 K/UL (ref 1.8–7.7)
NEUTROPHILS NFR BLD: 63.4 % (ref 38–73)
NRBC BLD-RTO: 0 /100 WBC
PHOSPHATE SERPL-MCNC: 3.3 MG/DL (ref 2.7–4.5)
PLATELET # BLD AUTO: 138 K/UL (ref 150–350)
PMV BLD AUTO: 9.6 FL (ref 9.2–12.9)
POCT GLUCOSE: 109 MG/DL (ref 70–110)
POCT GLUCOSE: 114 MG/DL (ref 70–110)
POCT GLUCOSE: 122 MG/DL (ref 70–110)
POCT GLUCOSE: 122 MG/DL (ref 70–110)
POTASSIUM SERPL-SCNC: 3.7 MMOL/L (ref 3.5–5.1)
PROT SERPL-MCNC: 6.2 G/DL (ref 6–8.4)
RBC # BLD AUTO: 3.85 M/UL (ref 4.6–6.2)
SODIUM SERPL-SCNC: 133 MMOL/L (ref 136–145)
VIT B12 SERPL-MCNC: 942 PG/ML (ref 210–950)
WBC # BLD AUTO: 5.53 K/UL (ref 3.9–12.7)

## 2020-01-09 PROCEDURE — 99232 PR SUBSEQUENT HOSPITAL CARE,LEVL II: ICD-10-PCS | Mod: ,,, | Performed by: NURSE PRACTITIONER

## 2020-01-09 PROCEDURE — S4991 NICOTINE PATCH NONLEGEND: HCPCS | Performed by: STUDENT IN AN ORGANIZED HEALTH CARE EDUCATION/TRAINING PROGRAM

## 2020-01-09 PROCEDURE — 25000003 PHARM REV CODE 250: Performed by: STUDENT IN AN ORGANIZED HEALTH CARE EDUCATION/TRAINING PROGRAM

## 2020-01-09 PROCEDURE — 83735 ASSAY OF MAGNESIUM: CPT

## 2020-01-09 PROCEDURE — 84100 ASSAY OF PHOSPHORUS: CPT

## 2020-01-09 PROCEDURE — 94761 N-INVAS EAR/PLS OXIMETRY MLT: CPT

## 2020-01-09 PROCEDURE — 85025 COMPLETE CBC W/AUTO DIFF WBC: CPT

## 2020-01-09 PROCEDURE — 63600175 PHARM REV CODE 636 W HCPCS: Performed by: STUDENT IN AN ORGANIZED HEALTH CARE EDUCATION/TRAINING PROGRAM

## 2020-01-09 PROCEDURE — 36415 COLL VENOUS BLD VENIPUNCTURE: CPT

## 2020-01-09 PROCEDURE — 99232 SBSQ HOSP IP/OBS MODERATE 35: CPT | Mod: ,,, | Performed by: INTERNAL MEDICINE

## 2020-01-09 PROCEDURE — 25000003 PHARM REV CODE 250: Performed by: EMERGENCY MEDICINE

## 2020-01-09 PROCEDURE — 99232 SBSQ HOSP IP/OBS MODERATE 35: CPT | Mod: ,,, | Performed by: NURSE PRACTITIONER

## 2020-01-09 PROCEDURE — 80053 COMPREHEN METABOLIC PANEL: CPT

## 2020-01-09 PROCEDURE — 82746 ASSAY OF FOLIC ACID SERUM: CPT

## 2020-01-09 PROCEDURE — 99232 PR SUBSEQUENT HOSPITAL CARE,LEVL II: ICD-10-PCS | Mod: ,,, | Performed by: INTERNAL MEDICINE

## 2020-01-09 PROCEDURE — 82607 VITAMIN B-12: CPT

## 2020-01-09 PROCEDURE — 11000001 HC ACUTE MED/SURG PRIVATE ROOM

## 2020-01-09 RX ORDER — HYDRALAZINE HYDROCHLORIDE 25 MG/1
25 TABLET, FILM COATED ORAL 3 TIMES DAILY PRN
Status: DISCONTINUED | OUTPATIENT
Start: 2020-01-09 | End: 2020-01-14 | Stop reason: HOSPADM

## 2020-01-09 RX ADMIN — FOLIC ACID 1 MG: 1 TABLET ORAL at 08:01

## 2020-01-09 RX ADMIN — ENOXAPARIN SODIUM 40 MG: 100 INJECTION SUBCUTANEOUS at 04:01

## 2020-01-09 RX ADMIN — PANTOPRAZOLE SODIUM 40 MG: 40 TABLET, DELAYED RELEASE ORAL at 08:01

## 2020-01-09 RX ADMIN — LORAZEPAM 2 MG: 1 TABLET ORAL at 02:01

## 2020-01-09 RX ADMIN — DIAZEPAM 10 MG: 5 TABLET ORAL at 12:01

## 2020-01-09 RX ADMIN — LORAZEPAM 2 MG: 1 TABLET ORAL at 10:01

## 2020-01-09 RX ADMIN — LACOSAMIDE 200 MG: 50 TABLET, FILM COATED ORAL at 09:01

## 2020-01-09 RX ADMIN — NICOTINE 1 PATCH: 21 PATCH, EXTENDED RELEASE TRANSDERMAL at 08:01

## 2020-01-09 RX ADMIN — DIAZEPAM 10 MG: 5 TABLET ORAL at 05:01

## 2020-01-09 RX ADMIN — ATORVASTATIN CALCIUM 40 MG: 20 TABLET, FILM COATED ORAL at 08:01

## 2020-01-09 RX ADMIN — LORAZEPAM 2 MG: 1 TABLET ORAL at 06:01

## 2020-01-09 RX ADMIN — THERA TABS 1 TABLET: TAB at 08:01

## 2020-01-09 RX ADMIN — DIAZEPAM 10 MG: 5 TABLET ORAL at 11:01

## 2020-01-09 RX ADMIN — Medication 100 MG: at 08:01

## 2020-01-09 RX ADMIN — HYDRALAZINE HYDROCHLORIDE 25 MG: 25 TABLET, FILM COATED ORAL at 03:01

## 2020-01-09 NOTE — PLAN OF CARE
Pt alert throughout evening.  PEC in place, safety sitter at bedside.     Pt having tremors and anxiety, CIWA 5.  Pt requiring scheduled diazepam and PRN ativan for withdrawal symptoms.  Dr. Oliveros notified, instructed to notify if CIWA>20.    Nicotine patch in place.    Portable suction set up outside of patient room.

## 2020-01-09 NOTE — ASSESSMENT & PLAN NOTE
Patient with a history of seizures with alcohol withdrawal    Plan  vimpat level - sent  Continue home vimpat  Seizure precautions

## 2020-01-09 NOTE — ASSESSMENT & PLAN NOTE
64 y.o. male with HTN, chronic pain on oxycodone, seizures (on vimpat), alcoholism, and depression who presents today after decreasing his alcohol intake. Patient has been binge drinking for months and has a long history of alcohol abuse. He has decreased his intake and is starting to have tremors, anxiety, and depression. Patient is displaying classic signs of alcohol withdrawal. Psych consulted in the ER and recommending hospital medicine admission for withdrawal. Vital signs have been stable    Plan  [facility] following and appreciate recs.   ALCOHOL/BENZO WITHDRAWAL PRECAUTIONS  Valium 10 mg q 6 hours with plan to taper and d/c   Ativan 2 mg q4 hours prn SBP> 160, DBP>105 and HR> 110. Notify primary MD.  Vital signs q4 hours  Thiamine, Folic acid, Vit B12 and Multivitamin supplementation  Patient will have a sitter as well  On ciwa    1/9/2020 Patient is still requiring PRN lorazepam for alcohol withdrawal. Psych contacted to follow up of his PEC and alcohol withdrawal

## 2020-01-09 NOTE — HOSPITAL COURSE
1/8/2020: Patient is still requiring PRN lorazepam for alcohol withdrawal. He has tongue fasciculations and hands tremors.   1/14/2020: Patient is off BZD (scheduled and PRN). No sx/sx of withdrawal however psych discussed with the patient and the wife the need for IP psych admission due to grave disability. Both agrees and patient discharged to IP psych as he is medically stable for discharge

## 2020-01-09 NOTE — PROGRESS NOTES
[facility] Hospital Medicine  Progress Note    Patient Name: Mirza Morales  MRN: [id number]  Patient Class: IP- Inpatient   Admission Date: 1/7/2020  Length of Stay: 2 days  Attending Physician: Shona Jacinto MD  Primary Care Provider: Jud Mo MD    Hospital Medicine Team: [facility] 2 Jacinta Young MD    Subjective:     Principal Problem:Alcohol withdrawal        HPI:  64 y.o. male with HTN, chronic pain on oxycodone, seizures (on vimpat), alcoholism, and depression here today with alcohol intoxication. Patient has been drinking about a fifth of vodka a day for weeks. According to his wife he has actually lowered his alcohol intake the past few days and is worried about him going through withdrawal. Says he has a history of seizures when he stops drinking. Patient says he has been anxious, having tremors, nausea, and depressed. He has been depressed for quite sometime and now he knows needs help.     In the ED, the patient was evaluated by psych and the ICU. Psyched PEC the patient and want to have him admitted to hospital medicine for alcohol withdrawal. The  ICU evaluated and state the patient is hemodynamically stable on the floor. On evaluation by hospital medicine, patient is lying in bed with his wife at bedside. He is agreeable to come into the hospital for treatment.       Overview/Hospital Course:  1/8/2020: Patient is still requiring PRN lorazepam for alcohol withdrawal. He has tongue fasciculations and hands tremors.     Past Medical History:   Diagnosis Date    Alcohol abuse     Behavioral problem     Bipolar 1 disorder     Chronic right hip pain     Depression     DJD (degenerative joint disease), lumbar 1/27/2015    Fatigue     Hepatitis     pt. denies this    History of psychiatric care     History of psychiatric hospitalization     Hypertension     Karina     Post traumatic stress disorder     Psychiatric problem     Seizures     Suicide attempt      Therapy        Past Surgical History:   Procedure Laterality Date    COLONOSCOPY N/A 8/14/2019    Procedure: COLONOSCOPY;  Surgeon: Tammy Duval MD;  Location: [facility] ([address] [place] [address]);  Service: Endoscopy;  Laterality: N/A;    HERNIA REPAIR      SPINE SURGERY  11-12-15    LAMINECTOMY/LUMBAR/WARE       Review of patient's allergies indicates:   Allergen Reactions    Gabapentin Other (See Comments)     SEVERE DIZZINESS AND BALANCE PROBLEMS, UNABLE TO WALK.    Nardil [phenelzine]      BECOMES HYPER, LIKE A MANIC EPISODE.    Penicillins Hives    Lamictal [lamotrigine] Rash       No current facility-administered medications on file prior to encounter.      Current Outpatient Medications on File Prior to Encounter   Medication Sig    atorvastatin (LIPITOR) 40 MG tablet TAKE 1 TABLET BY MOUTH EVERY DAY    lacosamide (VIMPAT) 200 mg Tab tablet Take 1 tablet (200 mg total) by mouth every 12 (twelve) hours.    lurasidone (LATUDA) 40 mg Tab tablet Take 1 tablet (40 mg total) by mouth Daily.    oxyCODONE (ROXICODONE) 10 mg Tab immediate release tablet Take 10 mg by mouth daily as needed for Pain.    polycarbophil (FIBERCON) 625 mg tablet Take 625 mg by mouth once daily.    traZODone (DESYREL) 100 MG tablet Take 200 or 300 mg at bedtime    mupirocin (BACTROBAN) 2 % ointment Apply topically 2 (two) times daily. Apply to back region with area of cellulitis/slough    naloxegol (MOVANTIK) 12.5 mg Tab Take 12.5 mg by mouth daily as needed.    valACYclovir (VALTREX) 1000 MG tablet Take 1 tablet (1,000 mg total) by mouth every 8 (eight) hours. for 7 days     Family History     Problem Relation (Age of Onset)    Alcohol abuse Mother, Maternal Uncle, Paternal Uncle, Cousin    Anxiety disorder Brother    Bipolar disorder Brother    Dementia Maternal Grandmother    Depression Mother, Father, Sister, Brother    Drug abuse Brother    Suicide Sister        Tobacco Use    Smoking status: Current Some Day Smoker      Packs/day: 0.25     Years: 10.00     Pack years: 2.50    Smokeless tobacco: Never Used   Substance and Sexual Activity    Alcohol use: No     Alcohol/week: 16.7 standard drinks     Types: 20 Standard drinks or equivalent per week     Comment: quit 4 years ago    Drug use: No    Sexual activity: Yes     Partners: Female     Comment: with wife; monogamous      Review of Systems   Constitutional: Negative for chills and fever.   HENT: Negative for congestion and sore throat.    Respiratory: Negative for chest tightness and shortness of breath.    Cardiovascular: Negative for chest pain and palpitations.   Gastrointestinal: Positive for abdominal pain and nausea. Negative for vomiting.   Endocrine: Negative for cold intolerance and heat intolerance.   Genitourinary: Negative for difficulty urinating and urgency.   Musculoskeletal: Positive for arthralgias and myalgias.   Skin: Negative for color change and rash.   Neurological: Positive for tremors and light-headedness. Negative for seizures.   Psychiatric/Behavioral: Positive for agitation and confusion. Negative for suicidal ideas. The patient is nervous/anxious.      Objective:     Vital Signs (Most Recent):  Temp: 98.6 °F (37 °C) (01/09/20 1527)  Pulse: 94 (01/09/20 1539)  Resp: 18 (01/09/20 1527)  BP: (!) 170/92 (01/09/20 1539)  SpO2: 97 % (01/09/20 1527) Vital Signs (24h Range):  Temp:  [97.6 °F (36.4 °C)-98.6 °F (37 °C)] 98.6 °F (37 °C)  Pulse:  [84-94] 94  Resp:  [18-20] 18  SpO2:  [96 %-99 %] 97 %  BP: (155-185)/(90-98) 170/92     Weight: 94.1 kg (207 lb 7.3 oz)  Body mass index is 24.6 kg/m².    Physical Exam   Constitutional: He is oriented to person, place, and time. He appears well-developed and well-nourished. He appears distressed.   Patient extremely anxious   HENT:   Head: Normocephalic and atraumatic.   Eyes: EOM are normal.   Neck: Normal range of motion. No JVD present.   Cardiovascular: Normal rate, regular rhythm, normal heart sounds and intact  distal pulses. Exam reveals no gallop and no friction rub.   No murmur heard.  Pulmonary/Chest: Effort normal and breath sounds normal. No respiratory distress.   Abdominal: Soft. Bowel sounds are normal. He exhibits no distension. There is no tenderness.   Musculoskeletal: Normal range of motion. He exhibits no edema.   Neurological: He is alert and oriented to person, place, and time. No cranial nerve deficit.   Bilateral tremor in hands. Tongue fasiculations   Skin: Skin is warm and dry. No rash noted.   Psychiatric:   Patient anxious and remorseful        Significant Labs:   Recent Results (from the past 48 hour(s))   Troponin I    Collection Time: 01/07/20  6:52 PM   Result Value Ref Range    Troponin I 0.009 0.000 - 0.026 ng/mL   HIV 1/2 Ag/Ab (4th Gen)    Collection Time: 01/07/20  6:52 PM   Result Value Ref Range    HIV 1/2 Ag/Ab Negative Negative   Hepatitis panel, acute    Collection Time: 01/07/20  6:52 PM   Result Value Ref Range    Hepatitis B Surface Ag Negative Negative    Hep B C IgM Negative Negative    Hep A IgM Negative Negative    Hepatitis C Ab Negative Negative   Procalcitonin    Collection Time: 01/07/20  6:52 PM   Result Value Ref Range    Procalcitonin 0.02 <0.25 ng/mL   CK    Collection Time: 01/07/20  6:52 PM   Result Value Ref Range     20 - 200 U/L   Lipase    Collection Time: 01/07/20  6:52 PM   Result Value Ref Range    Lipase 4 4 - 60 U/L   Hemoglobin A1c    Collection Time: 01/07/20  6:52 PM   Result Value Ref Range    Hemoglobin A1C 4.8 4.0 - 5.6 %    Estimated Avg Glucose 91 68 - 131 mg/dL   Protime-INR    Collection Time: 01/07/20  8:50 PM   Result Value Ref Range    Prothrombin Time 11.0 9.0 - 12.5 sec    INR 1.1 0.8 - 1.2   APTT    Collection Time: 01/07/20  8:50 PM   Result Value Ref Range    aPTT 23.6 21.0 - 32.0 sec   Comprehensive Metabolic Panel (CMP)    Collection Time: 01/08/20  7:12 AM   Result Value Ref Range    Sodium 133 (L) 136 - 145 mmol/L    Potassium 3.7  3.5 - 5.1 mmol/L    Chloride 99 95 - 110 mmol/L    CO2 24 23 - 29 mmol/L    Glucose 133 (H) 70 - 110 mg/dL    BUN, Bld 17 8 - 23 mg/dL    Creatinine 0.8 0.5 - 1.4 mg/dL    Calcium 8.9 8.7 - 10.5 mg/dL    Total Protein 5.9 (L) 6.0 - 8.4 g/dL    Albumin 3.0 (L) 3.5 - 5.2 g/dL    Total Bilirubin 0.9 0.1 - 1.0 mg/dL    Alkaline Phosphatase 91 55 - 135 U/L    AST 20 10 - 40 U/L    ALT 16 10 - 44 U/L    Anion Gap 10 8 - 16 mmol/L    eGFR if African American >60.0 >60 mL/min/1.73 m^2    eGFR if non African American >60.0 >60 mL/min/1.73 m^2   Magnesium    Collection Time: 01/08/20  7:12 AM   Result Value Ref Range    Magnesium 1.8 1.6 - 2.6 mg/dL   Phosphorus    Collection Time: 01/08/20  7:12 AM   Result Value Ref Range    Phosphorus 2.4 (L) 2.7 - 4.5 mg/dL   CBC with Automated Differential    Collection Time: 01/08/20  7:12 AM   Result Value Ref Range    WBC 5.95 3.90 - 12.70 K/uL    RBC 3.99 (L) 4.60 - 6.20 M/uL    Hemoglobin 13.8 (L) 14.0 - 18.0 g/dL    Hematocrit 40.1 40.0 - 54.0 %    Mean Corpuscular Volume 101 (H) 82 - 98 fL    Mean Corpuscular Hemoglobin 34.6 (H) 27.0 - 31.0 pg    Mean Corpuscular Hemoglobin Conc 34.4 32.0 - 36.0 g/dL    RDW 11.9 11.5 - 14.5 %    Platelets 146 (L) 150 - 350 K/uL    MPV 9.6 9.2 - 12.9 fL    Immature Granulocytes 1.0 (H) 0.0 - 0.5 %    Gran # (ANC) 3.9 1.8 - 7.7 K/uL    Immature Grans (Abs) 0.06 (H) 0.00 - 0.04 K/uL    Lymph # 1.1 1.0 - 4.8 K/uL    Mono # 0.7 0.3 - 1.0 K/uL    Eos # 0.1 0.0 - 0.5 K/uL    Baso # 0.03 0.00 - 0.20 K/uL    nRBC 0 0 /100 WBC    Gran% 65.2 38.0 - 73.0 %    Lymph% 18.7 18.0 - 48.0 %    Mono% 12.4 4.0 - 15.0 %    Eosinophil% 2.2 0.0 - 8.0 %    Basophil% 0.5 0.0 - 1.9 %    Differential Method Automated    POCT glucose    Collection Time: 01/08/20  8:05 AM   Result Value Ref Range    POCT Glucose 158 (H) 70 - 110 mg/dL   POCT glucose    Collection Time: 01/08/20 11:12 AM   Result Value Ref Range    POCT Glucose 130 (H) 70 - 110 mg/dL   POCT glucose     Collection Time: 01/08/20  4:41 PM   Result Value Ref Range    POCT Glucose 128 (H) 70 - 110 mg/dL   POCT glucose    Collection Time: 01/09/20 12:13 AM   Result Value Ref Range    POCT Glucose 114 (H) 70 - 110 mg/dL   Comprehensive Metabolic Panel (CMP)    Collection Time: 01/09/20  3:58 AM   Result Value Ref Range    Sodium 133 (L) 136 - 145 mmol/L    Potassium 3.7 3.5 - 5.1 mmol/L    Chloride 97 95 - 110 mmol/L    CO2 27 23 - 29 mmol/L    Glucose 110 70 - 110 mg/dL    BUN, Bld 11 8 - 23 mg/dL    Creatinine 0.7 0.5 - 1.4 mg/dL    Calcium 9.4 8.7 - 10.5 mg/dL    Total Protein 6.2 6.0 - 8.4 g/dL    Albumin 3.1 (L) 3.5 - 5.2 g/dL    Total Bilirubin 0.7 0.1 - 1.0 mg/dL    Alkaline Phosphatase 93 55 - 135 U/L    AST 23 10 - 40 U/L    ALT 15 10 - 44 U/L    Anion Gap 9 8 - 16 mmol/L    eGFR if African American >60.0 >60 mL/min/1.73 m^2    eGFR if non African American >60.0 >60 mL/min/1.73 m^2   Magnesium    Collection Time: 01/09/20  3:58 AM   Result Value Ref Range    Magnesium 1.9 1.6 - 2.6 mg/dL   Phosphorus    Collection Time: 01/09/20  3:58 AM   Result Value Ref Range    Phosphorus 3.3 2.7 - 4.5 mg/dL   CBC with Automated Differential    Collection Time: 01/09/20  3:58 AM   Result Value Ref Range    WBC 5.53 3.90 - 12.70 K/uL    RBC 3.85 (L) 4.60 - 6.20 M/uL    Hemoglobin 13.5 (L) 14.0 - 18.0 g/dL    Hematocrit 38.8 (L) 40.0 - 54.0 %    Mean Corpuscular Volume 101 (H) 82 - 98 fL    Mean Corpuscular Hemoglobin 35.1 (H) 27.0 - 31.0 pg    Mean Corpuscular Hemoglobin Conc 34.8 32.0 - 36.0 g/dL    RDW 11.7 11.5 - 14.5 %    Platelets 138 (L) 150 - 350 K/uL    MPV 9.6 9.2 - 12.9 fL    Immature Granulocytes 0.7 (H) 0.0 - 0.5 %    Gran # (ANC) 3.5 1.8 - 7.7 K/uL    Immature Grans (Abs) 0.04 0.00 - 0.04 K/uL    Lymph # 1.3 1.0 - 4.8 K/uL    Mono # 0.6 0.3 - 1.0 K/uL    Eos # 0.1 0.0 - 0.5 K/uL    Baso # 0.03 0.00 - 0.20 K/uL    nRBC 0 0 /100 WBC    Gran% 63.4 38.0 - 73.0 %    Lymph% 23.0 18.0 - 48.0 %    Mono% 9.9 4.0 - 15.0 %     Eosinophil% 2.5 0.0 - 8.0 %    Basophil% 0.5 0.0 - 1.9 %    Differential Method Automated    POCT glucose    Collection Time: 01/09/20  7:43 AM   Result Value Ref Range    POCT Glucose 109 70 - 110 mg/dL   Folate    Collection Time: 01/09/20  9:15 AM   Result Value Ref Range    Folate >40.0 (H) 4.0 - 24.0 ng/mL   Vitamin B12    Collection Time: 01/09/20  9:15 AM   Result Value Ref Range    Vitamin B-12 942 210 - 950 pg/mL   POCT glucose    Collection Time: 01/09/20  1:04 PM   Result Value Ref Range    POCT Glucose 122 (H) 70 - 110 mg/dL         Significant Imaging:   Imaging Results          X-Ray Chest AP Portable (Final result)  Result time 01/07/20 18:58:36    Final result by Rafael Willett MD (01/07/20 18:58:36)                 Impression:      1. Allowing for patient positioning, no convincing large focal consolidation noting left basilar subsegmental atelectasis or scarring.      Electronically signed by: Rafael Willett MD  Date:    01/07/2020  Time:    18:58             Narrative:    EXAMINATION:  XR CHEST AP PORTABLE    CLINICAL HISTORY:  Rule out infection;    TECHNIQUE:  Single frontal view of the chest was performed.    COMPARISON:  10/03/2019    FINDINGS:  The cardiomediastinal silhouette is not enlarged.  There is no pleural effusion.  The trachea is midline.  The lungs are symmetrically expanded bilaterally with mildly coarse interstitial attenuation.  There is left basilar subsegmental atelectasis or scarring..  No large focal consolidation seen.  There is no pneumothorax.  The osseous structures are remarkable for degenerative change..                                    Assessment/Plan:      * Alcohol withdrawal  64 y.o. male with HTN, chronic pain on oxycodone, seizures (on vimpat), alcoholism, and depression who presents today after decreasing his alcohol intake. Patient has been binge drinking for months and has a long history of alcohol abuse. He has decreased his intake and is starting  to have tremors, anxiety, and depression. Patient is displaying classic signs of alcohol withdrawal. Psych consulted in the ER and recommending hospital medicine admission for withdrawal. Vital signs have been stable    Plan  [facility] following and appreciate recs.   ALCOHOL/BENZO WITHDRAWAL PRECAUTIONS  Valium 10 mg q 6 hours with plan to taper and d/c   Ativan 2 mg q4 hours prn SBP> 160, DBP>105 and HR> 110. Notify primary MD.  Vital signs q4 hours  Thiamine, Folic acid, Vit B12 and Multivitamin supplementation  Patient will have a sitter as well  On [facility]        Debility  PT/OT      Seizure disorder  Patient with a history of seizures with alcohol withdrawal    Plan  vimpat level - sent  Continue home vimpat  Seizure precautions       Severe depression  Psych already consulted on the patient. We will help get patient medically stable to allow for psychiatric treatment        VTE Risk Mitigation (From admission, onward)         Ordered     enoxaparin injection 40 mg  Daily      01/07/20 1800                      Jacinta Young MD  Department of Hospital Medicine   [facility]

## 2020-01-09 NOTE — SUBJECTIVE & OBJECTIVE
Past Medical History:   Diagnosis Date    Alcohol abuse     Behavioral problem     Bipolar 1 disorder     Chronic right hip pain     Depression     DJD (degenerative joint disease), lumbar 1/27/2015    Fatigue     Hepatitis     pt. denies this    History of psychiatric care     History of psychiatric hospitalization     Hypertension     Karina     Post traumatic stress disorder     Psychiatric problem     Seizures     Suicide attempt     Therapy        Past Surgical History:   Procedure Laterality Date    COLONOSCOPY N/A 8/14/2019    Procedure: COLONOSCOPY;  Surgeon: Tammy Duval MD;  Location: [address]);  Service: Endoscopy;  Laterality: N/A;    HERNIA REPAIR      SPINE SURGERY  11-12-15    LAMINECTOMY/LUMBAR/WARE       Review of patient's allergies indicates:   Allergen Reactions    Gabapentin Other (See Comments)     SEVERE DIZZINESS AND BALANCE PROBLEMS, UNABLE TO WALK.    Nardil [phenelzine]      BECOMES HYPER, LIKE A MANIC EPISODE.    Penicillins Hives    Lamictal [lamotrigine] Rash       No current facility-administered medications on file prior to encounter.      Current Outpatient Medications on File Prior to Encounter   Medication Sig    atorvastatin (LIPITOR) 40 MG tablet TAKE 1 TABLET BY MOUTH EVERY DAY    lacosamide (VIMPAT) 200 mg Tab tablet Take 1 tablet (200 mg total) by mouth every 12 (twelve) hours.    lurasidone (LATUDA) 40 mg Tab tablet Take 1 tablet (40 mg total) by mouth Daily.    oxyCODONE (ROXICODONE) 10 mg Tab immediate release tablet Take 10 mg by mouth daily as needed for Pain.    polycarbophil (FIBERCON) 625 mg tablet Take 625 mg by mouth once daily.    traZODone (DESYREL) 100 MG tablet Take 200 or 300 mg at bedtime    mupirocin (BACTROBAN) 2 % ointment Apply topically 2 (two) times daily. Apply to back region with area of cellulitis/slough    naloxegol (MOVANTIK) 12.5 mg Tab Take 12.5 mg by mouth daily as needed.    valACYclovir (VALTREX)  1000 MG tablet Take 1 tablet (1,000 mg total) by mouth every 8 (eight) hours. for 7 days     Family History     Problem Relation (Age of Onset)    Alcohol abuse Mother, Maternal Uncle, Paternal Uncle, Cousin    Anxiety disorder Brother    Bipolar disorder Brother    Dementia Maternal Grandmother    Depression Mother, Father, Sister, Brother    Drug abuse Brother    Suicide Sister        Tobacco Use    Smoking status: Current Some Day Smoker     Packs/day: 0.25     Years: 10.00     Pack years: 2.50    Smokeless tobacco: Never Used   Substance and Sexual Activity    Alcohol use: No     Alcohol/week: 16.7 standard drinks     Types: 20 Standard drinks or equivalent per week     Comment: quit 4 years ago    Drug use: No    Sexual activity: Yes     Partners: Female     Comment: with wife; monogamous      Review of Systems   Constitutional: Negative for chills and fever.   HENT: Negative for congestion and sore throat.    Respiratory: Negative for chest tightness and shortness of breath.    Cardiovascular: Negative for chest pain and palpitations.   Gastrointestinal: Positive for abdominal pain and nausea. Negative for vomiting.   Endocrine: Negative for cold intolerance and heat intolerance.   Genitourinary: Negative for difficulty urinating and urgency.   Musculoskeletal: Positive for arthralgias and myalgias.   Skin: Negative for color change and rash.   Neurological: Positive for tremors and light-headedness. Negative for seizures.   Psychiatric/Behavioral: Negative for agitation, confusion and suicidal ideas. The patient is not nervous/anxious.      Objective:     Vital Signs (Most Recent):  Temp: 98.6 °F (37 °C) (01/09/20 1527)  Pulse: 94 (01/09/20 1527)  Resp: 18 (01/09/20 1527)  BP: (!) 185/93 (01/09/20 1527)  SpO2: 97 % (01/09/20 1527) Vital Signs (24h Range):  Temp:  [97.6 °F (36.4 °C)-98.6 °F (37 °C)] 98.6 °F (37 °C)  Pulse:  [84-94] 94  Resp:  [18-20] 18  SpO2:  [96 %-99 %] 97 %  BP: (155-185)/(90-98)  185/93     Weight: 94.1 kg (207 lb 7.3 oz)  Body mass index is 24.6 kg/m².    Physical Exam   Constitutional: He is oriented to person, place, and time. He appears well-developed and well-nourished. No distress.   HENT:   Head: Normocephalic and atraumatic.   Eyes: EOM are normal.   Neck: Normal range of motion. No JVD present.   Cardiovascular: Normal rate, regular rhythm, normal heart sounds and intact distal pulses. Exam reveals no gallop and no friction rub.   No murmur heard.  Pulmonary/Chest: Effort normal and breath sounds normal. No respiratory distress.   Abdominal: Soft. Bowel sounds are normal. He exhibits no distension. There is no tenderness.   Musculoskeletal: Normal range of motion. He exhibits no edema.   Neurological: He is alert and oriented to person, place, and time. No cranial nerve deficit.   Tremor improved. No tongue fasiculations   Skin: Skin is warm and dry. No rash noted.        Significant Labs:   Recent Results (from the past 48 hour(s))   Troponin I    Collection Time: 01/07/20  6:52 PM   Result Value Ref Range    Troponin I 0.009 0.000 - 0.026 ng/mL   HIV 1/2 Ag/Ab (4th Gen)    Collection Time: 01/07/20  6:52 PM   Result Value Ref Range    HIV 1/2 Ag/Ab Negative Negative   Hepatitis panel, acute    Collection Time: 01/07/20  6:52 PM   Result Value Ref Range    Hepatitis B Surface Ag Negative Negative    Hep B C IgM Negative Negative    Hep A IgM Negative Negative    Hepatitis C Ab Negative Negative   Procalcitonin    Collection Time: 01/07/20  6:52 PM   Result Value Ref Range    Procalcitonin 0.02 <0.25 ng/mL   CK    Collection Time: 01/07/20  6:52 PM   Result Value Ref Range     20 - 200 U/L   Lipase    Collection Time: 01/07/20  6:52 PM   Result Value Ref Range    Lipase 4 4 - 60 U/L   Hemoglobin A1c    Collection Time: 01/07/20  6:52 PM   Result Value Ref Range    Hemoglobin A1C 4.8 4.0 - 5.6 %    Estimated Avg Glucose 91 68 - 131 mg/dL   Protime-INR    Collection Time:  01/07/20  8:50 PM   Result Value Ref Range    Prothrombin Time 11.0 9.0 - 12.5 sec    INR 1.1 0.8 - 1.2   APTT    Collection Time: 01/07/20  8:50 PM   Result Value Ref Range    aPTT 23.6 21.0 - 32.0 sec   Comprehensive Metabolic Panel (CMP)    Collection Time: 01/08/20  7:12 AM   Result Value Ref Range    Sodium 133 (L) 136 - 145 mmol/L    Potassium 3.7 3.5 - 5.1 mmol/L    Chloride 99 95 - 110 mmol/L    CO2 24 23 - 29 mmol/L    Glucose 133 (H) 70 - 110 mg/dL    BUN, Bld 17 8 - 23 mg/dL    Creatinine 0.8 0.5 - 1.4 mg/dL    Calcium 8.9 8.7 - 10.5 mg/dL    Total Protein 5.9 (L) 6.0 - 8.4 g/dL    Albumin 3.0 (L) 3.5 - 5.2 g/dL    Total Bilirubin 0.9 0.1 - 1.0 mg/dL    Alkaline Phosphatase 91 55 - 135 U/L    AST 20 10 - 40 U/L    ALT 16 10 - 44 U/L    Anion Gap 10 8 - 16 mmol/L    eGFR if African American >60.0 >60 mL/min/1.73 m^2    eGFR if non African American >60.0 >60 mL/min/1.73 m^2   Magnesium    Collection Time: 01/08/20  7:12 AM   Result Value Ref Range    Magnesium 1.8 1.6 - 2.6 mg/dL   Phosphorus    Collection Time: 01/08/20  7:12 AM   Result Value Ref Range    Phosphorus 2.4 (L) 2.7 - 4.5 mg/dL   CBC with Automated Differential    Collection Time: 01/08/20  7:12 AM   Result Value Ref Range    WBC 5.95 3.90 - 12.70 K/uL    RBC 3.99 (L) 4.60 - 6.20 M/uL    Hemoglobin 13.8 (L) 14.0 - 18.0 g/dL    Hematocrit 40.1 40.0 - 54.0 %    Mean Corpuscular Volume 101 (H) 82 - 98 fL    Mean Corpuscular Hemoglobin 34.6 (H) 27.0 - 31.0 pg    Mean Corpuscular Hemoglobin Conc 34.4 32.0 - 36.0 g/dL    RDW 11.9 11.5 - 14.5 %    Platelets 146 (L) 150 - 350 K/uL    MPV 9.6 9.2 - 12.9 fL    Immature Granulocytes 1.0 (H) 0.0 - 0.5 %    Gran # (ANC) 3.9 1.8 - 7.7 K/uL    Immature Grans (Abs) 0.06 (H) 0.00 - 0.04 K/uL    Lymph # 1.1 1.0 - 4.8 K/uL    Mono # 0.7 0.3 - 1.0 K/uL    Eos # 0.1 0.0 - 0.5 K/uL    Baso # 0.03 0.00 - 0.20 K/uL    nRBC 0 0 /100 WBC    Gran% 65.2 38.0 - 73.0 %    Lymph% 18.7 18.0 - 48.0 %    Mono% 12.4 4.0 - 15.0  %    Eosinophil% 2.2 0.0 - 8.0 %    Basophil% 0.5 0.0 - 1.9 %    Differential Method Automated    POCT glucose    Collection Time: 01/08/20  8:05 AM   Result Value Ref Range    POCT Glucose 158 (H) 70 - 110 mg/dL   POCT glucose    Collection Time: 01/08/20 11:12 AM   Result Value Ref Range    POCT Glucose 130 (H) 70 - 110 mg/dL   POCT glucose    Collection Time: 01/08/20  4:41 PM   Result Value Ref Range    POCT Glucose 128 (H) 70 - 110 mg/dL   POCT glucose    Collection Time: 01/09/20 12:13 AM   Result Value Ref Range    POCT Glucose 114 (H) 70 - 110 mg/dL   Comprehensive Metabolic Panel (CMP)    Collection Time: 01/09/20  3:58 AM   Result Value Ref Range    Sodium 133 (L) 136 - 145 mmol/L    Potassium 3.7 3.5 - 5.1 mmol/L    Chloride 97 95 - 110 mmol/L    CO2 27 23 - 29 mmol/L    Glucose 110 70 - 110 mg/dL    BUN, Bld 11 8 - 23 mg/dL    Creatinine 0.7 0.5 - 1.4 mg/dL    Calcium 9.4 8.7 - 10.5 mg/dL    Total Protein 6.2 6.0 - 8.4 g/dL    Albumin 3.1 (L) 3.5 - 5.2 g/dL    Total Bilirubin 0.7 0.1 - 1.0 mg/dL    Alkaline Phosphatase 93 55 - 135 U/L    AST 23 10 - 40 U/L    ALT 15 10 - 44 U/L    Anion Gap 9 8 - 16 mmol/L    eGFR if African American >60.0 >60 mL/min/1.73 m^2    eGFR if non African American >60.0 >60 mL/min/1.73 m^2   Magnesium    Collection Time: 01/09/20  3:58 AM   Result Value Ref Range    Magnesium 1.9 1.6 - 2.6 mg/dL   Phosphorus    Collection Time: 01/09/20  3:58 AM   Result Value Ref Range    Phosphorus 3.3 2.7 - 4.5 mg/dL   CBC with Automated Differential    Collection Time: 01/09/20  3:58 AM   Result Value Ref Range    WBC 5.53 3.90 - 12.70 K/uL    RBC 3.85 (L) 4.60 - 6.20 M/uL    Hemoglobin 13.5 (L) 14.0 - 18.0 g/dL    Hematocrit 38.8 (L) 40.0 - 54.0 %    Mean Corpuscular Volume 101 (H) 82 - 98 fL    Mean Corpuscular Hemoglobin 35.1 (H) 27.0 - 31.0 pg    Mean Corpuscular Hemoglobin Conc 34.8 32.0 - 36.0 g/dL    RDW 11.7 11.5 - 14.5 %    Platelets 138 (L) 150 - 350 K/uL    MPV 9.6 9.2 - 12.9 fL     Immature Granulocytes 0.7 (H) 0.0 - 0.5 %    Gran # (ANC) 3.5 1.8 - 7.7 K/uL    Immature Grans (Abs) 0.04 0.00 - 0.04 K/uL    Lymph # 1.3 1.0 - 4.8 K/uL    Mono # 0.6 0.3 - 1.0 K/uL    Eos # 0.1 0.0 - 0.5 K/uL    Baso # 0.03 0.00 - 0.20 K/uL    nRBC 0 0 /100 WBC    Gran% 63.4 38.0 - 73.0 %    Lymph% 23.0 18.0 - 48.0 %    Mono% 9.9 4.0 - 15.0 %    Eosinophil% 2.5 0.0 - 8.0 %    Basophil% 0.5 0.0 - 1.9 %    Differential Method Automated    POCT glucose    Collection Time: 01/09/20  7:43 AM   Result Value Ref Range    POCT Glucose 109 70 - 110 mg/dL   Folate    Collection Time: 01/09/20  9:15 AM   Result Value Ref Range    Folate >40.0 (H) 4.0 - 24.0 ng/mL   Vitamin B12    Collection Time: 01/09/20  9:15 AM   Result Value Ref Range    Vitamin B-12 942 210 - 950 pg/mL   POCT glucose    Collection Time: 01/09/20  1:04 PM   Result Value Ref Range    POCT Glucose 122 (H) 70 - 110 mg/dL         Significant Imaging:   Imaging Results          X-Ray Chest AP Portable (Final result)  Result time 01/07/20 18:58:36    Final result by Rafael Willett MD (01/07/20 18:58:36)                 Impression:      1. Allowing for patient positioning, no convincing large focal consolidation noting left basilar subsegmental atelectasis or scarring.      Electronically signed by: Rafael Willett MD  Date:    01/07/2020  Time:    18:58             Narrative:    EXAMINATION:  XR CHEST AP PORTABLE    CLINICAL HISTORY:  Rule out infection;    TECHNIQUE:  Single frontal view of the chest was performed.    COMPARISON:  10/03/2019    FINDINGS:  The cardiomediastinal silhouette is not enlarged.  There is no pleural effusion.  The trachea is midline.  The lungs are symmetrically expanded bilaterally with mildly coarse interstitial attenuation.  There is left basilar subsegmental atelectasis or scarring..  No large focal consolidation seen.  There is no pneumothorax.  The osseous structures are remarkable for degenerative change..

## 2020-01-09 NOTE — PROGRESS NOTES
"[facility]  Psychiatry  Progress Note    Patient Name: Mirza Morales  MRN: [id number]   Code Status: Full Code  Admission Date: 1/7/2020  Hospital Length of Stay: 2 days  Expected Discharge Date: 1/10/2020  Attending Physician: Shona Jacinto MD  Primary Care Provider: Jud Mo MD    Current Legal Status: N/A    Patient information was obtained from patient and ER records.     Subjective:     Principal Problem:Alcohol withdrawal    Chief Complaint: "depression and alcohol withdrawals    HPI:   Per ED Note:      Alcohol Intoxication        Pt to ER via EMS from home for alcohol intoxication. Unsure who called EMS. Pt is A, A, O x 4. He was able to ambulate to stretcher with assistance at home. He drank a 5th of vodka or more. Hx of alcoholism.       HPI  Mr. Morales is a 64 y.o. male with HTN, chronic pain on oxycodone, seizures (on vimpat), alcoholism, and depression here today with alcohol intoxication. History mildly limited due to alcohol intoxication. States he has been drinking a lot recently because he feels he is at the end of his life. States he does not want to kill himself, but he no longer wants to live. Feels very depressed. States he thinks his dopamine levels are off. Denies fevers, chills, n/v, abd pain, chest pain, SOB, numbness, tingling, weakness, AVH, HI, SI.    On My Interview:  Mr. Morales is a 64 y.o. male with HTN, chronic pain on oxycodone, seizures (on vimpat), alcoholism, and depression  presented to ED with alcohol intoxication. Met with patient while patient was lying in his ED bed. He seemed tremulous, shaking all over, very anxious, dysphoric, and restless. He complained of alcohol withdrawal symptoms of nausea, abdominal pain, severe tremors, severe nervousness and diarrhea. He reported feeling very depressed, hopeless and stated that she feels he is at the end of his life. When he was asked if he is having suicidal thoughts he replied, "well, I feel very " "depressed. I cannot go outdoors anymore. I am isolated, I lost my well. I drink to feel better. I am tired of living. I feel it's my time. t if I get depressed enough I would fade away, quit living and just stop functioning and my body would die." He reported one prior suicide attempt by overdosing on oxycodone but stated it was not intentional. He stated that his current medications of latuda and trazodone are not working, He endorsed depressive symptoms of low mood, amotivation, anhedonia, decreased concentration, feeling of guilt, worthlessness, hopelessness and fatigue. He denied HI/AVH and no paranoia or delusions reported. Per chart he has diagnosis of bipolar but he denied having marielena or hypomania like episodes. He admitted to drinking 2 fifth or vodka daily, duration 30 years but with periods of sobriety, and his\ last use was this morning. He reported history of alcohol withdrawal seizures and Dt's.          Anamaria [phone]  Spoke with Anamaria, patient wife stated "my  has seizures and I am worried about him going through a grand mal seizure and dying. He has been gradually lowing his alcohol intake and he will be getting into seizures and I am afraid he will die. He mentioned a couple of times to me that he said he wants to die in his own bed. I could not let him lie there and die in his own bed. He is getting progressively worse. He sees a psychiatrics is Dr. Loco. I worry about his drinking, having seizures and wanting to die. I think it is not safe for him to come home and he needs treatment or he would die."    Psychiatric Review Of Systems - Is patient experiencing or having changes in:  sleep: yes  appetite: yes  weight: yes. Lost 15 lbs in a month  energy/anergy: yes   interest/pleasure/anhedonia: yes  somatic symptoms: yes  anxiety/panic: yes  guilty/hopelessness: yes  concentration: yes  S.I.B.s/risky behavior: no  Irritability: yes  Racing thoughts: yes  Impulsive behaviors: " "no  Paranoia:no  AVH:no  Suicidal thoughts/plan/intent: no    Past Medical/Surgical History  Past Medical History:   Diagnosis Date    Alcohol abuse     Behavioral problem     Bipolar 1 disorder     Chronic right hip pain     Depression     DJD (degenerative joint disease), lumbar 1/27/2015    Fatigue     Hepatitis     pt. denies this    History of psychiatric care     History of psychiatric hospitalization     Hypertension     Karina     Post traumatic stress disorder     Psychiatric problem     Seizures     Suicide attempt     Therapy      Past Surgical History:   Procedure Laterality Date    COLONOSCOPY N/A 8/14/2019    Procedure: COLONOSCOPY;  Surgeon: Tammy Duval MD;  Location: [facility] ([address] [place] [address]);  Service: Endoscopy;  Laterality: N/A;    HERNIA REPAIR      SPINE SURGERY  11-12-15    LAMINECTOMY/LUMBAR/WARE       Past Psychiatric History:  Previous Medication Trials: yes . Latuda, trazodone  Previous Psychiatric Hospitalizations: yes   Previous Suicide Attempts: yes . One attempt by taking overdosing on oxycodone  History of Violence: no  Outpatient Psychiatrist: yes. Dr Marroquin at [facility]    Social History:  Marital Status:   Children: 2   Employment Status/Info: retired  Education: some college  Special Ed: no  Housing Status: Live at home with his wife  History of phys/sexual abuse: no  Access to gun: no    Substance Abuse History:  Recreational Drugs: denied  Use of Alcohol: He drinks 2 fifths of vodia daily, duration 30 years "on and off", last use was this morning  Tobacco Use: yes. 1ppd  Rehab History: no     Legal History:  Past Charges/Incarcerations: yes, "DWI"   Pending charges: no     Family Psychiatric History: Mother has depression    Discharge Mental Status Exam  Appearance: age appropriate  Behavior/Cooperation: cooperative, restless and fidgety   Speech: soft, slow  Mood:  anxious  Affect: anxious, dysphoric   Thought Process: goal-directed  Thought Content: " "suicidal thoughts: (passive-yes)  Orientation:  grossly intact, person, place, situation, time/date, day of week, month of year  Memory: Intact and Registers and recalls 3/3 objects at 3/3 and > 3 minutes  Attention/Concentration:  Decreased   Cognition: grossly intact, fund of knowledge intact and appropriate to age and level of education, 3 of 4 recent presidents, memory > 3 objects at five mins, able to remember recent events- yes and proverbs were abstract  Insight: fair per patient understanding of illness  Judgement: poor per patient current behavior          Hospital Course: 01/09/2020      Interval History: Met with patient while lying in his hospital bed. He reported mood is" better and I am on my way to detoxing" and his affect was euthymic.He was calm and cooperative. He is still presenting with alcohol withdrawal symptoms (elevated vitals, tremors, and anxiety). He denied feeling suicidal and stated, "I don't want to drink myself to death and I want to live that I am sober. There are reasons for me to live. " He reported improved depressive symptoms since hospitalization but continues to report feeling depressed and dysphoric. He is medication compliant and no side effects noted or reported. He denied SI/HI/AVH and no delusion noted or reported. Patient in interested in continuing his detox treatment at this facility and is not interested in rehab treatment afterwards.     Family History     Problem Relation (Age of Onset)    Alcohol abuse Mother, Maternal Uncle, Paternal Uncle, Cousin    Anxiety disorder Brother    Bipolar disorder Brother    Dementia Maternal Grandmother    Depression Mother, Father, Sister, Brother    Drug abuse Brother    Suicide Sister        Tobacco Use    Smoking status: Current Some Day Smoker     Packs/day: 0.25     Years: 10.00     Pack years: 2.50    Smokeless tobacco: Never Used   Substance and Sexual Activity    Alcohol use: No     Alcohol/week: 16.7 standard drinks     " "Types: 20 Standard drinks or equivalent per week     Comment: quit 4 years ago    Drug use: No    Sexual activity: Yes     Partners: Female     Comment: with wife; monogamous      Psychotherapeutics (From admission, onward)    Start     Stop Route Frequency Ordered    01/07/20 1900  diazePAM tablet 10 mg      -- Oral Every 6 hours 01/07/20 1756    01/07/20 1859  LORazepam tablet 2 mg      -- Oral Every 4 hours PRN 01/07/20 1800           Review of Systems   Neurological: Positive for tremors.     Objective:     Vital Signs (Most Recent):  Temp: 98.6 °F (37 °C) (01/09/20 1527)  Pulse: 94 (01/09/20 1539)  Resp: 18 (01/09/20 1527)  BP: (!) 170/92 (01/09/20 1539)  SpO2: 97 % (01/09/20 1527) Vital Signs (24h Range):  Temp:  [97.6 °F (36.4 °C)-98.6 °F (37 °C)] 98.6 °F (37 °C)  Pulse:  [84-94] 94  Resp:  [18-20] 18  SpO2:  [96 %-99 %] 97 %  BP: (155-185)/(90-98) 170/92     Height: 6' 5" (195.6 cm)  Weight: 94.1 kg (207 lb 7.3 oz)  Body mass index is 24.6 kg/m².      Intake/Output Summary (Last 24 hours) at 1/9/2020 1644  Last data filed at 1/9/2020 0509  Gross per 24 hour   Intake 600 ml   Output 400 ml   Net 200 ml       Physical Exam     Significant Labs: All pertinent labs within the past 24 hours have been reviewed.    Significant Imaging: I have reviewed all pertinent imaging results/findings within the past 24 hours.    Assessment/Plan:     * Alcohol withdrawal  1. Dispo/Legal Status: Discontinue PEC as patient no longer meets the criteria for PEC. He does not pose a danger to self and denies having suicidal thoughts or hopelessness. Patient still has alcohol withdrawals symptoms, has history of ETOH seizures and DT's and would benefit form continuing alcohol withdrawal protocol  2. Scheduled Medications: Defer any non-psych meds to the ER MD.  3. PRN Medications: Continue   ALCOHOL/BENZO WITHDRAWAL PRECAUTIONS  Valium 10 mg q 6 hours with plan to taper and d/c   Ativan 2 mg q4 hours prn SBP> 160, DBP>105 and HR> " 110. Notify primary MD.  Vital signs q4 hours  Thiamine, Folic acid, Vit B12 and Multivitamin supplementation  .4. Precautions/Nursing: seizure and alcohol withdrawal precautions  5. CL team will follow once detoxed will consider antidepressant                  Need for Continued Hospitalization:   Patient needs treatment for alcohol withdrawals due to hx of ETOH seizirues and DT's    Anticipated Disposition: Home or Self Care     Total time:  35 with greater than 50% of this time spent in counseling and/or coordination of care.       Isabela Jonas NP   Psychiatry  [facility]

## 2020-01-09 NOTE — ASSESSMENT & PLAN NOTE
64 y.o. male with HTN, chronic pain on oxycodone, seizures (on vimpat), alcoholism, and depression who presents today after decreasing his alcohol intake. Patient has been binge drinking for months and has a long history of alcohol abuse. He has decreased his intake and is starting to have tremors, anxiety, and depression. Patient is displaying classic signs of alcohol withdrawal. Psych consulted in the ER and recommending hospital medicine admission for withdrawal. Vital signs have been stable    Plan  [facility] following and appreciate recs.   ALCOHOL/BENZO WITHDRAWAL PRECAUTIONS  Valium 10 mg q 6 hours with plan to taper and d/c   Ativan 2 mg q4 hours prn SBP> 160, DBP>105 and HR> 110. Notify primary MD.  Vital signs q4 hours  Thiamine, Folic acid, Vit B12 and Multivitamin supplementation  Patient will have a sitter as well  On wa

## 2020-01-09 NOTE — SUBJECTIVE & OBJECTIVE
"Interval History: Met with patient while lying in his hospital bed. He reported mood is" better and I am on my way to detoxing" and his affect was flat. He was calm and cooperative. He is still presenting with alcohol withdrawal symptoms (elevated vitals, tremors, and anxiety). He denied feeling suicidal and stated, "I don't want to drink myself to death and I want to live that I am sober. There are reasons for me to live. " He reported improved depressive symptoms since hospitalization but continues to report feeling depressed and dysphoric. He is medication compliant and no side effects noted or reported. He denied SI/HI/AVH and no delusion noted or reported. Patient in interested in continuing his detox treatment at this facility and is not interested in rehab treatment afterwards    Family History     Problem Relation (Age of Onset)    Alcohol abuse Mother, Maternal Uncle, Paternal Uncle, Cousin    Anxiety disorder Brother    Bipolar disorder Brother    Dementia Maternal Grandmother    Depression Mother, Father, Sister, Brother    Drug abuse Brother    Suicide Sister        Tobacco Use    Smoking status: Current Some Day Smoker     Packs/day: 0.25     Years: 10.00     Pack years: 2.50    Smokeless tobacco: Never Used   Substance and Sexual Activity    Alcohol use: No     Alcohol/week: 16.7 standard drinks     Types: 20 Standard drinks or equivalent per week     Comment: quit 4 years ago    Drug use: No    Sexual activity: Yes     Partners: Female     Comment: with wife; monogamous      Psychotherapeutics (From admission, onward)    Start     Stop Route Frequency Ordered    01/07/20 1900  diazePAM tablet 10 mg      -- Oral Every 6 hours 01/07/20 1756    01/07/20 1859  LORazepam tablet 2 mg      -- Oral Every 4 hours PRN 01/07/20 1800           Review of Systems   Neurological: Positive for tremors.   Psychiatric/Behavioral: Positive for dysphoric mood.     Objective:     Vital Signs (Most Recent):  Temp: " "98.6 °F (37 °C) (01/09/20 1527)  Pulse: 94 (01/09/20 1539)  Resp: 18 (01/09/20 1527)  BP: (!) 170/92 (01/09/20 1539)  SpO2: 97 % (01/09/20 1527) Vital Signs (24h Range):  Temp:  [97.6 °F (36.4 °C)-98.6 °F (37 °C)] 98.6 °F (37 °C)  Pulse:  [84-94] 94  Resp:  [18-20] 18  SpO2:  [96 %-99 %] 97 %  BP: (155-185)/(90-98) 170/92     Height: 6' 5" (195.6 cm)  Weight: 94.1 kg (207 lb 7.3 oz)  Body mass index is 24.6 kg/m².      Intake/Output Summary (Last 24 hours) at 1/9/2020 1644  Last data filed at 1/9/2020 0509  Gross per 24 hour   Intake 600 ml   Output 400 ml   Net 200 ml       Physical Exam     Significant Labs: All pertinent labs within the past 24 hours have been reviewed.    Significant Imaging: I have reviewed all pertinent imaging results/findings within the past 24 hours.  "

## 2020-01-09 NOTE — PROGRESS NOTES
[facility] Hospital Medicine  Progress Note    Patient Name: Mirza Morales  MRN: [id number]  Patient Class: IP- Inpatient   Admission Date: 1/7/2020  Length of Stay: 2 days  Attending Physician: Shona Jacinto MD  Primary Care Provider: Jud Mo MD    Hospital Medicine Team: [facility] HOSP MED 2 Jacinta Young MD    Subjective:     Principal Problem:Alcohol withdrawal        HPI:  64 y.o. male with HTN, chronic pain on oxycodone, seizures (on vimpat), alcoholism, and depression here today with alcohol intoxication. Patient has been drinking about a fifth of vodka a day for weeks. According to his wife he has actually lowered his alcohol intake the past few days and is worried about him going through withdrawal. Says he has a history of seizures when he stops drinking. Patient says he has been anxious, having tremors, nausea, and depressed. He has been depressed for quite sometime and now he knows needs help.     In the ED, the patient was evaluated by psych and the ICU. Psyched PEC the patient and want to have him admitted to hospital medicine for alcohol withdrawal. The  ICU evaluated and state the patient is hemodynamically stable on the floor. On evaluation by hospital medicine, patient is lying in bed with his wife at bedside. He is agreeable to come into the hospital for treatment.       Overview/Hospital Course:  1/9/2020: Patient is still requiring PRN lorazepam for alcohol withdrawal. Psych contacted to follow up of his PEC and alcohol withdrawal    Past Medical History:   Diagnosis Date    Alcohol abuse     Behavioral problem     Bipolar 1 disorder     Chronic right hip pain     Depression     DJD (degenerative joint disease), lumbar 1/27/2015    Fatigue     Hepatitis     pt. denies this    History of psychiatric care     History of psychiatric hospitalization     Hypertension     Karina     Post traumatic stress disorder     Psychiatric problem     Seizures     Suicide  attempt     Therapy        Past Surgical History:   Procedure Laterality Date    COLONOSCOPY N/A 8/14/2019    Procedure: COLONOSCOPY;  Surgeon: Tammy Duval MD;  Location: [facility] ([address] [place] [address]);  Service: Endoscopy;  Laterality: N/A;    HERNIA REPAIR      SPINE SURGERY  11-12-15    LAMINECTOMY/LUMBAR/WARE       Review of patient's allergies indicates:   Allergen Reactions    Gabapentin Other (See Comments)     SEVERE DIZZINESS AND BALANCE PROBLEMS, UNABLE TO WALK.    Nardil [phenelzine]      BECOMES HYPER, LIKE A MANIC EPISODE.    Penicillins Hives    Lamictal [lamotrigine] Rash       No current facility-administered medications on file prior to encounter.      Current Outpatient Medications on File Prior to Encounter   Medication Sig    atorvastatin (LIPITOR) 40 MG tablet TAKE 1 TABLET BY MOUTH EVERY DAY    lacosamide (VIMPAT) 200 mg Tab tablet Take 1 tablet (200 mg total) by mouth every 12 (twelve) hours.    lurasidone (LATUDA) 40 mg Tab tablet Take 1 tablet (40 mg total) by mouth Daily.    oxyCODONE (ROXICODONE) 10 mg Tab immediate release tablet Take 10 mg by mouth daily as needed for Pain.    polycarbophil (FIBERCON) 625 mg tablet Take 625 mg by mouth once daily.    traZODone (DESYREL) 100 MG tablet Take 200 or 300 mg at bedtime    mupirocin (BACTROBAN) 2 % ointment Apply topically 2 (two) times daily. Apply to back region with area of cellulitis/slough    naloxegol (MOVANTIK) 12.5 mg Tab Take 12.5 mg by mouth daily as needed.    valACYclovir (VALTREX) 1000 MG tablet Take 1 tablet (1,000 mg total) by mouth every 8 (eight) hours. for 7 days     Family History     Problem Relation (Age of Onset)    Alcohol abuse Mother, Maternal Uncle, Paternal Uncle, Cousin    Anxiety disorder Brother    Bipolar disorder Brother    Dementia Maternal Grandmother    Depression Mother, Father, Sister, Brother    Drug abuse Brother    Suicide Sister        Tobacco Use    Smoking status: Current Some Day Smoker      Packs/day: 0.25     Years: 10.00     Pack years: 2.50    Smokeless tobacco: Never Used   Substance and Sexual Activity    Alcohol use: No     Alcohol/week: 16.7 standard drinks     Types: 20 Standard drinks or equivalent per week     Comment: quit 4 years ago    Drug use: No    Sexual activity: Yes     Partners: Female     Comment: with wife; monogamous      Review of Systems   Constitutional: Negative for chills and fever.   HENT: Negative for congestion and sore throat.    Respiratory: Negative for chest tightness and shortness of breath.    Cardiovascular: Negative for chest pain and palpitations.   Gastrointestinal: Positive for abdominal pain and nausea. Negative for vomiting.   Endocrine: Negative for cold intolerance and heat intolerance.   Genitourinary: Negative for difficulty urinating and urgency.   Musculoskeletal: Positive for arthralgias and myalgias.   Skin: Negative for color change and rash.   Neurological: Positive for tremors and light-headedness. Negative for seizures.   Psychiatric/Behavioral: Negative for agitation, confusion and suicidal ideas. The patient is not nervous/anxious.      Objective:     Vital Signs (Most Recent):  Temp: 98.6 °F (37 °C) (01/09/20 1527)  Pulse: 94 (01/09/20 1527)  Resp: 18 (01/09/20 1527)  BP: (!) 185/93 (01/09/20 1527)  SpO2: 97 % (01/09/20 1527) Vital Signs (24h Range):  Temp:  [97.6 °F (36.4 °C)-98.6 °F (37 °C)] 98.6 °F (37 °C)  Pulse:  [84-94] 94  Resp:  [18-20] 18  SpO2:  [96 %-99 %] 97 %  BP: (155-185)/(90-98) 185/93     Weight: 94.1 kg (207 lb 7.3 oz)  Body mass index is 24.6 kg/m².    Physical Exam   Constitutional: He is oriented to person, place, and time. He appears well-developed and well-nourished. No distress.   HENT:   Head: Normocephalic and atraumatic.   Eyes: EOM are normal.   Neck: Normal range of motion. No JVD present.   Cardiovascular: Normal rate, regular rhythm, normal heart sounds and intact distal pulses. Exam reveals no gallop and  no friction rub.   No murmur heard.  Pulmonary/Chest: Effort normal and breath sounds normal. No respiratory distress.   Abdominal: Soft. Bowel sounds are normal. He exhibits no distension. There is no tenderness.   Musculoskeletal: Normal range of motion. He exhibits no edema.   Neurological: He is alert and oriented to person, place, and time. No cranial nerve deficit.   Tremor improved. No tongue fasiculations   Skin: Skin is warm and dry. No rash noted.        Significant Labs:   Recent Results (from the past 48 hour(s))   Troponin I    Collection Time: 01/07/20  6:52 PM   Result Value Ref Range    Troponin I 0.009 0.000 - 0.026 ng/mL   HIV 1/2 Ag/Ab (4th Gen)    Collection Time: 01/07/20  6:52 PM   Result Value Ref Range    HIV 1/2 Ag/Ab Negative Negative   Hepatitis panel, acute    Collection Time: 01/07/20  6:52 PM   Result Value Ref Range    Hepatitis B Surface Ag Negative Negative    Hep B C IgM Negative Negative    Hep A IgM Negative Negative    Hepatitis C Ab Negative Negative   Procalcitonin    Collection Time: 01/07/20  6:52 PM   Result Value Ref Range    Procalcitonin 0.02 <0.25 ng/mL   CK    Collection Time: 01/07/20  6:52 PM   Result Value Ref Range     20 - 200 U/L   Lipase    Collection Time: 01/07/20  6:52 PM   Result Value Ref Range    Lipase 4 4 - 60 U/L   Hemoglobin A1c    Collection Time: 01/07/20  6:52 PM   Result Value Ref Range    Hemoglobin A1C 4.8 4.0 - 5.6 %    Estimated Avg Glucose 91 68 - 131 mg/dL   Protime-INR    Collection Time: 01/07/20  8:50 PM   Result Value Ref Range    Prothrombin Time 11.0 9.0 - 12.5 sec    INR 1.1 0.8 - 1.2   APTT    Collection Time: 01/07/20  8:50 PM   Result Value Ref Range    aPTT 23.6 21.0 - 32.0 sec   Comprehensive Metabolic Panel (CMP)    Collection Time: 01/08/20  7:12 AM   Result Value Ref Range    Sodium 133 (L) 136 - 145 mmol/L    Potassium 3.7 3.5 - 5.1 mmol/L    Chloride 99 95 - 110 mmol/L    CO2 24 23 - 29 mmol/L    Glucose 133 (H) 70 -  110 mg/dL    BUN, Bld 17 8 - 23 mg/dL    Creatinine 0.8 0.5 - 1.4 mg/dL    Calcium 8.9 8.7 - 10.5 mg/dL    Total Protein 5.9 (L) 6.0 - 8.4 g/dL    Albumin 3.0 (L) 3.5 - 5.2 g/dL    Total Bilirubin 0.9 0.1 - 1.0 mg/dL    Alkaline Phosphatase 91 55 - 135 U/L    AST 20 10 - 40 U/L    ALT 16 10 - 44 U/L    Anion Gap 10 8 - 16 mmol/L    eGFR if African American >60.0 >60 mL/min/1.73 m^2    eGFR if non African American >60.0 >60 mL/min/1.73 m^2   Magnesium    Collection Time: 01/08/20  7:12 AM   Result Value Ref Range    Magnesium 1.8 1.6 - 2.6 mg/dL   Phosphorus    Collection Time: 01/08/20  7:12 AM   Result Value Ref Range    Phosphorus 2.4 (L) 2.7 - 4.5 mg/dL   CBC with Automated Differential    Collection Time: 01/08/20  7:12 AM   Result Value Ref Range    WBC 5.95 3.90 - 12.70 K/uL    RBC 3.99 (L) 4.60 - 6.20 M/uL    Hemoglobin 13.8 (L) 14.0 - 18.0 g/dL    Hematocrit 40.1 40.0 - 54.0 %    Mean Corpuscular Volume 101 (H) 82 - 98 fL    Mean Corpuscular Hemoglobin 34.6 (H) 27.0 - 31.0 pg    Mean Corpuscular Hemoglobin Conc 34.4 32.0 - 36.0 g/dL    RDW 11.9 11.5 - 14.5 %    Platelets 146 (L) 150 - 350 K/uL    MPV 9.6 9.2 - 12.9 fL    Immature Granulocytes 1.0 (H) 0.0 - 0.5 %    Gran # (ANC) 3.9 1.8 - 7.7 K/uL    Immature Grans (Abs) 0.06 (H) 0.00 - 0.04 K/uL    Lymph # 1.1 1.0 - 4.8 K/uL    Mono # 0.7 0.3 - 1.0 K/uL    Eos # 0.1 0.0 - 0.5 K/uL    Baso # 0.03 0.00 - 0.20 K/uL    nRBC 0 0 /100 WBC    Gran% 65.2 38.0 - 73.0 %    Lymph% 18.7 18.0 - 48.0 %    Mono% 12.4 4.0 - 15.0 %    Eosinophil% 2.2 0.0 - 8.0 %    Basophil% 0.5 0.0 - 1.9 %    Differential Method Automated    POCT glucose    Collection Time: 01/08/20  8:05 AM   Result Value Ref Range    POCT Glucose 158 (H) 70 - 110 mg/dL   POCT glucose    Collection Time: 01/08/20 11:12 AM   Result Value Ref Range    POCT Glucose 130 (H) 70 - 110 mg/dL   POCT glucose    Collection Time: 01/08/20  4:41 PM   Result Value Ref Range    POCT Glucose 128 (H) 70 - 110 mg/dL   POCT  glucose    Collection Time: 01/09/20 12:13 AM   Result Value Ref Range    POCT Glucose 114 (H) 70 - 110 mg/dL   Comprehensive Metabolic Panel (CMP)    Collection Time: 01/09/20  3:58 AM   Result Value Ref Range    Sodium 133 (L) 136 - 145 mmol/L    Potassium 3.7 3.5 - 5.1 mmol/L    Chloride 97 95 - 110 mmol/L    CO2 27 23 - 29 mmol/L    Glucose 110 70 - 110 mg/dL    BUN, Bld 11 8 - 23 mg/dL    Creatinine 0.7 0.5 - 1.4 mg/dL    Calcium 9.4 8.7 - 10.5 mg/dL    Total Protein 6.2 6.0 - 8.4 g/dL    Albumin 3.1 (L) 3.5 - 5.2 g/dL    Total Bilirubin 0.7 0.1 - 1.0 mg/dL    Alkaline Phosphatase 93 55 - 135 U/L    AST 23 10 - 40 U/L    ALT 15 10 - 44 U/L    Anion Gap 9 8 - 16 mmol/L    eGFR if African American >60.0 >60 mL/min/1.73 m^2    eGFR if non African American >60.0 >60 mL/min/1.73 m^2   Magnesium    Collection Time: 01/09/20  3:58 AM   Result Value Ref Range    Magnesium 1.9 1.6 - 2.6 mg/dL   Phosphorus    Collection Time: 01/09/20  3:58 AM   Result Value Ref Range    Phosphorus 3.3 2.7 - 4.5 mg/dL   CBC with Automated Differential    Collection Time: 01/09/20  3:58 AM   Result Value Ref Range    WBC 5.53 3.90 - 12.70 K/uL    RBC 3.85 (L) 4.60 - 6.20 M/uL    Hemoglobin 13.5 (L) 14.0 - 18.0 g/dL    Hematocrit 38.8 (L) 40.0 - 54.0 %    Mean Corpuscular Volume 101 (H) 82 - 98 fL    Mean Corpuscular Hemoglobin 35.1 (H) 27.0 - 31.0 pg    Mean Corpuscular Hemoglobin Conc 34.8 32.0 - 36.0 g/dL    RDW 11.7 11.5 - 14.5 %    Platelets 138 (L) 150 - 350 K/uL    MPV 9.6 9.2 - 12.9 fL    Immature Granulocytes 0.7 (H) 0.0 - 0.5 %    Gran # (ANC) 3.5 1.8 - 7.7 K/uL    Immature Grans (Abs) 0.04 0.00 - 0.04 K/uL    Lymph # 1.3 1.0 - 4.8 K/uL    Mono # 0.6 0.3 - 1.0 K/uL    Eos # 0.1 0.0 - 0.5 K/uL    Baso # 0.03 0.00 - 0.20 K/uL    nRBC 0 0 /100 WBC    Gran% 63.4 38.0 - 73.0 %    Lymph% 23.0 18.0 - 48.0 %    Mono% 9.9 4.0 - 15.0 %    Eosinophil% 2.5 0.0 - 8.0 %    Basophil% 0.5 0.0 - 1.9 %    Differential Method Automated    POCT  glucose    Collection Time: 01/09/20  7:43 AM   Result Value Ref Range    POCT Glucose 109 70 - 110 mg/dL   Folate    Collection Time: 01/09/20  9:15 AM   Result Value Ref Range    Folate >40.0 (H) 4.0 - 24.0 ng/mL   Vitamin B12    Collection Time: 01/09/20  9:15 AM   Result Value Ref Range    Vitamin B-12 942 210 - 950 pg/mL   POCT glucose    Collection Time: 01/09/20  1:04 PM   Result Value Ref Range    POCT Glucose 122 (H) 70 - 110 mg/dL         Significant Imaging:   Imaging Results          X-Ray Chest AP Portable (Final result)  Result time 01/07/20 18:58:36    Final result by Rafael Willett MD (01/07/20 18:58:36)                 Impression:      1. Allowing for patient positioning, no convincing large focal consolidation noting left basilar subsegmental atelectasis or scarring.      Electronically signed by: Rafael Willett MD  Date:    01/07/2020  Time:    18:58             Narrative:    EXAMINATION:  XR CHEST AP PORTABLE    CLINICAL HISTORY:  Rule out infection;    TECHNIQUE:  Single frontal view of the chest was performed.    COMPARISON:  10/03/2019    FINDINGS:  The cardiomediastinal silhouette is not enlarged.  There is no pleural effusion.  The trachea is midline.  The lungs are symmetrically expanded bilaterally with mildly coarse interstitial attenuation.  There is left basilar subsegmental atelectasis or scarring..  No large focal consolidation seen.  There is no pneumothorax.  The osseous structures are remarkable for degenerative change..                                    Assessment/Plan:      * Alcohol withdrawal  64 y.o. male with HTN, chronic pain on oxycodone, seizures (on vimpat), alcoholism, and depression who presents today after decreasing his alcohol intake. Patient has been binge drinking for months and has a long history of alcohol abuse. He has decreased his intake and is starting to have tremors, anxiety, and depression. Patient is displaying classic signs of alcohol withdrawal.  Psych consulted in the ER and recommending hospital medicine admission for withdrawal. Vital signs have been stable    Plan  [facility] following and appreciate recs.   ALCOHOL/BENZO WITHDRAWAL PRECAUTIONS  Valium 10 mg q 6 hours with plan to taper and d/c   Ativan 2 mg q4 hours prn SBP> 160, DBP>105 and HR> 110. Notify primary MD.  Vital signs q4 hours  Thiamine, Folic acid, Vit B12 and Multivitamin supplementation  Patient will have a sitter as well  On ciwa    1/9/2020 Patient is still requiring PRN lorazepam for alcohol withdrawal. Psych contacted to follow up of his PEC and alcohol withdrawal      Debility  PT/OT      Seizure disorder  Patient with a history of seizures with alcohol withdrawal    Plan  vimpat level - sent  Continue home vimpat  Seizure precautions       Severe depression  Psych already consulted on the patient. We will help get patient medically stable to allow for psychiatric treatment        VTE Risk Mitigation (From admission, onward)         Ordered     enoxaparin injection 40 mg  Daily      01/07/20 1800                      Jacinta Young MD  Department of Hospital Medicine   [facility]

## 2020-01-09 NOTE — SUBJECTIVE & OBJECTIVE
Past Medical History:   Diagnosis Date    Alcohol abuse     Behavioral problem     Bipolar 1 disorder     Chronic right hip pain     Depression     DJD (degenerative joint disease), lumbar 1/27/2015    Fatigue     Hepatitis     pt. denies this    History of psychiatric care     History of psychiatric hospitalization     Hypertension     Karina     Post traumatic stress disorder     Psychiatric problem     Seizures     Suicide attempt     Therapy        Past Surgical History:   Procedure Laterality Date    COLONOSCOPY N/A 8/14/2019    Procedure: COLONOSCOPY;  Surgeon: Tammy Duval MD;  Location: [address]);  Service: Endoscopy;  Laterality: N/A;    HERNIA REPAIR      SPINE SURGERY  11-12-15    LAMINECTOMY/LUMBAR/WARE       Review of patient's allergies indicates:   Allergen Reactions    Gabapentin Other (See Comments)     SEVERE DIZZINESS AND BALANCE PROBLEMS, UNABLE TO WALK.    Nardil [phenelzine]      BECOMES HYPER, LIKE A MANIC EPISODE.    Penicillins Hives    Lamictal [lamotrigine] Rash       No current facility-administered medications on file prior to encounter.      Current Outpatient Medications on File Prior to Encounter   Medication Sig    atorvastatin (LIPITOR) 40 MG tablet TAKE 1 TABLET BY MOUTH EVERY DAY    lacosamide (VIMPAT) 200 mg Tab tablet Take 1 tablet (200 mg total) by mouth every 12 (twelve) hours.    lurasidone (LATUDA) 40 mg Tab tablet Take 1 tablet (40 mg total) by mouth Daily.    oxyCODONE (ROXICODONE) 10 mg Tab immediate release tablet Take 10 mg by mouth daily as needed for Pain.    polycarbophil (FIBERCON) 625 mg tablet Take 625 mg by mouth once daily.    traZODone (DESYREL) 100 MG tablet Take 200 or 300 mg at bedtime    mupirocin (BACTROBAN) 2 % ointment Apply topically 2 (two) times daily. Apply to back region with area of cellulitis/slough    naloxegol (MOVANTIK) 12.5 mg Tab Take 12.5 mg by mouth daily as needed.    valACYclovir (VALTREX)  1000 MG tablet Take 1 tablet (1,000 mg total) by mouth every 8 (eight) hours. for 7 days     Family History     Problem Relation (Age of Onset)    Alcohol abuse Mother, Maternal Uncle, Paternal Uncle, Cousin    Anxiety disorder Brother    Bipolar disorder Brother    Dementia Maternal Grandmother    Depression Mother, Father, Sister, Brother    Drug abuse Brother    Suicide Sister        Tobacco Use    Smoking status: Current Some Day Smoker     Packs/day: 0.25     Years: 10.00     Pack years: 2.50    Smokeless tobacco: Never Used   Substance and Sexual Activity    Alcohol use: No     Alcohol/week: 16.7 standard drinks     Types: 20 Standard drinks or equivalent per week     Comment: quit 4 years ago    Drug use: No    Sexual activity: Yes     Partners: Female     Comment: with wife; monogamous      Review of Systems   Constitutional: Negative for chills and fever.   HENT: Negative for congestion and sore throat.    Respiratory: Negative for chest tightness and shortness of breath.    Cardiovascular: Negative for chest pain and palpitations.   Gastrointestinal: Positive for abdominal pain and nausea. Negative for vomiting.   Endocrine: Negative for cold intolerance and heat intolerance.   Genitourinary: Negative for difficulty urinating and urgency.   Musculoskeletal: Positive for arthralgias and myalgias.   Skin: Negative for color change and rash.   Neurological: Positive for tremors and light-headedness. Negative for seizures.   Psychiatric/Behavioral: Positive for agitation and confusion. Negative for suicidal ideas. The patient is nervous/anxious.      Objective:     Vital Signs (Most Recent):  Temp: 98.6 °F (37 °C) (01/09/20 1527)  Pulse: 94 (01/09/20 1539)  Resp: 18 (01/09/20 1527)  BP: (!) 170/92 (01/09/20 1539)  SpO2: 97 % (01/09/20 1527) Vital Signs (24h Range):  Temp:  [97.6 °F (36.4 °C)-98.6 °F (37 °C)] 98.6 °F (37 °C)  Pulse:  [84-94] 94  Resp:  [18-20] 18  SpO2:  [96 %-99 %] 97 %  BP:  (155-185)/(90-98) 170/92     Weight: 94.1 kg (207 lb 7.3 oz)  Body mass index is 24.6 kg/m².    Physical Exam   Constitutional: He is oriented to person, place, and time. He appears well-developed and well-nourished. He appears distressed.   Patient extremely anxious   HENT:   Head: Normocephalic and atraumatic.   Eyes: EOM are normal.   Neck: Normal range of motion. No JVD present.   Cardiovascular: Normal rate, regular rhythm, normal heart sounds and intact distal pulses. Exam reveals no gallop and no friction rub.   No murmur heard.  Pulmonary/Chest: Effort normal and breath sounds normal. No respiratory distress.   Abdominal: Soft. Bowel sounds are normal. He exhibits no distension. There is no tenderness.   Musculoskeletal: Normal range of motion. He exhibits no edema.   Neurological: He is alert and oriented to person, place, and time. No cranial nerve deficit.   Bilateral tremor in hands. Tongue fasiculations   Skin: Skin is warm and dry. No rash noted.   Psychiatric:   Patient anxious and remorseful        Significant Labs:   Recent Results (from the past 48 hour(s))   Troponin I    Collection Time: 01/07/20  6:52 PM   Result Value Ref Range    Troponin I 0.009 0.000 - 0.026 ng/mL   HIV 1/2 Ag/Ab (4th Gen)    Collection Time: 01/07/20  6:52 PM   Result Value Ref Range    HIV 1/2 Ag/Ab Negative Negative   Hepatitis panel, acute    Collection Time: 01/07/20  6:52 PM   Result Value Ref Range    Hepatitis B Surface Ag Negative Negative    Hep B C IgM Negative Negative    Hep A IgM Negative Negative    Hepatitis C Ab Negative Negative   Procalcitonin    Collection Time: 01/07/20  6:52 PM   Result Value Ref Range    Procalcitonin 0.02 <0.25 ng/mL   CK    Collection Time: 01/07/20  6:52 PM   Result Value Ref Range     20 - 200 U/L   Lipase    Collection Time: 01/07/20  6:52 PM   Result Value Ref Range    Lipase 4 4 - 60 U/L   Hemoglobin A1c    Collection Time: 01/07/20  6:52 PM   Result Value Ref Range     Hemoglobin A1C 4.8 4.0 - 5.6 %    Estimated Avg Glucose 91 68 - 131 mg/dL   Protime-INR    Collection Time: 01/07/20  8:50 PM   Result Value Ref Range    Prothrombin Time 11.0 9.0 - 12.5 sec    INR 1.1 0.8 - 1.2   APTT    Collection Time: 01/07/20  8:50 PM   Result Value Ref Range    aPTT 23.6 21.0 - 32.0 sec   Comprehensive Metabolic Panel (CMP)    Collection Time: 01/08/20  7:12 AM   Result Value Ref Range    Sodium 133 (L) 136 - 145 mmol/L    Potassium 3.7 3.5 - 5.1 mmol/L    Chloride 99 95 - 110 mmol/L    CO2 24 23 - 29 mmol/L    Glucose 133 (H) 70 - 110 mg/dL    BUN, Bld 17 8 - 23 mg/dL    Creatinine 0.8 0.5 - 1.4 mg/dL    Calcium 8.9 8.7 - 10.5 mg/dL    Total Protein 5.9 (L) 6.0 - 8.4 g/dL    Albumin 3.0 (L) 3.5 - 5.2 g/dL    Total Bilirubin 0.9 0.1 - 1.0 mg/dL    Alkaline Phosphatase 91 55 - 135 U/L    AST 20 10 - 40 U/L    ALT 16 10 - 44 U/L    Anion Gap 10 8 - 16 mmol/L    eGFR if African American >60.0 >60 mL/min/1.73 m^2    eGFR if non African American >60.0 >60 mL/min/1.73 m^2   Magnesium    Collection Time: 01/08/20  7:12 AM   Result Value Ref Range    Magnesium 1.8 1.6 - 2.6 mg/dL   Phosphorus    Collection Time: 01/08/20  7:12 AM   Result Value Ref Range    Phosphorus 2.4 (L) 2.7 - 4.5 mg/dL   CBC with Automated Differential    Collection Time: 01/08/20  7:12 AM   Result Value Ref Range    WBC 5.95 3.90 - 12.70 K/uL    RBC 3.99 (L) 4.60 - 6.20 M/uL    Hemoglobin 13.8 (L) 14.0 - 18.0 g/dL    Hematocrit 40.1 40.0 - 54.0 %    Mean Corpuscular Volume 101 (H) 82 - 98 fL    Mean Corpuscular Hemoglobin 34.6 (H) 27.0 - 31.0 pg    Mean Corpuscular Hemoglobin Conc 34.4 32.0 - 36.0 g/dL    RDW 11.9 11.5 - 14.5 %    Platelets 146 (L) 150 - 350 K/uL    MPV 9.6 9.2 - 12.9 fL    Immature Granulocytes 1.0 (H) 0.0 - 0.5 %    Gran # (ANC) 3.9 1.8 - 7.7 K/uL    Immature Grans (Abs) 0.06 (H) 0.00 - 0.04 K/uL    Lymph # 1.1 1.0 - 4.8 K/uL    Mono # 0.7 0.3 - 1.0 K/uL    Eos # 0.1 0.0 - 0.5 K/uL    Baso # 0.03 0.00 - 0.20  K/uL    nRBC 0 0 /100 WBC    Gran% 65.2 38.0 - 73.0 %    Lymph% 18.7 18.0 - 48.0 %    Mono% 12.4 4.0 - 15.0 %    Eosinophil% 2.2 0.0 - 8.0 %    Basophil% 0.5 0.0 - 1.9 %    Differential Method Automated    POCT glucose    Collection Time: 01/08/20  8:05 AM   Result Value Ref Range    POCT Glucose 158 (H) 70 - 110 mg/dL   POCT glucose    Collection Time: 01/08/20 11:12 AM   Result Value Ref Range    POCT Glucose 130 (H) 70 - 110 mg/dL   POCT glucose    Collection Time: 01/08/20  4:41 PM   Result Value Ref Range    POCT Glucose 128 (H) 70 - 110 mg/dL   POCT glucose    Collection Time: 01/09/20 12:13 AM   Result Value Ref Range    POCT Glucose 114 (H) 70 - 110 mg/dL   Comprehensive Metabolic Panel (CMP)    Collection Time: 01/09/20  3:58 AM   Result Value Ref Range    Sodium 133 (L) 136 - 145 mmol/L    Potassium 3.7 3.5 - 5.1 mmol/L    Chloride 97 95 - 110 mmol/L    CO2 27 23 - 29 mmol/L    Glucose 110 70 - 110 mg/dL    BUN, Bld 11 8 - 23 mg/dL    Creatinine 0.7 0.5 - 1.4 mg/dL    Calcium 9.4 8.7 - 10.5 mg/dL    Total Protein 6.2 6.0 - 8.4 g/dL    Albumin 3.1 (L) 3.5 - 5.2 g/dL    Total Bilirubin 0.7 0.1 - 1.0 mg/dL    Alkaline Phosphatase 93 55 - 135 U/L    AST 23 10 - 40 U/L    ALT 15 10 - 44 U/L    Anion Gap 9 8 - 16 mmol/L    eGFR if African American >60.0 >60 mL/min/1.73 m^2    eGFR if non African American >60.0 >60 mL/min/1.73 m^2   Magnesium    Collection Time: 01/09/20  3:58 AM   Result Value Ref Range    Magnesium 1.9 1.6 - 2.6 mg/dL   Phosphorus    Collection Time: 01/09/20  3:58 AM   Result Value Ref Range    Phosphorus 3.3 2.7 - 4.5 mg/dL   CBC with Automated Differential    Collection Time: 01/09/20  3:58 AM   Result Value Ref Range    WBC 5.53 3.90 - 12.70 K/uL    RBC 3.85 (L) 4.60 - 6.20 M/uL    Hemoglobin 13.5 (L) 14.0 - 18.0 g/dL    Hematocrit 38.8 (L) 40.0 - 54.0 %    Mean Corpuscular Volume 101 (H) 82 - 98 fL    Mean Corpuscular Hemoglobin 35.1 (H) 27.0 - 31.0 pg    Mean Corpuscular Hemoglobin Conc  34.8 32.0 - 36.0 g/dL    RDW 11.7 11.5 - 14.5 %    Platelets 138 (L) 150 - 350 K/uL    MPV 9.6 9.2 - 12.9 fL    Immature Granulocytes 0.7 (H) 0.0 - 0.5 %    Gran # (ANC) 3.5 1.8 - 7.7 K/uL    Immature Grans (Abs) 0.04 0.00 - 0.04 K/uL    Lymph # 1.3 1.0 - 4.8 K/uL    Mono # 0.6 0.3 - 1.0 K/uL    Eos # 0.1 0.0 - 0.5 K/uL    Baso # 0.03 0.00 - 0.20 K/uL    nRBC 0 0 /100 WBC    Gran% 63.4 38.0 - 73.0 %    Lymph% 23.0 18.0 - 48.0 %    Mono% 9.9 4.0 - 15.0 %    Eosinophil% 2.5 0.0 - 8.0 %    Basophil% 0.5 0.0 - 1.9 %    Differential Method Automated    POCT glucose    Collection Time: 01/09/20  7:43 AM   Result Value Ref Range    POCT Glucose 109 70 - 110 mg/dL   Folate    Collection Time: 01/09/20  9:15 AM   Result Value Ref Range    Folate >40.0 (H) 4.0 - 24.0 ng/mL   Vitamin B12    Collection Time: 01/09/20  9:15 AM   Result Value Ref Range    Vitamin B-12 942 210 - 950 pg/mL   POCT glucose    Collection Time: 01/09/20  1:04 PM   Result Value Ref Range    POCT Glucose 122 (H) 70 - 110 mg/dL         Significant Imaging:   Imaging Results          X-Ray Chest AP Portable (Final result)  Result time 01/07/20 18:58:36    Final result by Rafael Willett MD (01/07/20 18:58:36)                 Impression:      1. Allowing for patient positioning, no convincing large focal consolidation noting left basilar subsegmental atelectasis or scarring.      Electronically signed by: Rafael Willett MD  Date:    01/07/2020  Time:    18:58             Narrative:    EXAMINATION:  XR CHEST AP PORTABLE    CLINICAL HISTORY:  Rule out infection;    TECHNIQUE:  Single frontal view of the chest was performed.    COMPARISON:  10/03/2019    FINDINGS:  The cardiomediastinal silhouette is not enlarged.  There is no pleural effusion.  The trachea is midline.  The lungs are symmetrically expanded bilaterally with mildly coarse interstitial attenuation.  There is left basilar subsegmental atelectasis or scarring..  No large focal consolidation  seen.  There is no pneumothorax.  The osseous structures are remarkable for degenerative change..

## 2020-01-09 NOTE — PLAN OF CARE
Pt turns and repositions independently. No skin breakdown noted. Pt pain and safety;neuro assessment and CIWA  monitored through this shift . Blood glucose monitored through shift . Barrier cream applied to skin. Bed locked and in lowest position. Rails elevated x 2. Brakes on. Call light and personal belongings in reach. Sitter in the room .Will continue monitor.

## 2020-01-10 LAB
ALBUMIN SERPL BCP-MCNC: 3.3 G/DL (ref 3.5–5.2)
ALP SERPL-CCNC: 96 U/L (ref 55–135)
ALT SERPL W/O P-5'-P-CCNC: 21 U/L (ref 10–44)
ANION GAP SERPL CALC-SCNC: 8 MMOL/L (ref 8–16)
AST SERPL-CCNC: 33 U/L (ref 10–40)
BASOPHILS # BLD AUTO: 0.04 K/UL (ref 0–0.2)
BASOPHILS NFR BLD: 0.7 % (ref 0–1.9)
BILIRUB SERPL-MCNC: 0.7 MG/DL (ref 0.1–1)
BUN SERPL-MCNC: 6 MG/DL (ref 8–23)
CALCIUM SERPL-MCNC: 9.2 MG/DL (ref 8.7–10.5)
CHLORIDE SERPL-SCNC: 97 MMOL/L (ref 95–110)
CO2 SERPL-SCNC: 28 MMOL/L (ref 23–29)
CREAT SERPL-MCNC: 0.8 MG/DL (ref 0.5–1.4)
DIFFERENTIAL METHOD: ABNORMAL
EOSINOPHIL # BLD AUTO: 0.1 K/UL (ref 0–0.5)
EOSINOPHIL NFR BLD: 2.1 % (ref 0–8)
ERYTHROCYTE [DISTWIDTH] IN BLOOD BY AUTOMATED COUNT: 11.8 % (ref 11.5–14.5)
EST. GFR  (AFRICAN AMERICAN): >60 ML/MIN/1.73 M^2
EST. GFR  (NON AFRICAN AMERICAN): >60 ML/MIN/1.73 M^2
GLUCOSE SERPL-MCNC: 105 MG/DL (ref 70–110)
HCT VFR BLD AUTO: 40.4 % (ref 40–54)
HGB BLD-MCNC: 13.8 G/DL (ref 14–18)
IMM GRANULOCYTES # BLD AUTO: 0.05 K/UL (ref 0–0.04)
IMM GRANULOCYTES NFR BLD AUTO: 0.9 % (ref 0–0.5)
LACOSAMIDE: 4 MCG/ML (ref 1–10)
LYMPHOCYTES # BLD AUTO: 1.5 K/UL (ref 1–4.8)
LYMPHOCYTES NFR BLD: 25.5 % (ref 18–48)
MAGNESIUM SERPL-MCNC: 1.8 MG/DL (ref 1.6–2.6)
MCH RBC QN AUTO: 34.5 PG (ref 27–31)
MCHC RBC AUTO-ENTMCNC: 34.2 G/DL (ref 32–36)
MCV RBC AUTO: 101 FL (ref 82–98)
MONOCYTES # BLD AUTO: 0.6 K/UL (ref 0.3–1)
MONOCYTES NFR BLD: 10.4 % (ref 4–15)
NEUTROPHILS # BLD AUTO: 3.5 K/UL (ref 1.8–7.7)
NEUTROPHILS NFR BLD: 60.4 % (ref 38–73)
NRBC BLD-RTO: 0 /100 WBC
PHOSPHATE SERPL-MCNC: 3.3 MG/DL (ref 2.7–4.5)
PLATELET # BLD AUTO: 146 K/UL (ref 150–350)
PMV BLD AUTO: 9.8 FL (ref 9.2–12.9)
POCT GLUCOSE: 103 MG/DL (ref 70–110)
POCT GLUCOSE: 110 MG/DL (ref 70–110)
POTASSIUM SERPL-SCNC: 3.8 MMOL/L (ref 3.5–5.1)
PROT SERPL-MCNC: 6.6 G/DL (ref 6–8.4)
RBC # BLD AUTO: 4 M/UL (ref 4.6–6.2)
SODIUM SERPL-SCNC: 133 MMOL/L (ref 136–145)
WBC # BLD AUTO: 5.76 K/UL (ref 3.9–12.7)

## 2020-01-10 PROCEDURE — 99233 SBSQ HOSP IP/OBS HIGH 50: CPT | Mod: ,,, | Performed by: PSYCHIATRY & NEUROLOGY

## 2020-01-10 PROCEDURE — 25000003 PHARM REV CODE 250: Performed by: STUDENT IN AN ORGANIZED HEALTH CARE EDUCATION/TRAINING PROGRAM

## 2020-01-10 PROCEDURE — 63600175 PHARM REV CODE 636 W HCPCS: Performed by: STUDENT IN AN ORGANIZED HEALTH CARE EDUCATION/TRAINING PROGRAM

## 2020-01-10 PROCEDURE — 99232 SBSQ HOSP IP/OBS MODERATE 35: CPT | Mod: ,,, | Performed by: INTERNAL MEDICINE

## 2020-01-10 PROCEDURE — 84100 ASSAY OF PHOSPHORUS: CPT

## 2020-01-10 PROCEDURE — 97535 SELF CARE MNGMENT TRAINING: CPT

## 2020-01-10 PROCEDURE — 36415 COLL VENOUS BLD VENIPUNCTURE: CPT

## 2020-01-10 PROCEDURE — 80053 COMPREHEN METABOLIC PANEL: CPT

## 2020-01-10 PROCEDURE — 83735 ASSAY OF MAGNESIUM: CPT

## 2020-01-10 PROCEDURE — 99233 PR SUBSEQUENT HOSPITAL CARE,LEVL III: ICD-10-PCS | Mod: ,,, | Performed by: PSYCHIATRY & NEUROLOGY

## 2020-01-10 PROCEDURE — 25000003 PHARM REV CODE 250: Performed by: EMERGENCY MEDICINE

## 2020-01-10 PROCEDURE — 85025 COMPLETE CBC W/AUTO DIFF WBC: CPT

## 2020-01-10 PROCEDURE — 11000001 HC ACUTE MED/SURG PRIVATE ROOM

## 2020-01-10 PROCEDURE — S4991 NICOTINE PATCH NONLEGEND: HCPCS | Performed by: STUDENT IN AN ORGANIZED HEALTH CARE EDUCATION/TRAINING PROGRAM

## 2020-01-10 PROCEDURE — 99232 PR SUBSEQUENT HOSPITAL CARE,LEVL II: ICD-10-PCS | Mod: ,,, | Performed by: INTERNAL MEDICINE

## 2020-01-10 RX ORDER — LORAZEPAM 1 MG/1
2 TABLET ORAL EVERY 4 HOURS PRN
Status: DISCONTINUED | OUTPATIENT
Start: 2020-01-10 | End: 2020-01-14 | Stop reason: HOSPADM

## 2020-01-10 RX ADMIN — FOLIC ACID 1 MG: 1 TABLET ORAL at 09:01

## 2020-01-10 RX ADMIN — ENOXAPARIN SODIUM 40 MG: 100 INJECTION SUBCUTANEOUS at 05:01

## 2020-01-10 RX ADMIN — DIAZEPAM 10 MG: 5 TABLET ORAL at 06:01

## 2020-01-10 RX ADMIN — NICOTINE 1 PATCH: 21 PATCH, EXTENDED RELEASE TRANSDERMAL at 09:01

## 2020-01-10 RX ADMIN — SENNOSIDES AND DOCUSATE SODIUM 1 TABLET: 8.6; 5 TABLET ORAL at 09:01

## 2020-01-10 RX ADMIN — PANTOPRAZOLE SODIUM 40 MG: 40 TABLET, DELAYED RELEASE ORAL at 09:01

## 2020-01-10 RX ADMIN — LACOSAMIDE 200 MG: 50 TABLET, FILM COATED ORAL at 08:01

## 2020-01-10 RX ADMIN — DIAZEPAM 10 MG: 5 TABLET ORAL at 05:01

## 2020-01-10 RX ADMIN — LORAZEPAM 2 MG: 1 TABLET ORAL at 02:01

## 2020-01-10 RX ADMIN — DIAZEPAM 10 MG: 5 TABLET ORAL at 12:01

## 2020-01-10 RX ADMIN — ATORVASTATIN CALCIUM 40 MG: 20 TABLET, FILM COATED ORAL at 09:01

## 2020-01-10 RX ADMIN — THERA TABS 1 TABLET: TAB at 09:01

## 2020-01-10 RX ADMIN — DIAZEPAM 10 MG: 5 TABLET ORAL at 01:01

## 2020-01-10 RX ADMIN — DIAZEPAM 10 MG: 5 TABLET ORAL at 11:01

## 2020-01-10 RX ADMIN — Medication 100 MG: at 09:01

## 2020-01-10 RX ADMIN — LACOSAMIDE 200 MG: 50 TABLET, FILM COATED ORAL at 09:01

## 2020-01-10 NOTE — ASSESSMENT & PLAN NOTE
Patient with a history of seizures with alcohol withdrawal    Plan  Continue home vimpat  Seizure precautions

## 2020-01-10 NOTE — PLAN OF CARE
Problem: Occupational Therapy Goal  Goal: Occupational Therapy Goal  Description  Goals to be met by: 1/18/2020     Patient will increase functional independence with ADLs by performing:    LE Dressing with Stand-by Assistance in seated position.  Grooming while standing at sink with Stand-by Assistance.   Bathing with Stand-by Assistance.  Toilet transfer to toilet with Stand by Assistance.     Pt met goal related to toileting with POC updated to correlate with pt progression. Continue OT as tolerated.  Noni Hitchcock OT  1/10/2020    Outcome: Ongoing, Progressing

## 2020-01-10 NOTE — PROGRESS NOTES
"[facility]  Psychiatry  Progress Note    Patient Name: Mirza Morales  MRN: [id number]   Code Status: Full Code  Admission Date: 1/7/2020  Hospital Length of Stay: 3 days  Expected Discharge Date: 1/10/2020  Attending Physician: Shona Jacinto MD  Primary Care Provider: Jud Mo MD    Current Legal Status: PEC      Subjective:     Principal Problem:Alcohol withdrawal    Chief Complaint: EtOH w/d and depression    HPI:   Per ED Note:      Alcohol Intoxication        Pt to ER via EMS from home for alcohol intoxication. Unsure who called EMS. Pt is A, A, O x 4. He was able to ambulate to stretcher with assistance at home. He drank a 5th of vodka or more. Hx of alcoholism.       HPI  Mr. Morales is a 64 y.o. male with HTN, chronic pain on oxycodone, seizures (on vimpat), alcoholism, and depression here today with alcohol intoxication. History mildly limited due to alcohol intoxication. States he has been drinking a lot recently because he feels he is at the end of his life. States he does not want to kill himself, but he no longer wants to live. Feels very depressed. States he thinks his dopamine levels are off. Denies fevers, chills, n/v, abd pain, chest pain, SOB, numbness, tingling, weakness, AVH, HI, SI.    On My Interview:  Mr. Morales is a 64 y.o. male with HTN, chronic pain on oxycodone, seizures (on vimpat), alcoholism, and depression  presented to ED with alcohol intoxication. Met with patient while patient was lying in his ED bed. He seemed tremulous, shaking all over, very anxious, dysphoric, and restless. He complained of alcohol withdrawal symptoms of nausea, abdominal pain, severe tremors, severe nervousness and diarrhea. He reported feeling very depressed, hopeless and stated that she feels he is at the end of his life. When he was asked if he is having suicidal thoughts he replied, "well, I feel very depressed. I cannot go outdoors anymore. I am isolated, I lost my well. I " "drink to feel better. I am tired of living. I feel it's my time. t if I get depressed enough I would fade away, quit living and just stop functioning and my body would die." He reported one prior suicide attempt by overdosing on oxycodone but stated it was not intentional. He stated that his current medications of latuda and trazodone are not working, He endorsed depressive symptoms of low mood, amotivation, anhedonia, decreased concentration, feeling of guilt, worthlessness, hopelessness and fatigue. He denied HI/AVH and no paranoia or delusions reported. Per chart he has diagnosis of bipolar but he denied having marielena or hypomania like episodes. He admitted to drinking 2 fifth or vodka daily, duration 30 years but with periods of sobriety, and his\ last use was this morning. He reported history of alcohol withdrawal seizures and Dt's.          Anamaria [phone]  Spoke with Anamaria, patient wife stated "my  has seizures and I am worried about him going through a grand mal seizure and dying. He has been gradually lowing his alcohol intake and he will be getting into seizures and I am afraid he will die. He mentioned a couple of times to me that he said he wants to die in his own bed. I could not let him lie there and die in his own bed. He is getting progressively worse. He sees a psychiatrics is Dr. Loco. I worry about his drinking, having seizures and wanting to die. I think it is not safe for him to come home and he needs treatment or he would die."    Psychiatric Review Of Systems - Is patient experiencing or having changes in:  sleep: yes  appetite: yes  weight: yes. Lost 15 lbs in a month  energy/anergy: yes   interest/pleasure/anhedonia: yes  somatic symptoms: yes  anxiety/panic: yes  guilty/hopelessness: yes  concentration: yes  S.I.B.s/risky behavior: no  Irritability: yes  Racing thoughts: yes  Impulsive behaviors: no  Paranoia:no  AVH:no  Suicidal thoughts/plan/intent: no      Hospital " "Course: 1/9/20  Met with patient while lying in his hospital bed. He reported mood is" better and I am on my way to detoxing" and his affect was euthymic.He was calm and cooperative. He is still presenting with alcohol withdrawal symptoms (elevated vitals, tremors, and anxiety). He denied feeling suicidal and stated, "I don't want to drink myself to death and I want to live that I am sober. There are reasons for me to live. " He reported improved depressive symptoms since hospitalization but continues to report feeling depressed and dysphoric. He is medication compliant and no side effects noted or reported. He denied SI/HI/AVH and no delusion noted or reported. Patient in interested in continuing his detox treatment at this facility and is not interested in rehab treatment afterwards.  PEC rescinded d/t pt not longer endorsing active SI.    01/10/2020  Pt w/persistent hypertension, unclear if 2/2 EtOH w/d or hx chronic HTN.  HR 80's-90's.  Over past 24h, pt has received 40mg Valium plus 12mg Ativan.  No PRN's required for agitation.  This morning, pt found resting awake in bed.  Responds flatly but appropriately when greeted.  Pt reports that his detox is complete and that he is ready to leave the hospital.  Pt is a self-described "binge drinker," which he describes as abstaining from EtOH for weeks or months, then drinking heavily for 3-4 weeks.  During binges, pt will drink 2/5's of liquor per day.  Pt attributes binges to his long hx of depression.  When depressive sx get to the point where "I can no longer bear it," he starts drinking heavily.  Pt states that during these periods he no longer gets out of bed, does not care for himself.  Pt tearful and describes grim future outlook, states that his wife would be better off without him, that there is no hope of ever getting better, that he will never feel comfortable in his own skin or have a day of reprieve from depressive sx.  Pt endorses multiple seizures 2/2 " "EtOH w/d.  Says first seizure was in his 60's.  Of note, neurosurgery eval'd pt in Sept 2019 and suspected EtOH etiology.  Pt states desire to leave hospital in spite of seizure risk.  Does not have an appreciation of the need for continued taper even when need for taper is explained, "I'm already detoxed, I don't need it anymore."  Endorses passive SI of death by seizure, says would no longer be a burden on his wife.    Spoke with pt's outpatient provider, who states that pt lives w/severe depression and pt's wife is not longer able to care for him.  Does not feel it would be safe for pt to leave the hospital.  Per chart, pt's wife emailed outpatient provider on 1/5 and staetd "I am looking at mental health facilities for adults... I cannot do this anymore."  Spoke with wife on telephone today, she states that prior to coming to the hospital pt would not get out of bed, was urinating on himself, was wholly dependent on her for care.  States she is not able or willing to continue providing this type of care.  She has full time job and cannot stay home with pt all day.  Does not feel it is safe for pt to return home at this time.      Interval History: See hospital course    Family History     Problem Relation (Age of Onset)    Alcohol abuse Mother, Maternal Uncle, Paternal Uncle, Cousin    Anxiety disorder Brother    Bipolar disorder Brother    Dementia Maternal Grandmother    Depression Mother, Father, Sister, Brother    Drug abuse Brother    Suicide Sister        Tobacco Use    Smoking status: Current Some Day Smoker     Packs/day: 0.25     Years: 10.00     Pack years: 2.50    Smokeless tobacco: Never Used   Substance and Sexual Activity    Alcohol use: No     Alcohol/week: 16.7 standard drinks     Types: 20 Standard drinks or equivalent per week     Comment: quit 4 years ago    Drug use: No    Sexual activity: Yes     Partners: Female     Comment: with wife; monogamous      Psychotherapeutics (From " "admission, onward)    Start     Stop Route Frequency Ordered    01/07/20 1900  diazePAM tablet 10 mg      -- Oral Every 6 hours 01/07/20 1756    01/07/20 1859  LORazepam tablet 2 mg      -- Oral Every 4 hours PRN 01/07/20 1800           Review of Systems   Constitutional: Negative for diaphoresis.   Gastrointestinal: Positive for diarrhea.   Neurological: Positive for tremors. Negative for seizures.     Objective:     Vital Signs (Most Recent):  Temp: 97.4 °F (36.3 °C) (01/10/20 1132)  Pulse: 93 (01/10/20 1132)  Resp: 18 (01/10/20 1132)  BP: (!) 152/96 (01/10/20 1132)  SpO2: 98 % (01/10/20 1132) Vital Signs (24h Range):  Temp:  [97.4 °F (36.3 °C)-98.6 °F (37 °C)] 97.4 °F (36.3 °C)  Pulse:  [84-94] 93  Resp:  [16-18] 18  SpO2:  [95 %-98 %] 98 %  BP: (149-185)/(88-97) 152/96     Height: 6' 5" (195.6 cm)  Weight: 94.1 kg (207 lb 7.3 oz)  Body mass index is 24.6 kg/m².      Intake/Output Summary (Last 24 hours) at 1/10/2020 1334  Last data filed at 1/10/2020 0400  Gross per 24 hour   Intake 560 ml   Output 725 ml   Net -165 ml       Physical Exam   Psychiatric:   Appearance: No apparent distress, age appropriate  Behavior: Apathetic  Orientation:  Self, place, situation, year  Speech: normal volume, rate, and tone  Language:  Fluent english  Mood: depressed  Affect: tearful, blunted  Thought Process: Linear and well-organized  Associations:  Logical and intact  Memory:  Recent and remote intact  Attention/Concentration:  Grossly intact  Fund of Knowledge:  Appropriate for level of education  Thought Content: Passive SI, denies HI, delusions, or paranoia  Perceptions:  Pt denies AVH and no objective s/s of AVH  Cognition: Intact, follows commands, appropriate contributions to interview  Insight: Poor  Judgment: Poor  Impulse Control:  Poor            Significant Labs:   Last 24 Hours:   Recent Lab Results       01/10/20  0800   01/10/20  0623   01/09/20 2040        Albumin   3.3       Alkaline Phosphatase   96       ALT "   21       Anion Gap   8       AST   33       Baso #   0.04       Basophil%   0.7       BILIRUBIN TOTAL   0.7  Comment:  For infants and newborns, interpretation of results should be based  on gestational age, weight and in agreement with clinical  observations.  Premature Infant recommended reference ranges:  Up to 24 hours.............<8.0 mg/dL  Up to 48 hours............<12.0 mg/dL  3-5 days..................<15.0 mg/dL  6-29 days.................<15.0 mg/dL         BUN, Bld   6       Calcium   9.2       Chloride   97       CO2   28       Creatinine   0.8       Differential Method   Automated       eGFR if    >60.0       eGFR if non    >60.0  Comment:  Calculation used to obtain the estimated glomerular filtration  rate (eGFR) is the CKD-EPI equation.          Eos #   0.1       Eosinophil%   2.1       Glucose   105       Gran # (ANC)   3.5       Gran%   60.4       Hematocrit   40.4       Hemoglobin   13.8       Immature Grans (Abs)   0.05  Comment:  Mild elevation in immature granulocytes is non specific and   can be seen in a variety of conditions including stress response,   acute inflammation, trauma and pregnancy. Correlation with other   laboratory and clinical findings is essential.         Immature Granulocytes   0.9       Lymph #   1.5       Lymph%   25.5       Magnesium   1.8       MCH   34.5       MCHC   34.2       MCV   101       Mono #   0.6       Mono%   10.4       MPV   9.8       nRBC   0       Phosphorus   3.3       Platelets   146       POCT Glucose 110   122     Potassium   3.8       PROTEIN TOTAL   6.6       RBC   4.00       RDW   11.8       Sodium   133       WBC   5.76             Significant Imaging: I have reviewed all pertinent imaging results/findings within the past 24 hours.    Assessment/Plan:     Severe depression  ASSESSMENT:       Major Depression, Severe, without psychotic features  Hx Bipolar Disorder    Based on my interview as well as on  information obtained from collateral and from chart review, pt appears to have been living with depressed mood for the past several months with an acute worsening approx 4 wks ago.  Upon interview, pt tearful and expressed feelings of hopelessness. He is anhedonic, reports decreased appetite, c/o insomnia and decreased energy levels as evidenced by not feeling rested when waking up in morning.   He expressed a sense of worthlessness at several points per HPI.  Indecisiveness noted during interview. Especially concerning is that he reported active SI as recently as 1/7 and continues to report thoughts of death by seizure. The aforementioned sx have a strong temporal correlation with acute worsening of mood sx 4 wks ago.  Sx appear to be causing significant distress and are clearly interfering with pt's social and occupational functioning (pt urinating on self in bed, not otherwise caring for self, not speaking to anyone other than his wife).  It should be noted that these sx exist in the context of heavy EtOH use so likely substance-induced component, but per all sources depressive sx exist in context of sobriety as well.  MSE findings c/w severe depression but not concerning for psychosis at this time.  Pt would benefit from continued tx for mood disorder, but primary focus at this time will be on safely detoxing from EtOH.  Most recent regimen includes Latuda and trazodone per chart.    PLAN:     - Placed PEC in paper chart d/t grave disability per above.  Pt aware, charge notified of need for sitter, and security is w/patient until sitter arrives.  - Hold home Latuda d/t in context of acute w/d as this medication can lower seizure threshold  - Will continue to monitor and will defer management until pt is more medically stable      Alcohol use disorder, severe, dependence    ASSESSMENT:       Alcohol Use Disorder, Severe, in Acute Withdrawal    Based on the presence of the following criteria:    1. Substance often  taken in larger amounts or over a longer period than intended    2. Persistent desire or unsuccessful efforts to cut down or control use    3. Great deal of time spent obtaining/using/recovering from substance/effects    4. Craving or strong desire to use    5. Recurrent use resulting in failure to fulfill obligations at work/school/home    6. Continued use despite causing persistent/recurrent social/interpersonal problems    7. Social/occupational/recreational activities reduced d/t substance use    8. Recurrent use in situations that are physically hazardous    9. Continued use despite knowledge of physical/psychological harm    10. Tolerance, as defined by:       A. Need for increased amount to achieve desired effect       B. Diminished effect with continued use of same amount    11. Withdrawal, as defined by:       A. Characteristic withdrawal symptoms       B. Substance taken to avoid withdrawal symptoms      PLAN:     It should be noted that pt has several risk factors for complicated EtOH w/d, including a hx of chronic and sustained EtOH consumption and well-documented hx of EtOH w/d sz. Furthermore, his age (63yo) and psychiatric comorbidities puts him at increased risk. Upon presentation, he was noted to begin experiencing w/d sx in spite of having BAL of 0.196.     - Vitals q4 around the clock  - Due to large quantity of benzo's required to stabilize pt and multitude of risk factors per above, would recommend changing PRN Ativan indication to CIWA 8+.   - Decrease scheduled Valium to 10mg TID on 1/11/20  - Daily Thiamine, Folic acid, vit. B12, and multivitamin supplements  - Medications that reduce the seizure threshold (including chlorpromazine, fluphenazine, haloperidol, bupropion) should be avoided during the acute detoxification period, defined as the latter of 96h from sobriety or 12 hours after pt exhibits stable vitals w/o need for prn benzo       Michel Marin MD  [facility],  PGY-2  Pager Number [phone]  [facility]

## 2020-01-10 NOTE — ASSESSMENT & PLAN NOTE
64 y.o. male with HTN, chronic pain on oxycodone, seizures (on vimpat), alcoholism, and depression who presents today after decreasing his alcohol intake. Patient has been binge drinking for months and has a long history of alcohol abuse. He has decreased his intake and is starting to have tremors, anxiety, and depression. Patient is displaying classic signs of alcohol withdrawal. Psych consulted in the ER and recommending hospital medicine admission for withdrawal. Vital signs have been stable    Plan  [facility] following and appreciate recs.   ALCOHOL/BENZO WITHDRAWAL PRECAUTIONS  Valium 10 mg q 6 hours with plan to taper. Will switch to 10 mg tid 1/11  Ativan 2 mg q4 hours prn for ciwa 8+  Vital signs q4 hours  Thiamine, Folic acid, Vit B12 and Multivitamin supplementation  Patient will have a sitter as well  On ciwa

## 2020-01-10 NOTE — ASSESSMENT & PLAN NOTE
ASSESSMENT:       Major Depression, Severe, without psychotic features  Hx Bipolar Disorder    Based on my interview as well as on information obtained from collateral and from chart review, pt appears to have been living with depressed mood for the past several months with an acute worsening approx 4 wks ago.  Upon interview, pt tearful and expressed feelings of hopelessness. He is anhedonic, reports decreased appetite, c/o insomnia and decreased energy levels as evidenced by not feeling rested when waking up in morning.   He expressed a sense of worthlessness at several points per HPI.  Indecisiveness noted during interview. Especially concerning is that he reported active SI as recently as 1/7 and continues to report thoughts of death by seizure. The aforementioned sx have a strong temporal correlation with acute worsening of mood sx 4 wks ago.  Sx appear to be causing significant distress and are clearly interfering with pt's social and occupational functioning (pt urinating on self in bed, not otherwise caring for self, not speaking to anyone other than his wife).  It should be noted that these sx exist in the context of heavy EtOH use so likely substance-induced component, but per all sources depressive sx exist in context of sobriety as well.  MSE findings c/w severe depression but not concerning for psychosis at this time.  Pt would benefit from continued tx for mood disorder, but primary focus at this time will be on safely detoxing from EtOH.  Most recent regimen includes Latuda and trazodone per chart.    PLAN:     - Placed PEC in paper chart d/t grave disability per above.  Pt aware, charge notified of need for sitter, and security is w/patient until sitter arrives.  - Hold home Latuda d/t in context of acute w/d as this medication can lower seizure threshold  - Will continue to monitor and will defer management until pt is more medically stable

## 2020-01-10 NOTE — HOSPITAL COURSE
"1/9/20  Met with patient while lying in his hospital bed. He reported mood is" better and I am on my way to detoxing" and his affect was euthymic.He was calm and cooperative. He is still presenting with alcohol withdrawal symptoms (elevated vitals, tremors, and anxiety). He denied feeling suicidal and stated, "I don't want to drink myself to death and I want to live that I am sober. There are reasons for me to live. " He reported improved depressive symptoms since hospitalization but continues to report feeling depressed and dysphoric. He is medication compliant and no side effects noted or reported. He denied SI/HI/AVH and no delusion noted or reported. Patient in interested in continuing his detox treatment at this facility and is not interested in rehab treatment afterwards. PEC rescinded d/t pt not longer endorsing active SI.    01/10/2020  Pt w/persistent hypertension, unclear if 2/2 EtOH w/d or hx chronic HTN.  HR 80's-90's.  Over past 24h, pt has received 40mg Valium plus 12mg Ativan.  No PRN's required for agitation.  This morning, pt found resting awake in bed.  Responds flatly but appropriately when greeted.  Pt reports that his detox is complete and that he is ready to leave the hospital.  Pt is a self-described "binge drinker," which he describes as abstaining from EtOH for weeks or months, then drinking heavily for 3-4 weeks.  During binges, pt will drink 2/5's of liquor per day.  Pt attributes binges to his long hx of depression.  When depressive sx get to the point where "I can no longer bear it," he starts drinking heavily.  Pt states that during these periods he no longer gets out of bed, does not care for himself.  Pt tearful and describes grim future outlook, states that his wife would be better off without him, that there is no hope of ever getting better, that he will never feel comfortable in his own skin or have a day of reprieve from depressive sx.  Pt endorses multiple seizures 2/2 EtOH w/d. " " Says first seizure was in his 60's.  Of note, neurosurgery eval'd pt in Sept 2019 and suspected EtOH etiology.  Pt states desire to leave hospital in spite of seizure risk.  Does not have an appreciation of the need for continued taper even when need for taper is explained, "I'm already detoxed, I don't need it anymore."  Endorses passive SI of death by seizure, says would no longer be a burden on his wife.    Spoke with pt's outpatient provider, who states that pt lives w/severe depression and pt's wife is not longer able to care for him.  Does not feel it would be safe for pt to leave the hospital.  Per chart, pt's wife emailed outpatient provider on 1/5 and staetd "I am looking at mental health facilities for adults... I cannot do this anymore."  Spoke with wife on telephone today, she states that prior to coming to the hospital pt would not get out of bed, was urinating on himself, was wholly dependent on her for care.  States she is not able or willing to continue providing this type of care.  She has full time job and cannot stay home with pt all day.  Does not feel it is safe for pt to return home at this time.    01/11/2020  Pt found resting awake in bed in NAD.  No complaints today.  Wants to go home, remains flat and listless.  Pt ambivalent about rehab.  Per chart, 50mg Valium yesterday, due to receive scheduled 40mg today.  No PRN Ativan required.  Remains mildly hypertensive.      01/12/2020  Pt resting in bed, awake, alert and in NAD. Focused on going home, minimizes his depression/anxiety by stating he only needs medication management. Acknowledges that he has been trialed on multiple antidepressants over the course of many years with little effect. Does not desire rehab. However, based on pt's presentation the last few days and great concern from wife regarding passive SI, avolition. Pt will continued to be PEC/CEC with plan to transfer to psychiatric facility once deemed medically stable by primary " team.    01/13/2020  Patient lying in bed, noted to have flat affect; monotonous speech. He reports withdrawals are controlled, did not require any PRNs. Patient contracted for safety d/t concerns about SI. Informed patient about transfer to inpatient psychiatric unit once medically stable and answered all questions.     01/14/2020  Again spoke with patient about inpatient psychiatric treatment to which he was amenable. Also spoke with patient about the possibility of ECT in the future.     Spoke with wife Josette on the phone; she is now in agreement with inpatient psychiatric treatment, knowing that the facility in [facility] is an [facility].

## 2020-01-10 NOTE — ASSESSMENT & PLAN NOTE
ASSESSMENT:       Alcohol Use Disorder, Severe, in Acute Withdrawal    Based on the presence of the following criteria:    1. Substance often taken in larger amounts or over a longer period than intended    2. Persistent desire or unsuccessful efforts to cut down or control use    3. Great deal of time spent obtaining/using/recovering from substance/effects    4. Craving or strong desire to use    5. Recurrent use resulting in failure to fulfill obligations at work/school/home    6. Continued use despite causing persistent/recurrent social/interpersonal problems    7. Social/occupational/recreational activities reduced d/t substance use    8. Recurrent use in situations that are physically hazardous    9. Continued use despite knowledge of physical/psychological harm    10. Tolerance, as defined by:       A. Need for increased amount to achieve desired effect       B. Diminished effect with continued use of same amount    11. Withdrawal, as defined by:       A. Characteristic withdrawal symptoms       B. Substance taken to avoid withdrawal symptoms      PLAN:     It should be noted that pt has several risk factors for complicated EtOH w/d, including a hx of chronic and sustained EtOH consumption and well-documented hx of EtOH w/d sz. Furthermore, his age (65yo) and psychiatric comorbidities puts him at increased risk. Upon presentation, he was noted to begin experiencing w/d sx in spite of having BAL of 0.196.     - Vitals q4 around the clock  - Due to large quantity of benzo's required to stabilize pt and multitude of risk factors per above, would recommend changing PRN Ativan indication to CIWA 8+.   - Decrease scheduled Valium to 10mg TID on 1/11/20  - Daily Thiamine, Folic acid, vit. B12, and multivitamin supplements  - Medications that reduce the seizure threshold (including chlorpromazine, fluphenazine, haloperidol, bupropion) should be avoided during the acute detoxification period, defined as the latter of  96h from sobriety or 12 hours after pt exhibits stable vitals w/o need for prn benzo

## 2020-01-10 NOTE — SUBJECTIVE & OBJECTIVE
Interval History: NAEON. Patient laying in bed comfortable. However, he is stating he wants to leave. Discussed that he is still at risk for withdrawal, He still wanted to leave. Psych talked to patient and determined he was PEC because he is at grave risk to himself due to no support. Wife would not agree to pick him up. Will continue to work to taper patient and give him the resources he needs to stop drinking.     Review of Systems  Objective:     Vital Signs (Most Recent):  Temp: 97.4 °F (36.3 °C) (01/10/20 1132)  Pulse: 93 (01/10/20 1132)  Resp: 18 (01/10/20 1132)  BP: (!) 152/96 (01/10/20 1132)  SpO2: 98 % (01/10/20 1132) Vital Signs (24h Range):  Temp:  [97.4 °F (36.3 °C)-98.6 °F (37 °C)] 97.4 °F (36.3 °C)  Pulse:  [84-94] 93  Resp:  [16-18] 18  SpO2:  [95 %-98 %] 98 %  BP: (149-185)/(88-97) 152/96     Weight: 94.1 kg (207 lb 7.3 oz)  Body mass index is 24.6 kg/m².    Intake/Output Summary (Last 24 hours) at 1/10/2020 1446  Last data filed at 1/10/2020 0400  Gross per 24 hour   Intake 560 ml   Output 725 ml   Net -165 ml      Physical Exam   Constitutional: He is oriented to person, place, and time. He appears well-developed and well-nourished. He appears distressed.   Patient laying in bed comfortably    HENT:   Head: Normocephalic and atraumatic.   Eyes: EOM are normal.   Neck: Normal range of motion. No JVD present.   Cardiovascular: Normal rate, regular rhythm, normal heart sounds and intact distal pulses. Exam reveals no gallop and no friction rub.   No murmur heard.  Pulmonary/Chest: Effort normal and breath sounds normal. No respiratory distress.   Abdominal: Soft. Bowel sounds are normal. He exhibits no distension. There is no tenderness.   Musculoskeletal: Normal range of motion. He exhibits no edema.   Neurological: He is alert and oriented to person, place, and time. No cranial nerve deficit.   Tremor much improved. No tongue fasiculations    Skin: Skin is warm and dry. No rash noted.    Psychiatric: He has a normal mood and affect.   Nursing note and vitals reviewed.      Significant Labs:   Recent Results (from the past 48 hour(s))   POCT glucose    Collection Time: 01/08/20  4:41 PM   Result Value Ref Range    POCT Glucose 128 (H) 70 - 110 mg/dL   POCT glucose    Collection Time: 01/09/20 12:13 AM   Result Value Ref Range    POCT Glucose 114 (H) 70 - 110 mg/dL   Comprehensive Metabolic Panel (CMP)    Collection Time: 01/09/20  3:58 AM   Result Value Ref Range    Sodium 133 (L) 136 - 145 mmol/L    Potassium 3.7 3.5 - 5.1 mmol/L    Chloride 97 95 - 110 mmol/L    CO2 27 23 - 29 mmol/L    Glucose 110 70 - 110 mg/dL    BUN, Bld 11 8 - 23 mg/dL    Creatinine 0.7 0.5 - 1.4 mg/dL    Calcium 9.4 8.7 - 10.5 mg/dL    Total Protein 6.2 6.0 - 8.4 g/dL    Albumin 3.1 (L) 3.5 - 5.2 g/dL    Total Bilirubin 0.7 0.1 - 1.0 mg/dL    Alkaline Phosphatase 93 55 - 135 U/L    AST 23 10 - 40 U/L    ALT 15 10 - 44 U/L    Anion Gap 9 8 - 16 mmol/L    eGFR if African American >60.0 >60 mL/min/1.73 m^2    eGFR if non African American >60.0 >60 mL/min/1.73 m^2   Magnesium    Collection Time: 01/09/20  3:58 AM   Result Value Ref Range    Magnesium 1.9 1.6 - 2.6 mg/dL   Phosphorus    Collection Time: 01/09/20  3:58 AM   Result Value Ref Range    Phosphorus 3.3 2.7 - 4.5 mg/dL   CBC with Automated Differential    Collection Time: 01/09/20  3:58 AM   Result Value Ref Range    WBC 5.53 3.90 - 12.70 K/uL    RBC 3.85 (L) 4.60 - 6.20 M/uL    Hemoglobin 13.5 (L) 14.0 - 18.0 g/dL    Hematocrit 38.8 (L) 40.0 - 54.0 %    Mean Corpuscular Volume 101 (H) 82 - 98 fL    Mean Corpuscular Hemoglobin 35.1 (H) 27.0 - 31.0 pg    Mean Corpuscular Hemoglobin Conc 34.8 32.0 - 36.0 g/dL    RDW 11.7 11.5 - 14.5 %    Platelets 138 (L) 150 - 350 K/uL    MPV 9.6 9.2 - 12.9 fL    Immature Granulocytes 0.7 (H) 0.0 - 0.5 %    Gran # (ANC) 3.5 1.8 - 7.7 K/uL    Immature Grans (Abs) 0.04 0.00 - 0.04 K/uL    Lymph # 1.3 1.0 - 4.8 K/uL    Mono # 0.6 0.3 - 1.0  K/uL    Eos # 0.1 0.0 - 0.5 K/uL    Baso # 0.03 0.00 - 0.20 K/uL    nRBC 0 0 /100 WBC    Gran% 63.4 38.0 - 73.0 %    Lymph% 23.0 18.0 - 48.0 %    Mono% 9.9 4.0 - 15.0 %    Eosinophil% 2.5 0.0 - 8.0 %    Basophil% 0.5 0.0 - 1.9 %    Differential Method Automated    POCT glucose    Collection Time: 01/09/20  7:43 AM   Result Value Ref Range    POCT Glucose 109 70 - 110 mg/dL   Folate    Collection Time: 01/09/20  9:15 AM   Result Value Ref Range    Folate >40.0 (H) 4.0 - 24.0 ng/mL   Vitamin B12    Collection Time: 01/09/20  9:15 AM   Result Value Ref Range    Vitamin B-12 942 210 - 950 pg/mL   POCT glucose    Collection Time: 01/09/20  1:04 PM   Result Value Ref Range    POCT Glucose 122 (H) 70 - 110 mg/dL   POCT glucose    Collection Time: 01/09/20  8:40 PM   Result Value Ref Range    POCT Glucose 122 (H) 70 - 110 mg/dL   Comprehensive Metabolic Panel (CMP)    Collection Time: 01/10/20  6:23 AM   Result Value Ref Range    Sodium 133 (L) 136 - 145 mmol/L    Potassium 3.8 3.5 - 5.1 mmol/L    Chloride 97 95 - 110 mmol/L    CO2 28 23 - 29 mmol/L    Glucose 105 70 - 110 mg/dL    BUN, Bld 6 (L) 8 - 23 mg/dL    Creatinine 0.8 0.5 - 1.4 mg/dL    Calcium 9.2 8.7 - 10.5 mg/dL    Total Protein 6.6 6.0 - 8.4 g/dL    Albumin 3.3 (L) 3.5 - 5.2 g/dL    Total Bilirubin 0.7 0.1 - 1.0 mg/dL    Alkaline Phosphatase 96 55 - 135 U/L    AST 33 10 - 40 U/L    ALT 21 10 - 44 U/L    Anion Gap 8 8 - 16 mmol/L    eGFR if African American >60.0 >60 mL/min/1.73 m^2    eGFR if non African American >60.0 >60 mL/min/1.73 m^2   Magnesium    Collection Time: 01/10/20  6:23 AM   Result Value Ref Range    Magnesium 1.8 1.6 - 2.6 mg/dL   Phosphorus    Collection Time: 01/10/20  6:23 AM   Result Value Ref Range    Phosphorus 3.3 2.7 - 4.5 mg/dL   CBC with Automated Differential    Collection Time: 01/10/20  6:23 AM   Result Value Ref Range    WBC 5.76 3.90 - 12.70 K/uL    RBC 4.00 (L) 4.60 - 6.20 M/uL    Hemoglobin 13.8 (L) 14.0 - 18.0 g/dL     Hematocrit 40.4 40.0 - 54.0 %    Mean Corpuscular Volume 101 (H) 82 - 98 fL    Mean Corpuscular Hemoglobin 34.5 (H) 27.0 - 31.0 pg    Mean Corpuscular Hemoglobin Conc 34.2 32.0 - 36.0 g/dL    RDW 11.8 11.5 - 14.5 %    Platelets 146 (L) 150 - 350 K/uL    MPV 9.8 9.2 - 12.9 fL    Immature Granulocytes 0.9 (H) 0.0 - 0.5 %    Gran # (ANC) 3.5 1.8 - 7.7 K/uL    Immature Grans (Abs) 0.05 (H) 0.00 - 0.04 K/uL    Lymph # 1.5 1.0 - 4.8 K/uL    Mono # 0.6 0.3 - 1.0 K/uL    Eos # 0.1 0.0 - 0.5 K/uL    Baso # 0.04 0.00 - 0.20 K/uL    nRBC 0 0 /100 WBC    Gran% 60.4 38.0 - 73.0 %    Lymph% 25.5 18.0 - 48.0 %    Mono% 10.4 4.0 - 15.0 %    Eosinophil% 2.1 0.0 - 8.0 %    Basophil% 0.7 0.0 - 1.9 %    Differential Method Automated    POCT glucose    Collection Time: 01/10/20  8:00 AM   Result Value Ref Range    POCT Glucose 110 70 - 110 mg/dL         Significant Imaging:   Imaging Results          X-Ray Chest AP Portable (Final result)  Result time 01/07/20 18:58:36    Final result by Rafael Willett MD (01/07/20 18:58:36)                 Impression:      1. Allowing for patient positioning, no convincing large focal consolidation noting left basilar subsegmental atelectasis or scarring.      Electronically signed by: Rafael Willett MD  Date:    01/07/2020  Time:    18:58             Narrative:    EXAMINATION:  XR CHEST AP PORTABLE    CLINICAL HISTORY:  Rule out infection;    TECHNIQUE:  Single frontal view of the chest was performed.    COMPARISON:  10/03/2019    FINDINGS:  The cardiomediastinal silhouette is not enlarged.  There is no pleural effusion.  The trachea is midline.  The lungs are symmetrically expanded bilaterally with mildly coarse interstitial attenuation.  There is left basilar subsegmental atelectasis or scarring..  No large focal consolidation seen.  There is no pneumothorax.  The osseous structures are remarkable for degenerative change..

## 2020-01-10 NOTE — PROGRESS NOTES
[facility] Hospital Medicine  Progress Note    Patient Name: Mirza Morales  MRN: [id number]  Patient Class: IP- Inpatient   Admission Date: 1/7/2020  Length of Stay: 3 days  Attending Physician: Shona Jacinto MD  Primary Care Provider: Jud Mo MD    [place] [facility] [place] Cal Quintero MD    Subjective:     Principal Problem:Alcohol withdrawal        HPI:  64 y.o. male with HTN, chronic pain on oxycodone, seizures (on vimpat), alcoholism, and depression here today with alcohol intoxication. Patient has been drinking about a fifth of vodka a day for weeks. According to his wife he has actually lowered his alcohol intake the past few days and is worried about him going through withdrawal. Says he has a history of seizures when he stops drinking. Patient says he has been anxious, having tremors, nausea, and depressed. He has been depressed for quite sometime and now he knows needs help.     In the ED, the patient was evaluated by psych and the ICU. Psyched PEC the patient and want to have him admitted to hospital medicine for alcohol withdrawal. The  ICU evaluated and state the patient is hemodynamically stable on the floor. On evaluation by hospital medicine, patient is lying in bed with his wife at bedside. He is agreeable to come into the hospital for treatment.       Overview/Hospital Course:  1/8/2020: Patient is still requiring PRN lorazepam for alcohol withdrawal. He has tongue fasciculations and hands tremors.     Interval History: NAEON. Patient laying in bed comfortable. However, he is stating he wants to leave. Discussed that he is still at risk for withdrawal, He still wanted to leave. Psych talked to patient and determined he was PEC because he is at grave risk to himself due to no support. Wife would not agree to pick him up. Will continue to work to taper patient and give him the resources he needs to stop drinking.     Review of Systems  Objective:      Vital Signs (Most Recent):  Temp: 97.4 °F (36.3 °C) (01/10/20 1132)  Pulse: 93 (01/10/20 1132)  Resp: 18 (01/10/20 1132)  BP: (!) 152/96 (01/10/20 1132)  SpO2: 98 % (01/10/20 1132) Vital Signs (24h Range):  Temp:  [97.4 °F (36.3 °C)-98.6 °F (37 °C)] 97.4 °F (36.3 °C)  Pulse:  [84-94] 93  Resp:  [16-18] 18  SpO2:  [95 %-98 %] 98 %  BP: (149-185)/(88-97) 152/96     Weight: 94.1 kg (207 lb 7.3 oz)  Body mass index is 24.6 kg/m².    Intake/Output Summary (Last 24 hours) at 1/10/2020 1446  Last data filed at 1/10/2020 0400  Gross per 24 hour   Intake 560 ml   Output 725 ml   Net -165 ml      Physical Exam   Constitutional: He is oriented to person, place, and time. He appears well-developed and well-nourished. He appears distressed.   Patient laying in bed comfortably    HENT:   Head: Normocephalic and atraumatic.   Eyes: EOM are normal.   Neck: Normal range of motion. No JVD present.   Cardiovascular: Normal rate, regular rhythm, normal heart sounds and intact distal pulses. Exam reveals no gallop and no friction rub.   No murmur heard.  Pulmonary/Chest: Effort normal and breath sounds normal. No respiratory distress.   Abdominal: Soft. Bowel sounds are normal. He exhibits no distension. There is no tenderness.   Musculoskeletal: Normal range of motion. He exhibits no edema.   Neurological: He is alert and oriented to person, place, and time. No cranial nerve deficit.   Tremor much improved. No tongue fasiculations    Skin: Skin is warm and dry. No rash noted.   Psychiatric: He has a normal mood and affect.   Nursing note and vitals reviewed.      Significant Labs:   Recent Results (from the past 48 hour(s))   POCT glucose    Collection Time: 01/08/20  4:41 PM   Result Value Ref Range    POCT Glucose 128 (H) 70 - 110 mg/dL   POCT glucose    Collection Time: 01/09/20 12:13 AM   Result Value Ref Range    POCT Glucose 114 (H) 70 - 110 mg/dL   Comprehensive Metabolic Panel (CMP)    Collection Time: 01/09/20  3:58 AM    Result Value Ref Range    Sodium 133 (L) 136 - 145 mmol/L    Potassium 3.7 3.5 - 5.1 mmol/L    Chloride 97 95 - 110 mmol/L    CO2 27 23 - 29 mmol/L    Glucose 110 70 - 110 mg/dL    BUN, Bld 11 8 - 23 mg/dL    Creatinine 0.7 0.5 - 1.4 mg/dL    Calcium 9.4 8.7 - 10.5 mg/dL    Total Protein 6.2 6.0 - 8.4 g/dL    Albumin 3.1 (L) 3.5 - 5.2 g/dL    Total Bilirubin 0.7 0.1 - 1.0 mg/dL    Alkaline Phosphatase 93 55 - 135 U/L    AST 23 10 - 40 U/L    ALT 15 10 - 44 U/L    Anion Gap 9 8 - 16 mmol/L    eGFR if African American >60.0 >60 mL/min/1.73 m^2    eGFR if non African American >60.0 >60 mL/min/1.73 m^2   Magnesium    Collection Time: 01/09/20  3:58 AM   Result Value Ref Range    Magnesium 1.9 1.6 - 2.6 mg/dL   Phosphorus    Collection Time: 01/09/20  3:58 AM   Result Value Ref Range    Phosphorus 3.3 2.7 - 4.5 mg/dL   CBC with Automated Differential    Collection Time: 01/09/20  3:58 AM   Result Value Ref Range    WBC 5.53 3.90 - 12.70 K/uL    RBC 3.85 (L) 4.60 - 6.20 M/uL    Hemoglobin 13.5 (L) 14.0 - 18.0 g/dL    Hematocrit 38.8 (L) 40.0 - 54.0 %    Mean Corpuscular Volume 101 (H) 82 - 98 fL    Mean Corpuscular Hemoglobin 35.1 (H) 27.0 - 31.0 pg    Mean Corpuscular Hemoglobin Conc 34.8 32.0 - 36.0 g/dL    RDW 11.7 11.5 - 14.5 %    Platelets 138 (L) 150 - 350 K/uL    MPV 9.6 9.2 - 12.9 fL    Immature Granulocytes 0.7 (H) 0.0 - 0.5 %    Gran # (ANC) 3.5 1.8 - 7.7 K/uL    Immature Grans (Abs) 0.04 0.00 - 0.04 K/uL    Lymph # 1.3 1.0 - 4.8 K/uL    Mono # 0.6 0.3 - 1.0 K/uL    Eos # 0.1 0.0 - 0.5 K/uL    Baso # 0.03 0.00 - 0.20 K/uL    nRBC 0 0 /100 WBC    Gran% 63.4 38.0 - 73.0 %    Lymph% 23.0 18.0 - 48.0 %    Mono% 9.9 4.0 - 15.0 %    Eosinophil% 2.5 0.0 - 8.0 %    Basophil% 0.5 0.0 - 1.9 %    Differential Method Automated    POCT glucose    Collection Time: 01/09/20  7:43 AM   Result Value Ref Range    POCT Glucose 109 70 - 110 mg/dL   Folate    Collection Time: 01/09/20  9:15 AM   Result Value Ref Range    Folate  >40.0 (H) 4.0 - 24.0 ng/mL   Vitamin B12    Collection Time: 01/09/20  9:15 AM   Result Value Ref Range    Vitamin B-12 942 210 - 950 pg/mL   POCT glucose    Collection Time: 01/09/20  1:04 PM   Result Value Ref Range    POCT Glucose 122 (H) 70 - 110 mg/dL   POCT glucose    Collection Time: 01/09/20  8:40 PM   Result Value Ref Range    POCT Glucose 122 (H) 70 - 110 mg/dL   Comprehensive Metabolic Panel (CMP)    Collection Time: 01/10/20  6:23 AM   Result Value Ref Range    Sodium 133 (L) 136 - 145 mmol/L    Potassium 3.8 3.5 - 5.1 mmol/L    Chloride 97 95 - 110 mmol/L    CO2 28 23 - 29 mmol/L    Glucose 105 70 - 110 mg/dL    BUN, Bld 6 (L) 8 - 23 mg/dL    Creatinine 0.8 0.5 - 1.4 mg/dL    Calcium 9.2 8.7 - 10.5 mg/dL    Total Protein 6.6 6.0 - 8.4 g/dL    Albumin 3.3 (L) 3.5 - 5.2 g/dL    Total Bilirubin 0.7 0.1 - 1.0 mg/dL    Alkaline Phosphatase 96 55 - 135 U/L    AST 33 10 - 40 U/L    ALT 21 10 - 44 U/L    Anion Gap 8 8 - 16 mmol/L    eGFR if African American >60.0 >60 mL/min/1.73 m^2    eGFR if non African American >60.0 >60 mL/min/1.73 m^2   Magnesium    Collection Time: 01/10/20  6:23 AM   Result Value Ref Range    Magnesium 1.8 1.6 - 2.6 mg/dL   Phosphorus    Collection Time: 01/10/20  6:23 AM   Result Value Ref Range    Phosphorus 3.3 2.7 - 4.5 mg/dL   CBC with Automated Differential    Collection Time: 01/10/20  6:23 AM   Result Value Ref Range    WBC 5.76 3.90 - 12.70 K/uL    RBC 4.00 (L) 4.60 - 6.20 M/uL    Hemoglobin 13.8 (L) 14.0 - 18.0 g/dL    Hematocrit 40.4 40.0 - 54.0 %    Mean Corpuscular Volume 101 (H) 82 - 98 fL    Mean Corpuscular Hemoglobin 34.5 (H) 27.0 - 31.0 pg    Mean Corpuscular Hemoglobin Conc 34.2 32.0 - 36.0 g/dL    RDW 11.8 11.5 - 14.5 %    Platelets 146 (L) 150 - 350 K/uL    MPV 9.8 9.2 - 12.9 fL    Immature Granulocytes 0.9 (H) 0.0 - 0.5 %    Gran # (ANC) 3.5 1.8 - 7.7 K/uL    Immature Grans (Abs) 0.05 (H) 0.00 - 0.04 K/uL    Lymph # 1.5 1.0 - 4.8 K/uL    Mono # 0.6 0.3 - 1.0 K/uL     Eos # 0.1 0.0 - 0.5 K/uL    Baso # 0.04 0.00 - 0.20 K/uL    nRBC 0 0 /100 WBC    Gran% 60.4 38.0 - 73.0 %    Lymph% 25.5 18.0 - 48.0 %    Mono% 10.4 4.0 - 15.0 %    Eosinophil% 2.1 0.0 - 8.0 %    Basophil% 0.7 0.0 - 1.9 %    Differential Method Automated    POCT glucose    Collection Time: 01/10/20  8:00 AM   Result Value Ref Range    POCT Glucose 110 70 - 110 mg/dL         Significant Imaging:   Imaging Results          X-Ray Chest AP Portable (Final result)  Result time 01/07/20 18:58:36    Final result by Rafael Willett MD (01/07/20 18:58:36)                 Impression:      1. Allowing for patient positioning, no convincing large focal consolidation noting left basilar subsegmental atelectasis or scarring.      Electronically signed by: Rafael Willett MD  Date:    01/07/2020  Time:    18:58             Narrative:    EXAMINATION:  XR CHEST AP PORTABLE    CLINICAL HISTORY:  Rule out infection;    TECHNIQUE:  Single frontal view of the chest was performed.    COMPARISON:  10/03/2019    FINDINGS:  The cardiomediastinal silhouette is not enlarged.  There is no pleural effusion.  The trachea is midline.  The lungs are symmetrically expanded bilaterally with mildly coarse interstitial attenuation.  There is left basilar subsegmental atelectasis or scarring..  No large focal consolidation seen.  There is no pneumothorax.  The osseous structures are remarkable for degenerative change..                                    Assessment/Plan:      * Alcohol withdrawal  64 y.o. male with HTN, chronic pain on oxycodone, seizures (on vimpat), alcoholism, and depression who presents today after decreasing his alcohol intake. Patient has been binge drinking for months and has a long history of alcohol abuse. He has decreased his intake and is starting to have tremors, anxiety, and depression. Patient is displaying classic signs of alcohol withdrawal. Psych consulted in the ER and recommending hospital medicine admission for  withdrawal. Vital signs have been stable    Plan  [facility] following and appreciate recs.   ALCOHOL/BENZO WITHDRAWAL PRECAUTIONS  Valium 10 mg q 6 hours with plan to taper. Will switch to 10 mg tid 1/11  Ativan 2 mg q4 hours prn for ciwa 8+  Vital signs q4 hours  Thiamine, Folic acid, Vit B12 and Multivitamin supplementation  Patient will have a sitter as well  On ciwa        Seizure disorder  Patient with a history of seizures with alcohol withdrawal    Plan  Continue home vimpat  Seizure precautions       Debility  PT/OT      Severe depression  Psych already consulted on the patient. We will help get patient medically stable to allow for psychiatric treatment        VTE Risk Mitigation (From admission, onward)         Ordered     enoxaparin injection 40 mg  Daily      01/07/20 1800                      Cal Quintero MD  Department of Hospital Medicine   [facility]

## 2020-01-10 NOTE — SUBJECTIVE & OBJECTIVE
"Interval History: See hospital course    Family History     Problem Relation (Age of Onset)    Alcohol abuse Mother, Maternal Uncle, Paternal Uncle, Cousin    Anxiety disorder Brother    Bipolar disorder Brother    Dementia Maternal Grandmother    Depression Mother, Father, Sister, Brother    Drug abuse Brother    Suicide Sister        Tobacco Use    Smoking status: Current Some Day Smoker     Packs/day: 0.25     Years: 10.00     Pack years: 2.50    Smokeless tobacco: Never Used   Substance and Sexual Activity    Alcohol use: No     Alcohol/week: 16.7 standard drinks     Types: 20 Standard drinks or equivalent per week     Comment: quit 4 years ago    Drug use: No    Sexual activity: Yes     Partners: Female     Comment: with wife; monogamous      Psychotherapeutics (From admission, onward)    Start     Stop Route Frequency Ordered    01/07/20 1900  diazePAM tablet 10 mg      -- Oral Every 6 hours 01/07/20 1756    01/07/20 1859  LORazepam tablet 2 mg      -- Oral Every 4 hours PRN 01/07/20 1800           Review of Systems   Constitutional: Negative for diaphoresis.   Gastrointestinal: Positive for diarrhea.   Neurological: Positive for tremors. Negative for seizures.     Objective:     Vital Signs (Most Recent):  Temp: 97.4 °F (36.3 °C) (01/10/20 1132)  Pulse: 93 (01/10/20 1132)  Resp: 18 (01/10/20 1132)  BP: (!) 152/96 (01/10/20 1132)  SpO2: 98 % (01/10/20 1132) Vital Signs (24h Range):  Temp:  [97.4 °F (36.3 °C)-98.6 °F (37 °C)] 97.4 °F (36.3 °C)  Pulse:  [84-94] 93  Resp:  [16-18] 18  SpO2:  [95 %-98 %] 98 %  BP: (149-185)/(88-97) 152/96     Height: 6' 5" (195.6 cm)  Weight: 94.1 kg (207 lb 7.3 oz)  Body mass index is 24.6 kg/m².      Intake/Output Summary (Last 24 hours) at 1/10/2020 1334  Last data filed at 1/10/2020 0400  Gross per 24 hour   Intake 560 ml   Output 725 ml   Net -165 ml       Physical Exam   Psychiatric:   Appearance: No apparent distress, age appropriate  Behavior: Apathetic  Orientation:  " Self, place, situation, year  Speech: normal volume, rate, and tone  Language:  Fluent english  Mood: depressed  Affect: tearful, blunted  Thought Process: Linear and well-organized  Associations:  Logical and intact  Memory:  Recent and remote intact  Attention/Concentration:  Grossly intact  Fund of Knowledge:  Appropriate for level of education  Thought Content: Passive SI, denies HI, delusions, or paranoia  Perceptions:  Pt denies AVH and no objective s/s of AVH  Cognition: Intact, follows commands, appropriate contributions to interview  Insight: Poor  Judgment: Poor  Impulse Control:  Poor            Significant Labs:   Last 24 Hours:   Recent Lab Results       01/10/20  0800   01/10/20  0623   01/09/20  2040        Albumin   3.3       Alkaline Phosphatase   96       ALT   21       Anion Gap   8       AST   33       Baso #   0.04       Basophil%   0.7       BILIRUBIN TOTAL   0.7  Comment:  For infants and newborns, interpretation of results should be based  on gestational age, weight and in agreement with clinical  observations.  Premature Infant recommended reference ranges:  Up to 24 hours.............<8.0 mg/dL  Up to 48 hours............<12.0 mg/dL  3-5 days..................<15.0 mg/dL  6-29 days.................<15.0 mg/dL         BUN, Bld   6       Calcium   9.2       Chloride   97       CO2   28       Creatinine   0.8       Differential Method   Automated       eGFR if    >60.0       eGFR if non    >60.0  Comment:  Calculation used to obtain the estimated glomerular filtration  rate (eGFR) is the CKD-EPI equation.          Eos #   0.1       Eosinophil%   2.1       Glucose   105       Gran # (ANC)   3.5       Gran%   60.4       Hematocrit   40.4       Hemoglobin   13.8       Immature Grans (Abs)   0.05  Comment:  Mild elevation in immature granulocytes is non specific and   can be seen in a variety of conditions including stress response,   acute inflammation, trauma and  pregnancy. Correlation with other   laboratory and clinical findings is essential.         Immature Granulocytes   0.9       Lymph #   1.5       Lymph%   25.5       Magnesium   1.8       MCH   34.5       MCHC   34.2       MCV   101       Mono #   0.6       Mono%   10.4       MPV   9.8       nRBC   0       Phosphorus   3.3       Platelets   146       POCT Glucose 110   122     Potassium   3.8       PROTEIN TOTAL   6.6       RBC   4.00       RDW   11.8       Sodium   133       WBC   5.76             Significant Imaging: I have reviewed all pertinent imaging results/findings within the past 24 hours.

## 2020-01-11 LAB
ALBUMIN SERPL BCP-MCNC: 3.1 G/DL (ref 3.5–5.2)
ALP SERPL-CCNC: 91 U/L (ref 55–135)
ALT SERPL W/O P-5'-P-CCNC: 32 U/L (ref 10–44)
ANION GAP SERPL CALC-SCNC: 7 MMOL/L (ref 8–16)
AST SERPL-CCNC: 38 U/L (ref 10–40)
BASOPHILS # BLD AUTO: 0.02 K/UL (ref 0–0.2)
BASOPHILS NFR BLD: 0.4 % (ref 0–1.9)
BILIRUB SERPL-MCNC: 0.5 MG/DL (ref 0.1–1)
BUN SERPL-MCNC: 7 MG/DL (ref 8–23)
CALCIUM SERPL-MCNC: 9.7 MG/DL (ref 8.7–10.5)
CHLORIDE SERPL-SCNC: 100 MMOL/L (ref 95–110)
CO2 SERPL-SCNC: 28 MMOL/L (ref 23–29)
CREAT SERPL-MCNC: 0.7 MG/DL (ref 0.5–1.4)
DIFFERENTIAL METHOD: ABNORMAL
EOSINOPHIL # BLD AUTO: 0.1 K/UL (ref 0–0.5)
EOSINOPHIL NFR BLD: 2.2 % (ref 0–8)
ERYTHROCYTE [DISTWIDTH] IN BLOOD BY AUTOMATED COUNT: 11.9 % (ref 11.5–14.5)
EST. GFR  (AFRICAN AMERICAN): >60 ML/MIN/1.73 M^2
EST. GFR  (NON AFRICAN AMERICAN): >60 ML/MIN/1.73 M^2
GLUCOSE SERPL-MCNC: 113 MG/DL (ref 70–110)
HCT VFR BLD AUTO: 39.7 % (ref 40–54)
HGB BLD-MCNC: 13.6 G/DL (ref 14–18)
IMM GRANULOCYTES # BLD AUTO: 0.05 K/UL (ref 0–0.04)
IMM GRANULOCYTES NFR BLD AUTO: 0.9 % (ref 0–0.5)
LYMPHOCYTES # BLD AUTO: 1.3 K/UL (ref 1–4.8)
LYMPHOCYTES NFR BLD: 23.7 % (ref 18–48)
MAGNESIUM SERPL-MCNC: 1.9 MG/DL (ref 1.6–2.6)
MCH RBC QN AUTO: 34.5 PG (ref 27–31)
MCHC RBC AUTO-ENTMCNC: 34.3 G/DL (ref 32–36)
MCV RBC AUTO: 101 FL (ref 82–98)
MONOCYTES # BLD AUTO: 0.6 K/UL (ref 0.3–1)
MONOCYTES NFR BLD: 10.2 % (ref 4–15)
NEUTROPHILS # BLD AUTO: 3.4 K/UL (ref 1.8–7.7)
NEUTROPHILS NFR BLD: 62.6 % (ref 38–73)
NRBC BLD-RTO: 0 /100 WBC
PHOSPHATE SERPL-MCNC: 3.7 MG/DL (ref 2.7–4.5)
PLATELET # BLD AUTO: 155 K/UL (ref 150–350)
PMV BLD AUTO: 9.8 FL (ref 9.2–12.9)
POCT GLUCOSE: 108 MG/DL (ref 70–110)
POTASSIUM SERPL-SCNC: 3.8 MMOL/L (ref 3.5–5.1)
PROT SERPL-MCNC: 6.4 G/DL (ref 6–8.4)
RBC # BLD AUTO: 3.94 M/UL (ref 4.6–6.2)
SODIUM SERPL-SCNC: 135 MMOL/L (ref 136–145)
WBC # BLD AUTO: 5.41 K/UL (ref 3.9–12.7)

## 2020-01-11 PROCEDURE — 25000003 PHARM REV CODE 250: Performed by: STUDENT IN AN ORGANIZED HEALTH CARE EDUCATION/TRAINING PROGRAM

## 2020-01-11 PROCEDURE — 36415 COLL VENOUS BLD VENIPUNCTURE: CPT

## 2020-01-11 PROCEDURE — 25000003 PHARM REV CODE 250: Performed by: EMERGENCY MEDICINE

## 2020-01-11 PROCEDURE — 84100 ASSAY OF PHOSPHORUS: CPT

## 2020-01-11 PROCEDURE — 83735 ASSAY OF MAGNESIUM: CPT

## 2020-01-11 PROCEDURE — S4991 NICOTINE PATCH NONLEGEND: HCPCS | Performed by: STUDENT IN AN ORGANIZED HEALTH CARE EDUCATION/TRAINING PROGRAM

## 2020-01-11 PROCEDURE — 99232 SBSQ HOSP IP/OBS MODERATE 35: CPT | Mod: ,,, | Performed by: INTERNAL MEDICINE

## 2020-01-11 PROCEDURE — 11000001 HC ACUTE MED/SURG PRIVATE ROOM

## 2020-01-11 PROCEDURE — 85025 COMPLETE CBC W/AUTO DIFF WBC: CPT

## 2020-01-11 PROCEDURE — 63600175 PHARM REV CODE 636 W HCPCS: Performed by: STUDENT IN AN ORGANIZED HEALTH CARE EDUCATION/TRAINING PROGRAM

## 2020-01-11 PROCEDURE — 80053 COMPREHEN METABOLIC PANEL: CPT

## 2020-01-11 PROCEDURE — 99232 PR SUBSEQUENT HOSPITAL CARE,LEVL II: ICD-10-PCS | Mod: ,,, | Performed by: INTERNAL MEDICINE

## 2020-01-11 RX ORDER — DIAZEPAM 5 MG/1
10 TABLET ORAL 3 TIMES DAILY
Status: DISCONTINUED | OUTPATIENT
Start: 2020-01-11 | End: 2020-01-11

## 2020-01-11 RX ORDER — DIAZEPAM 5 MG/1
10 TABLET ORAL 3 TIMES DAILY
Status: DISCONTINUED | OUTPATIENT
Start: 2020-01-11 | End: 2020-01-12

## 2020-01-11 RX ORDER — TALC
6 POWDER (GRAM) TOPICAL NIGHTLY PRN
Status: DISCONTINUED | OUTPATIENT
Start: 2020-01-11 | End: 2020-01-14 | Stop reason: HOSPADM

## 2020-01-11 RX ADMIN — LACOSAMIDE 200 MG: 50 TABLET, FILM COATED ORAL at 09:01

## 2020-01-11 RX ADMIN — THERA TABS 1 TABLET: TAB at 08:01

## 2020-01-11 RX ADMIN — SENNOSIDES AND DOCUSATE SODIUM 1 TABLET: 8.6; 5 TABLET ORAL at 08:01

## 2020-01-11 RX ADMIN — NICOTINE 1 PATCH: 21 PATCH, EXTENDED RELEASE TRANSDERMAL at 08:01

## 2020-01-11 RX ADMIN — FOLIC ACID 1 MG: 1 TABLET ORAL at 08:01

## 2020-01-11 RX ADMIN — DIAZEPAM 10 MG: 5 TABLET ORAL at 05:01

## 2020-01-11 RX ADMIN — ENOXAPARIN SODIUM 40 MG: 100 INJECTION SUBCUTANEOUS at 04:01

## 2020-01-11 RX ADMIN — PANTOPRAZOLE SODIUM 40 MG: 40 TABLET, DELAYED RELEASE ORAL at 08:01

## 2020-01-11 RX ADMIN — Medication 6 MG: at 01:01

## 2020-01-11 RX ADMIN — LACOSAMIDE 200 MG: 50 TABLET, FILM COATED ORAL at 08:01

## 2020-01-11 RX ADMIN — DIAZEPAM 10 MG: 5 TABLET ORAL at 09:01

## 2020-01-11 RX ADMIN — ATORVASTATIN CALCIUM 40 MG: 20 TABLET, FILM COATED ORAL at 08:01

## 2020-01-11 RX ADMIN — Medication 6 MG: at 09:01

## 2020-01-11 RX ADMIN — Medication 100 MG: at 08:01

## 2020-01-11 RX ADMIN — DIAZEPAM 10 MG: 5 TABLET ORAL at 01:01

## 2020-01-11 NOTE — PROGRESS NOTES
[facility] Hospital Medicine  Progress Note    Patient Name: Mirza Morales  MRN: [id number]  Patient Class: IP- Inpatient   Admission Date: 1/7/2020  Length of Stay: 4 days  Attending Physician: Shona Jacinto MD  Primary Care Provider: Jud Mo MD    Hospital Medicine Team: [facility] HOSP MED 2 Cal Quintero MD    Subjective:     Principal Problem:Alcohol withdrawal        HPI:  64 y.o. male with HTN, chronic pain on oxycodone, seizures (on vimpat), alcoholism, and depression here today with alcohol intoxication. Patient has been drinking about a fifth of vodka a day for weeks. According to his wife he has actually lowered his alcohol intake the past few days and is worried about him going through withdrawal. Says he has a history of seizures when he stops drinking. Patient says he has been anxious, having tremors, nausea, and depressed. He has been depressed for quite sometime and now he knows needs help.     In the ED, the patient was evaluated by psych and the ICU. Psyched PEC the patient and want to have him admitted to hospital medicine for alcohol withdrawal. The  ICU evaluated and state the patient is hemodynamically stable on the floor. On evaluation by hospital medicine, patient is lying in bed with his wife at bedside. He is agreeable to come into the hospital for treatment.       Overview/Hospital Course:  1/8/2020: Patient is still requiring PRN lorazepam for alcohol withdrawal. He has tongue fasciculations and hands tremors.     Interval History: NAEON. Patient doing better today. Not asking to leave and understands he still needs treatment. No suicidal ideation. Starting to taper benzos per psych recs      Objective:     Vital Signs (Most Recent):  Temp: 98 °F (36.7 °C) (01/11/20 1153)  Pulse: 86 (01/11/20 1153)  Resp: 19 (01/11/20 1153)  BP: 138/88 (01/11/20 1153)  SpO2: 96 % (01/11/20 1153) Vital Signs (24h Range):  Temp:  [97.8 °F (36.6 °C)-98.6 °F (37 °C)] 98 °F (36.7  °C)  Pulse:  [81-92] 86  Resp:  [17-19] 19  SpO2:  [94 %-98 %] 96 %  BP: (138-152)/(85-99) 138/88     Weight: 94.1 kg (207 lb 7.3 oz)  Body mass index is 24.6 kg/m².    Intake/Output Summary (Last 24 hours) at 1/11/2020 1230  Last data filed at 1/11/2020 0757  Gross per 24 hour   Intake --   Output 675 ml   Net -675 ml      Physical Exam   Constitutional: He is oriented to person, place, and time. He appears well-developed and well-nourished. He appears distressed.   Patient laying in bed comfortably    HENT:   Head: Normocephalic and atraumatic.   Eyes: EOM are normal.   Neck: Normal range of motion. No JVD present.   Cardiovascular: Normal rate, regular rhythm, normal heart sounds and intact distal pulses. Exam reveals no gallop and no friction rub.   No murmur heard.  Pulmonary/Chest: Effort normal and breath sounds normal. No respiratory distress.   Abdominal: Soft. Bowel sounds are normal. He exhibits no distension. There is no tenderness.   Musculoskeletal: Normal range of motion. He exhibits no edema.   Neurological: He is alert and oriented to person, place, and time. No cranial nerve deficit.   Tremor much improved. No tongue fasiculations    Skin: Skin is warm and dry. No rash noted.   Psychiatric: He has a normal mood and affect.   Nursing note and vitals reviewed.      Significant Labs:   Recent Results (from the past 48 hour(s))   POCT glucose    Collection Time: 01/09/20  1:04 PM   Result Value Ref Range    POCT Glucose 122 (H) 70 - 110 mg/dL   POCT glucose    Collection Time: 01/09/20  8:40 PM   Result Value Ref Range    POCT Glucose 122 (H) 70 - 110 mg/dL   Comprehensive Metabolic Panel (CMP)    Collection Time: 01/10/20  6:23 AM   Result Value Ref Range    Sodium 133 (L) 136 - 145 mmol/L    Potassium 3.8 3.5 - 5.1 mmol/L    Chloride 97 95 - 110 mmol/L    CO2 28 23 - 29 mmol/L    Glucose 105 70 - 110 mg/dL    BUN, Bld 6 (L) 8 - 23 mg/dL    Creatinine 0.8 0.5 - 1.4 mg/dL    Calcium 9.2 8.7 - 10.5  mg/dL    Total Protein 6.6 6.0 - 8.4 g/dL    Albumin 3.3 (L) 3.5 - 5.2 g/dL    Total Bilirubin 0.7 0.1 - 1.0 mg/dL    Alkaline Phosphatase 96 55 - 135 U/L    AST 33 10 - 40 U/L    ALT 21 10 - 44 U/L    Anion Gap 8 8 - 16 mmol/L    eGFR if African American >60.0 >60 mL/min/1.73 m^2    eGFR if non African American >60.0 >60 mL/min/1.73 m^2   Magnesium    Collection Time: 01/10/20  6:23 AM   Result Value Ref Range    Magnesium 1.8 1.6 - 2.6 mg/dL   Phosphorus    Collection Time: 01/10/20  6:23 AM   Result Value Ref Range    Phosphorus 3.3 2.7 - 4.5 mg/dL   CBC with Automated Differential    Collection Time: 01/10/20  6:23 AM   Result Value Ref Range    WBC 5.76 3.90 - 12.70 K/uL    RBC 4.00 (L) 4.60 - 6.20 M/uL    Hemoglobin 13.8 (L) 14.0 - 18.0 g/dL    Hematocrit 40.4 40.0 - 54.0 %    Mean Corpuscular Volume 101 (H) 82 - 98 fL    Mean Corpuscular Hemoglobin 34.5 (H) 27.0 - 31.0 pg    Mean Corpuscular Hemoglobin Conc 34.2 32.0 - 36.0 g/dL    RDW 11.8 11.5 - 14.5 %    Platelets 146 (L) 150 - 350 K/uL    MPV 9.8 9.2 - 12.9 fL    Immature Granulocytes 0.9 (H) 0.0 - 0.5 %    Gran # (ANC) 3.5 1.8 - 7.7 K/uL    Immature Grans (Abs) 0.05 (H) 0.00 - 0.04 K/uL    Lymph # 1.5 1.0 - 4.8 K/uL    Mono # 0.6 0.3 - 1.0 K/uL    Eos # 0.1 0.0 - 0.5 K/uL    Baso # 0.04 0.00 - 0.20 K/uL    nRBC 0 0 /100 WBC    Gran% 60.4 38.0 - 73.0 %    Lymph% 25.5 18.0 - 48.0 %    Mono% 10.4 4.0 - 15.0 %    Eosinophil% 2.1 0.0 - 8.0 %    Basophil% 0.7 0.0 - 1.9 %    Differential Method Automated    POCT glucose    Collection Time: 01/10/20  8:00 AM   Result Value Ref Range    POCT Glucose 110 70 - 110 mg/dL   POCT glucose    Collection Time: 01/10/20  8:51 PM   Result Value Ref Range    POCT Glucose 103 70 - 110 mg/dL   POCT glucose    Collection Time: 01/11/20  7:56 AM   Result Value Ref Range    POCT Glucose 108 70 - 110 mg/dL   CBC with Automated Differential    Collection Time: 01/11/20  8:07 AM   Result Value Ref Range    WBC 5.41 3.90 - 12.70  K/uL    RBC 3.94 (L) 4.60 - 6.20 M/uL    Hemoglobin 13.6 (L) 14.0 - 18.0 g/dL    Hematocrit 39.7 (L) 40.0 - 54.0 %    Mean Corpuscular Volume 101 (H) 82 - 98 fL    Mean Corpuscular Hemoglobin 34.5 (H) 27.0 - 31.0 pg    Mean Corpuscular Hemoglobin Conc 34.3 32.0 - 36.0 g/dL    RDW 11.9 11.5 - 14.5 %    Platelets 155 150 - 350 K/uL    MPV 9.8 9.2 - 12.9 fL    Immature Granulocytes 0.9 (H) 0.0 - 0.5 %    Gran # (ANC) 3.4 1.8 - 7.7 K/uL    Immature Grans (Abs) 0.05 (H) 0.00 - 0.04 K/uL    Lymph # 1.3 1.0 - 4.8 K/uL    Mono # 0.6 0.3 - 1.0 K/uL    Eos # 0.1 0.0 - 0.5 K/uL    Baso # 0.02 0.00 - 0.20 K/uL    nRBC 0 0 /100 WBC    Gran% 62.6 38.0 - 73.0 %    Lymph% 23.7 18.0 - 48.0 %    Mono% 10.2 4.0 - 15.0 %    Eosinophil% 2.2 0.0 - 8.0 %    Basophil% 0.4 0.0 - 1.9 %    Differential Method Automated    Comprehensive Metabolic Panel (CMP)    Collection Time: 01/11/20  8:08 AM   Result Value Ref Range    Sodium 135 (L) 136 - 145 mmol/L    Potassium 3.8 3.5 - 5.1 mmol/L    Chloride 100 95 - 110 mmol/L    CO2 28 23 - 29 mmol/L    Glucose 113 (H) 70 - 110 mg/dL    BUN, Bld 7 (L) 8 - 23 mg/dL    Creatinine 0.7 0.5 - 1.4 mg/dL    Calcium 9.7 8.7 - 10.5 mg/dL    Total Protein 6.4 6.0 - 8.4 g/dL    Albumin 3.1 (L) 3.5 - 5.2 g/dL    Total Bilirubin 0.5 0.1 - 1.0 mg/dL    Alkaline Phosphatase 91 55 - 135 U/L    AST 38 10 - 40 U/L    ALT 32 10 - 44 U/L    Anion Gap 7 (L) 8 - 16 mmol/L    eGFR if African American >60.0 >60 mL/min/1.73 m^2    eGFR if non African American >60.0 >60 mL/min/1.73 m^2   Magnesium    Collection Time: 01/11/20  8:08 AM   Result Value Ref Range    Magnesium 1.9 1.6 - 2.6 mg/dL   Phosphorus    Collection Time: 01/11/20  8:08 AM   Result Value Ref Range    Phosphorus 3.7 2.7 - 4.5 mg/dL         Significant Imaging:   Imaging Results          X-Ray Chest AP Portable (Final result)  Result time 01/07/20 18:58:36    Final result by Rafael Willett MD (01/07/20 18:58:36)                 Impression:      1.  Allowing for patient positioning, no convincing large focal consolidation noting left basilar subsegmental atelectasis or scarring.      Electronically signed by: Rafael Willett MD  Date:    01/07/2020  Time:    18:58             Narrative:    EXAMINATION:  XR CHEST AP PORTABLE    CLINICAL HISTORY:  Rule out infection;    TECHNIQUE:  Single frontal view of the chest was performed.    COMPARISON:  10/03/2019    FINDINGS:  The cardiomediastinal silhouette is not enlarged.  There is no pleural effusion.  The trachea is midline.  The lungs are symmetrically expanded bilaterally with mildly coarse interstitial attenuation.  There is left basilar subsegmental atelectasis or scarring..  No large focal consolidation seen.  There is no pneumothorax.  The osseous structures are remarkable for degenerative change..                                    Assessment/Plan:      * Alcohol withdrawal  64 y.o. male with HTN, chronic pain on oxycodone, seizures (on vimpat), alcoholism, and depression who presents today after decreasing his alcohol intake. Patient has been binge drinking for months and has a long history of alcohol abuse. He has decreased his intake and is starting to have tremors, anxiety, and depression. Patient is displaying classic signs of alcohol withdrawal. Psych consulted in the ER and recommending hospital medicine admission for withdrawal. Vital signs have been stable    Plan  [facility] following and appreciate recs.   ALCOHOL/BENZO WITHDRAWAL PRECAUTIONS  Valium 10 mg tid starting 1/11  Ativan 2 mg q4 hours prn for ciwa 8+  Vital signs q4 hours  Thiamine, Folic acid, Vit B12 and Multivitamin supplementation  Patient will have a sitter as well  On ciwa        Seizure disorder  Patient with a history of seizures with alcohol withdrawal    Plan  Continue home vimpat  Seizure precautions       Debility  PT/OT      Severe depression  Psych already consulted on the patient. We will help get patient medically stable to  allow for psychiatric treatment        VTE Risk Mitigation (From admission, onward)         Ordered     enoxaparin injection 40 mg  Daily      01/07/20 1800                      Cal Quintero MD  Department of Hospital Medicine   [facility]

## 2020-01-11 NOTE — ASSESSMENT & PLAN NOTE
64 y.o. male with HTN, chronic pain on oxycodone, seizures (on vimpat), alcoholism, and depression who presents today after decreasing his alcohol intake. Patient has been binge drinking for months and has a long history of alcohol abuse. He has decreased his intake and is starting to have tremors, anxiety, and depression. Patient is displaying classic signs of alcohol withdrawal. Psych consulted in the ER and recommending hospital medicine admission for withdrawal. Vital signs have been stable    Plan  [facility] following and appreciate recs.   ALCOHOL/BENZO WITHDRAWAL PRECAUTIONS  Valium 10 mg tid starting 1/11  Ativan 2 mg q4 hours prn for ciwa 8+  Vital signs q4 hours  Thiamine, Folic acid, Vit B12 and Multivitamin supplementation  Patient will have a sitter as well  On ciwa

## 2020-01-11 NOTE — PROGRESS NOTES
"[facility]  Psychiatry  Progress Note    Patient Name: Mirza Morales  MRN: [id number]   Code Status: Full Code  Admission Date: 1/7/2020  Hospital Length of Stay: 4 days  Expected Discharge Date: 1/10/2020  Attending Physician: Shona Jacinto MD  Primary Care Provider: Jud oM MD      Subjective:     Principal Problem:Alcohol withdrawal    Chief Complaint: etoh w/d and severe depression    HPI:   Per ED Note:      Alcohol Intoxication        Pt to ER via EMS from home for alcohol intoxication. Unsure who called EMS. Pt is A, A, O x 4. He was able to ambulate to stretcher with assistance at home. He drank a 5th of vodka or more. Hx of alcoholism.       HPI  Mr. Morales is a 64 y.o. male with HTN, chronic pain on oxycodone, seizures (on vimpat), alcoholism, and depression here today with alcohol intoxication. History mildly limited due to alcohol intoxication. States he has been drinking a lot recently because he feels he is at the end of his life. States he does not want to kill himself, but he no longer wants to live. Feels very depressed. States he thinks his dopamine levels are off. Denies fevers, chills, n/v, abd pain, chest pain, SOB, numbness, tingling, weakness, AVH, HI, SI.    On My Interview:  Mr. Morales is a 64 y.o. male with HTN, chronic pain on oxycodone, seizures (on vimpat), alcoholism, and depression  presented to ED with alcohol intoxication. Met with patient while patient was lying in his ED bed. He seemed tremulous, shaking all over, very anxious, dysphoric, and restless. He complained of alcohol withdrawal symptoms of nausea, abdominal pain, severe tremors, severe nervousness and diarrhea. He reported feeling very depressed, hopeless and stated that she feels he is at the end of his life. When he was asked if he is having suicidal thoughts he replied, "well, I feel very depressed. I cannot go outdoors anymore. I am isolated, I lost my well. I drink to feel better. I am " "tired of living. I feel it's my time. t if I get depressed enough I would fade away, quit living and just stop functioning and my body would die." He reported one prior suicide attempt by overdosing on oxycodone but stated it was not intentional. He stated that his current medications of latuda and trazodone are not working, He endorsed depressive symptoms of low mood, amotivation, anhedonia, decreased concentration, feeling of guilt, worthlessness, hopelessness and fatigue. He denied HI/AVH and no paranoia or delusions reported. Per chart he has diagnosis of bipolar but he denied having marielena or hypomania like episodes. He admitted to drinking 2 fifth or vodka daily, duration 30 years but with periods of sobriety, and his\ last use was this morning. He reported history of alcohol withdrawal seizures and Dt's.          Anamaria [phone]  Spoke with Anamaria, patient wife stated "my  has seizures and I am worried about him going through a grand mal seizure and dying. He has been gradually lowing his alcohol intake and he will be getting into seizures and I am afraid he will die. He mentioned a couple of times to me that he said he wants to die in his own bed. I could not let him lie there and die in his own bed. He is getting progressively worse. He sees a psychiatrics is Dr. Loco. I worry about his drinking, having seizures and wanting to die. I think it is not safe for him to come home and he needs treatment or he would die."    Psychiatric Review Of Systems - Is patient experiencing or having changes in:  sleep: yes  appetite: yes  weight: yes. Lost 15 lbs in a month  energy/anergy: yes   interest/pleasure/anhedonia: yes  somatic symptoms: yes  anxiety/panic: yes  guilty/hopelessness: yes  concentration: yes  S.I.B.s/risky behavior: no  Irritability: yes  Racing thoughts: yes  Impulsive behaviors: no  Paranoia:no  AVH:no  Suicidal thoughts/plan/intent: no      Hospital Course: 1/9/20  Met with " "patient while lying in his hospital bed. He reported mood is" better and I am on my way to detoxing" and his affect was euthymic.He was calm and cooperative. He is still presenting with alcohol withdrawal symptoms (elevated vitals, tremors, and anxiety). He denied feeling suicidal and stated, "I don't want to drink myself to death and I want to live that I am sober. There are reasons for me to live. " He reported improved depressive symptoms since hospitalization but continues to report feeling depressed and dysphoric. He is medication compliant and no side effects noted or reported. He denied SI/HI/AVH and no delusion noted or reported. Patient in interested in continuing his detox treatment at this facility and is not interested in rehab treatment afterwards.  PEC rescinded d/t pt not longer endorsing active SI.    01/10/2020  Pt w/persistent hypertension, unclear if 2/2 EtOH w/d or hx chronic HTN.  HR 80's-90's.  Over past 24h, pt has received 40mg Valium plus 12mg Ativan.  No PRN's required for agitation.  This morning, pt found resting awake in bed.  Responds flatly but appropriately when greeted.  Pt reports that his detox is complete and that he is ready to leave the hospital.  Pt is a self-described "binge drinker," which he describes as abstaining from EtOH for weeks or months, then drinking heavily for 3-4 weeks.  During binges, pt will drink 2/5's of liquor per day.  Pt attributes binges to his long hx of depression.  When depressive sx get to the point where "I can no longer bear it," he starts drinking heavily.  Pt states that during these periods he no longer gets out of bed, does not care for himself.  Pt tearful and describes grim future outlook, states that his wife would be better off without him, that there is no hope of ever getting better, that he will never feel comfortable in his own skin or have a day of reprieve from depressive sx.  Pt endorses multiple seizures 2/2 EtOH w/d.  Says first " "seizure was in his 60's.  Of note, neurosurgery eval'd pt in Sept 2019 and suspected EtOH etiology.  Pt states desire to leave hospital in spite of seizure risk.  Does not have an appreciation of the need for continued taper even when need for taper is explained, "I'm already detoxed, I don't need it anymore."  Endorses passive SI of death by seizure, says would no longer be a burden on his wife.    Spoke with pt's outpatient provider, who states that pt lives w/severe depression and pt's wife is not longer able to care for him.  Does not feel it would be safe for pt to leave the hospital.  Per chart, pt's wife emailed outpatient provider on 1/5 and staetd "I am looking at mental health facilities for adults... I cannot do this anymore."  Spoke with wife on telephone today, she states that prior to coming to the hospital pt would not get out of bed, was urinating on himself, was wholly dependent on her for care.  States she is not able or willing to continue providing this type of care.  She has full time job and cannot stay home with pt all day.  Does not feel it is safe for pt to return home at this time.    01/11/2020  Pt found resting awake in bed in NAD.  No complaints today.  Wants to go home, remains flat and listless.  Pt ambivalent about rehab.  Per chart, 50mg Valium yesterday, due to receive scheduled 40mg today.  No PRN Ativan required.  Remains mildly hypertensive.      Interval History: See hospital course    Family History     Problem Relation (Age of Onset)    Alcohol abuse Mother, Maternal Uncle, Paternal Uncle, Cousin    Anxiety disorder Brother    Bipolar disorder Brother    Dementia Maternal Grandmother    Depression Mother, Father, Sister, Brother    Drug abuse Brother    Suicide Sister        Tobacco Use    Smoking status: Current Some Day Smoker     Packs/day: 0.25     Years: 10.00     Pack years: 2.50    Smokeless tobacco: Never Used   Substance and Sexual Activity    Alcohol use: No     " "Alcohol/week: 16.7 standard drinks     Types: 20 Standard drinks or equivalent per week     Comment: quit 4 years ago    Drug use: No    Sexual activity: Yes     Partners: Female     Comment: with wife; monogamous      Psychotherapeutics (From admission, onward)    Start     Stop Route Frequency Ordered    01/11/20 1200  diazePAM tablet 10 mg      -- Oral 3 times daily 01/11/20 0750    01/10/20 1446  LORazepam tablet 2 mg      -- Oral Every 4 hours PRN 01/10/20 1446           Review of Systems   Constitutional: Negative for diaphoresis.   Gastrointestinal: Positive for diarrhea.   Neurological: Positive for tremors. Negative for seizures.     Objective:     Vital Signs (Most Recent):  Temp: 98 °F (36.7 °C) (01/11/20 1153)  Pulse: 86 (01/11/20 1153)  Resp: 19 (01/11/20 1153)  BP: 138/88 (01/11/20 1153)  SpO2: 96 % (01/11/20 1153) Vital Signs (24h Range):  Temp:  [97.8 °F (36.6 °C)-98.6 °F (37 °C)] 98 °F (36.7 °C)  Pulse:  [81-92] 86  Resp:  [17-19] 19  SpO2:  [94 %-98 %] 96 %  BP: (138-152)/(85-99) 138/88     Height: 6' 5" (195.6 cm)  Weight: 94.1 kg (207 lb 7.3 oz)  Body mass index is 24.6 kg/m².      Intake/Output Summary (Last 24 hours) at 1/11/2020 1525  Last data filed at 1/11/2020 0757  Gross per 24 hour   Intake --   Output 675 ml   Net -675 ml       Physical Exam   Psychiatric:   Appearance: No apparent distress, age appropriate  Behavior: Apathetic  Orientation:  Self, place, situation, year  Speech: normal volume, rate, and tone  Language:  Fluent english  Mood: depressed  Affect: tearful, blunted  Thought Process: Linear and well-organized  Associations:  Logical and intact  Memory:  Recent and remote intact  Attention/Concentration:  Grossly intact  Fund of Knowledge:  Appropriate for level of education  Thought Content: Passive SI, denies HI, delusions, or paranoia  Perceptions:  Pt denies AVH and no objective s/s of AVH  Cognition: Intact, follows commands, appropriate contributions to " interview  Insight: Poor  Judgment: Poor  Impulse Control:  Poor              Significant Labs:   Last 24 Hours:   Recent Lab Results       01/11/20  0808   01/11/20  0807   01/11/20  0756   01/10/20  2051        Albumin 3.1           Alkaline Phosphatase 91           ALT 32           Anion Gap 7           AST 38           Baso #   0.02         Basophil%   0.4         BILIRUBIN TOTAL 0.5  Comment:  For infants and newborns, interpretation of results should be based  on gestational age, weight and in agreement with clinical  observations.  Premature Infant recommended reference ranges:  Up to 24 hours.............<8.0 mg/dL  Up to 48 hours............<12.0 mg/dL  3-5 days..................<15.0 mg/dL  6-29 days.................<15.0 mg/dL             BUN, Bld 7           Calcium 9.7           Chloride 100           CO2 28           Creatinine 0.7           Differential Method   Automated         eGFR if  >60.0           eGFR if non  >60.0  Comment:  Calculation used to obtain the estimated glomerular filtration  rate (eGFR) is the CKD-EPI equation.              Eos #   0.1         Eosinophil%   2.2         Glucose 113           Gran # (ANC)   3.4         Gran%   62.6         Hematocrit   39.7         Hemoglobin   13.6         Immature Grans (Abs)   0.05  Comment:  Mild elevation in immature granulocytes is non specific and   can be seen in a variety of conditions including stress response,   acute inflammation, trauma and pregnancy. Correlation with other   laboratory and clinical findings is essential.           Immature Granulocytes   0.9         Lymph #   1.3         Lymph%   23.7         Magnesium 1.9           MCH   34.5         MCHC   34.3         MCV   101         Mono #   0.6         Mono%   10.2         MPV   9.8         nRBC   0         Phosphorus 3.7           Platelets   155         POCT Glucose     108 103     Potassium 3.8           PROTEIN TOTAL 6.4           RBC   3.94          RDW   11.9         Sodium 135           WBC   5.41               Significant Imaging: I have reviewed all pertinent imaging results/findings within the past 24 hours.    Assessment/Plan:     Severe depression  ASSESSMENT:       Major Depression, Severe, without psychotic features  Hx Bipolar Disorder    Based on my interview as well as on information obtained from collateral and from chart review, pt appears to have been living with depressed mood for the past several months with an acute worsening approx 4 wks ago.  Upon interview, pt tearful and expressed feelings of hopelessness. He is anhedonic, reports decreased appetite, c/o insomnia and decreased energy levels as evidenced by not feeling rested when waking up in morning.   He expressed a sense of worthlessness at several points per HPI.  Indecisiveness noted during interview. Especially concerning is that he reported active SI as recently as 1/7 and continues to report thoughts of death by seizure. The aforementioned sx have a strong temporal correlation with acute worsening of mood sx 4 wks ago.  Sx appear to be causing significant distress and are clearly interfering with pt's social and occupational functioning (pt urinating on self in bed, not otherwise caring for self, not speaking to anyone other than his wife).  It should be noted that these sx exist in the context of heavy EtOH use so likely substance-induced component, but per all sources depressive sx exist in context of sobriety as well.  MSE findings c/w severe depression but not concerning for psychosis at this time.  Pt would benefit from continued tx for mood disorder, but primary focus at this time will be on safely detoxing from EtOH.  Most recent regimen includes Latuda and trazodone per chart.    PLAN:     - Placed PEC in paper chart d/t grave disability per above.  Pt aware, charge notified of need for sitter, and security is w/patient until sitter arrives.  - Hold home Latuda d/t  in context of acute w/d as this medication can lower seizure threshold  - Will continue to monitor and will defer management until pt is more medically stable      Alcohol use disorder, severe, dependence    ASSESSMENT:       Alcohol Use Disorder, Severe, in Acute Withdrawal    Based on the presence of the following criteria:    1. Substance often taken in larger amounts or over a longer period than intended    2. Persistent desire or unsuccessful efforts to cut down or control use    3. Great deal of time spent obtaining/using/recovering from substance/effects    4. Craving or strong desire to use    5. Recurrent use resulting in failure to fulfill obligations at work/school/home    6. Continued use despite causing persistent/recurrent social/interpersonal problems    7. Social/occupational/recreational activities reduced d/t substance use    8. Recurrent use in situations that are physically hazardous    9. Continued use despite knowledge of physical/psychological harm    10. Tolerance, as defined by:       A. Need for increased amount to achieve desired effect       B. Diminished effect with continued use of same amount    11. Withdrawal, as defined by:       A. Characteristic withdrawal symptoms       B. Substance taken to avoid withdrawal symptoms      PLAN:     It should be noted that pt has several risk factors for complicated EtOH w/d, including a hx of chronic and sustained EtOH consumption and well-documented hx of EtOH w/d sz. Furthermore, his age (63yo) and psychiatric comorbidities puts him at increased risk. Upon presentation, he was noted to begin experiencing w/d sx in spite of having BAL of 0.196.     - Vitals q4 around the clock  - Due to large quantity of benzo's required to stabilize pt and multitude of risk factors per above, would Cont PRN Ativan 2mg for CIWA 8+.   - Cont Valium 10mg QID  - Daily Thiamine, Folic acid, vit. B12, and multivitamin supplements  - Medications that reduce the seizure  threshold (including chlorpromazine, fluphenazine, haloperidol, bupropion) should be avoided during the acute detoxification period, defined as the latter of 96h from sobriety or 12 hours after pt exhibits stable vitals w/o need for prn benzo         Thank you for allowing me to provide to the care of this patient.    Michel Marin MD  [facility], PGY-2  Pager Number [phone]  [facility]

## 2020-01-11 NOTE — PLAN OF CARE
Pt AAOx4 and VSS. Pt is progressing with plan of care. Free of skin breakdown as the pt positioned/repositioned well independently. No alcohol withdrawal symptoms noted. Sitter at bedside. No complain of pain at this time. Frequent rounds made to assess pain and safety and no complaints at this time noted. Side rails up x 2. Bed locked. Call light within reach. No falls noted. Will continue to monitor.

## 2020-01-11 NOTE — ASSESSMENT & PLAN NOTE
ASSESSMENT:       Alcohol Use Disorder, Severe, in Acute Withdrawal    Based on the presence of the following criteria:    1. Substance often taken in larger amounts or over a longer period than intended    2. Persistent desire or unsuccessful efforts to cut down or control use    3. Great deal of time spent obtaining/using/recovering from substance/effects    4. Craving or strong desire to use    5. Recurrent use resulting in failure to fulfill obligations at work/school/home    6. Continued use despite causing persistent/recurrent social/interpersonal problems    7. Social/occupational/recreational activities reduced d/t substance use    8. Recurrent use in situations that are physically hazardous    9. Continued use despite knowledge of physical/psychological harm    10. Tolerance, as defined by:       A. Need for increased amount to achieve desired effect       B. Diminished effect with continued use of same amount    11. Withdrawal, as defined by:       A. Characteristic withdrawal symptoms       B. Substance taken to avoid withdrawal symptoms      PLAN:     It should be noted that pt has several risk factors for complicated EtOH w/d, including a hx of chronic and sustained EtOH consumption and well-documented hx of EtOH w/d sz. Furthermore, his age (65yo) and psychiatric comorbidities puts him at increased risk. Upon presentation, he was noted to begin experiencing w/d sx in spite of having BAL of 0.196.     - Vitals q4 around the clock  - Due to large quantity of benzo's required to stabilize pt and multitude of risk factors per above, would Cont PRN Ativan 2mg for CIWA 8+.   - Cont Valium 10mg QID  - Daily Thiamine, Folic acid, vit. B12, and multivitamin supplements  - Medications that reduce the seizure threshold (including chlorpromazine, fluphenazine, haloperidol, bupropion) should be avoided during the acute detoxification period, defined as the latter of 96h from sobriety or 12 hours after pt exhibits  stable vitals w/o need for prn benzo

## 2020-01-11 NOTE — SUBJECTIVE & OBJECTIVE
Interval History: NAEON. Patient doing better today. Not asking to leave and understands he still needs treatment. No suicidal ideation. Starting to taper benzos per psych recs      Objective:     Vital Signs (Most Recent):  Temp: 98 °F (36.7 °C) (01/11/20 1153)  Pulse: 86 (01/11/20 1153)  Resp: 19 (01/11/20 1153)  BP: 138/88 (01/11/20 1153)  SpO2: 96 % (01/11/20 1153) Vital Signs (24h Range):  Temp:  [97.8 °F (36.6 °C)-98.6 °F (37 °C)] 98 °F (36.7 °C)  Pulse:  [81-92] 86  Resp:  [17-19] 19  SpO2:  [94 %-98 %] 96 %  BP: (138-152)/(85-99) 138/88     Weight: 94.1 kg (207 lb 7.3 oz)  Body mass index is 24.6 kg/m².    Intake/Output Summary (Last 24 hours) at 1/11/2020 1230  Last data filed at 1/11/2020 0757  Gross per 24 hour   Intake --   Output 675 ml   Net -675 ml      Physical Exam   Constitutional: He is oriented to person, place, and time. He appears well-developed and well-nourished. He appears distressed.   Patient laying in bed comfortably    HENT:   Head: Normocephalic and atraumatic.   Eyes: EOM are normal.   Neck: Normal range of motion. No JVD present.   Cardiovascular: Normal rate, regular rhythm, normal heart sounds and intact distal pulses. Exam reveals no gallop and no friction rub.   No murmur heard.  Pulmonary/Chest: Effort normal and breath sounds normal. No respiratory distress.   Abdominal: Soft. Bowel sounds are normal. He exhibits no distension. There is no tenderness.   Musculoskeletal: Normal range of motion. He exhibits no edema.   Neurological: He is alert and oriented to person, place, and time. No cranial nerve deficit.   Tremor much improved. No tongue fasiculations    Skin: Skin is warm and dry. No rash noted.   Psychiatric: He has a normal mood and affect.   Nursing note and vitals reviewed.      Significant Labs:   Recent Results (from the past 48 hour(s))   POCT glucose    Collection Time: 01/09/20  1:04 PM   Result Value Ref Range    POCT Glucose 122 (H) 70 - 110 mg/dL   POCT glucose     Collection Time: 01/09/20  8:40 PM   Result Value Ref Range    POCT Glucose 122 (H) 70 - 110 mg/dL   Comprehensive Metabolic Panel (CMP)    Collection Time: 01/10/20  6:23 AM   Result Value Ref Range    Sodium 133 (L) 136 - 145 mmol/L    Potassium 3.8 3.5 - 5.1 mmol/L    Chloride 97 95 - 110 mmol/L    CO2 28 23 - 29 mmol/L    Glucose 105 70 - 110 mg/dL    BUN, Bld 6 (L) 8 - 23 mg/dL    Creatinine 0.8 0.5 - 1.4 mg/dL    Calcium 9.2 8.7 - 10.5 mg/dL    Total Protein 6.6 6.0 - 8.4 g/dL    Albumin 3.3 (L) 3.5 - 5.2 g/dL    Total Bilirubin 0.7 0.1 - 1.0 mg/dL    Alkaline Phosphatase 96 55 - 135 U/L    AST 33 10 - 40 U/L    ALT 21 10 - 44 U/L    Anion Gap 8 8 - 16 mmol/L    eGFR if African American >60.0 >60 mL/min/1.73 m^2    eGFR if non African American >60.0 >60 mL/min/1.73 m^2   Magnesium    Collection Time: 01/10/20  6:23 AM   Result Value Ref Range    Magnesium 1.8 1.6 - 2.6 mg/dL   Phosphorus    Collection Time: 01/10/20  6:23 AM   Result Value Ref Range    Phosphorus 3.3 2.7 - 4.5 mg/dL   CBC with Automated Differential    Collection Time: 01/10/20  6:23 AM   Result Value Ref Range    WBC 5.76 3.90 - 12.70 K/uL    RBC 4.00 (L) 4.60 - 6.20 M/uL    Hemoglobin 13.8 (L) 14.0 - 18.0 g/dL    Hematocrit 40.4 40.0 - 54.0 %    Mean Corpuscular Volume 101 (H) 82 - 98 fL    Mean Corpuscular Hemoglobin 34.5 (H) 27.0 - 31.0 pg    Mean Corpuscular Hemoglobin Conc 34.2 32.0 - 36.0 g/dL    RDW 11.8 11.5 - 14.5 %    Platelets 146 (L) 150 - 350 K/uL    MPV 9.8 9.2 - 12.9 fL    Immature Granulocytes 0.9 (H) 0.0 - 0.5 %    Gran # (ANC) 3.5 1.8 - 7.7 K/uL    Immature Grans (Abs) 0.05 (H) 0.00 - 0.04 K/uL    Lymph # 1.5 1.0 - 4.8 K/uL    Mono # 0.6 0.3 - 1.0 K/uL    Eos # 0.1 0.0 - 0.5 K/uL    Baso # 0.04 0.00 - 0.20 K/uL    nRBC 0 0 /100 WBC    Gran% 60.4 38.0 - 73.0 %    Lymph% 25.5 18.0 - 48.0 %    Mono% 10.4 4.0 - 15.0 %    Eosinophil% 2.1 0.0 - 8.0 %    Basophil% 0.7 0.0 - 1.9 %    Differential Method Automated    POCT glucose     Collection Time: 01/10/20  8:00 AM   Result Value Ref Range    POCT Glucose 110 70 - 110 mg/dL   POCT glucose    Collection Time: 01/10/20  8:51 PM   Result Value Ref Range    POCT Glucose 103 70 - 110 mg/dL   POCT glucose    Collection Time: 01/11/20  7:56 AM   Result Value Ref Range    POCT Glucose 108 70 - 110 mg/dL   CBC with Automated Differential    Collection Time: 01/11/20  8:07 AM   Result Value Ref Range    WBC 5.41 3.90 - 12.70 K/uL    RBC 3.94 (L) 4.60 - 6.20 M/uL    Hemoglobin 13.6 (L) 14.0 - 18.0 g/dL    Hematocrit 39.7 (L) 40.0 - 54.0 %    Mean Corpuscular Volume 101 (H) 82 - 98 fL    Mean Corpuscular Hemoglobin 34.5 (H) 27.0 - 31.0 pg    Mean Corpuscular Hemoglobin Conc 34.3 32.0 - 36.0 g/dL    RDW 11.9 11.5 - 14.5 %    Platelets 155 150 - 350 K/uL    MPV 9.8 9.2 - 12.9 fL    Immature Granulocytes 0.9 (H) 0.0 - 0.5 %    Gran # (ANC) 3.4 1.8 - 7.7 K/uL    Immature Grans (Abs) 0.05 (H) 0.00 - 0.04 K/uL    Lymph # 1.3 1.0 - 4.8 K/uL    Mono # 0.6 0.3 - 1.0 K/uL    Eos # 0.1 0.0 - 0.5 K/uL    Baso # 0.02 0.00 - 0.20 K/uL    nRBC 0 0 /100 WBC    Gran% 62.6 38.0 - 73.0 %    Lymph% 23.7 18.0 - 48.0 %    Mono% 10.2 4.0 - 15.0 %    Eosinophil% 2.2 0.0 - 8.0 %    Basophil% 0.4 0.0 - 1.9 %    Differential Method Automated    Comprehensive Metabolic Panel (CMP)    Collection Time: 01/11/20  8:08 AM   Result Value Ref Range    Sodium 135 (L) 136 - 145 mmol/L    Potassium 3.8 3.5 - 5.1 mmol/L    Chloride 100 95 - 110 mmol/L    CO2 28 23 - 29 mmol/L    Glucose 113 (H) 70 - 110 mg/dL    BUN, Bld 7 (L) 8 - 23 mg/dL    Creatinine 0.7 0.5 - 1.4 mg/dL    Calcium 9.7 8.7 - 10.5 mg/dL    Total Protein 6.4 6.0 - 8.4 g/dL    Albumin 3.1 (L) 3.5 - 5.2 g/dL    Total Bilirubin 0.5 0.1 - 1.0 mg/dL    Alkaline Phosphatase 91 55 - 135 U/L    AST 38 10 - 40 U/L    ALT 32 10 - 44 U/L    Anion Gap 7 (L) 8 - 16 mmol/L    eGFR if African American >60.0 >60 mL/min/1.73 m^2    eGFR if non African American >60.0 >60 mL/min/1.73 m^2    Magnesium    Collection Time: 01/11/20  8:08 AM   Result Value Ref Range    Magnesium 1.9 1.6 - 2.6 mg/dL   Phosphorus    Collection Time: 01/11/20  8:08 AM   Result Value Ref Range    Phosphorus 3.7 2.7 - 4.5 mg/dL         Significant Imaging:   Imaging Results          X-Ray Chest AP Portable (Final result)  Result time 01/07/20 18:58:36    Final result by Rafael Willett MD (01/07/20 18:58:36)                 Impression:      1. Allowing for patient positioning, no convincing large focal consolidation noting left basilar subsegmental atelectasis or scarring.      Electronically signed by: Rafael Willett MD  Date:    01/07/2020  Time:    18:58             Narrative:    EXAMINATION:  XR CHEST AP PORTABLE    CLINICAL HISTORY:  Rule out infection;    TECHNIQUE:  Single frontal view of the chest was performed.    COMPARISON:  10/03/2019    FINDINGS:  The cardiomediastinal silhouette is not enlarged.  There is no pleural effusion.  The trachea is midline.  The lungs are symmetrically expanded bilaterally with mildly coarse interstitial attenuation.  There is left basilar subsegmental atelectasis or scarring..  No large focal consolidation seen.  There is no pneumothorax.  The osseous structures are remarkable for degenerative change..

## 2020-01-11 NOTE — SUBJECTIVE & OBJECTIVE
"Interval History: See hospital course    Family History     Problem Relation (Age of Onset)    Alcohol abuse Mother, Maternal Uncle, Paternal Uncle, Cousin    Anxiety disorder Brother    Bipolar disorder Brother    Dementia Maternal Grandmother    Depression Mother, Father, Sister, Brother    Drug abuse Brother    Suicide Sister        Tobacco Use    Smoking status: Current Some Day Smoker     Packs/day: 0.25     Years: 10.00     Pack years: 2.50    Smokeless tobacco: Never Used   Substance and Sexual Activity    Alcohol use: No     Alcohol/week: 16.7 standard drinks     Types: 20 Standard drinks or equivalent per week     Comment: quit 4 years ago    Drug use: No    Sexual activity: Yes     Partners: Female     Comment: with wife; monogamous      Psychotherapeutics (From admission, onward)    Start     Stop Route Frequency Ordered    01/11/20 1200  diazePAM tablet 10 mg      -- Oral 3 times daily 01/11/20 0750    01/10/20 1446  LORazepam tablet 2 mg      -- Oral Every 4 hours PRN 01/10/20 1446           Review of Systems   Constitutional: Negative for diaphoresis.   Gastrointestinal: Positive for diarrhea.   Neurological: Positive for tremors. Negative for seizures.     Objective:     Vital Signs (Most Recent):  Temp: 98 °F (36.7 °C) (01/11/20 1153)  Pulse: 86 (01/11/20 1153)  Resp: 19 (01/11/20 1153)  BP: 138/88 (01/11/20 1153)  SpO2: 96 % (01/11/20 1153) Vital Signs (24h Range):  Temp:  [97.8 °F (36.6 °C)-98.6 °F (37 °C)] 98 °F (36.7 °C)  Pulse:  [81-92] 86  Resp:  [17-19] 19  SpO2:  [94 %-98 %] 96 %  BP: (138-152)/(85-99) 138/88     Height: 6' 5" (195.6 cm)  Weight: 94.1 kg (207 lb 7.3 oz)  Body mass index is 24.6 kg/m².      Intake/Output Summary (Last 24 hours) at 1/11/2020 1525  Last data filed at 1/11/2020 0757  Gross per 24 hour   Intake --   Output 675 ml   Net -675 ml       Physical Exam   Psychiatric:   Appearance: No apparent distress, age appropriate  Behavior: Apathetic  Orientation:  Self, " place, situation, year  Speech: normal volume, rate, and tone  Language:  Fluent english  Mood: depressed  Affect: tearful, blunted  Thought Process: Linear and well-organized  Associations:  Logical and intact  Memory:  Recent and remote intact  Attention/Concentration:  Grossly intact  Fund of Knowledge:  Appropriate for level of education  Thought Content: Passive SI, denies HI, delusions, or paranoia  Perceptions:  Pt denies AVH and no objective s/s of AVH  Cognition: Intact, follows commands, appropriate contributions to interview  Insight: Poor  Judgment: Poor  Impulse Control:  Poor              Significant Labs:   Last 24 Hours:   Recent Lab Results       01/11/20  0808   01/11/20  0807   01/11/20  0756   01/10/20  2051        Albumin 3.1           Alkaline Phosphatase 91           ALT 32           Anion Gap 7           AST 38           Baso #   0.02         Basophil%   0.4         BILIRUBIN TOTAL 0.5  Comment:  For infants and newborns, interpretation of results should be based  on gestational age, weight and in agreement with clinical  observations.  Premature Infant recommended reference ranges:  Up to 24 hours.............<8.0 mg/dL  Up to 48 hours............<12.0 mg/dL  3-5 days..................<15.0 mg/dL  6-29 days.................<15.0 mg/dL             BUN, Bld 7           Calcium 9.7           Chloride 100           CO2 28           Creatinine 0.7           Differential Method   Automated         eGFR if  >60.0           eGFR if non  >60.0  Comment:  Calculation used to obtain the estimated glomerular filtration  rate (eGFR) is the CKD-EPI equation.              Eos #   0.1         Eosinophil%   2.2         Glucose 113           Gran # (ANC)   3.4         Gran%   62.6         Hematocrit   39.7         Hemoglobin   13.6         Immature Grans (Abs)   0.05  Comment:  Mild elevation in immature granulocytes is non specific and   can be seen in a variety of conditions  including stress response,   acute inflammation, trauma and pregnancy. Correlation with other   laboratory and clinical findings is essential.           Immature Granulocytes   0.9         Lymph #   1.3         Lymph%   23.7         Magnesium 1.9           MCH   34.5         MCHC   34.3         MCV   101         Mono #   0.6         Mono%   10.2         MPV   9.8         nRBC   0         Phosphorus 3.7           Platelets   155         POCT Glucose     108 103     Potassium 3.8           PROTEIN TOTAL 6.4           RBC   3.94         RDW   11.9         Sodium 135           WBC   5.41               Significant Imaging: I have reviewed all pertinent imaging results/findings within the past 24 hours.

## 2020-01-12 LAB
ALBUMIN SERPL BCP-MCNC: 3.2 G/DL (ref 3.5–5.2)
ALP SERPL-CCNC: 91 U/L (ref 55–135)
ALT SERPL W/O P-5'-P-CCNC: 30 U/L (ref 10–44)
ANION GAP SERPL CALC-SCNC: 8 MMOL/L (ref 8–16)
AST SERPL-CCNC: 28 U/L (ref 10–40)
BASOPHILS # BLD AUTO: 0.05 K/UL (ref 0–0.2)
BASOPHILS NFR BLD: 0.8 % (ref 0–1.9)
BILIRUB SERPL-MCNC: 0.5 MG/DL (ref 0.1–1)
BUN SERPL-MCNC: 11 MG/DL (ref 8–23)
CALCIUM SERPL-MCNC: 9.4 MG/DL (ref 8.7–10.5)
CHLORIDE SERPL-SCNC: 101 MMOL/L (ref 95–110)
CO2 SERPL-SCNC: 26 MMOL/L (ref 23–29)
CREAT SERPL-MCNC: 0.8 MG/DL (ref 0.5–1.4)
DIFFERENTIAL METHOD: ABNORMAL
EOSINOPHIL # BLD AUTO: 0.2 K/UL (ref 0–0.5)
EOSINOPHIL NFR BLD: 3 % (ref 0–8)
ERYTHROCYTE [DISTWIDTH] IN BLOOD BY AUTOMATED COUNT: 12 % (ref 11.5–14.5)
EST. GFR  (AFRICAN AMERICAN): >60 ML/MIN/1.73 M^2
EST. GFR  (NON AFRICAN AMERICAN): >60 ML/MIN/1.73 M^2
GLUCOSE SERPL-MCNC: 105 MG/DL (ref 70–110)
HCT VFR BLD AUTO: 41.5 % (ref 40–54)
HGB BLD-MCNC: 13.8 G/DL (ref 14–18)
IMM GRANULOCYTES # BLD AUTO: 0.08 K/UL (ref 0–0.04)
IMM GRANULOCYTES NFR BLD AUTO: 1.3 % (ref 0–0.5)
LYMPHOCYTES # BLD AUTO: 1.5 K/UL (ref 1–4.8)
LYMPHOCYTES NFR BLD: 22.9 % (ref 18–48)
MAGNESIUM SERPL-MCNC: 1.8 MG/DL (ref 1.6–2.6)
MCH RBC QN AUTO: 34.5 PG (ref 27–31)
MCHC RBC AUTO-ENTMCNC: 33.3 G/DL (ref 32–36)
MCV RBC AUTO: 104 FL (ref 82–98)
MONOCYTES # BLD AUTO: 0.6 K/UL (ref 0.3–1)
MONOCYTES NFR BLD: 8.9 % (ref 4–15)
NEUTROPHILS # BLD AUTO: 4 K/UL (ref 1.8–7.7)
NEUTROPHILS NFR BLD: 63.1 % (ref 38–73)
NRBC BLD-RTO: 0 /100 WBC
PHOSPHATE SERPL-MCNC: 3.5 MG/DL (ref 2.7–4.5)
PLATELET # BLD AUTO: 181 K/UL (ref 150–350)
PMV BLD AUTO: 9.7 FL (ref 9.2–12.9)
POCT GLUCOSE: 113 MG/DL (ref 70–110)
POTASSIUM SERPL-SCNC: 3.7 MMOL/L (ref 3.5–5.1)
PROT SERPL-MCNC: 6.3 G/DL (ref 6–8.4)
RBC # BLD AUTO: 4 M/UL (ref 4.6–6.2)
SODIUM SERPL-SCNC: 135 MMOL/L (ref 136–145)
WBC # BLD AUTO: 6.38 K/UL (ref 3.9–12.7)

## 2020-01-12 PROCEDURE — 99233 SBSQ HOSP IP/OBS HIGH 50: CPT | Mod: ,,, | Performed by: PSYCHIATRY & NEUROLOGY

## 2020-01-12 PROCEDURE — 63600175 PHARM REV CODE 636 W HCPCS: Performed by: STUDENT IN AN ORGANIZED HEALTH CARE EDUCATION/TRAINING PROGRAM

## 2020-01-12 PROCEDURE — 85025 COMPLETE CBC W/AUTO DIFF WBC: CPT

## 2020-01-12 PROCEDURE — 25000003 PHARM REV CODE 250: Performed by: STUDENT IN AN ORGANIZED HEALTH CARE EDUCATION/TRAINING PROGRAM

## 2020-01-12 PROCEDURE — 11000001 HC ACUTE MED/SURG PRIVATE ROOM

## 2020-01-12 PROCEDURE — 36415 COLL VENOUS BLD VENIPUNCTURE: CPT

## 2020-01-12 PROCEDURE — 99233 PR SUBSEQUENT HOSPITAL CARE,LEVL III: ICD-10-PCS | Mod: ,,, | Performed by: PSYCHIATRY & NEUROLOGY

## 2020-01-12 PROCEDURE — 99232 SBSQ HOSP IP/OBS MODERATE 35: CPT | Mod: ,,, | Performed by: INTERNAL MEDICINE

## 2020-01-12 PROCEDURE — 25000003 PHARM REV CODE 250: Performed by: EMERGENCY MEDICINE

## 2020-01-12 PROCEDURE — 84100 ASSAY OF PHOSPHORUS: CPT

## 2020-01-12 PROCEDURE — S4991 NICOTINE PATCH NONLEGEND: HCPCS | Performed by: STUDENT IN AN ORGANIZED HEALTH CARE EDUCATION/TRAINING PROGRAM

## 2020-01-12 PROCEDURE — 83735 ASSAY OF MAGNESIUM: CPT

## 2020-01-12 PROCEDURE — 99232 PR SUBSEQUENT HOSPITAL CARE,LEVL II: ICD-10-PCS | Mod: ,,, | Performed by: INTERNAL MEDICINE

## 2020-01-12 PROCEDURE — 80053 COMPREHEN METABOLIC PANEL: CPT

## 2020-01-12 RX ORDER — DIAZEPAM 5 MG/1
10 TABLET ORAL 2 TIMES DAILY
Status: DISCONTINUED | OUTPATIENT
Start: 2020-01-12 | End: 2020-01-13

## 2020-01-12 RX ADMIN — Medication 6 MG: at 09:01

## 2020-01-12 RX ADMIN — THERA TABS 1 TABLET: TAB at 08:01

## 2020-01-12 RX ADMIN — NICOTINE 1 PATCH: 21 PATCH, EXTENDED RELEASE TRANSDERMAL at 08:01

## 2020-01-12 RX ADMIN — LACOSAMIDE 200 MG: 50 TABLET, FILM COATED ORAL at 08:01

## 2020-01-12 RX ADMIN — DIAZEPAM 10 MG: 5 TABLET ORAL at 05:01

## 2020-01-12 RX ADMIN — LACOSAMIDE 200 MG: 50 TABLET, FILM COATED ORAL at 09:01

## 2020-01-12 RX ADMIN — ENOXAPARIN SODIUM 40 MG: 100 INJECTION SUBCUTANEOUS at 04:01

## 2020-01-12 RX ADMIN — Medication 100 MG: at 08:01

## 2020-01-12 RX ADMIN — DIAZEPAM 10 MG: 5 TABLET ORAL at 09:01

## 2020-01-12 RX ADMIN — PANTOPRAZOLE SODIUM 40 MG: 40 TABLET, DELAYED RELEASE ORAL at 08:01

## 2020-01-12 RX ADMIN — ATORVASTATIN CALCIUM 40 MG: 20 TABLET, FILM COATED ORAL at 08:01

## 2020-01-12 RX ADMIN — FOLIC ACID 1 MG: 1 TABLET ORAL at 08:01

## 2020-01-12 RX ADMIN — DIAZEPAM 10 MG: 5 TABLET ORAL at 12:01

## 2020-01-12 RX ADMIN — SENNOSIDES AND DOCUSATE SODIUM 1 TABLET: 8.6; 5 TABLET ORAL at 08:01

## 2020-01-12 NOTE — PLAN OF CARE
Pt stable this shift. Sitter present at bedside. Denied pain or discomfort.No apparent distress or discomfort. Will cont to monitor

## 2020-01-12 NOTE — PROGRESS NOTES
[facility] Hospital Medicine  Progress Note    Patient Name: Mirza Morales  MRN: [id number]  Patient Class: IP- Inpatient   Admission Date: 1/7/2020  Length of Stay: 5 days  Attending Physician: Shona Jacinto MD  Primary Care Provider: Jud Mo MD    Hospital Medicine Team: [facility] HOSP MED 2 Cal Quintero MD    Subjective:     Principal Problem:Alcohol withdrawal        HPI:  64 y.o. male with HTN, chronic pain on oxycodone, seizures (on vimpat), alcoholism, and depression here today with alcohol intoxication. Patient has been drinking about a fifth of vodka a day for weeks. According to his wife he has actually lowered his alcohol intake the past few days and is worried about him going through withdrawal. Says he has a history of seizures when he stops drinking. Patient says he has been anxious, having tremors, nausea, and depressed. He has been depressed for quite sometime and now he knows needs help.     In the ED, the patient was evaluated by psych and the ICU. Psyched PEC the patient and want to have him admitted to hospital medicine for alcohol withdrawal. The  ICU evaluated and state the patient is hemodynamically stable on the floor. On evaluation by hospital medicine, patient is lying in bed with his wife at bedside. He is agreeable to come into the hospital for treatment.       Overview/Hospital Course:  1/8/2020: Patient is still requiring PRN lorazepam for alcohol withdrawal. He has tongue fasciculations and hands tremors.     Interval History: NAEON. Patient has not required prn ativan in three days. He is no longer tremulous. He is hesitant about receiving more psychiatric treatment. He does not see the benefit. Continuing to work with the patient to get him medically stable.     Objective:     Vital Signs (Most Recent):  Temp: 98.3 °F (36.8 °C) (01/12/20 0813)  Pulse: 83 (01/12/20 0813)  Resp: 16 (01/12/20 0813)  BP: (!) 156/96 (01/12/20 0813)  SpO2: 96 % (01/12/20  0813) Vital Signs (24h Range):  Temp:  [97.6 °F (36.4 °C)-98.3 °F (36.8 °C)] 98.3 °F (36.8 °C)  Pulse:  [79-91] 83  Resp:  [16-19] 16  SpO2:  [95 %-98 %] 96 %  BP: (130-156)/(88-96) 156/96     Weight: 94.8 kg (208 lb 15.9 oz)  Body mass index is 24.78 kg/m².    Intake/Output Summary (Last 24 hours) at 1/12/2020 0847  Last data filed at 1/11/2020 1714  Gross per 24 hour   Intake 360 ml   Output 575 ml   Net -215 ml      Physical Exam   Constitutional: He is oriented to person, place, and time. He appears well-developed and well-nourished. He appears distressed.   Patient laying in bed comfortably    HENT:   Head: Normocephalic and atraumatic.   Eyes: EOM are normal.   Neck: Normal range of motion. No JVD present.   Cardiovascular: Normal rate, regular rhythm, normal heart sounds and intact distal pulses. Exam reveals no gallop and no friction rub.   No murmur heard.  Pulmonary/Chest: Effort normal and breath sounds normal. No respiratory distress.   Abdominal: Soft. Bowel sounds are normal. He exhibits no distension. There is no tenderness.   Musculoskeletal: Normal range of motion. He exhibits no edema.   Neurological: He is alert and oriented to person, place, and time. No cranial nerve deficit.   Tremor much improved. No tongue fasiculations    Skin: Skin is warm and dry. No rash noted.   Psychiatric: He has a normal mood and affect.   Nursing note and vitals reviewed.      Significant Labs:   Recent Results (from the past 48 hour(s))   POCT glucose    Collection Time: 01/10/20  8:51 PM   Result Value Ref Range    POCT Glucose 103 70 - 110 mg/dL   POCT glucose    Collection Time: 01/11/20  7:56 AM   Result Value Ref Range    POCT Glucose 108 70 - 110 mg/dL   CBC with Automated Differential    Collection Time: 01/11/20  8:07 AM   Result Value Ref Range    WBC 5.41 3.90 - 12.70 K/uL    RBC 3.94 (L) 4.60 - 6.20 M/uL    Hemoglobin 13.6 (L) 14.0 - 18.0 g/dL    Hematocrit 39.7 (L) 40.0 - 54.0 %    Mean Corpuscular  Volume 101 (H) 82 - 98 fL    Mean Corpuscular Hemoglobin 34.5 (H) 27.0 - 31.0 pg    Mean Corpuscular Hemoglobin Conc 34.3 32.0 - 36.0 g/dL    RDW 11.9 11.5 - 14.5 %    Platelets 155 150 - 350 K/uL    MPV 9.8 9.2 - 12.9 fL    Immature Granulocytes 0.9 (H) 0.0 - 0.5 %    Gran # (ANC) 3.4 1.8 - 7.7 K/uL    Immature Grans (Abs) 0.05 (H) 0.00 - 0.04 K/uL    Lymph # 1.3 1.0 - 4.8 K/uL    Mono # 0.6 0.3 - 1.0 K/uL    Eos # 0.1 0.0 - 0.5 K/uL    Baso # 0.02 0.00 - 0.20 K/uL    nRBC 0 0 /100 WBC    Gran% 62.6 38.0 - 73.0 %    Lymph% 23.7 18.0 - 48.0 %    Mono% 10.2 4.0 - 15.0 %    Eosinophil% 2.2 0.0 - 8.0 %    Basophil% 0.4 0.0 - 1.9 %    Differential Method Automated    Comprehensive Metabolic Panel (CMP)    Collection Time: 01/11/20  8:08 AM   Result Value Ref Range    Sodium 135 (L) 136 - 145 mmol/L    Potassium 3.8 3.5 - 5.1 mmol/L    Chloride 100 95 - 110 mmol/L    CO2 28 23 - 29 mmol/L    Glucose 113 (H) 70 - 110 mg/dL    BUN, Bld 7 (L) 8 - 23 mg/dL    Creatinine 0.7 0.5 - 1.4 mg/dL    Calcium 9.7 8.7 - 10.5 mg/dL    Total Protein 6.4 6.0 - 8.4 g/dL    Albumin 3.1 (L) 3.5 - 5.2 g/dL    Total Bilirubin 0.5 0.1 - 1.0 mg/dL    Alkaline Phosphatase 91 55 - 135 U/L    AST 38 10 - 40 U/L    ALT 32 10 - 44 U/L    Anion Gap 7 (L) 8 - 16 mmol/L    eGFR if African American >60.0 >60 mL/min/1.73 m^2    eGFR if non African American >60.0 >60 mL/min/1.73 m^2   Magnesium    Collection Time: 01/11/20  8:08 AM   Result Value Ref Range    Magnesium 1.9 1.6 - 2.6 mg/dL   Phosphorus    Collection Time: 01/11/20  8:08 AM   Result Value Ref Range    Phosphorus 3.7 2.7 - 4.5 mg/dL   Comprehensive Metabolic Panel (CMP)    Collection Time: 01/12/20  5:53 AM   Result Value Ref Range    Sodium 135 (L) 136 - 145 mmol/L    Potassium 3.7 3.5 - 5.1 mmol/L    Chloride 101 95 - 110 mmol/L    CO2 26 23 - 29 mmol/L    Glucose 105 70 - 110 mg/dL    BUN, Bld 11 8 - 23 mg/dL    Creatinine 0.8 0.5 - 1.4 mg/dL    Calcium 9.4 8.7 - 10.5 mg/dL    Total  Protein 6.3 6.0 - 8.4 g/dL    Albumin 3.2 (L) 3.5 - 5.2 g/dL    Total Bilirubin 0.5 0.1 - 1.0 mg/dL    Alkaline Phosphatase 91 55 - 135 U/L    AST 28 10 - 40 U/L    ALT 30 10 - 44 U/L    Anion Gap 8 8 - 16 mmol/L    eGFR if African American >60.0 >60 mL/min/1.73 m^2    eGFR if non African American >60.0 >60 mL/min/1.73 m^2   Magnesium    Collection Time: 01/12/20  5:53 AM   Result Value Ref Range    Magnesium 1.8 1.6 - 2.6 mg/dL   Phosphorus    Collection Time: 01/12/20  5:53 AM   Result Value Ref Range    Phosphorus 3.5 2.7 - 4.5 mg/dL   CBC with Automated Differential    Collection Time: 01/12/20  5:53 AM   Result Value Ref Range    WBC 6.38 3.90 - 12.70 K/uL    RBC 4.00 (L) 4.60 - 6.20 M/uL    Hemoglobin 13.8 (L) 14.0 - 18.0 g/dL    Hematocrit 41.5 40.0 - 54.0 %    Mean Corpuscular Volume 104 (H) 82 - 98 fL    Mean Corpuscular Hemoglobin 34.5 (H) 27.0 - 31.0 pg    Mean Corpuscular Hemoglobin Conc 33.3 32.0 - 36.0 g/dL    RDW 12.0 11.5 - 14.5 %    Platelets 181 150 - 350 K/uL    MPV 9.7 9.2 - 12.9 fL    Immature Granulocytes 1.3 (H) 0.0 - 0.5 %    Gran # (ANC) 4.0 1.8 - 7.7 K/uL    Immature Grans (Abs) 0.08 (H) 0.00 - 0.04 K/uL    Lymph # 1.5 1.0 - 4.8 K/uL    Mono # 0.6 0.3 - 1.0 K/uL    Eos # 0.2 0.0 - 0.5 K/uL    Baso # 0.05 0.00 - 0.20 K/uL    nRBC 0 0 /100 WBC    Gran% 63.1 38.0 - 73.0 %    Lymph% 22.9 18.0 - 48.0 %    Mono% 8.9 4.0 - 15.0 %    Eosinophil% 3.0 0.0 - 8.0 %    Basophil% 0.8 0.0 - 1.9 %    Differential Method Automated          Significant Imaging:   Imaging Results          X-Ray Chest AP Portable (Final result)  Result time 01/07/20 18:58:36    Final result by Rafael Willett MD (01/07/20 18:58:36)                 Impression:      1. Allowing for patient positioning, no convincing large focal consolidation noting left basilar subsegmental atelectasis or scarring.      Electronically signed by: Rafael Willett MD  Date:    01/07/2020  Time:    18:58             Narrative:    EXAMINATION:  XR  CHEST AP PORTABLE    CLINICAL HISTORY:  Rule out infection;    TECHNIQUE:  Single frontal view of the chest was performed.    COMPARISON:  10/03/2019    FINDINGS:  The cardiomediastinal silhouette is not enlarged.  There is no pleural effusion.  The trachea is midline.  The lungs are symmetrically expanded bilaterally with mildly coarse interstitial attenuation.  There is left basilar subsegmental atelectasis or scarring..  No large focal consolidation seen.  There is no pneumothorax.  The osseous structures are remarkable for degenerative change..                                    Assessment/Plan:      * Alcohol withdrawal  64 y.o. male with HTN, chronic pain on oxycodone, seizures (on vimpat), alcoholism, and depression who presents today after decreasing his alcohol intake. Patient has been binge drinking for months and has a long history of alcohol abuse. He has decreased his intake and is starting to have tremors, anxiety, and depression. Patient is displaying classic signs of alcohol withdrawal. Psych consulted in the ER and recommending hospital medicine admission for withdrawal. Vital signs have been stable    Plan  [facility] following and appreciate recs.   ALCOHOL/BENZO WITHDRAWAL PRECAUTIONS  Valium 10 mg tid starting 1/11. Will try to switch to BID 1/12. Will ask psych  Ativan 2 mg q4 hours prn for ciwa 8+  Vital signs q4 hours  Thiamine, Folic acid, Vit B12 and Multivitamin supplementation  Patient will have a sitter as well  On ciwa        Seizure disorder  Patient with a history of seizures with alcohol withdrawal    Plan  Continue home vimpat  Seizure precautions       Debility  PT/OT      Severe depression  Psych already consulted on the patient. We will help get patient medically stable to allow for psychiatric treatment        VTE Risk Mitigation (From admission, onward)         Ordered     enoxaparin injection 40 mg  Daily      01/07/20 1800                      Cal Quintero MD  Department of  Hospital Medicine [facility]

## 2020-01-12 NOTE — ASSESSMENT & PLAN NOTE
ASSESSMENT:       Alcohol Use Disorder, Severe, in Acute Withdrawal    Based on the presence of the following criteria:    1. Substance often taken in larger amounts or over a longer period than intended    2. Persistent desire or unsuccessful efforts to cut down or control use    3. Great deal of time spent obtaining/using/recovering from substance/effects    4. Craving or strong desire to use    5. Recurrent use resulting in failure to fulfill obligations at work/school/home    6. Continued use despite causing persistent/recurrent social/interpersonal problems    7. Social/occupational/recreational activities reduced d/t substance use    8. Recurrent use in situations that are physically hazardous    9. Continued use despite knowledge of physical/psychological harm    10. Tolerance, as defined by:       A. Need for increased amount to achieve desired effect       B. Diminished effect with continued use of same amount    11. Withdrawal, as defined by:       A. Characteristic withdrawal symptoms       B. Substance taken to avoid withdrawal symptoms      PLAN:     It should be noted that pt has several risk factors for complicated EtOH w/d, including a hx of chronic and sustained EtOH consumption and well-documented hx of EtOH w/d sz. Furthermore, his age (63yo) and psychiatric comorbidities puts him at increased risk. Upon presentation, he was noted to begin experiencing w/d sx in spite of having BAL of 0.196.     - Vitals q4 around the clock  - Due to large quantity of benzo's required to stabilize pt and multitude of risk factors per above, would Cont PRN Ativan 2mg for CIWA 8+.   - Taper to Valium 10mg BID  - Daily Thiamine, Folic acid, vit. B12, and multivitamin supplements  - Medications that reduce the seizure threshold (including chlorpromazine, fluphenazine, haloperidol, bupropion) should be avoided during the acute detoxification period, defined as the latter of 96h from sobriety or 12 hours after pt  exhibits stable vitals w/o need for prn benzo

## 2020-01-12 NOTE — SUBJECTIVE & OBJECTIVE
"Interval History: See hospital course    Family History     Problem Relation (Age of Onset)    Alcohol abuse Mother, Maternal Uncle, Paternal Uncle, Cousin    Anxiety disorder Brother    Bipolar disorder Brother    Dementia Maternal Grandmother    Depression Mother, Father, Sister, Brother    Drug abuse Brother    Suicide Sister        Tobacco Use    Smoking status: Current Some Day Smoker     Packs/day: 0.25     Years: 10.00     Pack years: 2.50    Smokeless tobacco: Never Used   Substance and Sexual Activity    Alcohol use: No     Alcohol/week: 16.7 standard drinks     Types: 20 Standard drinks or equivalent per week     Comment: quit 4 years ago    Drug use: No    Sexual activity: Yes     Partners: Female     Comment: with wife; monogamous      Psychotherapeutics (From admission, onward)    Start     Stop Route Frequency Ordered    01/11/20 1200  diazePAM tablet 10 mg      -- Oral 3 times daily 01/11/20 0750    01/10/20 1446  LORazepam tablet 2 mg      -- Oral Every 4 hours PRN 01/10/20 1446           Review of Systems  Objective:     Vital Signs (Most Recent):  Temp: 97.6 °F (36.4 °C) (01/12/20 1055)  Pulse: 81 (01/12/20 1055)  Resp: 16 (01/12/20 1055)  BP: (!) 151/90 (01/12/20 1055)  SpO2: 96 % (01/12/20 1055) Vital Signs (24h Range):  Temp:  [97.6 °F (36.4 °C)-98.3 °F (36.8 °C)] 97.6 °F (36.4 °C)  Pulse:  [79-91] 81  Resp:  [16-18] 16  SpO2:  [95 %-98 %] 96 %  BP: (130-156)/(90-96) 151/90     Height: 6' 5" (195.6 cm)  Weight: 94.8 kg (208 lb 15.9 oz)  Body mass index is 24.78 kg/m².      Intake/Output Summary (Last 24 hours) at 1/12/2020 1411  Last data filed at 1/11/2020 1714  Gross per 24 hour   Intake --   Output 225 ml   Net -225 ml       Physical Exam   Psychiatric:   CONSTITUTIONAL  General Appearance and Manner: Well appearing, in NAD    MUSCULOSKELETAL  Abnormal Involuntary Movements: n/a  Gait and Station: steady    PSYCHIATRIC   Orientation: awake, alert, oriented to location - hospital  Speech: " low, spontaneous  Language: english, fluent  Mood: apathetic  Affect: flat  Thought Process: linear, goal-oriented  Associations: intact to conversation  Thought Content: Denies overt SI/HI/AVH, however minimizes overall depression/anxiety  Memory: intact to conversation  Attention and Concentration: intact  Fund of Knowledge: intact  Insight: poor  Judgment: limited        Significant Labs: All pertinent labs within the past 24 hours have been reviewed.    Significant Imaging: I have reviewed all pertinent imaging results/findings within the past 24 hours.

## 2020-01-12 NOTE — ASSESSMENT & PLAN NOTE
64 y.o. male with HTN, chronic pain on oxycodone, seizures (on vimpat), alcoholism, and depression who presents today after decreasing his alcohol intake. Patient has been binge drinking for months and has a long history of alcohol abuse. He has decreased his intake and is starting to have tremors, anxiety, and depression. Patient is displaying classic signs of alcohol withdrawal. Psych consulted in the ER and recommending hospital medicine admission for withdrawal. Vital signs have been stable    Plan  [facility] following and appreciate recs.   ALCOHOL/BENZO WITHDRAWAL PRECAUTIONS  Valium 10 mg tid starting 1/11. Will try to switch to BID 1/12. Will ask psych  Ativan 2 mg q4 hours prn for ciwa 8+  Vital signs q4 hours  Thiamine, Folic acid, Vit B12 and Multivitamin supplementation  Patient will have a sitter as well  On ciwa

## 2020-01-12 NOTE — SUBJECTIVE & OBJECTIVE
Interval History: NAEON. Patient has not required prn ativan in three days. He is no longer tremulous. He is hesitant about receiving more psychiatric treatment. He does not see the benefit. Continuing to work with the patient to get him medically stable.     Objective:     Vital Signs (Most Recent):  Temp: 98.3 °F (36.8 °C) (01/12/20 0813)  Pulse: 83 (01/12/20 0813)  Resp: 16 (01/12/20 0813)  BP: (!) 156/96 (01/12/20 0813)  SpO2: 96 % (01/12/20 0813) Vital Signs (24h Range):  Temp:  [97.6 °F (36.4 °C)-98.3 °F (36.8 °C)] 98.3 °F (36.8 °C)  Pulse:  [79-91] 83  Resp:  [16-19] 16  SpO2:  [95 %-98 %] 96 %  BP: (130-156)/(88-96) 156/96     Weight: 94.8 kg (208 lb 15.9 oz)  Body mass index is 24.78 kg/m².    Intake/Output Summary (Last 24 hours) at 1/12/2020 0847  Last data filed at 1/11/2020 1714  Gross per 24 hour   Intake 360 ml   Output 575 ml   Net -215 ml      Physical Exam   Constitutional: He is oriented to person, place, and time. He appears well-developed and well-nourished. He appears distressed.   Patient laying in bed comfortably    HENT:   Head: Normocephalic and atraumatic.   Eyes: EOM are normal.   Neck: Normal range of motion. No JVD present.   Cardiovascular: Normal rate, regular rhythm, normal heart sounds and intact distal pulses. Exam reveals no gallop and no friction rub.   No murmur heard.  Pulmonary/Chest: Effort normal and breath sounds normal. No respiratory distress.   Abdominal: Soft. Bowel sounds are normal. He exhibits no distension. There is no tenderness.   Musculoskeletal: Normal range of motion. He exhibits no edema.   Neurological: He is alert and oriented to person, place, and time. No cranial nerve deficit.   Tremor much improved. No tongue fasiculations    Skin: Skin is warm and dry. No rash noted.   Psychiatric: He has a normal mood and affect.   Nursing note and vitals reviewed.      Significant Labs:   Recent Results (from the past 48 hour(s))   POCT glucose    Collection Time:  01/10/20  8:51 PM   Result Value Ref Range    POCT Glucose 103 70 - 110 mg/dL   POCT glucose    Collection Time: 01/11/20  7:56 AM   Result Value Ref Range    POCT Glucose 108 70 - 110 mg/dL   CBC with Automated Differential    Collection Time: 01/11/20  8:07 AM   Result Value Ref Range    WBC 5.41 3.90 - 12.70 K/uL    RBC 3.94 (L) 4.60 - 6.20 M/uL    Hemoglobin 13.6 (L) 14.0 - 18.0 g/dL    Hematocrit 39.7 (L) 40.0 - 54.0 %    Mean Corpuscular Volume 101 (H) 82 - 98 fL    Mean Corpuscular Hemoglobin 34.5 (H) 27.0 - 31.0 pg    Mean Corpuscular Hemoglobin Conc 34.3 32.0 - 36.0 g/dL    RDW 11.9 11.5 - 14.5 %    Platelets 155 150 - 350 K/uL    MPV 9.8 9.2 - 12.9 fL    Immature Granulocytes 0.9 (H) 0.0 - 0.5 %    Gran # (ANC) 3.4 1.8 - 7.7 K/uL    Immature Grans (Abs) 0.05 (H) 0.00 - 0.04 K/uL    Lymph # 1.3 1.0 - 4.8 K/uL    Mono # 0.6 0.3 - 1.0 K/uL    Eos # 0.1 0.0 - 0.5 K/uL    Baso # 0.02 0.00 - 0.20 K/uL    nRBC 0 0 /100 WBC    Gran% 62.6 38.0 - 73.0 %    Lymph% 23.7 18.0 - 48.0 %    Mono% 10.2 4.0 - 15.0 %    Eosinophil% 2.2 0.0 - 8.0 %    Basophil% 0.4 0.0 - 1.9 %    Differential Method Automated    Comprehensive Metabolic Panel (CMP)    Collection Time: 01/11/20  8:08 AM   Result Value Ref Range    Sodium 135 (L) 136 - 145 mmol/L    Potassium 3.8 3.5 - 5.1 mmol/L    Chloride 100 95 - 110 mmol/L    CO2 28 23 - 29 mmol/L    Glucose 113 (H) 70 - 110 mg/dL    BUN, Bld 7 (L) 8 - 23 mg/dL    Creatinine 0.7 0.5 - 1.4 mg/dL    Calcium 9.7 8.7 - 10.5 mg/dL    Total Protein 6.4 6.0 - 8.4 g/dL    Albumin 3.1 (L) 3.5 - 5.2 g/dL    Total Bilirubin 0.5 0.1 - 1.0 mg/dL    Alkaline Phosphatase 91 55 - 135 U/L    AST 38 10 - 40 U/L    ALT 32 10 - 44 U/L    Anion Gap 7 (L) 8 - 16 mmol/L    eGFR if African American >60.0 >60 mL/min/1.73 m^2    eGFR if non African American >60.0 >60 mL/min/1.73 m^2   Magnesium    Collection Time: 01/11/20  8:08 AM   Result Value Ref Range    Magnesium 1.9 1.6 - 2.6 mg/dL   Phosphorus    Collection  Time: 01/11/20  8:08 AM   Result Value Ref Range    Phosphorus 3.7 2.7 - 4.5 mg/dL   Comprehensive Metabolic Panel (CMP)    Collection Time: 01/12/20  5:53 AM   Result Value Ref Range    Sodium 135 (L) 136 - 145 mmol/L    Potassium 3.7 3.5 - 5.1 mmol/L    Chloride 101 95 - 110 mmol/L    CO2 26 23 - 29 mmol/L    Glucose 105 70 - 110 mg/dL    BUN, Bld 11 8 - 23 mg/dL    Creatinine 0.8 0.5 - 1.4 mg/dL    Calcium 9.4 8.7 - 10.5 mg/dL    Total Protein 6.3 6.0 - 8.4 g/dL    Albumin 3.2 (L) 3.5 - 5.2 g/dL    Total Bilirubin 0.5 0.1 - 1.0 mg/dL    Alkaline Phosphatase 91 55 - 135 U/L    AST 28 10 - 40 U/L    ALT 30 10 - 44 U/L    Anion Gap 8 8 - 16 mmol/L    eGFR if African American >60.0 >60 mL/min/1.73 m^2    eGFR if non African American >60.0 >60 mL/min/1.73 m^2   Magnesium    Collection Time: 01/12/20  5:53 AM   Result Value Ref Range    Magnesium 1.8 1.6 - 2.6 mg/dL   Phosphorus    Collection Time: 01/12/20  5:53 AM   Result Value Ref Range    Phosphorus 3.5 2.7 - 4.5 mg/dL   CBC with Automated Differential    Collection Time: 01/12/20  5:53 AM   Result Value Ref Range    WBC 6.38 3.90 - 12.70 K/uL    RBC 4.00 (L) 4.60 - 6.20 M/uL    Hemoglobin 13.8 (L) 14.0 - 18.0 g/dL    Hematocrit 41.5 40.0 - 54.0 %    Mean Corpuscular Volume 104 (H) 82 - 98 fL    Mean Corpuscular Hemoglobin 34.5 (H) 27.0 - 31.0 pg    Mean Corpuscular Hemoglobin Conc 33.3 32.0 - 36.0 g/dL    RDW 12.0 11.5 - 14.5 %    Platelets 181 150 - 350 K/uL    MPV 9.7 9.2 - 12.9 fL    Immature Granulocytes 1.3 (H) 0.0 - 0.5 %    Gran # (ANC) 4.0 1.8 - 7.7 K/uL    Immature Grans (Abs) 0.08 (H) 0.00 - 0.04 K/uL    Lymph # 1.5 1.0 - 4.8 K/uL    Mono # 0.6 0.3 - 1.0 K/uL    Eos # 0.2 0.0 - 0.5 K/uL    Baso # 0.05 0.00 - 0.20 K/uL    nRBC 0 0 /100 WBC    Gran% 63.1 38.0 - 73.0 %    Lymph% 22.9 18.0 - 48.0 %    Mono% 8.9 4.0 - 15.0 %    Eosinophil% 3.0 0.0 - 8.0 %    Basophil% 0.8 0.0 - 1.9 %    Differential Method Automated          Significant Imaging:   Imaging  Results          X-Ray Chest AP Portable (Final result)  Result time 01/07/20 18:58:36    Final result by Rafael Willett MD (01/07/20 18:58:36)                 Impression:      1. Allowing for patient positioning, no convincing large focal consolidation noting left basilar subsegmental atelectasis or scarring.      Electronically signed by: Rafael Willett MD  Date:    01/07/2020  Time:    18:58             Narrative:    EXAMINATION:  XR CHEST AP PORTABLE    CLINICAL HISTORY:  Rule out infection;    TECHNIQUE:  Single frontal view of the chest was performed.    COMPARISON:  10/03/2019    FINDINGS:  The cardiomediastinal silhouette is not enlarged.  There is no pleural effusion.  The trachea is midline.  The lungs are symmetrically expanded bilaterally with mildly coarse interstitial attenuation.  There is left basilar subsegmental atelectasis or scarring..  No large focal consolidation seen.  There is no pneumothorax.  The osseous structures are remarkable for degenerative change..

## 2020-01-12 NOTE — PROGRESS NOTES
"[facility]  Psychiatry  Progress Note    Patient Name: Mirza Morales  MRN: [id number]   Code Status: Full Code  Admission Date: 1/7/2020  Hospital Length of Stay: 5 days  Expected Discharge Date: 1/10/2020  Attending Physician: Shona Jacinto MD  Primary Care Provider: Jud Mo MD    Current Legal Status: [facility]    Patient information was obtained from patient, spouse/SO, past medical records and ER records.     Subjective:     Principal Problem:Alcohol withdrawal    Chief Complaint: As above.    HPI:   Per ED Note:      Alcohol Intoxication        Pt to ER via EMS from home for alcohol intoxication. Unsure who called EMS. Pt is A, A, O x 4. He was able to ambulate to stretcher with assistance at home. He drank a 5th of vodka or more. Hx of alcoholism.       HPI  Mr. Morales is a 64 y.o. male with HTN, chronic pain on oxycodone, seizures (on vimpat), alcoholism, and depression here today with alcohol intoxication. History mildly limited due to alcohol intoxication. States he has been drinking a lot recently because he feels he is at the end of his life. States he does not want to kill himself, but he no longer wants to live. Feels very depressed. States he thinks his dopamine levels are off. Denies fevers, chills, n/v, abd pain, chest pain, SOB, numbness, tingling, weakness, AVH, HI, SI.    On My Interview:  Mr. Morales is a 64 y.o. male with HTN, chronic pain on oxycodone, seizures (on vimpat), alcoholism, and depression  presented to ED with alcohol intoxication. Met with patient while patient was lying in his ED bed. He seemed tremulous, shaking all over, very anxious, dysphoric, and restless. He complained of alcohol withdrawal symptoms of nausea, abdominal pain, severe tremors, severe nervousness and diarrhea. He reported feeling very depressed, hopeless and stated that she feels he is at the end of his life. When he was asked if he is having suicidal thoughts he replied, "well, I feel " "very depressed. I cannot go outdoors anymore. I am isolated, I lost my well. I drink to feel better. I am tired of living. I feel it's my time. t if I get depressed enough I would fade away, quit living and just stop functioning and my body would die." He reported one prior suicide attempt by overdosing on oxycodone but stated it was not intentional. He stated that his current medications of latuda and trazodone are not working, He endorsed depressive symptoms of low mood, amotivation, anhedonia, decreased concentration, feeling of guilt, worthlessness, hopelessness and fatigue. He denied HI/AVH and no paranoia or delusions reported. Per chart he has diagnosis of bipolar but he denied having marielena or hypomania like episodes. He admitted to drinking 2 fifth or vodka daily, duration 30 years but with periods of sobriety, and his\ last use was this morning. He reported history of alcohol withdrawal seizures and Dt's.          Anamaria [phone]  Spoke with Anamaria, patient wife stated "my  has seizures and I am worried about him going through a grand mal seizure and dying. He has been gradually lowing his alcohol intake and he will be getting into seizures and I am afraid he will die. He mentioned a couple of times to me that he said he wants to die in his own bed. I could not let him lie there and die in his own bed. He is getting progressively worse. He sees a psychiatrics is Dr. Loco. I worry about his drinking, having seizures and wanting to die. I think it is not safe for him to come home and he needs treatment or he would die."    Psychiatric Review Of Systems - Is patient experiencing or having changes in:  sleep: yes  appetite: yes  weight: yes. Lost 15 lbs in a month  energy/anergy: yes   interest/pleasure/anhedonia: yes  somatic symptoms: yes  anxiety/panic: yes  guilty/hopelessness: yes  concentration: yes  S.I.B.s/risky behavior: no  Irritability: yes  Racing thoughts: yes  Impulsive " "behaviors: no  Paranoia:no  AVH:no  Suicidal thoughts/plan/intent: no      Hospital Course: 1/9/20  Met with patient while lying in his hospital bed. He reported mood is" better and I am on my way to detoxing" and his affect was euthymic.He was calm and cooperative. He is still presenting with alcohol withdrawal symptoms (elevated vitals, tremors, and anxiety). He denied feeling suicidal and stated, "I don't want to drink myself to death and I want to live that I am sober. There are reasons for me to live. " He reported improved depressive symptoms since hospitalization but continues to report feeling depressed and dysphoric. He is medication compliant and no side effects noted or reported. He denied SI/HI/AVH and no delusion noted or reported. Patient in interested in continuing his detox treatment at this facility and is not interested in rehab treatment afterwards.  PEC rescinded d/t pt not longer endorsing active SI.    01/10/2020  Pt w/persistent hypertension, unclear if 2/2 EtOH w/d or hx chronic HTN.  HR 80's-90's.  Over past 24h, pt has received 40mg Valium plus 12mg Ativan.  No PRN's required for agitation.  This morning, pt found resting awake in bed.  Responds flatly but appropriately when greeted.  Pt reports that his detox is complete and that he is ready to leave the hospital.  Pt is a self-described "binge drinker," which he describes as abstaining from EtOH for weeks or months, then drinking heavily for 3-4 weeks.  During binges, pt will drink 2/5's of liquor per day.  Pt attributes binges to his long hx of depression.  When depressive sx get to the point where "I can no longer bear it," he starts drinking heavily.  Pt states that during these periods he no longer gets out of bed, does not care for himself.  Pt tearful and describes grim future outlook, states that his wife would be better off without him, that there is no hope of ever getting better, that he will never feel comfortable in his own " "skin or have a day of reprieve from depressive sx.  Pt endorses multiple seizures 2/2 EtOH w/d.  Says first seizure was in his 60's.  Of note, neurosurgery eval'd pt in Sept 2019 and suspected EtOH etiology.  Pt states desire to leave hospital in spite of seizure risk.  Does not have an appreciation of the need for continued taper even when need for taper is explained, "I'm already detoxed, I don't need it anymore."  Endorses passive SI of death by seizure, says would no longer be a burden on his wife.    Spoke with pt's outpatient provider, who states that pt lives w/severe depression and pt's wife is not longer able to care for him.  Does not feel it would be safe for pt to leave the hospital.  Per chart, pt's wife emailed outpatient provider on 1/5 and staetd "I am looking at mental health facilities for adults... I cannot do this anymore."  Spoke with wife on telephone today, she states that prior to coming to the hospital pt would not get out of bed, was urinating on himself, was wholly dependent on her for care.  States she is not able or willing to continue providing this type of care.  She has full time job and cannot stay home with pt all day.  Does not feel it is safe for pt to return home at this time.    01/11/2020  Pt found resting awake in bed in NAD.  No complaints today.  Wants to go home, remains flat and listless.  Pt ambivalent about rehab.  Per chart, 50mg Valium yesterday, due to receive scheduled 40mg today.  No PRN Ativan required.  Remains mildly hypertensive.      01/12/2020  Pt resting in bed, awake, alert and in NAD. Focused on going home, minimizes his depression/anxiety by stating he only needs medication management. Acknowledges that he has been trialed on multiple antidepressants over the course of many years with little effect. Does not desire rehab. However, based on pt's presentation the last few days and great concern from wife regarding passive SI, avolition. Pt will continued to " "be PEC/CEC with plan to transfer to psychiatric facility once deemed medically stable by primary team.    Interval History: See hospital course    Family History     Problem Relation (Age of Onset)    Alcohol abuse Mother, Maternal Uncle, Paternal Uncle, Cousin    Anxiety disorder Brother    Bipolar disorder Brother    Dementia Maternal Grandmother    Depression Mother, Father, Sister, Brother    Drug abuse Brother    Suicide Sister        Tobacco Use    Smoking status: Current Some Day Smoker     Packs/day: 0.25     Years: 10.00     Pack years: 2.50    Smokeless tobacco: Never Used   Substance and Sexual Activity    Alcohol use: No     Alcohol/week: 16.7 standard drinks     Types: 20 Standard drinks or equivalent per week     Comment: quit 4 years ago    Drug use: No    Sexual activity: Yes     Partners: Female     Comment: with wife; monogamous      Psychotherapeutics (From admission, onward)    Start     Stop Route Frequency Ordered    01/11/20 1200  diazePAM tablet 10 mg      -- Oral 3 times daily 01/11/20 0750    01/10/20 1446  LORazepam tablet 2 mg      -- Oral Every 4 hours PRN 01/10/20 1446           Review of Systems  Objective:     Vital Signs (Most Recent):  Temp: 97.6 °F (36.4 °C) (01/12/20 1055)  Pulse: 81 (01/12/20 1055)  Resp: 16 (01/12/20 1055)  BP: (!) 151/90 (01/12/20 1055)  SpO2: 96 % (01/12/20 1055) Vital Signs (24h Range):  Temp:  [97.6 °F (36.4 °C)-98.3 °F (36.8 °C)] 97.6 °F (36.4 °C)  Pulse:  [79-91] 81  Resp:  [16-18] 16  SpO2:  [95 %-98 %] 96 %  BP: (130-156)/(90-96) 151/90     Height: 6' 5" (195.6 cm)  Weight: 94.8 kg (208 lb 15.9 oz)  Body mass index is 24.78 kg/m².      Intake/Output Summary (Last 24 hours) at 1/12/2020 1411  Last data filed at 1/11/2020 1714  Gross per 24 hour   Intake --   Output 225 ml   Net -225 ml       Physical Exam   Psychiatric:   CONSTITUTIONAL  General Appearance and Manner: Well appearing, in NAD    MUSCULOSKELETAL  Abnormal Involuntary Movements: " n/a  Gait and Station: steady    PSYCHIATRIC   Orientation: awake, alert, oriented to location - hospital  Speech: low, spontaneous  Language: english, fluent  Mood: apathetic  Affect: flat  Thought Process: linear, goal-oriented  Associations: intact to conversation  Thought Content: Denies overt SI/HI/AVH, however minimizes overall depression/anxiety  Memory: intact to conversation  Attention and Concentration: intact  Fund of Knowledge: intact  Insight: poor  Judgment: limited        Significant Labs: All pertinent labs within the past 24 hours have been reviewed.    Significant Imaging: I have reviewed all pertinent imaging results/findings within the past 24 hours.    Assessment/Plan:     Severe depression  ASSESSMENT:       Major Depression, Severe, without psychotic features  Hx Bipolar Disorder    Based on my interview as well as on information obtained from collateral and from chart review, pt appears to have been living with depressed mood for the past several months with an acute worsening approx 4 wks ago.  Upon interview, pt tearful and expressed feelings of hopelessness. He is anhedonic, reports decreased appetite, c/o insomnia and decreased energy levels as evidenced by not feeling rested when waking up in morning.   He expressed a sense of worthlessness at several points per HPI.  Indecisiveness noted during interview. Especially concerning is that he reported active SI as recently as 1/7 and continues to report thoughts of death by seizure. The aforementioned sx have a strong temporal correlation with acute worsening of mood sx 4 wks ago.  Sx appear to be causing significant distress and are clearly interfering with pt's social and occupational functioning (pt urinating on self in bed, not otherwise caring for self, not speaking to anyone other than his wife).  It should be noted that these sx exist in the context of heavy EtOH use so likely substance-induced component, but per all sources  depressive sx exist in context of sobriety as well.  MSE findings c/w severe depression but not concerning for psychosis at this time.  Pt would benefit from continued tx for mood disorder, but primary focus at this time will be on safely detoxing from EtOH.  Most recent regimen includes Latuda and trazodone per chart.    PLAN:     - Placed PEC in paper chart d/t grave disability per above.  Pt aware, charge notified of need for sitter, and security is w/patient until sitter arrives.  - Hold home Latuda d/t in context of acute w/d as this medication can lower seizure threshold  - Will continue to monitor and will defer management until pt is more medically stable      Alcohol use disorder, severe, dependence    ASSESSMENT:       Alcohol Use Disorder, Severe, in Acute Withdrawal    Based on the presence of the following criteria:    1. Substance often taken in larger amounts or over a longer period than intended    2. Persistent desire or unsuccessful efforts to cut down or control use    3. Great deal of time spent obtaining/using/recovering from substance/effects    4. Craving or strong desire to use    5. Recurrent use resulting in failure to fulfill obligations at work/school/home    6. Continued use despite causing persistent/recurrent social/interpersonal problems    7. Social/occupational/recreational activities reduced d/t substance use    8. Recurrent use in situations that are physically hazardous    9. Continued use despite knowledge of physical/psychological harm    10. Tolerance, as defined by:       A. Need for increased amount to achieve desired effect       B. Diminished effect with continued use of same amount    11. Withdrawal, as defined by:       A. Characteristic withdrawal symptoms       B. Substance taken to avoid withdrawal symptoms      PLAN:     It should be noted that pt has several risk factors for complicated EtOH w/d, including a hx of chronic and sustained EtOH consumption and  well-documented hx of EtOH w/d sz. Furthermore, his age (63yo) and psychiatric comorbidities puts him at increased risk. Upon presentation, he was noted to begin experiencing w/d sx in spite of having BAL of 0.196.     - Vitals q4 around the clock  - Due to large quantity of benzo's required to stabilize pt and multitude of risk factors per above, would Cont PRN Ativan 2mg for CIWA 8+.   - Taper to Valium 10mg BID  - Daily Thiamine, Folic acid, vit. B12, and multivitamin supplements  - Medications that reduce the seizure threshold (including chlorpromazine, fluphenazine, haloperidol, bupropion) should be avoided during the acute detoxification period, defined as the latter of 96h from sobriety or 12 hours after pt exhibits stable vitals w/o need for prn benzo           Need for Continued Hospitalization:   Plan to admit to inpatient psychiatric facility once deemed medically stable for discharge.    Anticipated Disposition: Admitted as an Inpatient     Total time:  35 with greater than 50% of this time spent in counseling and/or coordination of care.       Osiris Knapp MD   Psychiatry  [facility]

## 2020-01-12 NOTE — PROGRESS NOTES
Informed by Dr. Bruce Henderson that he spoke with 's office and was notified that when pt was re-PEC'd 's office was not informed and did not receive new PEC from 1/10/2020. Spoke with 's office and confirmed information from Dr. Henderson. PEC faxed to 's office and office to notify  today and tomorrow morning of PEC expiring 1/13/2020 8090.

## 2020-01-13 ENCOUNTER — PATIENT MESSAGE (OUTPATIENT)
Dept: PSYCHIATRY | Facility: CLINIC | Age: 65
End: 2020-01-13

## 2020-01-13 LAB
ALBUMIN SERPL BCP-MCNC: 3.1 G/DL (ref 3.5–5.2)
ALP SERPL-CCNC: 87 U/L (ref 55–135)
ALT SERPL W/O P-5'-P-CCNC: 24 U/L (ref 10–44)
ANION GAP SERPL CALC-SCNC: 7 MMOL/L (ref 8–16)
AST SERPL-CCNC: 22 U/L (ref 10–40)
BASOPHILS # BLD AUTO: 0.05 K/UL (ref 0–0.2)
BASOPHILS NFR BLD: 0.7 % (ref 0–1.9)
BILIRUB SERPL-MCNC: 0.5 MG/DL (ref 0.1–1)
BUN SERPL-MCNC: 11 MG/DL (ref 8–23)
CALCIUM SERPL-MCNC: 9 MG/DL (ref 8.7–10.5)
CHLORIDE SERPL-SCNC: 102 MMOL/L (ref 95–110)
CO2 SERPL-SCNC: 24 MMOL/L (ref 23–29)
CREAT SERPL-MCNC: 0.8 MG/DL (ref 0.5–1.4)
DIFFERENTIAL METHOD: ABNORMAL
EOSINOPHIL # BLD AUTO: 0.2 K/UL (ref 0–0.5)
EOSINOPHIL NFR BLD: 2.3 % (ref 0–8)
ERYTHROCYTE [DISTWIDTH] IN BLOOD BY AUTOMATED COUNT: 12.3 % (ref 11.5–14.5)
EST. GFR  (AFRICAN AMERICAN): >60 ML/MIN/1.73 M^2
EST. GFR  (NON AFRICAN AMERICAN): >60 ML/MIN/1.73 M^2
GLUCOSE SERPL-MCNC: 97 MG/DL (ref 70–110)
HCT VFR BLD AUTO: 39.8 % (ref 40–54)
HGB BLD-MCNC: 13.3 G/DL (ref 14–18)
IMM GRANULOCYTES # BLD AUTO: 0.1 K/UL (ref 0–0.04)
IMM GRANULOCYTES NFR BLD AUTO: 1.4 % (ref 0–0.5)
LYMPHOCYTES # BLD AUTO: 1.7 K/UL (ref 1–4.8)
LYMPHOCYTES NFR BLD: 24.4 % (ref 18–48)
MAGNESIUM SERPL-MCNC: 1.9 MG/DL (ref 1.6–2.6)
MCH RBC QN AUTO: 34.9 PG (ref 27–31)
MCHC RBC AUTO-ENTMCNC: 33.4 G/DL (ref 32–36)
MCV RBC AUTO: 105 FL (ref 82–98)
MONOCYTES # BLD AUTO: 0.7 K/UL (ref 0.3–1)
MONOCYTES NFR BLD: 9.9 % (ref 4–15)
NEUTROPHILS # BLD AUTO: 4.3 K/UL (ref 1.8–7.7)
NEUTROPHILS NFR BLD: 61.3 % (ref 38–73)
NRBC BLD-RTO: 0 /100 WBC
PHOSPHATE SERPL-MCNC: 3.3 MG/DL (ref 2.7–4.5)
PLATELET # BLD AUTO: 191 K/UL (ref 150–350)
PMV BLD AUTO: 9.8 FL (ref 9.2–12.9)
POTASSIUM SERPL-SCNC: 4 MMOL/L (ref 3.5–5.1)
PROT SERPL-MCNC: 6.1 G/DL (ref 6–8.4)
RBC # BLD AUTO: 3.81 M/UL (ref 4.6–6.2)
SODIUM SERPL-SCNC: 133 MMOL/L (ref 136–145)
WBC # BLD AUTO: 6.96 K/UL (ref 3.9–12.7)

## 2020-01-13 PROCEDURE — 11000001 HC ACUTE MED/SURG PRIVATE ROOM

## 2020-01-13 PROCEDURE — 99232 SBSQ HOSP IP/OBS MODERATE 35: CPT | Mod: ,,, | Performed by: INTERNAL MEDICINE

## 2020-01-13 PROCEDURE — 85025 COMPLETE CBC W/AUTO DIFF WBC: CPT

## 2020-01-13 PROCEDURE — S4991 NICOTINE PATCH NONLEGEND: HCPCS | Performed by: STUDENT IN AN ORGANIZED HEALTH CARE EDUCATION/TRAINING PROGRAM

## 2020-01-13 PROCEDURE — 36415 COLL VENOUS BLD VENIPUNCTURE: CPT

## 2020-01-13 PROCEDURE — 25000003 PHARM REV CODE 250: Performed by: EMERGENCY MEDICINE

## 2020-01-13 PROCEDURE — 83735 ASSAY OF MAGNESIUM: CPT

## 2020-01-13 PROCEDURE — 84100 ASSAY OF PHOSPHORUS: CPT

## 2020-01-13 PROCEDURE — 25000003 PHARM REV CODE 250: Performed by: STUDENT IN AN ORGANIZED HEALTH CARE EDUCATION/TRAINING PROGRAM

## 2020-01-13 PROCEDURE — 80053 COMPREHEN METABOLIC PANEL: CPT

## 2020-01-13 PROCEDURE — 25000003 PHARM REV CODE 250: Performed by: INTERNAL MEDICINE

## 2020-01-13 PROCEDURE — 63600175 PHARM REV CODE 636 W HCPCS: Performed by: STUDENT IN AN ORGANIZED HEALTH CARE EDUCATION/TRAINING PROGRAM

## 2020-01-13 PROCEDURE — 99232 PR SUBSEQUENT HOSPITAL CARE,LEVL II: ICD-10-PCS | Mod: ,,, | Performed by: INTERNAL MEDICINE

## 2020-01-13 RX ORDER — DIAZEPAM 5 MG/1
10 TABLET ORAL DAILY
Status: COMPLETED | OUTPATIENT
Start: 2020-01-13 | End: 2020-01-13

## 2020-01-13 RX ADMIN — FOLIC ACID 1 MG: 1 TABLET ORAL at 09:01

## 2020-01-13 RX ADMIN — ENOXAPARIN SODIUM 40 MG: 100 INJECTION SUBCUTANEOUS at 04:01

## 2020-01-13 RX ADMIN — THERA TABS 1 TABLET: TAB at 09:01

## 2020-01-13 RX ADMIN — PANTOPRAZOLE SODIUM 40 MG: 40 TABLET, DELAYED RELEASE ORAL at 09:01

## 2020-01-13 RX ADMIN — ATORVASTATIN CALCIUM 40 MG: 20 TABLET, FILM COATED ORAL at 09:01

## 2020-01-13 RX ADMIN — NICOTINE 1 PATCH: 21 PATCH, EXTENDED RELEASE TRANSDERMAL at 09:01

## 2020-01-13 RX ADMIN — DIAZEPAM 10 MG: 5 TABLET ORAL at 09:01

## 2020-01-13 RX ADMIN — SENNOSIDES AND DOCUSATE SODIUM 1 TABLET: 8.6; 5 TABLET ORAL at 09:01

## 2020-01-13 RX ADMIN — LACOSAMIDE 200 MG: 50 TABLET, FILM COATED ORAL at 09:01

## 2020-01-13 RX ADMIN — Medication 100 MG: at 09:01

## 2020-01-13 NOTE — PROGRESS NOTES
[facility] Hospital Medicine  Progress Note    Patient Name: Mirza Morales  MRN: [id number]  Patient Class: IP- Inpatient   Admission Date: 1/7/2020  Length of Stay: 6 days  Attending Physician: Shnoa Jacinto MD  Primary Care Provider: Jud Mo MD    Hospital Medicine Team: [facility] HOSP MED 2 Cal Quintero MD    Subjective:     Principal Problem:Alcohol withdrawal        HPI:  64 y.o. male with HTN, chronic pain on oxycodone, seizures (on vimpat), alcoholism, and depression here today with alcohol intoxication. Patient has been drinking about a fifth of vodka a day for weeks. According to his wife he has actually lowered his alcohol intake the past few days and is worried about him going through withdrawal. Says he has a history of seizures when he stops drinking. Patient says he has been anxious, having tremors, nausea, and depressed. He has been depressed for quite sometime and now he knows needs help.     In the ED, the patient was evaluated by psych and the ICU. Psyched PEC the patient and want to have him admitted to hospital medicine for alcohol withdrawal. The  ICU evaluated and state the patient is hemodynamically stable on the floor. On evaluation by hospital medicine, patient is lying in bed with his wife at bedside. He is agreeable to come into the hospital for treatment.       Overview/Hospital Course:  1/8/2020: Patient is still requiring PRN lorazepam for alcohol withdrawal. He has tongue fasciculations and hands tremors.     Interval History: NAEON. Patient is doing well from a withdrawal standpoint. We are stopping the benzos today. Psych wants patient to go to an inpatient facility. Discussion being had with wife and psych to figure out the best plan for the patient.       Objective:     Vital Signs (Most Recent):  Temp: 98.1 °F (36.7 °C) (01/13/20 1152)  Pulse: 90 (01/13/20 1152)  Resp: 18 (01/13/20 1152)  BP: (!) 144/87 (01/13/20 1152)  SpO2: 98 % (01/13/20 1152)  Vital Signs (24h Range):  Temp:  [97.5 °F (36.4 °C)-98.5 °F (36.9 °C)] 98.1 °F (36.7 °C)  Pulse:  [77-90] 90  Resp:  [18] 18  SpO2:  [95 %-98 %] 98 %  BP: (137-162)/(82-94) 144/87     Weight: 94.8 kg (208 lb 15.9 oz)  Body mass index is 24.78 kg/m².    Intake/Output Summary (Last 24 hours) at 1/13/2020 1626  Last data filed at 1/13/2020 1200  Gross per 24 hour   Intake 240 ml   Output 1250 ml   Net -1010 ml      Physical Exam   Constitutional: He is oriented to person, place, and time. He appears well-developed and well-nourished. He appears distressed.   Patient laying in bed comfortably    HENT:   Head: Normocephalic and atraumatic.   Eyes: EOM are normal.   Neck: Normal range of motion. No JVD present.   Cardiovascular: Normal rate, regular rhythm, normal heart sounds and intact distal pulses. Exam reveals no gallop and no friction rub.   No murmur heard.  Pulmonary/Chest: Effort normal and breath sounds normal. No respiratory distress.   Abdominal: Soft. Bowel sounds are normal. He exhibits no distension. There is no tenderness.   Musculoskeletal: Normal range of motion. He exhibits no edema.   Neurological: He is alert and oriented to person, place, and time. No cranial nerve deficit.   Tremor much improved. No tongue fasiculations    Skin: Skin is warm and dry. No rash noted.   Psychiatric: He has a normal mood and affect.   Nursing note and vitals reviewed.      Significant Labs:   Recent Results (from the past 48 hour(s))   Comprehensive Metabolic Panel (CMP)    Collection Time: 01/12/20  5:53 AM   Result Value Ref Range    Sodium 135 (L) 136 - 145 mmol/L    Potassium 3.7 3.5 - 5.1 mmol/L    Chloride 101 95 - 110 mmol/L    CO2 26 23 - 29 mmol/L    Glucose 105 70 - 110 mg/dL    BUN, Bld 11 8 - 23 mg/dL    Creatinine 0.8 0.5 - 1.4 mg/dL    Calcium 9.4 8.7 - 10.5 mg/dL    Total Protein 6.3 6.0 - 8.4 g/dL    Albumin 3.2 (L) 3.5 - 5.2 g/dL    Total Bilirubin 0.5 0.1 - 1.0 mg/dL    Alkaline Phosphatase 91 55 - 135  U/L    AST 28 10 - 40 U/L    ALT 30 10 - 44 U/L    Anion Gap 8 8 - 16 mmol/L    eGFR if African American >60.0 >60 mL/min/1.73 m^2    eGFR if non African American >60.0 >60 mL/min/1.73 m^2   Magnesium    Collection Time: 01/12/20  5:53 AM   Result Value Ref Range    Magnesium 1.8 1.6 - 2.6 mg/dL   Phosphorus    Collection Time: 01/12/20  5:53 AM   Result Value Ref Range    Phosphorus 3.5 2.7 - 4.5 mg/dL   CBC with Automated Differential    Collection Time: 01/12/20  5:53 AM   Result Value Ref Range    WBC 6.38 3.90 - 12.70 K/uL    RBC 4.00 (L) 4.60 - 6.20 M/uL    Hemoglobin 13.8 (L) 14.0 - 18.0 g/dL    Hematocrit 41.5 40.0 - 54.0 %    Mean Corpuscular Volume 104 (H) 82 - 98 fL    Mean Corpuscular Hemoglobin 34.5 (H) 27.0 - 31.0 pg    Mean Corpuscular Hemoglobin Conc 33.3 32.0 - 36.0 g/dL    RDW 12.0 11.5 - 14.5 %    Platelets 181 150 - 350 K/uL    MPV 9.7 9.2 - 12.9 fL    Immature Granulocytes 1.3 (H) 0.0 - 0.5 %    Gran # (ANC) 4.0 1.8 - 7.7 K/uL    Immature Grans (Abs) 0.08 (H) 0.00 - 0.04 K/uL    Lymph # 1.5 1.0 - 4.8 K/uL    Mono # 0.6 0.3 - 1.0 K/uL    Eos # 0.2 0.0 - 0.5 K/uL    Baso # 0.05 0.00 - 0.20 K/uL    nRBC 0 0 /100 WBC    Gran% 63.1 38.0 - 73.0 %    Lymph% 22.9 18.0 - 48.0 %    Mono% 8.9 4.0 - 15.0 %    Eosinophil% 3.0 0.0 - 8.0 %    Basophil% 0.8 0.0 - 1.9 %    Differential Method Automated    POCT glucose    Collection Time: 01/12/20  9:24 PM   Result Value Ref Range    POCT Glucose 113 (H) 70 - 110 mg/dL   Comprehensive Metabolic Panel (CMP)    Collection Time: 01/13/20  6:37 AM   Result Value Ref Range    Sodium 133 (L) 136 - 145 mmol/L    Potassium 4.0 3.5 - 5.1 mmol/L    Chloride 102 95 - 110 mmol/L    CO2 24 23 - 29 mmol/L    Glucose 97 70 - 110 mg/dL    BUN, Bld 11 8 - 23 mg/dL    Creatinine 0.8 0.5 - 1.4 mg/dL    Calcium 9.0 8.7 - 10.5 mg/dL    Total Protein 6.1 6.0 - 8.4 g/dL    Albumin 3.1 (L) 3.5 - 5.2 g/dL    Total Bilirubin 0.5 0.1 - 1.0 mg/dL    Alkaline Phosphatase 87 55 - 135 U/L     AST 22 10 - 40 U/L    ALT 24 10 - 44 U/L    Anion Gap 7 (L) 8 - 16 mmol/L    eGFR if African American >60.0 >60 mL/min/1.73 m^2    eGFR if non African American >60.0 >60 mL/min/1.73 m^2   Magnesium    Collection Time: 01/13/20  6:37 AM   Result Value Ref Range    Magnesium 1.9 1.6 - 2.6 mg/dL   Phosphorus    Collection Time: 01/13/20  6:37 AM   Result Value Ref Range    Phosphorus 3.3 2.7 - 4.5 mg/dL   CBC with Automated Differential    Collection Time: 01/13/20  6:37 AM   Result Value Ref Range    WBC 6.96 3.90 - 12.70 K/uL    RBC 3.81 (L) 4.60 - 6.20 M/uL    Hemoglobin 13.3 (L) 14.0 - 18.0 g/dL    Hematocrit 39.8 (L) 40.0 - 54.0 %    Mean Corpuscular Volume 105 (H) 82 - 98 fL    Mean Corpuscular Hemoglobin 34.9 (H) 27.0 - 31.0 pg    Mean Corpuscular Hemoglobin Conc 33.4 32.0 - 36.0 g/dL    RDW 12.3 11.5 - 14.5 %    Platelets 191 150 - 350 K/uL    MPV 9.8 9.2 - 12.9 fL    Immature Granulocytes 1.4 (H) 0.0 - 0.5 %    Gran # (ANC) 4.3 1.8 - 7.7 K/uL    Immature Grans (Abs) 0.10 (H) 0.00 - 0.04 K/uL    Lymph # 1.7 1.0 - 4.8 K/uL    Mono # 0.7 0.3 - 1.0 K/uL    Eos # 0.2 0.0 - 0.5 K/uL    Baso # 0.05 0.00 - 0.20 K/uL    nRBC 0 0 /100 WBC    Gran% 61.3 38.0 - 73.0 %    Lymph% 24.4 18.0 - 48.0 %    Mono% 9.9 4.0 - 15.0 %    Eosinophil% 2.3 0.0 - 8.0 %    Basophil% 0.7 0.0 - 1.9 %    Differential Method Automated          Significant Imaging:   Imaging Results          X-Ray Chest AP Portable (Final result)  Result time 01/07/20 18:58:36    Final result by Rafael Willett MD (01/07/20 18:58:36)                 Impression:      1. Allowing for patient positioning, no convincing large focal consolidation noting left basilar subsegmental atelectasis or scarring.      Electronically signed by: Rafael Willett MD  Date:    01/07/2020  Time:    18:58             Narrative:    EXAMINATION:  XR CHEST AP PORTABLE    CLINICAL HISTORY:  Rule out infection;    TECHNIQUE:  Single frontal view of the chest was  performed.    COMPARISON:  10/03/2019    FINDINGS:  The cardiomediastinal silhouette is not enlarged.  There is no pleural effusion.  The trachea is midline.  The lungs are symmetrically expanded bilaterally with mildly coarse interstitial attenuation.  There is left basilar subsegmental atelectasis or scarring..  No large focal consolidation seen.  There is no pneumothorax.  The osseous structures are remarkable for degenerative change..                                    Assessment/Plan:      * Alcohol withdrawal  64 y.o. male with HTN, chronic pain on oxycodone, seizures (on vimpat), alcoholism, and depression who presents today after decreasing his alcohol intake. Patient has been binge drinking for months and has a long history of alcohol abuse. He has decreased his intake and is starting to have tremors, anxiety, and depression. Patient is displaying classic signs of alcohol withdrawal. Psych consulted in the ER and recommending hospital medicine admission for withdrawal. Vital signs have been stable    Plan  [facility] following and appreciate recs.   ALCOHOL/BENZO WITHDRAWAL PRECAUTIONS  Valium stopped 1/14  Ativan 2 mg q4 hours prn for ciwa 8+  Vital signs q4 hours  Thiamine, Folic acid, Vit B12 and Multivitamin supplementation  Patient will have a sitter as well    Patient is medically stable at this point. Discussion being had with psych about the need for inpatient rehab. Patient at this time is unwilling but he is still PECd as of 1/13. Wife now wants to take patient home and take care of him instead of facility. Will defer to psych team about PEC status. Patient willing to stay one more night.         Seizure disorder  Patient with a history of seizures with alcohol withdrawal    Plan  Continue home vimpat  Seizure precautions       Debility  PT/OT      Severe depression  Psych already consulted on the patient. We will help get patient medically stable to allow for psychiatric treatment        VTE Risk  Mitigation (From admission, onward)         Ordered     enoxaparin injection 40 mg  Daily      01/07/20 1800                      Cal Quintero MD  Department of Hospital Medicine   [facility]

## 2020-01-13 NOTE — PLAN OF CARE
01/13/20 1656   Discharge Reassessment   Assessment Type Discharge Planning Reassessment   Do you have any problems affording any of your prescribed medications? No   Discharge Plan A Psychiatric hospital   Discharge Plan B Home with family   DME Needed Upon Discharge  other (see comments)  (tbd)   Anticipated Discharge Disposition Psych   Can the patient answer the patient profile reliably? Yes, cognitively intact   How does the patient rate their overall health at the present time? Fair   Describe the patient's ability to walk at the present time. Walks with the help of equipment   How often would a person be available to care for the patient? Often   Number of comorbid conditions (as recorded on the chart) Five or more   Post-Acute Status   Post-Acute Authorization Placement   Post-Acute Placement Status Awaiting Internal Medical Clearance     Active CEC order noted. Awaiting medical stability prior to referrals being sent to in-pt psych placement. Will continue to follow.    IV discontinued, cath removed intact IV discontinued, cath removed intact/dc by MD

## 2020-01-13 NOTE — PLAN OF CARE
Patient is medically stable and is appropriate to be transferred to an inpatient psychiatric facility for further treatment.

## 2020-01-13 NOTE — ASSESSMENT & PLAN NOTE
64 y.o. male with HTN, chronic pain on oxycodone, seizures (on vimpat), alcoholism, and depression who presents today after decreasing his alcohol intake. Patient has been binge drinking for months and has a long history of alcohol abuse. He has decreased his intake and is starting to have tremors, anxiety, and depression. Patient is displaying classic signs of alcohol withdrawal. Psych consulted in the ER and recommending hospital medicine admission for withdrawal. Vital signs have been stable    Plan  [facility] following and appreciate recs.   ALCOHOL/BENZO WITHDRAWAL PRECAUTIONS  Valium stopped 1/14  Ativan 2 mg q4 hours prn for ciwa 8+  Vital signs q4 hours  Thiamine, Folic acid, Vit B12 and Multivitamin supplementation  Patient will have a sitter as well    Patient is medically stable at this point. Discussion being had with psych about the need for inpatient rehab. Patient at this time is unwilling but he is still PECd as of 1/13. Wife now wants to take patient home and take care of him instead of facility. Will defer to psych team about PEC status. Patient willing to stay one more night.

## 2020-01-13 NOTE — ASSESSMENT & PLAN NOTE
ASSESSMENT:       Alcohol Use Disorder, Severe, in Acute Withdrawal    Based on the presence of the following criteria:    1. Substance often taken in larger amounts or over a longer period than intended    2. Persistent desire or unsuccessful efforts to cut down or control use    3. Great deal of time spent obtaining/using/recovering from substance/effects    4. Craving or strong desire to use    5. Recurrent use resulting in failure to fulfill obligations at work/school/home    6. Continued use despite causing persistent/recurrent social/interpersonal problems    7. Social/occupational/recreational activities reduced d/t substance use    8. Recurrent use in situations that are physically hazardous    9. Continued use despite knowledge of physical/psychological harm    10. Tolerance, as defined by:       A. Need for increased amount to achieve desired effect       B. Diminished effect with continued use of same amount    11. Withdrawal, as defined by:       A. Characteristic withdrawal symptoms       B. Substance taken to avoid withdrawal symptoms      PLAN:     It should be noted that pt has several risk factors for complicated EtOH w/d, including a hx of chronic and sustained EtOH consumption and well-documented hx of EtOH w/d sz. Furthermore, his age (63yo) and psychiatric comorbidities puts him at increased risk. Upon presentation, he was noted to begin experiencing w/d sx in spite of having BAL of 0.196.     - Vitals q4 around the clock  - Due to large quantity of benzo's required to stabilize pt and multitude of risk factors per above, would Cont PRN Ativan 2mg for CIWA 8+.   - Valium taper to end today 1/13  - Daily Thiamine, Folic acid, vit. B12, and multivitamin supplements  - Medications that reduce the seizure threshold (including chlorpromazine, fluphenazine, haloperidol, bupropion) should be avoided during the acute detoxification period, defined as the latter of 96h from sobriety or 12 hours after  pt exhibits stable vitals w/o need for prn benzo

## 2020-01-13 NOTE — PLAN OF CARE
Problem: Wound  Goal: Optimal Wound Healing  Outcome: Ongoing, Progressing     Problem: Adult Inpatient Plan of Care  Goal: Plan of Care Review  Outcome: Ongoing, Progressing  Goal: Patient-Specific Goal (Individualization)  Outcome: Ongoing, Progressing  Goal: Absence of Hospital-Acquired Illness or Injury  Outcome: Ongoing, Progressing  Goal: Optimal Comfort and Wellbeing  Outcome: Ongoing, Progressing  Goal: Readiness for Transition of Care  Outcome: Ongoing, Progressing  Goal: Rounds/Family Conference  Outcome: Ongoing, Progressing     Problem: Infection  Goal: Infection Symptom Resolution  Outcome: Ongoing, Progressing     Problem: Fall Injury Risk  Goal: Absence of Fall and Fall-Related Injury  Outcome: Ongoing, Progressing   Patient rested this shift without issue. Sitter remains at bedside.  In a pleasant mood.  Will monitor. Srx2 bed low, CB near.

## 2020-01-13 NOTE — ASSESSMENT & PLAN NOTE
ASSESSMENT:       Major Depression, Severe, without psychotic features  Hx Bipolar Disorder    Based on my interview as well as on information obtained from collateral and from chart review, pt appears to have been living with depressed mood for the past several months with an acute worsening approx 4 wks ago.  Upon interview, pt tearful and expressed feelings of hopelessness. He is anhedonic, reports decreased appetite, c/o insomnia and decreased energy levels as evidenced by not feeling rested when waking up in morning.   He expressed a sense of worthlessness at several points per HPI.  Indecisiveness noted during interview. Especially concerning is that he reported active SI as recently as 1/7 and continues to report thoughts of death by seizure. The aforementioned sx have a strong temporal correlation with acute worsening of mood sx 4 wks ago.  Sx appear to be causing significant distress and are clearly interfering with pt's social and occupational functioning (pt urinating on self in bed, not otherwise caring for self, not speaking to anyone other than his wife).  It should be noted that these sx exist in the context of heavy EtOH use so likely substance-induced component, but per all sources depressive sx exist in context of sobriety as well.  MSE findings c/w severe depression but not concerning for psychosis at this time.  Pt would benefit from continued tx for mood disorder, but primary focus at this time will be on safely detoxing from EtOH.  Most recent regimen includes Latuda and trazodone per chart.    PLAN:     -Patient CEC'd  -transfer to inpatient psychiatry   -defer medications to inpatient psych unit

## 2020-01-13 NOTE — PLAN OF CARE
SW received notification from pt's nurse that pt's wife expressed she did not want pt to be D/C to psych facility. Upon finding this info, SW informed pts physician Dr Shona GARCIA. Pt's nurse also informed Dr. Shona GARDUNO of the situation. Pt's nurse followed up with SW, updating that the pt's physician will speak with Psychiatry, to find how to have the PEC lifted so that pt's wife can assist him to self admit to psych facility. Nurse will update RAYNA on proceedings and if pt will still need psych placement or not.     Shaina Fisher, [facility]   PRN

## 2020-01-13 NOTE — SUBJECTIVE & OBJECTIVE
Interval History: NAEON. Patient is doing well from a withdrawal standpoint. We are stopping the benzos today. Psych wants patient to go to an inpatient facility. Discussion being had with wife and psych to figure out the best plan for the patient.       Objective:     Vital Signs (Most Recent):  Temp: 98.1 °F (36.7 °C) (01/13/20 1152)  Pulse: 90 (01/13/20 1152)  Resp: 18 (01/13/20 1152)  BP: (!) 144/87 (01/13/20 1152)  SpO2: 98 % (01/13/20 1152) Vital Signs (24h Range):  Temp:  [97.5 °F (36.4 °C)-98.5 °F (36.9 °C)] 98.1 °F (36.7 °C)  Pulse:  [77-90] 90  Resp:  [18] 18  SpO2:  [95 %-98 %] 98 %  BP: (137-162)/(82-94) 144/87     Weight: 94.8 kg (208 lb 15.9 oz)  Body mass index is 24.78 kg/m².    Intake/Output Summary (Last 24 hours) at 1/13/2020 1626  Last data filed at 1/13/2020 1200  Gross per 24 hour   Intake 240 ml   Output 1250 ml   Net -1010 ml      Physical Exam   Constitutional: He is oriented to person, place, and time. He appears well-developed and well-nourished. He appears distressed.   Patient laying in bed comfortably    HENT:   Head: Normocephalic and atraumatic.   Eyes: EOM are normal.   Neck: Normal range of motion. No JVD present.   Cardiovascular: Normal rate, regular rhythm, normal heart sounds and intact distal pulses. Exam reveals no gallop and no friction rub.   No murmur heard.  Pulmonary/Chest: Effort normal and breath sounds normal. No respiratory distress.   Abdominal: Soft. Bowel sounds are normal. He exhibits no distension. There is no tenderness.   Musculoskeletal: Normal range of motion. He exhibits no edema.   Neurological: He is alert and oriented to person, place, and time. No cranial nerve deficit.   Tremor much improved. No tongue fasiculations    Skin: Skin is warm and dry. No rash noted.   Psychiatric: He has a normal mood and affect.   Nursing note and vitals reviewed.      Significant Labs:   Recent Results (from the past 48 hour(s))   Comprehensive Metabolic Panel (CMP)     Collection Time: 01/12/20  5:53 AM   Result Value Ref Range    Sodium 135 (L) 136 - 145 mmol/L    Potassium 3.7 3.5 - 5.1 mmol/L    Chloride 101 95 - 110 mmol/L    CO2 26 23 - 29 mmol/L    Glucose 105 70 - 110 mg/dL    BUN, Bld 11 8 - 23 mg/dL    Creatinine 0.8 0.5 - 1.4 mg/dL    Calcium 9.4 8.7 - 10.5 mg/dL    Total Protein 6.3 6.0 - 8.4 g/dL    Albumin 3.2 (L) 3.5 - 5.2 g/dL    Total Bilirubin 0.5 0.1 - 1.0 mg/dL    Alkaline Phosphatase 91 55 - 135 U/L    AST 28 10 - 40 U/L    ALT 30 10 - 44 U/L    Anion Gap 8 8 - 16 mmol/L    eGFR if African American >60.0 >60 mL/min/1.73 m^2    eGFR if non African American >60.0 >60 mL/min/1.73 m^2   Magnesium    Collection Time: 01/12/20  5:53 AM   Result Value Ref Range    Magnesium 1.8 1.6 - 2.6 mg/dL   Phosphorus    Collection Time: 01/12/20  5:53 AM   Result Value Ref Range    Phosphorus 3.5 2.7 - 4.5 mg/dL   CBC with Automated Differential    Collection Time: 01/12/20  5:53 AM   Result Value Ref Range    WBC 6.38 3.90 - 12.70 K/uL    RBC 4.00 (L) 4.60 - 6.20 M/uL    Hemoglobin 13.8 (L) 14.0 - 18.0 g/dL    Hematocrit 41.5 40.0 - 54.0 %    Mean Corpuscular Volume 104 (H) 82 - 98 fL    Mean Corpuscular Hemoglobin 34.5 (H) 27.0 - 31.0 pg    Mean Corpuscular Hemoglobin Conc 33.3 32.0 - 36.0 g/dL    RDW 12.0 11.5 - 14.5 %    Platelets 181 150 - 350 K/uL    MPV 9.7 9.2 - 12.9 fL    Immature Granulocytes 1.3 (H) 0.0 - 0.5 %    Gran # (ANC) 4.0 1.8 - 7.7 K/uL    Immature Grans (Abs) 0.08 (H) 0.00 - 0.04 K/uL    Lymph # 1.5 1.0 - 4.8 K/uL    Mono # 0.6 0.3 - 1.0 K/uL    Eos # 0.2 0.0 - 0.5 K/uL    Baso # 0.05 0.00 - 0.20 K/uL    nRBC 0 0 /100 WBC    Gran% 63.1 38.0 - 73.0 %    Lymph% 22.9 18.0 - 48.0 %    Mono% 8.9 4.0 - 15.0 %    Eosinophil% 3.0 0.0 - 8.0 %    Basophil% 0.8 0.0 - 1.9 %    Differential Method Automated    POCT glucose    Collection Time: 01/12/20  9:24 PM   Result Value Ref Range    POCT Glucose 113 (H) 70 - 110 mg/dL   Comprehensive Metabolic Panel (CMP)     Collection Time: 01/13/20  6:37 AM   Result Value Ref Range    Sodium 133 (L) 136 - 145 mmol/L    Potassium 4.0 3.5 - 5.1 mmol/L    Chloride 102 95 - 110 mmol/L    CO2 24 23 - 29 mmol/L    Glucose 97 70 - 110 mg/dL    BUN, Bld 11 8 - 23 mg/dL    Creatinine 0.8 0.5 - 1.4 mg/dL    Calcium 9.0 8.7 - 10.5 mg/dL    Total Protein 6.1 6.0 - 8.4 g/dL    Albumin 3.1 (L) 3.5 - 5.2 g/dL    Total Bilirubin 0.5 0.1 - 1.0 mg/dL    Alkaline Phosphatase 87 55 - 135 U/L    AST 22 10 - 40 U/L    ALT 24 10 - 44 U/L    Anion Gap 7 (L) 8 - 16 mmol/L    eGFR if African American >60.0 >60 mL/min/1.73 m^2    eGFR if non African American >60.0 >60 mL/min/1.73 m^2   Magnesium    Collection Time: 01/13/20  6:37 AM   Result Value Ref Range    Magnesium 1.9 1.6 - 2.6 mg/dL   Phosphorus    Collection Time: 01/13/20  6:37 AM   Result Value Ref Range    Phosphorus 3.3 2.7 - 4.5 mg/dL   CBC with Automated Differential    Collection Time: 01/13/20  6:37 AM   Result Value Ref Range    WBC 6.96 3.90 - 12.70 K/uL    RBC 3.81 (L) 4.60 - 6.20 M/uL    Hemoglobin 13.3 (L) 14.0 - 18.0 g/dL    Hematocrit 39.8 (L) 40.0 - 54.0 %    Mean Corpuscular Volume 105 (H) 82 - 98 fL    Mean Corpuscular Hemoglobin 34.9 (H) 27.0 - 31.0 pg    Mean Corpuscular Hemoglobin Conc 33.4 32.0 - 36.0 g/dL    RDW 12.3 11.5 - 14.5 %    Platelets 191 150 - 350 K/uL    MPV 9.8 9.2 - 12.9 fL    Immature Granulocytes 1.4 (H) 0.0 - 0.5 %    Gran # (ANC) 4.3 1.8 - 7.7 K/uL    Immature Grans (Abs) 0.10 (H) 0.00 - 0.04 K/uL    Lymph # 1.7 1.0 - 4.8 K/uL    Mono # 0.7 0.3 - 1.0 K/uL    Eos # 0.2 0.0 - 0.5 K/uL    Baso # 0.05 0.00 - 0.20 K/uL    nRBC 0 0 /100 WBC    Gran% 61.3 38.0 - 73.0 %    Lymph% 24.4 18.0 - 48.0 %    Mono% 9.9 4.0 - 15.0 %    Eosinophil% 2.3 0.0 - 8.0 %    Basophil% 0.7 0.0 - 1.9 %    Differential Method Automated          Significant Imaging:   Imaging Results          X-Ray Chest AP Portable (Final result)  Result time 01/07/20 18:58:36    Final result by Rafael GARDUNO  MD Brennon (01/07/20 18:58:36)                 Impression:      1. Allowing for patient positioning, no convincing large focal consolidation noting left basilar subsegmental atelectasis or scarring.      Electronically signed by: Rafael Willett MD  Date:    01/07/2020  Time:    18:58             Narrative:    EXAMINATION:  XR CHEST AP PORTABLE    CLINICAL HISTORY:  Rule out infection;    TECHNIQUE:  Single frontal view of the chest was performed.    COMPARISON:  10/03/2019    FINDINGS:  The cardiomediastinal silhouette is not enlarged.  There is no pleural effusion.  The trachea is midline.  The lungs are symmetrically expanded bilaterally with mildly coarse interstitial attenuation.  There is left basilar subsegmental atelectasis or scarring..  No large focal consolidation seen.  There is no pneumothorax.  The osseous structures are remarkable for degenerative change..

## 2020-01-13 NOTE — SUBJECTIVE & OBJECTIVE
"Interval History: See hospital course    Family History     Problem Relation (Age of Onset)    Alcohol abuse Mother, Maternal Uncle, Paternal Uncle, Cousin    Anxiety disorder Brother    Bipolar disorder Brother    Dementia Maternal Grandmother    Depression Mother, Father, Sister, Brother    Drug abuse Brother    Suicide Sister        Tobacco Use    Smoking status: Current Some Day Smoker     Packs/day: 0.25     Years: 10.00     Pack years: 2.50    Smokeless tobacco: Never Used   Substance and Sexual Activity    Alcohol use: No     Alcohol/week: 16.7 standard drinks     Types: 20 Standard drinks or equivalent per week     Comment: quit 4 years ago    Drug use: No    Sexual activity: Yes     Partners: Female     Comment: with wife; monogamous      Psychotherapeutics (From admission, onward)    Start     Stop Route Frequency Ordered    01/10/20 1446  LORazepam tablet 2 mg      -- Oral Every 4 hours PRN 01/10/20 1446           Review of Systems     Objective:     Vital Signs (Most Recent):  Temp: 98.1 °F (36.7 °C) (01/13/20 1152)  Pulse: 90 (01/13/20 1152)  Resp: 18 (01/13/20 1152)  BP: (!) 144/87 (01/13/20 1152)  SpO2: 98 % (01/13/20 1152) Vital Signs (24h Range):  Temp:  [97.5 °F (36.4 °C)-98.5 °F (36.9 °C)] 98.1 °F (36.7 °C)  Pulse:  [77-90] 90  Resp:  [18] 18  SpO2:  [95 %-98 %] 98 %  BP: (137-162)/(82-94) 144/87     Height: 6' 5" (195.6 cm)  Weight: 94.8 kg (208 lb 15.9 oz)  Body mass index is 24.78 kg/m².      Intake/Output Summary (Last 24 hours) at 1/13/2020 1235  Last data filed at 1/13/2020 1200  Gross per 24 hour   Intake 600 ml   Output 1650 ml   Net -1050 ml       Physical Exam   Psychiatric:   CONSTITUTIONAL  General Appearance and Manner: Well appearing, in NAD    MUSCULOSKELETAL  Abnormal Involuntary Movements: chronic right hand movement   Gait and Station: steady    PSYCHIATRIC   Orientation: awake, alert, oriented to location - hospital  Speech: low, spontaneous  Language: english, " fluent  Mood: apathetic  Affect: flat  Thought Process: linear, goal-oriented  Associations: intact to conversation  Thought Content: Denies overt SI/HI/AVH, however minimizes overall depression/anxiety  Memory: intact to conversation  Attention and Concentration: intact  Fund of Knowledge: intact  Insight: poor  Judgment: limited          Significant Labs: All pertinent labs within the past 24 hours have been reviewed.    Significant Imaging: I have reviewed all pertinent imaging results/findings within the past 24 hours.

## 2020-01-13 NOTE — PROGRESS NOTES
"[facility]  Psychiatry  Progress Note    Patient Name: Mirza Morales  MRN: [id number]   Code Status: Full Code  Admission Date: 1/7/2020  Hospital Length of Stay: 6 days  Expected Discharge Date: 1/14/2020  Attending Physician: Shona Jacinto MD  Primary Care Provider: Jud Mo MD    Current Legal Status: [facility] [place] [facility]    Patient information was obtained from patient and ER records.     Subjective:     Principal Problem:Alcohol withdrawal    Chief Complaint: as above     HPI:   Per ED Note:      Alcohol Intoxication        Pt to ER via EMS from home for alcohol intoxication. Unsure who called EMS. Pt is A, A, O x 4. He was able to ambulate to stretcher with assistance at home. He drank a 5th of vodka or more. Hx of alcoholism.       HPI  Mr. Morales is a 64 y.o. male with HTN, chronic pain on oxycodone, seizures (on vimpat), alcoholism, and depression here today with alcohol intoxication. History mildly limited due to alcohol intoxication. States he has been drinking a lot recently because he feels he is at the end of his life. States he does not want to kill himself, but he no longer wants to live. Feels very depressed. States he thinks his dopamine levels are off. Denies fevers, chills, n/v, abd pain, chest pain, SOB, numbness, tingling, weakness, AVH, HI, SI.    On My Interview:  Mr. Morales is a 64 y.o. male with HTN, chronic pain on oxycodone, seizures (on vimpat), alcoholism, and depression  presented to ED with alcohol intoxication. Met with patient while patient was lying in his ED bed. He seemed tremulous, shaking all over, very anxious, dysphoric, and restless. He complained of alcohol withdrawal symptoms of nausea, abdominal pain, severe tremors, severe nervousness and diarrhea. He reported feeling very depressed, hopeless and stated that she feels he is at the end of his life. When he was asked if he is having suicidal thoughts he replied, "well, I feel very depressed. I cannot go " "outdoors anymore. I am isolated, I lost my well. I drink to feel better. I am tired of living. I feel it's my time. t if I get depressed enough I would fade away, quit living and just stop functioning and my body would die." He reported one prior suicide attempt by overdosing on oxycodone but stated it was not intentional. He stated that his current medications of latuda and trazodone are not working, He endorsed depressive symptoms of low mood, amotivation, anhedonia, decreased concentration, feeling of guilt, worthlessness, hopelessness and fatigue. He denied HI/AVH and no paranoia or delusions reported. Per chart he has diagnosis of bipolar but he denied having marielena or hypomania like episodes. He admitted to drinking 2 fifth or vodka daily, duration 30 years but with periods of sobriety, and his\ last use was this morning. He reported history of alcohol withdrawal seizures and Dt's.          Anamaria [phone]  Spoke with Anamaria, patient wife stated "my  has seizures and I am worried about him going through a grand mal seizure and dying. He has been gradually lowing his alcohol intake and he will be getting into seizures and I am afraid he will die. He mentioned a couple of times to me that he said he wants to die in his own bed. I could not let him lie there and die in his own bed. He is getting progressively worse. He sees a psychiatrics is Dr. Loco. I worry about his drinking, having seizures and wanting to die. I think it is not safe for him to come home and he needs treatment or he would die."    Psychiatric Review Of Systems - Is patient experiencing or having changes in:  sleep: yes  appetite: yes  weight: yes. Lost 15 lbs in a month  energy/anergy: yes   interest/pleasure/anhedonia: yes  somatic symptoms: yes  anxiety/panic: yes  guilty/hopelessness: yes  concentration: yes  S.I.B.s/risky behavior: no  Irritability: yes  Racing thoughts: yes  Impulsive behaviors: " "no  Paranoia:no  AVH:no  Suicidal thoughts/plan/intent: no      Hospital Course: 1/9/20  Met with patient while lying in his hospital bed. He reported mood is" better and I am on my way to detoxing" and his affect was euthymic.He was calm and cooperative. He is still presenting with alcohol withdrawal symptoms (elevated vitals, tremors, and anxiety). He denied feeling suicidal and stated, "I don't want to drink myself to death and I want to live that I am sober. There are reasons for me to live. " He reported improved depressive symptoms since hospitalization but continues to report feeling depressed and dysphoric. He is medication compliant and no side effects noted or reported. He denied SI/HI/AVH and no delusion noted or reported. Patient in interested in continuing his detox treatment at this facility and is not interested in rehab treatment afterwards.  PEC rescinded d/t pt not longer endorsing active SI.    01/10/2020  Pt w/persistent hypertension, unclear if 2/2 EtOH w/d or hx chronic HTN.  HR 80's-90's.  Over past 24h, pt has received 40mg Valium plus 12mg Ativan.  No PRN's required for agitation.  This morning, pt found resting awake in bed.  Responds flatly but appropriately when greeted.  Pt reports that his detox is complete and that he is ready to leave the hospital.  Pt is a self-described "binge drinker," which he describes as abstaining from EtOH for weeks or months, then drinking heavily for 3-4 weeks.  During binges, pt will drink 2/5's of liquor per day.  Pt attributes binges to his long hx of depression.  When depressive sx get to the point where "I can no longer bear it," he starts drinking heavily.  Pt states that during these periods he no longer gets out of bed, does not care for himself.  Pt tearful and describes grim future outlook, states that his wife would be better off without him, that there is no hope of ever getting better, that he will never feel comfortable in his own skin or have " "a day of reprieve from depressive sx.  Pt endorses multiple seizures 2/2 EtOH w/d.  Says first seizure was in his 60's.  Of note, neurosurgery eval'd pt in Sept 2019 and suspected EtOH etiology.  Pt states desire to leave hospital in spite of seizure risk.  Does not have an appreciation of the need for continued taper even when need for taper is explained, "I'm already detoxed, I don't need it anymore."  Endorses passive SI of death by seizure, says would no longer be a burden on his wife.    Spoke with pt's outpatient provider, who states that pt lives w/severe depression and pt's wife is not longer able to care for him.  Does not feel it would be safe for pt to leave the hospital.  Per chart, pt's wife emailed outpatient provider on 1/5 and staetd "I am looking at mental health facilities for adults... I cannot do this anymore."  Spoke with wife on telephone today, she states that prior to coming to the hospital pt would not get out of bed, was urinating on himself, was wholly dependent on her for care.  States she is not able or willing to continue providing this type of care.  She has full time job and cannot stay home with pt all day.  Does not feel it is safe for pt to return home at this time.    01/11/2020  Pt found resting awake in bed in NAD.  No complaints today.  Wants to go home, remains flat and listless.  Pt ambivalent about rehab.  Per chart, 50mg Valium yesterday, due to receive scheduled 40mg today.  No PRN Ativan required.  Remains mildly hypertensive.      01/12/2020  Pt resting in bed, awake, alert and in NAD. Focused on going home, minimizes his depression/anxiety by stating he only needs medication management. Acknowledges that he has been trialed on multiple antidepressants over the course of many years with little effect. Does not desire rehab. However, based on pt's presentation the last few days and great concern from wife regarding passive SI, avolition. Pt will continued to be PEC/CEC " "with plan to transfer to psychiatric facility once deemed medically stable by primary team.    01/13/2020  Patient lying in bed, noted to have flat affect; monotonous speech. He reports withdrawals are controlled, did not require any PRNs. Patient contracted for safety d/t concerns about SI. Informed patient about transfer to inpatient psychiatric unit once medically stable and answered all questions.     Interval History: See hospital course    Family History     Problem Relation (Age of Onset)    Alcohol abuse Mother, Maternal Uncle, Paternal Uncle, Cousin    Anxiety disorder Brother    Bipolar disorder Brother    Dementia Maternal Grandmother    Depression Mother, Father, Sister, Brother    Drug abuse Brother    Suicide Sister        Tobacco Use    Smoking status: Current Some Day Smoker     Packs/day: 0.25     Years: 10.00     Pack years: 2.50    Smokeless tobacco: Never Used   Substance and Sexual Activity    Alcohol use: No     Alcohol/week: 16.7 standard drinks     Types: 20 Standard drinks or equivalent per week     Comment: quit 4 years ago    Drug use: No    Sexual activity: Yes     Partners: Female     Comment: with wife; monogamous      Psychotherapeutics (From admission, onward)    Start     Stop Route Frequency Ordered    01/10/20 1446  LORazepam tablet 2 mg      -- Oral Every 4 hours PRN 01/10/20 1446           Review of Systems     Objective:     Vital Signs (Most Recent):  Temp: 98.1 °F (36.7 °C) (01/13/20 1152)  Pulse: 90 (01/13/20 1152)  Resp: 18 (01/13/20 1152)  BP: (!) 144/87 (01/13/20 1152)  SpO2: 98 % (01/13/20 1152) Vital Signs (24h Range):  Temp:  [97.5 °F (36.4 °C)-98.5 °F (36.9 °C)] 98.1 °F (36.7 °C)  Pulse:  [77-90] 90  Resp:  [18] 18  SpO2:  [95 %-98 %] 98 %  BP: (137-162)/(82-94) 144/87     Height: 6' 5" (195.6 cm)  Weight: 94.8 kg (208 lb 15.9 oz)  Body mass index is 24.78 kg/m².      Intake/Output Summary (Last 24 hours) at 1/13/2020 1235  Last data filed at 1/13/2020 1200  Gross " per 24 hour   Intake 600 ml   Output 1650 ml   Net -1050 ml       Physical Exam   Psychiatric:   CONSTITUTIONAL  General Appearance and Manner: Well appearing, in NAD    MUSCULOSKELETAL  Abnormal Involuntary Movements: chronic right hand movement   Gait and Station: steady    PSYCHIATRIC   Orientation: awake, alert, oriented to location - hospital  Speech: low, spontaneous  Language: english, fluent  Mood: apathetic  Affect: flat  Thought Process: linear, goal-oriented  Associations: intact to conversation  Thought Content: Denies overt SI/HI/AVH, however minimizes overall depression/anxiety  Memory: intact to conversation  Attention and Concentration: intact  Fund of Knowledge: intact  Insight: poor  Judgment: limited          Significant Labs: All pertinent labs within the past 24 hours have been reviewed.    Significant Imaging: I have reviewed all pertinent imaging results/findings within the past 24 hours.    Assessment/Plan:     Severe depression  ASSESSMENT:       Major Depression, Severe, without psychotic features  Hx Bipolar Disorder    Based on my interview as well as on information obtained from collateral and from chart review, pt appears to have been living with depressed mood for the past several months with an acute worsening approx 4 wks ago.  Upon interview, pt tearful and expressed feelings of hopelessness. He is anhedonic, reports decreased appetite, c/o insomnia and decreased energy levels as evidenced by not feeling rested when waking up in morning.   He expressed a sense of worthlessness at several points per HPI.  Indecisiveness noted during interview. Especially concerning is that he reported active SI as recently as 1/7 and continues to report thoughts of death by seizure. The aforementioned sx have a strong temporal correlation with acute worsening of mood sx 4 wks ago.  Sx appear to be causing significant distress and are clearly interfering with pt's social and occupational functioning  (pt urinating on self in bed, not otherwise caring for self, not speaking to anyone other than his wife).  It should be noted that these sx exist in the context of heavy EtOH use so likely substance-induced component, but per all sources depressive sx exist in context of sobriety as well.  MSE findings c/w severe depression but not concerning for psychosis at this time.  Pt would benefit from continued tx for mood disorder, but primary focus at this time will be on safely detoxing from EtOH.  Most recent regimen includes Latuda and trazodone per chart.    PLAN:     -Patient CEC'd  -transfer to inpatient psychiatry   -defer medications to inpatient psych unit      Alcohol use disorder, severe, dependence    ASSESSMENT:       Alcohol Use Disorder, Severe, in Acute Withdrawal    Based on the presence of the following criteria:    1. Substance often taken in larger amounts or over a longer period than intended    2. Persistent desire or unsuccessful efforts to cut down or control use    3. Great deal of time spent obtaining/using/recovering from substance/effects    4. Craving or strong desire to use    5. Recurrent use resulting in failure to fulfill obligations at work/school/home    6. Continued use despite causing persistent/recurrent social/interpersonal problems    7. Social/occupational/recreational activities reduced d/t substance use    8. Recurrent use in situations that are physically hazardous    9. Continued use despite knowledge of physical/psychological harm    10. Tolerance, as defined by:       A. Need for increased amount to achieve desired effect       B. Diminished effect with continued use of same amount    11. Withdrawal, as defined by:       A. Characteristic withdrawal symptoms       B. Substance taken to avoid withdrawal symptoms      PLAN:     It should be noted that pt has several risk factors for complicated EtOH w/d, including a hx of chronic and sustained EtOH consumption and  well-documented hx of EtOH w/d sz. Furthermore, his age (63yo) and psychiatric comorbidities puts him at increased risk. Upon presentation, he was noted to begin experiencing w/d sx in spite of having BAL of 0.196.     - Vitals q4 around the clock  - Due to large quantity of benzo's required to stabilize pt and multitude of risk factors per above, would Cont PRN Ativan 2mg for CIWA 8+.   - Valium taper to end today 1/13  - Daily Thiamine, Folic acid, vit. B12, and multivitamin supplements  - Medications that reduce the seizure threshold (including chlorpromazine, fluphenazine, haloperidol, bupropion) should be avoided during the acute detoxification period, defined as the latter of 96h from sobriety or 12 hours after pt exhibits stable vitals w/o need for prn benzo         Need for Continued Hospitalization:   Psychiatric illness continues to pose a potential threat to life or bodily function, of self or others, thereby requiring the need for continued inpatient psychiatric hospitalization.    Anticipated Disposition: Admitted as an Inpatient     Total time:  15 with greater than 50% of this time spent in counseling and/or coordination of care.       Emre Phelps MD   Psychiatry  [facility]

## 2020-01-14 ENCOUNTER — PATIENT MESSAGE (OUTPATIENT)
Dept: PSYCHIATRY | Facility: CLINIC | Age: 65
End: 2020-01-14

## 2020-01-14 VITALS
SYSTOLIC BLOOD PRESSURE: 161 MMHG | OXYGEN SATURATION: 96 % | WEIGHT: 205 LBS | DIASTOLIC BLOOD PRESSURE: 92 MMHG | HEIGHT: 77 IN | BODY MASS INDEX: 24.21 KG/M2 | TEMPERATURE: 99 F | RESPIRATION RATE: 18 BRPM | HEART RATE: 88 BPM

## 2020-01-14 LAB
ALBUMIN SERPL BCP-MCNC: 3.1 G/DL (ref 3.5–5.2)
ALP SERPL-CCNC: 90 U/L (ref 55–135)
ALT SERPL W/O P-5'-P-CCNC: 23 U/L (ref 10–44)
ANION GAP SERPL CALC-SCNC: 5 MMOL/L (ref 8–16)
AST SERPL-CCNC: 21 U/L (ref 10–40)
BASOPHILS # BLD AUTO: 0.06 K/UL (ref 0–0.2)
BASOPHILS NFR BLD: 1 % (ref 0–1.9)
BILIRUB SERPL-MCNC: 0.5 MG/DL (ref 0.1–1)
BUN SERPL-MCNC: 9 MG/DL (ref 8–23)
CALCIUM SERPL-MCNC: 9.4 MG/DL (ref 8.7–10.5)
CHLORIDE SERPL-SCNC: 101 MMOL/L (ref 95–110)
CO2 SERPL-SCNC: 29 MMOL/L (ref 23–29)
CREAT SERPL-MCNC: 0.8 MG/DL (ref 0.5–1.4)
DIFFERENTIAL METHOD: ABNORMAL
EOSINOPHIL # BLD AUTO: 0.2 K/UL (ref 0–0.5)
EOSINOPHIL NFR BLD: 2.7 % (ref 0–8)
ERYTHROCYTE [DISTWIDTH] IN BLOOD BY AUTOMATED COUNT: 12.1 % (ref 11.5–14.5)
EST. GFR  (AFRICAN AMERICAN): >60 ML/MIN/1.73 M^2
EST. GFR  (NON AFRICAN AMERICAN): >60 ML/MIN/1.73 M^2
GLUCOSE SERPL-MCNC: 102 MG/DL (ref 70–110)
HCT VFR BLD AUTO: 40.9 % (ref 40–54)
HGB BLD-MCNC: 13.5 G/DL (ref 14–18)
IMM GRANULOCYTES # BLD AUTO: 0.11 K/UL (ref 0–0.04)
IMM GRANULOCYTES NFR BLD AUTO: 1.8 % (ref 0–0.5)
LYMPHOCYTES # BLD AUTO: 1.4 K/UL (ref 1–4.8)
LYMPHOCYTES NFR BLD: 23.6 % (ref 18–48)
MAGNESIUM SERPL-MCNC: 1.9 MG/DL (ref 1.6–2.6)
MCH RBC QN AUTO: 34.3 PG (ref 27–31)
MCHC RBC AUTO-ENTMCNC: 33 G/DL (ref 32–36)
MCV RBC AUTO: 104 FL (ref 82–98)
MONOCYTES # BLD AUTO: 0.6 K/UL (ref 0.3–1)
MONOCYTES NFR BLD: 10 % (ref 4–15)
NEUTROPHILS # BLD AUTO: 3.7 K/UL (ref 1.8–7.7)
NEUTROPHILS NFR BLD: 60.9 % (ref 38–73)
NRBC BLD-RTO: 0 /100 WBC
PHOSPHATE SERPL-MCNC: 3 MG/DL (ref 2.7–4.5)
PLATELET # BLD AUTO: 214 K/UL (ref 150–350)
PMV BLD AUTO: 9.5 FL (ref 9.2–12.9)
POCT GLUCOSE: 103 MG/DL (ref 70–110)
POCT GLUCOSE: 106 MG/DL (ref 70–110)
POCT GLUCOSE: 68 MG/DL (ref 70–110)
POTASSIUM SERPL-SCNC: 4.2 MMOL/L (ref 3.5–5.1)
PROT SERPL-MCNC: 6.4 G/DL (ref 6–8.4)
RBC # BLD AUTO: 3.94 M/UL (ref 4.6–6.2)
SODIUM SERPL-SCNC: 135 MMOL/L (ref 136–145)
WBC # BLD AUTO: 6.02 K/UL (ref 3.9–12.7)

## 2020-01-14 PROCEDURE — 99239 PR HOSPITAL DISCHARGE DAY,>30 MIN: ICD-10-PCS | Mod: ,,, | Performed by: INTERNAL MEDICINE

## 2020-01-14 PROCEDURE — 84100 ASSAY OF PHOSPHORUS: CPT

## 2020-01-14 PROCEDURE — 83735 ASSAY OF MAGNESIUM: CPT

## 2020-01-14 PROCEDURE — 25000003 PHARM REV CODE 250: Performed by: EMERGENCY MEDICINE

## 2020-01-14 PROCEDURE — S4991 NICOTINE PATCH NONLEGEND: HCPCS | Performed by: STUDENT IN AN ORGANIZED HEALTH CARE EDUCATION/TRAINING PROGRAM

## 2020-01-14 PROCEDURE — 25000003 PHARM REV CODE 250: Performed by: STUDENT IN AN ORGANIZED HEALTH CARE EDUCATION/TRAINING PROGRAM

## 2020-01-14 PROCEDURE — 36415 COLL VENOUS BLD VENIPUNCTURE: CPT

## 2020-01-14 PROCEDURE — 99239 HOSP IP/OBS DSCHRG MGMT >30: CPT | Mod: ,,, | Performed by: INTERNAL MEDICINE

## 2020-01-14 PROCEDURE — 85025 COMPLETE CBC W/AUTO DIFF WBC: CPT

## 2020-01-14 PROCEDURE — 97530 THERAPEUTIC ACTIVITIES: CPT

## 2020-01-14 PROCEDURE — 80053 COMPREHEN METABOLIC PANEL: CPT

## 2020-01-14 RX ORDER — AMOXICILLIN 250 MG
1 CAPSULE ORAL DAILY
Qty: 30 TABLET | Refills: 0 | Status: ON HOLD | OUTPATIENT
Start: 2020-01-15 | End: 2020-07-21

## 2020-01-14 RX ORDER — FOLIC ACID 1 MG/1
1 TABLET ORAL DAILY
Qty: 90 TABLET | Refills: 3 | Status: SHIPPED | OUTPATIENT
Start: 2020-01-15 | End: 2020-02-11 | Stop reason: SDUPTHER

## 2020-01-14 RX ORDER — PANTOPRAZOLE SODIUM 40 MG/1
40 TABLET, DELAYED RELEASE ORAL DAILY
Qty: 30 TABLET | Refills: 11 | Status: SHIPPED | OUTPATIENT
Start: 2020-01-15 | End: 2023-05-23

## 2020-01-14 RX ORDER — LANOLIN ALCOHOL/MO/W.PET/CERES
100 CREAM (GRAM) TOPICAL DAILY
Qty: 30 TABLET | Refills: 3 | Status: SHIPPED | OUTPATIENT
Start: 2020-01-15 | End: 2020-02-11 | Stop reason: SDUPTHER

## 2020-01-14 RX ADMIN — PANTOPRAZOLE SODIUM 40 MG: 40 TABLET, DELAYED RELEASE ORAL at 10:01

## 2020-01-14 RX ADMIN — NICOTINE 1 PATCH: 21 PATCH, EXTENDED RELEASE TRANSDERMAL at 09:01

## 2020-01-14 RX ADMIN — LORAZEPAM 2 MG: 1 TABLET ORAL at 02:01

## 2020-01-14 RX ADMIN — LACOSAMIDE 200 MG: 50 TABLET, FILM COATED ORAL at 10:01

## 2020-01-14 RX ADMIN — SENNOSIDES AND DOCUSATE SODIUM 1 TABLET: 8.6; 5 TABLET ORAL at 10:01

## 2020-01-14 RX ADMIN — THERA TABS 1 TABLET: TAB at 10:01

## 2020-01-14 RX ADMIN — Medication 100 MG: at 10:01

## 2020-01-14 RX ADMIN — FOLIC ACID 1 MG: 1 TABLET ORAL at 10:01

## 2020-01-14 RX ADMIN — ATORVASTATIN CALCIUM 40 MG: 20 TABLET, FILM COATED ORAL at 10:01

## 2020-01-14 NOTE — PLAN OF CARE
Problem: Occupational Therapy Goal  Goal: Occupational Therapy Goal  Description  Goals to be met by: 1/18/2020     Patient will increase functional independence with ADLs by performing:    LE Dressing with Stand-by Assistance in seated position.  Grooming while standing at sink with Stand-by Assistance.   Bathing with Stand-by Assistance.  Toilet transfer to toilet with Stand by Assistance.  Pt met all goals this date and no longer requires therapy services.  Noni Hitchcock OT  1/14/2020      Outcome: Met

## 2020-01-14 NOTE — PLAN OF CARE
01/14/20 1130   Post-Acute Status   Post-Acute Authorization Placement   Post-Acute Placement Status Pending Bed Availability        [facility]  via [facility] [id number] request.    Shaina Fisher LMSW   PRN

## 2020-01-14 NOTE — PLAN OF CARE
ARABELLA was informed by RAYNA Merritt that the patient has been accepted to [facility] in [place] & that transportation will be arranged for admission this afternoon. CM informed the patient's spouse,   Anamaria Morales ([phone]), of above. Anamaria voiced concern regarding the patient being admitted to a non[facility] in-pt psych facility. ARABELLA explained the referral process for patient's with an active CEC order who need in-pt psych placement. Will continue to follow.

## 2020-01-14 NOTE — PROGRESS NOTES
"[facility]  Psychiatry  Progress Note    Patient Name: Mirza Morales  MRN: [id number]   Code Status: Full Code  Admission Date: 1/7/2020  Hospital Length of Stay: 7 days  Expected Discharge Date: 1/15/2020  Attending Physician: Shona Jacinto MD  Primary Care Provider: Jud Mo MD    [place] [facility] [place] information was obtained from patient, spouse/SO and ER records.     Subjective:     Principal Problem:Alcohol withdrawal    Chief Complaint: as above     HPI:   Per ED Note:      Alcohol Intoxication        Pt to ER via EMS from home for alcohol intoxication. Unsure who called EMS. Pt is A, A, O x 4. He was able to ambulate to stretcher with assistance at home. He drank a 5th of vodka or more. Hx of alcoholism.       HPI  Mr. Morales is a 64 y.o. male with HTN, chronic pain on oxycodone, seizures (on vimpat), alcoholism, and depression here today with alcohol intoxication. History mildly limited due to alcohol intoxication. States he has been drinking a lot recently because he feels he is at the end of his life. States he does not want to kill himself, but he no longer wants to live. Feels very depressed. States he thinks his dopamine levels are off. Denies fevers, chills, n/v, abd pain, chest pain, SOB, numbness, tingling, weakness, AVH, HI, SI.    On My Interview:  Mr. Morales is a 64 y.o. male with HTN, chronic pain on oxycodone, seizures (on vimpat), alcoholism, and depression  presented to ED with alcohol intoxication. Met with patient while patient was lying in his ED bed. He seemed tremulous, shaking all over, very anxious, dysphoric, and restless. He complained of alcohol withdrawal symptoms of nausea, abdominal pain, severe tremors, severe nervousness and diarrhea. He reported feeling very depressed, hopeless and stated that she feels he is at the end of his life. When he was asked if he is having suicidal thoughts he replied, "well, I feel very depressed. I " "cannot go outdoors anymore. I am isolated, I lost my well. I drink to feel better. I am tired of living. I feel it's my time. t if I get depressed enough I would fade away, quit living and just stop functioning and my body would die." He reported one prior suicide attempt by overdosing on oxycodone but stated it was not intentional. He stated that his current medications of latuda and trazodone are not working, He endorsed depressive symptoms of low mood, amotivation, anhedonia, decreased concentration, feeling of guilt, worthlessness, hopelessness and fatigue. He denied HI/AVH and no paranoia or delusions reported. Per chart he has diagnosis of bipolar but he denied having marielena or hypomania like episodes. He admitted to drinking 2 fifth or vodka daily, duration 30 years but with periods of sobriety, and his\ last use was this morning. He reported history of alcohol withdrawal seizures and Dt's.          Anamaria [phone]  Spoke with Anamaria, patient wife stated "my  has seizures and I am worried about him going through a grand mal seizure and dying. He has been gradually lowing his alcohol intake and he will be getting into seizures and I am afraid he will die. He mentioned a couple of times to me that he said he wants to die in his own bed. I could not let him lie there and die in his own bed. He is getting progressively worse. He sees a psychiatrics is Dr. Loco. I worry about his drinking, having seizures and wanting to die. I think it is not safe for him to come home and he needs treatment or he would die."    Psychiatric Review Of Systems - Is patient experiencing or having changes in:  sleep: yes  appetite: yes  weight: yes. Lost 15 lbs in a month  energy/anergy: yes   interest/pleasure/anhedonia: yes  somatic symptoms: yes  anxiety/panic: yes  guilty/hopelessness: yes  concentration: yes  S.I.B.s/risky behavior: no  Irritability: yes  Racing thoughts: yes  Impulsive behaviors: " "no  Paranoia:no  AVH:no  Suicidal thoughts/plan/intent: no      Hospital Course: 1/9/20  Met with patient while lying in his hospital bed. He reported mood is" better and I am on my way to detoxing" and his affect was euthymic.He was calm and cooperative. He is still presenting with alcohol withdrawal symptoms (elevated vitals, tremors, and anxiety). He denied feeling suicidal and stated, "I don't want to drink myself to death and I want to live that I am sober. There are reasons for me to live. " He reported improved depressive symptoms since hospitalization but continues to report feeling depressed and dysphoric. He is medication compliant and no side effects noted or reported. He denied SI/HI/AVH and no delusion noted or reported. Patient in interested in continuing his detox treatment at this facility and is not interested in rehab treatment afterwards.  PEC rescinded d/t pt not longer endorsing active SI.    01/10/2020  Pt w/persistent hypertension, unclear if 2/2 EtOH w/d or hx chronic HTN.  HR 80's-90's.  Over past 24h, pt has received 40mg Valium plus 12mg Ativan.  No PRN's required for agitation.  This morning, pt found resting awake in bed.  Responds flatly but appropriately when greeted.  Pt reports that his detox is complete and that he is ready to leave the hospital.  Pt is a self-described "binge drinker," which he describes as abstaining from EtOH for weeks or months, then drinking heavily for 3-4 weeks.  During binges, pt will drink 2/5's of liquor per day.  Pt attributes binges to his long hx of depression.  When depressive sx get to the point where "I can no longer bear it," he starts drinking heavily.  Pt states that during these periods he no longer gets out of bed, does not care for himself.  Pt tearful and describes grim future outlook, states that his wife would be better off without him, that there is no hope of ever getting better, that he will never feel comfortable in his own skin or have " "a day of reprieve from depressive sx.  Pt endorses multiple seizures 2/2 EtOH w/d.  Says first seizure was in his 60's.  Of note, neurosurgery eval'd pt in Sept 2019 and suspected EtOH etiology.  Pt states desire to leave hospital in spite of seizure risk.  Does not have an appreciation of the need for continued taper even when need for taper is explained, "I'm already detoxed, I don't need it anymore."  Endorses passive SI of death by seizure, says would no longer be a burden on his wife.    Spoke with pt's outpatient provider, who states that pt lives w/severe depression and pt's wife is not longer able to care for him.  Does not feel it would be safe for pt to leave the hospital.  Per chart, pt's wife emailed outpatient provider on 1/5 and staetd "I am looking at mental health facilities for adults... I cannot do this anymore."  Spoke with wife on telephone today, she states that prior to coming to the hospital pt would not get out of bed, was urinating on himself, was wholly dependent on her for care.  States she is not able or willing to continue providing this type of care.  She has full time job and cannot stay home with pt all day.  Does not feel it is safe for pt to return home at this time.    01/11/2020  Pt found resting awake in bed in NAD.  No complaints today.  Wants to go home, remains flat and listless.  Pt ambivalent about rehab.  Per chart, 50mg Valium yesterday, due to receive scheduled 40mg today.  No PRN Ativan required.  Remains mildly hypertensive.      01/12/2020  Pt resting in bed, awake, alert and in NAD. Focused on going home, minimizes his depression/anxiety by stating he only needs medication management. Acknowledges that he has been trialed on multiple antidepressants over the course of many years with little effect. Does not desire rehab. However, based on pt's presentation the last few days and great concern from wife regarding passive SI, avolition. Pt will continued to be PEC/CEC " with plan to transfer to psychiatric facility once deemed medically stable by primary team.    01/13/2020  Patient lying in bed, noted to have flat affect; monotonous speech. He reports withdrawals are controlled, did not require any PRNs. Patient contracted for safety d/t concerns about SI. Informed patient about transfer to inpatient psychiatric unit once medically stable and answered all questions.     01/14/2020  Again spoke with patient about inpatient psychiatric treatment to which he was amenable. Also spoke with patient about the possibility of ECT in the future.     Spoke with wife Josette on the phone; she is now in agreement with inpatient psychiatric treatment, knowing that the facility in [facility] an [facility].     Interval History: See hospital course    Family History     Problem Relation (Age of Onset)    Alcohol abuse Mother, Maternal Uncle, Paternal Uncle, Cousin    Anxiety disorder Brother    Bipolar disorder Brother    Dementia Maternal Grandmother    Depression Mother, Father, Sister, Brother    Drug abuse Brother    Suicide Sister        Tobacco Use    Smoking status: Current Some Day Smoker     Packs/day: 0.25     Years: 10.00     Pack years: 2.50    Smokeless tobacco: Never Used   Substance and Sexual Activity    Alcohol use: No     Alcohol/week: 16.7 standard drinks     Types: 20 Standard drinks or equivalent per week     Comment: quit 4 years ago    Drug use: No    Sexual activity: Yes     Partners: Female     Comment: with wife; monogamous      Psychotherapeutics (From admission, onward)    Start     Stop Route Frequency Ordered    01/10/20 1446  LORazepam tablet 2 mg      -- Oral Every 4 hours PRN 01/10/20 1446           Review of Systems    Objective:     Vital Signs (Most Recent):  Temp: 98.7 °F (37.1 °C) (01/14/20 0802)  Pulse: 88 (01/14/20 0802)  Resp: 18 (01/14/20 0802)  BP: (!) 161/92 (01/14/20 0802)  SpO2: 96 % (01/14/20 0802) Vital Signs (24h Range):  Temp:  [97.3 °F  "(36.3 °C)-98.7 °F (37.1 °C)] 98.7 °F (37.1 °C)  Pulse:  [82-99] 88  Resp:  [16-18] 18  SpO2:  [95 %-98 %] 96 %  BP: (135-168)/(87-98) 161/92     Height: 6' 5" (195.6 cm)  Weight: 93 kg (205 lb 0.4 oz)  Body mass index is 24.31 kg/m².      Intake/Output Summary (Last 24 hours) at 1/14/2020 1125  Last data filed at 1/14/2020 0400  Gross per 24 hour   Intake 720 ml   Output 1400 ml   Net -680 ml       Physical Exam   Psychiatric:   CONSTITUTIONAL  General Appearance and Manner: Well appearing, in NAD    MUSCULOSKELETAL  Abnormal Involuntary Movements: chronic right hand movement   Gait and Station: steady    PSYCHIATRIC   Orientation: awake, alert, oriented to location - hospital  Speech: low, spontaneous  Language: english, fluent  Mood: apathetic  Affect: flat  Thought Process: linear, goal-oriented  Associations: intact to conversation  Thought Content: Denies overt SI/HI/AVH, however minimizes overall depression/anxiety  Memory: intact to conversation  Attention and Concentration: intact  Fund of Knowledge: intact  Insight: poor  Judgment: limited          Significant Labs: All pertinent labs within the past 24 hours have been reviewed.    Significant Imaging: I have reviewed all pertinent imaging results/findings within the past 24 hours.    Assessment/Plan:     Severe depression  ASSESSMENT:       Major Depression, Severe, without psychotic features  Hx Bipolar Disorder    Based on my interview as well as on information obtained from collateral and from chart review, pt appears to have been living with depressed mood for the past several months with an acute worsening approx 4 wks ago.  Upon interview, pt tearful and expressed feelings of hopelessness. He is anhedonic, reports decreased appetite, c/o insomnia and decreased energy levels as evidenced by not feeling rested when waking up in morning.   He expressed a sense of worthlessness at several points per HPI.  Indecisiveness noted during interview. Especially " concerning is that he reported active SI as recently as 1/7 and continues to report thoughts of death by seizure. The aforementioned sx have a strong temporal correlation with acute worsening of mood sx 4 wks ago.  Sx appear to be causing significant distress and are clearly interfering with pt's social and occupational functioning (pt urinating on self in bed, not otherwise caring for self, not speaking to anyone other than his wife).  It should be noted that these sx exist in the context of heavy EtOH use so likely substance-induced component, but per all sources depressive sx exist in context of sobriety as well.  MSE findings c/w severe depression but not concerning for psychosis at this time.  Pt would benefit from continued tx for mood disorder, but primary focus at this time will be on safely detoxing from EtOH.  Most recent regimen includes Latuda and trazodone per chart.    PLAN:     -Patient CEC'd  -transfer to inpatient psychiatry-- patient and wife are both in agreement with this plan    -defer medications to inpatient psych unit      Alcohol use disorder, severe, dependence    ASSESSMENT:       Alcohol Use Disorder, Severe, in Acute Withdrawal    Based on the presence of the following criteria:    1. Substance often taken in larger amounts or over a longer period than intended    2. Persistent desire or unsuccessful efforts to cut down or control use    3. Great deal of time spent obtaining/using/recovering from substance/effects    4. Craving or strong desire to use    5. Recurrent use resulting in failure to fulfill obligations at work/school/home    6. Continued use despite causing persistent/recurrent social/interpersonal problems    7. Social/occupational/recreational activities reduced d/t substance use    8. Recurrent use in situations that are physically hazardous    9. Continued use despite knowledge of physical/psychological harm    10. Tolerance, as defined by:       A. Need for increased  amount to achieve desired effect       B. Diminished effect with continued use of same amount    11. Withdrawal, as defined by:       A. Characteristic withdrawal symptoms       B. Substance taken to avoid withdrawal symptoms      PLAN:     It should be noted that pt has several risk factors for complicated EtOH w/d, including a hx of chronic and sustained EtOH consumption and well-documented hx of EtOH w/d sz. Furthermore, his age (65yo) and psychiatric comorbidities puts him at increased risk. Upon presentation, he was noted to begin experiencing w/d sx in spite of having BAL of 0.196.     - Vitals q4 around the clock  - Due to large quantity of benzo's required to stabilize pt and multitude of risk factors per above, would Cont PRN Ativan 2mg for CIWA 8+.   - Valium taper completed 1/13  - Daily Thiamine, Folic acid, vit. B12, and multivitamin supplements  - Medications that reduce the seizure threshold (including chlorpromazine, fluphenazine, haloperidol, bupropion) should be avoided during the acute detoxification period, defined as the latter of 96h from sobriety or 12 hours after pt exhibits stable vitals w/o need for prn benzo           Need for Continued Hospitalization:   Psychiatric illness continues to pose a potential threat to life or bodily function, of self or others, thereby requiring the need for continued inpatient psychiatric hospitalization.    Anticipated Disposition: Admitted as an Inpatient     Total time:  25 with greater than 50% of this time spent in counseling and/or coordination of care.       Emre Phelps MD   Psychiatry  [facility]

## 2020-01-14 NOTE — DISCHARGE SUMMARY
[facility] Hospital Medicine  Discharge Summary      Patient Name: Mirza Morales  MRN: [id number]  Admission Date: 1/7/2020  Hospital Length of Stay: 7 days  Discharge Date and Time:  01/14/2020 4:36 PM  Attending Physician: No att. providers found   Discharging Provider: Jacinta Young MD  Primary Care Provider: Jud Mo MD  Hospital Medicine Team: [facility] 2 Jacinta Young MD    HPI:   64 y.o. male with HTN, chronic pain on oxycodone, seizures (on vimpat), alcoholism, and depression here today with alcohol intoxication. Patient has been drinking about a fifth of vodka a day for weeks. According to his wife he has actually lowered his alcohol intake the past few days and is worried about him going through withdrawal. Says he has a history of seizures when he stops drinking. Patient says he has been anxious, having tremors, nausea, and depressed. He has been depressed for quite sometime and now he knows needs help.     In the ED, the patient was evaluated by psych and the ICU. Psyched PEC the patient and want to have him admitted to hospital medicine for alcohol withdrawal. The  ICU evaluated and state the patient is hemodynamically stable on the floor. On evaluation by hospital medicine, patient is lying in bed with his wife at bedside. He is agreeable to come into the hospital for treatment.       * No surgery found *      Hospital Course:   1/8/2020: Patient is still requiring PRN lorazepam for alcohol withdrawal. He has tongue fasciculations and hands tremors.   1/14/2020: Patient is off BZD (scheduled and PRN). No sx/sx of withdrawal however psych discussed with the patient and the wife the need for IP psych admission due to grave disability. Both agrees and patient discharged to IP psych as he is medically stable for discharge     Consults:   Consults (From admission, onward)        Status Ordering Provider     Inpatient consult to Critical Care Medicine  Once     Provider:  (Not  yet assigned)    Completed ALLEN MEZA     Inpatient consult to Psychiatry  Once     Provider:  (Not yet assigned)    Completed ALLEN MEZA          * Alcohol withdrawal  64 y.o. male with HTN, chronic pain on oxycodone, seizures (on vimpat), alcoholism, and depression who presents today after decreasing his alcohol intake. Patient has been binge drinking for months and has a long history of alcohol abuse. He has decreased his intake and is starting to have tremors, anxiety, and depression. Patient is displaying classic signs of alcohol withdrawal. Psych consulted in the ER and recommending hospital medicine admission for withdrawal. Vital signs have been stable    Plan  [facility] following and appreciate recs.   ALCOHOL/BENZO WITHDRAWAL PRECAUTIONS  Valium stopped 1/14  Ativan 2 mg q4 hours prn for ciwa 8+  Vital signs q4 hours  Thiamine, Folic acid, Vit B12 and Multivitamin supplementation  Patient will have a sitter as well    Patient is medically stable at this point. Discussion being had with psych about the need for inpatient rehab. Patient at this time is unwilling but he is still PECd as of 1/13. Wife now wants to take patient home and take care of him instead of facility. Will defer to psych team about PEC status. Patient is going to be discharged to IP psych        Debility  PT/OT      Seizure disorder  Patient with a history of seizures with alcohol withdrawal    Plan  Continue home vimpat  Seizure precautions       Severe depression  Psych already consulted on the patient. Medically stable for discharge to IP psych      Final Active Diagnoses:    Diagnosis Date Noted POA    PRINCIPAL PROBLEM:  Alcohol withdrawal [F10.239] 01/07/2020 Yes    Debility [R53.81] 01/09/2020 Yes    Alcoholic intoxication without complication [F10.920] 01/07/2020 Yes    Seizure disorder [G40.909] 01/03/2016 Yes    Severe depression [F32.2] 07/16/2013 Yes    Alcohol use disorder, severe, dependence [F10.20]  10/09/2012 Yes     Chronic      Problems Resolved During this Admission:       Discharged Condition: stable    Disposition: Home or Self Care    Follow Up:  Follow-up Information     Jud Mo MD On 1/29/2020.    Specialty:  Internal Medicine  Why:  at 10:00 AM; PCP hospital follow up appointment  Contact information:  [address]  [place] [id number]  [phone]             Ambrosio Loco MD On 1/17/2020.    Specialty:  Psychiatry  Why:  Call to scheduled an appointment following discharge.   Contact information:  [address] [place] [address]  [phone]             Jud Mo MD.    Specialty:  Internal Medicine  Contact information:  [address]  [facility] [address]  [phone]                 Patient Instructions:      Diet Cardiac     Notify your health care provider if you experience any of the following:  temperature >100.4     Notify your health care provider if you experience any of the following:  persistent nausea and vomiting or diarrhea     Notify your health care provider if you experience any of the following:  severe uncontrolled pain     Notify your health care provider if you experience any of the following:  redness, tenderness, or signs of infection (pain, swelling, redness, odor or green/yellow discharge around incision site)     Notify your health care provider if you experience any of the following:  difficulty breathing or increased cough     Notify your health care provider if you experience any of the following:  severe persistent headache     Notify your health care provider if you experience any of the following:  worsening rash     Notify your health care provider if you experience any of the following:  persistent dizziness, light-headedness, or visual disturbances     Notify your health care provider if you experience any of the following:  increased confusion or weakness     Activity as tolerated       Significant Diagnostic Studies: Labs:    BMP:   Recent Labs   Lab 01/13/20  0637 01/14/20  0638   GLU 97 102   * 135*   K 4.0 4.2    101   CO2 24 29   BUN 11 9   CREATININE 0.8 0.8   CALCIUM 9.0 9.4   MG 1.9 1.9   , CMP   Recent Labs   Lab 01/13/20  0637 01/14/20  0638   * 135*   K 4.0 4.2    101   CO2 24 29   GLU 97 102   BUN 11 9   CREATININE 0.8 0.8   CALCIUM 9.0 9.4   PROT 6.1 6.4   ALBUMIN 3.1* 3.1*   BILITOT 0.5 0.5   ALKPHOS 87 90   AST 22 21   ALT 24 23   ANIONGAP 7* 5*   ESTGFRAFRICA >60.0 >60.0   EGFRNONAA >60.0 >60.0   , CBC   Recent Labs   Lab 01/13/20  0637 01/14/20  0638   WBC 6.96 6.02   HGB 13.3* 13.5*   HCT 39.8* 40.9    214   , INR   Lab Results   Component Value Date    INR 1.1 01/07/2020    INR 1.0 07/12/2017    INR 0.9 11/09/2015   , Lipid Panel   Lab Results   Component Value Date    CHOL 126 08/08/2018    HDL 42 08/08/2018    LDLCALC 61.4 (L) 08/08/2018    TRIG 113 08/08/2018    CHOLHDL 33.3 08/08/2018   , Troponin   Recent Labs   Lab 01/07/20  1852   TROPONINI 0.009   , A1C:   Recent Labs   Lab 07/24/19  1228 01/07/20  1852   HGBA1C 4.8 4.8    and All labs within the past 24 hours have been reviewed    Pending Diagnostic Studies:     Procedure Component Value Units Date/Time    Ethanol [[id number]] Collected:  01/07/20 1019    Order Status:  Sent Lab Status:  In process Updated:  01/07/20 1019    Specimen:  Blood     Lactic acid, plasma [[id number]] Collected:  01/07/20 1852    Order Status:  Sent Lab Status:  In process Updated:  01/07/20 1925    Specimen:  Blood          Medications:  Reconciled Home Medications:      Medication List      START taking these medications    DOK Plus 8.6-50 mg per tablet  Generic drug:  senna-docusate 8.6-50 mg  Take 1 tablet by mouth once daily.  Start taking on:  January 15, 2020     folic acid 1 MG tablet  Commonly known as:  FOLVITE  Take 1 tablet (1 mg total) by mouth once daily.  Start taking on:  January 15, 2020     thiamine 100 MG tablet  Take 1 tablet (100 mg  total) by mouth once daily.  Start taking on:  January 15, 2020        CONTINUE taking these medications    atorvastatin 40 MG tablet  Commonly known as:  LIPITOR  TAKE 1 TABLET BY MOUTH EVERY DAY     lurasidone 40 mg Tab tablet  Commonly known as:  LATUDA  Take 1 tablet (40 mg total) by mouth Daily.     mupirocin 2 % ointment  Commonly known as:  BACTROBAN  Apply topically 2 (two) times daily. Apply to back region with area of cellulitis/slough     naloxegol 12.5 mg Tab  Commonly known as:  MOVANTIK  Take 12.5 mg by mouth daily as needed.     pantoprazole 40 MG tablet  Commonly known as:  PROTONIX  Take 1 tablet (40 mg total) by mouth once daily.  Start taking on:  January 15, 2020     polycarbophil 625 mg tablet  Commonly known as:  FIBERCON  Take 625 mg by mouth once daily.     traZODone 100 MG tablet  Commonly known as:  DESYREL  Take 200 or 300 mg at bedtime     valACYclovir 1000 MG tablet  Commonly known as:  VALTREX  Take 1 tablet (1,000 mg total) by mouth every 8 (eight) hours. for 7 days     Vimpat 200 mg Tab tablet  Generic drug:  lacosamide  Take 1 tablet (200 mg total) by mouth every 12 (twelve) hours.        STOP taking these medications    oxyCODONE 10 mg Tab immediate release tablet  Commonly known as:  ROXICODONE            Indwelling Lines/Drains at time of discharge:   Lines/Drains/Airways     None                 Time spent on the discharge of patient: 45 minutes  Patient was seen and examined on the date of discharge and determined to be suitable for discharge.         Jacinta Young MD  Department of Hospital Medicine  [facility]

## 2020-01-14 NOTE — ASSESSMENT & PLAN NOTE
ASSESSMENT:       Major Depression, Severe, without psychotic features  Hx Bipolar Disorder    Based on my interview as well as on information obtained from collateral and from chart review, pt appears to have been living with depressed mood for the past several months with an acute worsening approx 4 wks ago.  Upon interview, pt tearful and expressed feelings of hopelessness. He is anhedonic, reports decreased appetite, c/o insomnia and decreased energy levels as evidenced by not feeling rested when waking up in morning.   He expressed a sense of worthlessness at several points per HPI.  Indecisiveness noted during interview. Especially concerning is that he reported active SI as recently as 1/7 and continues to report thoughts of death by seizure. The aforementioned sx have a strong temporal correlation with acute worsening of mood sx 4 wks ago.  Sx appear to be causing significant distress and are clearly interfering with pt's social and occupational functioning (pt urinating on self in bed, not otherwise caring for self, not speaking to anyone other than his wife).  It should be noted that these sx exist in the context of heavy EtOH use so likely substance-induced component, but per all sources depressive sx exist in context of sobriety as well.  MSE findings c/w severe depression but not concerning for psychosis at this time.  Pt would benefit from continued tx for mood disorder, but primary focus at this time will be on safely detoxing from EtOH.  Most recent regimen includes Latuda and trazodone per chart.    PLAN:     -Patient CEC'd  -transfer to inpatient psychiatry-- patient and wife are both in agreement with this plan    -defer medications to inpatient psych unit

## 2020-01-14 NOTE — ASSESSMENT & PLAN NOTE
ASSESSMENT:       Alcohol Use Disorder, Severe, in Acute Withdrawal    Based on the presence of the following criteria:    1. Substance often taken in larger amounts or over a longer period than intended    2. Persistent desire or unsuccessful efforts to cut down or control use    3. Great deal of time spent obtaining/using/recovering from substance/effects    4. Craving or strong desire to use    5. Recurrent use resulting in failure to fulfill obligations at work/school/home    6. Continued use despite causing persistent/recurrent social/interpersonal problems    7. Social/occupational/recreational activities reduced d/t substance use    8. Recurrent use in situations that are physically hazardous    9. Continued use despite knowledge of physical/psychological harm    10. Tolerance, as defined by:       A. Need for increased amount to achieve desired effect       B. Diminished effect with continued use of same amount    11. Withdrawal, as defined by:       A. Characteristic withdrawal symptoms       B. Substance taken to avoid withdrawal symptoms      PLAN:     It should be noted that pt has several risk factors for complicated EtOH w/d, including a hx of chronic and sustained EtOH consumption and well-documented hx of EtOH w/d sz. Furthermore, his age (65yo) and psychiatric comorbidities puts him at increased risk. Upon presentation, he was noted to begin experiencing w/d sx in spite of having BAL of 0.196.     - Vitals q4 around the clock  - Due to large quantity of benzo's required to stabilize pt and multitude of risk factors per above, would Cont PRN Ativan 2mg for CIWA 8+.   - Valium taper completed 1/13  - Daily Thiamine, Folic acid, vit. B12, and multivitamin supplements  - Medications that reduce the seizure threshold (including chlorpromazine, fluphenazine, haloperidol, bupropion) should be avoided during the acute detoxification period, defined as the latter of 96h from sobriety or 12 hours after pt  exhibits stable vitals w/o need for prn benzo

## 2020-01-14 NOTE — SUBJECTIVE & OBJECTIVE
"Interval History: See hospital course    Family History     Problem Relation (Age of Onset)    Alcohol abuse Mother, Maternal Uncle, Paternal Uncle, Cousin    Anxiety disorder Brother    Bipolar disorder Brother    Dementia Maternal Grandmother    Depression Mother, Father, Sister, Brother    Drug abuse Brother    Suicide Sister        Tobacco Use    Smoking status: Current Some Day Smoker     Packs/day: 0.25     Years: 10.00     Pack years: 2.50    Smokeless tobacco: Never Used   Substance and Sexual Activity    Alcohol use: No     Alcohol/week: 16.7 standard drinks     Types: 20 Standard drinks or equivalent per week     Comment: quit 4 years ago    Drug use: No    Sexual activity: Yes     Partners: Female     Comment: with wife; monogamous      Psychotherapeutics (From admission, onward)    Start     Stop Route Frequency Ordered    01/10/20 1446  LORazepam tablet 2 mg      -- Oral Every 4 hours PRN 01/10/20 1446           Review of Systems    Objective:     Vital Signs (Most Recent):  Temp: 98.7 °F (37.1 °C) (01/14/20 0802)  Pulse: 88 (01/14/20 0802)  Resp: 18 (01/14/20 0802)  BP: (!) 161/92 (01/14/20 0802)  SpO2: 96 % (01/14/20 0802) Vital Signs (24h Range):  Temp:  [97.3 °F (36.3 °C)-98.7 °F (37.1 °C)] 98.7 °F (37.1 °C)  Pulse:  [82-99] 88  Resp:  [16-18] 18  SpO2:  [95 %-98 %] 96 %  BP: (135-168)/(87-98) 161/92     Height: 6' 5" (195.6 cm)  Weight: 93 kg (205 lb 0.4 oz)  Body mass index is 24.31 kg/m².      Intake/Output Summary (Last 24 hours) at 1/14/2020 1125  Last data filed at 1/14/2020 0400  Gross per 24 hour   Intake 720 ml   Output 1400 ml   Net -680 ml       Physical Exam   Psychiatric:   CONSTITUTIONAL  General Appearance and Manner: Well appearing, in NAD    MUSCULOSKELETAL  Abnormal Involuntary Movements: chronic right hand movement   Gait and Station: steady    PSYCHIATRIC   Orientation: awake, alert, oriented to location - hospital  Speech: low, spontaneous  Language: english, fluent  Mood: " apathetic  Affect: flat  Thought Process: linear, goal-oriented  Associations: intact to conversation  Thought Content: Denies overt SI/HI/AVH, however minimizes overall depression/anxiety  Memory: intact to conversation  Attention and Concentration: intact  Fund of Knowledge: intact  Insight: poor  Judgment: limited          Significant Labs: All pertinent labs within the past 24 hours have been reviewed.    Significant Imaging: I have reviewed all pertinent imaging results/findings within the past 24 hours.

## 2020-01-14 NOTE — PLAN OF CARE
01/14/20 1332   Post-Acute Status   Post-Acute Authorization Placement   Post-Acute Placement Status Set-up Complete       was notified by [facility] that pt has been accepted to [facility]. [facility] will update  concerning transportation ETA shortly.  updated pts nurse, of placement facility.     Shaina Fisher LMSW   PRN

## 2020-01-14 NOTE — ASSESSMENT & PLAN NOTE
64 y.o. male with HTN, chronic pain on oxycodone, seizures (on vimpat), alcoholism, and depression who presents today after decreasing his alcohol intake. Patient has been binge drinking for months and has a long history of alcohol abuse. He has decreased his intake and is starting to have tremors, anxiety, and depression. Patient is displaying classic signs of alcohol withdrawal. Psych consulted in the ER and recommending hospital medicine admission for withdrawal. Vital signs have been stable    Plan  [facility] following and appreciate recs.   ALCOHOL/BENZO WITHDRAWAL PRECAUTIONS  Valium stopped 1/14  Ativan 2 mg q4 hours prn for ciwa 8+  Vital signs q4 hours  Thiamine, Folic acid, Vit B12 and Multivitamin supplementation  Patient will have a sitter as well    Patient is medically stable at this point. Discussion being had with psych about the need for inpatient rehab. Patient at this time is unwilling but he is still PECd as of 1/13. Wife now wants to take patient home and take care of him instead of facility. Will defer to psych team about PEC status. Patient is going to be discharged to  psych

## 2020-01-14 NOTE — PT/OT/SLP PROGRESS
Occupational Therapy   Treatment and Discharge    Name: Mirza Morales  MRN: [id number]  Admitting Diagnosis:  Alcohol withdrawal       Recommendations:     Discharge Recommendations: home  Discharge Equipment Recommendations:  none  Barriers to discharge:  None    Assessment:     Mirza Morales is a 64 y.o. male with a medical diagnosis of Alcohol withdrawal.  He presents with impaired ADL and fx'l mobility performance deficits. Pt met all goals for therapy this date with session focus on bed mobility, LB dressing, sit>stand t/f, and bedroom and hallway mobility this date. Pt supervision with bed mobility and SBA with mobility using RW. Pt reports no concerns with returning home in relation to mobility and ADL needs.  Performance deficits affecting function are weakness, impaired endurance, impaired self care skills, impaired functional mobilty, gait instability, impaired balance, decreased safety awareness, decreased coordination, impaired fine motor. Pt is recommended to discharge to home at this time.    Subjective     Pain/Comfort:  · Pain Rating 1: 0/10  · Pain Rating Post-Intervention 2: 0/10    Objective:     Communicated with: RN prior to session.  Patient found HOB elevated with (CEC sitter) upon OT entry to room.    General Precautions: Standard, fall   Orthopedic Precautions:N/A   Braces: N/A     Occupational Performance:     Bed Mobility:    · Patient completed Rolling/Turning to Right with supervision  · Patient completed Scooting/Bridging with supervision  · Patient completed Supine to Sit with supervision  · Patient completed Sit to Supine with supervision     Functional Mobility/Transfers:  · Patient completed Sit <> Stand Transfer with stand by assistance  with  rolling walker   · Functional Mobility: Pt completed bedroom and hallway ambulation this date with RW and SBA    Activities of Daily Living:  · Lower Body Dressing: stand by assistance standing donning paper pants and adjusting sock  fit at EOB       [facility] 6 Click ADL: 24    Treatment & Education:  Pt educated on role of occupational therapy, POC, and safety during ADLs and functional mobility. Pt and OT discussed importance of safe, continued mobility to optimize daily living skills. Pt verbalized understanding.   Pt completed the following during session: bed mobility, sit>stand t/f, LB dressing, bedroom and hallway ambulation. White board updated during session. Pt given instruction to call for medical staff/nurse for assistance.       Patient left HOB elevated with all lines intact, call button in reach, RN notified and CEC presentEducation:      GOALS:   Multidisciplinary Problems     Occupational Therapy Goals     Not on file          Multidisciplinary Problems (Resolved)        Problem: Occupational Therapy Goal    Goal Priority Disciplines Outcome Interventions   Occupational Therapy Goal   (Resolved)     OT, PT/OT Met    Description:  Goals to be met by: 1/18/2020     Patient will increase functional independence with ADLs by performing:    LE Dressing with Stand-by Assistance in seated position.  Grooming while standing at sink with Stand-by Assistance.   Bathing with Stand-by Assistance.  Toilet transfer to toilet with Stand by Assistance.                         Time Tracking:     OT Date of Treatment: 01/14/20  OT Start Time: 0838  OT Stop Time: 0857  OT Total Time (min): 19 min    Billable Minutes:Therapeutic Activity 19 min    Noni Hitchcock OT  1/14/2020

## 2020-01-15 ENCOUNTER — PATIENT OUTREACH (OUTPATIENT)
Dept: ADMINISTRATIVE | Facility: HOSPITAL | Age: 65
End: 2020-01-15

## 2020-01-15 DIAGNOSIS — E78.2 MIXED HYPERLIPIDEMIA: Primary | ICD-10-CM

## 2020-01-27 ENCOUNTER — PATIENT MESSAGE (OUTPATIENT)
Dept: PSYCHIATRY | Facility: CLINIC | Age: 65
End: 2020-01-27

## 2020-01-29 ENCOUNTER — IMMUNIZATION (OUTPATIENT)
Dept: PHARMACY | Facility: CLINIC | Age: 65
End: 2020-01-29
Payer: COMMERCIAL

## 2020-01-29 ENCOUNTER — OFFICE VISIT (OUTPATIENT)
Dept: PSYCHIATRY | Facility: CLINIC | Age: 65
End: 2020-01-29
Payer: COMMERCIAL

## 2020-01-29 ENCOUNTER — PATIENT MESSAGE (OUTPATIENT)
Dept: PHARMACY | Facility: CLINIC | Age: 65
End: 2020-01-29

## 2020-01-29 ENCOUNTER — OFFICE VISIT (OUTPATIENT)
Dept: INTERNAL MEDICINE | Facility: CLINIC | Age: 65
End: 2020-01-29
Payer: COMMERCIAL

## 2020-01-29 VITALS
SYSTOLIC BLOOD PRESSURE: 170 MMHG | HEART RATE: 107 BPM | BODY MASS INDEX: 25.76 KG/M2 | DIASTOLIC BLOOD PRESSURE: 84 MMHG | WEIGHT: 217.25 LBS

## 2020-01-29 VITALS
OXYGEN SATURATION: 98 % | HEIGHT: 77 IN | HEART RATE: 98 BPM | BODY MASS INDEX: 25.53 KG/M2 | WEIGHT: 216.25 LBS | SYSTOLIC BLOOD PRESSURE: 128 MMHG | DIASTOLIC BLOOD PRESSURE: 84 MMHG

## 2020-01-29 DIAGNOSIS — Z09 HOSPITAL DISCHARGE FOLLOW-UP: Primary | ICD-10-CM

## 2020-01-29 DIAGNOSIS — F10.20 ALCOHOL USE DISORDER, SEVERE, DEPENDENCE: Chronic | ICD-10-CM

## 2020-01-29 DIAGNOSIS — E43 SEVERE MALNUTRITION: ICD-10-CM

## 2020-01-29 DIAGNOSIS — M48.061 DEGENERATIVE LUMBAR SPINAL STENOSIS: ICD-10-CM

## 2020-01-29 DIAGNOSIS — Z72.0 TOBACCO ABUSE: Chronic | ICD-10-CM

## 2020-01-29 DIAGNOSIS — G40.909 SEIZURE DISORDER: ICD-10-CM

## 2020-01-29 DIAGNOSIS — F32.2 SEVERE DEPRESSION: ICD-10-CM

## 2020-01-29 DIAGNOSIS — M47.26 OSTEOARTHRITIS OF SPINE WITH RADICULOPATHY, LUMBAR REGION: ICD-10-CM

## 2020-01-29 DIAGNOSIS — E87.1 HYPONATREMIA: Chronic | ICD-10-CM

## 2020-01-29 DIAGNOSIS — F31.9 BIPOLAR 1 DISORDER: Primary | ICD-10-CM

## 2020-01-29 DIAGNOSIS — E78.2 MIXED HYPERLIPIDEMIA: ICD-10-CM

## 2020-01-29 DIAGNOSIS — G95.9 DISEASE OF SPINAL CORD, UNSPECIFIED: ICD-10-CM

## 2020-01-29 DIAGNOSIS — D63.8 ANEMIA OF CHRONIC DISEASE: ICD-10-CM

## 2020-01-29 DIAGNOSIS — F41.9 ANXIETY: Chronic | ICD-10-CM

## 2020-01-29 DIAGNOSIS — F31.9 BIPOLAR I DISORDER: ICD-10-CM

## 2020-01-29 PROBLEM — L30.8 HERPES ZOSTER DERMATITIS: Status: RESOLVED | Noted: 2019-09-29 | Resolved: 2020-01-29

## 2020-01-29 PROBLEM — F10.920 ALCOHOLIC INTOXICATION WITHOUT COMPLICATION: Status: RESOLVED | Noted: 2020-01-07 | Resolved: 2020-01-29

## 2020-01-29 PROBLEM — Z98.890 S/P COLONOSCOPY: Status: RESOLVED | Noted: 2019-08-14 | Resolved: 2020-01-29

## 2020-01-29 PROBLEM — F10.939 ALCOHOL WITHDRAWAL: Status: RESOLVED | Noted: 2020-01-07 | Resolved: 2020-01-29

## 2020-01-29 PROBLEM — B02.8 HERPES ZOSTER DERMATITIS: Status: RESOLVED | Noted: 2019-09-29 | Resolved: 2020-01-29

## 2020-01-29 PROBLEM — R29.818 TRANSIENT NEUROLOGICAL SYMPTOMS: Status: RESOLVED | Noted: 2019-09-30 | Resolved: 2020-01-29

## 2020-01-29 PROBLEM — S01.01XA LACERATION OF SCALP WITHOUT FOREIGN BODY: Status: RESOLVED | Noted: 2019-09-29 | Resolved: 2020-01-29

## 2020-01-29 PROBLEM — R41.0 DELIRIUM: Status: RESOLVED | Noted: 2019-09-30 | Resolved: 2020-01-29

## 2020-01-29 PROCEDURE — 99214 PR OFFICE/OUTPT VISIT, EST, LEVL IV, 30-39 MIN: ICD-10-PCS | Mod: S$GLB,,, | Performed by: INTERNAL MEDICINE

## 2020-01-29 PROCEDURE — 99214 OFFICE O/P EST MOD 30 MIN: CPT | Mod: S$GLB,,, | Performed by: INTERNAL MEDICINE

## 2020-01-29 PROCEDURE — 3008F BODY MASS INDEX DOCD: CPT | Mod: CPTII,S$GLB,, | Performed by: INTERNAL MEDICINE

## 2020-01-29 PROCEDURE — 99213 PR OFFICE/OUTPT VISIT, EST, LEVL III, 20-29 MIN: ICD-10-PCS | Mod: S$GLB,,, | Performed by: PSYCHIATRY & NEUROLOGY

## 2020-01-29 PROCEDURE — 99999 PR PBB SHADOW E&M-EST. PATIENT-LVL III: CPT | Mod: PBBFAC,,, | Performed by: INTERNAL MEDICINE

## 2020-01-29 PROCEDURE — 99213 OFFICE O/P EST LOW 20 MIN: CPT | Mod: S$GLB,,, | Performed by: PSYCHIATRY & NEUROLOGY

## 2020-01-29 PROCEDURE — 99999 PR PBB SHADOW E&M-EST. PATIENT-LVL II: ICD-10-PCS | Mod: PBBFAC,,, | Performed by: PSYCHIATRY & NEUROLOGY

## 2020-01-29 PROCEDURE — 99999 PR PBB SHADOW E&M-EST. PATIENT-LVL II: CPT | Mod: PBBFAC,,, | Performed by: PSYCHIATRY & NEUROLOGY

## 2020-01-29 PROCEDURE — 99999 PR PBB SHADOW E&M-EST. PATIENT-LVL III: ICD-10-PCS | Mod: PBBFAC,,, | Performed by: INTERNAL MEDICINE

## 2020-01-29 PROCEDURE — 3008F PR BODY MASS INDEX (BMI) DOCUMENTED: ICD-10-PCS | Mod: CPTII,S$GLB,, | Performed by: INTERNAL MEDICINE

## 2020-01-29 RX ORDER — QUETIAPINE FUMARATE 100 MG/1
100 TABLET, FILM COATED ORAL NIGHTLY
Qty: 30 TABLET | Refills: 3 | Status: SHIPPED | OUTPATIENT
Start: 2020-01-29 | End: 2020-03-12 | Stop reason: SDUPTHER

## 2020-01-29 RX ORDER — LURASIDONE HYDROCHLORIDE 60 MG/1
TABLET, FILM COATED ORAL
Qty: 30 TABLET | Refills: 3 | Status: SHIPPED | OUTPATIENT
Start: 2020-01-29 | End: 2020-03-27 | Stop reason: SDUPTHER

## 2020-01-29 RX ORDER — TRAZODONE HYDROCHLORIDE 100 MG/1
200 TABLET ORAL NIGHTLY
Qty: 60 TABLET | Refills: 3 | Status: SHIPPED | OUTPATIENT
Start: 2020-01-29 | End: 2020-04-22 | Stop reason: SDUPTHER

## 2020-01-29 RX ORDER — QUETIAPINE FUMARATE 25 MG/1
25 TABLET, FILM COATED ORAL EVERY MORNING
Qty: 30 TABLET | Refills: 3 | Status: SHIPPED | OUTPATIENT
Start: 2020-01-29 | End: 2020-07-29

## 2020-01-29 NOTE — PROGRESS NOTES
ESTABLISHED OUTPATIENT VISIT   E/M LEVEL 3: [id number]    ENCOUNTER DATE: 1/29/2020  SITE: [facility] [address]    HISTORY    CHIEF COMPLAINT   Mirza Morales is a 64 y.o. male who presents for follow up of bipolar d/o.    HPI     Recently discharged from psychiatric hospitalization after medical hospitalization.     Mood is stable.  Sleeping about 5 hours per night.    No alcohol since discharge from hospital. No marijuana. Smoking about 15 cigarettes per day.    ROS   Walking without a cane today.    PAST MEDICAL, FAMILY AND SOCIAL HISTORY: The patient's past medical, family and social history have been reviewed and updated as appropriate within the electronic medical record - see encounter notes    PSYCHOTROPIC MEDICATIONS   Latuda 60 mg in the evening, Seroquel 25 mg qam, Seroquel 50 mg at bedtime Trazodone 200 mg at bedtime  Scheduled and PRN Medications     LABORATORY DATA  No results displayed because visit has over 200 results.      Lab Visit on 11/11/2019   Component Date Value Ref Range Status    Lacosamide 11/11/2019 5.3  1.0 - 10.0 mcg/mL Final    Comment: -------------------ADDITIONAL INFORMATION-------------------  This test was developed and its performance characteristics   determined by [facility] in a manner consistent with CLIA   requirements. This test has not been cleared or approved by   the U.S. Food and Drug Administration.  Test Performed by:  [facility] [place] [address]  : Kingsley Swanson M.D. Ph.D.; CLIA# [id number]       EXAMINATION    BP (!) 170/84   Pulse 107   Wt 98.5 kg (217 lb 4.2 oz)   BMI 25.76 kg/m²     CONSTITUTIONAL  General Appearance: well nourished    MUSCULOSKELETAL  Muscle Strength and Tone: normal strength and tone  Abnormal Involuntary Movements: no abnormal movement noted  Gait and Station: normal gait    PSYCHIATRIC   Level of Consciousness: alert  Orientation:  grossly intact  Grooming: well groomed  Psychomotor Behavior: no restlessness/agitation  Speech: normal in rate, rhythm and volume  Language: normal vocabulary  Mood: steady  Affect: full range and appropriate  Thought Process: logical and goal directed  Associations: intact associations  Thought Content: no SI/HI  Memory: grossly intact  Attention: intact to content of interview  Fund of Knowledge: appears adequate  Insight: good  Judgement: good    MEDICAL DECISION MAKING    DIAGNOSES  Bipolar d/o  Alcohol Use[pt. Currently does not desire treatment]    PROBLEM LIST AND MANAGEMENT PLANS    - increase bedtime Seroquel to 100 mg; continue other above psychotropic meds  - rtc 1-3 months    Time with patient: 20 min      Ambrosio Loco

## 2020-01-29 NOTE — PROGRESS NOTES
INTERNAL MEDICINE ESTABLISHED PATIENT VISIT NOTE    Subjective:     Chief Complaint: Hospital Follow Up       Patient ID: Mirza Morales is a 64 y.o. male with HLD, sz d/o, bipolar d/o c anxiety, hx EtOH dependence c hx withdrawal in the past, hx opiate overdose, lumbar radiculopathy c hx lumbar fusion around 4/2018 followed by Dr. Kannan Orona who is managing his pain), tob use, hx R fib fx, hx R clavicular fx 2/2 fall, last seen by me 7/2019, here today for hospital f/u.    Since last visit, was admitted 9/29-10/4/19 p presenting c witnessed seizure and subsequently falling and hitting his head requiring staples on his scalp.  Had apparently been depressed and self-weaned his Depakote for several weeks prior due to issues c tremors.  At that time, Depakote was stopped and Vimpat was increased.  EEG at that time c/w delerium which had improved c decreased Doxepin.  Also discharged on Latuda.    Psych issues have been followed by Dr. Loco and sz d/o followed by Dr. Ann.    Pt was then readmitted 1/7-1/14/20 p presenting c acute intoxication and had been drinking a fifth of vodka daily for several weeks.  Was PEC'ed and admitted to the ICU and treated c benzos for withdrawal and followed by psych inpatient and was transferred to [facility] inpatient psych and was discharged last Wednesday.      Denies seizures since discharged and feels motivated to go to  to stop drinking.  Was seen by Dr. Loco this morning but does not yet have an appt c Dr. Ann.  Still c/o resting tremor in his hand.    Currently denies si/hi since discharge.    Still c chronic back pain but was taken off pain meds while inpatient so plans to f/u c Dr. Orona and discuss injections or other tx options.      Past Medical History:  Past Medical History:   Diagnosis Date    Alcohol abuse     Behavioral problem     Bipolar 1 disorder     Chronic right hip pain     Depression     DJD (degenerative joint disease), lumbar  1/27/2015    Fatigue     Hepatitis     pt. denies this    History of psychiatric care     History of psychiatric hospitalization     Hypertension     Karina     Post traumatic stress disorder     Psychiatric problem     Seizures     Suicide attempt     Therapy        Home Medications:  Prior to Admission medications    Medication Sig Start Date End Date Taking? Authorizing Provider   atorvastatin (LIPITOR) 40 MG tablet TAKE 1 TABLET BY MOUTH EVERY DAY 11/11/19   Jud Mo MD   folic acid (FOLVITE) 1 MG tablet Take 1 tablet (1 mg total) by mouth once daily. 1/15/20 1/14/21  Jacinta Young MD   lacosamide (VIMPAT) 200 mg Tab tablet Take 1 tablet (200 mg total) by mouth every 12 (twelve) hours. 10/3/19 10/2/20  Hilario Thomas MD   lurasidone (LATUDA) 40 mg Tab tablet Take 1 tablet (40 mg total) by mouth Daily. 10/3/19 10/2/20  Shanon Wade PA-C   mupirocin (BACTROBAN) 2 % ointment Apply topically 2 (two) times daily. Apply to back region with area of cellulitis/slough 10/3/19   Shanon Wade PA-C   naloxegol (MOVANTIK) 12.5 mg Tab Take 12.5 mg by mouth daily as needed. 10/4/19   Edurada Calderon NP   pantoprazole (PROTONIX) 40 MG tablet Take 1 tablet (40 mg total) by mouth once daily. 1/15/20 1/14/21  Jacinta Young MD   polycarbophil (FIBERCON) 625 mg tablet Take 625 mg by mouth once daily.    Historical Provider, MD   senna-docusate 8.6-50 mg (PERICOLACE) 8.6-50 mg per tablet Take 1 tablet by mouth once daily. 1/15/20   Jacinta Young MD   thiamine 100 MG tablet Take 1 tablet (100 mg total) by mouth once daily. 1/15/20   Jacinta Young MD   traZODone (DESYREL) 100 MG tablet Take 200 or 300 mg at bedtime 12/4/19   Ambrosio Loco MD   valACYclovir (VALTREX) 1000 MG tablet Take 1 tablet (1,000 mg total) by mouth every 8 (eight) hours. for 7 days 9/9/19 9/16/19  Jud Mo MD       Allergies:  Review of patient's allergies indicates:   Allergen Reactions    Gabapentin Other (See Comments)     SEVERE  "DIZZINESS AND BALANCE PROBLEMS, UNABLE TO WALK.    Nardil [phenelzine]      BECOMES HYPER, LIKE A MANIC EPISODE.    Penicillins Hives    Lamictal [lamotrigine] Rash       Social History:  Social History     Tobacco Use    Smoking status: Current Some Day Smoker     Packs/day: 0.25     Years: 10.00     Pack years: 2.50    Smokeless tobacco: Never Used   Substance Use Topics    Alcohol use: No     Alcohol/week: 16.7 standard drinks     Types: 20 Standard drinks or equivalent per week     Comment: quit 4 years ago    Drug use: No        Review of Systems   Constitutional: Negative for appetite change, chills, fatigue, fever and unexpected weight change.   HENT: Negative for congestion, hearing loss and rhinorrhea.    Eyes: Negative for pain and visual disturbance.   Respiratory: Negative for cough, chest tightness, shortness of breath and wheezing.    Cardiovascular: Negative for chest pain, palpitations and leg swelling.   Gastrointestinal: Negative for abdominal distention and abdominal pain.   Endocrine: Negative for polydipsia and polyuria.   Genitourinary: Negative for decreased urine volume, discharge, dysuria and frequency.   Neurological: Positive for tremors. Negative for dizziness, weakness, numbness and headaches.   Psychiatric/Behavioral: Positive for dysphoric mood. Negative for behavioral problems and confusion.         Health Maintenance:     Immunizations:   Influenza 10/2019  TDap 8/2018  Pneumovax 8/2018  Shingrix rec today     Cancer Screening:  PSA 7/2019 wnl  Colonoscopy: 8/2019 wnl, repeat in 10 years     Objective:   /84 (BP Location: Left arm, Patient Position: Sitting, BP Method: Medium (Manual))   Pulse 98   Ht 6' 5" (1.956 m)   Wt 98.1 kg (216 lb 4.3 oz)   SpO2 98%   BMI 25.65 kg/m²        General: AAO x3, no apparent distress, slow-moving.  HEENT: PERRL, OP clear  CV: RRR, no m/r/g  Pulm: Lungs CTAB, no crackles, no wheezes  Abd: s/NT/ND +BS  Extremities: no c/c/e  Neuro: " tremor of hand noted at rest.    Labs:     Lab Results   Component Value Date    WBC 6.02 01/14/2020    HGB 13.5 (L) 01/14/2020    HCT 40.9 01/14/2020     (H) 01/14/2020     01/14/2020     Lab Results   Component Value Date    IRON 100 03/15/2018    TIBC 425 03/15/2018    FERRITIN 186 03/15/2018     Lab Results   Component Value Date    NURHZPBO52 942 01/09/2020         Sodium   Date Value Ref Range Status   01/14/2020 135 (L) 136 - 145 mmol/L Final     Potassium   Date Value Ref Range Status   01/14/2020 4.2 3.5 - 5.1 mmol/L Final     Chloride   Date Value Ref Range Status   01/14/2020 101 95 - 110 mmol/L Final     CO2   Date Value Ref Range Status   01/14/2020 29 23 - 29 mmol/L Final     Glucose   Date Value Ref Range Status   01/14/2020 102 70 - 110 mg/dL Final     BUN, Bld   Date Value Ref Range Status   01/14/2020 9 8 - 23 mg/dL Final     Creatinine   Date Value Ref Range Status   01/14/2020 0.8 0.5 - 1.4 mg/dL Final     Calcium   Date Value Ref Range Status   01/14/2020 9.4 8.7 - 10.5 mg/dL Final     Total Protein   Date Value Ref Range Status   01/14/2020 6.4 6.0 - 8.4 g/dL Final     Albumin   Date Value Ref Range Status   01/14/2020 3.1 (L) 3.5 - 5.2 g/dL Final     Total Bilirubin   Date Value Ref Range Status   01/14/2020 0.5 0.1 - 1.0 mg/dL Final     Comment:     For infants and newborns, interpretation of results should be based  on gestational age, weight and in agreement with clinical  observations.  Premature Infant recommended reference ranges:  Up to 24 hours.............<8.0 mg/dL  Up to 48 hours............<12.0 mg/dL  3-5 days..................<15.0 mg/dL  6-29 days.................<15.0 mg/dL       Alkaline Phosphatase   Date Value Ref Range Status   01/14/2020 90 55 - 135 U/L Final     AST   Date Value Ref Range Status   01/14/2020 21 10 - 40 U/L Final     ALT   Date Value Ref Range Status   01/14/2020 23 10 - 44 U/L Final     Anion Gap   Date Value Ref Range Status   01/14/2020 5  (L) 8 - 16 mmol/L Final     eGFR if    Date Value Ref Range Status   01/14/2020 >60.0 >60 mL/min/1.73 m^2 Final     eGFR if non    Date Value Ref Range Status   01/14/2020 >60.0 >60 mL/min/1.73 m^2 Final     Comment:     Calculation used to obtain the estimated glomerular filtration  rate (eGFR) is the CKD-EPI equation.        Lab Results   Component Value Date    HGBA1C 4.8 01/07/2020     Lab Results   Component Value Date    LDLCALC 61.4 (L) 08/08/2018     Lab Results   Component Value Date    TSH 0.743 01/07/2020       PSA, SCREEN (ng/mL)   Date Value   07/24/2019 0.69       Assessment/Plan     Mirza was seen today for hospital follow up.    Diagnoses and all orders for this visit:    Hospital discharge follow-up  As per HPI  Has had close f/u c psych and currently abstaining from alcohol.  Needs f/u c Neuro, will assist c scheduling.    Mixed hyperlipidemia  Lab Results   Component Value Date    LDLCALC 61.4 (L) 08/08/2018     Needs updated labs, has been well controlled on lipitor.  -     Lipid panel; Future    Seizure disorder  As per HPI  Hx withdrawal sz  On Vimpat as per Neuro  Will assist c scheduling f/u appt  -     Ambulatory referral to Neurology    Bipolar I disorder  Alcohol use disorder, severe, dependence  Severe depression  Anxiety  Managed by Dr. Loco/psych, had f/u this morning, mgmt as per psych, doing better since discharge.    Tobacco abuse  Patient counseled on the need to stop smoking.    Hyponatremia  Likely multifactorial from hx EtOH and meds.  Close to normal on last check, no acute issues    Osteoarthritis of spine with radiculopathy, lumbar region  Degenerative lumbar spinal stenosis  Disease of spinal cord, unspecified  Sees Dr. Kannan Orona.  Advised against narcotics given hx and mult meds.  Plan for f/u to discuss other tx options.    Severe malnutrition  Due to EtOH  Advised to take MVI, folate, thiamine, b12    Anemia of chronic  disease  Mild, stable, cont MVI as above.    HM as above  RTC in 6 mos for f/u, sooner if needed.  Labs today, cont close f/u c psych.    Jud Mo MD  Department of Internal Medicine - [facility] [address] [facility]  01/29/2020

## 2020-01-30 ENCOUNTER — TELEPHONE (OUTPATIENT)
Dept: NEUROLOGY | Facility: CLINIC | Age: 65
End: 2020-01-30

## 2020-02-09 ENCOUNTER — PATIENT MESSAGE (OUTPATIENT)
Dept: PSYCHIATRY | Facility: CLINIC | Age: 65
End: 2020-02-09

## 2020-02-09 ENCOUNTER — PATIENT MESSAGE (OUTPATIENT)
Dept: INTERNAL MEDICINE | Facility: CLINIC | Age: 65
End: 2020-02-09

## 2020-02-10 ENCOUNTER — PATIENT MESSAGE (OUTPATIENT)
Dept: INTERNAL MEDICINE | Facility: CLINIC | Age: 65
End: 2020-02-10

## 2020-02-10 RX ORDER — HYDROXYZINE PAMOATE 50 MG/1
CAPSULE ORAL
Qty: 30 CAPSULE | Refills: 3 | OUTPATIENT
Start: 2020-02-10

## 2020-02-11 ENCOUNTER — TELEPHONE (OUTPATIENT)
Dept: INTERNAL MEDICINE | Facility: CLINIC | Age: 65
End: 2020-02-11

## 2020-02-11 RX ORDER — LANOLIN ALCOHOL/MO/W.PET/CERES
100 CREAM (GRAM) TOPICAL DAILY
Qty: 30 TABLET | Refills: 3 | Status: SHIPPED | OUTPATIENT
Start: 2020-02-11 | End: 2020-07-29

## 2020-02-11 RX ORDER — LISINOPRIL 20 MG/1
20 TABLET ORAL DAILY
Qty: 90 TABLET | Refills: 3 | Status: SHIPPED | OUTPATIENT
Start: 2020-02-11 | End: 2020-11-23

## 2020-02-11 RX ORDER — MELOXICAM 7.5 MG/1
7.5 TABLET ORAL DAILY
Qty: 30 TABLET | Refills: 1 | Status: SHIPPED | OUTPATIENT
Start: 2020-02-11 | End: 2020-05-04

## 2020-02-11 RX ORDER — FOLIC ACID 1 MG/1
1 TABLET ORAL DAILY
Qty: 90 TABLET | Refills: 3 | Status: ON HOLD | OUTPATIENT
Start: 2020-02-11 | End: 2020-07-21

## 2020-02-11 NOTE — TELEPHONE ENCOUNTER
----- Message from Kimberlyn Smith sent at 2/11/2020  1:18 PM CST -----  Contact: WIFE/marion/[phone]  Pt wife called in regards to giving the MA information about the pt Rx.     Please advise

## 2020-02-13 ENCOUNTER — PATIENT MESSAGE (OUTPATIENT)
Dept: PSYCHIATRY | Facility: CLINIC | Age: 65
End: 2020-02-13

## 2020-02-14 RX ORDER — QUETIAPINE FUMARATE 50 MG/1
50 TABLET, FILM COATED ORAL NIGHTLY
Qty: 30 TABLET | Refills: 3 | Status: SHIPPED | OUTPATIENT
Start: 2020-02-14 | End: 2020-02-27 | Stop reason: SDUPTHER

## 2020-02-27 RX ORDER — QUETIAPINE FUMARATE 50 MG/1
TABLET, FILM COATED ORAL
Qty: 60 TABLET | Refills: 3 | Status: SHIPPED | OUTPATIENT
Start: 2020-02-27 | End: 2020-05-28 | Stop reason: SDUPTHER

## 2020-03-10 ENCOUNTER — PATIENT MESSAGE (OUTPATIENT)
Dept: PSYCHIATRY | Facility: CLINIC | Age: 65
End: 2020-03-10

## 2020-03-12 RX ORDER — QUETIAPINE FUMARATE 100 MG/1
100 TABLET, FILM COATED ORAL 2 TIMES DAILY
Qty: 60 TABLET | Refills: 3 | Status: SHIPPED | OUTPATIENT
Start: 2020-03-12 | End: 2020-04-22 | Stop reason: SDUPTHER

## 2020-03-18 RX ORDER — QUETIAPINE FUMARATE 100 MG/1
TABLET, FILM COATED ORAL
Qty: 30 TABLET | Refills: 3 | OUTPATIENT
Start: 2020-03-18

## 2020-03-18 RX ORDER — TRAZODONE HYDROCHLORIDE 100 MG/1
TABLET ORAL
Qty: 90 TABLET | Refills: 1 | OUTPATIENT
Start: 2020-03-18

## 2020-03-27 ENCOUNTER — PATIENT MESSAGE (OUTPATIENT)
Dept: PSYCHIATRY | Facility: CLINIC | Age: 65
End: 2020-03-27

## 2020-03-27 RX ORDER — LURASIDONE HYDROCHLORIDE 60 MG/1
TABLET, FILM COATED ORAL
Qty: 30 TABLET | Refills: 3 | Status: SHIPPED | OUTPATIENT
Start: 2020-03-27 | End: 2020-05-28 | Stop reason: SDUPTHER

## 2020-04-22 ENCOUNTER — TELEPHONE (OUTPATIENT)
Dept: PSYCHIATRY | Facility: CLINIC | Age: 65
End: 2020-04-22

## 2020-04-22 RX ORDER — TRAZODONE HYDROCHLORIDE 100 MG/1
200 TABLET ORAL NIGHTLY
Qty: 60 TABLET | Refills: 3 | Status: SHIPPED | OUTPATIENT
Start: 2020-04-22 | End: 2020-07-09 | Stop reason: SDUPTHER

## 2020-04-22 RX ORDER — QUETIAPINE FUMARATE 100 MG/1
100 TABLET, FILM COATED ORAL 2 TIMES DAILY
Qty: 90 TABLET | Refills: 3 | Status: SHIPPED | OUTPATIENT
Start: 2020-04-22 | End: 2020-05-28 | Stop reason: SDUPTHER

## 2020-04-22 NOTE — TELEPHONE ENCOUNTER
Telephone Call  ESTABLISHED OUTPATIENT VISIT     ENCOUNTER DATE: 4/22/2020  SITE: [facility] [address]    HISTORY    CHIEF COMPLAINT   Mirza Morales is a 65 y.o. male who presents for follow up of bipolar d/o.    HPI     Some depression.    No alcohol. No marijuana. Smoking about 10 cigarettes per day.    Wife Anamaria present for part of today's visit. Has done some work in the garden.    PAST MEDICAL, FAMILY AND SOCIAL HISTORY: The patient's past medical, family and social history have been reviewed and updated as appropriate within the electronic medical record - see encounter notes    PSYCHOTROPIC MEDICATIONS   Latuda 60 mg in the evening, Seroquel  mg qam, Seroquel 100 mg in the middle of the day, Seroquel 100 mg at bedtime, Trazodone 200 mg at bedtime    Scheduled and PRN Medications     LABORATORY DATA  Lab Visit on 01/29/2020   Component Date Value Ref Range Status    Cholesterol 01/29/2020 147  120 - 199 mg/dL Final    Comment: The National Cholesterol Education Program (NCEP) has set the  following guidelines (reference ranges) for Cholesterol:  Optimal.....................<200 mg/dL  Borderline High.............200-239 mg/dL  High........................> or = 240 mg/dL      Triglycerides 01/29/2020 114  30 - 150 mg/dL Final    Comment: The National Cholesterol Education Program (NCEP) has set the  following guidelines (reference values) for triglycerides:  Normal......................<150 mg/dL  Borderline High.............150-199 mg/dL  High........................200-499 mg/dL      HDL 01/29/2020 65  40 - 75 mg/dL Final    Comment: The National Cholesterol Education Program (NCEP) has set the  following guidelines (reference values) for HDL Cholesterol:  Low...............<40 mg/dL  Optimal...........>60 mg/dL      LDL Cholesterol 01/29/2020 59.2* 63.0 - 159.0 mg/dL Final    Comment: The National Cholesterol Education Program (NCEP) has set the  following guidelines  (reference values) for LDL Cholesterol:  Optimal.......................<130 mg/dL  Borderline High...............130-159 mg/dL  High..........................160-189 mg/dL  Very High.....................>190 mg/dL      Hdl/Cholesterol Ratio 01/29/2020 44.2  20.0 - 50.0 % Final    Total Cholesterol/HDL Ratio 01/29/2020 2.3  2.0 - 5.0 Final    Non-HDL Cholesterol 01/29/2020 82  mg/dL Final    Comment: Risk category and Non-HDL cholesterol goals:  Coronary heart disease (CHD)or equivalent (10-year risk of CHD >20%):  Non-HDL cholesterol goal     <130 mg/dL  Two or more CHD risk factors and 10-year risk of CHD <= 20%:  Non-HDL cholesterol goal     <160 mg/dL  0 to 1 CHD risk factor:  Non-HDL cholesterol goal     <190 mg/dL       EXAMINATION      CONSTITUTIONAL  General Appearance: well nourished    MUSCULOSKELETAL  Muscle Strength and Tone: normal strength and tone  Abnormal Involuntary Movements: no abnormal movement noted  Gait and Station: normal gait    PSYCHIATRIC   Level of Consciousness: alert  Orientation: grossly intact  Grooming: well groomed  Psychomotor Behavior: no restlessness/agitation  Speech: normal in rate, rhythm and volume  Language: normal vocabulary  Mood: steady  Affect: full range and appropriate  Thought Process: logical and goal directed  Associations: intact associations  Thought Content: no SI/HI  Memory: grossly intact  Attention: intact to content of interview  Fund of Knowledge: appears adequate  Insight: good  Judgement: good    MEDICAL DECISION MAKING    DIAGNOSES  Bipolar d/o  Alcohol Use[pt. Currently does not desire treatment]    PROBLEM LIST AND MANAGEMENT PLANS  - attempt to gradually decrease daily dose of Seroquel  - continue Latuda and Trazodone as above  - rtc 1-3 months    Time with patient: 20 min      Ambrosio Loco

## 2020-04-23 ENCOUNTER — PATIENT MESSAGE (OUTPATIENT)
Dept: PSYCHIATRY | Facility: CLINIC | Age: 65
End: 2020-04-23

## 2020-04-27 RX ORDER — LACOSAMIDE 200 MG/1
200 TABLET ORAL EVERY 12 HOURS
Qty: 60 TABLET | Refills: 5 | Status: CANCELLED | OUTPATIENT
Start: 2020-04-27 | End: 2021-04-27

## 2020-04-28 ENCOUNTER — PATIENT MESSAGE (OUTPATIENT)
Dept: NEUROLOGY | Facility: CLINIC | Age: 65
End: 2020-04-28

## 2020-04-28 DIAGNOSIS — R56.9 SEIZURES: Primary | ICD-10-CM

## 2020-04-28 RX ORDER — LACOSAMIDE 200 MG/1
200 TABLET ORAL EVERY 12 HOURS
Qty: 60 TABLET | Refills: 5 | Status: CANCELLED | OUTPATIENT
Start: 2020-04-28 | End: 2021-04-28

## 2020-04-28 RX ORDER — LACOSAMIDE 200 MG/1
200 TABLET ORAL EVERY 12 HOURS
Qty: 60 TABLET | Refills: 5 | Status: CANCELLED | OUTPATIENT
Start: 2020-04-27 | End: 2021-04-27

## 2020-04-28 NOTE — TELEPHONE ENCOUNTER
I returned pt's call and left message that we will refill med and schedule a f/u appt    ----- Message from Corrina Castillo sent at 4/28/2020  3:17 PM CDT -----  Contact: pt @ [phone]  Patient has been calling for a refill of medication: lacosamide (VIMPAT) 200 mg Tab tablet, was not sure which doctor would or should refill. Please call.    [id number] [place] [facility] [address]  [address]  [place] [address]  Phone: [phone] Fax: [phone]

## 2020-04-30 DIAGNOSIS — G40.909 SEIZURE DISORDER: Primary | ICD-10-CM

## 2020-04-30 RX ORDER — LACOSAMIDE 200 MG/1
100 TABLET ORAL EVERY 12 HOURS
Qty: 30 TABLET | Refills: 5 | Status: CANCELLED | OUTPATIENT
Start: 2020-04-30 | End: 2021-04-30

## 2020-04-30 RX ORDER — LACOSAMIDE 100 MG/1
100 TABLET ORAL EVERY 12 HOURS
Qty: 60 TABLET | Refills: 3 | Status: SHIPPED | OUTPATIENT
Start: 2020-04-30 | End: 2020-07-29

## 2020-04-30 NOTE — TELEPHONE ENCOUNTER
vimpat 200mg 1/2 tab BID was called in as temporary refill and escript request was sent to Dr. Ann

## 2020-05-04 RX ORDER — MELOXICAM 7.5 MG/1
TABLET ORAL
Qty: 30 TABLET | Refills: 1 | Status: SHIPPED | OUTPATIENT
Start: 2020-05-04 | End: 2020-07-06

## 2020-05-25 ENCOUNTER — OFFICE VISIT (OUTPATIENT)
Dept: NEUROLOGY | Facility: CLINIC | Age: 65
End: 2020-05-25
Payer: COMMERCIAL

## 2020-05-25 VITALS
HEIGHT: 77 IN | HEART RATE: 78 BPM | BODY MASS INDEX: 26.54 KG/M2 | SYSTOLIC BLOOD PRESSURE: 119 MMHG | DIASTOLIC BLOOD PRESSURE: 81 MMHG | WEIGHT: 224.75 LBS

## 2020-05-25 DIAGNOSIS — Z51.81 MEDICATION MONITORING ENCOUNTER: ICD-10-CM

## 2020-05-25 DIAGNOSIS — G40.909 SEIZURE DISORDER: ICD-10-CM

## 2020-05-25 DIAGNOSIS — G25.0 ESSENTIAL TREMOR: Primary | ICD-10-CM

## 2020-05-25 PROCEDURE — 99214 OFFICE O/P EST MOD 30 MIN: CPT | Mod: S$GLB,,, | Performed by: PSYCHIATRY & NEUROLOGY

## 2020-05-25 PROCEDURE — 99999 PR PBB SHADOW E&M-EST. PATIENT-LVL III: ICD-10-PCS | Mod: PBBFAC,,, | Performed by: PSYCHIATRY & NEUROLOGY

## 2020-05-25 PROCEDURE — 3008F BODY MASS INDEX DOCD: CPT | Mod: CPTII,S$GLB,, | Performed by: PSYCHIATRY & NEUROLOGY

## 2020-05-25 PROCEDURE — 3008F PR BODY MASS INDEX (BMI) DOCUMENTED: ICD-10-PCS | Mod: CPTII,S$GLB,, | Performed by: PSYCHIATRY & NEUROLOGY

## 2020-05-25 PROCEDURE — 99214 PR OFFICE/OUTPT VISIT, EST, LEVL IV, 30-39 MIN: ICD-10-PCS | Mod: S$GLB,,, | Performed by: PSYCHIATRY & NEUROLOGY

## 2020-05-25 PROCEDURE — 99999 PR PBB SHADOW E&M-EST. PATIENT-LVL III: CPT | Mod: PBBFAC,,, | Performed by: PSYCHIATRY & NEUROLOGY

## 2020-05-25 RX ORDER — PROPRANOLOL HYDROCHLORIDE 20 MG/1
20 TABLET ORAL 2 TIMES DAILY PRN
Qty: 180 TABLET | Refills: 1 | Status: SHIPPED | OUTPATIENT
Start: 2020-05-25 | End: 2020-09-24 | Stop reason: SDUPTHER

## 2020-05-25 NOTE — LETTER
May 26, 2020      Jud Mo MD  [address]  [place] [address]           [facility]  [address]  [facility] [address]  Phone: [phone]  Fax: [phone]          Patient: Mirza Morales   MR Number: [id number]   YOB: 1955   Date of Visit: 5/25/2020       Dear Dr. Us:    Thank you for referring Mirza Morales to me for evaluation. Attached you will find relevant portions of my assessment and plan of care.    If you have questions, please do not hesitate to call me. I look forward to following Mirza Morales along with you.    Sincerely,    Samuel Julien MD    Enclosure  CC:  No Recipients    If you would like to receive this communication electronically, please contact [email or web address] or [phone] to request more information on [employer] Link access.    For providers and/or their staff who would like to refer a patient to [facility], please contact us through our one-stop-shop provider referral line, [facility] , at [phone].    If you feel you have received this communication in error or would no longer like to receive these types of communications, please e-mail [email or web address]

## 2020-05-26 NOTE — PROGRESS NOTES
[facility] - NEUROLOGY  [facility] [place]    Date: May 26, 2020   Patient Name: Mirza Morales   MRN: [id number]   PCP: Jud Mo  Referring Provider: Jud Mo MD    Assessment:      This is Mirza Morales, 65 y.o. male with bipolar, epilepsy, EtOH abuse in remission with likely essential tremor - query latuda side effect although has been on this medication since 2013 and tremor fluctuates making this less likely.     Plan:      -  Trial propranolol 20mg bid prn  -  Continue LTG 100mg bid    Follow up with Dr. Ann in 3 months.       I discussed side effects of the medications. I asked the patient to stop the medication if he notices serious adverse effects as we discussed and to seek immediate medical attention at an ER.     Samuel Julien MD  [facility]   Department of Neurology    Subjective:      HPI:   Mr. Mirza Morales is a 65 y.o. male who presents with a chief complaint of seizure and tremor.  He typically follows with Dr. Ann for epilepsy and appointment was made with me today as he believed he needed additional appointment to get rx refills.    He continues to be seizure free on LCS 100mg bid and has noted improvement in tremor since full EtOH cessation following psych admit 1/2020 although persists to a great enough degree to interfere with ADL.  Tremor is bilateral and present more with action and intent than at rest and is not constant.  It will at times make it difficulty for him to drink from a cup in the morning and he wishes to begin activities such as fishing although he fear he will not be able to do it due to tremor.    PAST MEDICAL HISTORY:  Past Medical History:   Diagnosis Date    Alcohol abuse     Behavioral problem     Bipolar 1 disorder     Chronic right hip pain     Depression     DJD (degenerative joint disease), lumbar 1/27/2015    Fatigue     Hepatitis     pt. denies this    History of psychiatric care      History of psychiatric hospitalization     Hypertension     Karina     Post traumatic stress disorder     Psychiatric problem     Seizures     Suicide attempt     Therapy        PAST SURGICAL HISTORY:  Past Surgical History:   Procedure Laterality Date    COLONOSCOPY N/A 8/14/2019    Procedure: COLONOSCOPY;  Surgeon: Tammy Duval MD;  Location: [facility] ([address] [place] [address]);  Service: Endoscopy;  Laterality: N/A;    HERNIA REPAIR      SPINE SURGERY  11-12-15    LAMINECTOMY/LUMBAR/WARE       CURRENT MEDS:  Current Outpatient Medications   Medication Sig Dispense Refill    atorvastatin (LIPITOR) 40 MG tablet TAKE 1 TABLET BY MOUTH EVERY DAY 90 tablet 3    folic acid (FOLVITE) 1 MG tablet Take 1 tablet (1 mg total) by mouth once daily. 90 tablet 3    lacosamide (VIMPAT) 100 mg Tab Take 1 tablet (100 mg total) by mouth every 12 (twelve) hours. 60 tablet 3    lisinopril (PRINIVIL,ZESTRIL) 20 MG tablet Take 1 tablet (20 mg total) by mouth once daily. 90 tablet 3    lurasidone (LATUDA) 40 mg Tab tablet Take 1 tablet (40 mg total) by mouth Daily. 30 tablet 11    meloxicam (MOBIC) 7.5 MG tablet TAKE 1 TABLET BY MOUTH EVERY DAY 30 tablet 1    QUEtiapine (SEROQUEL) 25 MG Tab Take 1 tablet (25 mg total) by mouth every morning. 30 tablet 3    QUEtiapine (SEROQUEL) 50 MG tablet Take one tablet twice daily in addition 100 mg at bedtime 60 tablet 3    traZODone (DESYREL) 100 MG tablet Take 2 tablets (200 mg total) by mouth every evening. 60 tablet 3    lacosamide (VIMPAT) 200 mg Tab tablet Take 1 tablet (200 mg total) by mouth every 12 (twelve) hours. 60 tablet 5    lurasidone (LATUDA) 60 mg Tab tablet Take one tablet every evening with dinner. 30 tablet 3    pantoprazole (PROTONIX) 40 MG tablet Take 1 tablet (40 mg total) by mouth once daily. (Patient not taking: Reported on 5/25/2020) 30 tablet 11    polycarbophil (FIBERCON) 625 mg tablet Take 625 mg by mouth once daily.      propranoloL (INDERAL) 20 MG  tablet Take 1 tablet (20 mg total) by mouth 2 (two) times daily as needed. For tremor 180 tablet 1    QUEtiapine (SEROQUEL) 100 MG Tab Take 1 tablet (100 mg total) by mouth 2 (two) times daily. 90 tablet 3    senna-docusate 8.6-50 mg (PERICOLACE) 8.6-50 mg per tablet Take 1 tablet by mouth once daily. (Patient not taking: Reported on 5/25/2020) 30 tablet 0    thiamine 100 MG tablet Take 1 tablet (100 mg total) by mouth once daily. (Patient not taking: Reported on 5/25/2020) 30 tablet 3     No current facility-administered medications for this visit.        ALLERGIES:  Review of patient's allergies indicates:   Allergen Reactions    Gabapentin Other (See Comments)     SEVERE DIZZINESS AND BALANCE PROBLEMS, UNABLE TO WALK.    Nardil [phenelzine]      BECOMES HYPER, LIKE A MANIC EPISODE.    Penicillins Hives    Lamictal [lamotrigine] Rash       FAMILY HISTORY:  Family History   Problem Relation Age of Onset    Alcohol abuse Mother     Depression Mother     Depression Father     Depression Sister     Suicide Sister     Drug abuse Brother     Anxiety disorder Brother     Bipolar disorder Brother     Depression Brother     Alcohol abuse Maternal Uncle     Alcohol abuse Paternal Uncle     Dementia Maternal Grandmother     Alcohol abuse Cousin     Amblyopia Neg Hx     Blindness Neg Hx     Cancer Neg Hx     Cataracts Neg Hx     Diabetes Neg Hx     Glaucoma Neg Hx     Hypertension Neg Hx     Macular degeneration Neg Hx     Retinal detachment Neg Hx     Strabismus Neg Hx     Stroke Neg Hx     Thyroid disease Neg Hx     Asthma Neg Hx     Emphysema Neg Hx        SOCIAL HISTORY:  Social History     Tobacco Use    Smoking status: Current Some Day Smoker     Packs/day: 0.25     Years: 10.00     Pack years: 2.50    Smokeless tobacco: Never Used   Substance Use Topics    Alcohol use: No     Alcohol/week: 16.7 standard drinks     Types: 20 Standard drinks or equivalent per week     Comment: quit 4  "years ago    Drug use: No       Review of Systems:  12 review of systems is negative except for the symptoms mentioned in HPI.        Objective:     Vitals:    05/25/20 1453   BP: 119/81   Pulse: 78   Weight: 101.9 kg (224 lb 12.1 oz)   Height: 6' 5" (1.956 m)       General: NAD, well nourished   Eyes: no tearing, discharge, no erythema   ENT: moist mucous membranes of the oral cavity, nares patent    Neck: Supple, full range of motion  Cardiovascular: Warm and well perfused, pulses equal and symmetrical  Lungs: Normal work of breathing, normal chest wall excursions  Skin: No rash, lesions, or breakdown on exposed skin  Psychiatry: Mood and affect are appropriate   Abdomen: soft, non tender, non distended  Extremeties: No cyanosis, clubbing or edema.    Neurological   MENTAL STATUS: Alert and oriented to person, place, and time. Attention and concentration within normal limits. Speech without dysarthria, able to name and repeat without difficulty. Recent and remote memory within normal limits   CRANIAL NERVES: Visual fields intact. PERRL. EOMI. Facial sensation intact. Face symmetrical. Hearing grossly intact. Full shoulder shrug bilaterally. Tongue protrudes midline   SENSORY: Sensation is intact to light touch throughout. Negative Romberg.   MOTOR: Normal bulk and tone. No pronator drift.    No bradykinesias or tremor noted  CEREBELLAR/COORDINATION/GAIT: Gait steady with normal arm swing and stride length.  Finger to nose intact.   "

## 2020-05-28 ENCOUNTER — OFFICE VISIT (OUTPATIENT)
Dept: PSYCHIATRY | Facility: CLINIC | Age: 65
End: 2020-05-28
Payer: COMMERCIAL

## 2020-05-28 DIAGNOSIS — F31.9 BIPOLAR AFFECTIVE DISORDER, REMISSION STATUS UNSPECIFIED: Primary | ICD-10-CM

## 2020-05-28 PROCEDURE — 99212 PR OFFICE/OUTPT VISIT, EST, LEVL II, 10-19 MIN: ICD-10-PCS | Mod: 95,,, | Performed by: PSYCHIATRY & NEUROLOGY

## 2020-05-28 PROCEDURE — 99212 OFFICE O/P EST SF 10 MIN: CPT | Mod: 95,,, | Performed by: PSYCHIATRY & NEUROLOGY

## 2020-05-28 RX ORDER — QUETIAPINE FUMARATE 100 MG/1
100 TABLET, FILM COATED ORAL NIGHTLY
Qty: 30 TABLET | Refills: 3 | Status: SHIPPED | OUTPATIENT
Start: 2020-05-28 | End: 2020-08-11 | Stop reason: SDUPTHER

## 2020-05-28 RX ORDER — QUETIAPINE FUMARATE 50 MG/1
TABLET, FILM COATED ORAL
Qty: 30 TABLET | Refills: 3 | Status: SHIPPED | OUTPATIENT
Start: 2020-05-28 | End: 2021-01-06

## 2020-05-28 RX ORDER — MODAFINIL 100 MG/1
100 TABLET ORAL EVERY MORNING
Qty: 30 TABLET | Refills: 3 | Status: SHIPPED | OUTPATIENT
Start: 2020-05-28 | End: 2020-06-27

## 2020-05-28 RX ORDER — LURASIDONE HYDROCHLORIDE 60 MG/1
TABLET, FILM COATED ORAL
Qty: 30 TABLET | Refills: 3 | Status: SHIPPED | OUTPATIENT
Start: 2020-05-28 | End: 2020-08-11 | Stop reason: SDUPTHER

## 2020-05-28 NOTE — PROGRESS NOTES
Telephone Call  ESTABLISHED OUTPATIENT VISIT     ENCOUNTER DATE: 5/28/2020  SITE: [facility], [address]    HISTORY    CHIEF COMPLAINT   Mirza Morales is a 65 y.o. male who presents for follow up of bipolar d/o.    HPI     Some depression. Low energy. Pt. Suffering much from low energy.    No alcohol. No marijuana. Smoking about 10 cigarettes per day.    Wife Anamaria present during part of today's visit.    PAST MEDICAL, FAMILY AND SOCIAL HISTORY: The patient's past medical, family and social history have been reviewed and updated as appropriate within the electronic medical record - see encounter notes    PSYCHOTROPIC MEDICATIONS   Latuda 60 mg in the evening, Seroquel 50 mg qam, Seroquel 100 mg at bedtime, Trazodone 200 mg at bedtime, was recently started on Propranolol 20 mg prn for tremor    Scheduled and PRN Medications     LABORATORY DATA  No visits with results within 3 Month(s) from this visit.   Latest known visit with results is:   Lab Visit on 01/29/2020   Component Date Value Ref Range Status    Cholesterol 01/29/2020 147  120 - 199 mg/dL Final    Comment: The National Cholesterol Education Program (NCEP) has set the  following guidelines (reference ranges) for Cholesterol:  Optimal.....................<200 mg/dL  Borderline High.............200-239 mg/dL  High........................> or = 240 mg/dL      Triglycerides 01/29/2020 114  30 - 150 mg/dL Final    Comment: The National Cholesterol Education Program (NCEP) has set the  following guidelines (reference values) for triglycerides:  Normal......................<150 mg/dL  Borderline High.............150-199 mg/dL  High........................200-499 mg/dL      HDL 01/29/2020 65  40 - 75 mg/dL Final    Comment: The National Cholesterol Education Program (NCEP) has set the  following guidelines (reference values) for HDL Cholesterol:  Low...............<40 mg/dL  Optimal...........>60 mg/dL      LDL Cholesterol 01/29/2020 59.2*  63.0 - 159.0 mg/dL Final    Comment: The National Cholesterol Education Program (NCEP) has set the  following guidelines (reference values) for LDL Cholesterol:  Optimal.......................<130 mg/dL  Borderline High...............130-159 mg/dL  High..........................160-189 mg/dL  Very High.....................>190 mg/dL      Hdl/Cholesterol Ratio 01/29/2020 44.2  20.0 - 50.0 % Final    Total Cholesterol/HDL Ratio 01/29/2020 2.3  2.0 - 5.0 Final    Non-HDL Cholesterol 01/29/2020 82  mg/dL Final    Comment: Risk category and Non-HDL cholesterol goals:  Coronary heart disease (CHD)or equivalent (10-year risk of CHD >20%):  Non-HDL cholesterol goal     <130 mg/dL  Two or more CHD risk factors and 10-year risk of CHD <= 20%:  Non-HDL cholesterol goal     <160 mg/dL  0 to 1 CHD risk factor:  Non-HDL cholesterol goal     <190 mg/dL       EXAMINATION      CONSTITUTIONAL  General Appearance: well nourished    MUSCULOSKELETAL  Muscle Strength and Tone: normal strength and tone  Abnormal Involuntary Movements: no abnormal movement noted  Gait and Station: normal gait    PSYCHIATRIC   Level of Consciousness: alert  Orientation: grossly intact  Grooming: well groomed  Psychomotor Behavior: no restlessness/agitation  Speech: normal in rate, rhythm and volume  Language: normal vocabulary  Mood: steady  Affect: full range and appropriate  Thought Process: logical and goal directed  Associations: intact associations  Thought Content: no SI/HI  Memory: grossly intact  Attention: intact to content of interview  Fund of Knowledge: appears adequate  Insight: good  Judgement: good    MEDICAL DECISION MAKING    DIAGNOSES  Bipolar d/o  Alcohol Use[pt. Currently does not desire treatment]    PROBLEM LIST AND MANAGEMENT PLANS  - continue Latuda and Seroquel as above  - consider decreasing dose of Trazodone or discontinuing Trazodone  - start Provigil 100 mg qam[risks and benefits d/w pt.]  - rtc 1-3 months    Time with  patient: 20 min      Ambrosio Loco

## 2020-07-09 ENCOUNTER — OFFICE VISIT (OUTPATIENT)
Dept: PSYCHIATRY | Facility: CLINIC | Age: 65
End: 2020-07-09
Payer: COMMERCIAL

## 2020-07-09 DIAGNOSIS — F32.A DEPRESSIVE DISORDER: Primary | ICD-10-CM

## 2020-07-09 PROCEDURE — 99214 OFFICE O/P EST MOD 30 MIN: CPT | Mod: 95,,, | Performed by: PSYCHIATRY & NEUROLOGY

## 2020-07-09 PROCEDURE — 99214 PR OFFICE/OUTPT VISIT, EST, LEVL IV, 30-39 MIN: ICD-10-PCS | Mod: 95,,, | Performed by: PSYCHIATRY & NEUROLOGY

## 2020-07-09 RX ORDER — TRAZODONE HYDROCHLORIDE 100 MG/1
200 TABLET ORAL NIGHTLY
Qty: 60 TABLET | Refills: 3 | Status: ON HOLD | OUTPATIENT
Start: 2020-07-09 | End: 2020-07-21 | Stop reason: SDUPTHER

## 2020-07-09 RX ORDER — MODAFINIL 200 MG/1
200 TABLET ORAL EVERY MORNING
Qty: 30 TABLET | Refills: 3 | Status: SHIPPED | OUTPATIENT
Start: 2020-07-09 | End: 2020-07-20 | Stop reason: SINTOL

## 2020-07-09 NOTE — PROGRESS NOTES
The patient location is: [facility]  The chief complaint leading to consultation is: bipolar d/o    Visit type: audiovisual    Face to Face time with patient: 20 min  20 minutes of total time spent on the encounter, which includes face to face time and non-face to face time preparing to see the patient (eg, review of tests), Obtaining and/or reviewing separately obtained history, Documenting clinical information in the electronic or other health record, Independently interpreting results (not separately reported) and communicating results to the patient/family/caregiver, or Care coordination (not separately reported).         Each patient to whom he or she provides medical services by telemedicine is:  (1) informed of the relationship between the physician and patient and the respective role of any other health care provider with respect to management of the patient; and (2) notified that he or she may decline to receive medical services by telemedicine and may withdraw from such care at any time.    Notes:   ESTABLISHED OUTPATIENT VISIT     ENCOUNTER DATE: 7/9/2020  SITE: [facility], [address]    HISTORY    CHIEF COMPLAINT   Mirza Morales is a 65 y.o. male who presents for follow up of bipolar d/o.    HPI     Some depression. Low energy. Modafinil somewhat helpful for energy.    No recent seizure.    No alcohol. No marijuana. Smoking about 10 cigarettes per day.    Wife Anamaria present during part of today's visit.    PAST MEDICAL, FAMILY AND SOCIAL HISTORY: The patient's past medical, family and social history have been reviewed and updated as appropriate within the electronic medical record - see encounter notes    PSYCHOTROPIC MEDICATIONS   Latuda 60 mg in the evening, Seroquel 50 mg qam, Seroquel 100 mg at bedtime, Trazodone 200 mg at bedtime, Propranolol 20 mg bid for tremor, Provigil 100 mg qam    Scheduled and PRN Medications     LABORATORY DATA  No visits with results within 3 Month(s)  from this visit.   Latest known visit with results is:   Lab Visit on 01/29/2020   Component Date Value Ref Range Status    Cholesterol 01/29/2020 147  120 - 199 mg/dL Final    Comment: The National Cholesterol Education Program (NCEP) has set the  following guidelines (reference ranges) for Cholesterol:  Optimal.....................<200 mg/dL  Borderline High.............200-239 mg/dL  High........................> or = 240 mg/dL      Triglycerides 01/29/2020 114  30 - 150 mg/dL Final    Comment: The National Cholesterol Education Program (NCEP) has set the  following guidelines (reference values) for triglycerides:  Normal......................<150 mg/dL  Borderline High.............150-199 mg/dL  High........................200-499 mg/dL      HDL 01/29/2020 65  40 - 75 mg/dL Final    Comment: The National Cholesterol Education Program (NCEP) has set the  following guidelines (reference values) for HDL Cholesterol:  Low...............<40 mg/dL  Optimal...........>60 mg/dL      LDL Cholesterol 01/29/2020 59.2* 63.0 - 159.0 mg/dL Final    Comment: The National Cholesterol Education Program (NCEP) has set the  following guidelines (reference values) for LDL Cholesterol:  Optimal.......................<130 mg/dL  Borderline High...............130-159 mg/dL  High..........................160-189 mg/dL  Very High.....................>190 mg/dL      Hdl/Cholesterol Ratio 01/29/2020 44.2  20.0 - 50.0 % Final    Total Cholesterol/HDL Ratio 01/29/2020 2.3  2.0 - 5.0 Final    Non-HDL Cholesterol 01/29/2020 82  mg/dL Final    Comment: Risk category and Non-HDL cholesterol goals:  Coronary heart disease (CHD)or equivalent (10-year risk of CHD >20%):  Non-HDL cholesterol goal     <130 mg/dL  Two or more CHD risk factors and 10-year risk of CHD <= 20%:  Non-HDL cholesterol goal     <160 mg/dL  0 to 1 CHD risk factor:  Non-HDL cholesterol goal     <190 mg/dL       EXAMINATION      CONSTITUTIONAL  General Appearance: well  nourished    MUSCULOSKELETAL  Muscle Strength and Tone: normal strength and tone  Abnormal Involuntary Movements: no abnormal movement noted  Gait and Station: normal gait    PSYCHIATRIC   Level of Consciousness: alert  Orientation: grossly intact  Grooming: well groomed  Psychomotor Behavior: no restlessness/agitation  Speech: normal in rate, rhythm and volume  Language: normal vocabulary  Mood: steady  Affect: full range and appropriate  Thought Process: logical and goal directed  Associations: intact associations  Thought Content: no SI/HI  Memory: grossly intact  Attention: intact to content of interview  Fund of Knowledge: appears adequate  Insight: good  Judgement: good    MEDICAL DECISION MAKING    DIAGNOSES  Bipolar d/o  Alcohol Use[pt. Currently does not desire treatment]    PROBLEM LIST AND MANAGEMENT PLANS  - continue Latuda, Seroquel, and Trazodone as above  - increase Provigil to 200 mg qam[risks and benefits d/w pt.]  - rtc 1-3 months    Time with patient: 20 min      Ambrosio Loco

## 2020-07-20 ENCOUNTER — HOSPITAL ENCOUNTER (INPATIENT)
Facility: HOSPITAL | Age: 65
LOS: 1 days | Discharge: HOME | DRG: 645 | End: 2020-07-21
Attending: EMERGENCY MEDICINE | Admitting: HOSPITALIST
Payer: MEDICARE

## 2020-07-20 DIAGNOSIS — E87.1 HYPONATREMIA: Primary | ICD-10-CM

## 2020-07-20 DIAGNOSIS — F32.A DEPRESSIVE DISORDER: ICD-10-CM

## 2020-07-20 DIAGNOSIS — R56.9 SEIZURE: ICD-10-CM

## 2020-07-20 DIAGNOSIS — R94.31 QT PROLONGATION: ICD-10-CM

## 2020-07-20 DIAGNOSIS — G40.909 SEIZURE DISORDER: ICD-10-CM

## 2020-07-20 DIAGNOSIS — F41.9 ANXIETY: ICD-10-CM

## 2020-07-20 LAB
ALBUMIN SERPL BCP-MCNC: 3.9 G/DL (ref 3.5–5.2)
ALP SERPL-CCNC: 68 U/L (ref 55–135)
ALT SERPL W/O P-5'-P-CCNC: 26 U/L (ref 10–44)
AMPHET+METHAMPHET UR QL: NEGATIVE
ANION GAP SERPL CALC-SCNC: 7 MMOL/L (ref 8–16)
ANION GAP SERPL CALC-SCNC: 7 MMOL/L (ref 8–16)
APAP SERPL-MCNC: <3 UG/ML (ref 10–20)
AST SERPL-CCNC: 26 U/L (ref 10–40)
BACTERIA #/AREA URNS AUTO: NORMAL /HPF
BARBITURATES UR QL SCN>200 NG/ML: NEGATIVE
BASOPHILS # BLD AUTO: 0.04 K/UL (ref 0–0.2)
BASOPHILS NFR BLD: 0.6 % (ref 0–1.9)
BENZODIAZ UR QL SCN>200 NG/ML: NEGATIVE
BILIRUB SERPL-MCNC: 0.5 MG/DL (ref 0.1–1)
BILIRUB UR QL STRIP: NEGATIVE
BUN SERPL-MCNC: 10 MG/DL (ref 8–23)
BUN SERPL-MCNC: 10 MG/DL (ref 8–23)
BZE UR QL SCN: NEGATIVE
CALCIUM SERPL-MCNC: 9 MG/DL (ref 8.7–10.5)
CALCIUM SERPL-MCNC: 9 MG/DL (ref 8.7–10.5)
CANNABINOIDS UR QL SCN: NEGATIVE
CHLORIDE SERPL-SCNC: 95 MMOL/L (ref 95–110)
CHLORIDE SERPL-SCNC: 98 MMOL/L (ref 95–110)
CLARITY UR REFRACT.AUTO: CLEAR
CO2 SERPL-SCNC: 24 MMOL/L (ref 23–29)
CO2 SERPL-SCNC: 27 MMOL/L (ref 23–29)
COLOR UR AUTO: YELLOW
CREAT SERPL-MCNC: 1 MG/DL (ref 0.5–1.4)
CREAT SERPL-MCNC: 1 MG/DL (ref 0.5–1.4)
CREAT UR-MCNC: 60 MG/DL (ref 23–375)
DIFFERENTIAL METHOD: ABNORMAL
EOSINOPHIL # BLD AUTO: 0.2 K/UL (ref 0–0.5)
EOSINOPHIL NFR BLD: 2.7 % (ref 0–8)
ERYTHROCYTE [DISTWIDTH] IN BLOOD BY AUTOMATED COUNT: 11.6 % (ref 11.5–14.5)
EST. GFR  (AFRICAN AMERICAN): >60 ML/MIN/1.73 M^2
EST. GFR  (AFRICAN AMERICAN): >60 ML/MIN/1.73 M^2
EST. GFR  (NON AFRICAN AMERICAN): >60 ML/MIN/1.73 M^2
EST. GFR  (NON AFRICAN AMERICAN): >60 ML/MIN/1.73 M^2
ETHANOL SERPL-MCNC: <10 MG/DL
GLUCOSE SERPL-MCNC: 109 MG/DL (ref 70–110)
GLUCOSE SERPL-MCNC: 126 MG/DL (ref 70–110)
GLUCOSE UR QL STRIP: NEGATIVE
HCT VFR BLD AUTO: 41.4 % (ref 40–54)
HGB BLD-MCNC: 14.1 G/DL (ref 14–18)
HGB UR QL STRIP: NEGATIVE
HYALINE CASTS UR QL AUTO: 0 /LPF
IMM GRANULOCYTES # BLD AUTO: 0.06 K/UL (ref 0–0.04)
IMM GRANULOCYTES NFR BLD AUTO: 0.9 % (ref 0–0.5)
KETONES UR QL STRIP: NEGATIVE
LEUKOCYTE ESTERASE UR QL STRIP: NEGATIVE
LYMPHOCYTES # BLD AUTO: 1.1 K/UL (ref 1–4.8)
LYMPHOCYTES NFR BLD: 15.9 % (ref 18–48)
MCH RBC QN AUTO: 33.7 PG (ref 27–31)
MCHC RBC AUTO-ENTMCNC: 34.1 G/DL (ref 32–36)
MCV RBC AUTO: 99 FL (ref 82–98)
METHADONE UR QL SCN>300 NG/ML: NEGATIVE
MICROSCOPIC COMMENT: NORMAL
MONOCYTES # BLD AUTO: 0.6 K/UL (ref 0.3–1)
MONOCYTES NFR BLD: 8.6 % (ref 4–15)
NEUTROPHILS # BLD AUTO: 4.8 K/UL (ref 1.8–7.7)
NEUTROPHILS NFR BLD: 71.3 % (ref 38–73)
NITRITE UR QL STRIP: NEGATIVE
NRBC BLD-RTO: 0 /100 WBC
OPIATES UR QL SCN: NEGATIVE
OSMOLALITY SERPL: 271 MOSM/KG (ref 280–300)
OSMOLALITY UR: 308 MOSM/KG (ref 50–1200)
PCP UR QL SCN>25 NG/ML: NEGATIVE
PH UR STRIP: 7 [PH] (ref 5–8)
PLATELET # BLD AUTO: 188 K/UL (ref 150–350)
PMV BLD AUTO: 9.4 FL (ref 9.2–12.9)
POTASSIUM SERPL-SCNC: 4.2 MMOL/L (ref 3.5–5.1)
POTASSIUM SERPL-SCNC: 4.2 MMOL/L (ref 3.5–5.1)
PROT SERPL-MCNC: 7.1 G/DL (ref 6–8.4)
PROT UR QL STRIP: ABNORMAL
RBC # BLD AUTO: 4.18 M/UL (ref 4.6–6.2)
RBC #/AREA URNS AUTO: 1 /HPF (ref 0–4)
SARS-COV-2 RDRP RESP QL NAA+PROBE: NEGATIVE
SODIUM SERPL-SCNC: 126 MMOL/L (ref 136–145)
SODIUM SERPL-SCNC: 132 MMOL/L (ref 136–145)
SP GR UR STRIP: 1.01 (ref 1–1.03)
SQUAMOUS #/AREA URNS AUTO: 0 /HPF
TOXICOLOGY INFORMATION: NORMAL
TSH SERPL DL<=0.005 MIU/L-ACNC: 1.73 UIU/ML (ref 0.4–4)
URN SPEC COLLECT METH UR: ABNORMAL
WBC # BLD AUTO: 6.73 K/UL (ref 3.9–12.7)
WBC #/AREA URNS AUTO: 1 /HPF (ref 0–5)

## 2020-07-20 PROCEDURE — 80329 ANALGESICS NON-OPIOID 1 OR 2: CPT

## 2020-07-20 PROCEDURE — 36415 COLL VENOUS BLD VENIPUNCTURE: CPT

## 2020-07-20 PROCEDURE — 83930 ASSAY OF BLOOD OSMOLALITY: CPT

## 2020-07-20 PROCEDURE — 80320 DRUG SCREEN QUANTALCOHOLS: CPT

## 2020-07-20 PROCEDURE — 80307 DRUG TEST PRSMV CHEM ANLYZR: CPT

## 2020-07-20 PROCEDURE — 81001 URINALYSIS AUTO W/SCOPE: CPT

## 2020-07-20 PROCEDURE — 11000001 HC ACUTE MED/SURG PRIVATE ROOM

## 2020-07-20 PROCEDURE — U0002 COVID-19 LAB TEST NON-CDC: HCPCS

## 2020-07-20 PROCEDURE — 99285 EMERGENCY DEPT VISIT HI MDM: CPT | Mod: ,,, | Performed by: PHYSICIAN ASSISTANT

## 2020-07-20 PROCEDURE — 80048 BASIC METABOLIC PNL TOTAL CA: CPT

## 2020-07-20 PROCEDURE — 99285 EMERGENCY DEPT VISIT HI MDM: CPT | Mod: 25,HCNC

## 2020-07-20 PROCEDURE — 93005 ELECTROCARDIOGRAM TRACING: CPT

## 2020-07-20 PROCEDURE — 93010 ELECTROCARDIOGRAM REPORT: CPT | Mod: ,,, | Performed by: INTERNAL MEDICINE

## 2020-07-20 PROCEDURE — 99223 1ST HOSP IP/OBS HIGH 75: CPT | Mod: ,,, | Performed by: HOSPITALIST

## 2020-07-20 PROCEDURE — 83935 ASSAY OF URINE OSMOLALITY: CPT

## 2020-07-20 PROCEDURE — 63600175 PHARM REV CODE 636 W HCPCS: Performed by: STUDENT IN AN ORGANIZED HEALTH CARE EDUCATION/TRAINING PROGRAM

## 2020-07-20 PROCEDURE — 25000003 PHARM REV CODE 250: Mod: HCNC | Performed by: STUDENT IN AN ORGANIZED HEALTH CARE EDUCATION/TRAINING PROGRAM

## 2020-07-20 PROCEDURE — 80053 COMPREHEN METABOLIC PANEL: CPT

## 2020-07-20 PROCEDURE — 84443 ASSAY THYROID STIM HORMONE: CPT

## 2020-07-20 PROCEDURE — 99223 PR INITIAL HOSPITAL CARE,LEVL III: ICD-10-PCS | Mod: ,,, | Performed by: HOSPITALIST

## 2020-07-20 PROCEDURE — 93010 EKG 12-LEAD: ICD-10-PCS | Mod: ,,, | Performed by: INTERNAL MEDICINE

## 2020-07-20 PROCEDURE — 99285 PR EMERGENCY DEPT VISIT,LEVEL V: ICD-10-PCS | Mod: ,,, | Performed by: PHYSICIAN ASSISTANT

## 2020-07-20 PROCEDURE — 85025 COMPLETE CBC W/AUTO DIFF WBC: CPT

## 2020-07-20 RX ORDER — FOLIC ACID 1 MG/1
1 TABLET ORAL DAILY
Status: DISCONTINUED | OUTPATIENT
Start: 2020-07-21 | End: 2020-07-21 | Stop reason: HOSPADM

## 2020-07-20 RX ORDER — ENOXAPARIN SODIUM 100 MG/ML
40 INJECTION SUBCUTANEOUS EVERY 24 HOURS
Status: DISCONTINUED | OUTPATIENT
Start: 2020-07-20 | End: 2020-07-21 | Stop reason: HOSPADM

## 2020-07-20 RX ORDER — THIAMINE HCL 100 MG
100 TABLET ORAL DAILY
Status: DISCONTINUED | OUTPATIENT
Start: 2020-07-21 | End: 2020-07-21 | Stop reason: HOSPADM

## 2020-07-20 RX ORDER — GLUCAGON 1 MG
1 KIT INJECTION
Status: DISCONTINUED | OUTPATIENT
Start: 2020-07-20 | End: 2020-07-21 | Stop reason: HOSPADM

## 2020-07-20 RX ORDER — LURASIDONE HYDROCHLORIDE 40 MG/1
40 TABLET, FILM COATED ORAL DAILY
Status: DISCONTINUED | OUTPATIENT
Start: 2020-07-21 | End: 2020-07-21 | Stop reason: HOSPADM

## 2020-07-20 RX ORDER — ATORVASTATIN CALCIUM 20 MG/1
40 TABLET, FILM COATED ORAL DAILY
Status: DISCONTINUED | OUTPATIENT
Start: 2020-07-21 | End: 2020-07-21 | Stop reason: HOSPADM

## 2020-07-20 RX ORDER — LACOSAMIDE 100 MG/1
100 TABLET ORAL EVERY 12 HOURS
Status: DISCONTINUED | OUTPATIENT
Start: 2020-07-20 | End: 2020-07-21 | Stop reason: HOSPADM

## 2020-07-20 RX ORDER — SODIUM CHLORIDE 0.9 % (FLUSH) 0.9 %
10 SYRINGE (ML) INJECTION
Status: CANCELLED | OUTPATIENT
Start: 2020-07-20

## 2020-07-20 RX ORDER — SODIUM CHLORIDE 0.9 % (FLUSH) 0.9 %
10 SYRINGE (ML) INJECTION
Status: DISCONTINUED | OUTPATIENT
Start: 2020-07-20 | End: 2020-07-21 | Stop reason: HOSPADM

## 2020-07-20 RX ORDER — IBUPROFEN 200 MG
24 TABLET ORAL
Status: DISCONTINUED | OUTPATIENT
Start: 2020-07-20 | End: 2020-07-21 | Stop reason: HOSPADM

## 2020-07-20 RX ORDER — IBUPROFEN 200 MG
16 TABLET ORAL
Status: DISCONTINUED | OUTPATIENT
Start: 2020-07-20 | End: 2020-07-21 | Stop reason: HOSPADM

## 2020-07-20 RX ORDER — PANTOPRAZOLE SODIUM 40 MG/1
40 TABLET, DELAYED RELEASE ORAL DAILY
Status: DISCONTINUED | OUTPATIENT
Start: 2020-07-21 | End: 2020-07-21 | Stop reason: HOSPADM

## 2020-07-20 RX ADMIN — LACOSAMIDE 100 MG: 100 TABLET, FILM COATED ORAL at 08:07

## 2020-07-20 RX ADMIN — ENOXAPARIN SODIUM 40 MG: 100 INJECTION SUBCUTANEOUS at 08:07

## 2020-07-20 NOTE — ED TRIAGE NOTES
"Pt stated he had a panic attack earlier but his wife claims he had a seizure. Seizure was unwitnessed and pt denies having one but did say he has a history of seizures. Pt also has not been sleeping due to his thoughts "exhausting him".      LOC: The patient is awake, alert, and oriented to place, time, situation. Affect is appropriate.  Speech is appropriate and clear.     APPEARANCE: Patient resting comfortably in no acute distress.  Patient is clean and well groomed.    SKIN: The skin is warm and dry; color consistent with ethnicity.  Patient has normal skin turgor and moist mucus membranes.  Skin intact; no breakdown or bruising noted.     MUSCULOSKELETAL: Patient moving upper and lower extremities without difficulty.  Denies weakness.     RESPIRATORY: Airway is open and patent. Respirations spontaneous, even, easy, and non-labored.  Patient has a normal effort and rate.  No accessory muscle use noted. Denies cough.     CARDIAC:  Normal rhythm and rate noted.  No peripheral edema noted. No complaints of chest pain.      ABDOMEN: Soft and non tender to palpation.  No distention noted.     NEUROLOGIC: Eyes open spontaneously.  Behavior appropriate to situation.  Follows commands; facial expression symmetrical.  Purposeful motor response noted; normal sensation in all extremities.        "

## 2020-07-20 NOTE — CONSULTS
"Emergency Psychiatry Consult Note    7/20/2020 2:46 PM  Mirza Morales  MRN: [id number]    Chief Complaint / Reason for Consult: "Racing thoughts-concern for manic episode"     SUBJECTIVE     History of Present Illness:   Mirza Morales is a 65 y.o. male with a past psychiatric history of Bipolar Disorder, EtOH use disorder, currently presenting after seizure witnessed at home by wife. Emergency Psychiatry was originally consulted to address the patient's symptoms of racing thoughts and concerns per the pt's wife for symptoms of marielena..    Per ED RN(s):  Pt stated he had a panic attack earlier but his wife claims he had a seizure. Seizure was unwitnessed and pt denies having one but did say he has a history of seizures. Pt also has not been sleeping due to his thoughts "exhausting him".      Per ED PA:  This is a 65 year old male with a PMH of HTN, Bipolar disorder, seizures, alcohol abuse who presents to the ED with a chief complaint of anxiousness. He reports having incomplete thoughts, racing thoughts, and anxiousness over the last few days. He feels frustrated by these thoughts. He took trazodone in an attempt to treat his symptoms, which he does feel helps somewhat. He is asking for a second dose of trazodone. He reports normal sleep and appetite. He denies suicidal ideation or plans. He denies fever, cough, SOB.    Per ED Psych MD:  Pt seen and interviewed at bedside. At times, circumstantial in TP. Talkative, though not pressured, and redirectable throughout interview. Pleasant and cooperative. Reports that he came to the ED after his wife thought that he had a seizure earlier today. Pt reports that he is still not sure as to whether he was having a seizure or a "panic attack." When asked why he believes he might have been having a panic attack, pt reports that he was having rapid thoughts on could only "stay on track for 13 words in a row" before thinking about something else. Agrees that this could " be racing thoughts in the context of his hx of bipolar disorder. 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 Believes that he has had somewhat more energy recently; also endorses being increasingly argumentative with his wife. Denies changes in sleep, denies increased rate of speech, denies hallucinations or delusions. Denies SI, HI, AH, VH.     Pt reports that he had a recent appointment with his outpatient psychiatrist and that the only medication change made at this time was an increase in his dose of modafinil from 100mg to 200mg. Agrees that this may be related to his recent increase in energy, goal-directed activity, arguments with wife, etc. Amenable to discontinuing this medication for now until follow up with outpatient psychiatrist to discuss whether or not this medication would be appropriate in the future. Reports that he has been taking AEDs as prescribed and states that previous seizures have occurred exclusively in the setting of EtOH withdrawal. Denies recent EtOH consumption (confirmed on labs today).    Psychiatric Review of Systems:  sleep: denies  appetite: no  weight: no  energy/anergy: yes  interest/pleasure/anhedonia: no  somatic symptoms: no  guilty/hopelessness: no  concentration: improved  S.I.B.s/risky behavior: no  SI/SA:  no    anxiety/panic: no  Agoraphobia:  no  Social phobia:  no  Recurrent nightmares:  no  hyper startle response:  no  Avoidance: no  Recurrent thoughts:  no  Recurrent behaviors:  no    Irritability: yes  Racing thoughts: yes  Impulsive behaviors: no  Pressured speech:  no, though somewhat talkative    Paranoia:no  Delusions: no  AVH:no    Medical Review Of Systems:  Pertinent items noted in HPI    Psychiatric History:  Diagnose(s): Yes - bipolar disorder, EtOH use disorder  Previous Medication Trials: Yes - seroquel, latuda, trazodone,  "modafinil  Previous Psychiatric Hospitalizations: Yes   Family Psychiatric History: Yes   Outpatient Psychiatrist: Yes - Dr. Loco at [facility]    Suicide/Violence Risk Assessment:  Current/active suicidal ideation/plan/intent: No  Previous suicide attempts: Yes   Current/active homicidal ideation/plan/intent: No  History of threats/arrests associated with violent conduct - No    Social History:  Marital Status:   Children: not asked   Employment Status: not asked  Education: not asked  Special Ed: not asked  Housing Status: Yes - with wife  Developmental History: not asked  History of Abuse: not asked    Substance Abuse History:  Recreational Drugs: denies  Use of Alcohol: hx heavy use and associate withdrawal seizures  Tobacco Use: Yes - remote hx 0.25ppd smoking  Is the patient aware of the biomedical complications associated with substance abuse and mental illness? Yes   Legal consequences of chemical use: not asked    Legal History:  Past Charges/Incarcerations: not asked   Pending Charges: not asked    Collateral:   Spoke with pt's wife, Gunjan Morales, at [phone]. She reports that the pt has been "confused" and not acting like himself x4-5 days. 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 Pt's wife reports that she has not noticed change in sleep, risky behaviors, psychotic symptoms, threats of violence. Reports that she believes that these changes began to occur in the setting of new psychiatric medication modafinil and became worse when dose was recently increased. States that she brought the pt to ED today primarily out of " concern for seizure activity that she witnessed this morning. States that she has been home with the pt recently and is fairly certain that he has not resumed alcohol consumption. Reports that she would be okay with the pt returning home if medically cleared with plan to discontinue modafinil and follow up with outpatient provider. No other concerns at the time of interview.    Scheduled Meds: latuda, trazodone, seroquel, modafinil    Psychotherapeutics (From admission, onward)    None        PRN Meds:    Home Meds:  Prior to Admission medications    Medication Sig Start Date End Date Taking? Authorizing Provider   atorvastatin (LIPITOR) 40 MG tablet TAKE 1 TABLET BY MOUTH EVERY DAY 6/17/20  Yes Jud Mo MD   folic acid (FOLVITE) 1 MG tablet Take 1 tablet (1 mg total) by mouth once daily. 2/11/20 2/10/21 Yes Jud Mo MD   lacosamide (VIMPAT) 100 mg Tab Take 1 tablet (100 mg total) by mouth every 12 (twelve) hours. 4/30/20 8/28/20 Yes Yashira Ann MD   lacosamide (VIMPAT) 200 mg Tab tablet Take 1 tablet (200 mg total) by mouth every 12 (twelve) hours. 7/8/20  Yes Yashira Ann MD   lisinopril (PRINIVIL,ZESTRIL) 20 MG tablet Take 1 tablet (20 mg total) by mouth once daily. 2/11/20 2/10/21 Yes Jud Mo MD   lurasidone (LATUDA) 60 mg Tab tablet Take one tablet every evening with dinner. 5/28/20  Yes Ambrosio Loco MD   meloxicam (MOBIC) 7.5 MG tablet TAKE 1 TABLET BY MOUTH EVERY DAY 7/6/20  Yes Jud Mo MD   modafiniL (PROVIGIL) 200 MG Tab Take 1 tablet (200 mg total) by mouth every morning. 7/9/20 8/8/20 Yes Ambrosio Loco MD   pantoprazole (PROTONIX) 40 MG tablet Take 1 tablet (40 mg total) by mouth once daily. 1/15/20 1/14/21 Yes Jacinta Young MD   polycarbophil (FIBERCON) 625 mg tablet Take 625 mg by mouth once daily.   Yes Historical Provider, MD   propranoloL (INDERAL) 20 MG tablet Take 1 tablet (20 mg total) by mouth 2 (two) times daily as needed. For tremor 5/25/20 5/25/21 Yes Samuel Julien MD    QUEtiapine (SEROQUEL) 100 MG Tab Take 1 tablet (100 mg total) by mouth nightly. 5/28/20 5/28/21 Yes Ambrosio Loco MD   QUEtiapine (SEROQUEL) 50 MG tablet Take one tablet every morning in addition 100 mg at bedtime 5/28/20  Yes Ambrosio Loco MD   senna-docusate 8.6-50 mg (PERICOLACE) 8.6-50 mg per tablet Take 1 tablet by mouth once daily. 1/15/20  Yes Jacinta Young MD   traZODone (DESYREL) 100 MG tablet Take 2 tablets (200 mg total) by mouth every evening. 7/9/20 7/9/21 Yes Ambrosio Loco MD   lurasidone (LATUDA) 40 mg Tab tablet Take 1 tablet (40 mg total) by mouth Daily. 10/3/19 10/2/20  Shanon Wade PA-C   QUEtiapine (SEROQUEL) 25 MG Tab Take 1 tablet (25 mg total) by mouth every morning. 1/29/20 1/28/21  Ambrosio Loco MD   thiamine 100 MG tablet Take 1 tablet (100 mg total) by mouth once daily.  Patient not taking: Reported on 5/25/2020 2/11/20   Jud Mo MD     Psychotherapeutics (From admission, onward)    None        Allergies:  Gabapentin, Nardil [phenelzine], Penicillins, and Lamictal [lamotrigine]  Past Medical/Surgical History:  Past Medical History:   Diagnosis Date    Alcohol abuse     Behavioral problem     Bipolar 1 disorder     Chronic right hip pain     Depression     DJD (degenerative joint disease), lumbar 1/27/2015    Fatigue     Hepatitis     pt. denies this    History of psychiatric care     History of psychiatric hospitalization     Hypertension     Karina     Post traumatic stress disorder     Psychiatric problem     Seizures     Suicide attempt     Therapy      Past Surgical History:   Procedure Laterality Date    COLONOSCOPY N/A 8/14/2019    Procedure: COLONOSCOPY;  Surgeon: Tammy Duval MD;  Location: [address]);  Service: Endoscopy;  Laterality: N/A;    HERNIA REPAIR      SPINE SURGERY  11-12-15    LAMINECTOMY/LUMBAR/WARE     OBJECTIVE     Vital Signs:  Temp:  [97.9 °F (36.6 °C)]   Pulse:  [93]   Resp:  [16]   BP: (167)/(100)   SpO2:   "[100 %]     Mental Status Exam:  Appearance: unremarkable, age appropriate, lying in bed, casually dressed  Level of Consciousness: alert, awake  Behavior/Cooperation: friendly and cooperative  Psychomotor: unremarkable   Speech: normal tone, normal rate, normal pitch, normal volume, talkative  Language: english, fluid  Orientation: person, place, situation, month of year, year  Attention Span/Concentration: mildy inattentive  Memory: Grossly intact  Mood: "sleepy"  Affect: euthymic  Thought Process: circumstantial  Associations: circumstantial  Thought Content: normal, no suicidality, no homicidality, delusions, or paranoia  Fund of Knowledge: Aware of current events  Abstraction: proverbs were abstract  Insight: fair  Judgment: fair    Laboratory Data:  Recent Results (from the past 48 hour(s))   CBC auto differential    Collection Time: 07/20/20  1:57 PM   Result Value Ref Range    WBC 6.73 3.90 - 12.70 K/uL    RBC 4.18 (L) 4.60 - 6.20 M/uL    Hemoglobin 14.1 14.0 - 18.0 g/dL    Hematocrit 41.4 40.0 - 54.0 %    Mean Corpuscular Volume 99 (H) 82 - 98 fL    Mean Corpuscular Hemoglobin 33.7 (H) 27.0 - 31.0 pg    Mean Corpuscular Hemoglobin Conc 34.1 32.0 - 36.0 g/dL    RDW 11.6 11.5 - 14.5 %    Platelets 188 150 - 350 K/uL    MPV 9.4 9.2 - 12.9 fL    Immature Granulocytes 0.9 (H) 0.0 - 0.5 %    Gran # (ANC) 4.8 1.8 - 7.7 K/uL    Immature Grans (Abs) 0.06 (H) 0.00 - 0.04 K/uL    Lymph # 1.1 1.0 - 4.8 K/uL    Mono # 0.6 0.3 - 1.0 K/uL    Eos # 0.2 0.0 - 0.5 K/uL    Baso # 0.04 0.00 - 0.20 K/uL    nRBC 0 0 /100 WBC    Gran% 71.3 38.0 - 73.0 %    Lymph% 15.9 (L) 18.0 - 48.0 %    Mono% 8.6 4.0 - 15.0 %    Eosinophil% 2.7 0.0 - 8.0 %    Basophil% 0.6 0.0 - 1.9 %    Differential Method Automated    COVID-19 Rapid Screening    Collection Time: 07/20/20  1:59 PM   Result Value Ref Range    SARS-CoV-2 RNA, Amplification, Qual Negative Negative      Lab Results   Component Value Date    VALPROATE 48.2 (L) 09/29/2019 "     Imaging:  Imaging Results    None        ASSESSMENT     Mirza Morales is a 65 y.o. male with a past psychiatric history of Bipolar Disorder, EtOH use disorder, currently presenting after seizure witnessed at home by wife. Emergency Psychiatry was originally consulted to address the patient's symptoms of racing thoughts and concerns per the pt's wife for symptoms of marielena..    IMPRESSION:  Bipolar Disorder, hypomania  EtOH Use Disorder    RECOMMENDATION(S)      1. Scheduled Medication(s):  Recommend discontinuation of modafinil; timeline of initiation of hypomanic sx concerning for medication-induced episode. Induction of manic symptoms a documented (though relatively rare) complication of treatment with modafinil in pts with bipolar disorder. Brief literature review of modafinil unrevealing with regard to precipitation of seizures.    Continue other outpatient psychiatric medications as prescribed: latuda, seroquel, trazodone.    2. PRN Medication(s):  none    3. Legal Status/Precaution(s):  Does not appear to meet PEC criteria for danger to self, danger to others, grave disability at the time of interview. Please inform pt's wife, Gunjan, if he is to be discharged so that she can assist with transportation.    In cases of emergency, daily coverage provided by Acute/ED Psych MD, NP, or SW, with associated contact numbers listed in the [facility] On Call Schedule.    Case discussed with emergency psychiatry staff: Dr. Karishma Roche MD  [facility], PGY3  07/20/2020

## 2020-07-20 NOTE — ED PROVIDER NOTES
"Encounter Date: 7/20/2020       History     Chief Complaint   Patient presents with    Seizures     Pt states he feels like he was about to have panic attack. Per ems pt has unwitness seizure 20 mins ago, while on his bed did not hit his head. Pt is aox4. Dnies having seizure .     Panic Attack     This is a 65 year old male with a PMH of HTN, Bipolar disorder, seizures, alcohol abuse who presents to the ED with a chief complaint of anxiousness. He reports having incomplete thoughts, racing thoughts, and anxiousness over the last few days. He feels frustrated by these thoughts. He took trazodone in an attempt to treat his symptoms, which he does feel helps somewhat. He is asking for a second dose of trazodone. He reports normal sleep and appetite. He denies suicidal ideation or plans. He denies fever, cough, SOB.    Spoke to wife, Anamaria  She reports he has a long hx of Bipolar disorder. He has been hospitalized for manic episodes over the years. For the last 4-5 days she reports he's been more assertive than usual, which is how his prior manic episodes have started. She says "anything anybody else says is wrong, and he is right." She has been working from home and has not seen any liquor. She states this morning he was lying in bed around noon - his eyes were rolled back in his head, his extremities were stiff, he was drooling. The episode lasted 3-5 minutes. He came out of it but was unaware that he had a seizure. The last seizure he had like this was related to alcohol use. Anamaria puts his medications into a container for him but notes he has run out of his trazadone. She states that he refilled the medication but couldn't find it - has not had it in several days. She reports his sleep is normal. He is eating and drinking normally. She states that he has been talking about being a writer, lying in bed doing research - "very annoying."  She attributes his symptoms to starting a new medication, modafinil. She states " that he started it, stopped it, and then started it again. She does not fear for his or her safety.         Review of patient's allergies indicates:   Allergen Reactions    Gabapentin Other (See Comments)     SEVERE DIZZINESS AND BALANCE PROBLEMS, UNABLE TO WALK.    Nardil [phenelzine]      BECOMES HYPER, LIKE A MANIC EPISODE.    Penicillins Hives    Lamictal [lamotrigine] Rash     Past Medical History:   Diagnosis Date    Alcohol abuse     Behavioral problem     Bipolar 1 disorder     Chronic right hip pain     Depression     DJD (degenerative joint disease), lumbar 1/27/2015    Fatigue     Hepatitis     pt. denies this    History of psychiatric care     History of psychiatric hospitalization     Hypertension     Karina     Post traumatic stress disorder     Psychiatric problem     Seizures     Suicide attempt     Therapy      Past Surgical History:   Procedure Laterality Date    COLONOSCOPY N/A 8/14/2019    Procedure: COLONOSCOPY;  Surgeon: Tammy Duval MD;  Location: [facility] ([address] [place] [address]);  Service: Endoscopy;  Laterality: N/A;    HERNIA REPAIR      SPINE SURGERY  11-12-15    LAMINECTOMY/LUMBAR/WARE     Family History   Problem Relation Age of Onset    Alcohol abuse Mother     Depression Mother     Depression Father     Depression Sister     Suicide Sister     Drug abuse Brother     Anxiety disorder Brother     Bipolar disorder Brother     Depression Brother     Alcohol abuse Maternal Uncle     Alcohol abuse Paternal Uncle     Dementia Maternal Grandmother     Alcohol abuse Cousin     Amblyopia Neg Hx     Blindness Neg Hx     Cancer Neg Hx     Cataracts Neg Hx     Diabetes Neg Hx     Glaucoma Neg Hx     Hypertension Neg Hx     Macular degeneration Neg Hx     Retinal detachment Neg Hx     Strabismus Neg Hx     Stroke Neg Hx     Thyroid disease Neg Hx     Asthma Neg Hx     Emphysema Neg Hx      Social History     Tobacco Use    Smoking status: Current Some Day  Smoker     Packs/day: 0.25     Years: 10.00     Pack years: 2.50    Smokeless tobacco: Never Used   Substance Use Topics    Alcohol use: No     Alcohol/week: 16.7 standard drinks     Types: 20 Standard drinks or equivalent per week     Comment: quit 4 years ago    Drug use: No     Review of Systems   Unable to perform ROS: Psychiatric disorder       Physical Exam     Initial Vitals [07/20/20 1257]   BP Pulse Resp Temp SpO2   (!) 167/100 93 16 97.9 °F (36.6 °C) 100 %      MAP       --         Physical Exam    Constitutional: He appears well-developed and well-nourished. No distress.   HENT:   Head: Atraumatic.   Eyes: Conjunctivae and EOM are normal. Pupils are equal, round, and reactive to light.   Cardiovascular: Normal rate, regular rhythm and normal heart sounds.   Pulmonary/Chest: Breath sounds normal. No respiratory distress. He has no wheezes. He has no rhonchi. He has no rales.   Abdominal: Soft. Bowel sounds are normal. There is no abdominal tenderness.   Neurological: He is alert and oriented to person, place, and time. He has normal strength and normal reflexes. No cranial nerve deficit or sensory deficit. Coordination and gait normal. GCS eye subscore is 4. GCS verbal subscore is 5. GCS motor subscore is 6.   Skin: Skin is warm and dry. No rash noted.   Psychiatric: His mood appears anxious. His speech is rapid and/or pressured. He is not actively hallucinating. He expresses no homicidal and no suicidal ideation. He expresses no suicidal plans and no homicidal plans.         ED Course   Procedures  Labs Reviewed   CBC W/ AUTO DIFFERENTIAL - Abnormal; Notable for the following components:       Result Value    RBC 4.18 (*)     Mean Corpuscular Volume 99 (*)     Mean Corpuscular Hemoglobin 33.7 (*)     Immature Granulocytes 0.9 (*)     Immature Grans (Abs) 0.06 (*)     Lymph% 15.9 (*)     All other components within normal limits   COMPREHENSIVE METABOLIC PANEL - Abnormal; Notable for the following  components:    Sodium 126 (*)     Anion Gap 7 (*)     All other components within normal limits   URINALYSIS, REFLEX TO URINE CULTURE - Abnormal; Notable for the following components:    Protein, UA 1+ (*)     All other components within normal limits    Narrative:     Specimen Source->Urine   ACETAMINOPHEN LEVEL - Abnormal; Notable for the following components:    Acetaminophen (Tylenol), Serum <3.0 (*)     All other components within normal limits   TSH   DRUG SCREEN PANEL, URINE EMERGENCY    Narrative:     Specimen Source->Urine   ALCOHOL,MEDICAL (ETHANOL)   SARS-COV-2 RNA AMPLIFICATION, QUAL   URINALYSIS MICROSCOPIC    Narrative:     Specimen Source->Urine   OSMOLALITY, URINE RANDOM   OSMOLALITY, SERUM   OSMOLALITY, URINE RANDOM          Imaging Results          CT Head Without Contrast (Final result)  Result time 07/20/20 16:08:22    Final result by Heron Verma MD (07/20/20 16:08:22)                 Impression:      No acute intracranial abnormality identified.  Specifically, no intracranial hemorrhage.      Electronically signed by: Heron Verma MD  Date:    07/20/2020  Time:    16:08             Narrative:    EXAMINATION:  CT HEAD WITHOUT CONTRAST    CLINICAL HISTORY:  Seizure, nontraumatic (Age => 42y);    TECHNIQUE:  Low dose axial CT images obtained throughout the head without intravenous contrast. Sagittal and coronal reconstructions were performed.    COMPARISON:  Head CT most recent 09/29/2019 and MRI brain 05/14/2018    FINDINGS:  Intracranial compartment:    Ventricles and sulci are normal in size for age without evidence of hydrocephalus. No extra-axial blood or fluid collections.    Brain parenchyma appears unchanged noting minimal periventricular white matter hypoattenuation likely sequela of chronic small vessel ischemic change.  No definite new focal areas of abnormal parenchymal attenuation.  No parenchymal mass, hemorrhage, edema or major vascular distribution infarct.    Skull/extracranial  contents (limited evaluation): No fracture. Mastoid air cells and paranasal sinuses are essentially clear.  Imaged portions of the orbits are within normal limits.                                 Medical Decision Making:   History:   Old Medical Records: I decided to obtain old medical records.  Old Records Summarized: records from clinic visits.  Clinical Tests:   Lab Tests: Ordered and Reviewed  Radiological Study: Ordered and Reviewed  Medical Tests: Ordered and Reviewed  Other:   I have discussed this case with another health care provider.       <> Summary of the Discussion: Psychiatry,        APC / Resident Notes:   65 y.o. year old male presenting with racing thoughts.    DDx includes but is not limited to anxiety, medication reaction, manic episode, substance abuse, seizure, pseudoseizure.    I discussed with psychiatry, appreciate consult.   They are recommending discontinuation of modafinil as this can cause or exacerbate manic states.    Labs demonstrate hyponatremia 126, normal glucose, otherwise without acute significant findings  Covid negative    Given today's seizure is different than prior alcohol withdrawal seizures a CT head was obtained, which is negative for acute findings. Will admit to medicine for hyponatremia as likely cause of seizure. Patient and family updated on plan of care. I discussed the care of this patient with my supervising MD.                             Clinical Impression:       ICD-10-CM ICD-9-CM   1. Hyponatremia  E87.1 276.1   2. Anxiety  F41.9 300.00   3. Seizure  R56.9 780.39         Disposition:   Disposition: Admitted  Condition: Stable     ED Disposition Condition    Admit

## 2020-07-21 VITALS
BODY MASS INDEX: 26.57 KG/M2 | OXYGEN SATURATION: 98 % | RESPIRATION RATE: 18 BRPM | HEIGHT: 77 IN | DIASTOLIC BLOOD PRESSURE: 88 MMHG | WEIGHT: 225.06 LBS | TEMPERATURE: 98 F | SYSTOLIC BLOOD PRESSURE: 132 MMHG | HEART RATE: 76 BPM

## 2020-07-21 LAB
ALBUMIN SERPL BCP-MCNC: 3.4 G/DL (ref 3.5–5.2)
ALP SERPL-CCNC: 61 U/L (ref 55–135)
ALT SERPL W/O P-5'-P-CCNC: 25 U/L (ref 10–44)
ANION GAP SERPL CALC-SCNC: 5 MMOL/L (ref 8–16)
ANION GAP SERPL CALC-SCNC: 6 MMOL/L (ref 8–16)
ANION GAP SERPL CALC-SCNC: 7 MMOL/L (ref 8–16)
ANION GAP SERPL CALC-SCNC: 7 MMOL/L (ref 8–16)
AST SERPL-CCNC: 25 U/L (ref 10–40)
BASOPHILS # BLD AUTO: 0.04 K/UL (ref 0–0.2)
BASOPHILS NFR BLD: 0.7 % (ref 0–1.9)
BILIRUB SERPL-MCNC: 0.5 MG/DL (ref 0.1–1)
BNP SERPL-MCNC: 10 PG/ML (ref 0–99)
BUN SERPL-MCNC: 13 MG/DL (ref 8–23)
BUN SERPL-MCNC: 13 MG/DL (ref 8–23)
BUN SERPL-MCNC: 14 MG/DL (ref 8–23)
BUN SERPL-MCNC: 14 MG/DL (ref 8–23)
CALCIUM SERPL-MCNC: 9 MG/DL (ref 8.7–10.5)
CALCIUM SERPL-MCNC: 9 MG/DL (ref 8.7–10.5)
CALCIUM SERPL-MCNC: 9.1 MG/DL (ref 8.7–10.5)
CALCIUM SERPL-MCNC: 9.1 MG/DL (ref 8.7–10.5)
CHLORIDE SERPL-SCNC: 100 MMOL/L (ref 95–110)
CHLORIDE SERPL-SCNC: 97 MMOL/L (ref 95–110)
CHLORIDE SERPL-SCNC: 97 MMOL/L (ref 95–110)
CHLORIDE SERPL-SCNC: 98 MMOL/L (ref 95–110)
CK SERPL-CCNC: 404 U/L (ref 20–200)
CO2 SERPL-SCNC: 25 MMOL/L (ref 23–29)
CO2 SERPL-SCNC: 27 MMOL/L (ref 23–29)
CO2 SERPL-SCNC: 28 MMOL/L (ref 23–29)
CO2 SERPL-SCNC: 28 MMOL/L (ref 23–29)
CREAT SERPL-MCNC: 1.1 MG/DL (ref 0.5–1.4)
CREAT SERPL-MCNC: 1.1 MG/DL (ref 0.5–1.4)
CREAT SERPL-MCNC: 1.2 MG/DL (ref 0.5–1.4)
CREAT SERPL-MCNC: 1.2 MG/DL (ref 0.5–1.4)
DIFFERENTIAL METHOD: ABNORMAL
EOSINOPHIL # BLD AUTO: 0.2 K/UL (ref 0–0.5)
EOSINOPHIL NFR BLD: 3.6 % (ref 0–8)
ERYTHROCYTE [DISTWIDTH] IN BLOOD BY AUTOMATED COUNT: 11.9 % (ref 11.5–14.5)
EST. GFR  (AFRICAN AMERICAN): >60 ML/MIN/1.73 M^2
EST. GFR  (NON AFRICAN AMERICAN): >60 ML/MIN/1.73 M^2
GLUCOSE SERPL-MCNC: 107 MG/DL (ref 70–110)
GLUCOSE SERPL-MCNC: 107 MG/DL (ref 70–110)
GLUCOSE SERPL-MCNC: 109 MG/DL (ref 70–110)
GLUCOSE SERPL-MCNC: 109 MG/DL (ref 70–110)
HCT VFR BLD AUTO: 41.5 % (ref 40–54)
HGB BLD-MCNC: 14.1 G/DL (ref 14–18)
IMM GRANULOCYTES # BLD AUTO: 0.07 K/UL (ref 0–0.04)
IMM GRANULOCYTES NFR BLD AUTO: 1.2 % (ref 0–0.5)
LYMPHOCYTES # BLD AUTO: 1.6 K/UL (ref 1–4.8)
LYMPHOCYTES NFR BLD: 28 % (ref 18–48)
MAGNESIUM SERPL-MCNC: 2.1 MG/DL (ref 1.6–2.6)
MCH RBC QN AUTO: 34 PG (ref 27–31)
MCHC RBC AUTO-ENTMCNC: 34 G/DL (ref 32–36)
MCV RBC AUTO: 100 FL (ref 82–98)
MONOCYTES # BLD AUTO: 0.7 K/UL (ref 0.3–1)
MONOCYTES NFR BLD: 12.3 % (ref 4–15)
NEUTROPHILS # BLD AUTO: 3.2 K/UL (ref 1.8–7.7)
NEUTROPHILS NFR BLD: 54.2 % (ref 38–73)
NRBC BLD-RTO: 0 /100 WBC
PHOSPHATE SERPL-MCNC: 3.7 MG/DL (ref 2.7–4.5)
PLATELET # BLD AUTO: 170 K/UL (ref 150–350)
PMV BLD AUTO: 9.7 FL (ref 9.2–12.9)
POTASSIUM SERPL-SCNC: 4.6 MMOL/L (ref 3.5–5.1)
POTASSIUM SERPL-SCNC: 4.6 MMOL/L (ref 3.5–5.1)
POTASSIUM SERPL-SCNC: 4.7 MMOL/L (ref 3.5–5.1)
POTASSIUM SERPL-SCNC: 4.8 MMOL/L (ref 3.5–5.1)
POTASSIUM UR-SCNC: 40 MMOL/L (ref 15–95)
PROT SERPL-MCNC: 6.4 G/DL (ref 6–8.4)
RBC # BLD AUTO: 4.15 M/UL (ref 4.6–6.2)
SODIUM SERPL-SCNC: 130 MMOL/L (ref 136–145)
SODIUM SERPL-SCNC: 131 MMOL/L (ref 136–145)
SODIUM SERPL-SCNC: 132 MMOL/L (ref 136–145)
SODIUM SERPL-SCNC: 132 MMOL/L (ref 136–145)
SODIUM UR-SCNC: 30 MMOL/L (ref 20–250)
WBC # BLD AUTO: 5.86 K/UL (ref 3.9–12.7)

## 2020-07-21 PROCEDURE — 25000003 PHARM REV CODE 250: Performed by: STUDENT IN AN ORGANIZED HEALTH CARE EDUCATION/TRAINING PROGRAM

## 2020-07-21 PROCEDURE — 36415 COLL VENOUS BLD VENIPUNCTURE: CPT | Mod: HCNC

## 2020-07-21 PROCEDURE — 84100 ASSAY OF PHOSPHORUS: CPT

## 2020-07-21 PROCEDURE — 95816 EEG AWAKE AND DROWSY: CPT | Mod: 26,,, | Performed by: PSYCHIATRY & NEUROLOGY

## 2020-07-21 PROCEDURE — 63600175 PHARM REV CODE 636 W HCPCS: Mod: HCNC | Performed by: STUDENT IN AN ORGANIZED HEALTH CARE EDUCATION/TRAINING PROGRAM

## 2020-07-21 PROCEDURE — 84133 ASSAY OF URINE POTASSIUM: CPT | Mod: HCNC

## 2020-07-21 PROCEDURE — 85025 COMPLETE CBC W/AUTO DIFF WBC: CPT

## 2020-07-21 PROCEDURE — 99239 PR HOSPITAL DISCHARGE DAY,>30 MIN: ICD-10-PCS | Mod: HCNC,,, | Performed by: HOSPITALIST

## 2020-07-21 PROCEDURE — 99239 HOSP IP/OBS DSCHRG MGMT >30: CPT | Mod: HCNC,,, | Performed by: HOSPITALIST

## 2020-07-21 PROCEDURE — 95816 PR EEG,W/AWAKE & DROWSY RECORD: ICD-10-PCS | Mod: 26,,, | Performed by: PSYCHIATRY & NEUROLOGY

## 2020-07-21 PROCEDURE — 80048 BASIC METABOLIC PNL TOTAL CA: CPT

## 2020-07-21 PROCEDURE — 83735 ASSAY OF MAGNESIUM: CPT

## 2020-07-21 PROCEDURE — 82550 ASSAY OF CK (CPK): CPT

## 2020-07-21 PROCEDURE — 84300 ASSAY OF URINE SODIUM: CPT | Mod: HCNC

## 2020-07-21 PROCEDURE — 80053 COMPREHEN METABOLIC PANEL: CPT

## 2020-07-21 PROCEDURE — 80048 BASIC METABOLIC PNL TOTAL CA: CPT | Mod: 91,HCNC

## 2020-07-21 PROCEDURE — 95816 EEG AWAKE AND DROWSY: CPT

## 2020-07-21 PROCEDURE — 83880 ASSAY OF NATRIURETIC PEPTIDE: CPT

## 2020-07-21 RX ORDER — MODAFINIL 200 MG/1
200 TABLET ORAL DAILY
Status: ON HOLD | COMMUNITY
End: 2020-07-21 | Stop reason: HOSPADM

## 2020-07-21 RX ORDER — LORAZEPAM 2 MG/ML
1 INJECTION INTRAMUSCULAR 2 TIMES DAILY PRN
Status: CANCELLED | OUTPATIENT
Start: 2020-07-21

## 2020-07-21 RX ORDER — QUETIAPINE FUMARATE 25 MG/1
100 TABLET, FILM COATED ORAL DAILY
Status: CANCELLED | OUTPATIENT
Start: 2020-07-21

## 2020-07-21 RX ORDER — TRAZODONE HYDROCHLORIDE 100 MG/1
200 TABLET ORAL NIGHTLY
Qty: 60 TABLET | Refills: 3 | Status: SHIPPED | OUTPATIENT
Start: 2020-07-21 | End: 2020-11-12 | Stop reason: SDUPTHER

## 2020-07-21 RX ADMIN — LACOSAMIDE 100 MG: 100 TABLET, FILM COATED ORAL at 09:07

## 2020-07-21 RX ADMIN — PANTOPRAZOLE SODIUM 40 MG: 40 TABLET, DELAYED RELEASE ORAL at 09:07

## 2020-07-21 RX ADMIN — Medication 100 MG: at 09:07

## 2020-07-21 RX ADMIN — LURASIDONE HYDROCHLORIDE 40 MG: 40 TABLET, FILM COATED ORAL at 05:07

## 2020-07-21 RX ADMIN — FOLIC ACID 1 MG: 1 TABLET ORAL at 09:07

## 2020-07-21 RX ADMIN — ATORVASTATIN CALCIUM 40 MG: 20 TABLET, FILM COATED ORAL at 09:07

## 2020-07-21 RX ADMIN — ENOXAPARIN SODIUM 40 MG: 100 INJECTION SUBCUTANEOUS at 05:07

## 2020-07-21 NOTE — ASSESSMENT & PLAN NOTE
Seizure today witnessed by wife. Most likely due to hyponatremia, but cannot  R/o related to alcohol use disorder due to past episodes caused by alcohol withdrawal.    - Pt denies urinary/bowel incontinence or tongue biting  - Home dose Lacosamide 100mg q12  - EEG normal  - BNP WNL  -   - Will correct hyponatremia with fluid restriction, see hyponatremia notes  - EEG normal, follow up outpt with Neurology to adjust anti-seizure medication and further management

## 2020-07-21 NOTE — PLAN OF CARE
07/21/20 1031   Discharge Assessment   Assessment Type Discharge Planning Assessment   Confirmed/corrected address and phone number on facesheet? Yes   Assessment information obtained from? Medical Record;Patient   Expected Length of Stay (days) 3   Prior to hospitilization cognitive status: Alert/Oriented   Prior to hospitalization functional status: Independent;Assistive Equipment   Current cognitive status: Alert/Oriented   Current Functional Status: Independent;Assistive Equipment   Lives With spouse   Able to Return to Prior Arrangements yes   Is patient able to care for self after discharge? Unable to determine at this time (comments);Yes   Who are your caregiver(s) and their phone number(s)? Gilma Dutton (Niece)[phone] --Anamaria Morales (spouse) [phone]   Patient currently being followed by outpatient case management? No   Patient currently receives any other outside agency services? No   Equipment Currently Used at Home walker, rolling;cane, straight   Do you have any problems affording any of your prescribed medications? No   Is the patient taking medications as prescribed? yes   Does the patient have transportation home? Yes   Transportation Anticipated family or friend will provide   Does the patient receive services at the [facility]mad[facility] Clinic? No   Discharge Plan A Home with family   Discharge Plan B Home with family;Home Health   DME Needed Upon Discharge  other (see comments)  (TBD)   Patient/Family in Agreement with Plan yes     Jud Mo MD  [address] / [place] [phone]       [id number] - [place], [place] - [address]  [address]  [place] [address]  Phone: [phone] Fax: [phone]    [facility]  [address] [facility] [address]  Phone: [phone] Fax: [phone]    [facility]  [address]  [place] [address]  Phone: [phone] Fax: [phone]    Payor: [facility] / Plan: [facility]  CHOICE PLUS / Product Type: Commercial /

## 2020-07-21 NOTE — ASSESSMENT & PLAN NOTE
Pt with Moderate hyponatremia Na 126 today on admission with history of seizure activity.    - Has been low in the past, BL around low 130s  - Euvolemic on exam, considering SIADH due to psych medications vs. Psychogenic polydipsia   - Pt admits to drinking lots of water and coke lately  - Main treatment will be fluid restriction to 1,000mL fluid / day  - BMP q8 hrs to monitor for overcorrection of Na  - Serum osm, urine osm, urine Na, Urine K ordered

## 2020-07-21 NOTE — DISCHARGE INSTRUCTIONS
Call to schedule a follow up with  within 1 week.    Return to the ER for confusion, changes in mood, or with any concerns about safety.

## 2020-07-21 NOTE — H&P
[facility] Hospital Medicine  History & Physical    Patient Name: Mirza Morales  MRN: [id number]  Admission Date: 7/20/2020  Attending Physician: Lela Pop MD   Primary Care Provider: Jud Mo MD    [facility] Medicine Team: [facility] 2 Inga Steward MD     Patient information was obtained from patient and ER records.     Subjective:     Principal Problem:Hyponatremia    Chief Complaint:   Chief Complaint   Patient presents with    Seizures     Pt states he feels like he was about to have panic attack. Per ems pt has unwitness seizure 20 mins ago, while on his bed did not hit his head. Pt is aox4. Dnies having seizure .     Panic Attack        HPI: Pt is a 64 yo M with PMH and HTN, Bipolar Disorder, Seizures, and Alcohol Abuse presented to [facility] ED for 1 episode of seizure per wife around noon today. Per Anamaria the wife, pt was described as  Lying in bed with eyes rolled back, exremities stiffened, and drooling. He has had past episodes of seziures, but usually correlated with alchol use. He has also started a new medication, Modafinil, pt says for energy and concentration 1 wk ago but pt's wife says he hasn't been taking it properly. CT Head shows no intracranial abnormalities or hemorrhage. Of note, pt's Na was 126 on admission. He has a chronic low Na usually in the lows 130s BL. Pt denies any weakness, numbness, confusion, but has no recollection of his seizure today. He denies any urinary or fecal incontinence or tounge biting during this episode. He denies alcohol use, last drink was about 2 mo ago according to pt. He denies nausea, vomiting, diarrhea, constipation, fever, chills,  Change in mentation. Pt endorses that he hasn't been his self lately and may be having a manic episode. He skipped his meds one morning and has had fleeting thoughts and pressured speech like last types of manic episodes. He also mentions that he has been drinking lots of water lately -  about 5 16 oz water bottles and lots of coke which has caused hyponatremia in the past.    No new subjective & objective note has been filed under this hospital service since the last note was generated.    Assessment/Plan:     * Hyponatremia  Pt with Moderate hyponatremia Na 126 today on admission with history of seizure activity.    - Has been low in the past, BL around low 130s  - Euvolemic on exam, considering SIADH due to psych medications vs. Psychogenic polydipsia   - Pt admits to drinking lots of water and coke lately  - Main treatment will be fluid restriction to 1,000mL fluid / day  - BMP q8 hrs to monitor for overcorrection of Na  - Serum osm, urine osm, urine Na, Urine K ordered    Seizure disorder  Seizure today witnessed by wife. Most likely due to hyponatremia, but cannot  R/o related to alcohol use disorder due to past episodes caused by alcohol withdrawal.    - Pt denies urinary/bowel incontinence or tongue biting  - Lacosamide 100mg q12  - EEG ordered  - BNP  - CK   - Neuro checks  - Fall precautions  - Will correct hyponatremia with fluid restriction, see hyponatremia notes      Bipolar I disorder  Pt has admitted to having symptoms of manic episodes with decreased sleep requirements, pressured speech, ideas of grandiosity about writing a book, and fleeting thoughts.     - Psych on board, recs to discontinue Modafinil due to could contribute to exacerbation of manic episodes   - Discontinued home Trazadone and Seroquil due to possibly causing SIADH  - Continued home Lurasidone 40 mg daily     Mixed hyperlipidemia  - continue home med Atorvastatin 40      Alcohol use disorder, severe, dependence  Pt has h/o alcohol dependence, but says he has not had a drink for 2-3 months.     - WA Protocol  - Continue home thiamine and folate supplements    VTE Risk Mitigation (From admission, onward)         Ordered     enoxaparin injection 40 mg  Every 24 hours      07/20/20 2000     IP VTE LOW RISK PATIENT   Once      07/20/20 2000                   Inga Steward MD  Department of Hospital Medicine   [facility]

## 2020-07-21 NOTE — ASSESSMENT & PLAN NOTE
Pt has admitted to having symptoms of manic episodes with decreased sleep requirements, pressured speech, ideas of grandiosity about writing a book, and fleeting thoughts.     - Psych on board, recs to discontinue Modafinil due to could contribute to exacerbation of manic episodes   - Discontinued home Trazadone and Seroquil due to possibly causing SIADH  - Continued home Lurasidone 40 mg daily

## 2020-07-21 NOTE — SUBJECTIVE & OBJECTIVE
Past Medical History:   Diagnosis Date    Alcohol abuse     Behavioral problem     Bipolar 1 disorder     Chronic right hip pain     Depression     DJD (degenerative joint disease), lumbar 1/27/2015    Fatigue     Hepatitis     pt. denies this    History of psychiatric care     History of psychiatric hospitalization     Hypertension     Karina     Post traumatic stress disorder     Psychiatric problem     Seizures     Suicide attempt     Therapy        Past Surgical History:   Procedure Laterality Date    COLONOSCOPY N/A 8/14/2019    Procedure: COLONOSCOPY;  Surgeon: Tammy Duval MD;  Location: [address]);  Service: Endoscopy;  Laterality: N/A;    HERNIA REPAIR      SPINE SURGERY  11-12-15    LAMINECTOMY/LUMBAR/WARE       Review of patient's allergies indicates:   Allergen Reactions    Gabapentin Other (See Comments)     SEVERE DIZZINESS AND BALANCE PROBLEMS, UNABLE TO WALK.    Nardil [phenelzine]      BECOMES HYPER, LIKE A MANIC EPISODE.    Penicillins Hives    Lamictal [lamotrigine] Rash       No current facility-administered medications on file prior to encounter.      Current Outpatient Medications on File Prior to Encounter   Medication Sig    atorvastatin (LIPITOR) 40 MG tablet TAKE 1 TABLET BY MOUTH EVERY DAY    folic acid (FOLVITE) 1 MG tablet Take 1 tablet (1 mg total) by mouth once daily.    lacosamide (VIMPAT) 100 mg Tab Take 1 tablet (100 mg total) by mouth every 12 (twelve) hours.    lacosamide (VIMPAT) 200 mg Tab tablet Take 1 tablet (200 mg total) by mouth every 12 (twelve) hours.    lisinopril (PRINIVIL,ZESTRIL) 20 MG tablet Take 1 tablet (20 mg total) by mouth once daily.    lurasidone (LATUDA) 60 mg Tab tablet Take one tablet every evening with dinner.    meloxicam (MOBIC) 7.5 MG tablet TAKE 1 TABLET BY MOUTH EVERY DAY    pantoprazole (PROTONIX) 40 MG tablet Take 1 tablet (40 mg total) by mouth once daily.    polycarbophil (FIBERCON) 625 mg tablet  Take 625 mg by mouth once daily.    propranoloL (INDERAL) 20 MG tablet Take 1 tablet (20 mg total) by mouth 2 (two) times daily as needed. For tremor    QUEtiapine (SEROQUEL) 100 MG Tab Take 1 tablet (100 mg total) by mouth nightly.    QUEtiapine (SEROQUEL) 50 MG tablet Take one tablet every morning in addition 100 mg at bedtime    senna-docusate 8.6-50 mg (PERICOLACE) 8.6-50 mg per tablet Take 1 tablet by mouth once daily.    traZODone (DESYREL) 100 MG tablet Take 2 tablets (200 mg total) by mouth every evening.    [DISCONTINUED] modafiniL (PROVIGIL) 200 MG Tab Take 1 tablet (200 mg total) by mouth every morning.    lurasidone (LATUDA) 40 mg Tab tablet Take 1 tablet (40 mg total) by mouth Daily.    QUEtiapine (SEROQUEL) 25 MG Tab Take 1 tablet (25 mg total) by mouth every morning.    thiamine 100 MG tablet Take 1 tablet (100 mg total) by mouth once daily. (Patient not taking: Reported on 5/25/2020)     Family History     Problem Relation (Age of Onset)    Alcohol abuse Mother, Maternal Uncle, Paternal Uncle, Cousin    Anxiety disorder Brother    Bipolar disorder Brother    Dementia Maternal Grandmother    Depression Mother, Father, Sister, Brother    Drug abuse Brother    Suicide Sister        Tobacco Use    Smoking status: Current Some Day Smoker     Packs/day: 0.25     Years: 10.00     Pack years: 2.50    Smokeless tobacco: Never Used   Substance and Sexual Activity    Alcohol use: No     Alcohol/week: 16.7 standard drinks     Types: 20 Standard drinks or equivalent per week     Comment: quit 4 years ago    Drug use: No    Sexual activity: Yes     Partners: Female     Comment: with wife; monogamous      Review of Systems   Constitutional: Negative.    HENT: Negative.    Eyes: Negative.    Respiratory: Negative.    Cardiovascular: Negative.    Gastrointestinal: Negative.    Endocrine: Negative.    Genitourinary: Negative.    Musculoskeletal: Negative.    Skin: Negative.    Neurological: Positive for  seizures. Negative for weakness and numbness.   Psychiatric/Behavioral: Positive for sleep disturbance. Negative for confusion and suicidal ideas. The patient is hyperactive. The patient is not nervous/anxious.      Objective:     Vital Signs (Most Recent):  Temp: 98.4 °F (36.9 °C) (07/20/20 2045)  Pulse: 74 (07/20/20 2045)  Resp: 18 (07/20/20 2045)  BP: 112/67 (07/20/20 2045)  SpO2: 100 % (07/20/20 2045) Vital Signs (24h Range):  Temp:  [97.9 °F (36.6 °C)-98.4 °F (36.9 °C)] 98.4 °F (36.9 °C)  Pulse:  [71-93] 74  Resp:  [16-18] 18  SpO2:  [98 %-100 %] 100 %  BP: (112-167)/() 112/67     Weight: 101.9 kg (224 lb 10.4 oz)  Body mass index is 26.64 kg/m².    Physical Exam  Constitutional:       General: He is not in acute distress.     Appearance: Normal appearance.   HENT:      Head: Normocephalic and atraumatic.      Right Ear: External ear normal.      Left Ear: External ear normal.      Nose: Nose normal.   Eyes:      Extraocular Movements: Extraocular movements intact.      Conjunctiva/sclera: Conjunctivae normal.   Neck:      Musculoskeletal: Normal range of motion.   Cardiovascular:      Rate and Rhythm: Normal rate and regular rhythm.      Heart sounds: Normal heart sounds.   Pulmonary:      Effort: Pulmonary effort is normal.      Breath sounds: Normal breath sounds.   Abdominal:      Palpations: Abdomen is soft.      Tenderness: There is no abdominal tenderness.   Musculoskeletal:      Right lower leg: No edema.      Left lower leg: No edema.   Skin:     General: Skin is warm.      Capillary Refill: Capillary refill takes 2 to 3 seconds.      Coloration: Skin is pale.   Neurological:      General: No focal deficit present.      Mental Status: He is alert and oriented to person, place, and time.      Motor: No weakness.   Psychiatric:         Mood and Affect: Mood normal.             Significant Labs: {Results:33483}    Significant Imaging: {Imaging Review:[id number]}

## 2020-07-21 NOTE — HOSPITAL COURSE
Pt admitted to [facility] on 7/20 for episode of seizure and hyponatremia.     Pt's hyponatremia has improved with fluid restriction back to his BL (130-135) with normal EEG. Pt will follow-up outpatient with Neurology for seizure management and Psych to determine home medications.

## 2020-07-21 NOTE — ASSESSMENT & PLAN NOTE
Seizure today witnessed by wife. Most likely due to hyponatremia, but cannot  R/o related to alcohol use disorder due to past episodes caused by alcohol withdrawal.    - Pt denies urinary/bowel incontinence or tongue biting  - Lacosamide 100mg q12  - EEG ordered  - BNP  - CK   - Neuro checks  - Fall precautions  - Will correct hyponatremia with fluid restriction, see hyponatremia notes

## 2020-07-21 NOTE — ASSESSMENT & PLAN NOTE
Pt has h/o alcohol dependence, but says he has not had a drink for 2-3 months.     - Continue home thiamine and folate supplements

## 2020-07-21 NOTE — ASSESSMENT & PLAN NOTE
Pt with Moderate hyponatremia Na 126 today on admission with history of seizure activity.    - Has been low in the past, BL around low 130s  - Euvolemic on exam, considering SIADH due to psych medications vs. Psychogenic polydipsia or Beer proteinemia syndrome  - Pt admits to drinking lots of water and coke lately  - Main treatment will be fluid restriction to 1,000mL fluid / day  - Serum osm 271, Urine osm 308 further increasing likelihood of our differential  -Urine Na 30  - Pt's Na normalized to pt's BL from 126 -> 132 with fluid restriction  - Continue to limit how much pt drinks at home  - F/u with psychiatry outpt to reconcile psych medicaitons that may contribute to SIADH

## 2020-07-21 NOTE — ASSESSMENT & PLAN NOTE
Pt has h/o alcohol dependence, but says he has not had a drink for 2-3 months.     - [facility] Protocol  - Continue home thiamine and folate supplements

## 2020-07-21 NOTE — PROCEDURES
ELECTROENCEPHALOGRAM REPORT    DATE OF SERVICE:  07/21/2020  EEG NUMBER: FH   REQUESTED BY:  Dr. Moreland  LOCATION OF SERVICE:  [id number]    METHODOLOGY   Electroencephalographic (EEG) recording is with electrodes placed according to the International 10-20 placement system.  Thirty two (32) channels of digital signal (sampling rate of 512/sec) including T1 and T2 was simultaneously recorded from the scalp and may include  EKG, EMG, and/or eye monitors.  Recording band pass was 0.1 to 512 hz.  Digital video recording of the patient is simultaneously recorded with the EEG.  The patient is instructed report clinical symptoms which may occur during the recording session.  EEG and video recording is stored and archived in digital format. Activation procedures which include photic stimulation, hyperventilation and instructing patients to perform simple task are done in selected patients.    The EEG is displayed on a monitor screen and can be reviewed using different montages.  Computer assisted analysis is employed to detect spike and electrographic seizure activity.   The entire record is submitted for computer analysis.  The entire recording is visually reviewed and the times identified by computer analysis as being spikes or seizures are reviewed again.  Compresses spectral analysis (CSA) is also performed on the activity recorded from each individual channel.  This is displayed as a power display of frequencies from 0 to 30 Hz over time.   The CSA is reviewed looking for asymmetries in power between homologous areas of the scalp and then compared with the original EEG recording.     [employer] software was also utilized in the review of this study.  This software suite analyzes the EEG recording in multiple domains.  Coherence and rhythmicity is computed to identify EEG sections which may contain organized seizures.  Each channel undergoes analysis to detect presence of spike and sharp waves which have special and  morphological characteristic of epileptic activity.  The routine EEG recording is converted from spacial into frequency domain.  This is then displayed comparing homologous areas to identify areas of significant asymmetry.  Algorithm to identify non-cortically generated artifact is used to separate eye movement, EMG and other artifact from the EEG    EEG FINGINGS  Waking state -   The waking background consists of a fairly rhythmic 8 hertz posterior dominant rhythm seen predominantly in the occipital leads but some spread intermittently to the parietal posterior temporal area.  Mid upper range beta was intermixed in the mid and frontal regions.    no lateralized or focal changes were noted no spike or sharp wave activity was seen.  Sleep state -   The posterior dominant rhythm attenuated and reviewed replaced by generalized mixture of theta and beta frequencies.  These were punctuated by V waves and sleep spindles as stage 2 sleep was achieved.  There is no lateralized or focal changes and no spike or sharp wave activity was seen.  Activation procedures:   Photic Stimulation - not performed  Hyperventilation - Not performed    Cognitive testing:   The patient was ask questions the and correctly responded with their location, date, , name of the president, and the sum of a nickel, dime and red.    Cardiac Monitor:   Single lead EKG was obtained and showed a regular cardiac rhythm, with a  rate of approximately 70    IMPRESSION:  Normal EEG -

## 2020-07-21 NOTE — DISCHARGE SUMMARY
[facility] Hospital Medicine  Discharge Summary      Patient Name: Mirza Morales  MRN: [id number]  Admission Date: 7/20/2020  Hospital Length of Stay: 1 days  Discharge Date and Time:  07/21/2020 5:38 PM  Attending Physician: Lela Pop MD   Discharging Provider: Inga Steward MD  Primary Care Provider: Jud Mo MD  [facility] Medicine Team: [facility]OSP MED 2 Inga Steward MD    HPI:   Pt is a 66 yo M with PMH and HTN, Bipolar Disorder, Seizures, and Alcohol Abuse presented to [facility] ED for 1 episode of seizure per wife around noon today. Per Anamaria the wife, pt was described as  Lying in bed with eyes rolled back, exremities stiffened, and drooling. He has had past episodes of seziures, but usually correlated with alchol use. He has also started a new medication, Modafinil, pt says for energy and concentration 1 wk ago but pt's wife says he hasn't been taking it properly. CT Head shows no intracranial abnormalities or hemorrhage. Of note, pt's Na was 126 on admission. He has a chronic low Na usually in the lows 130s BL. Pt denies any weakness, numbness, confusion, but has no recollection of his seizure today. He denies any urinary or fecal incontinence or tounge biting during this episode. He denies alcohol use, last drink was about 2 mo ago according to pt. He denies nausea, vomiting, diarrhea, constipation, fever, chills,  Change in mentation. Pt endorses that he hasn't been his self lately and may be having a manic episode. He skipped his meds one morning and has had fleeting thoughts and pressured speech like last types of manic episodes. He also mentions that he has been drinking lots of water lately - about 5 16 oz water bottles and lots of coke which has caused hyponatremia in the past.    * No surgery found *      Hospital Course:   Pt admitted to [facility] on 7/20 for episode of seizure and hyponatremia.     Pt's hyponatremia has improved with fluid restriction back to his  BL (130-135) with normal EEG. Pt will follow-up outpatient with Neurology for seizure management and Psych to determine home medications.      Consults:   Consults (From admission, onward)        Status Ordering Provider     Inpatient consult to Psychiatry  Once     Provider:  (Not yet assigned)    Completed BENITO MCFARLANE          * Hyponatremia  Pt with Moderate hyponatremia Na 126 today on admission with history of seizure activity.    - Has been low in the past, BL around low 130s  - Euvolemic on exam, considering SIADH due to psych medications vs. Psychogenic polydipsia or Beer proteinemia syndrome  - Pt admits to drinking lots of water and coke lately  - Main treatment will be fluid restriction to 1,000mL fluid / day  - Serum osm 271, Urine osm 308 further increasing likelihood of our differential  -Urine Na 30  - Pt's Na normalized to pt's BL from 126 -> 132 with fluid restriction  - Continue to limit how much pt drinks at home  - F/u with psychiatry outpt to reconcile psych medicaitons that may contribute to SIADH    Seizure disorder  Seizure today witnessed by wife. Most likely due to hyponatremia, but cannot  R/o related to alcohol use disorder due to past episodes caused by alcohol withdrawal.    - Pt denies urinary/bowel incontinence or tongue biting  - Home dose Lacosamide 100mg q12  - EEG normal  - BNP WNL  -   - Will correct hyponatremia with fluid restriction, see hyponatremia notes  - EEG normal, follow up outpt with Neurology to adjust anti-seizure medication and further management    Bipolar I disorder  Pt has admitted to having symptoms of manic episodes with decreased sleep requirements, pressured speech, ideas of grandiosity about writing a book, and fleeting thoughts.     - Psych on board, recs to discontinue Modafinil due to could contribute to exacerbation of manic episodes   - Per psych's recommendations, home Lurasidone, Trazadone and Seroquil restarted on discharge  - Pt to  follow with Psych upon discharge for further management    Mixed hyperlipidemia  - continue home med Atorvastatin 40      Alcohol use disorder, severe, dependence  Pt has h/o alcohol dependence, but says he has not had a drink for 2-3 months.     - Continue home thiamine and folate supplements      Final Active Diagnoses:    Diagnosis Date Noted POA    PRINCIPAL PROBLEM:  Hyponatremia [E87.1] 02/21/2018 Yes     Chronic    Seizure disorder [G40.909] 01/03/2016 Yes    Bipolar I disorder [F31.9] 12/21/2015 Yes    Mixed hyperlipidemia [E78.2] 01/03/2016 Yes    Alcohol use disorder, severe, dependence [F10.20] 10/09/2012 Yes     Chronic      Problems Resolved During this Admission:       Discharged Condition: good    Disposition: Home or Self Care    Follow Up:  Follow-up Information     Ambrosio Loco MD. Schedule an appointment as soon as possible for a visit in 3 days.    Specialty: Psychiatry  Contact information:  [address] [facility] [address]  [phone]                 Patient Instructions:      Diet Adult Regular     Notify your health care provider if you experience any of the following:  temperature >100.4     Notify your health care provider if you experience any of the following:  persistent nausea and vomiting or diarrhea     Notify your health care provider if you experience any of the following:  severe uncontrolled pain     Notify your health care provider if you experience any of the following:  redness, tenderness, or signs of infection (pain, swelling, redness, odor or green/yellow discharge around incision site)     Notify your health care provider if you experience any of the following:  difficulty breathing or increased cough     Notify your health care provider if you experience any of the following:  persistent dizziness, light-headedness, or visual disturbances     Notify your health care provider if you experience any of the following:  increased confusion or weakness      Activity as tolerated       Significant Diagnostic Studies: EEG Normal    Pending Diagnostic Studies:     None         Medications:  Reconciled Home Medications:      Medication List      CHANGE how you take these medications    pantoprazole 40 MG tablet  Commonly known as: PROTONIX  Take 1 tablet (40 mg total) by mouth once daily.  What changed:   · when to take this  · reasons to take this        CONTINUE taking these medications    atorvastatin 40 MG tablet  Commonly known as: LIPITOR  TAKE 1 TABLET BY MOUTH EVERY DAY     CALCIUM POLYCARBOPHIL ORAL  Take by mouth once daily.     * lacosamide 100 mg Tab  Commonly known as: VIMPAT  Take 1 tablet (100 mg total) by mouth every 12 (twelve) hours.     * VIMPAT 200 mg Tab tablet  Generic drug: lacosamide  Take 1 tablet (200 mg total) by mouth every 12 (twelve) hours.     lisinopriL 20 MG tablet  Commonly known as: PRINIVIL,ZESTRIL  Take 1 tablet (20 mg total) by mouth once daily.     * lurasidone 40 mg Tab tablet  Commonly known as: LATUDA  Take 1 tablet (40 mg total) by mouth Daily.     * lurasidone 60 mg Tab tablet  Commonly known as: LATUDA  Take one tablet every evening with dinner.     meloxicam 7.5 MG tablet  Commonly known as: MOBIC  TAKE 1 TABLET BY MOUTH EVERY DAY     propranoloL 20 MG tablet  Commonly known as: INDERAL  Take 1 tablet (20 mg total) by mouth 2 (two) times daily as needed. For tremor     * QUEtiapine 25 MG Tab  Commonly known as: SEROQUEL  Take 1 tablet (25 mg total) by mouth every morning.     * QUEtiapine 50 MG tablet  Commonly known as: SEROQUEL  Take one tablet every morning in addition 100 mg at bedtime     * QUEtiapine 100 MG Tab  Commonly known as: SEROQUEL  Take 1 tablet (100 mg total) by mouth nightly.     thiamine 100 MG tablet  Take 1 tablet (100 mg total) by mouth once daily.     traZODone 100 MG tablet  Commonly known as: DESYREL  Take 2 tablets (200 mg total) by mouth every evening.         * This list has 7 medication(s) that  are the same as other medications prescribed for you. Read the directions carefully, and ask your doctor or other care provider to review them with you.            STOP taking these medications    modafiniL 200 MG Tab  Commonly known as: PROVIGIL            Indwelling Lines/Drains at time of discharge:   Lines/Drains/Airways     None                 Time spent on the discharge of patient: 30 minutes  Patient was seen and examined on the date of discharge and determined to be suitable for discharge.       Inga Steward MD  Department of Hospital Medicine  [facility]

## 2020-07-21 NOTE — MEDICAL/APP STUDENT
Progress Note  Hospital Medicine    Primary Team: [facility] HOSP MED 2  Admit Date: 7/20/2020   Length of Stay:  LOS: 1 day   SUBJECTIVE:   Reason for Admission:  Hyponatremia    Overall assessment:  Mr. Morales is a 64 yo male who was admitted for hyponatremia (euvolemic) secondary to psychogenic polydipsia, alcoholism, or SIADH. There were no signs of volume overload or dehydration on history and exam. Pt is currently stable and asymptomatic. Plan for d/c today after obtaining final discharge med recs for seizure and psychiatric medications.    Hospital course:  -7/21  Sodium levels up trended (126>130) s/p fluid restriction (1L/day). seroquel and trazodone held for concerns for SIADH; modafinil held for seizure concerns. EEG negative for seizure activity. Pt has no sxs or complaints; stable for d/c today.     Interval history:    NAEON. Pt has no concerns or complaints. Asking for d/c soon. Plan for d/c today.    Review of Systems   Constitutional: Negative for chills and fever.   HENT: Negative.    Eyes: Negative.    Respiratory: Negative for shortness of breath.    Cardiovascular: Negative for chest pain, palpitations and leg swelling.   Gastrointestinal: Negative for abdominal pain, constipation, diarrhea, nausea and vomiting.   Genitourinary: Negative.    Musculoskeletal: Negative for back pain, falls, myalgias and neck pain.   Neurological: Negative for dizziness, tingling, tremors, focal weakness, weakness and headaches.   Psychiatric/Behavioral: Negative.        OBJECTIVE:     Temp:  [97.4 °F (36.3 °C)-98.4 °F (36.9 °C)]   Pulse:  [70-80]   Resp:  [18-20]   BP: (104-147)/(67-97)   SpO2:  [98 %-100 %]  Body mass index is 26.69 kg/m².  Intake/Outake:  This Shift:  I/O this shift:  In: 240 [P.O.:240]  Out: -     Net I/O past 24h:     Intake/Output Summary (Last 24 hours) at 7/21/2020 1328  Last data filed at 7/21/2020 0900  Gross per 24 hour   Intake 490 ml   Output 350 ml   Net 140 ml             Physical Exam    Constitutional: He is oriented to person, place, and time and well-developed, well-nourished, and in no distress.   HENT:   Head: Normocephalic and atraumatic.   Eyes: Pupils are equal, round, and reactive to light.   Neck: Normal range of motion. Neck supple.   Cardiovascular: Normal rate, regular rhythm, normal heart sounds and intact distal pulses.   Pulmonary/Chest: Effort normal and breath sounds normal. No respiratory distress. He has no wheezes. He has no rales.   Abdominal: Soft. Bowel sounds are normal. He exhibits no distension. There is no abdominal tenderness.   Neurological: He is alert and oriented to person, place, and time.   Psychiatric: Mood, memory, affect and judgment normal.       Laboratory:  CBC/Anemia Labs: Coags:    Recent Labs   Lab 07/20/20  1357 07/21/20  0510   WBC 6.73 5.86   HGB 14.1 14.1   HCT 41.4 41.5    170   MCV 99* 100*   RDW 11.6 11.9    No results for input(s): PT, INR, APTT in the last 168 hours.     Chemistries:   Recent Labs   Lab 07/20/20  1357  07/21/20  0025 07/21/20  0510 07/21/20  0756   *   < > 132* 131* 130*   K 4.2   < > 4.7 4.8 4.6   CL 95   < > 97 98 97   CO2 24   < > 28 28 27   BUN 10   < > 14 14 13   CREATININE 1.0   < > 1.2 1.2 1.1   CALCIUM 9.0   < > 9.1 9.0 9.1   PROT 7.1  --   --  6.4  --    BILITOT 0.5  --   --  0.5  --    ALKPHOS 68  --   --  61  --    ALT 26  --   --  25  --    AST 26  --   --  25  --    MG  --   --   --  2.1  --    PHOS  --   --   --  3.7  --     < > = values in this interval not displayed.        Medications:  Scheduled Meds:   atorvastatin  40 mg Oral Daily    enoxaparin  40 mg Subcutaneous Q24H    folic acid  1 mg Oral Daily    lacosamide  100 mg Oral Q12H    lurasidone  40 mg Oral Daily    pantoprazole  40 mg Oral Daily    thiamine  100 mg Oral Daily                             Continuous Infusions:  PRN Meds:.dextrose 50%, dextrose 50%, glucagon (human recombinant), glucose, glucose, sodium chloride 0.9%    "  ASSESSMENT/PLAN:   Mr. Morales is a 64 yo male who was admitted for hyponatremia (euvolemic) secondary to psychogenic polydipsia, alcoholism, or SIADH. There were no signs of volume overload or dehydration on history and exam. Pt is currently stable and asymptomatic. Plan for d/c today after obtaining final discharge med recs for seizure and psychiatric medications.    Hyponatremia  -na 126 on admission  -seizure-like episodes at home per wife  -has had a hx of hyponatremic episodes since 2012  -TSH wnl  -UDS negative for alcohol   -Anastasia 30, Uosm 271, and serum osm 271     Plan  -BMP q8h to monitor sodium correction (no greater than 6-8/day)  -fluid restriction 1L/day  -Seroquel and trazodone held for SIADH concerns     Seizure  -"grand-mal" like seizures with post ictal state as reported by wife  plan  -continue home vimpat 100mg BID  -check vimpat levels  -modafinil held for seizure concerns  -EEG negative for seizure  -fall precautions, seizure precautions     Bipolar disorder  -psychiatry consulted  -resume lurasidone  --Seroquel held for SIADH concerns     EtOH use disorder  -UDS negative for ETOH  -denies alcohol use in past 3 months  -check Peth   -PRN ativan for alcohol withdrawal seizure    Discharge planning  -continue home vimpat 100mg BID at d/c per neurology recs  -awaiting [facility] recs for psychiatric medication regimen at discharge   -outpt f/u with PCP, psychiatry, and neurology    Bruce Delgadillo, MS4  "

## 2020-07-21 NOTE — MEDICAL/APP STUDENT
"History & Physical  [facility]    SUBJECTIVE:   Chief Complaint/Reason for Admission: hyponatremia    History of Present Illness:  Mr. Morales is a 65 y.o. male w/ a pmhx of htn, bipolar disorder, seizure, and alcohol abuse, who presents to the ED after a seizure-like episode at home. According to the wife, the pt was having a "grand mal seizure" with shaking in both arms and legs, associated with drooling and stiffness. According to the wife, the seizure-like episode lasted for about 3-4 minutes; and the pt remained confused for about 15-20 mins afterwards, until after EMT arrived at their house. Wife states that pt usually experiences seizures after drinking binges; however, pt denies drinking in the past few months and she does not feel that this episode was alcohol related. According to wife, the pt took trazodone this morning "on top of his other morning medications"; and the pt had skipped his prior two nightly doses of trazodone. The seizure-like episode occurred while the pt was in bed; and he did not hit or injure his head. The pt does not recall the seizure-like episode. Pt denies headaches, dizziness, dyspnea, chest pain, or myalgias. Pt endorses drinking more water ("4-5 bottles" a day) than usual but his wife disputes this claim.    The pt was hyponatremic (na 126) on arrival to the ED. Of note, the pt has had a history of hyponatremia since 2012 per chart review. Pt admits to binge drinking in the past, but states that he hasn't drank in months. Pt smokes 10 cigarettes/day; and denies illicit drug use. Pt states he recently started taking modafinil to "help with his concentration."    Past Medical History:   Diagnosis Date    Alcohol abuse     Behavioral problem     Bipolar 1 disorder     Chronic right hip pain     Depression     DJD (degenerative joint disease), lumbar 1/27/2015    Fatigue     Hepatitis     pt. denies this    History of psychiatric care     History of psychiatric " hospitalization     Hypertension     Karina     Post traumatic stress disorder     Psychiatric problem     Seizures     Suicide attempt     Therapy        Past Surgical History:   Procedure Laterality Date    COLONOSCOPY N/A 8/14/2019    Procedure: COLONOSCOPY;  Surgeon: Tammy Duval MD;  Location: [facility] ([address] [place] [address]);  Service: Endoscopy;  Laterality: N/A;    HERNIA REPAIR      SPINE SURGERY  11-12-15    LAMINECTOMY/LUMBAR/WARE      Family History   Problem Relation Age of Onset    Alcohol abuse Mother     Depression Mother     Depression Father     Depression Sister     Suicide Sister     Drug abuse Brother     Anxiety disorder Brother     Bipolar disorder Brother     Depression Brother     Alcohol abuse Maternal Uncle     Alcohol abuse Paternal Uncle     Dementia Maternal Grandmother     Alcohol abuse Cousin     Amblyopia Neg Hx     Blindness Neg Hx     Cancer Neg Hx     Cataracts Neg Hx     Diabetes Neg Hx     Glaucoma Neg Hx     Hypertension Neg Hx     Macular degeneration Neg Hx     Retinal detachment Neg Hx     Strabismus Neg Hx     Stroke Neg Hx     Thyroid disease Neg Hx     Asthma Neg Hx     Emphysema Neg Hx        Social History     Tobacco Use    Smoking status: Current Some Day Smoker     Packs/day: 0.25     Years: 10.00     Pack years: 2.50    Smokeless tobacco: Never Used   Substance Use Topics    Alcohol use: No     Alcohol/week: 16.7 standard drinks     Types: 20 Standard drinks or equivalent per week     Comment: quit 4 years ago    Drug use: No      Review of patient's allergies indicates:   Allergen Reactions    Gabapentin Other (See Comments)     SEVERE DIZZINESS AND BALANCE PROBLEMS, UNABLE TO WALK.    Nardil [phenelzine]      BECOMES HYPER, LIKE A MANIC EPISODE.    Penicillins Hives    Lamictal [lamotrigine] Rash       No current facility-administered medications on file prior to encounter.      Current Outpatient Medications on File Prior to  Encounter   Medication Sig Dispense Refill    atorvastatin (LIPITOR) 40 MG tablet TAKE 1 TABLET BY MOUTH EVERY DAY 90 tablet 3    folic acid (FOLVITE) 1 MG tablet Take 1 tablet (1 mg total) by mouth once daily. 90 tablet 3    lacosamide (VIMPAT) 100 mg Tab Take 1 tablet (100 mg total) by mouth every 12 (twelve) hours. 60 tablet 3    lacosamide (VIMPAT) 200 mg Tab tablet Take 1 tablet (200 mg total) by mouth every 12 (twelve) hours. 60 tablet 5    lisinopril (PRINIVIL,ZESTRIL) 20 MG tablet Take 1 tablet (20 mg total) by mouth once daily. 90 tablet 3    lurasidone (LATUDA) 60 mg Tab tablet Take one tablet every evening with dinner. 30 tablet 3    meloxicam (MOBIC) 7.5 MG tablet TAKE 1 TABLET BY MOUTH EVERY DAY 30 tablet 1    pantoprazole (PROTONIX) 40 MG tablet Take 1 tablet (40 mg total) by mouth once daily. 30 tablet 11    polycarbophil (FIBERCON) 625 mg tablet Take 625 mg by mouth once daily.      propranoloL (INDERAL) 20 MG tablet Take 1 tablet (20 mg total) by mouth 2 (two) times daily as needed. For tremor 180 tablet 1    QUEtiapine (SEROQUEL) 100 MG Tab Take 1 tablet (100 mg total) by mouth nightly. 30 tablet 3    QUEtiapine (SEROQUEL) 50 MG tablet Take one tablet every morning in addition 100 mg at bedtime 30 tablet 3    senna-docusate 8.6-50 mg (PERICOLACE) 8.6-50 mg per tablet Take 1 tablet by mouth once daily. 30 tablet 0    traZODone (DESYREL) 100 MG tablet Take 2 tablets (200 mg total) by mouth every evening. 60 tablet 3    [DISCONTINUED] modafiniL (PROVIGIL) 200 MG Tab Take 1 tablet (200 mg total) by mouth every morning. 30 tablet 3    lurasidone (LATUDA) 40 mg Tab tablet Take 1 tablet (40 mg total) by mouth Daily. 30 tablet 11    QUEtiapine (SEROQUEL) 25 MG Tab Take 1 tablet (25 mg total) by mouth every morning. 30 tablet 3    thiamine 100 MG tablet Take 1 tablet (100 mg total) by mouth once daily. (Patient not taking: Reported on 5/25/2020) 30 tablet 3        Review of Systems    Constitutional: Negative for chills, fever, malaise/fatigue and weight loss.   HENT: Negative.    Eyes: Negative.    Respiratory: Negative.  Negative for shortness of breath.    Cardiovascular: Negative for chest pain, palpitations, orthopnea and leg swelling.   Gastrointestinal: Negative.  Negative for abdominal pain, constipation and diarrhea.   Genitourinary: Negative for dysuria, frequency and urgency.   Musculoskeletal: Negative for back pain, joint pain, myalgias and neck pain.   Skin: Negative.    Neurological: Positive for seizures and loss of consciousness. Negative for dizziness, sensory change, speech change, weakness and headaches.   Endo/Heme/Allergies: Negative.    Psychiatric/Behavioral: Negative for depression, hallucinations and suicidal ideas. The patient is nervous/anxious. The patient does not have insomnia.        OBJECTIVE:     Vital Signs (Most Recent)   Temp: 98.3 °F (36.8 °C) (07/20/20 1754)  Pulse: 71 (07/20/20 1754)  Resp: 18 (07/20/20 1754)  BP: 133/73 (07/20/20 1754)  SpO2: 100 % (07/20/20 1754)  Body mass index is 26.56 kg/m².      Physical Exam   Constitutional: He is oriented to person, place, and time.   HENT:   Head: Normocephalic and atraumatic.   Eyes: Conjunctivae are normal.   Neck: Normal range of motion. Neck supple.   Cardiovascular: Normal rate, regular rhythm, normal heart sounds and normal pulses.   Pulmonary/Chest: Effort normal and breath sounds normal.   Abdominal: Soft. Normal appearance and bowel sounds are normal. He exhibits no distension. There is no abdominal tenderness.   Neurological: He is alert and oriented to person, place, and time.   Skin: Skin is warm and dry.   Psychiatric: His behavior is normal. Mood and thought content normal.       Laboratory:  CBC/Anemia Labs: Coags:    Recent Labs   Lab 07/20/20  1357   WBC 6.73   HGB 14.1   HCT 41.4      MCV 99*   RDW 11.6    No results for input(s): PT, INR, APTT in the last 168 hours.     Chemistries:  "ABG:   Recent Labs   Lab 07/20/20  1357   *   K 4.2   CL 95   CO2 24   BUN 10   CREATININE 1.0   CALCIUM 9.0   PROT 7.1   BILITOT 0.5   ALKPHOS 68   ALT 26   AST 26    No results for input(s): PH, PCO2, PO2, HCO3, POCSATURATED, BE in the last 168 hours.         ASSESSMENT/PLAN:   Mr. Morales is a 64 yo male who was admitted for hyponatremia (euvolemic) secondary to psychogenic polydipsia, alcoholism, or SIADH. There were no signs of volume overload or dehydration on history and exam. Pt is currently stable and asymptomatic.     Hyponatremia  -na 126 on admission  -seizure-like episodes at home per wife  -has had a hx of hyponatremic episodes since 2012  -TSH wnl  -UDS negative for alcohol     Plan  -daily BMP  -check Anastasia, Uosm, and serum osm  -fluid restriction 800ml/day  -salt tablets if sodium doesn't correct with fluid restriction  -consider psychiatry or pharmacy consult to evaluate for medication-induced SIADH    Seizure  -"grand-mal" like seizures with post ictal state as reported by wife  plan  -continue home vimpat  -EEG monitor  -fall precautions    Bipolar disorder  -per psyc recs:  -Continue other outpatient psychiatric medications as prescribed: latuda, seroquel,   -d/c modafinil    EtOH use disorder  -UDS negative for ETOH  -check Peth     Insomnia  -trazodone      Bruce Delgadillo, MS4     "

## 2020-07-21 NOTE — ASSESSMENT & PLAN NOTE
Pt has admitted to having symptoms of manic episodes with decreased sleep requirements, pressured speech, ideas of grandiosity about writing a book, and fleeting thoughts.     - Psych on board, recs to discontinue Modafinil due to could contribute to exacerbation of manic episodes   - Per psych's recommendations, home Lurasidone, Trazadone and Seroquil restarted on discharge  - Pt to follow with Psych upon discharge for further management

## 2020-07-21 NOTE — HPI
Pt is a 66 yo M with PMH and HTN, Bipolar Disorder, Seizures, and Alcohol Abuse presented to [facility] ED for 1 episode of seizure per wife around noon today. Per Anamaria the wife, pt was described as  Lying in bed with eyes rolled back, exremities stiffened, and drooling. He has had past episodes of seziures, but usually correlated with alchol use. He has also started a new medication, Modafinil, pt says for energy and concentration 1 wk ago but pt's wife says he hasn't been taking it properly. CT Head shows no intracranial abnormalities or hemorrhage. Of note, pt's Na was 126 on admission. He has a chronic low Na usually in the lows 130s BL. Pt denies any weakness, numbness, confusion, but has no recollection of his seizure today. He denies any urinary or fecal incontinence or tounge biting during this episode. He denies alcohol use, last drink was about 2 mo ago according to pt. He denies nausea, vomiting, diarrhea, constipation, fever, chills,  Change in mentation. Pt endorses that he hasn't been his self lately and may be having a manic episode. He skipped his meds one morning and has had fleeting thoughts and pressured speech like last types of manic episodes. He also mentions that he has been drinking lots of water lately - about 5 16 oz water bottles and lots of coke which has caused hyponatremia in the past.

## 2020-07-22 ENCOUNTER — PATIENT OUTREACH (OUTPATIENT)
Dept: ADMINISTRATIVE | Facility: CLINIC | Age: 65
End: 2020-07-22

## 2020-07-22 NOTE — PATIENT INSTRUCTIONS
Hyponatremia  Hyponatremia means low sodium levels in the blood. This condition most often occurs after prolonged vomiting or diarrhea, which causes your body to lose too much water and sodium. It can also result from drinking excess amounts of water or the use of diuretics (water pills).  Mild hyponatremia causes no symptoms. It is only discovered with a blood test. As sodium levels in the blood decreases, symptoms begin to appear. This includes weakness, confusion, muscle cramping and seizures.  Home care  · Reduce your daily water intake until the problem is corrected.  · If you have been taking diuretics, you may be asked to stop taking them for a short time.  · If you are having symptoms of weakness or confusion, do not drive or operate dangerous machinery until symptoms resolve.  Follow-up care  Follow up with your healthcare provider for a repeat blood test within the next week or as advised by our staff.  When to seek medical advice  Call your healthcare provider if any of the following occur:  · Increasing weakness  · Dizziness  · Irregular heartbeat, extra beats or very fast heart rate  · Increasing confusion  · Fainting or loss of consciousness  Date Last Reviewed: 7/26/2015  © 2381-5781 [employer]. [address] [place] [address]. All rights reserved. This information is not intended as a substitute for professional medical care. Always follow your healthcare professional's instructions.

## 2020-07-22 NOTE — PLAN OF CARE
07/22/20 0748   Final Note   Assessment Type Final Discharge Note   Anticipated Discharge Disposition Home   What phone number can be called within the next 1-3 days to see how you are doing after discharge? [phone]   Hospital Follow Up  Appt(s) scheduled? Yes   Right Care Referral Info   Post Acute Recommendation No Care   Post-Acute Status   Post-Acute Authorization HME   E Status Set-up Complete   Discharge Delays None known at this time     Kingsley Grimaldo RN BSN CM  [id number]

## 2020-07-28 ENCOUNTER — TELEPHONE (OUTPATIENT)
Dept: INTERNAL MEDICINE | Facility: CLINIC | Age: 65
End: 2020-07-28

## 2020-07-28 NOTE — TELEPHONE ENCOUNTER
----- Message from Ingrid Corea sent at 7/28/2020  4:04 PM CDT -----  Regarding: Pt calling to speak with the nurse  Contact: self  Pt called to request his appt to be changed to an Audio visit please if possible for tomorrow.    Pt can be reached at [phone]      Thank you!

## 2020-07-29 ENCOUNTER — TELEPHONE (OUTPATIENT)
Dept: INTERNAL MEDICINE | Facility: CLINIC | Age: 65
End: 2020-07-29

## 2020-07-29 ENCOUNTER — OFFICE VISIT (OUTPATIENT)
Dept: INTERNAL MEDICINE | Facility: CLINIC | Age: 65
End: 2020-07-29
Payer: MEDICARE

## 2020-07-29 DIAGNOSIS — F10.20 ALCOHOL USE DISORDER, SEVERE, DEPENDENCE: Chronic | ICD-10-CM

## 2020-07-29 DIAGNOSIS — E87.1 HYPONATREMIA: Primary | Chronic | ICD-10-CM

## 2020-07-29 DIAGNOSIS — F31.9 BIPOLAR I DISORDER: ICD-10-CM

## 2020-07-29 DIAGNOSIS — D63.8 ANEMIA OF CHRONIC DISEASE: ICD-10-CM

## 2020-07-29 DIAGNOSIS — G40.909 SEIZURE DISORDER: ICD-10-CM

## 2020-07-29 DIAGNOSIS — E78.2 MIXED HYPERLIPIDEMIA: ICD-10-CM

## 2020-07-29 DIAGNOSIS — Z12.5 SCREENING PSA (PROSTATE SPECIFIC ANTIGEN): ICD-10-CM

## 2020-07-29 DIAGNOSIS — R74.8 ELEVATED CPK: ICD-10-CM

## 2020-07-29 DIAGNOSIS — F32.2 SEVERE DEPRESSION: ICD-10-CM

## 2020-07-29 PROBLEM — R23.9 ALTERATION IN SKIN INTEGRITY: Status: RESOLVED | Noted: 2019-10-01 | Resolved: 2020-07-29

## 2020-07-29 PROBLEM — E43 SEVERE MALNUTRITION: Status: RESOLVED | Noted: 2018-02-21 | Resolved: 2020-07-29

## 2020-07-29 PROCEDURE — 99442 PR PHYSICIAN TELEPHONE EVALUATION 11-20 MIN: CPT | Mod: HCNC,95,, | Performed by: INTERNAL MEDICINE

## 2020-07-29 PROCEDURE — 99442 PR PHYSICIAN TELEPHONE EVALUATION 11-20 MIN: ICD-10-PCS | Mod: HCNC,95,, | Performed by: INTERNAL MEDICINE

## 2020-07-29 NOTE — TELEPHONE ENCOUNTER
----- Message from Jud Mo MD sent at 7/29/2020  9:02 AM CDT -----  Please schedule fasting labs on Monday at 8:30 am and f/u appt in 5 mos.

## 2020-07-29 NOTE — PROGRESS NOTES
Established Patient - Audio Only Telehealth Visit     The patient location is: Home  The chief complaint leading to consultation is: hospital f/u  Visit type: Virtual visit with audio only (telephone)  Total time spent with patient: 15 minutes       The reason for the audio only service rather than synchronous audio and video virtual visit was related to technical difficulties or patient preference/necessity.     Each patient to whom I provide medical services by telemedicine is:  (1) informed of the relationship between the physician and patient and the respective role of any other health care provider with respect to management of the patient; and (2) notified that they may decline to receive medical services by telemedicine and may withdraw from such care at any time. Patient verbally consented to receive this service via voice-only telephone call.    Notes:      Patient ID: Mirza Morales is a 65 y.o. male with HLD, sz d/o, bipolar d/o c anxiety, hx EtOH dependence c hx withdrawal in the past, hx opiate overdose, lumbar radiculopathy c hx lumbar fusion around 4/2018 followed by Dr. Kannan Orona who is managing his pain), tob use, hx R fib fx, hx R clavicular fx 2/2 fall, last seen by me in Jan, here today for Telemedicine visit for f/u.    To review, over the past year, had recurrent sz in Sept after self-weaning his Depakote due to s/e tremors.  Was then switched to Vimpat c EEG c/w delerium which had improved p decreasing Doxepin.    In January had been readmitted after relapsing with alcohol use and was admitted and treated for alcohol withdrawal.  Has not been drinking since that time.    Had f/u c Dr. Julien in May and was sz free on Vimpat up until July when he apparently felt like he was about to have a panic attack and then had a seizure while in bed at home.  Still c tremor.  Was admitted at that time.  CT head c chronic ischemic change overall unchanged.  Seen by psych inpt who felt likely  hypomanic and possibly due to recent increase in dose of his Modafinil so med was stopped.  Had EEG done on this admit that was normal.    Was noted to have Hyponatremia on this admit as well (126 on admission), which per discharge summary was noted to be c/w SIADH vs psychogenic polydipsia and was rec for fluid restrictions c improvement in numbers to 132.    Since discharge, states he has been feeling well.  Still not drinking and has not had any recurrent seizures.    Still followed by Dr. Loco for psych issues but has not been seen since discharge, has appt in about 2 weeks.    States he is having issues c MyChart due to a financial hold.     Assessment and plan:      64 yo M c    Hyponatremia--has above, suspect SIADH, could be from psych meds c component of psychogenic polypdipsia.  Numbers had improved c fluid restriction while inpatient, will check f/u labs now.    Seizure d/o--most recent EEG wnl and previously stable on Vimpat, will cont meds, rec routine f/u c Neurology.  Recent CPK elevation likely due to sz prior to admission, will repeat now.    Depression, Bipolar d/o, Alcohol abuse--now abstaining from alcohol since Jan, on Seroquel, Latuda, as per psych.  Advised to call Financial Dept to set up a payment plan as to not interfere with his upcoming appt c Dr. Loco.  Number given for technically support to unlock his [facility] acct.    HLD--at goal on Jan labs on Lipitor.  Recent LFTs wnl.   Will repeat lipids.    AoCD--normalized on recent labs, will monitor.     HM--2nd Shingrix due now, advised to get at pharmacy when he comes for labs.    RTC in 5 mos for f/u, sooner if needed and depending on labs.     This service was not originating from a related E/M service provided within the previous 7 days nor will  to an E/M service or procedure within the next 24 hours or my soonest available appointment.  Prevailing standard of care was able to be met in this audio-only visit.

## 2020-08-03 ENCOUNTER — IMMUNIZATION (OUTPATIENT)
Dept: PHARMACY | Facility: CLINIC | Age: 65
End: 2020-08-03
Payer: MEDICARE

## 2020-08-03 ENCOUNTER — LAB VISIT (OUTPATIENT)
Dept: LAB | Facility: HOSPITAL | Age: 65
End: 2020-08-03
Attending: INTERNAL MEDICINE
Payer: MEDICARE

## 2020-08-03 DIAGNOSIS — Z12.5 SCREENING PSA (PROSTATE SPECIFIC ANTIGEN): ICD-10-CM

## 2020-08-03 DIAGNOSIS — R74.8 ELEVATED CPK: ICD-10-CM

## 2020-08-03 DIAGNOSIS — E78.2 MIXED HYPERLIPIDEMIA: ICD-10-CM

## 2020-08-03 DIAGNOSIS — E87.1 HYPONATREMIA: Chronic | ICD-10-CM

## 2020-08-03 DIAGNOSIS — G40.909 SEIZURE DISORDER: ICD-10-CM

## 2020-08-03 LAB
ANION GAP SERPL CALC-SCNC: 7 MMOL/L (ref 8–16)
BUN SERPL-MCNC: 12 MG/DL (ref 8–23)
CALCIUM SERPL-MCNC: 9.6 MG/DL (ref 8.7–10.5)
CHLORIDE SERPL-SCNC: 100 MMOL/L (ref 95–110)
CHOLEST SERPL-MCNC: 127 MG/DL (ref 120–199)
CHOLEST/HDLC SERPL: 2.4 {RATIO} (ref 2–5)
CK SERPL-CCNC: 88 U/L (ref 20–200)
CO2 SERPL-SCNC: 28 MMOL/L (ref 23–29)
COMPLEXED PSA SERPL-MCNC: 0.62 NG/ML (ref 0–4)
CREAT SERPL-MCNC: 1.9 MG/DL (ref 0.5–1.4)
EST. GFR  (AFRICAN AMERICAN): 42.1 ML/MIN/1.73 M^2
EST. GFR  (NON AFRICAN AMERICAN): 36.4 ML/MIN/1.73 M^2
GLUCOSE SERPL-MCNC: 107 MG/DL (ref 70–110)
HDLC SERPL-MCNC: 52 MG/DL (ref 40–75)
HDLC SERPL: 40.9 % (ref 20–50)
LDLC SERPL CALC-MCNC: 55.2 MG/DL (ref 63–159)
NONHDLC SERPL-MCNC: 75 MG/DL
POTASSIUM SERPL-SCNC: 4.9 MMOL/L (ref 3.5–5.1)
SODIUM SERPL-SCNC: 135 MMOL/L (ref 136–145)
TRIGL SERPL-MCNC: 99 MG/DL (ref 30–150)

## 2020-08-03 PROCEDURE — 84153 ASSAY OF PSA TOTAL: CPT | Mod: HCNC

## 2020-08-03 PROCEDURE — 80048 BASIC METABOLIC PNL TOTAL CA: CPT | Mod: HCNC

## 2020-08-03 PROCEDURE — 36415 COLL VENOUS BLD VENIPUNCTURE: CPT | Mod: HCNC

## 2020-08-03 PROCEDURE — 80061 LIPID PANEL: CPT | Mod: HCNC

## 2020-08-03 PROCEDURE — 82550 ASSAY OF CK (CPK): CPT | Mod: HCNC

## 2020-08-04 ENCOUNTER — PATIENT MESSAGE (OUTPATIENT)
Dept: INTERNAL MEDICINE | Facility: CLINIC | Age: 65
End: 2020-08-04

## 2020-08-04 DIAGNOSIS — N17.9 AKI (ACUTE KIDNEY INJURY): Primary | ICD-10-CM

## 2020-08-04 NOTE — TELEPHONE ENCOUNTER
Spoke with pt - CKD shows AMINA.   Stop Meloxicam   Cont modafinil  Will refer to nephrology   Repeat BMP in 1 week

## 2020-08-11 ENCOUNTER — OFFICE VISIT (OUTPATIENT)
Dept: PSYCHIATRY | Facility: CLINIC | Age: 65
End: 2020-08-11
Payer: MEDICARE

## 2020-08-11 ENCOUNTER — LAB VISIT (OUTPATIENT)
Dept: LAB | Facility: HOSPITAL | Age: 65
End: 2020-08-11
Attending: NURSE PRACTITIONER
Payer: MEDICARE

## 2020-08-11 DIAGNOSIS — N17.9 AKI (ACUTE KIDNEY INJURY): ICD-10-CM

## 2020-08-11 DIAGNOSIS — F31.9 BIPOLAR AFFECTIVE DISORDER, REMISSION STATUS UNSPECIFIED: Primary | ICD-10-CM

## 2020-08-11 LAB
ANION GAP SERPL CALC-SCNC: 7 MMOL/L (ref 8–16)
BUN SERPL-MCNC: 13 MG/DL (ref 8–23)
CALCIUM SERPL-MCNC: 10.1 MG/DL (ref 8.7–10.5)
CHLORIDE SERPL-SCNC: 100 MMOL/L (ref 95–110)
CO2 SERPL-SCNC: 30 MMOL/L (ref 23–29)
CREAT SERPL-MCNC: 1.3 MG/DL (ref 0.5–1.4)
EST. GFR  (AFRICAN AMERICAN): >60 ML/MIN/1.73 M^2
EST. GFR  (NON AFRICAN AMERICAN): 57.7 ML/MIN/1.73 M^2
GLUCOSE SERPL-MCNC: 102 MG/DL (ref 70–110)
POTASSIUM SERPL-SCNC: 6 MMOL/L (ref 3.5–5.1)
SODIUM SERPL-SCNC: 137 MMOL/L (ref 136–145)

## 2020-08-11 PROCEDURE — 36415 COLL VENOUS BLD VENIPUNCTURE: CPT | Mod: HCNC

## 2020-08-11 PROCEDURE — 99214 OFFICE O/P EST MOD 30 MIN: CPT | Mod: 95,,, | Performed by: PSYCHIATRY & NEUROLOGY

## 2020-08-11 PROCEDURE — 99499 UNLISTED E&M SERVICE: CPT | Mod: 95,,, | Performed by: PSYCHIATRY & NEUROLOGY

## 2020-08-11 PROCEDURE — 80048 BASIC METABOLIC PNL TOTAL CA: CPT | Mod: HCNC

## 2020-08-11 PROCEDURE — 99214 PR OFFICE/OUTPT VISIT, EST, LEVL IV, 30-39 MIN: ICD-10-PCS | Mod: 95,,, | Performed by: PSYCHIATRY & NEUROLOGY

## 2020-08-11 PROCEDURE — 99499 RISK ADDL DX/OHS AUDIT: ICD-10-PCS | Mod: 95,,, | Performed by: PSYCHIATRY & NEUROLOGY

## 2020-08-11 RX ORDER — QUETIAPINE FUMARATE 100 MG/1
200 TABLET, FILM COATED ORAL NIGHTLY
Qty: 60 TABLET | Refills: 3 | Status: SHIPPED | OUTPATIENT
Start: 2020-08-11 | End: 2020-11-12 | Stop reason: SDUPTHER

## 2020-08-11 RX ORDER — MODAFINIL 100 MG/1
TABLET ORAL
Qty: 45 TABLET | Refills: 1 | Status: SHIPPED | OUTPATIENT
Start: 2020-08-11 | End: 2020-11-23

## 2020-08-11 RX ORDER — LURASIDONE HYDROCHLORIDE 60 MG/1
TABLET, FILM COATED ORAL
Qty: 30 TABLET | Refills: 3 | Status: SHIPPED | OUTPATIENT
Start: 2020-08-11 | End: 2020-11-12 | Stop reason: SDUPTHER

## 2020-08-11 NOTE — PROGRESS NOTES
The patient location is: [facility]  The chief complaint leading to consultation is: bipolar d/o    Visit type: audiovisual    Face to Face time with patient: 15 min  15 minutes of total time spent on the encounter, which includes face to face time and non-face to face time preparing to see the patient (eg, review of tests), Obtaining and/or reviewing separately obtained history, Documenting clinical information in the electronic or other health record, Independently interpreting results (not separately reported) and communicating results to the patient/family/caregiver, or Care coordination (not separately reported).         Each patient to whom he or she provides medical services by telemedicine is:  (1) informed of the relationship between the physician and patient and the respective role of any other health care provider with respect to management of the patient; and (2) notified that he or she may decline to receive medical services by telemedicine and may withdraw from such care at any time.    Notes:   ESTABLISHED OUTPATIENT VISIT     ENCOUNTER DATE: 8/11/2020  SITE: [facility] [address]    HISTORY    CHIEF COMPLAINT   Mirza Morales is a 65 y.o. male who presents for follow up of bipolar d/o.    HPI     Some depression. Low energy. Modafinil somewhat helpful for energy, therefore pt. Has continued to take it[he had been asked to stop taking it during recent hospitalization].    Most recent seizure about 2 weeks ago.    No alcohol. No marijuana. Smoking about 10 cigarettes per day.    PAST MEDICAL, FAMILY AND SOCIAL HISTORY: The patient's past medical, family and social history have been reviewed and updated as appropriate within the electronic medical record - see encounter notes    PSYCHOTROPIC MEDICATIONS   Latuda 60 mg in the evening, Seroquel 200 mg at bedtime, Trazodone 200 mg at bedtime, Propranolol 20 mg bid for tremor, Provigil 150 mg qam    Scheduled and PRN Medications      LABORATORY DATA  Lab Visit on 08/03/2020   Component Date Value Ref Range Status    Sodium 08/03/2020 135* 136 - 145 mmol/L Final    Potassium 08/03/2020 4.9  3.5 - 5.1 mmol/L Final    Chloride 08/03/2020 100  95 - 110 mmol/L Final    CO2 08/03/2020 28  23 - 29 mmol/L Final    Glucose 08/03/2020 107  70 - 110 mg/dL Final    BUN, Bld 08/03/2020 12  8 - 23 mg/dL Final    Creatinine 08/03/2020 1.9* 0.5 - 1.4 mg/dL Final    Calcium 08/03/2020 9.6  8.7 - 10.5 mg/dL Final    Anion Gap 08/03/2020 7* 8 - 16 mmol/L Final    eGFR if  08/03/2020 42.1* >60 mL/min/1.73 m^2 Final    eGFR if non African American 08/03/2020 36.4* >60 mL/min/1.73 m^2 Final    Comment: Calculation used to obtain the estimated glomerular filtration  rate (eGFR) is the CKD-EPI equation.       CPK 08/03/2020 88  20 - 200 U/L Final    Cholesterol 08/03/2020 127  120 - 199 mg/dL Final    Comment: The National Cholesterol Education Program (NCEP) has set the  following guidelines (reference ranges) for Cholesterol:  Optimal.....................<200 mg/dL  Borderline High.............200-239 mg/dL  High........................> or = 240 mg/dL      Triglycerides 08/03/2020 99  30 - 150 mg/dL Final    Comment: The National Cholesterol Education Program (NCEP) has set the  following guidelines (reference values) for triglycerides:  Normal......................<150 mg/dL  Borderline High.............150-199 mg/dL  High........................200-499 mg/dL      HDL 08/03/2020 52  40 - 75 mg/dL Final    Comment: The National Cholesterol Education Program (NCEP) has set the  following guidelines (reference values) for HDL Cholesterol:  Low...............<40 mg/dL  Optimal...........>60 mg/dL      LDL Cholesterol 08/03/2020 55.2* 63.0 - 159.0 mg/dL Final    Comment: The National Cholesterol Education Program (NCEP) has set the  following guidelines (reference values) for LDL Cholesterol:  Optimal.......................<130  mg/dL  Borderline High...............130-159 mg/dL  High..........................160-189 mg/dL  Very High.....................>190 mg/dL      Hdl/Cholesterol Ratio 08/03/2020 40.9  20.0 - 50.0 % Final    Total Cholesterol/HDL Ratio 08/03/2020 2.4  2.0 - 5.0 Final    Non-HDL Cholesterol 08/03/2020 75  mg/dL Final    Comment: Risk category and Non-HDL cholesterol goals:  Coronary heart disease (CHD)or equivalent (10-year risk of CHD >20%):  Non-HDL cholesterol goal     <130 mg/dL  Two or more CHD risk factors and 10-year risk of CHD <= 20%:  Non-HDL cholesterol goal     <160 mg/dL  0 to 1 CHD risk factor:  Non-HDL cholesterol goal     <190 mg/dL      PSA, SCREEN 08/03/2020 0.62  0.00 - 4.00 ng/mL Final    Comment: PSA Expected levels:  Hormonal Therapy: <0.05 ng/ml  Prostatectomy: <0.01 ng/ml  Radiation Therapy: <1.00 ng/ml     Admission on 07/20/2020, Discharged on 07/21/2020   Component Date Value Ref Range Status    WBC 07/20/2020 6.73  3.90 - 12.70 K/uL Final    RBC 07/20/2020 4.18* 4.60 - 6.20 M/uL Final    Hemoglobin 07/20/2020 14.1  14.0 - 18.0 g/dL Final    Hematocrit 07/20/2020 41.4  40.0 - 54.0 % Final    Mean Corpuscular Volume 07/20/2020 99* 82 - 98 fL Final    Mean Corpuscular Hemoglobin 07/20/2020 33.7* 27.0 - 31.0 pg Final    Mean Corpuscular Hemoglobin Conc 07/20/2020 34.1  32.0 - 36.0 g/dL Final    RDW 07/20/2020 11.6  11.5 - 14.5 % Final    Platelets 07/20/2020 188  150 - 350 K/uL Final    MPV 07/20/2020 9.4  9.2 - 12.9 fL Final    Immature Granulocytes 07/20/2020 0.9* 0.0 - 0.5 % Final    Gran # (ANC) 07/20/2020 4.8  1.8 - 7.7 K/uL Final    Immature Grans (Abs) 07/20/2020 0.06* 0.00 - 0.04 K/uL Final    Comment: Mild elevation in immature granulocytes is non specific and   can be seen in a variety of conditions including stress response,   acute inflammation, trauma and pregnancy. Correlation with other   laboratory and clinical findings is essential.      Lymph # 07/20/2020 1.1   1.0 - 4.8 K/uL Final    Mono # 07/20/2020 0.6  0.3 - 1.0 K/uL Final    Eos # 07/20/2020 0.2  0.0 - 0.5 K/uL Final    Baso # 07/20/2020 0.04  0.00 - 0.20 K/uL Final    nRBC 07/20/2020 0  0 /100 WBC Final    Gran% 07/20/2020 71.3  38.0 - 73.0 % Final    Lymph% 07/20/2020 15.9* 18.0 - 48.0 % Final    Mono% 07/20/2020 8.6  4.0 - 15.0 % Final    Eosinophil% 07/20/2020 2.7  0.0 - 8.0 % Final    Basophil% 07/20/2020 0.6  0.0 - 1.9 % Final    Differential Method 07/20/2020 Automated   Final    Sodium 07/20/2020 126* 136 - 145 mmol/L Final    Potassium 07/20/2020 4.2  3.5 - 5.1 mmol/L Final    Chloride 07/20/2020 95  95 - 110 mmol/L Final    CO2 07/20/2020 24  23 - 29 mmol/L Final    Glucose 07/20/2020 109  70 - 110 mg/dL Final    BUN, Bld 07/20/2020 10  8 - 23 mg/dL Final    Creatinine 07/20/2020 1.0  0.5 - 1.4 mg/dL Final    Calcium 07/20/2020 9.0  8.7 - 10.5 mg/dL Final    Total Protein 07/20/2020 7.1  6.0 - 8.4 g/dL Final    Albumin 07/20/2020 3.9  3.5 - 5.2 g/dL Final    Total Bilirubin 07/20/2020 0.5  0.1 - 1.0 mg/dL Final    Comment: For infants and newborns, interpretation of results should be based  on gestational age, weight and in agreement with clinical  observations.  Premature Infant recommended reference ranges:  Up to 24 hours.............<8.0 mg/dL  Up to 48 hours............<12.0 mg/dL  3-5 days..................<15.0 mg/dL  6-29 days.................<15.0 mg/dL      Alkaline Phosphatase 07/20/2020 68  55 - 135 U/L Final    AST 07/20/2020 26  10 - 40 U/L Final    ALT 07/20/2020 26  10 - 44 U/L Final    Anion Gap 07/20/2020 7* 8 - 16 mmol/L Final    eGFR if African American 07/20/2020 >60.0  >60 mL/min/1.73 m^2 Final    eGFR if non African American 07/20/2020 >60.0  >60 mL/min/1.73 m^2 Final    Comment: Calculation used to obtain the estimated glomerular filtration  rate (eGFR) is the CKD-EPI equation.       TSH 07/20/2020 1.732  0.400 - 4.000 uIU/mL Final    Specimen UA  07/20/2020 Urine, Clean Catch   Final    Color, UA 07/20/2020 Yellow  Yellow, Straw, Emily Final    Appearance, UA 07/20/2020 Clear  Clear Final    pH, UA 07/20/2020 7.0  5.0 - 8.0 Final    Specific Gravity, UA 07/20/2020 1.010  1.005 - 1.030 Final    Protein, UA 07/20/2020 1+* Negative Final    Comment: Recommend a 24 hour urine protein or a urine   protein/creatinine ratio if globulin induced proteinuria is  clinically suspected.      Glucose, UA 07/20/2020 Negative  Negative Final    Ketones, UA 07/20/2020 Negative  Negative Final    Bilirubin (UA) 07/20/2020 Negative  Negative Final    Occult Blood UA 07/20/2020 Negative  Negative Final    Nitrite, UA 07/20/2020 Negative  Negative Final    Leukocytes, UA 07/20/2020 Negative  Negative Final    Benzodiazepines 07/20/2020 Negative   Final    Methadone metabolites 07/20/2020 Negative   Final    Cocaine (Metab.) 07/20/2020 Negative   Final    Opiate Scrn, Ur 07/20/2020 Negative   Final    Barbiturate Screen, Ur 07/20/2020 Negative   Final    Amphetamine Screen, Ur 07/20/2020 Negative   Final    THC 07/20/2020 Negative   Final    Phencyclidine 07/20/2020 Negative   Final    Creatinine, Random Ur 07/20/2020 60.0  23.0 - 375.0 mg/dL Final    Comment: The random urine reference ranges provided were established   for 24 hour urine collections.  No reference ranges exist for  random urine specimens.  Correlate clinically.      Toxicology Information 07/20/2020 SEE COMMENT   Final    Comment: This screen includes the following classes of drugs at the   listed cut-off:  Benzodiazepines                  200 ng/ml  Methadone                        300 ng/ml  Cocaine metabolite               300 ng/ml  Opiates                          300 ng/ml  Barbiturates                     200 ng/ml  Amphetamines                    1000 ng/ml  Marijuana metabs (THC)            50 ng/ml  Phencyclidine (PCP)               25 ng/ml  High concentrations of Diphenhydramine  may cross-react with  Phencyclidine PCP screening immunoassay giving a false   positive result.  High concentrations of Methylenedioxymethamphetamine (MDMA aka  Ectasy) and other structurally similar compounds may cross-   react with the Amphetamine/Methamphetamine screening   immunoassay giving a false positive result.  A metabolite of the anti-HIV drug Sustiva () may cause  false positive results in the Marijuana metabolite (THC)   screening assay.  Note: This exception list includes only more common   interferants i                           n toxicology screen testing.  Because of many   cross-reactantspositive results on toxicology drug screens   should be confirmed whenever results do not correlate with   clinical presentation.  This report is intended for use in clinical monitoring and  management of patients. It is not intended for use in   employment related drug testing.  Because of any cross-reactants, positive results on toxicology  drug screens should be confirmed whenever results do not  correlate with clinical presentation.  Presumptive positive results are unconfirmed and may be used   only for medical purposes.  Assay Intended Use: This assay provides only a preliminary analytical  test result. A more specific alternate chemical method must be used  to obtain a confirmed analytical result. Gas chromatography/mass  spectrometry (GS/MS)is the preferred confirmatory method. Clinical  consideration and professional judgement should be applied to any   drug of abuse test result, particularly when preliminary resul                           ts  are used.      Alcohol, Medical, Serum 07/20/2020 <10  <10 mg/dL Final    Acetaminophen (Tylenol), Serum 07/20/2020 <3.0* 10.0 - 20.0 ug/mL Final    Comment: Toxic Levels:  Adults (4 hr post-ingestion).........>150 ug/mL  Adults (12 hr post-ingestion)........>40 ug/mL  Peds (2 hr post-ingestion, liquid)...>225 ug/mL      SARS-CoV-2 RNA, Amplification,  Qual 07/20/2020 Negative  Negative Final    Comment: This test utilizes isothermal nucleic acid amplification   technology to detect the SARS-CoV-2 RdRp nucleic acid segment.   The analytical sensitivity (limit of detection) is 125 genome   equivalents/mL.   A POSITIVE result implies infection with the SARS-CoV-2 virus;  the patient is presumed to be contagious.    A NEGATIVE result means that SARS-CoV-2 nucleic acids are not  present above the limit of detection. A NEGATIVE result should be   treated as presumptive. It does not rule out the possibility of   COVID-19 and should not be the sole basis for treatment decisions.   If COVID-19 is strongly suspected based on clinical and exposure   history, re-testing using an alternate molecular assay should be   considered.   This test is only for use under the Food and Drug   Administration s Emergency Use Authorization (EUA).   Commercial kits are provided by [employer].   Performance characteristics of the EUA have been independently  verified by [facility] Department o                           f  Pathology and Laboratory Medicine.   _________________________________________________________________  The ID NOW COVID-19 Letter of Authorization, along with the   authorized Fact Sheet for Healthcare Providers, the authorized Fact  Sheet for Patients, and authorized labeling are available on the FDA   website:  www.fda.gov/MedicalDevices/Safety/EmergencySituations/rbn433443.htm      RBC, UA 07/20/2020 1  0 - 4 /hpf Final    WBC, UA 07/20/2020 1  0 - 5 /hpf Final    Bacteria 07/20/2020 Rare  None-Occ /hpf Final    Squam Epithel, UA 07/20/2020 0  /hpf Final    Hyaline Casts, UA 07/20/2020 0  0-1/lpf /lpf Final    Microscopic Comment 07/20/2020 SEE COMMENT   Final    Comment: Other formed elements not mentioned in the report are not   present in the microscopic examination.       Osmolality, Ur 07/20/2020 308  50 - 1200 mOsm/kg Final    Comment:  The random urine reference ranges provided were established   for 24 hour urine collections.  No reference ranges exist for  random urine specimens.  Correlate clinically.      Sodium 07/20/2020 132* 136 - 145 mmol/L Final    Potassium 07/20/2020 4.2  3.5 - 5.1 mmol/L Final    Chloride 07/20/2020 98  95 - 110 mmol/L Final    CO2 07/20/2020 27  23 - 29 mmol/L Final    Glucose 07/20/2020 126* 70 - 110 mg/dL Final    BUN, Bld 07/20/2020 10  8 - 23 mg/dL Final    Creatinine 07/20/2020 1.0  0.5 - 1.4 mg/dL Final    Calcium 07/20/2020 9.0  8.7 - 10.5 mg/dL Final    Anion Gap 07/20/2020 7* 8 - 16 mmol/L Final    eGFR if African American 07/20/2020 >60.0  >60 mL/min/1.73 m^2 Final    eGFR if non African American 07/20/2020 >60.0  >60 mL/min/1.73 m^2 Final    Comment: Calculation used to obtain the estimated glomerular filtration  rate (eGFR) is the CKD-EPI equation.       Sodium Urine Random 07/21/2020 30  20 - 250 mmol/L Final    Comment: The random urine reference ranges provided were established   for 24 hour urine collections.  No reference ranges exist for  random urine specimens.  Correlate clinically.      Potassium Urine Random 07/21/2020 40  15 - 95 mmol/L Final    Comment: The random urine reference ranges provided were established   for 24 hour urine collections.  No reference ranges exist for  random urine specimens.  Correlate clinically.      Osmolality 07/20/2020 271* 280 - 300 mOsm/kg Final    Sodium 07/21/2020 132* 136 - 145 mmol/L Final    Potassium 07/21/2020 4.7  3.5 - 5.1 mmol/L Final    Chloride 07/21/2020 97  95 - 110 mmol/L Final    CO2 07/21/2020 28  23 - 29 mmol/L Final    Glucose 07/21/2020 109  70 - 110 mg/dL Final    BUN, Bld 07/21/2020 14  8 - 23 mg/dL Final    Creatinine 07/21/2020 1.2  0.5 - 1.4 mg/dL Final    Calcium 07/21/2020 9.1  8.7 - 10.5 mg/dL Final    Anion Gap 07/21/2020 7* 8 - 16 mmol/L Final    eGFR if African American 07/21/2020 >60.0  >60 mL/min/1.73 m^2  Final    eGFR if non African American 07/21/2020 >60.0  >60 mL/min/1.73 m^2 Final    Comment: Calculation used to obtain the estimated glomerular filtration  rate (eGFR) is the CKD-EPI equation.       Sodium 07/21/2020 131* 136 - 145 mmol/L Final    Potassium 07/21/2020 4.8  3.5 - 5.1 mmol/L Final    Chloride 07/21/2020 98  95 - 110 mmol/L Final    CO2 07/21/2020 28  23 - 29 mmol/L Final    Glucose 07/21/2020 107  70 - 110 mg/dL Final    BUN, Bld 07/21/2020 14  8 - 23 mg/dL Final    Creatinine 07/21/2020 1.2  0.5 - 1.4 mg/dL Final    Calcium 07/21/2020 9.0  8.7 - 10.5 mg/dL Final    Total Protein 07/21/2020 6.4  6.0 - 8.4 g/dL Final    Albumin 07/21/2020 3.4* 3.5 - 5.2 g/dL Final    Total Bilirubin 07/21/2020 0.5  0.1 - 1.0 mg/dL Final    Comment: For infants and newborns, interpretation of results should be based  on gestational age, weight and in agreement with clinical  observations.  Premature Infant recommended reference ranges:  Up to 24 hours.............<8.0 mg/dL  Up to 48 hours............<12.0 mg/dL  3-5 days..................<15.0 mg/dL  6-29 days.................<15.0 mg/dL      Alkaline Phosphatase 07/21/2020 61  55 - 135 U/L Final    AST 07/21/2020 25  10 - 40 U/L Final    ALT 07/21/2020 25  10 - 44 U/L Final    Anion Gap 07/21/2020 5* 8 - 16 mmol/L Final    eGFR if African American 07/21/2020 >60.0  >60 mL/min/1.73 m^2 Final    eGFR if non African American 07/21/2020 >60.0  >60 mL/min/1.73 m^2 Final    Comment: Calculation used to obtain the estimated glomerular filtration  rate (eGFR) is the CKD-EPI equation.       Magnesium 07/21/2020 2.1  1.6 - 2.6 mg/dL Final    Phosphorus 07/21/2020 3.7  2.7 - 4.5 mg/dL Final    WBC 07/21/2020 5.86  3.90 - 12.70 K/uL Final    RBC 07/21/2020 4.15* 4.60 - 6.20 M/uL Final    Hemoglobin 07/21/2020 14.1  14.0 - 18.0 g/dL Final    Hematocrit 07/21/2020 41.5  40.0 - 54.0 % Final    Mean Corpuscular Volume 07/21/2020 100* 82 - 98 fL Final     Mean Corpuscular Hemoglobin 07/21/2020 34.0* 27.0 - 31.0 pg Final    Mean Corpuscular Hemoglobin Conc 07/21/2020 34.0  32.0 - 36.0 g/dL Final    RDW 07/21/2020 11.9  11.5 - 14.5 % Final    Platelets 07/21/2020 170  150 - 350 K/uL Final    MPV 07/21/2020 9.7  9.2 - 12.9 fL Final    Immature Granulocytes 07/21/2020 1.2* 0.0 - 0.5 % Final    Gran # (ANC) 07/21/2020 3.2  1.8 - 7.7 K/uL Final    Immature Grans (Abs) 07/21/2020 0.07* 0.00 - 0.04 K/uL Final    Comment: Mild elevation in immature granulocytes is non specific and   can be seen in a variety of conditions including stress response,   acute inflammation, trauma and pregnancy. Correlation with other   laboratory and clinical findings is essential.      Lymph # 07/21/2020 1.6  1.0 - 4.8 K/uL Final    Mono # 07/21/2020 0.7  0.3 - 1.0 K/uL Final    Eos # 07/21/2020 0.2  0.0 - 0.5 K/uL Final    Baso # 07/21/2020 0.04  0.00 - 0.20 K/uL Final    nRBC 07/21/2020 0  0 /100 WBC Final    Gran% 07/21/2020 54.2  38.0 - 73.0 % Final    Lymph% 07/21/2020 28.0  18.0 - 48.0 % Final    Mono% 07/21/2020 12.3  4.0 - 15.0 % Final    Eosinophil% 07/21/2020 3.6  0.0 - 8.0 % Final    Basophil% 07/21/2020 0.7  0.0 - 1.9 % Final    Differential Method 07/21/2020 Automated   Final    CPK 07/21/2020 404* 20 - 200 U/L Final    BNP 07/21/2020 10  0 - 99 pg/mL Final    Values of less than 100 pg/ml are consistent with non-CHF populations.    Sodium 07/21/2020 130* 136 - 145 mmol/L Final    Potassium 07/21/2020 4.6  3.5 - 5.1 mmol/L Final    Chloride 07/21/2020 97  95 - 110 mmol/L Final    CO2 07/21/2020 27  23 - 29 mmol/L Final    Glucose 07/21/2020 107  70 - 110 mg/dL Final    BUN, Bld 07/21/2020 13  8 - 23 mg/dL Final    Creatinine 07/21/2020 1.1  0.5 - 1.4 mg/dL Final    Calcium 07/21/2020 9.1  8.7 - 10.5 mg/dL Final    Anion Gap 07/21/2020 6* 8 - 16 mmol/L Final    eGFR if African American 07/21/2020 >60.0  >60 mL/min/1.73 m^2 Final    eGFR if non African  American 07/21/2020 >60.0  >60 mL/min/1.73 m^2 Final    Comment: Calculation used to obtain the estimated glomerular filtration  rate (eGFR) is the CKD-EPI equation.       Sodium 07/21/2020 132* 136 - 145 mmol/L Final    Potassium 07/21/2020 4.6  3.5 - 5.1 mmol/L Final    Chloride 07/21/2020 100  95 - 110 mmol/L Final    CO2 07/21/2020 25  23 - 29 mmol/L Final    Glucose 07/21/2020 109  70 - 110 mg/dL Final    BUN, Bld 07/21/2020 13  8 - 23 mg/dL Final    Creatinine 07/21/2020 1.1  0.5 - 1.4 mg/dL Final    Calcium 07/21/2020 9.0  8.7 - 10.5 mg/dL Final    Anion Gap 07/21/2020 7* 8 - 16 mmol/L Final    eGFR if African American 07/21/2020 >60.0  >60 mL/min/1.73 m^2 Final    eGFR if non African American 07/21/2020 >60.0  >60 mL/min/1.73 m^2 Final    Comment: Calculation used to obtain the estimated glomerular filtration  rate (eGFR) is the CKD-EPI equation.        EXAMINATION      CONSTITUTIONAL  General Appearance: well nourished    MUSCULOSKELETAL  Muscle Strength and Tone: normal strength and tone  Abnormal Involuntary Movements: no abnormal movement noted  Gait and Station: normal gait    PSYCHIATRIC   Level of Consciousness: alert  Orientation: grossly intact  Grooming: well groomed  Psychomotor Behavior: no restlessness/agitation  Speech: normal in rate, rhythm and volume  Language: normal vocabulary  Mood: steady  Affect: full range and appropriate  Thought Process: logical and goal directed  Associations: intact associations  Thought Content: no SI/HI  Memory: grossly intact  Attention: intact to content of interview  Fund of Knowledge: appears adequate  Insight: good  Judgement: good    MEDICAL DECISION MAKING    DIAGNOSES  Bipolar d/o  Alcohol Use    PROBLEM LIST AND MANAGEMENT PLANS  - continue Latuda, Seroquel, and Trazodone as above  - consider dividing daily dose of Modafinil into 3 doses[50/50/50]  - rtc 1-3 months    Time with patient: 15 min      Ambrosio Loco

## 2020-08-12 ENCOUNTER — TELEPHONE (OUTPATIENT)
Dept: INTERNAL MEDICINE | Facility: CLINIC | Age: 65
End: 2020-08-12

## 2020-08-12 DIAGNOSIS — E87.5 HYPERKALEMIA: Primary | ICD-10-CM

## 2020-08-12 NOTE — TELEPHONE ENCOUNTER
Kidney numbers much better but unfortunately now his potassium is showing as elevated.  I think this could be a lab error; recommend repeat labs today if possible.  Will message nursing to contact pt to schedule.

## 2020-08-13 ENCOUNTER — HOSPITAL ENCOUNTER (EMERGENCY)
Facility: HOSPITAL | Age: 65
Discharge: HOME | End: 2020-08-13
Attending: EMERGENCY MEDICINE
Payer: MEDICARE

## 2020-08-13 VITALS
BODY MASS INDEX: 25.98 KG/M2 | HEART RATE: 80 BPM | OXYGEN SATURATION: 98 % | SYSTOLIC BLOOD PRESSURE: 162 MMHG | HEIGHT: 77 IN | DIASTOLIC BLOOD PRESSURE: 97 MMHG | WEIGHT: 220 LBS | RESPIRATION RATE: 18 BRPM | TEMPERATURE: 98 F

## 2020-08-13 DIAGNOSIS — Z04.9 SUSPECTED CONDITION NOT FOUND: Primary | ICD-10-CM

## 2020-08-13 DIAGNOSIS — E87.5 HYPERKALEMIA: ICD-10-CM

## 2020-08-13 LAB
ALBUMIN SERPL BCP-MCNC: 3.8 G/DL (ref 3.5–5.2)
ALP SERPL-CCNC: 66 U/L (ref 55–135)
ALT SERPL W/O P-5'-P-CCNC: 16 U/L (ref 10–44)
ANION GAP SERPL CALC-SCNC: 6 MMOL/L (ref 8–16)
AST SERPL-CCNC: 17 U/L (ref 10–40)
BASOPHILS # BLD AUTO: 0.05 K/UL (ref 0–0.2)
BASOPHILS NFR BLD: 0.7 % (ref 0–1.9)
BILIRUB SERPL-MCNC: 0.7 MG/DL (ref 0.1–1)
BUN SERPL-MCNC: 9 MG/DL (ref 8–23)
CALCIUM SERPL-MCNC: 9.4 MG/DL (ref 8.7–10.5)
CHLORIDE SERPL-SCNC: 98 MMOL/L (ref 95–110)
CO2 SERPL-SCNC: 27 MMOL/L (ref 23–29)
CREAT SERPL-MCNC: 1 MG/DL (ref 0.5–1.4)
DIFFERENTIAL METHOD: ABNORMAL
EOSINOPHIL # BLD AUTO: 0.1 K/UL (ref 0–0.5)
EOSINOPHIL NFR BLD: 2 % (ref 0–8)
ERYTHROCYTE [DISTWIDTH] IN BLOOD BY AUTOMATED COUNT: 11.9 % (ref 11.5–14.5)
EST. GFR  (AFRICAN AMERICAN): >60 ML/MIN/1.73 M^2
EST. GFR  (NON AFRICAN AMERICAN): >60 ML/MIN/1.73 M^2
GLUCOSE SERPL-MCNC: 94 MG/DL (ref 70–110)
HCT VFR BLD AUTO: 43.6 % (ref 40–54)
HGB BLD-MCNC: 14.8 G/DL (ref 14–18)
IMM GRANULOCYTES # BLD AUTO: 0.04 K/UL (ref 0–0.04)
IMM GRANULOCYTES NFR BLD AUTO: 0.6 % (ref 0–0.5)
LYMPHOCYTES # BLD AUTO: 1.9 K/UL (ref 1–4.8)
LYMPHOCYTES NFR BLD: 27.6 % (ref 18–48)
MCH RBC QN AUTO: 34.3 PG (ref 27–31)
MCHC RBC AUTO-ENTMCNC: 33.9 G/DL (ref 32–36)
MCV RBC AUTO: 101 FL (ref 82–98)
MONOCYTES # BLD AUTO: 0.7 K/UL (ref 0.3–1)
MONOCYTES NFR BLD: 9.3 % (ref 4–15)
NEUTROPHILS # BLD AUTO: 4.2 K/UL (ref 1.8–7.7)
NEUTROPHILS NFR BLD: 59.8 % (ref 38–73)
NRBC BLD-RTO: 0 /100 WBC
PLATELET # BLD AUTO: 201 K/UL (ref 150–350)
PMV BLD AUTO: 9.8 FL (ref 9.2–12.9)
POTASSIUM SERPL-SCNC: 4.2 MMOL/L (ref 3.5–5.1)
PROT SERPL-MCNC: 6.9 G/DL (ref 6–8.4)
RBC # BLD AUTO: 4.31 M/UL (ref 4.6–6.2)
SODIUM SERPL-SCNC: 131 MMOL/L (ref 136–145)
WBC # BLD AUTO: 7.02 K/UL (ref 3.9–12.7)

## 2020-08-13 PROCEDURE — 85025 COMPLETE CBC W/AUTO DIFF WBC: CPT | Mod: HCNC

## 2020-08-13 PROCEDURE — 99284 EMERGENCY DEPT VISIT MOD MDM: CPT | Mod: 25,HCNC

## 2020-08-13 PROCEDURE — 93010 EKG 12-LEAD: ICD-10-PCS | Mod: HCNC,,, | Performed by: INTERNAL MEDICINE

## 2020-08-13 PROCEDURE — 99285 EMERGENCY DEPT VISIT HI MDM: CPT | Mod: ,,, | Performed by: EMERGENCY MEDICINE

## 2020-08-13 PROCEDURE — 93010 ELECTROCARDIOGRAM REPORT: CPT | Mod: HCNC,,, | Performed by: INTERNAL MEDICINE

## 2020-08-13 PROCEDURE — 93005 ELECTROCARDIOGRAM TRACING: CPT | Mod: HCNC

## 2020-08-13 PROCEDURE — 99285 PR EMERGENCY DEPT VISIT,LEVEL V: ICD-10-PCS | Mod: ,,, | Performed by: EMERGENCY MEDICINE

## 2020-08-13 PROCEDURE — 80053 COMPREHEN METABOLIC PANEL: CPT | Mod: HCNC

## 2020-08-13 NOTE — FIRST PROVIDER EVALUATION
" Emergency Department TeleTRIAGE Encounter Note      CHIEF COMPLAINT    Chief Complaint   Patient presents with    Abnormal Lab     Patient sent from PCP for "high potassium" level.        VITAL SIGNS   Initial Vitals [08/13/20 1637]   BP Pulse Resp Temp SpO2   (!) 149/93 98 17 98.3 °F (36.8 °C) 96 %      MAP       --            ALLERGIES    Review of patient's allergies indicates:   Allergen Reactions    Gabapentin Other (See Comments)     SEVERE DIZZINESS AND BALANCE PROBLEMS, UNABLE TO WALK.    Nardil [phenelzine]      BECOMES HYPER, LIKE A MANIC EPISODE.    Penicillins Hives    Lamictal [lamotrigine] Rash       PROVIDER TRIAGE NOTE  Patient advised to come to the ER by PCP due to elevated potassium of 6.2 today. He was advised to stop lisinopril and present to ER.   Will repeat labs, perform EKG pending ED provider evaluation and treatment.       ORDERS  Labs Reviewed - No data to display    ED Orders (720h ago, onward)    None            Virtual Visit Note: The provider triage portion of this emergency department evaluation and documentation was performed via [employer], a HIPAA-compliant telemedicine application, in concert with a tele-presenter in the room. A face to face patient evaluation with one of my colleagues will occur once the patient is placed in an emergency department room.      DISCLAIMER: This note was prepared with [employer] voice recognition transcription software. Garbled syntax, mangled pronouns, and other bizarre constructions may be attributed to that software system.  "

## 2020-08-13 NOTE — ED PROVIDER NOTES
"Encounter Date: 8/13/2020       History     Chief Complaint   Patient presents with    Abnormal Lab     Patient sent from PCP for "high potassium" level.      65-year-old male with multiple medical comorbidities significant for bipolar 1 disorder, alcohol abuse, history of psychiatric care presents to the ED [facility] for hyperkalemia.  Labs revealed a potassium of 6.2 today, even higher from labs on 08/11 which was 6.0.  Patient denies any acute symptoms.  He reports fatigue that has been ongoing for the past 6 months to 1 year.  Denies chest pain, shortness of breath, heart palpitations, nausea, vomiting, diarrhea, abdominal pain, fever.         Review of patient's allergies indicates:   Allergen Reactions    Gabapentin Other (See Comments)     SEVERE DIZZINESS AND BALANCE PROBLEMS, UNABLE TO WALK.    Nardil [phenelzine]      BECOMES HYPER, LIKE A MANIC EPISODE.    Penicillins Hives    Lamictal [lamotrigine] Rash     Past Medical History:   Diagnosis Date    Alcohol abuse     Behavioral problem     Bipolar 1 disorder     Chronic right hip pain     Depression     DJD (degenerative joint disease), lumbar 1/27/2015    Fatigue     Hepatitis     pt. denies this    History of psychiatric care     History of psychiatric hospitalization     Hypertension     Karina     Post traumatic stress disorder     Psychiatric problem     Seizures     Suicide attempt     Therapy      Past Surgical History:   Procedure Laterality Date    COLONOSCOPY N/A 8/14/2019    Procedure: COLONOSCOPY;  Surgeon: Tammy Duval MD;  Location: [facility] ([address] [place] [address]);  Service: Endoscopy;  Laterality: N/A;    HERNIA REPAIR      SPINE SURGERY  11-12-15    LAMINECTOMY/LUMBAR/WARE     Family History   Problem Relation Age of Onset    Alcohol abuse Mother     Depression Mother     Depression Father     Depression Sister     Suicide Sister     Drug abuse Brother     Anxiety disorder Brother     " Bipolar disorder Brother     Depression Brother     Alcohol abuse Maternal Uncle     Alcohol abuse Paternal Uncle     Dementia Maternal Grandmother     Alcohol abuse Cousin     Amblyopia Neg Hx     Blindness Neg Hx     Cancer Neg Hx     Cataracts Neg Hx     Diabetes Neg Hx     Glaucoma Neg Hx     Hypertension Neg Hx     Macular degeneration Neg Hx     Retinal detachment Neg Hx     Strabismus Neg Hx     Stroke Neg Hx     Thyroid disease Neg Hx     Asthma Neg Hx     Emphysema Neg Hx      Social History     Tobacco Use    Smoking status: Current Some Day Smoker     Packs/day: 0.25     Years: 10.00     Pack years: 2.50    Smokeless tobacco: Never Used   Substance Use Topics    Alcohol use: No     Alcohol/week: 16.7 standard drinks     Types: 20 Standard drinks or equivalent per week     Comment: quit 4 years ago    Drug use: No     Review of Systems   Constitutional: Positive for fatigue (chronic). Negative for fever.   HENT: Negative for sore throat.    Respiratory: Negative for shortness of breath.    Cardiovascular: Negative for chest pain and palpitations.   Gastrointestinal: Negative for abdominal pain, diarrhea, nausea and vomiting.   Genitourinary: Negative for dysuria.   Musculoskeletal: Negative for back pain.   Skin: Negative for rash.   Neurological: Negative for weakness.   Hematological: Does not bruise/bleed easily.       Physical Exam     Initial Vitals [08/13/20 1637]   BP Pulse Resp Temp SpO2   (!) 149/93 98 17 98.3 °F (36.8 °C) 96 %      MAP       --         Physical Exam    Nursing note and vitals reviewed.  Constitutional: He appears well-developed and well-nourished.  Non-toxic appearance. He does not appear ill. No distress.   HENT:   Head: Normocephalic and atraumatic.   Neck: Normal range of motion. Neck supple.   Cardiovascular: Normal rate and regular rhythm. Exam reveals no gallop, no distant heart sounds and no friction rub.    No murmur heard.  Pulmonary/Chest: Effort  normal and breath sounds normal. No accessory muscle usage. No tachypnea. No respiratory distress. He has no decreased breath sounds. He has no wheezes. He has no rhonchi. He has no rales.   Abdominal: Soft. He exhibits distension. He exhibits no mass. There is no abdominal tenderness. There is no rigidity and no guarding.   Neurological: He is alert.   Skin: No rash noted.         ED Course   Procedures  Labs Reviewed   CBC W/ AUTO DIFFERENTIAL - Abnormal; Notable for the following components:       Result Value    RBC 4.31 (*)     Mean Corpuscular Volume 101 (*)     Mean Corpuscular Hemoglobin 34.3 (*)     Immature Granulocytes 0.6 (*)     All other components within normal limits   COMPREHENSIVE METABOLIC PANEL - Abnormal; Notable for the following components:    Sodium 131 (*)     Anion Gap 6 (*)     All other components within normal limits     EKG Readings: (Independently Interpreted)   Initial Reading: No STEMI. Rhythm: Normal Sinus Rhythm. Heart Rate: 82. Ectopy: No Ectopy. Axis: Normal.       Imaging Results    None          Medical Decision Making:   History:   Old Medical Records: I decided to obtain old medical records.  Independently Interpreted Test(s):   I have ordered and independently interpreted EKG Reading(s) - see prior notes  Clinical Tests:   Lab Tests: Ordered  Medical Tests: Ordered       APC / Resident Notes:   65-year-old male presents to the ED with abnormal labs obtained earlier today showing a K of 6.2.  Vitals are within normal limits.  EKG without concerning arrhythmias or acute changes.    Repeat labs today show a potassium of 4.2.  Sodium slightly decreased at 131.  Feel the patient is stable for discharge.  Advised close follow-up with his PCP in the next few days for repeat labs.  ED return precautions given.  Stable for discharge.    I have reviewed the patient's records and discussed this case with my supervising physician. Please be advised that this text was dictated with  M*Modal software and may contain dictation errors.                                   Clinical Impression:       ICD-10-CM ICD-9-CM   1. Suspected condition not found  Z04.9 V71.9   2. Hyperkalemia  E87.5 276.7         Disposition:   Disposition: Discharged  Condition: Stable     ED Disposition Condition    Discharge Stable        ED Prescriptions     None        Follow-up Information    None                                    Leighann Soler PA-C  08/13/20 1910

## 2020-08-13 NOTE — ED TRIAGE NOTES
Pt reports to Ed today via referral for hyperkalemia. Pt reports 6.2 K level. Pt denies chest pain, Sob

## 2020-08-14 ENCOUNTER — PATIENT MESSAGE (OUTPATIENT)
Dept: INTERNAL MEDICINE | Facility: CLINIC | Age: 65
End: 2020-08-14

## 2020-08-14 ENCOUNTER — TELEPHONE (OUTPATIENT)
Dept: INTERNAL MEDICINE | Facility: CLINIC | Age: 65
End: 2020-08-14

## 2020-08-14 DIAGNOSIS — E87.5 HYPERKALEMIA: Primary | ICD-10-CM

## 2020-08-14 RX ORDER — AMLODIPINE BESYLATE 2.5 MG/1
2.5 TABLET ORAL DAILY
Qty: 30 TABLET | Refills: 11 | Status: SHIPPED | OUTPATIENT
Start: 2020-08-14 | End: 2021-06-16 | Stop reason: SDUPTHER

## 2020-08-14 NOTE — DISCHARGE INSTRUCTIONS
Your labs were normal. Follow up with your doctor in the next few days for re-evaluation.    Return to the ER for any new or significantly worsening symptoms.

## 2020-08-14 NOTE — TELEPHONE ENCOUNTER
Spoke with pt and he wants to talk to dr. Mo on Monday about his hospital visit, potassium and bp meds. Pt wants to know why his potassium went up and how did to ER get it to come down so fast. Pt states the ER stopped some meds for his BP but do he needs new meds for his BP, pt informed he will need an appt but he wants to talk to md first

## 2020-08-14 NOTE — TELEPHONE ENCOUNTER
Spoke with pt - discussed possibilities for normalization of K+. Does not have BP cuff at home. Has stopped lisinopril.   BP at ED elevated. Will start amlodipine 2.5mg daily will check BP over the weekend.   Will have Dr Mo follow up on Monday. Will repeat K+

## 2020-08-14 NOTE — TELEPHONE ENCOUNTER
----- Message from Thea Ferraro sent at 8/14/2020  8:31 AM CDT -----  Contact: self [phone]  Pt states his potassium went done without having to take any medication to flush out the excess potassium. Pt also states he needs to know what blood pressure medication he needs to take instead the lisinopril (PRINIVIL,ZESTRIL) 20 MG tablet. Pt states he would like to speak to the doctor. Please call and advise.

## 2020-08-17 ENCOUNTER — LAB VISIT (OUTPATIENT)
Dept: LAB | Facility: HOSPITAL | Age: 65
End: 2020-08-17
Attending: NURSE PRACTITIONER
Payer: MEDICARE

## 2020-08-17 DIAGNOSIS — E87.5 HYPERKALEMIA: ICD-10-CM

## 2020-08-17 PROCEDURE — 80048 BASIC METABOLIC PNL TOTAL CA: CPT | Mod: HCNC

## 2020-08-17 PROCEDURE — 36415 COLL VENOUS BLD VENIPUNCTURE: CPT | Mod: HCNC

## 2020-08-18 LAB
ANION GAP SERPL CALC-SCNC: 9 MMOL/L (ref 8–16)
BUN SERPL-MCNC: 8 MG/DL (ref 8–23)
CALCIUM SERPL-MCNC: 8.9 MG/DL (ref 8.7–10.5)
CHLORIDE SERPL-SCNC: 91 MMOL/L (ref 95–110)
CO2 SERPL-SCNC: 24 MMOL/L (ref 23–29)
CREAT SERPL-MCNC: 1.1 MG/DL (ref 0.5–1.4)
EST. GFR  (AFRICAN AMERICAN): >60 ML/MIN/1.73 M^2
EST. GFR  (NON AFRICAN AMERICAN): >60 ML/MIN/1.73 M^2
GLUCOSE SERPL-MCNC: 127 MG/DL (ref 70–110)
POTASSIUM SERPL-SCNC: 4.2 MMOL/L (ref 3.5–5.1)
SODIUM SERPL-SCNC: 124 MMOL/L (ref 136–145)

## 2020-08-26 ENCOUNTER — OFFICE VISIT (OUTPATIENT)
Dept: PSYCHIATRY | Facility: CLINIC | Age: 65
End: 2020-08-26
Payer: MEDICARE

## 2020-08-26 DIAGNOSIS — F31.9 BIPOLAR 1 DISORDER: Primary | ICD-10-CM

## 2020-08-26 PROCEDURE — 99214 PR OFFICE/OUTPT VISIT, EST, LEVL IV, 30-39 MIN: ICD-10-PCS | Mod: HCNC,95,, | Performed by: PSYCHIATRY & NEUROLOGY

## 2020-08-26 PROCEDURE — 99214 OFFICE O/P EST MOD 30 MIN: CPT | Mod: HCNC,95,, | Performed by: PSYCHIATRY & NEUROLOGY

## 2020-08-26 NOTE — PROGRESS NOTES
The patient location is: [facility]  The chief complaint leading to consultation is: bipolar d/o    Visit type: audiovisual    Face to Face time with patient: 20 min  20 minutes of total time spent on the encounter, which includes face to face time and non-face to face time preparing to see the patient (eg, review of tests), Obtaining and/or reviewing separately obtained history, Documenting clinical information in the electronic or other health record, Independently interpreting results (not separately reported) and communicating results to the patient/family/caregiver, or Care coordination (not separately reported).         Each patient to whom he or she provides medical services by telemedicine is:  (1) informed of the relationship between the physician and patient and the respective role of any other health care provider with respect to management of the patient; and (2) notified that he or she may decline to receive medical services by telemedicine and may withdraw from such care at any time.    Notes:   ESTABLISHED OUTPATIENT VISIT     ENCOUNTER DATE: 8/26/2020  SITE: [facility], [address]    HISTORY    CHIEF COMPLAINT   Mirza Morales is a 65 y.o. male who presents for follow up of bipolar d/o.    HPI     Pt. During today's visit shows disorganized thought process.    Pt. denies recent seizure, however wife reports recent seizure.    No alcohol. No marijuana. Smoking about 1.5 packs of cigarettes per day.    During today's visit I spoke separately with pt.'s wife Anamaria. Wife reports, that pt. Recently had a seizure.    PAST MEDICAL, FAMILY AND SOCIAL HISTORY: The patient's past medical, family and social history have been reviewed and updated as appropriate within the electronic medical record - see encounter notes    PSYCHOTROPIC MEDICATIONS   Latuda 60 mg in the evening, Seroquel 200 mg at bedtime, Trazodone 200 mg at bedtime, Propranolol 20 mg bid for tremor, states that he took  Provigil yesterday[states, that he has not been taking it regularly]    Scheduled and PRN Medications     LABORATORY DATA  Lab Visit on 08/17/2020   Component Date Value Ref Range Status    Sodium 08/17/2020 124* 136 - 145 mmol/L Final    Potassium 08/17/2020 4.2  3.5 - 5.1 mmol/L Final    Chloride 08/17/2020 91* 95 - 110 mmol/L Final    CO2 08/17/2020 24  23 - 29 mmol/L Final    Glucose 08/17/2020 127* 70 - 110 mg/dL Final    BUN, Bld 08/17/2020 8  8 - 23 mg/dL Final    Creatinine 08/17/2020 1.1  0.5 - 1.4 mg/dL Final    Calcium 08/17/2020 8.9  8.7 - 10.5 mg/dL Final    Anion Gap 08/17/2020 9  8 - 16 mmol/L Final    eGFR if African American 08/17/2020 >60.0  >60 mL/min/1.73 m^2 Final    eGFR if non African American 08/17/2020 >60.0  >60 mL/min/1.73 m^2 Final    Comment: Calculation used to obtain the estimated glomerular filtration  rate (eGFR) is the CKD-EPI equation.      Admission on 08/13/2020, Discharged on 08/13/2020   Component Date Value Ref Range Status    WBC 08/13/2020 7.02  3.90 - 12.70 K/uL Final    RBC 08/13/2020 4.31* 4.60 - 6.20 M/uL Final    Hemoglobin 08/13/2020 14.8  14.0 - 18.0 g/dL Final    Hematocrit 08/13/2020 43.6  40.0 - 54.0 % Final    Mean Corpuscular Volume 08/13/2020 101* 82 - 98 fL Final    Mean Corpuscular Hemoglobin 08/13/2020 34.3* 27.0 - 31.0 pg Final    Mean Corpuscular Hemoglobin Conc 08/13/2020 33.9  32.0 - 36.0 g/dL Final    RDW 08/13/2020 11.9  11.5 - 14.5 % Final    Platelets 08/13/2020 201  150 - 350 K/uL Final    MPV 08/13/2020 9.8  9.2 - 12.9 fL Final    Immature Granulocytes 08/13/2020 0.6* 0.0 - 0.5 % Final    Gran # (ANC) 08/13/2020 4.2  1.8 - 7.7 K/uL Final    Immature Grans (Abs) 08/13/2020 0.04  0.00 - 0.04 K/uL Final    Comment: Mild elevation in immature granulocytes is non specific and   can be seen in a variety of conditions including stress response,   acute inflammation, trauma and pregnancy. Correlation with other   laboratory and  clinical findings is essential.      Lymph # 08/13/2020 1.9  1.0 - 4.8 K/uL Final    Mono # 08/13/2020 0.7  0.3 - 1.0 K/uL Final    Eos # 08/13/2020 0.1  0.0 - 0.5 K/uL Final    Baso # 08/13/2020 0.05  0.00 - 0.20 K/uL Final    nRBC 08/13/2020 0  0 /100 WBC Final    Gran% 08/13/2020 59.8  38.0 - 73.0 % Final    Lymph% 08/13/2020 27.6  18.0 - 48.0 % Final    Mono% 08/13/2020 9.3  4.0 - 15.0 % Final    Eosinophil% 08/13/2020 2.0  0.0 - 8.0 % Final    Basophil% 08/13/2020 0.7  0.0 - 1.9 % Final    Differential Method 08/13/2020 Automated   Final    Sodium 08/13/2020 131* 136 - 145 mmol/L Final    Potassium 08/13/2020 4.2  3.5 - 5.1 mmol/L Final    Chloride 08/13/2020 98  95 - 110 mmol/L Final    CO2 08/13/2020 27  23 - 29 mmol/L Final    Glucose 08/13/2020 94  70 - 110 mg/dL Final    BUN, Bld 08/13/2020 9  8 - 23 mg/dL Final    Creatinine 08/13/2020 1.0  0.5 - 1.4 mg/dL Final    Calcium 08/13/2020 9.4  8.7 - 10.5 mg/dL Final    Total Protein 08/13/2020 6.9  6.0 - 8.4 g/dL Final    Albumin 08/13/2020 3.8  3.5 - 5.2 g/dL Final    Total Bilirubin 08/13/2020 0.7  0.1 - 1.0 mg/dL Final    Comment: For infants and newborns, interpretation of results should be based  on gestational age, weight and in agreement with clinical  observations.  Premature Infant recommended reference ranges:  Up to 24 hours.............<8.0 mg/dL  Up to 48 hours............<12.0 mg/dL  3-5 days..................<15.0 mg/dL  6-29 days.................<15.0 mg/dL      Alkaline Phosphatase 08/13/2020 66  55 - 135 U/L Final    AST 08/13/2020 17  10 - 40 U/L Final    ALT 08/13/2020 16  10 - 44 U/L Final    Anion Gap 08/13/2020 6* 8 - 16 mmol/L Final    eGFR if African American 08/13/2020 >60.0  >60 mL/min/1.73 m^2 Final    eGFR if non African American 08/13/2020 >60.0  >60 mL/min/1.73 m^2 Final    Comment: Calculation used to obtain the estimated glomerular filtration  rate (eGFR) is the CKD-EPI equation.      Lab Visit on  08/13/2020   Component Date Value Ref Range Status    Potassium 08/13/2020 6.2* 3.5 - 5.1 mmol/L Final    *No Visible Hemolysis   Lab Visit on 08/11/2020   Component Date Value Ref Range Status    Sodium 08/11/2020 137  136 - 145 mmol/L Final    Potassium 08/11/2020 6.0* 3.5 - 5.1 mmol/L Final    *No Visible Hemolysis    Chloride 08/11/2020 100  95 - 110 mmol/L Final    CO2 08/11/2020 30* 23 - 29 mmol/L Final    Glucose 08/11/2020 102  70 - 110 mg/dL Final    BUN, Bld 08/11/2020 13  8 - 23 mg/dL Final    Creatinine 08/11/2020 1.3  0.5 - 1.4 mg/dL Final    Calcium 08/11/2020 10.1  8.7 - 10.5 mg/dL Final    Anion Gap 08/11/2020 7* 8 - 16 mmol/L Final    eGFR if African American 08/11/2020 >60.0  >60 mL/min/1.73 m^2 Final    eGFR if non African American 08/11/2020 57.7* >60 mL/min/1.73 m^2 Final    Comment: Calculation used to obtain the estimated glomerular filtration  rate (eGFR) is the CKD-EPI equation.      Lab Visit on 08/03/2020   Component Date Value Ref Range Status    Sodium 08/03/2020 135* 136 - 145 mmol/L Final    Potassium 08/03/2020 4.9  3.5 - 5.1 mmol/L Final    Chloride 08/03/2020 100  95 - 110 mmol/L Final    CO2 08/03/2020 28  23 - 29 mmol/L Final    Glucose 08/03/2020 107  70 - 110 mg/dL Final    BUN, Bld 08/03/2020 12  8 - 23 mg/dL Final    Creatinine 08/03/2020 1.9* 0.5 - 1.4 mg/dL Final    Calcium 08/03/2020 9.6  8.7 - 10.5 mg/dL Final    Anion Gap 08/03/2020 7* 8 - 16 mmol/L Final    eGFR if  08/03/2020 42.1* >60 mL/min/1.73 m^2 Final    eGFR if non African American 08/03/2020 36.4* >60 mL/min/1.73 m^2 Final    Comment: Calculation used to obtain the estimated glomerular filtration  rate (eGFR) is the CKD-EPI equation.       CPK 08/03/2020 88  20 - 200 U/L Final    Cholesterol 08/03/2020 127  120 - 199 mg/dL Final    Comment: The National Cholesterol Education Program (NCEP) has set the  following guidelines (reference ranges) for  Cholesterol:  Optimal.....................<200 mg/dL  Borderline High.............200-239 mg/dL  High........................> or = 240 mg/dL      Triglycerides 08/03/2020 99  30 - 150 mg/dL Final    Comment: The National Cholesterol Education Program (NCEP) has set the  following guidelines (reference values) for triglycerides:  Normal......................<150 mg/dL  Borderline High.............150-199 mg/dL  High........................200-499 mg/dL      HDL 08/03/2020 52  40 - 75 mg/dL Final    Comment: The National Cholesterol Education Program (NCEP) has set the  following guidelines (reference values) for HDL Cholesterol:  Low...............<40 mg/dL  Optimal...........>60 mg/dL      LDL Cholesterol 08/03/2020 55.2* 63.0 - 159.0 mg/dL Final    Comment: The National Cholesterol Education Program (NCEP) has set the  following guidelines (reference values) for LDL Cholesterol:  Optimal.......................<130 mg/dL  Borderline High...............130-159 mg/dL  High..........................160-189 mg/dL  Very High.....................>190 mg/dL      Hdl/Cholesterol Ratio 08/03/2020 40.9  20.0 - 50.0 % Final    Total Cholesterol/HDL Ratio 08/03/2020 2.4  2.0 - 5.0 Final    Non-HDL Cholesterol 08/03/2020 75  mg/dL Final    Comment: Risk category and Non-HDL cholesterol goals:  Coronary heart disease (CHD)or equivalent (10-year risk of CHD >20%):  Non-HDL cholesterol goal     <130 mg/dL  Two or more CHD risk factors and 10-year risk of CHD <= 20%:  Non-HDL cholesterol goal     <160 mg/dL  0 to 1 CHD risk factor:  Non-HDL cholesterol goal     <190 mg/dL      PSA, SCREEN 08/03/2020 0.62  0.00 - 4.00 ng/mL Final    Comment: PSA Expected levels:  Hormonal Therapy: <0.05 ng/ml  Prostatectomy: <0.01 ng/ml  Radiation Therapy: <1.00 ng/ml     Admission on 07/20/2020, Discharged on 07/21/2020   Component Date Value Ref Range Status    WBC 07/20/2020 6.73  3.90 - 12.70 K/uL Final    RBC 07/20/2020 4.18* 4.60 -  6.20 M/uL Final    Hemoglobin 07/20/2020 14.1  14.0 - 18.0 g/dL Final    Hematocrit 07/20/2020 41.4  40.0 - 54.0 % Final    Mean Corpuscular Volume 07/20/2020 99* 82 - 98 fL Final    Mean Corpuscular Hemoglobin 07/20/2020 33.7* 27.0 - 31.0 pg Final    Mean Corpuscular Hemoglobin Conc 07/20/2020 34.1  32.0 - 36.0 g/dL Final    RDW 07/20/2020 11.6  11.5 - 14.5 % Final    Platelets 07/20/2020 188  150 - 350 K/uL Final    MPV 07/20/2020 9.4  9.2 - 12.9 fL Final    Immature Granulocytes 07/20/2020 0.9* 0.0 - 0.5 % Final    Gran # (ANC) 07/20/2020 4.8  1.8 - 7.7 K/uL Final    Immature Grans (Abs) 07/20/2020 0.06* 0.00 - 0.04 K/uL Final    Comment: Mild elevation in immature granulocytes is non specific and   can be seen in a variety of conditions including stress response,   acute inflammation, trauma and pregnancy. Correlation with other   laboratory and clinical findings is essential.      Lymph # 07/20/2020 1.1  1.0 - 4.8 K/uL Final    Mono # 07/20/2020 0.6  0.3 - 1.0 K/uL Final    Eos # 07/20/2020 0.2  0.0 - 0.5 K/uL Final    Baso # 07/20/2020 0.04  0.00 - 0.20 K/uL Final    nRBC 07/20/2020 0  0 /100 WBC Final    Gran% 07/20/2020 71.3  38.0 - 73.0 % Final    Lymph% 07/20/2020 15.9* 18.0 - 48.0 % Final    Mono% 07/20/2020 8.6  4.0 - 15.0 % Final    Eosinophil% 07/20/2020 2.7  0.0 - 8.0 % Final    Basophil% 07/20/2020 0.6  0.0 - 1.9 % Final    Differential Method 07/20/2020 Automated   Final    Sodium 07/20/2020 126* 136 - 145 mmol/L Final    Potassium 07/20/2020 4.2  3.5 - 5.1 mmol/L Final    Chloride 07/20/2020 95  95 - 110 mmol/L Final    CO2 07/20/2020 24  23 - 29 mmol/L Final    Glucose 07/20/2020 109  70 - 110 mg/dL Final    BUN, Bld 07/20/2020 10  8 - 23 mg/dL Final    Creatinine 07/20/2020 1.0  0.5 - 1.4 mg/dL Final    Calcium 07/20/2020 9.0  8.7 - 10.5 mg/dL Final    Total Protein 07/20/2020 7.1  6.0 - 8.4 g/dL Final    Albumin 07/20/2020 3.9  3.5 - 5.2 g/dL Final    Total  Bilirubin 07/20/2020 0.5  0.1 - 1.0 mg/dL Final    Comment: For infants and newborns, interpretation of results should be based  on gestational age, weight and in agreement with clinical  observations.  Premature Infant recommended reference ranges:  Up to 24 hours.............<8.0 mg/dL  Up to 48 hours............<12.0 mg/dL  3-5 days..................<15.0 mg/dL  6-29 days.................<15.0 mg/dL      Alkaline Phosphatase 07/20/2020 68  55 - 135 U/L Final    AST 07/20/2020 26  10 - 40 U/L Final    ALT 07/20/2020 26  10 - 44 U/L Final    Anion Gap 07/20/2020 7* 8 - 16 mmol/L Final    eGFR if African American 07/20/2020 >60.0  >60 mL/min/1.73 m^2 Final    eGFR if non African American 07/20/2020 >60.0  >60 mL/min/1.73 m^2 Final    Comment: Calculation used to obtain the estimated glomerular filtration  rate (eGFR) is the CKD-EPI equation.       TSH 07/20/2020 1.732  0.400 - 4.000 uIU/mL Final    Specimen UA 07/20/2020 Urine, Clean Catch   Final    Color, UA 07/20/2020 Yellow  Yellow, Straw, Emily Final    Appearance, UA 07/20/2020 Clear  Clear Final    pH, UA 07/20/2020 7.0  5.0 - 8.0 Final    Specific Gravity, UA 07/20/2020 1.010  1.005 - 1.030 Final    Protein, UA 07/20/2020 1+* Negative Final    Comment: Recommend a 24 hour urine protein or a urine   protein/creatinine ratio if globulin induced proteinuria is  clinically suspected.      Glucose, UA 07/20/2020 Negative  Negative Final    Ketones, UA 07/20/2020 Negative  Negative Final    Bilirubin (UA) 07/20/2020 Negative  Negative Final    Occult Blood UA 07/20/2020 Negative  Negative Final    Nitrite, UA 07/20/2020 Negative  Negative Final    Leukocytes, UA 07/20/2020 Negative  Negative Final    Benzodiazepines 07/20/2020 Negative   Final    Methadone metabolites 07/20/2020 Negative   Final    Cocaine (Metab.) 07/20/2020 Negative   Final    Opiate Scrn, Ur 07/20/2020 Negative   Final    Barbiturate Screen, Ur 07/20/2020 Negative    Final    Amphetamine Screen, Ur 07/20/2020 Negative   Final    THC 07/20/2020 Negative   Final    Phencyclidine 07/20/2020 Negative   Final    Creatinine, Random Ur 07/20/2020 60.0  23.0 - 375.0 mg/dL Final    Comment: The random urine reference ranges provided were established   for 24 hour urine collections.  No reference ranges exist for  random urine specimens.  Correlate clinically.      Toxicology Information 07/20/2020 SEE COMMENT   Final    Comment: This screen includes the following classes of drugs at the   listed cut-off:  Benzodiazepines                  200 ng/ml  Methadone                        300 ng/ml  Cocaine metabolite               300 ng/ml  Opiates                          300 ng/ml  Barbiturates                     200 ng/ml  Amphetamines                    1000 ng/ml  Marijuana metabs (THC)            50 ng/ml  Phencyclidine (PCP)               25 ng/ml  High concentrations of Diphenhydramine may cross-react with  Phencyclidine PCP screening immunoassay giving a false   positive result.  High concentrations of Methylenedioxymethamphetamine (MDMA aka  Ectasy) and other structurally similar compounds may cross-   react with the Amphetamine/Methamphetamine screening   immunoassay giving a false positive result.  A metabolite of the anti-HIV drug Sustiva () may cause  false positive results in the Marijuana metabolite (THC)   screening assay.  Note: This exception list includes only more common   interferants i                           n toxicology screen testing.  Because of many   cross-reactantspositive results on toxicology drug screens   should be confirmed whenever results do not correlate with   clinical presentation.  This report is intended for use in clinical monitoring and  management of patients. It is not intended for use in   employment related drug testing.  Because of any cross-reactants, positive results on toxicology  drug screens should be confirmed whenever  results do not  correlate with clinical presentation.  Presumptive positive results are unconfirmed and may be used   only for medical purposes.  Assay Intended Use: This assay provides only a preliminary analytical  test result. A more specific alternate chemical method must be used  to obtain a confirmed analytical result. Gas chromatography/mass  spectrometry (GS/MS)is the preferred confirmatory method. Clinical  consideration and professional judgement should be applied to any   drug of abuse test result, particularly when preliminary resul                           ts  are used.      Alcohol, Medical, Serum 07/20/2020 <10  <10 mg/dL Final    Acetaminophen (Tylenol), Serum 07/20/2020 <3.0* 10.0 - 20.0 ug/mL Final    Comment: Toxic Levels:  Adults (4 hr post-ingestion).........>150 ug/mL  Adults (12 hr post-ingestion)........>40 ug/mL  Peds (2 hr post-ingestion, liquid)...>225 ug/mL      SARS-CoV-2 RNA, Amplification, Qual 07/20/2020 Negative  Negative Final    Comment: This test utilizes isothermal nucleic acid amplification   technology to detect the SARS-CoV-2 RdRp nucleic acid segment.   The analytical sensitivity (limit of detection) is 125 genome   equivalents/mL.   A POSITIVE result implies infection with the SARS-CoV-2 virus;  the patient is presumed to be contagious.    A NEGATIVE result means that SARS-CoV-2 nucleic acids are not  present above the limit of detection. A NEGATIVE result should be   treated as presumptive. It does not rule out the possibility of   COVID-19 and should not be the sole basis for treatment decisions.   If COVID-19 is strongly suspected based on clinical and exposure   history, re-testing using an alternate molecular assay should be   considered.   This test is only for use under the Food and Drug   Administration s Emergency Use Authorization (EUA).   Commercial kits are provided by "[employer]   Performance characteristics of the EUA have been  independently  verified by [facility] Department o                           f  Pathology and Laboratory Medicine.   _________________________________________________________________  The [employer] NOW COVID-19 Letter of Authorization, along with the   authorized Fact Sheet for Healthcare Providers, the authorized Fact  Sheet for Patients, and authorized labeling are available on the FDA   website:  www.fda.gov/MedicalDevices/Safety/EmergencySituations/qtl144419.htm      RBC, UA 07/20/2020 1  0 - 4 /hpf Final    WBC, UA 07/20/2020 1  0 - 5 /hpf Final    Bacteria 07/20/2020 Rare  None-Occ /hpf Final    Squam Epithel, UA 07/20/2020 0  /hpf Final    Hyaline Casts, UA 07/20/2020 0  0-1/lpf /lpf Final    Microscopic Comment 07/20/2020 SEE COMMENT   Final    Comment: Other formed elements not mentioned in the report are not   present in the microscopic examination.       Osmolality, Ur 07/20/2020 308  50 - 1200 mOsm/kg Final    Comment: The random urine reference ranges provided were established   for 24 hour urine collections.  No reference ranges exist for  random urine specimens.  Correlate clinically.      Sodium 07/20/2020 132* 136 - 145 mmol/L Final    Potassium 07/20/2020 4.2  3.5 - 5.1 mmol/L Final    Chloride 07/20/2020 98  95 - 110 mmol/L Final    CO2 07/20/2020 27  23 - 29 mmol/L Final    Glucose 07/20/2020 126* 70 - 110 mg/dL Final    BUN, Bld 07/20/2020 10  8 - 23 mg/dL Final    Creatinine 07/20/2020 1.0  0.5 - 1.4 mg/dL Final    Calcium 07/20/2020 9.0  8.7 - 10.5 mg/dL Final    Anion Gap 07/20/2020 7* 8 - 16 mmol/L Final    eGFR if African American 07/20/2020 >60.0  >60 mL/min/1.73 m^2 Final    eGFR if non African American 07/20/2020 >60.0  >60 mL/min/1.73 m^2 Final    Comment: Calculation used to obtain the estimated glomerular filtration  rate (eGFR) is the CKD-EPI equation.       Sodium Urine Random 07/21/2020 30  20 - 250 mmol/L Final    Comment: The random urine reference ranges  provided were established   for 24 hour urine collections.  No reference ranges exist for  random urine specimens.  Correlate clinically.      Potassium Urine Random 07/21/2020 40  15 - 95 mmol/L Final    Comment: The random urine reference ranges provided were established   for 24 hour urine collections.  No reference ranges exist for  random urine specimens.  Correlate clinically.      Osmolality 07/20/2020 271* 280 - 300 mOsm/kg Final    Sodium 07/21/2020 132* 136 - 145 mmol/L Final    Potassium 07/21/2020 4.7  3.5 - 5.1 mmol/L Final    Chloride 07/21/2020 97  95 - 110 mmol/L Final    CO2 07/21/2020 28  23 - 29 mmol/L Final    Glucose 07/21/2020 109  70 - 110 mg/dL Final    BUN, Bld 07/21/2020 14  8 - 23 mg/dL Final    Creatinine 07/21/2020 1.2  0.5 - 1.4 mg/dL Final    Calcium 07/21/2020 9.1  8.7 - 10.5 mg/dL Final    Anion Gap 07/21/2020 7* 8 - 16 mmol/L Final    eGFR if African American 07/21/2020 >60.0  >60 mL/min/1.73 m^2 Final    eGFR if non African American 07/21/2020 >60.0  >60 mL/min/1.73 m^2 Final    Comment: Calculation used to obtain the estimated glomerular filtration  rate (eGFR) is the CKD-EPI equation.       Sodium 07/21/2020 131* 136 - 145 mmol/L Final    Potassium 07/21/2020 4.8  3.5 - 5.1 mmol/L Final    Chloride 07/21/2020 98  95 - 110 mmol/L Final    CO2 07/21/2020 28  23 - 29 mmol/L Final    Glucose 07/21/2020 107  70 - 110 mg/dL Final    BUN, Bld 07/21/2020 14  8 - 23 mg/dL Final    Creatinine 07/21/2020 1.2  0.5 - 1.4 mg/dL Final    Calcium 07/21/2020 9.0  8.7 - 10.5 mg/dL Final    Total Protein 07/21/2020 6.4  6.0 - 8.4 g/dL Final    Albumin 07/21/2020 3.4* 3.5 - 5.2 g/dL Final    Total Bilirubin 07/21/2020 0.5  0.1 - 1.0 mg/dL Final    Comment: For infants and newborns, interpretation of results should be based  on gestational age, weight and in agreement with clinical  observations.  Premature Infant recommended reference ranges:  Up to 24 hours.............<8.0  mg/dL  Up to 48 hours............<12.0 mg/dL  3-5 days..................<15.0 mg/dL  6-29 days.................<15.0 mg/dL      Alkaline Phosphatase 07/21/2020 61  55 - 135 U/L Final    AST 07/21/2020 25  10 - 40 U/L Final    ALT 07/21/2020 25  10 - 44 U/L Final    Anion Gap 07/21/2020 5* 8 - 16 mmol/L Final    eGFR if African American 07/21/2020 >60.0  >60 mL/min/1.73 m^2 Final    eGFR if non African American 07/21/2020 >60.0  >60 mL/min/1.73 m^2 Final    Comment: Calculation used to obtain the estimated glomerular filtration  rate (eGFR) is the CKD-EPI equation.       Magnesium 07/21/2020 2.1  1.6 - 2.6 mg/dL Final    Phosphorus 07/21/2020 3.7  2.7 - 4.5 mg/dL Final    WBC 07/21/2020 5.86  3.90 - 12.70 K/uL Final    RBC 07/21/2020 4.15* 4.60 - 6.20 M/uL Final    Hemoglobin 07/21/2020 14.1  14.0 - 18.0 g/dL Final    Hematocrit 07/21/2020 41.5  40.0 - 54.0 % Final    Mean Corpuscular Volume 07/21/2020 100* 82 - 98 fL Final    Mean Corpuscular Hemoglobin 07/21/2020 34.0* 27.0 - 31.0 pg Final    Mean Corpuscular Hemoglobin Conc 07/21/2020 34.0  32.0 - 36.0 g/dL Final    RDW 07/21/2020 11.9  11.5 - 14.5 % Final    Platelets 07/21/2020 170  150 - 350 K/uL Final    MPV 07/21/2020 9.7  9.2 - 12.9 fL Final    Immature Granulocytes 07/21/2020 1.2* 0.0 - 0.5 % Final    Gran # (ANC) 07/21/2020 3.2  1.8 - 7.7 K/uL Final    Immature Grans (Abs) 07/21/2020 0.07* 0.00 - 0.04 K/uL Final    Comment: Mild elevation in immature granulocytes is non specific and   can be seen in a variety of conditions including stress response,   acute inflammation, trauma and pregnancy. Correlation with other   laboratory and clinical findings is essential.      Lymph # 07/21/2020 1.6  1.0 - 4.8 K/uL Final    Mono # 07/21/2020 0.7  0.3 - 1.0 K/uL Final    Eos # 07/21/2020 0.2  0.0 - 0.5 K/uL Final    Baso # 07/21/2020 0.04  0.00 - 0.20 K/uL Final    nRBC 07/21/2020 0  0 /100 WBC Final    Gran% 07/21/2020 54.2  38.0 - 73.0 %  Final    Lymph% 07/21/2020 28.0  18.0 - 48.0 % Final    Mono% 07/21/2020 12.3  4.0 - 15.0 % Final    Eosinophil% 07/21/2020 3.6  0.0 - 8.0 % Final    Basophil% 07/21/2020 0.7  0.0 - 1.9 % Final    Differential Method 07/21/2020 Automated   Final    CPK 07/21/2020 404* 20 - 200 U/L Final    BNP 07/21/2020 10  0 - 99 pg/mL Final    Values of less than 100 pg/ml are consistent with non-CHF populations.    Sodium 07/21/2020 130* 136 - 145 mmol/L Final    Potassium 07/21/2020 4.6  3.5 - 5.1 mmol/L Final    Chloride 07/21/2020 97  95 - 110 mmol/L Final    CO2 07/21/2020 27  23 - 29 mmol/L Final    Glucose 07/21/2020 107  70 - 110 mg/dL Final    BUN, Bld 07/21/2020 13  8 - 23 mg/dL Final    Creatinine 07/21/2020 1.1  0.5 - 1.4 mg/dL Final    Calcium 07/21/2020 9.1  8.7 - 10.5 mg/dL Final    Anion Gap 07/21/2020 6* 8 - 16 mmol/L Final    eGFR if African American 07/21/2020 >60.0  >60 mL/min/1.73 m^2 Final    eGFR if non African American 07/21/2020 >60.0  >60 mL/min/1.73 m^2 Final    Comment: Calculation used to obtain the estimated glomerular filtration  rate (eGFR) is the CKD-EPI equation.       Sodium 07/21/2020 132* 136 - 145 mmol/L Final    Potassium 07/21/2020 4.6  3.5 - 5.1 mmol/L Final    Chloride 07/21/2020 100  95 - 110 mmol/L Final    CO2 07/21/2020 25  23 - 29 mmol/L Final    Glucose 07/21/2020 109  70 - 110 mg/dL Final    BUN, Bld 07/21/2020 13  8 - 23 mg/dL Final    Creatinine 07/21/2020 1.1  0.5 - 1.4 mg/dL Final    Calcium 07/21/2020 9.0  8.7 - 10.5 mg/dL Final    Anion Gap 07/21/2020 7* 8 - 16 mmol/L Final    eGFR if African American 07/21/2020 >60.0  >60 mL/min/1.73 m^2 Final    eGFR if non African American 07/21/2020 >60.0  >60 mL/min/1.73 m^2 Final    Comment: Calculation used to obtain the estimated glomerular filtration  rate (eGFR) is the CKD-EPI equation.        EXAMINATION      CONSTITUTIONAL  General Appearance: well nourished    MUSCULOSKELETAL  Muscle Strength and  Tone: normal strength and tone  Abnormal Involuntary Movements: no abnormal movement noted  Gait and Station: normal gait    PSYCHIATRIC   Level of Consciousness: alert  Orientation: grossly intact  Grooming: well groomed  Psychomotor Behavior: no restlessness/agitation  Speech: normal in rate, rhythm and volume  Language: normal vocabulary  Mood: steady  Affect: full range and appropriate  Thought Process: some disorganization  Associations: some looseness  Thought Content: no SI/HI  Memory: grossly intact  Attention: intact to content of interview  Fund of Knowledge: appears adequate  Insight: good  Judgement: good    MEDICAL DECISION MAKING    DIAGNOSES  Bipolar d/o  Alcohol Use    PROBLEM LIST AND MANAGEMENT PLANS  - continue Latuda, Seroquel, and Trazodone as above  - discontinue Modafinil  - monitor mood  - rtc already scheduled    Time with patient: 20 min      Ambrosio Loco

## 2020-09-16 ENCOUNTER — PATIENT MESSAGE (OUTPATIENT)
Dept: PSYCHIATRY | Facility: CLINIC | Age: 65
End: 2020-09-16

## 2020-09-16 ENCOUNTER — OFFICE VISIT (OUTPATIENT)
Dept: PSYCHIATRY | Facility: CLINIC | Age: 65
End: 2020-09-16
Payer: MEDICARE

## 2020-09-16 DIAGNOSIS — F31.9 BIPOLAR 1 DISORDER: Primary | ICD-10-CM

## 2020-09-16 PROCEDURE — 99214 OFFICE O/P EST MOD 30 MIN: CPT | Mod: HCNC,95,, | Performed by: PSYCHIATRY & NEUROLOGY

## 2020-09-16 PROCEDURE — 99214 PR OFFICE/OUTPT VISIT, EST, LEVL IV, 30-39 MIN: ICD-10-PCS | Mod: HCNC,95,, | Performed by: PSYCHIATRY & NEUROLOGY

## 2020-09-16 NOTE — PROGRESS NOTES
The patient location is: [facility]  The chief complaint leading to consultation is: bipolar d/o    Visit type: audiovisual    Face to Face time with patient: 20 min  20 minutes of total time spent on the encounter, which includes face to face time and non-face to face time preparing to see the patient (eg, review of tests), Obtaining and/or reviewing separately obtained history, Documenting clinical information in the electronic or other health record, Independently interpreting results (not separately reported) and communicating results to the patient/family/caregiver, or Care coordination (not separately reported).         Each patient to whom he or she provides medical services by telemedicine is:  (1) informed of the relationship between the physician and patient and the respective role of any other health care provider with respect to management of the patient; and (2) notified that he or she may decline to receive medical services by telemedicine and may withdraw from such care at any time.    Notes:   ESTABLISHED OUTPATIENT VISIT     ENCOUNTER DATE: 9/16/2020  SITE: [facility], [address]    HISTORY    CHIEF COMPLAINT   Mirza Morales is a 65 y.o. male who presents for follow up of bipolar d/o.    HPI     Thought process today is organized and linear. Not taking Modafinil.    Denies recent seizure.    Drank alcohol on two days. No marijuana. Smoking about a pack of cigarettes per day.    PAST MEDICAL, FAMILY AND SOCIAL HISTORY: The patient's past medical, family and social history have been reviewed and updated as appropriate within the electronic medical record - see encounter notes    PSYCHOTROPIC MEDICATIONS   Latuda 60 mg in the evening, Seroquel 200 mg at bedtime, Trazodone 200 mg at bedtime, Propranolol 20 mg bid for tremor    Scheduled and PRN Medications     LABORATORY DATA  Lab Visit on 08/17/2020   Component Date Value Ref Range Status    Sodium 08/17/2020 124* 136 - 145 mmol/L  Final    Potassium 08/17/2020 4.2  3.5 - 5.1 mmol/L Final    Chloride 08/17/2020 91* 95 - 110 mmol/L Final    CO2 08/17/2020 24  23 - 29 mmol/L Final    Glucose 08/17/2020 127* 70 - 110 mg/dL Final    BUN, Bld 08/17/2020 8  8 - 23 mg/dL Final    Creatinine 08/17/2020 1.1  0.5 - 1.4 mg/dL Final    Calcium 08/17/2020 8.9  8.7 - 10.5 mg/dL Final    Anion Gap 08/17/2020 9  8 - 16 mmol/L Final    eGFR if African American 08/17/2020 >60.0  >60 mL/min/1.73 m^2 Final    eGFR if non African American 08/17/2020 >60.0  >60 mL/min/1.73 m^2 Final    Comment: Calculation used to obtain the estimated glomerular filtration  rate (eGFR) is the CKD-EPI equation.      Admission on 08/13/2020, Discharged on 08/13/2020   Component Date Value Ref Range Status    WBC 08/13/2020 7.02  3.90 - 12.70 K/uL Final    RBC 08/13/2020 4.31* 4.60 - 6.20 M/uL Final    Hemoglobin 08/13/2020 14.8  14.0 - 18.0 g/dL Final    Hematocrit 08/13/2020 43.6  40.0 - 54.0 % Final    Mean Corpuscular Volume 08/13/2020 101* 82 - 98 fL Final    Mean Corpuscular Hemoglobin 08/13/2020 34.3* 27.0 - 31.0 pg Final    Mean Corpuscular Hemoglobin Conc 08/13/2020 33.9  32.0 - 36.0 g/dL Final    RDW 08/13/2020 11.9  11.5 - 14.5 % Final    Platelets 08/13/2020 201  150 - 350 K/uL Final    MPV 08/13/2020 9.8  9.2 - 12.9 fL Final    Immature Granulocytes 08/13/2020 0.6* 0.0 - 0.5 % Final    Gran # (ANC) 08/13/2020 4.2  1.8 - 7.7 K/uL Final    Immature Grans (Abs) 08/13/2020 0.04  0.00 - 0.04 K/uL Final    Comment: Mild elevation in immature granulocytes is non specific and   can be seen in a variety of conditions including stress response,   acute inflammation, trauma and pregnancy. Correlation with other   laboratory and clinical findings is essential.      Lymph # 08/13/2020 1.9  1.0 - 4.8 K/uL Final    Mono # 08/13/2020 0.7  0.3 - 1.0 K/uL Final    Eos # 08/13/2020 0.1  0.0 - 0.5 K/uL Final    Baso # 08/13/2020 0.05  0.00 - 0.20 K/uL Final     nRBC 08/13/2020 0  0 /100 WBC Final    Gran% 08/13/2020 59.8  38.0 - 73.0 % Final    Lymph% 08/13/2020 27.6  18.0 - 48.0 % Final    Mono% 08/13/2020 9.3  4.0 - 15.0 % Final    Eosinophil% 08/13/2020 2.0  0.0 - 8.0 % Final    Basophil% 08/13/2020 0.7  0.0 - 1.9 % Final    Differential Method 08/13/2020 Automated   Final    Sodium 08/13/2020 131* 136 - 145 mmol/L Final    Potassium 08/13/2020 4.2  3.5 - 5.1 mmol/L Final    Chloride 08/13/2020 98  95 - 110 mmol/L Final    CO2 08/13/2020 27  23 - 29 mmol/L Final    Glucose 08/13/2020 94  70 - 110 mg/dL Final    BUN, Bld 08/13/2020 9  8 - 23 mg/dL Final    Creatinine 08/13/2020 1.0  0.5 - 1.4 mg/dL Final    Calcium 08/13/2020 9.4  8.7 - 10.5 mg/dL Final    Total Protein 08/13/2020 6.9  6.0 - 8.4 g/dL Final    Albumin 08/13/2020 3.8  3.5 - 5.2 g/dL Final    Total Bilirubin 08/13/2020 0.7  0.1 - 1.0 mg/dL Final    Comment: For infants and newborns, interpretation of results should be based  on gestational age, weight and in agreement with clinical  observations.  Premature Infant recommended reference ranges:  Up to 24 hours.............<8.0 mg/dL  Up to 48 hours............<12.0 mg/dL  3-5 days..................<15.0 mg/dL  6-29 days.................<15.0 mg/dL      Alkaline Phosphatase 08/13/2020 66  55 - 135 U/L Final    AST 08/13/2020 17  10 - 40 U/L Final    ALT 08/13/2020 16  10 - 44 U/L Final    Anion Gap 08/13/2020 6* 8 - 16 mmol/L Final    eGFR if African American 08/13/2020 >60.0  >60 mL/min/1.73 m^2 Final    eGFR if non African American 08/13/2020 >60.0  >60 mL/min/1.73 m^2 Final    Comment: Calculation used to obtain the estimated glomerular filtration  rate (eGFR) is the CKD-EPI equation.      Lab Visit on 08/13/2020   Component Date Value Ref Range Status    Potassium 08/13/2020 6.2* 3.5 - 5.1 mmol/L Final    *No Visible Hemolysis   Lab Visit on 08/11/2020   Component Date Value Ref Range Status    Sodium 08/11/2020 137  136 - 145  mmol/L Final    Potassium 08/11/2020 6.0* 3.5 - 5.1 mmol/L Final    *No Visible Hemolysis    Chloride 08/11/2020 100  95 - 110 mmol/L Final    CO2 08/11/2020 30* 23 - 29 mmol/L Final    Glucose 08/11/2020 102  70 - 110 mg/dL Final    BUN, Bld 08/11/2020 13  8 - 23 mg/dL Final    Creatinine 08/11/2020 1.3  0.5 - 1.4 mg/dL Final    Calcium 08/11/2020 10.1  8.7 - 10.5 mg/dL Final    Anion Gap 08/11/2020 7* 8 - 16 mmol/L Final    eGFR if African American 08/11/2020 >60.0  >60 mL/min/1.73 m^2 Final    eGFR if non African American 08/11/2020 57.7* >60 mL/min/1.73 m^2 Final    Comment: Calculation used to obtain the estimated glomerular filtration  rate (eGFR) is the CKD-EPI equation.      Lab Visit on 08/03/2020   Component Date Value Ref Range Status    Sodium 08/03/2020 135* 136 - 145 mmol/L Final    Potassium 08/03/2020 4.9  3.5 - 5.1 mmol/L Final    Chloride 08/03/2020 100  95 - 110 mmol/L Final    CO2 08/03/2020 28  23 - 29 mmol/L Final    Glucose 08/03/2020 107  70 - 110 mg/dL Final    BUN, Bld 08/03/2020 12  8 - 23 mg/dL Final    Creatinine 08/03/2020 1.9* 0.5 - 1.4 mg/dL Final    Calcium 08/03/2020 9.6  8.7 - 10.5 mg/dL Final    Anion Gap 08/03/2020 7* 8 - 16 mmol/L Final    eGFR if  08/03/2020 42.1* >60 mL/min/1.73 m^2 Final    eGFR if non African American 08/03/2020 36.4* >60 mL/min/1.73 m^2 Final    Comment: Calculation used to obtain the estimated glomerular filtration  rate (eGFR) is the CKD-EPI equation.       CPK 08/03/2020 88  20 - 200 U/L Final    Cholesterol 08/03/2020 127  120 - 199 mg/dL Final    Comment: The National Cholesterol Education Program (NCEP) has set the  following guidelines (reference ranges) for Cholesterol:  Optimal.....................<200 mg/dL  Borderline High.............200-239 mg/dL  High........................> or = 240 mg/dL      Triglycerides 08/03/2020 99  30 - 150 mg/dL Final    Comment: The National Cholesterol Education Program  (NCEP) has set the  following guidelines (reference values) for triglycerides:  Normal......................<150 mg/dL  Borderline High.............150-199 mg/dL  High........................200-499 mg/dL      HDL 08/03/2020 52  40 - 75 mg/dL Final    Comment: The National Cholesterol Education Program (NCEP) has set the  following guidelines (reference values) for HDL Cholesterol:  Low...............<40 mg/dL  Optimal...........>60 mg/dL      LDL Cholesterol 08/03/2020 55.2* 63.0 - 159.0 mg/dL Final    Comment: The National Cholesterol Education Program (NCEP) has set the  following guidelines (reference values) for LDL Cholesterol:  Optimal.......................<130 mg/dL  Borderline High...............130-159 mg/dL  High..........................160-189 mg/dL  Very High.....................>190 mg/dL      Hdl/Cholesterol Ratio 08/03/2020 40.9  20.0 - 50.0 % Final    Total Cholesterol/HDL Ratio 08/03/2020 2.4  2.0 - 5.0 Final    Non-HDL Cholesterol 08/03/2020 75  mg/dL Final    Comment: Risk category and Non-HDL cholesterol goals:  Coronary heart disease (CHD)or equivalent (10-year risk of CHD >20%):  Non-HDL cholesterol goal     <130 mg/dL  Two or more CHD risk factors and 10-year risk of CHD <= 20%:  Non-HDL cholesterol goal     <160 mg/dL  0 to 1 CHD risk factor:  Non-HDL cholesterol goal     <190 mg/dL      PSA, SCREEN 08/03/2020 0.62  0.00 - 4.00 ng/mL Final    Comment: PSA Expected levels:  Hormonal Therapy: <0.05 ng/ml  Prostatectomy: <0.01 ng/ml  Radiation Therapy: <1.00 ng/ml     Admission on 07/20/2020, Discharged on 07/21/2020   Component Date Value Ref Range Status    WBC 07/20/2020 6.73  3.90 - 12.70 K/uL Final    RBC 07/20/2020 4.18* 4.60 - 6.20 M/uL Final    Hemoglobin 07/20/2020 14.1  14.0 - 18.0 g/dL Final    Hematocrit 07/20/2020 41.4  40.0 - 54.0 % Final    Mean Corpuscular Volume 07/20/2020 99* 82 - 98 fL Final    Mean Corpuscular Hemoglobin 07/20/2020 33.7* 27.0 - 31.0 pg Final     Mean Corpuscular Hemoglobin Conc 07/20/2020 34.1  32.0 - 36.0 g/dL Final    RDW 07/20/2020 11.6  11.5 - 14.5 % Final    Platelets 07/20/2020 188  150 - 350 K/uL Final    MPV 07/20/2020 9.4  9.2 - 12.9 fL Final    Immature Granulocytes 07/20/2020 0.9* 0.0 - 0.5 % Final    Gran # (ANC) 07/20/2020 4.8  1.8 - 7.7 K/uL Final    Immature Grans (Abs) 07/20/2020 0.06* 0.00 - 0.04 K/uL Final    Comment: Mild elevation in immature granulocytes is non specific and   can be seen in a variety of conditions including stress response,   acute inflammation, trauma and pregnancy. Correlation with other   laboratory and clinical findings is essential.      Lymph # 07/20/2020 1.1  1.0 - 4.8 K/uL Final    Mono # 07/20/2020 0.6  0.3 - 1.0 K/uL Final    Eos # 07/20/2020 0.2  0.0 - 0.5 K/uL Final    Baso # 07/20/2020 0.04  0.00 - 0.20 K/uL Final    nRBC 07/20/2020 0  0 /100 WBC Final    Gran% 07/20/2020 71.3  38.0 - 73.0 % Final    Lymph% 07/20/2020 15.9* 18.0 - 48.0 % Final    Mono% 07/20/2020 8.6  4.0 - 15.0 % Final    Eosinophil% 07/20/2020 2.7  0.0 - 8.0 % Final    Basophil% 07/20/2020 0.6  0.0 - 1.9 % Final    Differential Method 07/20/2020 Automated   Final    Sodium 07/20/2020 126* 136 - 145 mmol/L Final    Potassium 07/20/2020 4.2  3.5 - 5.1 mmol/L Final    Chloride 07/20/2020 95  95 - 110 mmol/L Final    CO2 07/20/2020 24  23 - 29 mmol/L Final    Glucose 07/20/2020 109  70 - 110 mg/dL Final    BUN, Bld 07/20/2020 10  8 - 23 mg/dL Final    Creatinine 07/20/2020 1.0  0.5 - 1.4 mg/dL Final    Calcium 07/20/2020 9.0  8.7 - 10.5 mg/dL Final    Total Protein 07/20/2020 7.1  6.0 - 8.4 g/dL Final    Albumin 07/20/2020 3.9  3.5 - 5.2 g/dL Final    Total Bilirubin 07/20/2020 0.5  0.1 - 1.0 mg/dL Final    Comment: For infants and newborns, interpretation of results should be based  on gestational age, weight and in agreement with clinical  observations.  Premature Infant recommended reference ranges:  Up to  24 hours.............<8.0 mg/dL  Up to 48 hours............<12.0 mg/dL  3-5 days..................<15.0 mg/dL  6-29 days.................<15.0 mg/dL      Alkaline Phosphatase 07/20/2020 68  55 - 135 U/L Final    AST 07/20/2020 26  10 - 40 U/L Final    ALT 07/20/2020 26  10 - 44 U/L Final    Anion Gap 07/20/2020 7* 8 - 16 mmol/L Final    eGFR if African American 07/20/2020 >60.0  >60 mL/min/1.73 m^2 Final    eGFR if non African American 07/20/2020 >60.0  >60 mL/min/1.73 m^2 Final    Comment: Calculation used to obtain the estimated glomerular filtration  rate (eGFR) is the CKD-EPI equation.       TSH 07/20/2020 1.732  0.400 - 4.000 uIU/mL Final    Specimen UA 07/20/2020 Urine, Clean Catch   Final    Color, UA 07/20/2020 Yellow  Yellow, Straw, Emily Final    Appearance, UA 07/20/2020 Clear  Clear Final    pH, UA 07/20/2020 7.0  5.0 - 8.0 Final    Specific Gravity, UA 07/20/2020 1.010  1.005 - 1.030 Final    Protein, UA 07/20/2020 1+* Negative Final    Comment: Recommend a 24 hour urine protein or a urine   protein/creatinine ratio if globulin induced proteinuria is  clinically suspected.      Glucose, UA 07/20/2020 Negative  Negative Final    Ketones, UA 07/20/2020 Negative  Negative Final    Bilirubin (UA) 07/20/2020 Negative  Negative Final    Occult Blood UA 07/20/2020 Negative  Negative Final    Nitrite, UA 07/20/2020 Negative  Negative Final    Leukocytes, UA 07/20/2020 Negative  Negative Final    Benzodiazepines 07/20/2020 Negative   Final    Methadone metabolites 07/20/2020 Negative   Final    Cocaine (Metab.) 07/20/2020 Negative   Final    Opiate Scrn, Ur 07/20/2020 Negative   Final    Barbiturate Screen, Ur 07/20/2020 Negative   Final    Amphetamine Screen, Ur 07/20/2020 Negative   Final    THC 07/20/2020 Negative   Final    Phencyclidine 07/20/2020 Negative   Final    Creatinine, Random Ur 07/20/2020 60.0  23.0 - 375.0 mg/dL Final    Comment: The random urine reference ranges  provided were established   for 24 hour urine collections.  No reference ranges exist for  random urine specimens.  Correlate clinically.      Toxicology Information 07/20/2020 SEE COMMENT   Final    Comment: This screen includes the following classes of drugs at the   listed cut-off:  Benzodiazepines                  200 ng/ml  Methadone                        300 ng/ml  Cocaine metabolite               300 ng/ml  Opiates                          300 ng/ml  Barbiturates                     200 ng/ml  Amphetamines                    1000 ng/ml  Marijuana metabs (THC)            50 ng/ml  Phencyclidine (PCP)               25 ng/ml  High concentrations of Diphenhydramine may cross-react with  Phencyclidine PCP screening immunoassay giving a false   positive result.  High concentrations of Methylenedioxymethamphetamine (MDMA aka  Ectasy) and other structurally similar compounds may cross-   react with the Amphetamine/Methamphetamine screening   immunoassay giving a false positive result.  A metabolite of the anti-HIV drug Sustiva () may cause  false positive results in the Marijuana metabolite (THC)   screening assay.  Note: This exception list includes only more common   interferants i                           n toxicology screen testing.  Because of many   cross-reactantspositive results on toxicology drug screens   should be confirmed whenever results do not correlate with   clinical presentation.  This report is intended for use in clinical monitoring and  management of patients. It is not intended for use in   employment related drug testing.  Because of any cross-reactants, positive results on toxicology  drug screens should be confirmed whenever results do not  correlate with clinical presentation.  Presumptive positive results are unconfirmed and may be used   only for medical purposes.  Assay Intended Use: This assay provides only a preliminary analytical  test result. A more specific alternate  chemical method must be used  to obtain a confirmed analytical result. Gas chromatography/mass  spectrometry (GS/MS)is the preferred confirmatory method. Clinical  consideration and professional judgement should be applied to any   drug of abuse test result, particularly when preliminary resul                           ts  are used.      Alcohol, Medical, Serum 07/20/2020 <10  <10 mg/dL Final    Acetaminophen (Tylenol), Serum 07/20/2020 <3.0* 10.0 - 20.0 ug/mL Final    Comment: Toxic Levels:  Adults (4 hr post-ingestion).........>150 ug/mL  Adults (12 hr post-ingestion)........>40 ug/mL  Peds (2 hr post-ingestion, liquid)...>225 ug/mL      SARS-CoV-2 RNA, Amplification, Qual 07/20/2020 Negative  Negative Final    Comment: This test utilizes isothermal nucleic acid amplification   technology to detect the SARS-CoV-2 RdRp nucleic acid segment.   The analytical sensitivity (limit of detection) is 125 genome   equivalents/mL.   A POSITIVE result implies infection with the SARS-CoV-2 virus;  the patient is presumed to be contagious.    A NEGATIVE result means that SARS-CoV-2 nucleic acids are not  present above the limit of detection. A NEGATIVE result should be   treated as presumptive. It does not rule out the possibility of   COVID-19 and should not be the sole basis for treatment decisions.   If COVID-19 is strongly suspected based on clinical and exposure   history, re-testing using an alternate molecular assay should be   considered.   This test is only for use under the Food and Drug   Administration s Emergency Use Authorization (EUA).   Commercial kits are provided by [employer].   Performance characteristics of the EUA have been independently  verified by [facility] Department o                           f  Pathology and Laboratory Medicine.   _________________________________________________________________  The [employer] NOW COVID-19 Letter of Authorization, along with the   authorized Fact  Sheet for Healthcare Providers, the authorized Fact  Sheet for Patients, and authorized labeling are available on the FDA   website:  www.fda.gov/MedicalDevices/Safety/EmergencySituations/yqs341828.htm      RBC, UA 07/20/2020 1  0 - 4 /hpf Final    WBC, UA 07/20/2020 1  0 - 5 /hpf Final    Bacteria 07/20/2020 Rare  None-Occ /hpf Final    Squam Epithel, UA 07/20/2020 0  /hpf Final    Hyaline Casts, UA 07/20/2020 0  0-1/lpf /lpf Final    Microscopic Comment 07/20/2020 SEE COMMENT   Final    Comment: Other formed elements not mentioned in the report are not   present in the microscopic examination.       Osmolality, Ur 07/20/2020 308  50 - 1200 mOsm/kg Final    Comment: The random urine reference ranges provided were established   for 24 hour urine collections.  No reference ranges exist for  random urine specimens.  Correlate clinically.      Sodium 07/20/2020 132* 136 - 145 mmol/L Final    Potassium 07/20/2020 4.2  3.5 - 5.1 mmol/L Final    Chloride 07/20/2020 98  95 - 110 mmol/L Final    CO2 07/20/2020 27  23 - 29 mmol/L Final    Glucose 07/20/2020 126* 70 - 110 mg/dL Final    BUN, Bld 07/20/2020 10  8 - 23 mg/dL Final    Creatinine 07/20/2020 1.0  0.5 - 1.4 mg/dL Final    Calcium 07/20/2020 9.0  8.7 - 10.5 mg/dL Final    Anion Gap 07/20/2020 7* 8 - 16 mmol/L Final    eGFR if African American 07/20/2020 >60.0  >60 mL/min/1.73 m^2 Final    eGFR if non African American 07/20/2020 >60.0  >60 mL/min/1.73 m^2 Final    Comment: Calculation used to obtain the estimated glomerular filtration  rate (eGFR) is the CKD-EPI equation.       Sodium Urine Random 07/21/2020 30  20 - 250 mmol/L Final    Comment: The random urine reference ranges provided were established   for 24 hour urine collections.  No reference ranges exist for  random urine specimens.  Correlate clinically.      Potassium Urine Random 07/21/2020 40  15 - 95 mmol/L Final    Comment: The random urine reference ranges provided were established    for 24 hour urine collections.  No reference ranges exist for  random urine specimens.  Correlate clinically.      Osmolality 07/20/2020 271* 280 - 300 mOsm/kg Final    Sodium 07/21/2020 132* 136 - 145 mmol/L Final    Potassium 07/21/2020 4.7  3.5 - 5.1 mmol/L Final    Chloride 07/21/2020 97  95 - 110 mmol/L Final    CO2 07/21/2020 28  23 - 29 mmol/L Final    Glucose 07/21/2020 109  70 - 110 mg/dL Final    BUN, Bld 07/21/2020 14  8 - 23 mg/dL Final    Creatinine 07/21/2020 1.2  0.5 - 1.4 mg/dL Final    Calcium 07/21/2020 9.1  8.7 - 10.5 mg/dL Final    Anion Gap 07/21/2020 7* 8 - 16 mmol/L Final    eGFR if African American 07/21/2020 >60.0  >60 mL/min/1.73 m^2 Final    eGFR if non African American 07/21/2020 >60.0  >60 mL/min/1.73 m^2 Final    Comment: Calculation used to obtain the estimated glomerular filtration  rate (eGFR) is the CKD-EPI equation.       Sodium 07/21/2020 131* 136 - 145 mmol/L Final    Potassium 07/21/2020 4.8  3.5 - 5.1 mmol/L Final    Chloride 07/21/2020 98  95 - 110 mmol/L Final    CO2 07/21/2020 28  23 - 29 mmol/L Final    Glucose 07/21/2020 107  70 - 110 mg/dL Final    BUN, Bld 07/21/2020 14  8 - 23 mg/dL Final    Creatinine 07/21/2020 1.2  0.5 - 1.4 mg/dL Final    Calcium 07/21/2020 9.0  8.7 - 10.5 mg/dL Final    Total Protein 07/21/2020 6.4  6.0 - 8.4 g/dL Final    Albumin 07/21/2020 3.4* 3.5 - 5.2 g/dL Final    Total Bilirubin 07/21/2020 0.5  0.1 - 1.0 mg/dL Final    Comment: For infants and newborns, interpretation of results should be based  on gestational age, weight and in agreement with clinical  observations.  Premature Infant recommended reference ranges:  Up to 24 hours.............<8.0 mg/dL  Up to 48 hours............<12.0 mg/dL  3-5 days..................<15.0 mg/dL  6-29 days.................<15.0 mg/dL      Alkaline Phosphatase 07/21/2020 61  55 - 135 U/L Final    AST 07/21/2020 25  10 - 40 U/L Final    ALT 07/21/2020 25  10 - 44 U/L Final    Anion  Gap 07/21/2020 5* 8 - 16 mmol/L Final    eGFR if African American 07/21/2020 >60.0  >60 mL/min/1.73 m^2 Final    eGFR if non African American 07/21/2020 >60.0  >60 mL/min/1.73 m^2 Final    Comment: Calculation used to obtain the estimated glomerular filtration  rate (eGFR) is the CKD-EPI equation.       Magnesium 07/21/2020 2.1  1.6 - 2.6 mg/dL Final    Phosphorus 07/21/2020 3.7  2.7 - 4.5 mg/dL Final    WBC 07/21/2020 5.86  3.90 - 12.70 K/uL Final    RBC 07/21/2020 4.15* 4.60 - 6.20 M/uL Final    Hemoglobin 07/21/2020 14.1  14.0 - 18.0 g/dL Final    Hematocrit 07/21/2020 41.5  40.0 - 54.0 % Final    Mean Corpuscular Volume 07/21/2020 100* 82 - 98 fL Final    Mean Corpuscular Hemoglobin 07/21/2020 34.0* 27.0 - 31.0 pg Final    Mean Corpuscular Hemoglobin Conc 07/21/2020 34.0  32.0 - 36.0 g/dL Final    RDW 07/21/2020 11.9  11.5 - 14.5 % Final    Platelets 07/21/2020 170  150 - 350 K/uL Final    MPV 07/21/2020 9.7  9.2 - 12.9 fL Final    Immature Granulocytes 07/21/2020 1.2* 0.0 - 0.5 % Final    Gran # (ANC) 07/21/2020 3.2  1.8 - 7.7 K/uL Final    Immature Grans (Abs) 07/21/2020 0.07* 0.00 - 0.04 K/uL Final    Comment: Mild elevation in immature granulocytes is non specific and   can be seen in a variety of conditions including stress response,   acute inflammation, trauma and pregnancy. Correlation with other   laboratory and clinical findings is essential.      Lymph # 07/21/2020 1.6  1.0 - 4.8 K/uL Final    Mono # 07/21/2020 0.7  0.3 - 1.0 K/uL Final    Eos # 07/21/2020 0.2  0.0 - 0.5 K/uL Final    Baso # 07/21/2020 0.04  0.00 - 0.20 K/uL Final    nRBC 07/21/2020 0  0 /100 WBC Final    Gran% 07/21/2020 54.2  38.0 - 73.0 % Final    Lymph% 07/21/2020 28.0  18.0 - 48.0 % Final    Mono% 07/21/2020 12.3  4.0 - 15.0 % Final    Eosinophil% 07/21/2020 3.6  0.0 - 8.0 % Final    Basophil% 07/21/2020 0.7  0.0 - 1.9 % Final    Differential Method 07/21/2020 Automated   Final    CPK 07/21/2020 404*  20 - 200 U/L Final    BNP 07/21/2020 10  0 - 99 pg/mL Final    Values of less than 100 pg/ml are consistent with non-CHF populations.    Sodium 07/21/2020 130* 136 - 145 mmol/L Final    Potassium 07/21/2020 4.6  3.5 - 5.1 mmol/L Final    Chloride 07/21/2020 97  95 - 110 mmol/L Final    CO2 07/21/2020 27  23 - 29 mmol/L Final    Glucose 07/21/2020 107  70 - 110 mg/dL Final    BUN, Bld 07/21/2020 13  8 - 23 mg/dL Final    Creatinine 07/21/2020 1.1  0.5 - 1.4 mg/dL Final    Calcium 07/21/2020 9.1  8.7 - 10.5 mg/dL Final    Anion Gap 07/21/2020 6* 8 - 16 mmol/L Final    eGFR if African American 07/21/2020 >60.0  >60 mL/min/1.73 m^2 Final    eGFR if non African American 07/21/2020 >60.0  >60 mL/min/1.73 m^2 Final    Comment: Calculation used to obtain the estimated glomerular filtration  rate (eGFR) is the CKD-EPI equation.       Sodium 07/21/2020 132* 136 - 145 mmol/L Final    Potassium 07/21/2020 4.6  3.5 - 5.1 mmol/L Final    Chloride 07/21/2020 100  95 - 110 mmol/L Final    CO2 07/21/2020 25  23 - 29 mmol/L Final    Glucose 07/21/2020 109  70 - 110 mg/dL Final    BUN, Bld 07/21/2020 13  8 - 23 mg/dL Final    Creatinine 07/21/2020 1.1  0.5 - 1.4 mg/dL Final    Calcium 07/21/2020 9.0  8.7 - 10.5 mg/dL Final    Anion Gap 07/21/2020 7* 8 - 16 mmol/L Final    eGFR if African American 07/21/2020 >60.0  >60 mL/min/1.73 m^2 Final    eGFR if non African American 07/21/2020 >60.0  >60 mL/min/1.73 m^2 Final    Comment: Calculation used to obtain the estimated glomerular filtration  rate (eGFR) is the CKD-EPI equation.        EXAMINATION      CONSTITUTIONAL  General Appearance: well nourished    MUSCULOSKELETAL  Muscle Strength and Tone: normal strength and tone  Abnormal Involuntary Movements: no abnormal movement noted  Gait and Station: normal gait    PSYCHIATRIC   Level of Consciousness: alert  Orientation: grossly intact  Grooming: well groomed  Psychomotor Behavior: no restlessness/agitation  Speech:  normal in rate, rhythm and volume  Language: normal vocabulary  Mood: steady  Affect: full range and appropriate  Thought Process: some disorganization  Associations: some looseness  Thought Content: no SI/HI  Memory: grossly intact  Attention: intact to content of interview  Fund of Knowledge: appears adequate  Insight: good  Judgement: good    MEDICAL DECISION MAKING    DIAGNOSES  Bipolar d/o  Alcohol Use    PROBLEM LIST AND MANAGEMENT PLANS  - continue Latuda, Seroquel, and Trazodone as above  - monitor mood  - rtc already scheduled    Time with patient: 20 min      Ambrosio Loco

## 2020-09-24 DIAGNOSIS — G25.0 ESSENTIAL TREMOR: ICD-10-CM

## 2020-09-24 RX ORDER — PROPRANOLOL HYDROCHLORIDE 20 MG/1
20 TABLET ORAL 2 TIMES DAILY PRN
Qty: 180 TABLET | Refills: 3 | Status: SHIPPED | OUTPATIENT
Start: 2020-09-24 | End: 2021-06-09

## 2020-09-24 NOTE — TELEPHONE ENCOUNTER
----- Message from Vasiliy Infante sent at 9/24/2020  1:02 PM CDT -----  Regarding: Refill request  Refill request    propranoloL (INDERAL) 20 MG tablet    Be sent to:    [facility]/pharmacy #[id number] - [place] - [address]   [phone] (Phone)  [phone] (Fax)

## 2020-09-25 ENCOUNTER — PATIENT MESSAGE (OUTPATIENT)
Dept: NEUROLOGY | Facility: CLINIC | Age: 65
End: 2020-09-25

## 2020-10-13 ENCOUNTER — PATIENT MESSAGE (OUTPATIENT)
Dept: INTERNAL MEDICINE | Facility: CLINIC | Age: 65
End: 2020-10-13

## 2020-10-13 DIAGNOSIS — K40.90 INGUINAL HERNIA WITHOUT OBSTRUCTION OR GANGRENE, RECURRENCE NOT SPECIFIED, UNSPECIFIED LATERALITY: Primary | ICD-10-CM

## 2020-10-18 ENCOUNTER — PATIENT OUTREACH (OUTPATIENT)
Dept: ADMINISTRATIVE | Facility: OTHER | Age: 65
End: 2020-10-18

## 2020-10-19 ENCOUNTER — PATIENT MESSAGE (OUTPATIENT)
Dept: NEUROLOGY | Facility: CLINIC | Age: 65
End: 2020-10-19

## 2020-10-19 ENCOUNTER — TELEPHONE (OUTPATIENT)
Dept: NEUROLOGY | Facility: CLINIC | Age: 65
End: 2020-10-19

## 2020-10-19 NOTE — TELEPHONE ENCOUNTER
----- Message from Anna Kirby sent at 10/19/2020  6:07 PM CDT -----  Regarding: virtual visit      Who Called: Patient's wife  Reason for call: Wanted to let staff know that her  is signed on to the virtual visit and waiting  Would the patient rather a call back or a response via [facility]? callback  Best Call Back Number: [phone]  Additional Information:

## 2020-10-20 ENCOUNTER — PATIENT MESSAGE (OUTPATIENT)
Dept: NEUROLOGY | Facility: CLINIC | Age: 65
End: 2020-10-20

## 2020-11-12 ENCOUNTER — OFFICE VISIT (OUTPATIENT)
Dept: PSYCHIATRY | Facility: CLINIC | Age: 65
End: 2020-11-12
Payer: MEDICARE

## 2020-11-12 DIAGNOSIS — F32.A DEPRESSIVE DISORDER: ICD-10-CM

## 2020-11-12 DIAGNOSIS — F31.9 BIPOLAR AFFECTIVE DISORDER, REMISSION STATUS UNSPECIFIED: ICD-10-CM

## 2020-11-12 PROCEDURE — 99214 PR OFFICE/OUTPT VISIT, EST, LEVL IV, 30-39 MIN: ICD-10-PCS | Mod: 95,,, | Performed by: PSYCHIATRY & NEUROLOGY

## 2020-11-12 PROCEDURE — 99214 OFFICE O/P EST MOD 30 MIN: CPT | Mod: 95,,, | Performed by: PSYCHIATRY & NEUROLOGY

## 2020-11-12 RX ORDER — VENLAFAXINE HYDROCHLORIDE 75 MG/1
75 CAPSULE, EXTENDED RELEASE ORAL DAILY
Qty: 30 CAPSULE | Refills: 3 | Status: SHIPPED | OUTPATIENT
Start: 2020-11-12 | End: 2020-12-16 | Stop reason: SDUPTHER

## 2020-11-12 RX ORDER — QUETIAPINE FUMARATE 100 MG/1
200 TABLET, FILM COATED ORAL NIGHTLY
Qty: 60 TABLET | Refills: 3 | Status: SHIPPED | OUTPATIENT
Start: 2020-11-12 | End: 2021-05-05

## 2020-11-12 RX ORDER — TRAZODONE HYDROCHLORIDE 100 MG/1
200 TABLET ORAL NIGHTLY
Qty: 60 TABLET | Refills: 3 | Status: SHIPPED | OUTPATIENT
Start: 2020-11-12 | End: 2020-12-16 | Stop reason: SDUPTHER

## 2020-11-12 RX ORDER — LURASIDONE HYDROCHLORIDE 60 MG/1
TABLET, FILM COATED ORAL
Qty: 30 TABLET | Refills: 3 | Status: SHIPPED | OUTPATIENT
Start: 2020-11-12 | End: 2021-06-10 | Stop reason: SDUPTHER

## 2020-11-12 NOTE — PROGRESS NOTES
The patient location is: [place], [place]  The chief complaint leading to consultation is: bipolar d/o    Visit type: audiovisual    Face to Face time with patient: 20 min  20 minutes of total time spent on the encounter, which includes face to face time and non-face to face time preparing to see the patient (eg, review of tests), Obtaining and/or reviewing separately obtained history, Documenting clinical information in the electronic or other health record, Independently interpreting results (not separately reported) and communicating results to the patient/family/caregiver, or Care coordination (not separately reported).     Each patient to whom he or she provides medical services by telemedicine is:  (1) informed of the relationship between the physician and patient and the respective role of any other health care provider with respect to management of the patient; and (2) notified that he or she may decline to receive medical services by telemedicine and may withdraw from such care at any time.    Notes:   ESTABLISHED OUTPATIENT VISIT     ENCOUNTER DATE: 11/12/2020  SITE: [facility], [address]    HISTORY    CHIEF COMPLAINT   Mirza Morales is a 65 y.o. male who presents for follow up of bipolar d/o.    HPI     Pt. Reports apathy, low energy, and depression. Sleeping adequately.    Denies recent seizure.    No recent alcohol or marijuana. Smoking about 3/4 of a pack of cigarettes per day.    Wife Anamaria present during part of today's visit: pt. Spends much time in bed.    PAST MEDICAL, FAMILY AND SOCIAL HISTORY: The patient's past medical, family and social history have been reviewed and updated as appropriate within the electronic medical record - see encounter notes    PSYCHOTROPIC MEDICATIONS   Latuda 60 mg in the evening, Seroquel 200 mg at bedtime, Trazodone 200 mg at bedtime, Propranolol 20 mg bid for tremor    Scheduled and PRN Medications     LABORATORY DATA  Lab Visit on 08/17/2020    Component Date Value Ref Range Status    Sodium 08/17/2020 124* 136 - 145 mmol/L Final    Potassium 08/17/2020 4.2  3.5 - 5.1 mmol/L Final    Chloride 08/17/2020 91* 95 - 110 mmol/L Final    CO2 08/17/2020 24  23 - 29 mmol/L Final    Glucose 08/17/2020 127* 70 - 110 mg/dL Final    BUN 08/17/2020 8  8 - 23 mg/dL Final    Creatinine 08/17/2020 1.1  0.5 - 1.4 mg/dL Final    Calcium 08/17/2020 8.9  8.7 - 10.5 mg/dL Final    Anion Gap 08/17/2020 9  8 - 16 mmol/L Final    eGFR if African American 08/17/2020 >60.0  >60 mL/min/1.73 m^2 Final    eGFR if non African American 08/17/2020 >60.0  >60 mL/min/1.73 m^2 Final    Comment: Calculation used to obtain the estimated glomerular filtration  rate (eGFR) is the CKD-EPI equation.      Admission on 08/13/2020, Discharged on 08/13/2020   Component Date Value Ref Range Status    WBC 08/13/2020 7.02  3.90 - 12.70 K/uL Final    RBC 08/13/2020 4.31* 4.60 - 6.20 M/uL Final    Hemoglobin 08/13/2020 14.8  14.0 - 18.0 g/dL Final    Hematocrit 08/13/2020 43.6  40.0 - 54.0 % Final    MCV 08/13/2020 101* 82 - 98 fL Final    MCH 08/13/2020 34.3* 27.0 - 31.0 pg Final    MCHC 08/13/2020 33.9  32.0 - 36.0 g/dL Final    RDW 08/13/2020 11.9  11.5 - 14.5 % Final    Platelets 08/13/2020 201  150 - 350 K/uL Final    MPV 08/13/2020 9.8  9.2 - 12.9 fL Final    Immature Granulocytes 08/13/2020 0.6* 0.0 - 0.5 % Final    Gran # (ANC) 08/13/2020 4.2  1.8 - 7.7 K/uL Final    Immature Grans (Abs) 08/13/2020 0.04  0.00 - 0.04 K/uL Final    Comment: Mild elevation in immature granulocytes is non specific and   can be seen in a variety of conditions including stress response,   acute inflammation, trauma and pregnancy. Correlation with other   laboratory and clinical findings is essential.      Lymph # 08/13/2020 1.9  1.0 - 4.8 K/uL Final    Mono # 08/13/2020 0.7  0.3 - 1.0 K/uL Final    Eos # 08/13/2020 0.1  0.0 - 0.5 K/uL Final    Baso # 08/13/2020 0.05  0.00 - 0.20 K/uL Final     nRBC 08/13/2020 0  0 /100 WBC Final    Gran % 08/13/2020 59.8  38.0 - 73.0 % Final    Lymph % 08/13/2020 27.6  18.0 - 48.0 % Final    Mono % 08/13/2020 9.3  4.0 - 15.0 % Final    Eosinophil % 08/13/2020 2.0  0.0 - 8.0 % Final    Basophil % 08/13/2020 0.7  0.0 - 1.9 % Final    Differential Method 08/13/2020 Automated   Final    Sodium 08/13/2020 131* 136 - 145 mmol/L Final    Potassium 08/13/2020 4.2  3.5 - 5.1 mmol/L Final    Chloride 08/13/2020 98  95 - 110 mmol/L Final    CO2 08/13/2020 27  23 - 29 mmol/L Final    Glucose 08/13/2020 94  70 - 110 mg/dL Final    BUN 08/13/2020 9  8 - 23 mg/dL Final    Creatinine 08/13/2020 1.0  0.5 - 1.4 mg/dL Final    Calcium 08/13/2020 9.4  8.7 - 10.5 mg/dL Final    Total Protein 08/13/2020 6.9  6.0 - 8.4 g/dL Final    Albumin 08/13/2020 3.8  3.5 - 5.2 g/dL Final    Total Bilirubin 08/13/2020 0.7  0.1 - 1.0 mg/dL Final    Comment: For infants and newborns, interpretation of results should be based  on gestational age, weight and in agreement with clinical  observations.  Premature Infant recommended reference ranges:  Up to 24 hours.............<8.0 mg/dL  Up to 48 hours............<12.0 mg/dL  3-5 days..................<15.0 mg/dL  6-29 days.................<15.0 mg/dL      Alkaline Phosphatase 08/13/2020 66  55 - 135 U/L Final    AST 08/13/2020 17  10 - 40 U/L Final    ALT 08/13/2020 16  10 - 44 U/L Final    Anion Gap 08/13/2020 6* 8 - 16 mmol/L Final    eGFR if African American 08/13/2020 >60.0  >60 mL/min/1.73 m^2 Final    eGFR if non African American 08/13/2020 >60.0  >60 mL/min/1.73 m^2 Final    Comment: Calculation used to obtain the estimated glomerular filtration  rate (eGFR) is the CKD-EPI equation.      Lab Visit on 08/13/2020   Component Date Value Ref Range Status    Potassium 08/13/2020 6.2* 3.5 - 5.1 mmol/L Final    *No Visible Hemolysis     EXAMINATION      CONSTITUTIONAL  General Appearance: well nourished    MUSCULOSKELETAL  Muscle  Strength and Tone: normal strength and tone  Abnormal Involuntary Movements: no abnormal movement noted  Gait and Station: normal gait    PSYCHIATRIC   Level of Consciousness: alert  Orientation: grossly intact  Grooming: well groomed  Psychomotor Behavior: no restlessness/agitation  Speech: normal in rate, rhythm and volume  Language: normal vocabulary  Mood: steady  Affect: full range and appropriate  Thought Process: some disorganization  Associations: some looseness  Thought Content: no SI/HI  Memory: grossly intact  Attention: intact to content of interview  Fund of Knowledge: appears adequate  Insight: good  Judgement: good    MEDICAL DECISION MAKING    DIAGNOSES  Bipolar d/o  Alcohol Use    PROBLEM LIST AND MANAGEMENT PLANS  - continue Latuda, Seroquel, and Trazodone as above  - start Effexor XR 75 mg daily[risks and benefits d/w pt.]  - monitor mood  - rtc already scheduled    Time with patient: 20 min      Ambrosio Loco

## 2020-11-23 ENCOUNTER — PATIENT MESSAGE (OUTPATIENT)
Dept: NEUROLOGY | Facility: CLINIC | Age: 65
End: 2020-11-23

## 2020-11-23 RX ORDER — LACOSAMIDE 200 MG/1
TABLET, FILM COATED ORAL
Qty: 60 TABLET | Refills: 0 | Status: SHIPPED | OUTPATIENT
Start: 2020-11-23 | End: 2021-01-06 | Stop reason: SDUPTHER

## 2020-12-15 ENCOUNTER — OFFICE VISIT (OUTPATIENT)
Dept: SURGERY | Facility: CLINIC | Age: 65
End: 2020-12-15
Payer: MEDICARE

## 2020-12-15 VITALS
TEMPERATURE: 98 F | SYSTOLIC BLOOD PRESSURE: 134 MMHG | BODY MASS INDEX: 27.76 KG/M2 | OXYGEN SATURATION: 96 % | DIASTOLIC BLOOD PRESSURE: 92 MMHG | HEART RATE: 106 BPM | HEIGHT: 76 IN | WEIGHT: 228 LBS

## 2020-12-15 DIAGNOSIS — K40.91 RECURRENT LEFT INGUINAL HERNIA: Primary | ICD-10-CM

## 2020-12-15 PROCEDURE — 99999 PR PBB SHADOW E&M-EST. PATIENT-LVL IV: ICD-10-PCS | Mod: PBBFAC,HCNC,, | Performed by: SURGERY

## 2020-12-15 PROCEDURE — 99999 PR PBB SHADOW E&M-EST. PATIENT-LVL IV: CPT | Mod: PBBFAC,HCNC,, | Performed by: SURGERY

## 2020-12-15 PROCEDURE — 1126F AMNT PAIN NOTED NONE PRSNT: CPT | Mod: HCNC,S$GLB,, | Performed by: SURGERY

## 2020-12-15 PROCEDURE — 1126F PR PAIN SEVERITY QUANTIFIED, NO PAIN PRESENT: ICD-10-PCS | Mod: HCNC,S$GLB,, | Performed by: SURGERY

## 2020-12-15 PROCEDURE — 3008F PR BODY MASS INDEX (BMI) DOCUMENTED: ICD-10-PCS | Mod: HCNC,CPTII,S$GLB, | Performed by: SURGERY

## 2020-12-15 PROCEDURE — 99204 OFFICE O/P NEW MOD 45 MIN: CPT | Mod: HCNC,S$GLB,, | Performed by: SURGERY

## 2020-12-15 PROCEDURE — 3008F BODY MASS INDEX DOCD: CPT | Mod: HCNC,CPTII,S$GLB, | Performed by: SURGERY

## 2020-12-15 PROCEDURE — 99204 PR OFFICE/OUTPT VISIT, NEW, LEVL IV, 45-59 MIN: ICD-10-PCS | Mod: HCNC,S$GLB,, | Performed by: SURGERY

## 2020-12-15 NOTE — PATIENT INSTRUCTIONS
What Is a Hernia?    A hernia is when an organ or tissue pushes through a weak area in the belly (abdominal) wall. This weak area may be present at birth. Or it may be caused by abdominal strain over time. If not treated, a hernia can get worse with time and physical stress.  When a bulge forms  When there is a weak area in the abdominal wall, an organ or tissue can push outward. This often causes a bulge that you can see under your skin. The bulge may get bigger when you stand up. It may go away when you lie down. You may also feel some pressure or mild pain when lifting, coughing, urinating, or doing other activities.  Types of hernias  The type of hernia you have depends on its location. Most hernias form in the groin at or near the internal ring. This is the entrance to a canal between the abdomen and groin. Hernias can also occur in the abdomen, thigh, or genitals.  · An incisional hernia occurs at the site of a previous surgical incision.  · An umbilical hernia occurs at the navel.  · An indirect inguinal hernia occurs in the groin at the internal ring.  · A direct inguinal hernia occurs in the groin near the internal ring.  · A femoral hernia occurs just below the groin.  · An epigastric hernia occurs in the upper abdomen at the midline.  Diagnosis  In most cases, your healthcare provider can diagnose a hernia by doing a physical exam.  In some cases it might not be clear why you have a swelling in the belly wall. Then your provider may order an imaging test such as an ultrasound. This can help with the diagnosis.  Surgery  A hernia will not heal on its own. Surgery is needed to fix the weak spot in the abdominal wall. If not treated, a hernia can get larger. It can also cause serious health problems. The good news is that hernia surgery can be done quickly and safely. In some cases, you can go home the same day as your surgery.   When to call your provider  Call your healthcare provider right away if the  swelling around your hernia becomes larger, firmer, or more painful. These may be signs that your intestines are stuck in the abdominal wall. This is an emergency. The hernia must be repaired right away to avoid serious problems.  Date Last Reviewed: 7/1/2016 © 2000-2017 [employer]. [address] [place] [address]. All rights reserved. This information is not intended as a substitute for professional medical care. Always follow your healthcare professional's instructions.        Having Hernia Surgery: Patch Repair  Surgery treats a hernia by repairing the weakness in the abdominal wall. An incision is made so the surgeon has a direct view of the hernia. The repair is then done through this incision (open surgery). To repair the defect, special mesh materials are used to patch the weak area and make a tension-free repair. Follow your healthcare providers advice on how to get ready for the procedure. You can usually go home the same day as your surgery. In some cases, though, you may need to stay in the hospital overnight.  Getting ready for surgery  Your healthcare provider will talk with you about preparing for surgery. Follow all the instructions youre given and be sure to:   · Tell your healthcare provider about any medicines, supplements, or herbs you take. This includes both prescription and over-the-counter items.  · Stop taking aspirin, ibuprofen, naproxen and other NSAIDs as directed.  · Arrange for an adult family member or friend to give you a ride home after surgery.  · Stop smoking. Smoking affects blood flow and can slow healing.  · Gently wash the surgical area the night before surgery.  · Follow any directions you are given for not eating or drinking before surgery.     Repair in Front           Repair in Back           Combination Repair      The day of surgery  Arrive at the hospital or surgical center at your scheduled time. Youll be asked to change into a patient  gown. Youll then be given an IV to provide fluids and medicine. Shortly before surgery, an anesthesiologist or nurse anesthetist will talk with you. He or she will explain the types of anesthesia used to prevent pain during surgery. You will have one or more of the following:  · Monitored sedation to make you relaxed and sleepy.  · Local anesthesia to numb the surgical site.  · Regional anesthesia to numb specific areas of your body.  · General anesthesia to let you sleep during surgery.  During the surgery  Most hernias are treated using tension-free repairs. This is surgery that uses special mesh materials to repair the weak area. Unlike traditional repairs, the abdominal fascia (tissue around the muscle that gives strength to the abdominal wall) isnt sewn together. Instead, the mesh covers the weak area like a patch. This repairs the defect without tension on the muscles. It also makes it less likely to happen again. The mesh is made of strong, flexible plastic that stays in the body. Over time, nearby tissues grow into the mesh to strengthen the repair.  After surgery  When the procedure is over, youll be taken to the recovery area to rest. Your blood pressure and heart rate will be monitored. Youll also have a bandage over the surgical site. To help reduce discomfort, youll be given pain medicines. You may also be given breathing exercises to keep your lungs clear. Later, youll be asked to get up and walk. This helps prevent blood clots in the legs. You can go home when your healthcare provider says youre ready.     Risks and complications  Hernia surgery is safe, but does have risks including:  · Bleeding  · Infection  · Anesthesia risks  · Mesh complications  · Inability to urinate   · Bowel or bladder injury   · Numbness or pain in the groin or leg  · Risk the hernia will happen again  · Damage to the testicles or testicular function      Date Last Reviewed: 10/1/2016  © 3223-2181 [employer]. [address] [place] [address], [place] [address]. All rights reserved. This information is not intended as a substitute for professional medical care. Always follow your healthcare professional's instructions.

## 2020-12-15 NOTE — PROGRESS NOTES
General Surgery Office Visit   History and Physical    Patient Name: Mirza Morales  YOB: 1955 (65 y.o.)  MRN: [id number]  Today's Date: 12/15/2020    Referring Md:   Jud Mo Md  [address] [place] [address]    SUBJECTIVE:     Chief Complaint: Inguinal hernia     History of Present Illness:  Mirza Morales is a 65 y.o. male with PMHx of HTN, hyponatremia, and bipolar disorder who presents to the clinic today complaining of an inguinal hernia that he first noticed about 3 weeks ago. He was seen at his PCP's office for this and referred to our clinic. Patient describes it as a bulge in her L groin that feels like a pressure but is not painful. He reports that he has not attempted to reduce it but it spontaneously reduces. Patient states the sxs are aggravated by squatting and bending. Patient denies weight loss, melena, hematochezia, fever, sweats, urinary symptoms, changes in bowel movements, diarrhea, constipation. Patient would like to have the defect repaired. Patient reports being compliant with home medication regimen.     Surgical history includes: inguinal hernia repair with mesh (does not remember if L or R), laminectomy with hardware placement, and a lap jayy in the early 2000s    He smokes 1ppd but denies alcohol and drug use.     Denies additional medical history including DM, COPD, CVA, MI, liver disease. Denies current use of anticoags, including ASA.    Also denies family history of colon cancer        Review of patient's allergies indicates:   Allergen Reactions    Gabapentin Other (See Comments)     SEVERE DIZZINESS AND BALANCE PROBLEMS, UNABLE TO WALK.    Nardil [phenelzine]      BECOMES HYPER, LIKE A MANIC EPISODE.    Penicillins Hives    Lamictal [lamotrigine] Rash       Past Medical History:   Diagnosis Date    Alcohol abuse     Behavioral problem     Bipolar 1 disorder     Chronic right hip pain     Depression     DJD (degenerative joint disease),  lumbar 1/27/2015    Fatigue     Hepatitis     pt. denies this    History of psychiatric care     History of psychiatric hospitalization     Hypertension     Karina     Post traumatic stress disorder     Psychiatric problem     Seizures     Suicide attempt     Therapy      Past Surgical History:   Procedure Laterality Date    COLONOSCOPY N/A 8/14/2019    Procedure: COLONOSCOPY;  Surgeon: Tammy Duval MD;  Location: [facility] ([address] [place] [address]);  Service: Endoscopy;  Laterality: N/A;    HERNIA REPAIR      SPINE SURGERY  11-12-15    LAMINECTOMY/LUMBAR/WARE     Family History   Problem Relation Age of Onset    Alcohol abuse Mother     Depression Mother     Depression Father     Depression Sister     Suicide Sister     Drug abuse Brother     Anxiety disorder Brother     Bipolar disorder Brother     Depression Brother     Alcohol abuse Maternal Uncle     Alcohol abuse Paternal Uncle     Dementia Maternal Grandmother     Alcohol abuse Cousin     Amblyopia Neg Hx     Blindness Neg Hx     Cancer Neg Hx     Cataracts Neg Hx     Diabetes Neg Hx     Glaucoma Neg Hx     Hypertension Neg Hx     Macular degeneration Neg Hx     Retinal detachment Neg Hx     Strabismus Neg Hx     Stroke Neg Hx     Thyroid disease Neg Hx     Asthma Neg Hx     Emphysema Neg Hx      Social History     Tobacco Use    Smoking status: Current Some Day Smoker     Packs/day: 0.25     Years: 10.00     Pack years: 2.50    Smokeless tobacco: Never Used   Substance Use Topics    Alcohol use: No     Alcohol/week: 16.7 standard drinks     Types: 20 Standard drinks or equivalent per week     Comment: quit 4 years ago    Drug use: No        Review of Systems:  Review of Systems   Constitutional: Negative for chills, fever and weight loss.   HENT: Negative for congestion and sore throat.    Eyes: Negative for blurred vision and double vision.   Respiratory: Negative for cough and shortness of breath.    Cardiovascular:  "Negative for chest pain and palpitations.   Gastrointestinal: Negative for abdominal pain, constipation, diarrhea, nausea and vomiting.        (+) bulge/hernia in L groin   Genitourinary: Negative for dysuria and hematuria.   Musculoskeletal: Negative for joint pain and myalgias.   Skin: Negative for rash.   Neurological: Negative for dizziness, weakness and headaches.   Psychiatric/Behavioral: Negative for substance abuse. The patient is not nervous/anxious.    All other systems reviewed and are negative.      OBJECTIVE:     Vital Signs (Most Recent)  BP (!) 134/92   Pulse 106   Temp 98.2 °F (36.8 °C)   Ht 6' 4" (1.93 m)   Wt 103.4 kg (228 lb)   SpO2 96%   BMI 27.75 kg/m²     Physical Exam:  General: White male in no distress, normal apperance  Neuro: alert and oriented x 4.  Moves all extremities.     HEENT: Normocephalic, atraumatic. No icterus.  Trachea midline. EOM intact.   Respiratory: Respirations are even and unlabored  Cardiac: Regular rate. Regular rhythm  Abdomen: Soft, non distended. No tenderness or guarding noted. Small, palpable defect in L inguinal canal. No bowel contents protruding through defect on exam.  Extremities: Warm dry and intact.   Skin: No rashes. Warm and dry.          ASSESSMENT/PLAN:     Diagnoses and all orders for this visit:    Recurrent left inguinal hernia  -     COVID-19 Routine Screening; Future    - Laparoscopic hernia repair with mesh scheduled for 1/6/20 with Dr. Chery  - Consent obtained   - COVID test within 72 hours of surgery  - Educated patient on the importance of smoking cessation as it pertains to his overall health, especially its negative effects on post op wound healing      Discussed case with Dr. Chery.       Kannan Connell, MPAS, PA-C  General Surgery  - [facility]        "

## 2020-12-15 NOTE — LETTER
December 15, 2020      Jud Mo MD  [address]  [facility] [address]           [facility]  [address]  [facility] [address]  Phone: [phone]          Patient: Mirza Morales   MR Number: [id number]   YOB: 1955   Date of Visit: 12/15/2020       Dear Dr. Us:    Thank you for referring Mirza Morales to me for evaluation. Attached you will find relevant portions of my assessment and plan of care.    If you have questions, please do not hesitate to call me. I look forward to following Mirza Morales along with you.    Sincerely,    Jose Luis Chery MD    Enclosure  CC:  No Recipients    If you would like to receive this communication electronically, please contact [email or web address] or [phone] to request more information on [facility] Link access.    For providers and/or their staff who would like to refer a patient to [facility], please contact us through our one-stop-shop provider referral line, [facility], at [phone].    If you feel you have received this communication in error or would no longer like to receive these types of communications, please e-mail [email or web address]

## 2020-12-16 ENCOUNTER — OFFICE VISIT (OUTPATIENT)
Dept: PSYCHIATRY | Facility: CLINIC | Age: 65
End: 2020-12-16
Payer: MEDICARE

## 2020-12-16 VITALS
DIASTOLIC BLOOD PRESSURE: 80 MMHG | SYSTOLIC BLOOD PRESSURE: 132 MMHG | HEART RATE: 84 BPM | WEIGHT: 226 LBS | BODY MASS INDEX: 27.52 KG/M2 | HEIGHT: 76 IN

## 2020-12-16 DIAGNOSIS — F32.A DEPRESSIVE DISORDER: ICD-10-CM

## 2020-12-16 PROCEDURE — 99999 PR PBB SHADOW E&M-EST. PATIENT-LVL II: CPT | Mod: PBBFAC,HCNC,, | Performed by: PSYCHIATRY & NEUROLOGY

## 2020-12-16 PROCEDURE — 3008F BODY MASS INDEX DOCD: CPT | Mod: HCNC,CPTII,S$GLB, | Performed by: PSYCHIATRY & NEUROLOGY

## 2020-12-16 PROCEDURE — 99214 OFFICE O/P EST MOD 30 MIN: CPT | Mod: HCNC,S$GLB,, | Performed by: PSYCHIATRY & NEUROLOGY

## 2020-12-16 PROCEDURE — 99999 PR PBB SHADOW E&M-EST. PATIENT-LVL II: ICD-10-PCS | Mod: PBBFAC,HCNC,, | Performed by: PSYCHIATRY & NEUROLOGY

## 2020-12-16 PROCEDURE — 99214 PR OFFICE/OUTPT VISIT, EST, LEVL IV, 30-39 MIN: ICD-10-PCS | Mod: HCNC,S$GLB,, | Performed by: PSYCHIATRY & NEUROLOGY

## 2020-12-16 PROCEDURE — 3008F PR BODY MASS INDEX (BMI) DOCUMENTED: ICD-10-PCS | Mod: HCNC,CPTII,S$GLB, | Performed by: PSYCHIATRY & NEUROLOGY

## 2020-12-16 RX ORDER — VENLAFAXINE HYDROCHLORIDE 75 MG/1
CAPSULE, EXTENDED RELEASE ORAL
Qty: 60 CAPSULE | Refills: 3 | Status: SHIPPED | OUTPATIENT
Start: 2020-12-16 | End: 2021-03-17

## 2020-12-16 RX ORDER — TRAZODONE HYDROCHLORIDE 100 MG/1
TABLET ORAL
Qty: 90 TABLET | Refills: 3 | Status: SHIPPED | OUTPATIENT
Start: 2020-12-16 | End: 2021-03-17

## 2020-12-16 NOTE — PROGRESS NOTES
ESTABLISHED OUTPATIENT VISIT     ENCOUNTER DATE: 12/16/2020  SITE: [facility], [address]    HISTORY    CHIEF COMPLAINT   Mirza Morales is a 65 y.o. male who presents for follow up of bipolar d/o.    HPI     Mood and energy are improved.    Sleep is good..    Denies recent seizure.    No recent alcohol or marijuana. Smoking about 3/4 of a pack of cigarettes per day.    PAST MEDICAL, FAMILY AND SOCIAL HISTORY: The patient's past medical, family and social history have been reviewed and updated as appropriate within the electronic medical record - see encounter notes    PSYCHOTROPIC MEDICATIONS   Latuda 60 mg in the evening, Seroquel 200 mg at bedtime, Effexor XR 75 mg bid, Trazodone 100 mg bid and 100-200 mg at bedtime, Propranolol 20 mg bid for tremor, recently started taking Modafinil    Scheduled and PRN Medications     LABORATORY DATA  No visits with results within 3 Month(s) from this visit.   Latest known visit with results is:   Lab Visit on 08/17/2020   Component Date Value Ref Range Status    Sodium 08/17/2020 124* 136 - 145 mmol/L Final    Potassium 08/17/2020 4.2  3.5 - 5.1 mmol/L Final    Chloride 08/17/2020 91* 95 - 110 mmol/L Final    CO2 08/17/2020 24  23 - 29 mmol/L Final    Glucose 08/17/2020 127* 70 - 110 mg/dL Final    BUN 08/17/2020 8  8 - 23 mg/dL Final    Creatinine 08/17/2020 1.1  0.5 - 1.4 mg/dL Final    Calcium 08/17/2020 8.9  8.7 - 10.5 mg/dL Final    Anion Gap 08/17/2020 9  8 - 16 mmol/L Final    eGFR if African American 08/17/2020 >60.0  >60 mL/min/1.73 m^2 Final    eGFR if non African American 08/17/2020 >60.0  >60 mL/min/1.73 m^2 Final    Comment: Calculation used to obtain the estimated glomerular filtration  rate (eGFR) is the CKD-EPI equation.        EXAMINATION      CONSTITUTIONAL  General Appearance: well nourished    MUSCULOSKELETAL  Muscle Strength and Tone: normal strength and tone  Abnormal Involuntary Movements: no abnormal movement  noted  Gait and Station: normal gait    PSYCHIATRIC   Level of Consciousness: alert  Orientation: grossly intact  Grooming: well groomed  Psychomotor Behavior: no restlessness/agitation  Speech: normal in rate, rhythm and volume  Language: normal vocabulary  Mood: steady  Affect: full range and appropriate  Thought Process: some disorganization  Associations: some looseness  Thought Content: no SI/HI  Memory: grossly intact  Attention: intact to content of interview  Fund of Knowledge: appears adequate  Insight: good  Judgement: good    MEDICAL DECISION MAKING    DIAGNOSES  Bipolar d/o  Alcohol Use    PROBLEM LIST AND MANAGEMENT PLANS  - continue Latuda, Seroquel, and Effexor XR as above  - d/c Modafinil  - Trazodone 50 mg bid and 100-200 mg at bedtime  - monitor mood  - rtc already scheduled    Time with patient: 20 min      Ambrosio Loco

## 2020-12-29 ENCOUNTER — PATIENT MESSAGE (OUTPATIENT)
Dept: INTERNAL MEDICINE | Facility: CLINIC | Age: 65
End: 2020-12-29

## 2020-12-29 ENCOUNTER — PATIENT MESSAGE (OUTPATIENT)
Dept: PSYCHIATRY | Facility: CLINIC | Age: 65
End: 2020-12-29

## 2021-01-06 ENCOUNTER — PATIENT MESSAGE (OUTPATIENT)
Dept: NEUROLOGY | Facility: CLINIC | Age: 66
End: 2021-01-06

## 2021-01-06 RX ORDER — LACOSAMIDE 100 MG/1
TABLET, FILM COATED ORAL
Qty: 60 TABLET | Refills: 0 | Status: CANCELLED | OUTPATIENT
Start: 2021-01-06

## 2021-01-06 RX ORDER — LACOSAMIDE 200 MG/1
TABLET, FILM COATED ORAL
Qty: 60 TABLET | Refills: 4 | Status: SHIPPED | OUTPATIENT
Start: 2021-01-06 | End: 2021-06-09

## 2021-01-21 ENCOUNTER — PATIENT MESSAGE (OUTPATIENT)
Dept: PSYCHIATRY | Facility: CLINIC | Age: 66
End: 2021-01-21

## 2021-01-25 ENCOUNTER — TELEPHONE (OUTPATIENT)
Dept: INTERNAL MEDICINE | Facility: CLINIC | Age: 66
End: 2021-01-25

## 2021-01-25 DIAGNOSIS — Z20.822 EXPOSURE TO COVID-19 VIRUS: Primary | ICD-10-CM

## 2021-01-26 ENCOUNTER — LAB VISIT (OUTPATIENT)
Dept: INTERNAL MEDICINE | Facility: CLINIC | Age: 66
End: 2021-01-26
Payer: MEDICARE

## 2021-01-26 DIAGNOSIS — Z20.822 EXPOSURE TO COVID-19 VIRUS: ICD-10-CM

## 2021-01-26 LAB — SARS-COV-2 RNA RESP QL NAA+PROBE: NOT DETECTED

## 2021-01-26 PROCEDURE — U0003 INFECTIOUS AGENT DETECTION BY NUCLEIC ACID (DNA OR RNA); SEVERE ACUTE RESPIRATORY SYNDROME CORONAVIRUS 2 (SARS-COV-2) (CORONAVIRUS DISEASE [COVID-19]), AMPLIFIED PROBE TECHNIQUE, MAKING USE OF HIGH THROUGHPUT TECHNOLOGIES AS DESCRIBED BY CMS-2020-01-R: HCPCS | Mod: HCNC

## 2021-02-15 ENCOUNTER — PES CALL (OUTPATIENT)
Dept: ADMINISTRATIVE | Facility: CLINIC | Age: 66
End: 2021-02-15

## 2021-02-26 ENCOUNTER — PATIENT MESSAGE (OUTPATIENT)
Dept: PSYCHIATRY | Facility: CLINIC | Age: 66
End: 2021-02-26

## 2021-03-10 ENCOUNTER — PATIENT MESSAGE (OUTPATIENT)
Dept: INTERNAL MEDICINE | Facility: CLINIC | Age: 66
End: 2021-03-10

## 2021-03-10 DIAGNOSIS — H93.8X9 SENSATION OF FULLNESS IN EAR, UNSPECIFIED LATERALITY: Primary | ICD-10-CM

## 2021-03-10 DIAGNOSIS — H93.8X2 SENSATION OF FULLNESS IN LEFT EAR: ICD-10-CM

## 2021-03-11 ENCOUNTER — PATIENT MESSAGE (OUTPATIENT)
Dept: INTERNAL MEDICINE | Facility: CLINIC | Age: 66
End: 2021-03-11

## 2021-03-11 ENCOUNTER — TELEPHONE (OUTPATIENT)
Dept: OTOLARYNGOLOGY | Facility: CLINIC | Age: 66
End: 2021-03-11

## 2021-03-15 ENCOUNTER — TELEPHONE (OUTPATIENT)
Dept: OTOLARYNGOLOGY | Facility: CLINIC | Age: 66
End: 2021-03-15

## 2021-03-26 ENCOUNTER — OFFICE VISIT (OUTPATIENT)
Dept: OTOLARYNGOLOGY | Facility: CLINIC | Age: 66
End: 2021-03-26
Payer: MEDICARE

## 2021-03-26 ENCOUNTER — CLINICAL SUPPORT (OUTPATIENT)
Dept: AUDIOLOGY | Facility: CLINIC | Age: 66
End: 2021-03-26
Payer: MEDICARE

## 2021-03-26 VITALS — DIASTOLIC BLOOD PRESSURE: 94 MMHG | HEART RATE: 84 BPM | SYSTOLIC BLOOD PRESSURE: 150 MMHG

## 2021-03-26 DIAGNOSIS — H91.8X3 ASYMMETRICAL HEARING LOSS, UNSPECIFIED LATERALITY: Primary | ICD-10-CM

## 2021-03-26 DIAGNOSIS — H61.23 BILATERAL IMPACTED CERUMEN: ICD-10-CM

## 2021-03-26 DIAGNOSIS — H90.71 MIXED CONDUCTIVE AND SENSORINEURAL HEARING LOSS OF RIGHT EAR WITH UNRESTRICTED HEARING OF LEFT EAR: Primary | ICD-10-CM

## 2021-03-26 PROCEDURE — 1126F PR PAIN SEVERITY QUANTIFIED, NO PAIN PRESENT: ICD-10-PCS | Mod: S$GLB,,, | Performed by: NURSE PRACTITIONER

## 2021-03-26 PROCEDURE — 92557 COMPREHENSIVE HEARING TEST: CPT | Mod: S$GLB,,, | Performed by: AUDIOLOGIST

## 2021-03-26 PROCEDURE — 99204 PR OFFICE/OUTPT VISIT, NEW, LEVL IV, 45-59 MIN: ICD-10-PCS | Mod: 25,S$GLB,, | Performed by: NURSE PRACTITIONER

## 2021-03-26 PROCEDURE — 99999 PR PBB SHADOW E&M-EST. PATIENT-LVL I: ICD-10-PCS | Mod: PBBFAC,,,

## 2021-03-26 PROCEDURE — 99999 PR PBB SHADOW E&M-EST. PATIENT-LVL III: ICD-10-PCS | Mod: PBBFAC,,, | Performed by: NURSE PRACTITIONER

## 2021-03-26 PROCEDURE — 92557 PR COMPREHENSIVE HEARING TEST: ICD-10-PCS | Mod: S$GLB,,, | Performed by: AUDIOLOGIST

## 2021-03-26 PROCEDURE — G0268 EAR CERUMEN REMOVAL: ICD-10-PCS | Mod: S$GLB,,, | Performed by: NURSE PRACTITIONER

## 2021-03-26 PROCEDURE — 99999 PR PBB SHADOW E&M-EST. PATIENT-LVL I: CPT | Mod: PBBFAC,,,

## 2021-03-26 PROCEDURE — G0268 REMOVAL OF IMPACTED WAX MD: HCPCS | Mod: S$GLB,,, | Performed by: NURSE PRACTITIONER

## 2021-03-26 PROCEDURE — 92567 TYMPANOMETRY: CPT | Mod: S$GLB,,, | Performed by: AUDIOLOGIST

## 2021-03-26 PROCEDURE — 99999 PR PBB SHADOW E&M-EST. PATIENT-LVL III: CPT | Mod: PBBFAC,,, | Performed by: NURSE PRACTITIONER

## 2021-03-26 PROCEDURE — 92567 PR TYMPA2METRY: ICD-10-PCS | Mod: S$GLB,,, | Performed by: AUDIOLOGIST

## 2021-03-26 PROCEDURE — 99204 OFFICE O/P NEW MOD 45 MIN: CPT | Mod: 25,S$GLB,, | Performed by: NURSE PRACTITIONER

## 2021-03-26 PROCEDURE — 1126F AMNT PAIN NOTED NONE PRSNT: CPT | Mod: S$GLB,,, | Performed by: NURSE PRACTITIONER

## 2021-05-21 ENCOUNTER — PATIENT MESSAGE (OUTPATIENT)
Dept: PSYCHIATRY | Facility: CLINIC | Age: 66
End: 2021-05-21

## 2021-06-04 ENCOUNTER — PATIENT MESSAGE (OUTPATIENT)
Dept: PSYCHIATRY | Facility: CLINIC | Age: 66
End: 2021-06-04

## 2021-06-10 ENCOUNTER — OFFICE VISIT (OUTPATIENT)
Dept: NEUROLOGY | Facility: CLINIC | Age: 66
End: 2021-06-10
Payer: MEDICARE

## 2021-06-10 ENCOUNTER — OFFICE VISIT (OUTPATIENT)
Dept: PSYCHIATRY | Facility: CLINIC | Age: 66
End: 2021-06-10
Payer: MEDICARE

## 2021-06-10 DIAGNOSIS — G40.909 SEIZURE DISORDER: Primary | ICD-10-CM

## 2021-06-10 DIAGNOSIS — Z51.81 MEDICATION MONITORING ENCOUNTER: ICD-10-CM

## 2021-06-10 DIAGNOSIS — G25.0 ESSENTIAL TREMOR: ICD-10-CM

## 2021-06-10 DIAGNOSIS — E87.5 HYPERKALEMIA: ICD-10-CM

## 2021-06-10 DIAGNOSIS — F31.9 BIPOLAR AFFECTIVE DISORDER, REMISSION STATUS UNSPECIFIED: ICD-10-CM

## 2021-06-10 PROCEDURE — 99499 RISK ADDL DX/OHS AUDIT: ICD-10-PCS | Mod: 95,,, | Performed by: PSYCHIATRY & NEUROLOGY

## 2021-06-10 PROCEDURE — 99214 PR OFFICE/OUTPT VISIT, EST, LEVL IV, 30-39 MIN: ICD-10-PCS | Mod: 95,,, | Performed by: PSYCHIATRY & NEUROLOGY

## 2021-06-10 PROCEDURE — 99499 UNLISTED E&M SERVICE: CPT | Mod: 95,,, | Performed by: PSYCHIATRY & NEUROLOGY

## 2021-06-10 PROCEDURE — 99214 OFFICE O/P EST MOD 30 MIN: CPT | Mod: 95,,, | Performed by: PSYCHIATRY & NEUROLOGY

## 2021-06-10 RX ORDER — VENLAFAXINE HYDROCHLORIDE 75 MG/1
CAPSULE, EXTENDED RELEASE ORAL
Qty: 60 CAPSULE | Refills: 3 | Status: SHIPPED | OUTPATIENT
Start: 2021-06-10 | End: 2021-12-06

## 2021-06-10 RX ORDER — PROPRANOLOL HYDROCHLORIDE 60 MG/1
60 CAPSULE, EXTENDED RELEASE ORAL DAILY
Qty: 90 CAPSULE | Refills: 3 | Status: SHIPPED | OUTPATIENT
Start: 2021-06-10 | End: 2022-05-30 | Stop reason: SDUPTHER

## 2021-06-10 RX ORDER — LURASIDONE HYDROCHLORIDE 60 MG/1
TABLET, FILM COATED ORAL
Qty: 30 TABLET | Refills: 3 | Status: SHIPPED | OUTPATIENT
Start: 2021-06-10 | End: 2022-03-17

## 2021-06-11 RX ORDER — LACOSAMIDE 200 MG/1
TABLET ORAL
Qty: 60 TABLET | Refills: 4 | Status: CANCELLED | OUTPATIENT
Start: 2021-06-11

## 2021-06-16 DIAGNOSIS — F31.9 BIPOLAR AFFECTIVE DISORDER, REMISSION STATUS UNSPECIFIED: ICD-10-CM

## 2021-06-16 DIAGNOSIS — E87.5 HYPERKALEMIA: ICD-10-CM

## 2021-06-16 DIAGNOSIS — F32.A DEPRESSIVE DISORDER: ICD-10-CM

## 2021-06-16 RX ORDER — LURASIDONE HYDROCHLORIDE 60 MG/1
TABLET, FILM COATED ORAL
Qty: 30 TABLET | Refills: 3 | OUTPATIENT
Start: 2021-06-16

## 2021-06-16 RX ORDER — TRAZODONE HYDROCHLORIDE 100 MG/1
TABLET ORAL
Qty: 270 TABLET | Refills: 0 | OUTPATIENT
Start: 2021-06-16

## 2021-06-16 RX ORDER — AMLODIPINE BESYLATE 2.5 MG/1
2.5 TABLET ORAL DAILY
Qty: 90 TABLET | Refills: 1 | Status: SHIPPED | OUTPATIENT
Start: 2021-06-16 | End: 2021-10-18

## 2021-06-16 RX ORDER — VENLAFAXINE HYDROCHLORIDE 75 MG/1
CAPSULE, EXTENDED RELEASE ORAL
Qty: 60 CAPSULE | Refills: 3 | OUTPATIENT
Start: 2021-06-16

## 2021-06-16 RX ORDER — ATORVASTATIN CALCIUM 40 MG/1
40 TABLET, FILM COATED ORAL DAILY
Qty: 90 TABLET | Refills: 1 | Status: SHIPPED | OUTPATIENT
Start: 2021-06-16 | End: 2022-03-02

## 2021-06-16 RX ORDER — QUETIAPINE FUMARATE 100 MG/1
200 TABLET, FILM COATED ORAL NIGHTLY
Qty: 60 TABLET | Refills: 3 | OUTPATIENT
Start: 2021-06-16

## 2021-06-21 DIAGNOSIS — G40.909 SEIZURE DISORDER: Primary | ICD-10-CM

## 2021-06-21 RX ORDER — LACOSAMIDE 200 MG/1
TABLET ORAL
Qty: 180 TABLET | Refills: 3 | Status: SHIPPED | OUTPATIENT
Start: 2021-06-21 | End: 2021-09-27 | Stop reason: SDUPTHER

## 2021-09-24 ENCOUNTER — PATIENT MESSAGE (OUTPATIENT)
Dept: NEUROLOGY | Facility: CLINIC | Age: 66
End: 2021-09-24

## 2021-09-24 DIAGNOSIS — G40.909 SEIZURE DISORDER: Primary | ICD-10-CM

## 2021-09-27 RX ORDER — LACOSAMIDE 200 MG/1
200 TABLET ORAL EVERY 12 HOURS
Qty: 60 TABLET | Refills: 0 | Status: SHIPPED | OUTPATIENT
Start: 2021-09-27 | End: 2022-03-30

## 2021-09-27 RX ORDER — LACOSAMIDE 200 MG/1
200 TABLET ORAL EVERY 12 HOURS
Qty: 180 TABLET | Refills: 1 | Status: SHIPPED | OUTPATIENT
Start: 2021-10-25 | End: 2022-02-23 | Stop reason: SDUPTHER

## 2021-10-04 ENCOUNTER — PATIENT MESSAGE (OUTPATIENT)
Dept: ADMINISTRATIVE | Facility: HOSPITAL | Age: 66
End: 2021-10-04

## 2021-10-21 ENCOUNTER — IMMUNIZATION (OUTPATIENT)
Dept: INTERNAL MEDICINE | Facility: CLINIC | Age: 66
End: 2021-10-21
Payer: MEDICARE

## 2021-10-21 PROCEDURE — 90694 VACC AIIV4 NO PRSRV 0.5ML IM: CPT | Mod: HCNC,S$GLB,, | Performed by: INTERNAL MEDICINE

## 2021-10-21 PROCEDURE — G0008 ADMIN INFLUENZA VIRUS VAC: HCPCS | Mod: HCNC,S$GLB,, | Performed by: INTERNAL MEDICINE

## 2021-10-21 PROCEDURE — 90694 FLU VACCINE - QUADRIVALENT - ADJUVANTED: ICD-10-PCS | Mod: HCNC,S$GLB,, | Performed by: INTERNAL MEDICINE

## 2021-10-21 PROCEDURE — G0008 FLU VACCINE - QUADRIVALENT - ADJUVANTED: ICD-10-PCS | Mod: HCNC,S$GLB,, | Performed by: INTERNAL MEDICINE

## 2021-12-28 ENCOUNTER — PATIENT MESSAGE (OUTPATIENT)
Dept: PSYCHIATRY | Facility: CLINIC | Age: 66
End: 2021-12-28
Payer: MEDICARE

## 2021-12-30 ENCOUNTER — TELEPHONE (OUTPATIENT)
Dept: INTERNAL MEDICINE | Facility: CLINIC | Age: 66
End: 2021-12-30
Payer: MEDICARE

## 2022-01-10 ENCOUNTER — PATIENT MESSAGE (OUTPATIENT)
Dept: PSYCHIATRY | Facility: CLINIC | Age: 67
End: 2022-01-10
Payer: MEDICARE

## 2022-01-12 DIAGNOSIS — F32.A DEPRESSIVE DISORDER: ICD-10-CM

## 2022-01-12 DIAGNOSIS — F31.9 BIPOLAR AFFECTIVE DISORDER, REMISSION STATUS UNSPECIFIED: ICD-10-CM

## 2022-01-12 RX ORDER — QUETIAPINE FUMARATE 100 MG/1
TABLET, FILM COATED ORAL
Qty: 30 TABLET | Refills: 2 | Status: SHIPPED | OUTPATIENT
Start: 2022-01-12 | End: 2022-03-17

## 2022-01-12 RX ORDER — TRAZODONE HYDROCHLORIDE 100 MG/1
TABLET ORAL
Qty: 60 TABLET | Refills: 2 | Status: SHIPPED | OUTPATIENT
Start: 2022-01-12 | End: 2022-03-17

## 2022-02-23 DIAGNOSIS — G40.909 SEIZURE DISORDER: ICD-10-CM

## 2022-02-23 RX ORDER — LACOSAMIDE 200 MG/1
200 TABLET ORAL EVERY 12 HOURS
Qty: 180 TABLET | Refills: 1 | Status: SHIPPED | OUTPATIENT
Start: 2022-02-23 | End: 2022-05-30 | Stop reason: SDUPTHER

## 2022-03-07 ENCOUNTER — PATIENT MESSAGE (OUTPATIENT)
Dept: PSYCHIATRY | Facility: CLINIC | Age: 67
End: 2022-03-07
Payer: MEDICARE

## 2022-03-16 ENCOUNTER — PATIENT MESSAGE (OUTPATIENT)
Dept: ADMINISTRATIVE | Facility: HOSPITAL | Age: 67
End: 2022-03-16
Payer: MEDICARE

## 2022-03-17 ENCOUNTER — OFFICE VISIT (OUTPATIENT)
Dept: PSYCHIATRY | Facility: CLINIC | Age: 67
End: 2022-03-17
Payer: MEDICARE

## 2022-03-17 ENCOUNTER — LAB VISIT (OUTPATIENT)
Dept: LAB | Facility: HOSPITAL | Age: 67
End: 2022-03-17
Payer: MEDICARE

## 2022-03-17 VITALS
DIASTOLIC BLOOD PRESSURE: 97 MMHG | WEIGHT: 223.56 LBS | BODY MASS INDEX: 27.21 KG/M2 | HEART RATE: 99 BPM | SYSTOLIC BLOOD PRESSURE: 166 MMHG

## 2022-03-17 DIAGNOSIS — F31.9 BIPOLAR 1 DISORDER: Primary | ICD-10-CM

## 2022-03-17 DIAGNOSIS — Z79.899 LONG TERM CURRENT USE OF ANTIPSYCHOTIC MEDICATION: ICD-10-CM

## 2022-03-17 DIAGNOSIS — F12.90 CANNABIS USE, UNCOMPLICATED: ICD-10-CM

## 2022-03-17 DIAGNOSIS — F32.A DEPRESSION, UNSPECIFIED DEPRESSION TYPE: ICD-10-CM

## 2022-03-17 DIAGNOSIS — F31.9 BIPOLAR 1 DISORDER: ICD-10-CM

## 2022-03-17 DIAGNOSIS — E55.9 VITAMIN D INSUFFICIENCY: ICD-10-CM

## 2022-03-17 LAB
25(OH)D3+25(OH)D2 SERPL-MCNC: 27 NG/ML (ref 30–96)
ALBUMIN SERPL BCP-MCNC: 4.3 G/DL (ref 3.5–5.2)
ALP SERPL-CCNC: 65 U/L (ref 55–135)
ALT SERPL W/O P-5'-P-CCNC: 26 U/L (ref 10–44)
ANION GAP SERPL CALC-SCNC: 9 MMOL/L (ref 8–16)
AST SERPL-CCNC: 23 U/L (ref 10–40)
BASOPHILS # BLD AUTO: 0.04 K/UL (ref 0–0.2)
BASOPHILS NFR BLD: 0.7 % (ref 0–1.9)
BILIRUB SERPL-MCNC: 0.7 MG/DL (ref 0.1–1)
BUN SERPL-MCNC: 11 MG/DL (ref 8–23)
CALCIUM SERPL-MCNC: 10.4 MG/DL (ref 8.7–10.5)
CHLORIDE SERPL-SCNC: 97 MMOL/L (ref 95–110)
CHOLEST SERPL-MCNC: 144 MG/DL (ref 120–199)
CHOLEST/HDLC SERPL: 2.4 {RATIO} (ref 2–5)
CO2 SERPL-SCNC: 30 MMOL/L (ref 23–29)
CREAT SERPL-MCNC: 0.9 MG/DL (ref 0.5–1.4)
DIFFERENTIAL METHOD: ABNORMAL
EOSINOPHIL # BLD AUTO: 0.1 K/UL (ref 0–0.5)
EOSINOPHIL NFR BLD: 2.3 % (ref 0–8)
ERYTHROCYTE [DISTWIDTH] IN BLOOD BY AUTOMATED COUNT: 11.9 % (ref 11.5–14.5)
EST. GFR  (AFRICAN AMERICAN): >60 ML/MIN/1.73 M^2
EST. GFR  (NON AFRICAN AMERICAN): >60 ML/MIN/1.73 M^2
GLUCOSE SERPL-MCNC: 124 MG/DL (ref 70–110)
HCT VFR BLD AUTO: 45.3 % (ref 40–54)
HDLC SERPL-MCNC: 60 MG/DL (ref 40–75)
HDLC SERPL: 41.7 % (ref 20–50)
HGB BLD-MCNC: 15.2 G/DL (ref 14–18)
IMM GRANULOCYTES # BLD AUTO: 0.03 K/UL (ref 0–0.04)
IMM GRANULOCYTES NFR BLD AUTO: 0.5 % (ref 0–0.5)
LDLC SERPL CALC-MCNC: 67.2 MG/DL (ref 63–159)
LYMPHOCYTES # BLD AUTO: 1.4 K/UL (ref 1–4.8)
LYMPHOCYTES NFR BLD: 25.4 % (ref 18–48)
MCH RBC QN AUTO: 34.6 PG (ref 27–31)
MCHC RBC AUTO-ENTMCNC: 33.6 G/DL (ref 32–36)
MCV RBC AUTO: 103 FL (ref 82–98)
MONOCYTES # BLD AUTO: 0.4 K/UL (ref 0.3–1)
MONOCYTES NFR BLD: 7.8 % (ref 4–15)
NEUTROPHILS # BLD AUTO: 3.6 K/UL (ref 1.8–7.7)
NEUTROPHILS NFR BLD: 63.3 % (ref 38–73)
NONHDLC SERPL-MCNC: 84 MG/DL
NRBC BLD-RTO: 0 /100 WBC
PLATELET # BLD AUTO: 167 K/UL (ref 150–450)
PMV BLD AUTO: 9.4 FL (ref 9.2–12.9)
POTASSIUM SERPL-SCNC: 5 MMOL/L (ref 3.5–5.1)
PROT SERPL-MCNC: 8.1 G/DL (ref 6–8.4)
RBC # BLD AUTO: 4.39 M/UL (ref 4.6–6.2)
SODIUM SERPL-SCNC: 136 MMOL/L (ref 136–145)
TRIGL SERPL-MCNC: 84 MG/DL (ref 30–150)
TSH SERPL DL<=0.005 MIU/L-ACNC: 1.54 UIU/ML (ref 0.4–4)
WBC # BLD AUTO: 5.67 K/UL (ref 3.9–12.7)

## 2022-03-17 PROCEDURE — 3008F PR BODY MASS INDEX (BMI) DOCUMENTED: ICD-10-PCS | Mod: CPTII,S$GLB,, | Performed by: PHYSICIAN ASSISTANT

## 2022-03-17 PROCEDURE — 80061 LIPID PANEL: CPT | Performed by: PHYSICIAN ASSISTANT

## 2022-03-17 PROCEDURE — 90833 PR PSYCHOTHERAPY W/PATIENT W/E&M, 30 MIN (ADD ON): ICD-10-PCS | Mod: S$GLB,,, | Performed by: PHYSICIAN ASSISTANT

## 2022-03-17 PROCEDURE — 99215 PR OFFICE/OUTPT VISIT, EST, LEVL V, 40-54 MIN: ICD-10-PCS | Mod: S$GLB,,, | Performed by: PHYSICIAN ASSISTANT

## 2022-03-17 PROCEDURE — 36415 COLL VENOUS BLD VENIPUNCTURE: CPT | Performed by: PHYSICIAN ASSISTANT

## 2022-03-17 PROCEDURE — 3077F PR MOST RECENT SYSTOLIC BLOOD PRESSURE >= 140 MM HG: ICD-10-PCS | Mod: CPTII,S$GLB,, | Performed by: PHYSICIAN ASSISTANT

## 2022-03-17 PROCEDURE — 84443 ASSAY THYROID STIM HORMONE: CPT | Performed by: PHYSICIAN ASSISTANT

## 2022-03-17 PROCEDURE — 3008F BODY MASS INDEX DOCD: CPT | Mod: CPTII,S$GLB,, | Performed by: PHYSICIAN ASSISTANT

## 2022-03-17 PROCEDURE — 99999 PR PBB SHADOW E&M-EST. PATIENT-LVL III: ICD-10-PCS | Mod: PBBFAC,,, | Performed by: PHYSICIAN ASSISTANT

## 2022-03-17 PROCEDURE — 1159F MED LIST DOCD IN RCRD: CPT | Mod: CPTII,S$GLB,, | Performed by: PHYSICIAN ASSISTANT

## 2022-03-17 PROCEDURE — 3080F PR MOST RECENT DIASTOLIC BLOOD PRESSURE >= 90 MM HG: ICD-10-PCS | Mod: CPTII,S$GLB,, | Performed by: PHYSICIAN ASSISTANT

## 2022-03-17 PROCEDURE — 3077F SYST BP >= 140 MM HG: CPT | Mod: CPTII,S$GLB,, | Performed by: PHYSICIAN ASSISTANT

## 2022-03-17 PROCEDURE — 1160F PR REVIEW ALL MEDS BY PRESCRIBER/CLIN PHARMACIST DOCUMENTED: ICD-10-PCS | Mod: CPTII,S$GLB,, | Performed by: PHYSICIAN ASSISTANT

## 2022-03-17 PROCEDURE — 85025 COMPLETE CBC W/AUTO DIFF WBC: CPT | Performed by: PHYSICIAN ASSISTANT

## 2022-03-17 PROCEDURE — 3080F DIAST BP >= 90 MM HG: CPT | Mod: CPTII,S$GLB,, | Performed by: PHYSICIAN ASSISTANT

## 2022-03-17 PROCEDURE — 99999 PR PBB SHADOW E&M-EST. PATIENT-LVL III: CPT | Mod: PBBFAC,,, | Performed by: PHYSICIAN ASSISTANT

## 2022-03-17 PROCEDURE — 82306 VITAMIN D 25 HYDROXY: CPT | Performed by: PHYSICIAN ASSISTANT

## 2022-03-17 PROCEDURE — 90833 PSYTX W PT W E/M 30 MIN: CPT | Mod: S$GLB,,, | Performed by: PHYSICIAN ASSISTANT

## 2022-03-17 PROCEDURE — 80053 COMPREHEN METABOLIC PANEL: CPT | Performed by: PHYSICIAN ASSISTANT

## 2022-03-17 PROCEDURE — 99215 OFFICE O/P EST HI 40 MIN: CPT | Mod: S$GLB,,, | Performed by: PHYSICIAN ASSISTANT

## 2022-03-17 PROCEDURE — 1159F PR MEDICATION LIST DOCUMENTED IN MEDICAL RECORD: ICD-10-PCS | Mod: CPTII,S$GLB,, | Performed by: PHYSICIAN ASSISTANT

## 2022-03-17 PROCEDURE — 1160F RVW MEDS BY RX/DR IN RCRD: CPT | Mod: CPTII,S$GLB,, | Performed by: PHYSICIAN ASSISTANT

## 2022-03-17 RX ORDER — LURASIDONE HYDROCHLORIDE 60 MG/1
TABLET, FILM COATED ORAL
Qty: 90 TABLET | Refills: 1 | Status: SHIPPED | OUTPATIENT
Start: 2022-03-17 | End: 2022-03-22

## 2022-03-17 RX ORDER — VIT C/E/ZN/COPPR/LUTEIN/ZEAXAN 250MG-90MG
3000 CAPSULE ORAL DAILY
Qty: 180 CAPSULE | Refills: 2 | Status: SHIPPED | OUTPATIENT
Start: 2022-03-17

## 2022-03-17 RX ORDER — TRAZODONE HYDROCHLORIDE 100 MG/1
TABLET ORAL
Qty: 60 TABLET | Refills: 2 | Status: SHIPPED | OUTPATIENT
Start: 2022-03-17 | End: 2023-04-03 | Stop reason: SDUPTHER

## 2022-03-17 RX ORDER — QUETIAPINE FUMARATE 100 MG/1
TABLET, FILM COATED ORAL
Qty: 90 TABLET | Refills: 1 | Status: SHIPPED | OUTPATIENT
Start: 2022-03-17 | End: 2022-12-14 | Stop reason: SDUPTHER

## 2022-03-17 NOTE — PROGRESS NOTES
"Outpatient Psychiatry Follow-Up Visit (MD/BEATRICE)    3/17/2022    Clinical Status of Patient:  Outpatient (Ambulatory)    Chief Complaint:  Mirza Morales is a 66 y.o. male who presents today for follow-up of mood disorder.  Met with patient.      Interval History and Content of Current Session:  Interim Events/Subjective Report/Content of Current Session:    Pt reports today: "feeling much better. im smoking a small amount of marijuana, a few tokes in the morning, sometime in the afternoon, and I have a few more tokes in the evening. im smoking a small joint and it lasts me a whole day."    Reports depression today as 3/10, and anxiety as 3/10.    Pt appears euthymic and steady, good eye contact, linear and logical, calm and cooperative, linear and logical, good insight.    Denies SI/HI/AVH. Pt reports sleeping well 6-7 hrs per night, takes seroquel 100mg qhs at bedtime, states most nights only has to take trazodone 100mg qhs and able to sleep well, also smokes small amount of cannabis in the evening which he says improves his sleep quality. Pt reports improved appetite. Reports that frequently after taking morning medications has mild nausea, states if he takes a small hit of thc it resolves the nausea.    Reports no alcohol use since last visit. States not thinking about drinking much and denies having craving. Has good insight into alcohol dependence d/o and that if he has one drink he will have a significant relapse and begin binge drinking. Reports thc use has helped with etoh cravings "it makes me thing about it less, its not really entering my mind."    Pt reports taking medications as prescribed and behaving appropriately during interview today.      Psychotherapy:  · Target symptoms: substance abuse, mood disorder  · Why chosen therapy is appropriate versus another modality: relevant to diagnosis  · Outcome monitoring methods: self-report  · Therapeutic intervention type: insight oriented psychotherapy, " supportive psychotherapy  · Topics discussed/themes: building skills sets for symptom management, symptom recognition, substance abuse  · The patient's response to the intervention is accepting. The patient's progress toward treatment goals is good.   · Duration of intervention: 17 minutes.      Review of Systems     Psychiatric Review Of Systems - Is patient experiencing or having changes in:  sleep: yes, improved sleep  appetite: yes, improved appetite  weight: no  energy/anergy: no  interest/pleasure/anhedonia: no  somatic symptoms: no  libido: no  anxiety/panic: no  guilty/hopelessness: no  concentration: no  S.I.B.s/risky behavior: no  Irritability: no  Racing thoughts: no  Impulsive behaviors: no  Paranoia: no  AVH: no      Review of Systems   Constitutional: Negative for fever.   HENT: Negative for sore throat.    Eyes: Negative for photophobia.   Respiratory: Negative for cough.    Cardiovascular: Negative for chest pain and palpitations.   Gastrointestinal: Positive for nausea (mild nausea after taking more medicaitons, resolves quickly). Negative for abdominal pain.   Genitourinary: Negative for dysuria.   Musculoskeletal: Negative for myalgias.   Skin: Negative for rash.   Neurological: Negative for dizziness.   Endo/Heme/Allergies: Does not bruise/bleed easily.         Past Medical, Family and Social History: The patient's past medical, family and social history have been reviewed and updated as appropriate within the electronic medical record - see encounter notes.      Current Medications:   Medication List with Changes/Refills   New Medications    CHOLECALCIFEROL, VITAMIN D3, (VITAMIN D3) 25 MCG (1,000 UNIT) CAPSULE    Take 3 capsules (3,000 Units total) by mouth once daily.   Current Medications    AMLODIPINE (NORVASC) 2.5 MG TABLET    TAKE 1 TABLET (2.5 MG TOTAL) BY MOUTH ONCE DAILY.    ATORVASTATIN (LIPITOR) 40 MG TABLET    TAKE 1 TABLET (40 MG TOTAL) BY MOUTH ONCE DAILY.    CALCIUM POLYCARBOPHIL  ORAL    Take by mouth once daily.     LACOSAMIDE (VIMPAT) 200 MG TAB TABLET    Take 1 tablet (200 mg total) by mouth every 12 (twelve) hours.    LACOSAMIDE (VIMPAT) 200 MG TAB TABLET    Take 1 tablet (200 mg total) by mouth every 12 (twelve) hours. MUST MAKE APPOINTMENT FOR FURTHER REFILLS    PANTOPRAZOLE (PROTONIX) 40 MG TABLET    Take 1 tablet (40 mg total) by mouth once daily.    PROPRANOLOL (INDERAL LA) 60 MG 24 HR CAPSULE    Take 1 capsule (60 mg total) by mouth once daily.    PROPRANOLOL (INDERAL) 60 MG TABLET    Take 1 tablet (60 mg total) by mouth once daily.   Changed and/or Refilled Medications    Modified Medication Previous Medication    LURASIDONE (LATUDA) 60 MG TAB TABLET lurasidone (LATUDA) 60 mg Tab tablet       Take one tablet every evening with dinner.    Take one tablet every evening with dinner.    QUETIAPINE (SEROQUEL) 100 MG TAB QUEtiapine (SEROQUEL) 100 MG Tab       Take one tablet at bedtime    Take one tablet at bedtime    TRAZODONE (DESYREL) 100 MG TABLET traZODone (DESYREL) 100 MG tablet       TAKE 1-2 TABLETS BY MOUTH AT BEDTIME    TAKE 1-2 TABLETS BY MOUTH AT BEDTIME   Discontinued Medications    PROPRANOLOL (INDERAL) 20 MG TABLET    TAKE 1 TABLET TWICE DAILY AS NEEDED FOR TREMOR    VENLAFAXINE (EFFEXOR-XR) 75 MG 24 HR CAPSULE    TAKE 1 CAPSULE BY MOUTH TWICE A DAY         Allergies:   Review of patient's allergies indicates:   Allergen Reactions    Gabapentin Other (See Comments)     SEVERE DIZZINESS AND BALANCE PROBLEMS, UNABLE TO WALK.    Nardil [phenelzine]      BECOMES HYPER, LIKE A MANIC EPISODE.    Penicillins Hives    Lamictal [lamotrigine] Rash         Vitals   Vitals:    03/17/22 0901   BP: (!) 166/97   Pulse: 99          Labs/Imaging/Studies:   Recent Results (from the past 48 hour(s))   Lipid Panel    Collection Time: 03/17/22 10:19 AM   Result Value Ref Range    Cholesterol 144 120 - 199 mg/dL    Triglycerides 84 30 - 150 mg/dL    HDL 60 40 - 75 mg/dL    LDL Cholesterol  67.2 63.0 - 159.0 mg/dL    HDL/Cholesterol Ratio 41.7 20.0 - 50.0 %    Total Cholesterol/HDL Ratio 2.4 2.0 - 5.0    Non-HDL Cholesterol 84 mg/dL   CBC auto differential    Collection Time: 03/17/22 10:19 AM   Result Value Ref Range    WBC 5.67 3.90 - 12.70 K/uL    RBC 4.39 (L) 4.60 - 6.20 M/uL    Hemoglobin 15.2 14.0 - 18.0 g/dL    Hematocrit 45.3 40.0 - 54.0 %     (H) 82 - 98 fL    MCH 34.6 (H) 27.0 - 31.0 pg    MCHC 33.6 32.0 - 36.0 g/dL    RDW 11.9 11.5 - 14.5 %    Platelets 167 150 - 450 K/uL    MPV 9.4 9.2 - 12.9 fL    Immature Granulocytes 0.5 0.0 - 0.5 %    Gran # (ANC) 3.6 1.8 - 7.7 K/uL    Immature Grans (Abs) 0.03 0.00 - 0.04 K/uL    Lymph # 1.4 1.0 - 4.8 K/uL    Mono # 0.4 0.3 - 1.0 K/uL    Eos # 0.1 0.0 - 0.5 K/uL    Baso # 0.04 0.00 - 0.20 K/uL    nRBC 0 0 /100 WBC    Gran % 63.3 38.0 - 73.0 %    Lymph % 25.4 18.0 - 48.0 %    Mono % 7.8 4.0 - 15.0 %    Eosinophil % 2.3 0.0 - 8.0 %    Basophil % 0.7 0.0 - 1.9 %    Differential Method Automated    Comprehensive metabolic panel    Collection Time: 03/17/22 10:19 AM   Result Value Ref Range    Sodium 136 136 - 145 mmol/L    Potassium 5.0 3.5 - 5.1 mmol/L    Chloride 97 95 - 110 mmol/L    CO2 30 (H) 23 - 29 mmol/L    Glucose 124 (H) 70 - 110 mg/dL    BUN 11 8 - 23 mg/dL    Creatinine 0.9 0.5 - 1.4 mg/dL    Calcium 10.4 8.7 - 10.5 mg/dL    Total Protein 8.1 6.0 - 8.4 g/dL    Albumin 4.3 3.5 - 5.2 g/dL    Total Bilirubin 0.7 0.1 - 1.0 mg/dL    Alkaline Phosphatase 65 55 - 135 U/L    AST 23 10 - 40 U/L    ALT 26 10 - 44 U/L    Anion Gap 9 8 - 16 mmol/L    eGFR if African American >60.0 >60 mL/min/1.73 m^2    eGFR if non African American >60.0 >60 mL/min/1.73 m^2   TSH    Collection Time: 03/17/22 10:19 AM   Result Value Ref Range    TSH 1.538 0.400 - 4.000 uIU/mL   Vitamin D    Collection Time: 03/17/22 10:19 AM   Result Value Ref Range    Vit D, 25-Hydroxy 27 (L) 30 - 96 ng/mL      Lab Results   Component Value Date    VALPROATE 48.2 (L) 09/29/2019        Compliance: yes    Side effects: None    Risk Parameters:  Patient reports no suicidal ideation  Patient reports no homicidal ideation  Patient reports no self-injurious behavior  Patient reports no violent behavior    Exam (detailed: at least 9 elements; comprehensive: all 15 elements)   Constitutional  Vitals:  Most recent vital signs, dated less than 90 days prior to this appointment, were reviewed.   Vitals:    03/17/22 0901   BP: (!) 166/97   Pulse: 99   Weight: 101.4 kg (223 lb 8.7 oz)        General:  unremarkable, age appropriate     Musculoskeletal  Muscle Strength/Tone:  not examined   Gait & Station:  non-ataxic     Psychiatric  Speech:  no latency; no press   Mood & Affect:  euthymic  congruent and appropriate   Thought Process:  normal and logical   Associations:  intact   Thought Content:  normal, no suicidality, no homicidality, delusions, or paranoia   Insight:  intact, has awareness of illness   Judgement: behavior is adequate to circumstances   Orientation:  grossly intact   Memory: intact for content of interview   Language: grossly intact   Attention Span & Concentration:  able to focus   Fund of Knowledge:  intact and appropriate to age and level of education     Assessment and Diagnosis   Status/Progress: Based on the examination today, the patient's problem(s) is/are improved and well controlled.  New problems have not been presented today.   Co-morbidities, Diagnostic uncertainty and Lack of compliance are complicating management of the primary condition.  There are no active rule-out diagnoses for this patient at this time.     General Impression:      ICD-10-CM ICD-9-CM   1. Bipolar 1 disorder  F31.9 296.7   2. Cannabis use, uncomplicated  F12.90 305.20   3. Long term current use of antipsychotic medication  Z79.899 V58.69   4. Vitamin D insufficiency  E55.9 268.9       Intervention/Counseling/Treatment Plan   · Medication Management: Continue current medications. The risks and  benefits of medication were discussed with the patient.  -continue latuda 60mg daily  -continue seroquel 100mg qhs, discussed pt will consider lowering to 50mg qhs in future if able to still sleep well on decreased  -continue trazodone 100mg, take 1-2 tablets at bedtime as needed for insomnia    Return to Clinic: 2 months        Trav Andrews PA-C      Total face to face time: 50 min  Total time (chart review, patient contact, documentation): 65 min      *This note has been prepared using a combination of a dictation device and typing.  It has been checked for errors but some errors may still exist within the note as a result of speech recognition errors and/or typographical errors.

## 2022-03-22 DIAGNOSIS — F31.9 BIPOLAR 1 DISORDER: ICD-10-CM

## 2022-03-22 RX ORDER — LURASIDONE HYDROCHLORIDE 60 MG/1
TABLET, FILM COATED ORAL
Qty: 30 TABLET | Refills: 2 | Status: SHIPPED | OUTPATIENT
Start: 2022-03-22 | End: 2022-08-24

## 2022-03-30 ENCOUNTER — OFFICE VISIT (OUTPATIENT)
Dept: INTERNAL MEDICINE | Facility: CLINIC | Age: 67
End: 2022-03-30
Payer: MEDICARE

## 2022-03-30 ENCOUNTER — LAB VISIT (OUTPATIENT)
Dept: LAB | Facility: HOSPITAL | Age: 67
End: 2022-03-30
Payer: MEDICARE

## 2022-03-30 VITALS
BODY MASS INDEX: 27.41 KG/M2 | DIASTOLIC BLOOD PRESSURE: 80 MMHG | OXYGEN SATURATION: 99 % | HEIGHT: 76 IN | SYSTOLIC BLOOD PRESSURE: 120 MMHG | HEART RATE: 100 BPM | WEIGHT: 225.06 LBS

## 2022-03-30 DIAGNOSIS — Z12.5 SCREENING FOR MALIGNANT NEOPLASM OF PROSTATE: ICD-10-CM

## 2022-03-30 DIAGNOSIS — G95.9 DISEASE OF SPINAL CORD, UNSPECIFIED: ICD-10-CM

## 2022-03-30 DIAGNOSIS — R10.84 GENERALIZED ABDOMINAL PAIN: ICD-10-CM

## 2022-03-30 DIAGNOSIS — Z13.6 SCREENING FOR AAA (ABDOMINAL AORTIC ANEURYSM): ICD-10-CM

## 2022-03-30 DIAGNOSIS — R56.9 SEIZURE: ICD-10-CM

## 2022-03-30 DIAGNOSIS — D63.8 ANEMIA OF CHRONIC DISEASE: ICD-10-CM

## 2022-03-30 DIAGNOSIS — G40.909 SEIZURE DISORDER: Primary | ICD-10-CM

## 2022-03-30 DIAGNOSIS — G25.0 ESSENTIAL TREMOR: ICD-10-CM

## 2022-03-30 DIAGNOSIS — R73.01 IMPAIRED FASTING GLUCOSE: ICD-10-CM

## 2022-03-30 DIAGNOSIS — F10.20 ALCOHOL USE DISORDER, SEVERE, DEPENDENCE: ICD-10-CM

## 2022-03-30 DIAGNOSIS — M47.26 OSTEOARTHRITIS OF SPINE WITH RADICULOPATHY, LUMBAR REGION: ICD-10-CM

## 2022-03-30 DIAGNOSIS — R06.2 WHEEZING: ICD-10-CM

## 2022-03-30 DIAGNOSIS — F31.9 BIPOLAR 1 DISORDER: ICD-10-CM

## 2022-03-30 DIAGNOSIS — F41.9 ANXIETY: ICD-10-CM

## 2022-03-30 DIAGNOSIS — F09 COGNITIVE DISORDER: ICD-10-CM

## 2022-03-30 DIAGNOSIS — F32.2 SEVERE DEPRESSION: ICD-10-CM

## 2022-03-30 DIAGNOSIS — Z72.0 TOBACCO ABUSE: ICD-10-CM

## 2022-03-30 LAB
AMYLASE SERPL-CCNC: 49 U/L (ref 20–110)
COMPLEXED PSA SERPL-MCNC: 2 NG/ML (ref 0–4)
ESTIMATED AVG GLUCOSE: 97 MG/DL (ref 68–131)
HBA1C MFR BLD: 5 % (ref 4–5.6)
LIPASE SERPL-CCNC: 20 U/L (ref 4–60)
VIT B12 SERPL-MCNC: 796 PG/ML (ref 210–950)

## 2022-03-30 PROCEDURE — 3008F BODY MASS INDEX DOCD: CPT | Mod: CPTII,S$GLB,, | Performed by: NURSE PRACTITIONER

## 2022-03-30 PROCEDURE — 82150 ASSAY OF AMYLASE: CPT | Performed by: NURSE PRACTITIONER

## 2022-03-30 PROCEDURE — 3288F PR FALLS RISK ASSESSMENT DOCUMENTED: ICD-10-PCS | Mod: CPTII,S$GLB,, | Performed by: NURSE PRACTITIONER

## 2022-03-30 PROCEDURE — 3079F PR MOST RECENT DIASTOLIC BLOOD PRESSURE 80-89 MM HG: ICD-10-PCS | Mod: CPTII,S$GLB,, | Performed by: NURSE PRACTITIONER

## 2022-03-30 PROCEDURE — 3008F PR BODY MASS INDEX (BMI) DOCUMENTED: ICD-10-PCS | Mod: CPTII,S$GLB,, | Performed by: NURSE PRACTITIONER

## 2022-03-30 PROCEDURE — 1126F PR PAIN SEVERITY QUANTIFIED, NO PAIN PRESENT: ICD-10-PCS | Mod: CPTII,S$GLB,, | Performed by: NURSE PRACTITIONER

## 2022-03-30 PROCEDURE — 1159F PR MEDICATION LIST DOCUMENTED IN MEDICAL RECORD: ICD-10-PCS | Mod: CPTII,S$GLB,, | Performed by: NURSE PRACTITIONER

## 2022-03-30 PROCEDURE — 84153 ASSAY OF PSA TOTAL: CPT | Performed by: NURSE PRACTITIONER

## 2022-03-30 PROCEDURE — 3074F SYST BP LT 130 MM HG: CPT | Mod: CPTII,S$GLB,, | Performed by: NURSE PRACTITIONER

## 2022-03-30 PROCEDURE — 1101F PT FALLS ASSESS-DOCD LE1/YR: CPT | Mod: CPTII,S$GLB,, | Performed by: NURSE PRACTITIONER

## 2022-03-30 PROCEDURE — 1101F PR PT FALLS ASSESS DOC 0-1 FALLS W/OUT INJ PAST YR: ICD-10-PCS | Mod: CPTII,S$GLB,, | Performed by: NURSE PRACTITIONER

## 2022-03-30 PROCEDURE — 3288F FALL RISK ASSESSMENT DOCD: CPT | Mod: CPTII,S$GLB,, | Performed by: NURSE PRACTITIONER

## 2022-03-30 PROCEDURE — 1160F PR REVIEW ALL MEDS BY PRESCRIBER/CLIN PHARMACIST DOCUMENTED: ICD-10-PCS | Mod: CPTII,S$GLB,, | Performed by: NURSE PRACTITIONER

## 2022-03-30 PROCEDURE — 3074F PR MOST RECENT SYSTOLIC BLOOD PRESSURE < 130 MM HG: ICD-10-PCS | Mod: CPTII,S$GLB,, | Performed by: NURSE PRACTITIONER

## 2022-03-30 PROCEDURE — 1160F RVW MEDS BY RX/DR IN RCRD: CPT | Mod: CPTII,S$GLB,, | Performed by: NURSE PRACTITIONER

## 2022-03-30 PROCEDURE — 82607 VITAMIN B-12: CPT | Performed by: NURSE PRACTITIONER

## 2022-03-30 PROCEDURE — 36415 COLL VENOUS BLD VENIPUNCTURE: CPT | Performed by: NURSE PRACTITIONER

## 2022-03-30 PROCEDURE — 83690 ASSAY OF LIPASE: CPT | Performed by: NURSE PRACTITIONER

## 2022-03-30 PROCEDURE — 3079F DIAST BP 80-89 MM HG: CPT | Mod: CPTII,S$GLB,, | Performed by: NURSE PRACTITIONER

## 2022-03-30 PROCEDURE — 99999 PR PBB SHADOW E&M-EST. PATIENT-LVL V: ICD-10-PCS | Mod: PBBFAC,,, | Performed by: NURSE PRACTITIONER

## 2022-03-30 PROCEDURE — 99999 PR PBB SHADOW E&M-EST. PATIENT-LVL V: CPT | Mod: PBBFAC,,, | Performed by: NURSE PRACTITIONER

## 2022-03-30 PROCEDURE — 99214 PR OFFICE/OUTPT VISIT, EST, LEVL IV, 30-39 MIN: ICD-10-PCS | Mod: S$GLB,,, | Performed by: NURSE PRACTITIONER

## 2022-03-30 PROCEDURE — 1126F AMNT PAIN NOTED NONE PRSNT: CPT | Mod: CPTII,S$GLB,, | Performed by: NURSE PRACTITIONER

## 2022-03-30 PROCEDURE — 99214 OFFICE O/P EST MOD 30 MIN: CPT | Mod: S$GLB,,, | Performed by: NURSE PRACTITIONER

## 2022-03-30 PROCEDURE — 1159F MED LIST DOCD IN RCRD: CPT | Mod: CPTII,S$GLB,, | Performed by: NURSE PRACTITIONER

## 2022-03-30 PROCEDURE — 83036 HEMOGLOBIN GLYCOSYLATED A1C: CPT | Performed by: NURSE PRACTITIONER

## 2022-03-30 NOTE — PROGRESS NOTES
Internal Medicine Annual Exam       CHIEF COMPLAINT     The patient, Mirza Morales, who is a 66 y.o. male with lumbar DJD, OA fo the spine, cognitive disorder, seizures, tremor, bipolar disorder, depression, HLD, ST, anemia, constipation, scoliosis, tobacco use, and vit d def  presents for an annual exam.    HPI   He is an established pt of Dr Mo - last seen 7/29/2020    Bipolar disorder/depression - followed by psychiatry Dr Andrews   -continue latuda 60mg daily - pt has not taken in 1.5 years   -continue seroquel 100mg qhs, discussed pt will consider lowering to 50mg qhs in future if able to still sleep well on decreased  -continue trazodone 100mg, take 1-2 tablets at bedtime as needed for insomnia    Seizures-   recurrent sz in Sept 2019  after self-weaning his Depakote due to s/e tremors.  Was then switched to Vimpat c EEG c/w delerium which had improved p decreasing Doxepin.  Followed by Dr barbosa - needs new neurologist - taking vimpat 200mg and propranolol for tremor   Had seizure 12/24/2021 - x 2     Abd pain generalized - 3-6 months ago - intermittent   worse when standing. No pain when sitting or lying.   No pain in office today   Worsens with prolonged standing.      Recent labs-   Glucose fasting 124  Lipids- at goal   TSH- at goal   Macrocytosis   Vitamin D - 27     HM-  PSA- will order   AAA- will order           Past Medical History:  Past Medical History:   Diagnosis Date    Alcohol abuse     Behavioral problem     Bipolar 1 disorder     Chronic right hip pain     Depression     DJD (degenerative joint disease), lumbar 1/27/2015    Fatigue     Hepatitis     pt. denies this    History of psychiatric care     History of psychiatric hospitalization     Hypertension     Karina     Post traumatic stress disorder     Psychiatric problem     Seizures     Suicide attempt     Therapy     Vitamin D insufficiency 3/17/2022       Past Surgical History:   Procedure Laterality Date     COLONOSCOPY N/A 8/14/2019    Procedure: COLONOSCOPY;  Surgeon: Tammy Duval MD;  Location: [facility] ([address] [place] [address]);  Service: Endoscopy;  Laterality: N/A;    HERNIA REPAIR      SPINE SURGERY  11-12-15    LAMINECTOMY/LUMBAR/WARE        Family History   Problem Relation Age of Onset    Alcohol abuse Mother     Depression Mother     Depression Father     Depression Sister     Suicide Sister     Drug abuse Brother     Anxiety disorder Brother     Bipolar disorder Brother     Depression Brother     Alcohol abuse Maternal Uncle     Alcohol abuse Paternal Uncle     Dementia Maternal Grandmother     Alcohol abuse Cousin     Amblyopia Neg Hx     Blindness Neg Hx     Cancer Neg Hx     Cataracts Neg Hx     Diabetes Neg Hx     Glaucoma Neg Hx     Hypertension Neg Hx     Macular degeneration Neg Hx     Retinal detachment Neg Hx     Strabismus Neg Hx     Stroke Neg Hx     Thyroid disease Neg Hx     Asthma Neg Hx     Emphysema Neg Hx         Social History     Socioeconomic History    Marital status:     Number of children: 2    Years of education: 14   Occupational History    Occupation: disabilty   Tobacco Use    Smoking status: Current Some Day Smoker     Packs/day: 0.25     Years: 10.00     Pack years: 2.50    Smokeless tobacco: Never Used   Substance and Sexual Activity    Alcohol use: No     Alcohol/week: 16.7 standard drinks     Types: 20 Standard drinks or equivalent per week     Comment: quit 4 years ago    Drug use: No    Sexual activity: Yes     Partners: Female     Comment: with wife; monogamous    Other Topics Concern    Financial Status: Unemployed No    Childhood History: Adopted No    Childhood History: Early trauma Yes    Firearms: Does patient have access to a firearm? Yes    Leisure: Fishing Yes    Childhood History: Raised by parents No    Home situation: homeless No    Home situation: lives alone No    Home situation: lives in group home No     Education: Unfinished High School No    Home situation: lives in nursing home No    Education: High School Graduate Yes    Home situation: lives in shelter No    Education: Unfinished college Yes    Home situation: lives with family No     Service No    Education: Trade School No    Home situation: lives with friends No    Spirituality: Active Participation No    Education: Associate's Degree No    Home situation: lives with significant other No    Spirituality: Organized Mandaen Yes    Education: Bachelor's Degree No    Home situation: lives with spouse Yes    Education: More than one Bachelor's or Professional No    Patient feels they ought to cut down on drinking/drug use Yes    Education: Master's, PhD No    Legal: Arrest history Yes    Patient annoyed by others criticizing their drinking/drug use No    Financial Status: Disabled Yes    Legal: Involved in civil litigation Yes    Patient has felt bad or guilty about drinking/drug use Yes    Financial Status: Employed No    Patient has had a drink/used drugs as an eye opener in the AM No     Social Determinants of Health     Financial Resource Strain: Unknown    Difficulty of Paying Living Expenses: Patient refused   Food Insecurity: Unknown    Worried About Running Out of Food in the Last Year: Patient refused    Ran Out of Food in the Last Year: Patient refused   Transportation Needs: No Transportation Needs    Lack of Transportation (Medical): No    Lack of Transportation (Non-Medical): No   Physical Activity: Insufficiently Active    Days of Exercise per Week: 2 days    Minutes of Exercise per Session: 10 min   Stress: Stress Concern Present    Feeling of Stress : To some extent   Social Connections: Unknown    Frequency of Communication with Friends and Family: Patient refused    Frequency of Social Gatherings with Friends and Family: Patient refused    Active Member of Clubs or Organizations: No    Attends Club or  Organization Meetings: Never    Marital Status:    Housing Stability: Low Risk     Unable to Pay for Housing in the Last Year: No    Number of Places Lived in the Last Year: 1    Unstable Housing in the Last Year: No        Social History     Tobacco Use   Smoking Status Current Some Day Smoker    Packs/day: 0.25    Years: 10.00    Pack years: 2.50   Smokeless Tobacco Never Used        Allergies as of 03/30/2022 - Reviewed 03/30/2022   Allergen Reaction Noted    Gabapentin Other (See Comments) 11/25/2015    Nardil [phenelzine]  11/25/2015    Penicillins Hives 12/21/2015    Lamictal [lamotrigine] Rash 02/27/2013          Home Medications:  Prior to Admission medications    Medication Sig Start Date End Date Taking? Authorizing Provider   amLODIPine (NORVASC) 2.5 MG tablet TAKE 1 TABLET (2.5 MG TOTAL) BY MOUTH ONCE DAILY. 10/18/21 10/18/22 Yes Jud Mo MD   atorvastatin (LIPITOR) 40 MG tablet TAKE 1 TABLET (40 MG TOTAL) BY MOUTH ONCE DAILY. 3/2/22  Yes Eduarda Calderon NP   CALCIUM POLYCARBOPHIL ORAL Take by mouth once daily.    Yes Historical Provider   cholecalciferol, vitamin D3, (VITAMIN D3) 25 mcg (1,000 unit) capsule Take 3 capsules (3,000 Units total) by mouth once daily. 3/17/22  Yes DAVID Dexter   lacosamide (VIMPAT) 200 mg Tab tablet Take 1 tablet (200 mg total) by mouth every 12 (twelve) hours. MUST MAKE APPOINTMENT FOR FURTHER REFILLS 2/23/22  Yes Samuel Julien MD   lurasidone (LATUDA) 60 mg Tab tablet Take one tablet every evening with dinner. 3/22/22  Yes DAVID Dexter   propranoloL (INDERAL LA) 60 MG 24 hr capsule Take 1 capsule (60 mg total) by mouth once daily. 6/10/21 6/10/22 Yes Samuel Julien MD   propranoloL (INDERAL) 60 MG tablet Take 1 tablet (60 mg total) by mouth once daily. 1/10/22  Yes Samuel Julien MD   QUEtiapine (SEROQUEL) 100 MG Tab Take one tablet at bedtime 3/17/22  Yes DAVID Dexter   traZODone (DESYREL) 100 MG tablet TAKE 1-2 TABLETS  BY MOUTH AT BEDTIME 3/17/22  Yes DAVID Dexter   pantoprazole (PROTONIX) 40 MG tablet Take 1 tablet (40 mg total) by mouth once daily.  Patient taking differently: Take 40 mg by mouth daily as needed (heartburn).  1/15/20 1/14/21  Jacinta Young MD   lacosamide (VIMPAT) 200 mg Tab tablet Take 1 tablet (200 mg total) by mouth every 12 (twelve) hours. 9/27/21 3/30/22  Samuel Julien MD       Review of Systems:  Review of Systems   Constitutional: Negative for activity change and unexpected weight change.   HENT: Positive for postnasal drip, rhinorrhea and sneezing. Negative for hearing loss and trouble swallowing.    Eyes: Negative for discharge and visual disturbance.   Respiratory: Positive for cough, shortness of breath and wheezing. Negative for chest tightness.    Cardiovascular: Positive for palpitations. Negative for chest pain.   Gastrointestinal: Positive for abdominal pain. Negative for blood in stool, constipation (improved with ginger root ), diarrhea, nausea and vomiting.   Endocrine: Negative for polydipsia and polyuria.   Genitourinary: Negative for difficulty urinating, hematuria and urgency.   Musculoskeletal: Negative for arthralgias, joint swelling and neck pain.   Skin: Negative for rash.   Neurological: Positive for weakness. Negative for dizziness, light-headedness and headaches.   Psychiatric/Behavioral: Positive for dysphoric mood and sleep disturbance. Negative for confusion.       Health Maintainence:   Immunizations:  Health Maintenance       Date Due Completion Date    Abdominal Aortic Aneurysm Screening 04/22/2021 2/28/2013    PROSTATE-SPECIFIC ANTIGEN 08/03/2021 8/3/2020    Lipid Panel 03/17/2023 3/17/2022    Pneumococcal Vaccines (Age 65+) (2 of 2 - PPSV23) 08/08/2023 8/8/2018    TETANUS VACCINE 08/08/2028 8/8/2018    Colorectal Cancer Screening 08/14/2029 8/14/2019           PHYSICAL EXAM     /80 (BP Location: Left arm, Patient Position: Sitting, BP Method: Medium (Manual))    "Pulse 100   Ht 6' 4" (1.93 m)   Wt 102.1 kg (225 lb 1.4 oz)   SpO2 99%   BMI 27.40 kg/m²  Body mass index is 27.4 kg/m².    Physical Exam  Vitals reviewed.   Constitutional:       Appearance: He is well-developed.   HENT:      Head: Normocephalic.      Right Ear: External ear normal.      Left Ear: External ear normal.      Nose: Nose normal.      Mouth/Throat:      Pharynx: No oropharyngeal exudate.   Eyes:      Pupils: Pupils are equal, round, and reactive to light.   Neck:      Thyroid: No thyromegaly.      Vascular: No JVD.      Trachea: No tracheal deviation.   Cardiovascular:      Rate and Rhythm: Normal rate and regular rhythm.      Heart sounds: No murmur heard.    No friction rub. No gallop.   Pulmonary:      Effort: No respiratory distress.      Breath sounds: Normal breath sounds. No wheezing or rales.   Chest:      Chest wall: No tenderness.   Abdominal:      General: Bowel sounds are normal. There is no distension.      Palpations: Abdomen is soft. There is no mass.      Tenderness: There is no abdominal tenderness. There is no right CVA tenderness, left CVA tenderness, guarding or rebound.      Hernia: A hernia (left inguinal ) is present.   Musculoskeletal:         General: No tenderness. Normal range of motion.   Lymphadenopathy:      Cervical: No cervical adenopathy.   Skin:     General: Skin is warm and dry.      Findings: No rash.   Neurological:      Mental Status: He is alert and oriented to person, place, and time.   Psychiatric:         Behavior: Behavior normal.         LABS     Lab Results   Component Value Date    HGBA1C 4.8 01/07/2020     CMP  Sodium   Date Value Ref Range Status   03/17/2022 136 136 - 145 mmol/L Final     Potassium   Date Value Ref Range Status   03/17/2022 5.0 3.5 - 5.1 mmol/L Final     Chloride   Date Value Ref Range Status   03/17/2022 97 95 - 110 mmol/L Final     CO2   Date Value Ref Range Status   03/17/2022 30 (H) 23 - 29 mmol/L Final     Glucose   Date Value " Ref Range Status   03/17/2022 124 (H) 70 - 110 mg/dL Final     BUN   Date Value Ref Range Status   03/17/2022 11 8 - 23 mg/dL Final     Creatinine   Date Value Ref Range Status   03/17/2022 0.9 0.5 - 1.4 mg/dL Final     Calcium   Date Value Ref Range Status   03/17/2022 10.4 8.7 - 10.5 mg/dL Final     Total Protein   Date Value Ref Range Status   03/17/2022 8.1 6.0 - 8.4 g/dL Final     Albumin   Date Value Ref Range Status   03/17/2022 4.3 3.5 - 5.2 g/dL Final     Total Bilirubin   Date Value Ref Range Status   03/17/2022 0.7 0.1 - 1.0 mg/dL Final     Comment:     For infants and newborns, interpretation of results should be based  on gestational age, weight and in agreement with clinical  observations.    Premature Infant recommended reference ranges:  Up to 24 hours.............<8.0 mg/dL  Up to 48 hours............<12.0 mg/dL  3-5 days..................<15.0 mg/dL  6-29 days.................<15.0 mg/dL       Alkaline Phosphatase   Date Value Ref Range Status   03/17/2022 65 55 - 135 U/L Final     AST   Date Value Ref Range Status   03/17/2022 23 10 - 40 U/L Final     ALT   Date Value Ref Range Status   03/17/2022 26 10 - 44 U/L Final     Anion Gap   Date Value Ref Range Status   03/17/2022 9 8 - 16 mmol/L Final     eGFR if    Date Value Ref Range Status   03/17/2022 >60.0 >60 mL/min/1.73 m^2 Final     eGFR if non    Date Value Ref Range Status   03/17/2022 >60.0 >60 mL/min/1.73 m^2 Final     Comment:     Calculation used to obtain the estimated glomerular filtration  rate (eGFR) is the CKD-EPI equation.        Lab Results   Component Value Date    WBC 5.67 03/17/2022    HGB 15.2 03/17/2022    HCT 45.3 03/17/2022     (H) 03/17/2022     03/17/2022     Lab Results   Component Value Date    CHOL 144 03/17/2022    CHOL 127 08/03/2020    CHOL 147 01/29/2020     Lab Results   Component Value Date    HDL 60 03/17/2022    HDL 52 08/03/2020    HDL 65 01/29/2020     Lab Results    Component Value Date    LDLCALC 67.2 03/17/2022    LDLCALC 55.2 (L) 08/03/2020    LDLCALC 59.2 (L) 01/29/2020     Lab Results   Component Value Date    TRIG 84 03/17/2022    TRIG 99 08/03/2020    TRIG 114 01/29/2020     Lab Results   Component Value Date    CHOLHDL 41.7 03/17/2022    CHOLHDL 40.9 08/03/2020    CHOLHDL 44.2 01/29/2020     Lab Results   Component Value Date    TSH 1.538 03/17/2022       ASSESSMENT/PLAN     Mirza Morales is a 66 y.o. male    Seizure disorder- stable. Will refer to neurology. Cont vimpat.     Seizure- stable. Will refer to neurology. Cont vimpat.   -     Ambulatory referral/consult to Neurology; Future; Expected date: 04/06/2022    Essential tremor- stable. Will cont propranolol - f/u with neurology     Disease of spinal cord, unspecified- stable. Will f/u with ortho     Osteoarthritis of spine with radiculopathy, lumbar region- stable. Will f/u with ortho     Cognitive disorder- stable. Avoid EOTH. Will monitor     Bipolar 1 disorder/Severe depression/Anxiety- stable. Will f/u with psychiatry and cont current meds.     Impaired fasting glucose- will check A1c, low sugar diet recommended   -     Hemoglobin A1C; Future; Expected date: 03/30/2022    Screening for malignant neoplasm of prostate  -     PSA, Screening; Future; Expected date: 03/30/2022    Screening for AAA (abdominal aortic aneurysm)  -     CV AAA Screening; Future    Anemia of chronic disease  -     Vitamin B12; Future; Expected date: 03/30/2022    Alcohol use disorder, severe, dependence  -     Vitamin B12; Future; Expected date: 03/30/2022    Tobacco abuse  -     Spirometry with/without bronchodilator; Future    Wheezing- with tobacco use. Will send for spirometry and use albuterol as needed   -     Spirometry with/without bronchodilator; Future    Generalized abdominal pain- send for labs and u/s of the abd   -     US Abdomen Complete; Future; Expected date: 03/30/2022  -     Lipase; Future; Expected date:  03/30/2022  -     Amylase; Future; Expected date: 03/30/2022    Follow up with PCP    Eduarda BECKWITH, BETTY, FNP-c   Department of Internal Medicine - [facility]  2:12 PM

## 2022-04-05 ENCOUNTER — HOSPITAL ENCOUNTER (OUTPATIENT)
Dept: RADIOLOGY | Facility: HOSPITAL | Age: 67
Discharge: HOME | End: 2022-04-05
Attending: NURSE PRACTITIONER
Payer: MEDICARE

## 2022-04-05 ENCOUNTER — HOSPITAL ENCOUNTER (OUTPATIENT)
Dept: CARDIOLOGY | Facility: HOSPITAL | Age: 67
Discharge: HOME | End: 2022-04-05
Attending: NURSE PRACTITIONER
Payer: MEDICARE

## 2022-04-05 ENCOUNTER — PATIENT MESSAGE (OUTPATIENT)
Dept: INTERNAL MEDICINE | Facility: CLINIC | Age: 67
End: 2022-04-05
Payer: MEDICARE

## 2022-04-05 ENCOUNTER — HOSPITAL ENCOUNTER (OUTPATIENT)
Dept: PULMONOLOGY | Facility: CLINIC | Age: 67
Discharge: HOME | End: 2022-04-05
Payer: MEDICARE

## 2022-04-05 DIAGNOSIS — Z13.6 SCREENING FOR AAA (ABDOMINAL AORTIC ANEURYSM): ICD-10-CM

## 2022-04-05 DIAGNOSIS — Z72.0 TOBACCO ABUSE: ICD-10-CM

## 2022-04-05 DIAGNOSIS — R10.84 GENERALIZED ABDOMINAL PAIN: ICD-10-CM

## 2022-04-05 DIAGNOSIS — R06.2 WHEEZING: ICD-10-CM

## 2022-04-05 DIAGNOSIS — N28.1 COMPLEX RENAL CYST: ICD-10-CM

## 2022-04-05 LAB
FEF 25 75 LLN: 1.34
FEF 25 75 PRE REF: 82.8 %
FEF 25 75 REF: 2.91
FET100 CHG: 15.6 %
FEV05 LLN: 1.97
FEV05 REF: 3.1
FEV1 CHG: -4.5 %
FEV1 FVC LLN: 63
FEV1 FVC PRE REF: 103.3 %
FEV1 FVC REF: 76
FEV1 LLN: 2.8
FEV1 PRE REF: 72 %
FEV1 REF: 3.81
FEV1 VOL CHG: -0.12
FVC CHG: -3 %
FVC LLN: 3.78
FVC PRE REF: 69.3 %
FVC REF: 5.04
FVC VOL CHG: -0.11
PEF LLN: 7.16
PEF PRE REF: 73.7 %
PEF REF: 9.75
PHYSICIAN COMMENT: ABNORMAL
POST FEF 25 75: 2.15 L/S (ref 1.34–5.1)
POST FET 100: 6.3 SEC
POST FEV1 FVC: 77.21 % (ref 63.2–87.06)
POST FEV1: 2.62 L (ref 2.8–4.75)
POST FEV5: 2.02 L (ref 1.97–4.24)
POST FVC: 3.39 L (ref 3.78–6.31)
POST PEF: 5.91 L/S (ref 7.16–12.34)
PRE FEF 25 75: 2.41 L/S (ref 1.34–5.1)
PRE FET 100: 5.45 SEC
PRE FEV05 REF: 70.8 %
PRE FEV1 FVC: 78.4 % (ref 63.2–87.06)
PRE FEV1: 2.74 L (ref 2.8–4.75)
PRE FEV5: 2.2 L (ref 1.97–4.24)
PRE FVC: 3.5 L (ref 3.78–6.31)
PRE PEF: 7.18 L/S (ref 7.16–12.34)

## 2022-04-05 PROCEDURE — 93978 VASCULAR STUDY: CPT | Mod: 26,,, | Performed by: INTERNAL MEDICINE

## 2022-04-05 PROCEDURE — 93978 VASCULAR STUDY: CPT

## 2022-04-05 PROCEDURE — 94060 EVALUATION OF WHEEZING: CPT | Mod: S$GLB,,, | Performed by: INTERNAL MEDICINE

## 2022-04-05 PROCEDURE — 94060 PR EVAL OF BRONCHOSPASM: ICD-10-PCS | Mod: S$GLB,,, | Performed by: INTERNAL MEDICINE

## 2022-04-05 PROCEDURE — 76700 US ABDOMEN COMPLETE: ICD-10-PCS | Mod: 26,,, | Performed by: RADIOLOGY

## 2022-04-05 PROCEDURE — 76700 US EXAM ABDOM COMPLETE: CPT | Mod: 26,,, | Performed by: RADIOLOGY

## 2022-04-05 PROCEDURE — 76700 US EXAM ABDOM COMPLETE: CPT | Mod: TC

## 2022-04-05 PROCEDURE — 93978 CV US ABDOMINAL AORTA EVALUATION (CUPID ONLY): ICD-10-PCS | Mod: 26,,, | Performed by: INTERNAL MEDICINE

## 2022-04-06 PROBLEM — I77.819 AORTIC ECTASIA: Status: ACTIVE | Noted: 2022-04-06

## 2022-04-06 LAB
ABDOMINAL IMA AP: 2.27 CM
ABDOMINAL IMA ED VEL: 0 CM/S
ABDOMINAL IMA PS VEL: 88 CM/S
ABDOMINAL IMA TRANS: 1.81 CM
ABDOMINAL INFRARENAL AORTA AP: 1.89 CM
ABDOMINAL INFRARENAL AORTA ED VEL: 0 CM/S
ABDOMINAL INFRARENAL AORTA PS VEL: 90 CM/S
ABDOMINAL INFRARENAL AORTA TRANS: 1.89 CM
ABDOMINAL JUXTARENAL AORTA AP: 2.4 CM
ABDOMINAL JUXTARENAL AORTA ED VEL: 0 CM/S
ABDOMINAL JUXTARENAL AORTA PS VEL: 80 CM/S
ABDOMINAL JUXTARENAL AORTA TRANS: 2.08 CM
ABDOMINAL LT COM ILIAC AP: 0.96 CM
ABDOMINAL LT COM ILIAC TRANS: 0.94 CM
ABDOMINAL LT COM ILIAC VEL: 74 CM/S
ABDOMINAL LT COM ILLIAC ED VEL: 0 CM/S
ABDOMINAL RT COM ILIAC AP: 1.02 CM
ABDOMINAL RT COM ILIAC TRANS: 1.01 CM
ABDOMINAL RT COM ILIAC VEL: 91 CM/S
ABDOMINAL RT COM ILLIAC ED VEL: 0 CM/S
ABDOMINAL SUPRARENAL AORTA AP: 2.53 CM
ABDOMINAL SUPRARENAL AORTA ED VEL: 16 CM/S
ABDOMINAL SUPRARENAL AORTA PS VEL: 122 CM/S
ABDOMINAL SUPRARENAL AORTA TRANS: 2.53 CM

## 2022-04-08 ENCOUNTER — PATIENT MESSAGE (OUTPATIENT)
Dept: INTERNAL MEDICINE | Facility: CLINIC | Age: 67
End: 2022-04-08
Payer: MEDICARE

## 2022-04-08 RX ORDER — FLUTICASONE PROPIONATE AND SALMETEROL 250; 50 UG/1; UG/1
1 POWDER RESPIRATORY (INHALATION) 2 TIMES DAILY
Qty: 60 EACH | Refills: 1 | Status: SHIPPED | OUTPATIENT
Start: 2022-04-08 | End: 2023-04-13

## 2022-04-12 ENCOUNTER — PATIENT MESSAGE (OUTPATIENT)
Dept: INTERNAL MEDICINE | Facility: CLINIC | Age: 67
End: 2022-04-12
Payer: MEDICARE

## 2022-04-12 DIAGNOSIS — J30.9 ALLERGIC RHINITIS, UNSPECIFIED SEASONALITY, UNSPECIFIED TRIGGER: ICD-10-CM

## 2022-04-12 DIAGNOSIS — R06.2 WHEEZING: Primary | ICD-10-CM

## 2022-04-12 DIAGNOSIS — J98.4 RESTRICTIVE AIRWAY DISEASE: ICD-10-CM

## 2022-04-27 ENCOUNTER — TELEPHONE (OUTPATIENT)
Dept: OTOLARYNGOLOGY | Facility: CLINIC | Age: 67
End: 2022-04-27
Payer: MEDICARE

## 2022-04-27 NOTE — TELEPHONE ENCOUNTER
----- Message from Waldoboris Parikh sent at 4/27/2022  3:44 PM CDT -----  Contact: VICKI OLIVER [[id number]] [phone]  Type:  Sooner Appointment Request    Caller is requesting a sooner appointment.  Caller declined first available appointment listed below.  Caller will not accept being placed on the waitlist and is requesting a message be sent to doctor.    Name of Caller: VICKI OLIVER [[id number]]  When is the first available appointment? 5/3/22  Reason for Visit: Sinus drainage  Would the patient rather a call back or a response via [facility]? Phone call   Best Call Back Number: [phone]  Additional Information: Patient requests an in person visit this week if possible. Patient open to seeing any provider at [facility] if Peggy Paz NP is not available. Prefers in person, but open to virtual.

## 2022-04-27 NOTE — TELEPHONE ENCOUNTER
Spoke with patient's wife in regards to scheduling an appointment they prefer [facility] because they live 5 minutes away.

## 2022-04-29 ENCOUNTER — PATIENT MESSAGE (OUTPATIENT)
Dept: PSYCHIATRY | Facility: CLINIC | Age: 67
End: 2022-04-29
Payer: MEDICARE

## 2022-05-05 NOTE — ASSESSMENT & PLAN NOTE
"63yo M with history of HTN, Bipolar 1 disorder, Seizures, Depression, Alcohol abuse and Opiate dependency presenting with Altered Mental Status by EMS due to opiate overdose.  Patient found somnolent in home, aroused after given 0.4mg Narcan by EMS. He reportedly admitted to taking 60mg of Oxycodone at once around 1300 today.    -Urine tox screen positive for opiates  -0.4mg Naloxone given by EMS with good response  - monitor neuro status closely and consider redosing naloxone if needed   - withdrawal order set initiated    -Telemetry  -High risk of fall / injury utilize all safety measures and fall precautions  -Aspiration precautions  -Seizure precautions  -Sitter at bedside for now and reassess in AM  -Neuro checks q4h  -Hold neuro blunting agents for now  2/22 pt AA x 3; R hand tremors.  Pt states he took additional opioid to "take the edge off and get high."  2/23 stable for discharge and follow up with Dr. Loco  " English

## 2022-05-30 ENCOUNTER — OFFICE VISIT (OUTPATIENT)
Dept: NEUROLOGY | Facility: CLINIC | Age: 67
End: 2022-05-30
Payer: MEDICARE

## 2022-05-30 ENCOUNTER — LAB VISIT (OUTPATIENT)
Dept: LAB | Facility: HOSPITAL | Age: 67
End: 2022-05-30
Attending: PSYCHIATRY & NEUROLOGY
Payer: MEDICARE

## 2022-05-30 VITALS
HEART RATE: 68 BPM | SYSTOLIC BLOOD PRESSURE: 134 MMHG | HEIGHT: 76 IN | WEIGHT: 227.06 LBS | DIASTOLIC BLOOD PRESSURE: 81 MMHG | BODY MASS INDEX: 27.65 KG/M2

## 2022-05-30 DIAGNOSIS — R56.9 SEIZURE: ICD-10-CM

## 2022-05-30 DIAGNOSIS — G25.0 ESSENTIAL TREMOR: ICD-10-CM

## 2022-05-30 DIAGNOSIS — G40.909 SEIZURE DISORDER: ICD-10-CM

## 2022-05-30 DIAGNOSIS — E87.5 HYPERKALEMIA: ICD-10-CM

## 2022-05-30 PROCEDURE — 99999 PR PBB SHADOW E&M-EST. PATIENT-LVL IV: CPT | Mod: PBBFAC,,, | Performed by: PSYCHIATRY & NEUROLOGY

## 2022-05-30 PROCEDURE — 3079F PR MOST RECENT DIASTOLIC BLOOD PRESSURE 80-89 MM HG: ICD-10-PCS | Mod: CPTII,S$GLB,, | Performed by: PSYCHIATRY & NEUROLOGY

## 2022-05-30 PROCEDURE — 99999 PR PBB SHADOW E&M-EST. PATIENT-LVL IV: ICD-10-PCS | Mod: PBBFAC,,, | Performed by: PSYCHIATRY & NEUROLOGY

## 2022-05-30 PROCEDURE — 1159F PR MEDICATION LIST DOCUMENTED IN MEDICAL RECORD: ICD-10-PCS | Mod: CPTII,S$GLB,, | Performed by: PSYCHIATRY & NEUROLOGY

## 2022-05-30 PROCEDURE — 3288F PR FALLS RISK ASSESSMENT DOCUMENTED: ICD-10-PCS | Mod: CPTII,S$GLB,, | Performed by: PSYCHIATRY & NEUROLOGY

## 2022-05-30 PROCEDURE — 36415 COLL VENOUS BLD VENIPUNCTURE: CPT | Performed by: PSYCHIATRY & NEUROLOGY

## 2022-05-30 PROCEDURE — 1159F MED LIST DOCD IN RCRD: CPT | Mod: CPTII,S$GLB,, | Performed by: PSYCHIATRY & NEUROLOGY

## 2022-05-30 PROCEDURE — 1126F PR PAIN SEVERITY QUANTIFIED, NO PAIN PRESENT: ICD-10-PCS | Mod: CPTII,S$GLB,, | Performed by: PSYCHIATRY & NEUROLOGY

## 2022-05-30 PROCEDURE — 1101F PR PT FALLS ASSESS DOC 0-1 FALLS W/OUT INJ PAST YR: ICD-10-PCS | Mod: CPTII,S$GLB,, | Performed by: PSYCHIATRY & NEUROLOGY

## 2022-05-30 PROCEDURE — 80235 DRUG ASSAY LACOSAMIDE: CPT | Performed by: PSYCHIATRY & NEUROLOGY

## 2022-05-30 PROCEDURE — 3008F BODY MASS INDEX DOCD: CPT | Mod: CPTII,S$GLB,, | Performed by: PSYCHIATRY & NEUROLOGY

## 2022-05-30 PROCEDURE — 3008F PR BODY MASS INDEX (BMI) DOCUMENTED: ICD-10-PCS | Mod: CPTII,S$GLB,, | Performed by: PSYCHIATRY & NEUROLOGY

## 2022-05-30 PROCEDURE — 3075F PR MOST RECENT SYSTOLIC BLOOD PRESS GE 130-139MM HG: ICD-10-PCS | Mod: CPTII,S$GLB,, | Performed by: PSYCHIATRY & NEUROLOGY

## 2022-05-30 PROCEDURE — 3079F DIAST BP 80-89 MM HG: CPT | Mod: CPTII,S$GLB,, | Performed by: PSYCHIATRY & NEUROLOGY

## 2022-05-30 PROCEDURE — 3044F HG A1C LEVEL LT 7.0%: CPT | Mod: CPTII,S$GLB,, | Performed by: PSYCHIATRY & NEUROLOGY

## 2022-05-30 PROCEDURE — 99214 OFFICE O/P EST MOD 30 MIN: CPT | Mod: S$GLB,,, | Performed by: PSYCHIATRY & NEUROLOGY

## 2022-05-30 PROCEDURE — 1101F PT FALLS ASSESS-DOCD LE1/YR: CPT | Mod: CPTII,S$GLB,, | Performed by: PSYCHIATRY & NEUROLOGY

## 2022-05-30 PROCEDURE — 1160F RVW MEDS BY RX/DR IN RCRD: CPT | Mod: CPTII,S$GLB,, | Performed by: PSYCHIATRY & NEUROLOGY

## 2022-05-30 PROCEDURE — 1126F AMNT PAIN NOTED NONE PRSNT: CPT | Mod: CPTII,S$GLB,, | Performed by: PSYCHIATRY & NEUROLOGY

## 2022-05-30 PROCEDURE — 3075F SYST BP GE 130 - 139MM HG: CPT | Mod: CPTII,S$GLB,, | Performed by: PSYCHIATRY & NEUROLOGY

## 2022-05-30 PROCEDURE — 99214 PR OFFICE/OUTPT VISIT, EST, LEVL IV, 30-39 MIN: ICD-10-PCS | Mod: S$GLB,,, | Performed by: PSYCHIATRY & NEUROLOGY

## 2022-05-30 PROCEDURE — 1160F PR REVIEW ALL MEDS BY PRESCRIBER/CLIN PHARMACIST DOCUMENTED: ICD-10-PCS | Mod: CPTII,S$GLB,, | Performed by: PSYCHIATRY & NEUROLOGY

## 2022-05-30 PROCEDURE — 3288F FALL RISK ASSESSMENT DOCD: CPT | Mod: CPTII,S$GLB,, | Performed by: PSYCHIATRY & NEUROLOGY

## 2022-05-30 PROCEDURE — 3044F PR MOST RECENT HEMOGLOBIN A1C LEVEL <7.0%: ICD-10-PCS | Mod: CPTII,S$GLB,, | Performed by: PSYCHIATRY & NEUROLOGY

## 2022-05-30 RX ORDER — PROPRANOLOL HYDROCHLORIDE 60 MG/1
60 CAPSULE, EXTENDED RELEASE ORAL DAILY
Qty: 90 CAPSULE | Refills: 3 | Status: SHIPPED | OUTPATIENT
Start: 2022-05-30 | End: 2023-03-21

## 2022-05-30 RX ORDER — AMLODIPINE BESYLATE 2.5 MG/1
2.5 TABLET ORAL DAILY
Qty: 90 TABLET | Refills: 1 | Status: SHIPPED | OUTPATIENT
Start: 2022-05-30 | End: 2022-10-17 | Stop reason: SDUPTHER

## 2022-05-30 RX ORDER — LACOSAMIDE 200 MG/1
200 TABLET ORAL EVERY 12 HOURS
Qty: 180 TABLET | Refills: 1 | Status: SHIPPED | OUTPATIENT
Start: 2022-05-30 | End: 2023-01-13 | Stop reason: SDUPTHER

## 2022-05-30 RX ORDER — PANTOPRAZOLE SODIUM 40 MG/1
40 TABLET, DELAYED RELEASE ORAL DAILY
Qty: 30 TABLET | Refills: 11 | OUTPATIENT
Start: 2022-05-30 | End: 2023-05-30

## 2022-05-30 NOTE — PROGRESS NOTES
[facility] - NEUROLOGY  [facility], [place]    Date: May 30, 2022   Patient Name: Mirza Morales   MRN: [id number]   PCP: Jud Mo  Referring Provider: Eduarda Calderon,*    Assessment:      This is Mirza Morales, 67 y.o. male with bipolar, epilepsy, EtOH abuse in remission with likely essential tremor - query latuda side effect although has been on this medication since 2013 and tremor fluctuates making this less likely.     Plan:      -  Continue propranolol 60mg daily  -  Continue LCS 200mg bid, level    Follow up with 12 months       Greater than 30 minutes spent in chart review, documentation, independent review of imaging, and face to face time with patient    I discussed side effects of the medications. I asked the patient to stop the medication if he notices serious adverse effects as we discussed and to seek immediate medical attention at an ER.     Samuel Julien MD  [facility]   Department of Neurology    Subjective:      -  Two seizures Gordo Shayla in the setting of stress of niece and her boyfriend who are troublesome staying at his house, compliant with medication  -  Tremor controlled with propranolol increase    6/2021  No seizure, partial improvement in tremor    HPI 5/2020:   Mr. Mirza Morales is a 67 y.o. male who presents with a chief complaint of seizure and tremor.  He typically follows with Dr. Ann for epilepsy and appointment was made with me today as he believed he needed additional appointment to get rx refills.    He continues to be seizure free on LCS 200mg bid and has noted improvement in tremor since full EtOH cessation following psych admit 1/2020 although persists to a great enough degree to interfere with ADL.  Tremor is bilateral and present more with action and intent than at rest and is not constant.  It will at times make it difficulty for him to drink from a cup in the morning and he wishes to begin  activities such as fishing although he fear he will not be able to do it due to tremor.    PAST MEDICAL HISTORY:  Past Medical History:   Diagnosis Date    Alcohol abuse     Behavioral problem     Bipolar 1 disorder     Chronic right hip pain     Depression     DJD (degenerative joint disease), lumbar 1/27/2015    Fatigue     Hepatitis     pt. denies this    History of psychiatric care     History of psychiatric hospitalization     Hypertension     Karina     Post traumatic stress disorder     Psychiatric problem     Seizures     Suicide attempt     Therapy     Vitamin D insufficiency 3/17/2022       PAST SURGICAL HISTORY:  Past Surgical History:   Procedure Laterality Date    CHOLECYSTECTOMY  1990    COLONOSCOPY N/A 08/14/2019    Procedure: COLONOSCOPY;  Surgeon: Tammy Duval MD;  Location: [facility] ([address] [place] [address]);  Service: Endoscopy;  Laterality: N/A;    HERNIA REPAIR      SPINE SURGERY  11/12/2015    LAMINECTOMY/LUMBAR/WARE       CURRENT MEDS:  Current Outpatient Medications   Medication Sig Dispense Refill    amLODIPine (NORVASC) 2.5 MG tablet TAKE 1 TABLET EVERY DAY 90 tablet 1    atorvastatin (LIPITOR) 40 MG tablet TAKE 1 TABLET (40 MG TOTAL) BY MOUTH ONCE DAILY. 90 tablet 1    CALCIUM POLYCARBOPHIL ORAL Take by mouth once daily.       cholecalciferol, vitamin D3, (VITAMIN D3) 25 mcg (1,000 unit) capsule Take 3 capsules (3,000 Units total) by mouth once daily. 180 capsule 2    fluticasone-salmeterol diskus inhaler 250-50 mcg Inhale 1 puff into the lungs 2 (two) times daily. Controller 60 each 1    lacosamide (VIMPAT) 200 mg Tab tablet Take 1 tablet (200 mg total) by mouth every 12 (twelve) hours. MUST MAKE APPOINTMENT FOR FURTHER REFILLS 180 tablet 1    lurasidone (LATUDA) 60 mg Tab tablet Take one tablet every evening with dinner. (Patient not taking: Reported on 3/30/2022) 30 tablet 2    pantoprazole (PROTONIX) 40 MG tablet Take 1 tablet (40 mg total) by mouth once daily.  (Patient taking differently: Take 40 mg by mouth daily as needed (heartburn). ) 30 tablet 11    propranoloL (INDERAL LA) 60 MG 24 hr capsule Take 1 capsule (60 mg total) by mouth once daily. 90 capsule 3    propranoloL (INDERAL) 60 MG tablet Take 1 tablet (60 mg total) by mouth once daily. 90 tablet 1    QUEtiapine (SEROQUEL) 100 MG Tab Take one tablet at bedtime 90 tablet 1    traZODone (DESYREL) 100 MG tablet TAKE 1-2 TABLETS BY MOUTH AT BEDTIME 60 tablet 2     No current facility-administered medications for this visit.       ALLERGIES:  Review of patient's allergies indicates:   Allergen Reactions    Gabapentin Other (See Comments)     SEVERE DIZZINESS AND BALANCE PROBLEMS, UNABLE TO WALK.    Nardil [phenelzine]      BECOMES HYPER, LIKE A MANIC EPISODE.    Penicillins Hives    Lamictal [lamotrigine] Rash       FAMILY HISTORY:  Family History   Problem Relation Age of Onset    Alcohol abuse Mother     Depression Mother     Mental illness Mother     Depression Father     Depression Sister     Suicide Sister     Drug abuse Brother     Anxiety disorder Brother     Bipolar disorder Brother     Depression Brother     Alcohol abuse Maternal Uncle     Alcohol abuse Paternal Uncle     Dementia Maternal Grandmother     Alcohol abuse Cousin     Amblyopia Neg Hx     Blindness Neg Hx     Cancer Neg Hx     Cataracts Neg Hx     Diabetes Neg Hx     Glaucoma Neg Hx     Hypertension Neg Hx     Macular degeneration Neg Hx     Retinal detachment Neg Hx     Strabismus Neg Hx     Stroke Neg Hx     Thyroid disease Neg Hx     Asthma Neg Hx     Emphysema Neg Hx        SOCIAL HISTORY:  Social History     Tobacco Use    Smoking status: Current Every Day Smoker     Packs/day: 0.50     Years: 10.00     Pack years: 5.00     Types: Cigarettes    Smokeless tobacco: Never Used   Substance Use Topics    Alcohol use: Not Currently     Comment: quit 4 years ago    Drug use: Not Currently       Review of  "Systems:  12 review of systems is negative except for the symptoms mentioned in HPI.        Objective:     Vitals:    05/30/22 1454   BP: 134/81   Pulse: 68   Weight: 103 kg (227 lb 1.2 oz)   Height: 6' 4" (1.93 m)       General: NAD, well nourished   Eyes: no tearing, discharge, no erythema   ENT: moist mucous membranes of the oral cavity, nares patent    Neck: Supple, full range of motion  Cardiovascular:  well perfused  Lungs: Normal work of breathing, normal chest wall excursions  Skin: No rash, lesions, or breakdown on exposed skin  Psychiatry: Mood and affect are appropriate   Abdomen: non distended  Extremeties: No cyanosis, clubbing or edema.    Neurological   MENTAL STATUS: Alert and oriented to person, place, and time. Attention and concentration within normal limits. Speech without dysarthria, able to name and repeat without difficulty. Recent and remote memory within normal limits   CRANIAL NERVES: Visual fields intact. PERRL. EOMI. Facial sensation intact. Face symmetrical. Hearing grossly intact. Full shoulder shrug bilaterally. Tongue protrudes midline   SENSORY: Sensation is intact to light touch throughout.   MOTOR: Normal bulk and tone. No pronator drift.    No bradykinesias or tremor noted  CEREBELLAR/COORDINATION/GAIT: Finger to nose intact.     "

## 2022-06-01 LAB — LACOSAMIDE: 6.2 MCG/ML (ref 1–10)

## 2022-06-15 ENCOUNTER — TELEPHONE (OUTPATIENT)
Dept: ALLERGY | Facility: CLINIC | Age: 67
End: 2022-06-15
Payer: MEDICARE

## 2022-06-16 ENCOUNTER — LAB VISIT (OUTPATIENT)
Dept: LAB | Facility: HOSPITAL | Age: 67
End: 2022-06-16
Attending: ALLERGY & IMMUNOLOGY
Payer: MEDICARE

## 2022-06-16 ENCOUNTER — OFFICE VISIT (OUTPATIENT)
Dept: ALLERGY | Facility: CLINIC | Age: 67
End: 2022-06-16
Payer: MEDICARE

## 2022-06-16 VITALS
BODY MASS INDEX: 27.68 KG/M2 | WEIGHT: 227.31 LBS | SYSTOLIC BLOOD PRESSURE: 153 MMHG | DIASTOLIC BLOOD PRESSURE: 84 MMHG | HEIGHT: 76 IN

## 2022-06-16 DIAGNOSIS — J31.0 CHRONIC RHINITIS: Primary | ICD-10-CM

## 2022-06-16 DIAGNOSIS — J98.4 RESTRICTIVE AIRWAY DISEASE: ICD-10-CM

## 2022-06-16 DIAGNOSIS — Z72.0 TOBACCO ABUSE: Chronic | ICD-10-CM

## 2022-06-16 DIAGNOSIS — J31.0 CHRONIC RHINITIS: ICD-10-CM

## 2022-06-16 DIAGNOSIS — J30.9 ALLERGIC RHINITIS, UNSPECIFIED SEASONALITY, UNSPECIFIED TRIGGER: ICD-10-CM

## 2022-06-16 DIAGNOSIS — M47.26 OSTEOARTHRITIS OF SPINE WITH RADICULOPATHY, LUMBAR REGION: ICD-10-CM

## 2022-06-16 DIAGNOSIS — R06.2 WHEEZING: ICD-10-CM

## 2022-06-16 DIAGNOSIS — F32.2 SEVERE DEPRESSION: ICD-10-CM

## 2022-06-16 LAB — IGE SERPL-ACNC: 56 IU/ML (ref 0–100)

## 2022-06-16 PROCEDURE — 3044F HG A1C LEVEL LT 7.0%: CPT | Mod: CPTII,S$GLB,, | Performed by: ALLERGY & IMMUNOLOGY

## 2022-06-16 PROCEDURE — 3044F PR MOST RECENT HEMOGLOBIN A1C LEVEL <7.0%: ICD-10-PCS | Mod: CPTII,S$GLB,, | Performed by: ALLERGY & IMMUNOLOGY

## 2022-06-16 PROCEDURE — 3079F PR MOST RECENT DIASTOLIC BLOOD PRESSURE 80-89 MM HG: ICD-10-PCS | Mod: CPTII,S$GLB,, | Performed by: ALLERGY & IMMUNOLOGY

## 2022-06-16 PROCEDURE — 86003 ALLG SPEC IGE CRUDE XTRC EA: CPT | Mod: 59 | Performed by: ALLERGY & IMMUNOLOGY

## 2022-06-16 PROCEDURE — 1126F AMNT PAIN NOTED NONE PRSNT: CPT | Mod: CPTII,S$GLB,, | Performed by: ALLERGY & IMMUNOLOGY

## 2022-06-16 PROCEDURE — 1159F MED LIST DOCD IN RCRD: CPT | Mod: CPTII,S$GLB,, | Performed by: ALLERGY & IMMUNOLOGY

## 2022-06-16 PROCEDURE — 3008F BODY MASS INDEX DOCD: CPT | Mod: CPTII,S$GLB,, | Performed by: ALLERGY & IMMUNOLOGY

## 2022-06-16 PROCEDURE — 3077F PR MOST RECENT SYSTOLIC BLOOD PRESSURE >= 140 MM HG: ICD-10-PCS | Mod: CPTII,S$GLB,, | Performed by: ALLERGY & IMMUNOLOGY

## 2022-06-16 PROCEDURE — 3077F SYST BP >= 140 MM HG: CPT | Mod: CPTII,S$GLB,, | Performed by: ALLERGY & IMMUNOLOGY

## 2022-06-16 PROCEDURE — 3079F DIAST BP 80-89 MM HG: CPT | Mod: CPTII,S$GLB,, | Performed by: ALLERGY & IMMUNOLOGY

## 2022-06-16 PROCEDURE — 99999 PR PBB SHADOW E&M-EST. PATIENT-LVL III: ICD-10-PCS | Mod: PBBFAC,,, | Performed by: ALLERGY & IMMUNOLOGY

## 2022-06-16 PROCEDURE — 1126F PR PAIN SEVERITY QUANTIFIED, NO PAIN PRESENT: ICD-10-PCS | Mod: CPTII,S$GLB,, | Performed by: ALLERGY & IMMUNOLOGY

## 2022-06-16 PROCEDURE — 99204 OFFICE O/P NEW MOD 45 MIN: CPT | Mod: S$GLB,,, | Performed by: ALLERGY & IMMUNOLOGY

## 2022-06-16 PROCEDURE — 86003 ALLG SPEC IGE CRUDE XTRC EA: CPT | Performed by: ALLERGY & IMMUNOLOGY

## 2022-06-16 PROCEDURE — 99999 PR PBB SHADOW E&M-EST. PATIENT-LVL III: CPT | Mod: PBBFAC,,, | Performed by: ALLERGY & IMMUNOLOGY

## 2022-06-16 PROCEDURE — 1159F PR MEDICATION LIST DOCUMENTED IN MEDICAL RECORD: ICD-10-PCS | Mod: CPTII,S$GLB,, | Performed by: ALLERGY & IMMUNOLOGY

## 2022-06-16 PROCEDURE — 99204 PR OFFICE/OUTPT VISIT, NEW, LEVL IV, 45-59 MIN: ICD-10-PCS | Mod: S$GLB,,, | Performed by: ALLERGY & IMMUNOLOGY

## 2022-06-16 PROCEDURE — 82785 ASSAY OF IGE: CPT | Performed by: ALLERGY & IMMUNOLOGY

## 2022-06-16 PROCEDURE — 3008F PR BODY MASS INDEX (BMI) DOCUMENTED: ICD-10-PCS | Mod: CPTII,S$GLB,, | Performed by: ALLERGY & IMMUNOLOGY

## 2022-06-16 PROCEDURE — 1160F PR REVIEW ALL MEDS BY PRESCRIBER/CLIN PHARMACIST DOCUMENTED: ICD-10-PCS | Mod: CPTII,S$GLB,, | Performed by: ALLERGY & IMMUNOLOGY

## 2022-06-16 PROCEDURE — 36415 COLL VENOUS BLD VENIPUNCTURE: CPT | Performed by: ALLERGY & IMMUNOLOGY

## 2022-06-16 PROCEDURE — 1160F RVW MEDS BY RX/DR IN RCRD: CPT | Mod: CPTII,S$GLB,, | Performed by: ALLERGY & IMMUNOLOGY

## 2022-06-16 RX ORDER — DOXEPIN HYDROCHLORIDE 100 MG/1
CAPSULE ORAL
COMMUNITY
End: 2023-04-12
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
## 2022-06-20 LAB
A ALTERNATA IGE QN: <0.1 KU/L
A FUMIGATUS IGE QN: <0.1 KU/L
BERMUDA GRASS IGE QN: <0.1 KU/L
CAT DANDER IGE QN: <0.1 KU/L
CEDAR IGE QN: <0.1 KU/L
D FARINAE IGE QN: <0.1 KU/L
D PTERONYSS IGE QN: <0.1 KU/L
DEPRECATED A ALTERNATA IGE RAST QL: NORMAL
DEPRECATED A FUMIGATUS IGE RAST QL: NORMAL
DEPRECATED BERMUDA GRASS IGE RAST QL: NORMAL
DEPRECATED CAT DANDER IGE RAST QL: NORMAL
DEPRECATED CEDAR IGE RAST QL: NORMAL
DEPRECATED D FARINAE IGE RAST QL: NORMAL
DEPRECATED D PTERONYSS IGE RAST QL: NORMAL
DEPRECATED DOG DANDER IGE RAST QL: NORMAL
DEPRECATED ELDER IGE RAST QL: NORMAL
DEPRECATED ENGL PLANTAIN IGE RAST QL: NORMAL
DEPRECATED PECAN/HICK TREE IGE RAST QL: NORMAL
DEPRECATED ROACH IGE RAST QL: NORMAL
DEPRECATED TIMOTHY IGE RAST QL: NORMAL
DEPRECATED WEST RAGWEED IGE RAST QL: NORMAL
DEPRECATED WHITE OAK IGE RAST QL: NORMAL
DOG DANDER IGE QN: <0.1 KU/L
ELDER IGE QN: <0.1 KU/L
ENGL PLANTAIN IGE QN: <0.1 KU/L
PECAN/HICK TREE IGE QN: <0.1 KU/L
ROACH IGE QN: <0.1 KU/L
TIMOTHY IGE QN: <0.1 KU/L
WEST RAGWEED IGE QN: <0.1 KU/L
WHITE OAK IGE QN: <0.1 KU/L

## 2022-06-24 ENCOUNTER — TELEPHONE (OUTPATIENT)
Dept: ALLERGY | Facility: CLINIC | Age: 67
End: 2022-06-24
Payer: MEDICARE

## 2022-06-24 NOTE — TELEPHONE ENCOUNTER
Called patient to advise that we needed to move his virtual skin test appt to an in-person appt where a skin test can be done.  No answer, left vm msg. That we moved his appt to 10:40 on Monday, 6/28.  Advised that if this appt time didn't work for him, he can send us a msg and reschedule.

## 2022-08-01 ENCOUNTER — TELEPHONE (OUTPATIENT)
Dept: PSYCHIATRY | Facility: CLINIC | Age: 67
End: 2022-08-01
Payer: MEDICARE

## 2022-08-01 ENCOUNTER — PATIENT MESSAGE (OUTPATIENT)
Dept: PSYCHIATRY | Facility: CLINIC | Age: 67
End: 2022-08-01
Payer: MEDICARE

## 2022-08-01 ENCOUNTER — TELEPHONE (OUTPATIENT)
Dept: OTOLARYNGOLOGY | Facility: CLINIC | Age: 67
End: 2022-08-01
Payer: MEDICARE

## 2022-08-01 NOTE — TELEPHONE ENCOUNTER
Let vm and phone number to let pt call back to schedule appt.      ----- Message from Viral Tavarez sent at 8/1/2022  2:19 PM CDT -----  Regarding: asking to be seen today, 8/1/22  Contact: PT @ [phone]  Pt is calling to speak to someone in [facility]'s office for an appt as soon as today if possible. Pt's left ear may have possible wax build up.. may have a q-tip in his ear. Pt states that he does not have pain, but is unable to hear properly out of his ear and was fine before cleaning his left ear today. Pt is asking to be seen today. Please call pt. Thanks.

## 2022-08-02 ENCOUNTER — TELEPHONE (OUTPATIENT)
Dept: OTOLARYNGOLOGY | Facility: CLINIC | Age: 67
End: 2022-08-02
Payer: MEDICARE

## 2022-08-02 NOTE — TELEPHONE ENCOUNTER
Spoke with pt and pt's wife ,scheduled appt/audiogram on 8/18. Pt states that he will go to urgent care if needed.    ----- Message from Brijesh Powers sent at 8/2/2022  3:41 PM CDT -----  Regarding: returning call to Arley Stanley from yesterday      The Pt states that he didn't rcv a msg from you yesterday and only found out just now that you called.    Please contact the Pt to sched the appt for tomorrow.     # [phone]

## 2022-08-04 ENCOUNTER — OFFICE VISIT (OUTPATIENT)
Dept: URGENT CARE | Facility: CLINIC | Age: 67
End: 2022-08-04
Payer: MEDICARE

## 2022-08-04 VITALS
OXYGEN SATURATION: 96 % | HEART RATE: 64 BPM | HEIGHT: 76 IN | DIASTOLIC BLOOD PRESSURE: 89 MMHG | SYSTOLIC BLOOD PRESSURE: 137 MMHG | WEIGHT: 227 LBS | RESPIRATION RATE: 18 BRPM | TEMPERATURE: 98 F | BODY MASS INDEX: 27.64 KG/M2

## 2022-08-04 DIAGNOSIS — H61.22 IMPACTED CERUMEN OF LEFT EAR: Primary | ICD-10-CM

## 2022-08-04 DIAGNOSIS — H92.02 LEFT EAR PAIN: ICD-10-CM

## 2022-08-04 PROCEDURE — 1160F PR REVIEW ALL MEDS BY PRESCRIBER/CLIN PHARMACIST DOCUMENTED: ICD-10-PCS | Mod: CPTII,S$GLB,, | Performed by: FAMILY MEDICINE

## 2022-08-04 PROCEDURE — 3075F PR MOST RECENT SYSTOLIC BLOOD PRESS GE 130-139MM HG: ICD-10-PCS | Mod: CPTII,S$GLB,, | Performed by: FAMILY MEDICINE

## 2022-08-04 PROCEDURE — 3079F PR MOST RECENT DIASTOLIC BLOOD PRESSURE 80-89 MM HG: ICD-10-PCS | Mod: CPTII,S$GLB,, | Performed by: FAMILY MEDICINE

## 2022-08-04 PROCEDURE — 3044F HG A1C LEVEL LT 7.0%: CPT | Mod: CPTII,S$GLB,, | Performed by: FAMILY MEDICINE

## 2022-08-04 PROCEDURE — 99213 OFFICE O/P EST LOW 20 MIN: CPT | Mod: S$GLB,,, | Performed by: FAMILY MEDICINE

## 2022-08-04 PROCEDURE — 99213 PR OFFICE/OUTPT VISIT, EST, LEVL III, 20-29 MIN: ICD-10-PCS | Mod: S$GLB,,, | Performed by: FAMILY MEDICINE

## 2022-08-04 PROCEDURE — 3075F SYST BP GE 130 - 139MM HG: CPT | Mod: CPTII,S$GLB,, | Performed by: FAMILY MEDICINE

## 2022-08-04 PROCEDURE — 1160F RVW MEDS BY RX/DR IN RCRD: CPT | Mod: CPTII,S$GLB,, | Performed by: FAMILY MEDICINE

## 2022-08-04 PROCEDURE — 3008F PR BODY MASS INDEX (BMI) DOCUMENTED: ICD-10-PCS | Mod: CPTII,S$GLB,, | Performed by: FAMILY MEDICINE

## 2022-08-04 PROCEDURE — 1159F PR MEDICATION LIST DOCUMENTED IN MEDICAL RECORD: ICD-10-PCS | Mod: CPTII,S$GLB,, | Performed by: FAMILY MEDICINE

## 2022-08-04 PROCEDURE — 3008F BODY MASS INDEX DOCD: CPT | Mod: CPTII,S$GLB,, | Performed by: FAMILY MEDICINE

## 2022-08-04 PROCEDURE — 3044F PR MOST RECENT HEMOGLOBIN A1C LEVEL <7.0%: ICD-10-PCS | Mod: CPTII,S$GLB,, | Performed by: FAMILY MEDICINE

## 2022-08-04 PROCEDURE — 3079F DIAST BP 80-89 MM HG: CPT | Mod: CPTII,S$GLB,, | Performed by: FAMILY MEDICINE

## 2022-08-04 PROCEDURE — 1159F MED LIST DOCD IN RCRD: CPT | Mod: CPTII,S$GLB,, | Performed by: FAMILY MEDICINE

## 2022-08-04 PROCEDURE — 1125F PR PAIN SEVERITY QUANTIFIED, PAIN PRESENT: ICD-10-PCS | Mod: CPTII,S$GLB,, | Performed by: FAMILY MEDICINE

## 2022-08-04 PROCEDURE — 1125F AMNT PAIN NOTED PAIN PRSNT: CPT | Mod: CPTII,S$GLB,, | Performed by: FAMILY MEDICINE

## 2022-08-04 RX ORDER — AMOXICILLIN 500 MG/1
CAPSULE ORAL
COMMUNITY
Start: 2022-04-20 | End: 2023-02-07

## 2022-08-04 RX ORDER — MUPIROCIN 20 MG/G
OINTMENT TOPICAL
COMMUNITY
End: 2023-02-07

## 2022-08-04 RX ORDER — FLUCONAZOLE 100 MG/1
TABLET ORAL
COMMUNITY
End: 2023-02-07

## 2022-08-04 RX ORDER — OXYCODONE HYDROCHLORIDE 10 MG/1
TABLET ORAL
COMMUNITY
End: 2023-02-07

## 2022-08-04 NOTE — PROGRESS NOTES
"Subjective:       Patient ID: Mirza Morales is a 67 y.o. male.    Vitals:  height is 6' 4" (1.93 m) and weight is 103 kg (227 lb). His oral temperature is 98.1 °F (36.7 °C). His blood pressure is 137/89 and his pulse is 64. His respiration is 18 and oxygen saturation is 96%.     Chief Complaint: Otalgia    Pt presents to urgent care for left ear discomfort since Monday, pt states he used an ear flush from [facility] that   Didn't work. Denies fever, cough, sore throat, sinus congestion.     Otalgia   There is pain in the left ear. This is a new problem. The current episode started in the past 7 days. The problem occurs constantly. The problem has been waxing and waning. There has been no fever. The pain is at a severity of 6/10. The patient is experiencing no pain. Associated symptoms include hearing loss. He has tried ear drops for the symptoms. The treatment provided no relief. His past medical history is significant for hearing loss.       HENT: Positive for ear pain and hearing loss.        Objective:      Physical Exam   Constitutional: He is oriented to person, place, and time. He appears well-developed. He is cooperative.  Non-toxic appearance. He does not appear ill. No distress.   HENT:   Head: Normocephalic and atraumatic.   Ears:   Right Ear: Hearing, tympanic membrane, external ear and ear canal normal. impacted cerumen  Left Ear: Hearing, tympanic membrane, external ear and ear canal normal. impacted cerumen     Comments: Post lavage ear exam WNL  Nose: Nose normal. No mucosal edema, rhinorrhea, nasal deformity or congestion. No epistaxis. Right sinus exhibits no maxillary sinus tenderness and no frontal sinus tenderness. Left sinus exhibits no maxillary sinus tenderness and no frontal sinus tenderness.   Mouth/Throat: Uvula is midline, oropharynx is clear and moist and mucous membranes are normal. No trismus in the jaw. Normal dentition. No uvula swelling.   Eyes: Conjunctivae and lids are normal. " Right eye exhibits no discharge. Left eye exhibits no discharge. No scleral icterus.   Neck: Trachea normal and phonation normal. Neck supple.   Cardiovascular: Normal rate, regular rhythm, normal heart sounds and normal pulses.   No murmur heard.  Pulmonary/Chest: Effort normal and breath sounds normal. No stridor. No respiratory distress. He has no wheezes. He has no rhonchi. He has no rales.   Abdominal: Normal appearance and bowel sounds are normal. He exhibits no distension and no mass. Soft. There is no abdominal tenderness.   Musculoskeletal: Normal range of motion.         General: No deformity. Normal range of motion.      Cervical back: He exhibits no tenderness.   Lymphadenopathy:     He has no cervical adenopathy.   Neurological: He is alert and oriented to person, place, and time. He exhibits normal muscle tone. Coordination normal.   Skin: Skin is warm, dry, intact, not diaphoretic and not pale.   Psychiatric: His speech is normal and behavior is normal. Judgment and thought content normal.   Nursing note and vitals reviewed.        Assessment:       1. Impacted cerumen of left ear    2. Left ear pain          Plan:         Impacted cerumen of left ear  -     Ear wax removal    Left ear pain

## 2022-08-24 ENCOUNTER — OFFICE VISIT (OUTPATIENT)
Dept: PSYCHIATRY | Facility: CLINIC | Age: 67
End: 2022-08-24
Payer: MEDICARE

## 2022-08-24 DIAGNOSIS — F12.90 CANNABIS USE, UNCOMPLICATED: ICD-10-CM

## 2022-08-24 DIAGNOSIS — E55.9 VITAMIN D INSUFFICIENCY: ICD-10-CM

## 2022-08-24 DIAGNOSIS — F31.9 BIPOLAR 1 DISORDER: Primary | ICD-10-CM

## 2022-08-24 PROCEDURE — 99214 PR OFFICE/OUTPT VISIT, EST, LEVL IV, 30-39 MIN: ICD-10-PCS | Mod: 95,,, | Performed by: PHYSICIAN ASSISTANT

## 2022-08-24 PROCEDURE — 3044F HG A1C LEVEL LT 7.0%: CPT | Mod: CPTII,95,, | Performed by: PHYSICIAN ASSISTANT

## 2022-08-24 PROCEDURE — 99214 OFFICE O/P EST MOD 30 MIN: CPT | Mod: 95,,, | Performed by: PHYSICIAN ASSISTANT

## 2022-08-24 PROCEDURE — 3044F PR MOST RECENT HEMOGLOBIN A1C LEVEL <7.0%: ICD-10-PCS | Mod: CPTII,95,, | Performed by: PHYSICIAN ASSISTANT

## 2022-08-24 RX ORDER — ARIPIPRAZOLE 2 MG/1
2 TABLET ORAL DAILY
Qty: 30 TABLET | Refills: 2 | Status: SHIPPED | OUTPATIENT
Start: 2022-08-24 | End: 2023-04-12

## 2022-08-24 NOTE — PROGRESS NOTES
"  The patient location is: [place]    Visit type: audiovisual    Face to Face time with patient: 13  20 minutes of total time spent on the encounter, which includes face to face time and non-face to face time preparing to see the patient (eg, review of tests), Obtaining and/or reviewing separately obtained history, Documenting clinical information in the electronic or other health record, Independently interpreting results (not separately reported) and communicating results to the patient/family/caregiver, or Care coordination (not separately reported).         Each patient to whom he or she provides medical services by telemedicine is:  (1) informed of the relationship between the physician and patient and the respective role of any other health care provider with respect to management of the patient; and (2) notified that he or she may decline to receive medical services by telemedicine and may withdraw from such care at any time.    Notes:     Outpatient Psychiatry Follow-Up Visit (MD/BEATRICE)    8/24/2022    Clinical Status of Patient:  Outpatient (Ambulatory)    Chief Complaint:  Mirza Morales is a 67 y.o. male who presents today for follow-up of mood disorder and substance problems.  Met with patient.      Interval History and Content of Current Session 08/24/2022:    Pt reports today: "mood is ok, I lay down for like 2-3 hrs in the morning. Its hard to get motivated to get up  And do something, I lay in bed and stew about things."    Pt had stopped latuda since last visit. Will try abilify low dose to increase motivation, pt having severe anhedonia.    Pt remains on seroquel 100mg. Discussed risks of combining antipsychotics but discussed that abilify is at low dose. Pt reports understanding and agreeable to plan    Patients mood is steady, affect appears mood congruent. Linear and logical, friendly and cooperative, good eye contact.    Denies SI/HI/AVH. Pt reports sleeping well and normal appetite. " "Denies side effects of medications.    Pt reports taking medications as prescribed and behaving appropriately during interview today.      Prior visit 3/17/22:  Pt reports today: "feeling much better. im smoking a small amount of marijuana, a few tokes in the morning, sometime in the afternoon, and I have a few more tokes in the evening. im smoking a small joint and it lasts me a whole day."     Reports depression today as 3/10, and anxiety as 3/10.     Pt appears euthymic and steady, good eye contact, linear and logical, calm and cooperative, linear and logical, good insight.     Denies SI/HI/AVH. Pt reports sleeping well 6-7 hrs per night, takes seroquel 100mg qhs at bedtime, states most nights only has to take trazodone 100mg qhs and able to sleep well, also smokes small amount of cannabis in the evening which he says improves his sleep quality. Pt reports improved appetite. Reports that frequently after taking morning medications has mild nausea, states if he takes a small hit of thc it resolves the nausea.     Reports no alcohol use since last visit. States not thinking about drinking much and denies having craving. Has good insight into alcohol dependence d/o and that if he has one drink he will have a significant relapse and begin binge drinking. Reports thc use has helped with etoh cravings "it makes me thing about it less, its not really entering my mind."     Pt reports taking medications as prescribed and behaving appropriately during interview today.             Review of Systems     Review of Systems   Constitutional: Negative for fever.   HENT: Negative for sore throat.    Eyes: Negative for photophobia.   Respiratory: Negative for cough.    Cardiovascular: Negative for chest pain and palpitations.   Gastrointestinal: Negative for abdominal pain.   Genitourinary: Negative for dysuria.   Musculoskeletal: Negative for myalgias.   Skin: Negative for rash.   Neurological: Negative for dizziness. "   Endo/Heme/Allergies: Does not bruise/bleed easily.       Psychiatric Review Of Systems - Is patient experiencing or having changes in:  sleep: no  appetite: no  weight: no  energy/anergy: yes  interest/pleasure/anhedonia: yes  somatic symptoms: no  libido: no  anxiety/panic: no  guilty/hopelessness: no  concentration: yes  S.I.B.s/risky behavior: no  Irritability: no  Racing thoughts: no  Impulsive behaviors: no  Paranoia: no  AVH: no      Past Medical, Family and Social History: The patient's past medical, family and social history have been reviewed and updated as appropriate within the electronic medical record - see encounter notes.      Current Medications:   Medication List with Changes/Refills   Current Medications    AMLODIPINE (NORVASC) 2.5 MG TABLET    Take 1 tablet (2.5 mg total) by mouth once daily.    AMOXICILLIN (AMOXIL) 500 MG CAPSULE    TAKE 1 CAPSULE EVERY 8 HOURS UNTIL FINISHED    ATORVASTATIN (LIPITOR) 40 MG TABLET    TAKE 1 TABLET (40 MG TOTAL) BY MOUTH ONCE DAILY.    CALCIUM POLYCARBOPHIL ORAL    Take by mouth once daily.     CHOLECALCIFEROL, VITAMIN D3, (VITAMIN D3) 25 MCG (1,000 UNIT) CAPSULE    Take 3 capsules (3,000 Units total) by mouth once daily.    DOXEPIN (SINEQUAN) 100 MG CAPSULE    doxepin 100 mg capsule    FLUCONAZOLE (DIFLUCAN) 100 MG TABLET    fluconazole 100 mg tablet    FLUTICASONE-SALMETEROL DISKUS INHALER 250-50 MCG    Inhale 1 puff into the lungs 2 (two) times daily. Controller    LACOSAMIDE (VIMPAT) 200 MG TAB TABLET    Take 1 tablet (200 mg total) by mouth every 12 (twelve) hours.    LURASIDONE (LATUDA) 60 MG TAB TABLET    Take one tablet every evening with dinner.    MUPIROCIN (BACTROBAN) 2 % OINTMENT    mupirocin 2 % topical ointment    OXYCODONE (ROXICODONE) 10 MG TAB IMMEDIATE RELEASE TABLET    oxycodone 10 mg tablet   TAKE 1 TABLET EVERY 6 HOURS BY ORAL ROUTE FOR PAIN (DO NOT FILL UNTIL 01/10/2020 ) G89.4M48.061 Z98.1    PANTOPRAZOLE (PROTONIX) 40 MG TABLET    Take 1  tablet (40 mg total) by mouth once daily.    PROPRANOLOL (INDERAL LA) 60 MG 24 HR CAPSULE    Take 1 capsule (60 mg total) by mouth once daily.    QUETIAPINE (SEROQUEL) 100 MG TAB    Take one tablet at bedtime    TRAZODONE (DESYREL) 100 MG TABLET    TAKE 1-2 TABLETS BY MOUTH AT BEDTIME         Allergies:   Review of patient's allergies indicates:   Allergen Reactions    Gabapentin Other (See Comments)     SEVERE DIZZINESS AND BALANCE PROBLEMS, UNABLE TO WALK.    Nardil [phenelzine]      BECOMES HYPER, LIKE A MANIC EPISODE.    Penicillins Hives    Lamictal [lamotrigine] Rash         Vitals   There were no vitals filed for this visit.       Labs/Imaging/Studies:   No results found for this or any previous visit (from the past 48 hour(s)).   Lab Results   Component Value Date    VALPROATE 48.2 (L) 09/29/2019       Compliance: yes    Side effects: None    Risk Parameters:  Patient reports no suicidal ideation  Patient reports no homicidal ideation  Patient reports no self-injurious behavior  Patient reports no violent behavior    Exam (detailed: at least 9 elements; comprehensive: all 15 elements)   Constitutional  Vitals:  Most recent vital signs, dated greater than 90 days prior to this appointment, were reviewed.   There were no vitals filed for this visit.     General:  unremarkable, age appropriate     Musculoskeletal  Muscle Strength/Tone:  not examined   Gait & Station:  Not examined     Psychiatric  Speech:  no latency; no press   Mood & Affect:  steady  congruent and appropriate   Thought Process:  normal and logical   Associations:  intact   Thought Content:  normal, no suicidality, no homicidality, delusions, or paranoia   Insight:  intact, has awareness of illness   Judgement: behavior is adequate to circumstances   Orientation:  grossly intact   Memory: intact for content of interview   Language: grossly intact   Attention Span & Concentration:  able to focus   Fund of Knowledge:  intact and appropriate  to age and level of education     Assessment and Diagnosis   Status/Progress: Based on the examination today, the patient's problem(s) is/are adequately but not ideally controlled.  New problems have not been presented today.   Co-morbidities, Diagnostic uncertainty and Lack of compliance are not complicating management of the primary condition.  There are no active rule-out diagnoses for this patient at this time.     General Impression:      ICD-10-CM ICD-9-CM   1. Bipolar 1 disorder  F31.9 296.7   2. Cannabis use, uncomplicated  F12.90 305.20   3. Vitamin D insufficiency  E55.9 268.9       Intervention/Counseling/Treatment Plan   · Medication Management: Continue current medications. The risks and benefits of medication were discussed with the patient.  -pt stopped latuda months ago on his own    -start abilify 2mg daily  -continue seroquel 100mg qhs  -continue trazodone 100mg, take 1-2 tablets at bedtime as needed for insomnia    Return to Clinic: 1 month        Trav Andrews PA-C      Total face to face time: 13 min  Total time (chart review, patient contact, documentation): 20 min      *This note has been prepared using a combination of a dictation device and typing.  It has been checked for errors but some errors may still exist within the note as a result of speech recognition errors and/or typographical errors.

## 2022-09-21 ENCOUNTER — PATIENT MESSAGE (OUTPATIENT)
Dept: INTERNAL MEDICINE | Facility: CLINIC | Age: 67
End: 2022-09-21
Payer: MEDICARE

## 2022-09-22 ENCOUNTER — OFFICE VISIT (OUTPATIENT)
Dept: INTERNAL MEDICINE | Facility: CLINIC | Age: 67
End: 2022-09-22
Payer: MEDICARE

## 2022-09-22 VITALS
WEIGHT: 229.5 LBS | OXYGEN SATURATION: 97 % | HEIGHT: 76 IN | SYSTOLIC BLOOD PRESSURE: 130 MMHG | BODY MASS INDEX: 27.95 KG/M2 | DIASTOLIC BLOOD PRESSURE: 80 MMHG | HEART RATE: 71 BPM

## 2022-09-22 DIAGNOSIS — R00.0 SINUS TACHYCARDIA: ICD-10-CM

## 2022-09-22 DIAGNOSIS — F41.9 ANXIETY: Chronic | ICD-10-CM

## 2022-09-22 DIAGNOSIS — K40.91 RECURRENT LEFT INGUINAL HERNIA: ICD-10-CM

## 2022-09-22 DIAGNOSIS — F10.20 ALCOHOL USE DISORDER, SEVERE, DEPENDENCE: Chronic | ICD-10-CM

## 2022-09-22 DIAGNOSIS — D63.8 ANEMIA OF CHRONIC DISEASE: ICD-10-CM

## 2022-09-22 DIAGNOSIS — J44.9 CHRONIC OBSTRUCTIVE PULMONARY DISEASE, UNSPECIFIED COPD TYPE: Primary | ICD-10-CM

## 2022-09-22 DIAGNOSIS — N28.1 RENAL CYST, RIGHT: ICD-10-CM

## 2022-09-22 DIAGNOSIS — F31.9 BIPOLAR I DISORDER: ICD-10-CM

## 2022-09-22 DIAGNOSIS — I77.819 AORTIC ECTASIA: ICD-10-CM

## 2022-09-22 DIAGNOSIS — Z72.0 TOBACCO ABUSE: Chronic | ICD-10-CM

## 2022-09-22 DIAGNOSIS — E78.2 MIXED HYPERLIPIDEMIA: ICD-10-CM

## 2022-09-22 PROCEDURE — 1126F PR PAIN SEVERITY QUANTIFIED, NO PAIN PRESENT: ICD-10-PCS | Mod: CPTII,S$GLB,, | Performed by: NURSE PRACTITIONER

## 2022-09-22 PROCEDURE — 3044F HG A1C LEVEL LT 7.0%: CPT | Mod: CPTII,S$GLB,, | Performed by: NURSE PRACTITIONER

## 2022-09-22 PROCEDURE — 3075F PR MOST RECENT SYSTOLIC BLOOD PRESS GE 130-139MM HG: ICD-10-PCS | Mod: CPTII,S$GLB,, | Performed by: NURSE PRACTITIONER

## 2022-09-22 PROCEDURE — 99999 PR PBB SHADOW E&M-EST. PATIENT-LVL IV: CPT | Mod: PBBFAC,,, | Performed by: NURSE PRACTITIONER

## 2022-09-22 PROCEDURE — G0008 ADMIN INFLUENZA VIRUS VAC: HCPCS | Mod: S$GLB,,, | Performed by: NURSE PRACTITIONER

## 2022-09-22 PROCEDURE — 99499 UNLISTED E&M SERVICE: CPT | Mod: S$GLB,,, | Performed by: NURSE PRACTITIONER

## 2022-09-22 PROCEDURE — 99214 PR OFFICE/OUTPT VISIT, EST, LEVL IV, 30-39 MIN: ICD-10-PCS | Mod: 25,S$GLB,, | Performed by: NURSE PRACTITIONER

## 2022-09-22 PROCEDURE — 3288F FALL RISK ASSESSMENT DOCD: CPT | Mod: CPTII,S$GLB,, | Performed by: NURSE PRACTITIONER

## 2022-09-22 PROCEDURE — 99214 OFFICE O/P EST MOD 30 MIN: CPT | Mod: 25,S$GLB,, | Performed by: NURSE PRACTITIONER

## 2022-09-22 PROCEDURE — 1101F PR PT FALLS ASSESS DOC 0-1 FALLS W/OUT INJ PAST YR: ICD-10-PCS | Mod: CPTII,S$GLB,, | Performed by: NURSE PRACTITIONER

## 2022-09-22 PROCEDURE — 1159F MED LIST DOCD IN RCRD: CPT | Mod: CPTII,S$GLB,, | Performed by: NURSE PRACTITIONER

## 2022-09-22 PROCEDURE — 3288F PR FALLS RISK ASSESSMENT DOCUMENTED: ICD-10-PCS | Mod: CPTII,S$GLB,, | Performed by: NURSE PRACTITIONER

## 2022-09-22 PROCEDURE — 99999 PR PBB SHADOW E&M-EST. PATIENT-LVL IV: ICD-10-PCS | Mod: PBBFAC,,, | Performed by: NURSE PRACTITIONER

## 2022-09-22 PROCEDURE — 3008F BODY MASS INDEX DOCD: CPT | Mod: CPTII,S$GLB,, | Performed by: NURSE PRACTITIONER

## 2022-09-22 PROCEDURE — 99499 RISK ADDL DX/OHS AUDIT: ICD-10-PCS | Mod: S$GLB,,, | Performed by: NURSE PRACTITIONER

## 2022-09-22 PROCEDURE — G0008 FLU VACCINE - QUADRIVALENT - ADJUVANTED: ICD-10-PCS | Mod: S$GLB,,, | Performed by: NURSE PRACTITIONER

## 2022-09-22 PROCEDURE — 3079F PR MOST RECENT DIASTOLIC BLOOD PRESSURE 80-89 MM HG: ICD-10-PCS | Mod: CPTII,S$GLB,, | Performed by: NURSE PRACTITIONER

## 2022-09-22 PROCEDURE — 3008F PR BODY MASS INDEX (BMI) DOCUMENTED: ICD-10-PCS | Mod: CPTII,S$GLB,, | Performed by: NURSE PRACTITIONER

## 2022-09-22 PROCEDURE — 1101F PT FALLS ASSESS-DOCD LE1/YR: CPT | Mod: CPTII,S$GLB,, | Performed by: NURSE PRACTITIONER

## 2022-09-22 PROCEDURE — 90694 FLU VACCINE - QUADRIVALENT - ADJUVANTED: ICD-10-PCS | Mod: S$GLB,,, | Performed by: NURSE PRACTITIONER

## 2022-09-22 PROCEDURE — 90694 VACC AIIV4 NO PRSRV 0.5ML IM: CPT | Mod: S$GLB,,, | Performed by: NURSE PRACTITIONER

## 2022-09-22 PROCEDURE — 3079F DIAST BP 80-89 MM HG: CPT | Mod: CPTII,S$GLB,, | Performed by: NURSE PRACTITIONER

## 2022-09-22 PROCEDURE — 3075F SYST BP GE 130 - 139MM HG: CPT | Mod: CPTII,S$GLB,, | Performed by: NURSE PRACTITIONER

## 2022-09-22 PROCEDURE — 1159F PR MEDICATION LIST DOCUMENTED IN MEDICAL RECORD: ICD-10-PCS | Mod: CPTII,S$GLB,, | Performed by: NURSE PRACTITIONER

## 2022-09-22 PROCEDURE — 3044F PR MOST RECENT HEMOGLOBIN A1C LEVEL <7.0%: ICD-10-PCS | Mod: CPTII,S$GLB,, | Performed by: NURSE PRACTITIONER

## 2022-09-22 PROCEDURE — 1126F AMNT PAIN NOTED NONE PRSNT: CPT | Mod: CPTII,S$GLB,, | Performed by: NURSE PRACTITIONER

## 2022-09-22 NOTE — PROGRESS NOTES
INTERNAL MEDICINE PROGRESS NOTE    CHIEF COMPLAINT     Chief Complaint   Patient presents with    Hernia     L-side    Sneezing fit comes and goes     A while        HPI     Mirza Morales is a 67 y.o. male with lumbar DJD, OA fo the spine, cognitive disorder, seizures, tremor, bipolar disorder, depression, HLD, ST, anemia, constipation, scoliosis, tobacco use, and vit d def  who presents for an urgent/follow up visit today.    HTN- taking amlodipine and propranolol     Bipolar disorder/depression - followed by psychiatry Dr Andrews   -pt was started on abilify but stopped taking   -continue seroquel 100mg qhs, discussed pt will consider lowering to 50mg qhs in future if able to still sleep well on decreased  -continue trazodone 100mg, take 1-2 tablets at bedtime as needed for insomnia     Seizures-   recurrent sz in Sept 2019  after self-weaning his Depakote due to s/e tremors.  Was then switched to Vimpat c EEG c/w delerium   Followed by Dr barbosa - taking vimpat 200mg and propranolol for tremor   Last seizure  12/24/2021     Hernia - left inguinal bulge   Was seen by Dr Chery but pt elected to not have surgery   Mass is reducible   Denies pain   Denies bowel changes.       Sneezing/rhinitis- seen by allergy; continues to have sneezing fits. Was seen by allergy but did not complete work up. Cannot tolerate antihistamine. No improvement with flonase.     COPD- not taking advair as directed     Vitamin D def- taking D3 supplement        Recent labs- reviewed   Glucose fasting 124  Lipids- at goal   TSH- at goal   Macrocytosis   Vitamin D - 27      HM-  PSA- 3/2022  AAA- will order        Past Medical History:  Past Medical History:   Diagnosis Date    Alcohol abuse     Allergy     Behavioral problem     Bipolar 1 disorder     Chronic right hip pain     Depression     DJD (degenerative joint disease), lumbar 1/27/2015    Fatigue     Hepatitis     pt. denies this    History of psychiatric care     History of  psychiatric hospitalization     Hypertension     Karina     Post traumatic stress disorder     Psychiatric problem     Seizures     Suicide attempt     Therapy     Vitamin D insufficiency 3/17/2022       Home Medications:  Prior to Admission medications    Medication Sig Start Date End Date Taking? Authorizing Provider   amLODIPine (NORVASC) 2.5 MG tablet Take 1 tablet (2.5 mg total) by mouth once daily. 5/30/22  Yes Jud Mo MD   atorvastatin (LIPITOR) 40 MG tablet TAKE 1 TABLET (40 MG TOTAL) BY MOUTH ONCE DAILY. 7/27/22  Yes Eduarda Calderon NP   cholecalciferol, vitamin D3, (VITAMIN D3) 25 mcg (1,000 unit) capsule Take 3 capsules (3,000 Units total) by mouth once daily. 3/17/22  Yes DAVID Dexter   doxepin (SINEQUAN) 100 MG capsule doxepin 100 mg capsule   Yes Historical Provider   lacosamide (VIMPAT) 200 mg Tab tablet Take 1 tablet (200 mg total) by mouth every 12 (twelve) hours. 5/30/22  Yes Samuel Julien MD   propranoloL (INDERAL LA) 60 MG 24 hr capsule Take 1 capsule (60 mg total) by mouth once daily. 5/30/22 5/30/23 Yes Samuel Julien MD   QUEtiapine (SEROQUEL) 100 MG Tab Take one tablet at bedtime 3/17/22  Yes DAVID Dexter   traZODone (DESYREL) 100 MG tablet TAKE 1-2 TABLETS BY MOUTH AT BEDTIME 3/17/22  Yes DAVID Dexter   amoxicillin (AMOXIL) 500 MG capsule TAKE 1 CAPSULE EVERY 8 HOURS UNTIL FINISHED 4/20/22   Historical Provider   ARIPiprazole (ABILIFY) 2 MG Tab Take 1 tablet (2 mg total) by mouth once daily.  Patient not taking: Reported on 9/22/2022 8/24/22 11/22/22  DAVID Dexter   CALCIUM POLYCARBOPHIL ORAL Take by mouth once daily.     Historical Provider   fluconazole (DIFLUCAN) 100 MG tablet fluconazole 100 mg tablet    Historical Provider   fluticasone-salmeterol diskus inhaler 250-50 mcg Inhale 1 puff into the lungs 2 (two) times daily. Controller  Patient not taking: No sig reported 4/8/22 4/8/23  Eduarda Calderon NP   mupirocin (BACTROBAN) 2 % ointment  "mupirocin 2 % topical ointment    Historical Provider   oxyCODONE (ROXICODONE) 10 mg Tab immediate release tablet oxycodone 10 mg tablet   TAKE 1 TABLET EVERY 6 HOURS BY ORAL ROUTE FOR PAIN (DO NOT FILL UNTIL 01/10/2020 ) G89.4M48.061 Z98.1    Historical Provider   pantoprazole (PROTONIX) 40 MG tablet Take 1 tablet (40 mg total) by mouth once daily.  Patient taking differently: Take 40 mg by mouth daily as needed (heartburn).  1/15/20 1/14/21  Jacinta Young MD       Review of Systems:  Review of Systems   Constitutional:  Negative for activity change and unexpected weight change.   HENT:  Positive for rhinorrhea. Negative for hearing loss and trouble swallowing.    Eyes:  Negative for discharge and visual disturbance.   Respiratory:  Positive for wheezing. Negative for chest tightness.    Cardiovascular:  Negative for chest pain and palpitations.   Gastrointestinal:  Positive for diarrhea. Negative for blood in stool, constipation and vomiting.   Endocrine: Negative for polydipsia and polyuria.   Genitourinary:  Negative for difficulty urinating, hematuria and urgency.   Musculoskeletal:  Positive for joint swelling. Negative for arthralgias and neck pain.   Neurological:  Positive for weakness. Negative for headaches.   Psychiatric/Behavioral:  Positive for dysphoric mood.      Health Maintainence:   Immunizations:  Health Maintenance         Date Due Completion Date    COVID-19 Vaccine (4 - Booster for Moderna series) 04/10/2022 12/10/2021    Influenza Vaccine (1) 09/01/2022 10/21/2021    Lipid Panel 03/17/2023 3/17/2022    PROSTATE-SPECIFIC ANTIGEN 03/30/2023 3/30/2022    Pneumococcal Vaccines (Age 65+) (3 - PPSV23 or PCV20) 08/08/2023 8/8/2018    TETANUS VACCINE 08/08/2028 8/8/2018    Colorectal Cancer Screening 08/14/2029 8/14/2019             PHYSICAL EXAM     /80 (BP Location: Right arm, Patient Position: Sitting, BP Method: Medium (Manual))   Pulse 71   Ht 6' 4" (1.93 m)   Wt 104.1 kg (229 lb 8 oz)   " SpO2 97%   BMI 27.94 kg/m²     Physical Exam  Vitals reviewed.   Constitutional:       Appearance: He is well-developed.   HENT:      Head: Normocephalic.      Right Ear: External ear normal.      Left Ear: External ear normal.      Nose: Nose normal.      Mouth/Throat:      Pharynx: No oropharyngeal exudate.   Eyes:      Pupils: Pupils are equal, round, and reactive to light.   Neck:      Thyroid: No thyromegaly.      Vascular: No JVD.      Trachea: No tracheal deviation.   Cardiovascular:      Rate and Rhythm: Normal rate and regular rhythm.      Heart sounds: No murmur heard.    No friction rub. No gallop.   Pulmonary:      Effort: No respiratory distress.      Breath sounds: Normal breath sounds. No wheezing or rales.   Chest:      Chest wall: No tenderness.   Abdominal:      General: Bowel sounds are normal. There is no distension.      Palpations: Abdomen is soft.      Tenderness: There is no abdominal tenderness.   Musculoskeletal:         General: No tenderness. Normal range of motion.   Lymphadenopathy:      Cervical: No cervical adenopathy.   Skin:     General: Skin is warm and dry.      Findings: No rash.   Neurological:      Mental Status: He is alert and oriented to person, place, and time.   Psychiatric:         Behavior: Behavior normal.       LABS     Lab Results   Component Value Date    HGBA1C 5.0 03/30/2022     CMP  Sodium   Date Value Ref Range Status   03/17/2022 136 136 - 145 mmol/L Final     Potassium   Date Value Ref Range Status   03/17/2022 5.0 3.5 - 5.1 mmol/L Final     Chloride   Date Value Ref Range Status   03/17/2022 97 95 - 110 mmol/L Final     CO2   Date Value Ref Range Status   03/17/2022 30 (H) 23 - 29 mmol/L Final     Glucose   Date Value Ref Range Status   03/17/2022 124 (H) 70 - 110 mg/dL Final     BUN   Date Value Ref Range Status   03/17/2022 11 8 - 23 mg/dL Final     Creatinine   Date Value Ref Range Status   03/17/2022 0.9 0.5 - 1.4 mg/dL Final     Calcium   Date Value Ref  Range Status   03/17/2022 10.4 8.7 - 10.5 mg/dL Final     Total Protein   Date Value Ref Range Status   03/17/2022 8.1 6.0 - 8.4 g/dL Final     Albumin   Date Value Ref Range Status   03/17/2022 4.3 3.5 - 5.2 g/dL Final     Total Bilirubin   Date Value Ref Range Status   03/17/2022 0.7 0.1 - 1.0 mg/dL Final     Comment:     For infants and newborns, interpretation of results should be based  on gestational age, weight and in agreement with clinical  observations.    Premature Infant recommended reference ranges:  Up to 24 hours.............<8.0 mg/dL  Up to 48 hours............<12.0 mg/dL  3-5 days..................<15.0 mg/dL  6-29 days.................<15.0 mg/dL       Alkaline Phosphatase   Date Value Ref Range Status   03/17/2022 65 55 - 135 U/L Final     AST   Date Value Ref Range Status   03/17/2022 23 10 - 40 U/L Final     ALT   Date Value Ref Range Status   03/17/2022 26 10 - 44 U/L Final     Anion Gap   Date Value Ref Range Status   03/17/2022 9 8 - 16 mmol/L Final     eGFR if    Date Value Ref Range Status   03/17/2022 >60.0 >60 mL/min/1.73 m^2 Final     eGFR if non    Date Value Ref Range Status   03/17/2022 >60.0 >60 mL/min/1.73 m^2 Final     Comment:     Calculation used to obtain the estimated glomerular filtration  rate (eGFR) is the CKD-EPI equation.        Lab Results   Component Value Date    WBC 5.67 03/17/2022    HGB 15.2 03/17/2022    HCT 45.3 03/17/2022     (H) 03/17/2022     03/17/2022     Lab Results   Component Value Date    CHOL 144 03/17/2022    CHOL 127 08/03/2020    CHOL 147 01/29/2020     Lab Results   Component Value Date    HDL 60 03/17/2022    HDL 52 08/03/2020    HDL 65 01/29/2020     Lab Results   Component Value Date    LDLCALC 67.2 03/17/2022    LDLCALC 55.2 (L) 08/03/2020    LDLCALC 59.2 (L) 01/29/2020     Lab Results   Component Value Date    TRIG 84 03/17/2022    TRIG 99 08/03/2020    TRIG 114 01/29/2020     Lab Results   Component  Value Date    CHOLHDL 41.7 03/17/2022    CHOLHDL 40.9 08/03/2020    CHOLHDL 44.2 01/29/2020     Lab Results   Component Value Date    TSH 1.538 03/17/2022       ASSESSMENT/PLAN     Mirza Morales is a 67 y.o. male     Chronic obstructive pulmonary disease, unspecified COPD type- stable. Will cont advair. F/u with Pulmonary medicine     Aortic ectasia- stable. Will cont statin     Tobacco abuse- discussed cessation, not ready      Recurrent left inguinal hernia- stable. Will cont to monitor and call if ever not reducible     Anemia of chronic disease- stable. Will monitor     Mixed hyperlipidemia- stable. Will cont lipitor 40mg     Sinus tachycardia- stable. Will cont propranolol     Alcohol use disorder, severe, dependence- stable. Will monitor     Anxiety/Bipolar I disorder    Renal cyst, right- will send for u/s of the renal cyst 6 month follow up   -     US Abdomen Limited_Kidney; Future; Expected date: 09/22/2022    Other orders  -     Influenza (FLUAD) - Quadrivalent (Adjuvanted) *Preferred* (65+) (PF)         Follow up with PCP in 6 months for follow     Patient education provided from [facility]. Patient was counseled on when and how to seek emergent care.       Eduarda BECKWITH, APRN, FNP-c   Department of Internal Medicine - [facility]  1:44 PM

## 2022-10-01 ENCOUNTER — HOSPITAL ENCOUNTER (OUTPATIENT)
Dept: RADIOLOGY | Facility: HOSPITAL | Age: 67
Discharge: HOME | End: 2022-10-01
Attending: NURSE PRACTITIONER
Payer: MEDICARE

## 2022-10-01 DIAGNOSIS — N28.1 RENAL CYST, RIGHT: ICD-10-CM

## 2022-10-01 PROCEDURE — 76770 US EXAM ABDO BACK WALL COMP: CPT | Mod: 26,,, | Performed by: RADIOLOGY

## 2022-10-01 PROCEDURE — 76770 US EXAM ABDO BACK WALL COMP: CPT | Mod: TC

## 2022-10-01 PROCEDURE — 76770 US RETROPERITONEAL COMPLETE: ICD-10-PCS | Mod: 26,,, | Performed by: RADIOLOGY

## 2022-10-14 ENCOUNTER — TELEPHONE (OUTPATIENT)
Dept: INTERNAL MEDICINE | Facility: CLINIC | Age: 67
End: 2022-10-14
Payer: MEDICARE

## 2022-10-14 NOTE — TELEPHONE ENCOUNTER
Wife sent a message in regards to patient and her.     Patient is experiencing high bp readings.   Patient has been experiencing dizziness sometime.    Declined headaches, chest pains or visual disturbances.     Patient is currently taking Amlodipine 2.5mg once daily.    Please advies

## 2022-10-17 DIAGNOSIS — E87.5 HYPERKALEMIA: ICD-10-CM

## 2022-10-17 RX ORDER — AMLODIPINE BESYLATE 5 MG/1
5 TABLET ORAL DAILY
Qty: 90 TABLET | Refills: 3 | Status: SHIPPED | OUTPATIENT
Start: 2022-10-17 | End: 2023-02-07

## 2022-10-17 NOTE — PROGRESS NOTES
Wife states home readings have been in the 140s c associated h/a.  No vision changes.  Will increase Amlodipine to 5 mg and advised to send me updated readings in about a week.

## 2022-12-13 ENCOUNTER — PATIENT MESSAGE (OUTPATIENT)
Dept: PSYCHIATRY | Facility: CLINIC | Age: 67
End: 2022-12-13
Payer: MEDICARE

## 2022-12-14 ENCOUNTER — TELEPHONE (OUTPATIENT)
Dept: OTOLARYNGOLOGY | Facility: CLINIC | Age: 67
End: 2022-12-14
Payer: MEDICARE

## 2022-12-14 ENCOUNTER — PATIENT MESSAGE (OUTPATIENT)
Dept: INTERNAL MEDICINE | Facility: CLINIC | Age: 67
End: 2022-12-14
Payer: MEDICARE

## 2022-12-14 DIAGNOSIS — R09.81 SINUS CONGESTION: Primary | ICD-10-CM

## 2022-12-14 DIAGNOSIS — F31.9 BIPOLAR 1 DISORDER: ICD-10-CM

## 2022-12-15 ENCOUNTER — PATIENT MESSAGE (OUTPATIENT)
Dept: INTERNAL MEDICINE | Facility: CLINIC | Age: 67
End: 2022-12-15
Payer: MEDICARE

## 2022-12-15 RX ORDER — QUETIAPINE FUMARATE 100 MG/1
TABLET, FILM COATED ORAL
Qty: 90 TABLET | Refills: 1 | Status: SHIPPED | OUTPATIENT
Start: 2022-12-15 | End: 2023-04-12 | Stop reason: SDUPTHER

## 2023-01-13 DIAGNOSIS — G40.909 SEIZURE DISORDER: ICD-10-CM

## 2023-01-13 RX ORDER — LACOSAMIDE 200 MG/1
200 TABLET ORAL EVERY 12 HOURS
Qty: 180 TABLET | Refills: 1 | Status: SHIPPED | OUTPATIENT
Start: 2023-01-13 | End: 2023-10-10 | Stop reason: SDUPTHER

## 2023-01-30 ENCOUNTER — NURSE TRIAGE (OUTPATIENT)
Dept: ADMINISTRATIVE | Facility: CLINIC | Age: 68
End: 2023-01-30
Payer: MEDICARE

## 2023-01-30 NOTE — TELEPHONE ENCOUNTER
Patient c/o elevated BP. Last BP was 155/89. Denies any symptoms. Advised per protocol to be seen within the next 2 weeks. An appointment was scheduled for the patient. Advised the patient to call back with any further questions or if symptoms worsen. Patient VU.     Reason for Disposition   Systolic BP >= 130 OR Diastolic >= 80, and is taking BP medications    Additional Information   Negative: Sounds like a life-threatening emergency to the triager   Negative: Systolic BP >= 160 OR Diastolic >= 100, and any cardiac or neurologic symptoms (e.g., chest pain, difficulty breathing, unsteady gait, blurred vision)   Negative: Pregnant 20 or more weeks (or postpartum < 6 weeks) with new hand or face swelling   Negative: Pregnant 20 or more weeks (or postpartum < 6 weeks) and Systolic BP >= 160 OR Diastolic >= 100   Negative: Patient sounds very sick or weak to the triager   Negative: Systolic BP >= 200 OR Diastolic >= 120 and having NO cardiac or neurologic symptoms   Negative: Pregnant 20 or more weeks (or postpartum < 6 weeks) with Systolic BP >= 140 OR Diastolic >= 90   Negative: Systolic BP >= 180 OR Diastolic >= 110, and missed most recent dose of blood pressure medication   Negative: Systolic BP >= 180 OR Diastolic >= 110   Negative: Patient wants to be seen   Negative: Ran out of BP medications   Negative: Taking BP medications and feels is having side effects (e.g., impotence, cough, dizziness)   Negative: Systolic BP >= 160 OR Diastolic >= 100   Negative: Systolic BP >= 130 OR Diastolic >= 80, and pregnant    Protocols used: Blood Pressure - High-A-OH

## 2023-02-06 ENCOUNTER — PES CALL (OUTPATIENT)
Dept: ADMINISTRATIVE | Facility: CLINIC | Age: 68
End: 2023-02-06
Payer: MEDICARE

## 2023-02-07 ENCOUNTER — OFFICE VISIT (OUTPATIENT)
Dept: INTERNAL MEDICINE | Facility: CLINIC | Age: 68
End: 2023-02-07
Payer: MEDICARE

## 2023-02-07 VITALS
HEART RATE: 72 BPM | WEIGHT: 235.69 LBS | BODY MASS INDEX: 28.7 KG/M2 | DIASTOLIC BLOOD PRESSURE: 78 MMHG | SYSTOLIC BLOOD PRESSURE: 146 MMHG | OXYGEN SATURATION: 99 % | HEIGHT: 76 IN

## 2023-02-07 DIAGNOSIS — Z00.00 ENCOUNTER FOR MEDICARE ANNUAL WELLNESS EXAM: ICD-10-CM

## 2023-02-07 DIAGNOSIS — J30.9 ALLERGIC RHINITIS, UNSPECIFIED SEASONALITY, UNSPECIFIED TRIGGER: ICD-10-CM

## 2023-02-07 DIAGNOSIS — F10.21 ALCOHOL DEPENDENCE IN REMISSION: ICD-10-CM

## 2023-02-07 DIAGNOSIS — J44.9 CHRONIC OBSTRUCTIVE PULMONARY DISEASE, UNSPECIFIED COPD TYPE: ICD-10-CM

## 2023-02-07 DIAGNOSIS — G40.909 SEIZURE DISORDER: ICD-10-CM

## 2023-02-07 DIAGNOSIS — I10 HYPERTENSION, UNSPECIFIED TYPE: Primary | ICD-10-CM

## 2023-02-07 DIAGNOSIS — I77.819 AORTIC ECTASIA: ICD-10-CM

## 2023-02-07 DIAGNOSIS — F31.9 BIPOLAR 1 DISORDER: ICD-10-CM

## 2023-02-07 PROBLEM — G95.9 LUMBAR MYELOPATHY: Status: RESOLVED | Noted: 2018-03-30 | Resolved: 2023-02-07

## 2023-02-07 PROCEDURE — 99999 PR PBB SHADOW E&M-EST. PATIENT-LVL V: ICD-10-PCS | Mod: PBBFAC,HCNC,, | Performed by: PHYSICIAN ASSISTANT

## 2023-02-07 PROCEDURE — 3008F PR BODY MASS INDEX (BMI) DOCUMENTED: ICD-10-PCS | Mod: HCNC,CPTII,S$GLB, | Performed by: PHYSICIAN ASSISTANT

## 2023-02-07 PROCEDURE — 1160F PR REVIEW ALL MEDS BY PRESCRIBER/CLIN PHARMACIST DOCUMENTED: ICD-10-PCS | Mod: HCNC,CPTII,S$GLB, | Performed by: PHYSICIAN ASSISTANT

## 2023-02-07 PROCEDURE — 3077F PR MOST RECENT SYSTOLIC BLOOD PRESSURE >= 140 MM HG: ICD-10-PCS | Mod: HCNC,CPTII,S$GLB, | Performed by: PHYSICIAN ASSISTANT

## 2023-02-07 PROCEDURE — 1159F PR MEDICATION LIST DOCUMENTED IN MEDICAL RECORD: ICD-10-PCS | Mod: HCNC,CPTII,S$GLB, | Performed by: PHYSICIAN ASSISTANT

## 2023-02-07 PROCEDURE — 3078F DIAST BP <80 MM HG: CPT | Mod: HCNC,CPTII,S$GLB, | Performed by: PHYSICIAN ASSISTANT

## 2023-02-07 PROCEDURE — 1160F RVW MEDS BY RX/DR IN RCRD: CPT | Mod: HCNC,CPTII,S$GLB, | Performed by: PHYSICIAN ASSISTANT

## 2023-02-07 PROCEDURE — 99214 PR OFFICE/OUTPT VISIT, EST, LEVL IV, 30-39 MIN: ICD-10-PCS | Mod: HCNC,S$GLB,, | Performed by: PHYSICIAN ASSISTANT

## 2023-02-07 PROCEDURE — 3078F PR MOST RECENT DIASTOLIC BLOOD PRESSURE < 80 MM HG: ICD-10-PCS | Mod: HCNC,CPTII,S$GLB, | Performed by: PHYSICIAN ASSISTANT

## 2023-02-07 PROCEDURE — 3077F SYST BP >= 140 MM HG: CPT | Mod: HCNC,CPTII,S$GLB, | Performed by: PHYSICIAN ASSISTANT

## 2023-02-07 PROCEDURE — 99214 OFFICE O/P EST MOD 30 MIN: CPT | Mod: HCNC,S$GLB,, | Performed by: PHYSICIAN ASSISTANT

## 2023-02-07 PROCEDURE — 3288F PR FALLS RISK ASSESSMENT DOCUMENTED: ICD-10-PCS | Mod: HCNC,CPTII,S$GLB, | Performed by: PHYSICIAN ASSISTANT

## 2023-02-07 PROCEDURE — 1101F PT FALLS ASSESS-DOCD LE1/YR: CPT | Mod: HCNC,CPTII,S$GLB, | Performed by: PHYSICIAN ASSISTANT

## 2023-02-07 PROCEDURE — 99999 PR PBB SHADOW E&M-EST. PATIENT-LVL V: CPT | Mod: PBBFAC,HCNC,, | Performed by: PHYSICIAN ASSISTANT

## 2023-02-07 PROCEDURE — 3288F FALL RISK ASSESSMENT DOCD: CPT | Mod: HCNC,CPTII,S$GLB, | Performed by: PHYSICIAN ASSISTANT

## 2023-02-07 PROCEDURE — 1101F PR PT FALLS ASSESS DOC 0-1 FALLS W/OUT INJ PAST YR: ICD-10-PCS | Mod: HCNC,CPTII,S$GLB, | Performed by: PHYSICIAN ASSISTANT

## 2023-02-07 PROCEDURE — 1126F PR PAIN SEVERITY QUANTIFIED, NO PAIN PRESENT: ICD-10-PCS | Mod: HCNC,CPTII,S$GLB, | Performed by: PHYSICIAN ASSISTANT

## 2023-02-07 PROCEDURE — 3008F BODY MASS INDEX DOCD: CPT | Mod: HCNC,CPTII,S$GLB, | Performed by: PHYSICIAN ASSISTANT

## 2023-02-07 PROCEDURE — 1159F MED LIST DOCD IN RCRD: CPT | Mod: HCNC,CPTII,S$GLB, | Performed by: PHYSICIAN ASSISTANT

## 2023-02-07 PROCEDURE — 1126F AMNT PAIN NOTED NONE PRSNT: CPT | Mod: HCNC,CPTII,S$GLB, | Performed by: PHYSICIAN ASSISTANT

## 2023-02-07 RX ORDER — AZELASTINE 1 MG/ML
1 SPRAY, METERED NASAL 2 TIMES DAILY
Qty: 30 ML | Refills: 1 | Status: SHIPPED | OUTPATIENT
Start: 2023-02-07 | End: 2024-02-07

## 2023-02-07 RX ORDER — AMLODIPINE BESYLATE 10 MG/1
10 TABLET ORAL DAILY
Qty: 90 TABLET | Refills: 1 | Status: SHIPPED | OUTPATIENT
Start: 2023-02-07 | End: 2023-08-10

## 2023-02-07 NOTE — PROGRESS NOTES
Subjective:       Patient ID: Mirza Morales is a 67 y.o. male.    Chief Complaint: Hypertension    HPI    Established pt of Jud Mo MD (new to me)    Here with concerns of elevated BP.     He has been monitoring his blood pressure more frequent over the past 3 months.  His amlodipine was increased from 2.5 to 5mg about 4 months ago, home readings 140-150/80s. No cp, sob, ha, or edema      BP Readings from Last 3 Encounters:   02/07/23 (!) 152/84   09/22/22 130/80   08/04/22 137/89         Also with chronic allergy issues, Sneezing fits/runny nose, he tried old hydroxyzine but made him too drowsy. He saw an outside allergist. He has tried otc benadryl, zyrtec. He has a cat and a dogs.    Past Medical History:   Diagnosis Date    Alcohol abuse     Allergy     Behavioral problem     Bipolar 1 disorder     Chronic right hip pain     Depression     DJD (degenerative joint disease), lumbar 1/27/2015    Fatigue     Hepatitis     pt. denies this    History of psychiatric care     History of psychiatric hospitalization     Hypertension     Karina     Post traumatic stress disorder     Psychiatric problem     Seizures     Suicide attempt     Therapy     Vitamin D insufficiency 3/17/2022     Social History     Tobacco Use    Smoking status: Every Day     Packs/day: 0.50     Years: 10.00     Pack years: 5.00     Types: Cigarettes    Smokeless tobacco: Never   Substance Use Topics    Alcohol use: Not Currently     Comment: quit 4 years ago    Drug use: Not Currently     Review of patient's allergies indicates:   Allergen Reactions    Gabapentin Other (See Comments)     SEVERE DIZZINESS AND BALANCE PROBLEMS, UNABLE TO WALK.    Nardil [phenelzine]      BECOMES HYPER, LIKE A MANIC EPISODE.    Penicillin     Penicillins Hives    Lamictal [lamotrigine] Rash       Review of Systems   Constitutional:  Negative for chills, fever and unexpected weight change.   HENT:  Positive for rhinorrhea and sneezing.    Respiratory:   "Negative for cough and shortness of breath.    Cardiovascular:  Negative for chest pain and leg swelling.   Gastrointestinal:  Negative for abdominal pain, nausea and vomiting.   Integumentary:  Negative for rash.   Neurological:  Negative for weakness and headaches.       Objective: BP (!) 152/84 (BP Location: Left arm, Patient Position: Sitting, BP Method: Medium (Manual))   Pulse 72   Ht 6' 4" (1.93 m)   Wt 106.9 kg (235 lb 10.8 oz)   SpO2 99%   BMI 28.69 kg/m²         Physical Exam  Vitals reviewed.   Constitutional:       General: He is not in acute distress.     Appearance: He is well-developed. He is not ill-appearing.   HENT:      Head: Normocephalic and atraumatic.      Right Ear: Tympanic membrane, ear canal and external ear normal.      Left Ear: Tympanic membrane, ear canal and external ear normal.   Cardiovascular:      Rate and Rhythm: Normal rate and regular rhythm.      Heart sounds: No murmur heard.  Pulmonary:      Effort: Pulmonary effort is normal.      Breath sounds: Normal breath sounds. No wheezing or rales.   Abdominal:      Palpations: Abdomen is soft.   Musculoskeletal:      Right lower leg: No edema.      Left lower leg: No edema.   Skin:     General: Skin is warm and dry.      Findings: No rash.   Neurological:      Mental Status: He is alert and oriented to person, place, and time.       Assessment:       Problem List Items Addressed This Visit          Neuro    Seizure disorder       Psychiatric    Alcohol dependence in remission    Bipolar 1 disorder       Pulmonary    Chronic obstructive pulmonary disease, unspecified COPD type       Cardiac/Vascular    Aortic ectasia - u/s 4/2022     Other Visit Diagnoses       Hypertension, unspecified type    -  Primary    Relevant Medications    amLODIPine (NORVASC) 10 MG tablet    Allergic rhinitis, unspecified seasonality, unspecified trigger        Relevant Medications    azelastine (ASTELIN) 137 mcg (0.1 %) nasal spray            Plan:    "    Mirza was seen today for hypertension.    Diagnoses and all orders for this visit:    Hypertension, unspecified type  Discussed BP goals  Increase amlodipine 5mg-->10mg  Keep log and PCP f/u next month  -     amLODIPine (NORVASC) 10 MG tablet; Take 1 tablet (10 mg total) by mouth once daily.    Allergic rhinitis, unspecified seasonality, unspecified trigger  -     azelastine (ASTELIN) 137 mcg (0.1 %) nasal spray; 1 spray (137 mcg total) by Nasal route 2 (two) times daily.          Bipolar 1 disorder (stable, followed by Psychiatry)    Alcohol dependence in remission (no longer drinking etoh)    Chronic obstructive pulmonary disease, unspecified COPD type (stable on current inhalers)    Aortic ectasia - (stable, seen on prior u/s 4/2022)    Seizure disorder (stable on current meds, followed by Neurology)    Patient Instructions   Increase amlodipine from 5mg to 10mg     Nasal antihistamine spray for sneezing/allergies/runny nose    Keep up follow up appt with Dr. Mo next month (bring BP log, consider bringing your blood pressure device)      Charisse De Jesus PA-C

## 2023-02-07 NOTE — PATIENT INSTRUCTIONS
Increase amlodipine from 5mg to 10mg     Nasal antihistamine spray for sneezing/allergies/runny nose    Keep up follow up appt with Dr. Mo next month (bring BP log, consider bringing your blood pressure device)

## 2023-02-09 DIAGNOSIS — Z00.00 ENCOUNTER FOR MEDICARE ANNUAL WELLNESS EXAM: ICD-10-CM

## 2023-03-21 PROBLEM — D63.8 ANEMIA OF CHRONIC DISEASE: Status: RESOLVED | Noted: 2018-08-08 | Resolved: 2023-03-21

## 2023-03-21 PROBLEM — I10 BENIGN ESSENTIAL HYPERTENSION: Status: ACTIVE | Noted: 2023-03-21

## 2023-03-21 PROBLEM — E87.1 HYPONATREMIA: Chronic | Status: RESOLVED | Noted: 2018-02-21 | Resolved: 2023-03-21

## 2023-04-03 ENCOUNTER — PATIENT MESSAGE (OUTPATIENT)
Dept: PSYCHIATRY | Facility: CLINIC | Age: 68
End: 2023-04-03
Payer: MEDICARE

## 2023-04-03 DIAGNOSIS — F12.90 CANNABIS USE, UNCOMPLICATED: ICD-10-CM

## 2023-04-04 RX ORDER — TRAZODONE HYDROCHLORIDE 100 MG/1
TABLET ORAL
Qty: 60 TABLET | Refills: 2 | Status: SHIPPED | OUTPATIENT
Start: 2023-04-04 | End: 2023-07-14 | Stop reason: SDUPTHER

## 2023-04-12 ENCOUNTER — OFFICE VISIT (OUTPATIENT)
Dept: PSYCHIATRY | Facility: CLINIC | Age: 68
End: 2023-04-12
Payer: MEDICARE

## 2023-04-12 DIAGNOSIS — F12.90 CANNABIS USE, UNCOMPLICATED: ICD-10-CM

## 2023-04-12 DIAGNOSIS — F31.9 BIPOLAR 1 DISORDER: Primary | ICD-10-CM

## 2023-04-12 PROCEDURE — 99214 PR OFFICE/OUTPT VISIT, EST, LEVL IV, 30-39 MIN: ICD-10-PCS | Mod: HCNC,95,, | Performed by: PHYSICIAN ASSISTANT

## 2023-04-12 PROCEDURE — 1160F PR REVIEW ALL MEDS BY PRESCRIBER/CLIN PHARMACIST DOCUMENTED: ICD-10-PCS | Mod: HCNC,CPTII,95, | Performed by: PHYSICIAN ASSISTANT

## 2023-04-12 PROCEDURE — 1160F RVW MEDS BY RX/DR IN RCRD: CPT | Mod: HCNC,CPTII,95, | Performed by: PHYSICIAN ASSISTANT

## 2023-04-12 PROCEDURE — 99214 OFFICE O/P EST MOD 30 MIN: CPT | Mod: HCNC,95,, | Performed by: PHYSICIAN ASSISTANT

## 2023-04-12 PROCEDURE — 1159F PR MEDICATION LIST DOCUMENTED IN MEDICAL RECORD: ICD-10-PCS | Mod: HCNC,CPTII,95, | Performed by: PHYSICIAN ASSISTANT

## 2023-04-12 PROCEDURE — 1159F MED LIST DOCD IN RCRD: CPT | Mod: HCNC,CPTII,95, | Performed by: PHYSICIAN ASSISTANT

## 2023-04-12 RX ORDER — QUETIAPINE FUMARATE 100 MG/1
TABLET, FILM COATED ORAL
Qty: 90 TABLET | Refills: 1 | Status: SHIPPED | OUTPATIENT
Start: 2023-04-12 | End: 2023-07-14 | Stop reason: SDUPTHER

## 2023-04-12 RX ORDER — CARIPRAZINE 1.5 MG/1
1.5 CAPSULE, GELATIN COATED ORAL EVERY MORNING
Qty: 30 CAPSULE | Refills: 2 | Status: SHIPPED | OUTPATIENT
Start: 2023-04-12 | End: 2023-05-17

## 2023-04-12 NOTE — PROGRESS NOTES
"The patient location is: [place]    Visit type: audiovisual    Each patient to whom he or she provides medical services by telemedicine is:  (1) informed of the relationship between the physician and patient and the respective role of any other health care provider with respect to management of the patient; and (2) notified that he or she may decline to receive medical services by telemedicine and may withdraw from such care at any time.    Notes:     Outpatient Psychiatry Follow-Up Visit (MD/BEATRICE)    4/12/2023    Clinical Status of Patient:  Outpatient (Ambulatory)    Chief Complaint:  Mirza Morales is a 67 y.o. male who presents today for follow-up of mood disorder and substance problems.  Met with patient.      Interval History and Content of Current Session 04/12/2023:    Pt reports today: "mood has been alright. In the mornings when I wake up I feel lowsy, it takes a while for me to come around." Reports lays in bed for a few hours its hard to get up and going, lays in bed feeling in deep depression for a few hours, reports this improves throughout the day.    Discussed with pt starting vraylar for mood and depressive symptoms. Pt agreeable to plan    Patients mood is depressive, affect appears mood congruent. Linear and logical, friendly and cooperative, good eye contact.    Denies SI/HI/AVH. Pt reports sleeping well and normal appetite. Denies side effects of medications.    Pt reports taking medications as prescribed and behaving appropriately during interview today.      Prior visit:    Pt reports today: "mood is ok, I lay down for like 2-3 hrs in the morning. Its hard to get motivated to get up  And do something, I lay in bed and stew about things."    Pt had stopped latuda since last visit. Will try abilify low dose to increase motivation, pt having severe anhedonia.    Pt remains on seroquel 100mg. Discussed risks of combining antipsychotics but discussed that abilify is at low dose. Pt reports " understanding and agreeable to plan    Patients mood is steady, affect appears mood congruent. Linear and logical, friendly and cooperative, good eye contact.    Denies SI/HI/AVH. Pt reports sleeping well and normal appetite. Denies side effects of medications.    Pt reports taking medications as prescribed and behaving appropriately during interview today.    Review of Systems     Review of Systems   Constitutional:  Negative for fever.   HENT:  Negative for sore throat.    Eyes:  Negative for photophobia.   Respiratory:  Negative for cough.    Cardiovascular:  Negative for chest pain and palpitations.   Gastrointestinal:  Negative for abdominal pain.   Genitourinary:  Negative for dysuria.   Musculoskeletal:  Negative for myalgias.   Skin:  Negative for rash.   Neurological:  Negative for dizziness.   Endo/Heme/Allergies:  Does not bruise/bleed easily.   Psychiatric/Behavioral:  Positive for depression. Negative for hallucinations and suicidal ideas. The patient is not nervous/anxious and does not have insomnia.      Psychiatric Review Of Systems - Is patient experiencing or having changes in:  sleep: no  appetite: no  weight: no  energy/anergy: yes  interest/pleasure/anhedonia: yes  somatic symptoms: no  libido: no  anxiety/panic: no  guilty/hopelessness: no  concentration: yes  S.I.B.s/risky behavior: no  Irritability: no  Racing thoughts: no  Impulsive behaviors: no  Paranoia: no  AVH: no      Past Medical, Family and Social History: The patient's past medical, family and social history have been reviewed and updated as appropriate within the electronic medical record - see encounter notes.      Current Medications:   Medication List with Changes/Refills   Current Medications    AMLODIPINE (NORVASC) 10 MG TABLET    Take 1 tablet (10 mg total) by mouth once daily.    ATORVASTATIN (LIPITOR) 40 MG TABLET    TAKE 1 TABLET ONE TIME DAILY    AZELASTINE (ASTELIN) 137 MCG (0.1 %) NASAL SPRAY    1 spray (137 mcg total)  by Nasal route 2 (two) times daily.    CALCIUM POLYCARBOPHIL ORAL    Take by mouth once daily.     CHOLECALCIFEROL, VITAMIN D3, (VITAMIN D3) 25 MCG (1,000 UNIT) CAPSULE    Take 3 capsules (3,000 Units total) by mouth once daily.    DOXEPIN (SINEQUAN) 100 MG CAPSULE    doxepin 100 mg capsule    FLUTICASONE-SALMETEROL DISKUS INHALER 250-50 MCG    Inhale 1 puff into the lungs 2 (two) times daily. Controller    LACOSAMIDE (VIMPAT) 200 MG TAB TABLET    Take 1 tablet (200 mg total) by mouth every 12 (twelve) hours.    PANTOPRAZOLE (PROTONIX) 40 MG TABLET    Take 1 tablet (40 mg total) by mouth once daily.    PROPRANOLOL (INDERAL LA) 60 MG 24 HR CAPSULE    Take 1 capsule (60 mg total) by mouth once daily.    QUETIAPINE (SEROQUEL) 100 MG TAB    Take one tablet at bedtime    TRAZODONE (DESYREL) 100 MG TABLET    TAKE 1-2 TABLETS BY MOUTH AT BEDTIME   Discontinued Medications    ARIPIPRAZOLE (ABILIFY) 2 MG TAB    Take 1 tablet (2 mg total) by mouth once daily.         Allergies:   Review of patient's allergies indicates:   Allergen Reactions    Gabapentin Other (See Comments)     SEVERE DIZZINESS AND BALANCE PROBLEMS, UNABLE TO WALK.    Nardil [phenelzine]      BECOMES HYPER, LIKE A MANIC EPISODE.    Penicillin     Penicillins Hives    Lamictal [lamotrigine] Rash         Vitals   There were no vitals filed for this visit.       Labs/Imaging/Studies:   No results found for this or any previous visit (from the past 48 hour(s)).   Lab Results   Component Value Date    VALPROATE 48.2 (L) 09/29/2019       Compliance: yes    Side effects: None    Risk Parameters:  Patient reports no suicidal ideation  Patient reports no homicidal ideation  Patient reports no self-injurious behavior  Patient reports no violent behavior    Exam (detailed: at least 9 elements; comprehensive: all 15 elements)   Constitutional  Vitals:  Most recent vital signs, dated greater than 90 days prior to this appointment, were reviewed.   There were no vitals  filed for this visit.     General:  unremarkable, age appropriate     Musculoskeletal  Muscle Strength/Tone:  not examined   Gait & Station:  Not examined     Psychiatric  Speech:  no latency; no press   Mood & Affect:  steady  congruent and appropriate   Thought Process:  normal and logical   Associations:  intact   Thought Content:  normal, no suicidality, no homicidality, delusions, or paranoia   Insight:  intact, has awareness of illness   Judgement: behavior is adequate to circumstances   Orientation:  grossly intact   Memory: intact for content of interview   Language: grossly intact   Attention Span & Concentration:  able to focus   Fund of Knowledge:  intact and appropriate to age and level of education     Assessment and Diagnosis   Status/Progress: Based on the examination today, the patient's problem(s) is/are adequately but not ideally controlled.  New problems have not been presented today.   Co-morbidities, Diagnostic uncertainty and Lack of compliance are not complicating management of the primary condition.  There are no active rule-out diagnoses for this patient at this time.     General Impression:      ICD-10-CM ICD-9-CM   1. Bipolar 1 disorder  F31.9 296.7   2. Cannabis use, uncomplicated  F12.90 305.20         Intervention/Counseling/Treatment Plan   Medication Management: Continue current medications. The risks and benefits of medication were discussed with the patient.    -pt stopped abilify    -start vraylar 1.5mg daily    -continue seroquel 100mg qhs    -continue trazodone 100mg, take 1-2 tablets at bedtime as needed for insomnia    Return to Clinic: 1 month        Trav Andrews PA-C        Total face to face time: 23 min  Total time (chart review, patient contact, documentation): 27 min      *This note has been prepared using a combination of a dictation device and typing.  It has been checked for errors but some errors may still exist within the note as a result of speech recognition errors  and/or typographical errors.

## 2023-04-13 ENCOUNTER — OFFICE VISIT (OUTPATIENT)
Dept: ALLERGY | Facility: CLINIC | Age: 68
End: 2023-04-13
Payer: MEDICARE

## 2023-04-13 VITALS
DIASTOLIC BLOOD PRESSURE: 79 MMHG | WEIGHT: 239.19 LBS | SYSTOLIC BLOOD PRESSURE: 130 MMHG | OXYGEN SATURATION: 95 % | HEART RATE: 82 BPM | BODY MASS INDEX: 29.12 KG/M2

## 2023-04-13 DIAGNOSIS — J31.0 CHRONIC RHINITIS: Primary | ICD-10-CM

## 2023-04-13 DIAGNOSIS — G40.909 SEIZURE DISORDER: ICD-10-CM

## 2023-04-13 DIAGNOSIS — I10 BENIGN ESSENTIAL HYPERTENSION: ICD-10-CM

## 2023-04-13 DIAGNOSIS — Z72.0 TOBACCO ABUSE: Chronic | ICD-10-CM

## 2023-04-13 DIAGNOSIS — G25.0 ESSENTIAL TREMOR: ICD-10-CM

## 2023-04-13 DIAGNOSIS — H10.403 CHRONIC CONJUNCTIVITIS OF BOTH EYES, UNSPECIFIED CHRONIC CONJUNCTIVITIS TYPE: ICD-10-CM

## 2023-04-13 DIAGNOSIS — J44.9 CHRONIC OBSTRUCTIVE PULMONARY DISEASE, UNSPECIFIED COPD TYPE: ICD-10-CM

## 2023-04-13 PROCEDURE — 3078F PR MOST RECENT DIASTOLIC BLOOD PRESSURE < 80 MM HG: ICD-10-PCS | Mod: HCNC,CPTII,S$GLB, | Performed by: ALLERGY & IMMUNOLOGY

## 2023-04-13 PROCEDURE — 3008F BODY MASS INDEX DOCD: CPT | Mod: HCNC,CPTII,S$GLB, | Performed by: ALLERGY & IMMUNOLOGY

## 2023-04-13 PROCEDURE — 3075F PR MOST RECENT SYSTOLIC BLOOD PRESS GE 130-139MM HG: ICD-10-PCS | Mod: HCNC,CPTII,S$GLB, | Performed by: ALLERGY & IMMUNOLOGY

## 2023-04-13 PROCEDURE — 99999 PR PBB SHADOW E&M-EST. PATIENT-LVL III: ICD-10-PCS | Mod: PBBFAC,HCNC,, | Performed by: ALLERGY & IMMUNOLOGY

## 2023-04-13 PROCEDURE — 3078F DIAST BP <80 MM HG: CPT | Mod: HCNC,CPTII,S$GLB, | Performed by: ALLERGY & IMMUNOLOGY

## 2023-04-13 PROCEDURE — 3008F PR BODY MASS INDEX (BMI) DOCUMENTED: ICD-10-PCS | Mod: HCNC,CPTII,S$GLB, | Performed by: ALLERGY & IMMUNOLOGY

## 2023-04-13 PROCEDURE — 1159F MED LIST DOCD IN RCRD: CPT | Mod: HCNC,CPTII,S$GLB, | Performed by: ALLERGY & IMMUNOLOGY

## 2023-04-13 PROCEDURE — 1160F RVW MEDS BY RX/DR IN RCRD: CPT | Mod: HCNC,CPTII,S$GLB, | Performed by: ALLERGY & IMMUNOLOGY

## 2023-04-13 PROCEDURE — 3075F SYST BP GE 130 - 139MM HG: CPT | Mod: HCNC,CPTII,S$GLB, | Performed by: ALLERGY & IMMUNOLOGY

## 2023-04-13 PROCEDURE — 1160F PR REVIEW ALL MEDS BY PRESCRIBER/CLIN PHARMACIST DOCUMENTED: ICD-10-PCS | Mod: HCNC,CPTII,S$GLB, | Performed by: ALLERGY & IMMUNOLOGY

## 2023-04-13 PROCEDURE — 99999 PR PBB SHADOW E&M-EST. PATIENT-LVL III: CPT | Mod: PBBFAC,HCNC,, | Performed by: ALLERGY & IMMUNOLOGY

## 2023-04-13 PROCEDURE — 1126F AMNT PAIN NOTED NONE PRSNT: CPT | Mod: HCNC,CPTII,S$GLB, | Performed by: ALLERGY & IMMUNOLOGY

## 2023-04-13 PROCEDURE — 1159F PR MEDICATION LIST DOCUMENTED IN MEDICAL RECORD: ICD-10-PCS | Mod: HCNC,CPTII,S$GLB, | Performed by: ALLERGY & IMMUNOLOGY

## 2023-04-13 PROCEDURE — 99214 OFFICE O/P EST MOD 30 MIN: CPT | Mod: HCNC,S$GLB,, | Performed by: ALLERGY & IMMUNOLOGY

## 2023-04-13 PROCEDURE — 99214 PR OFFICE/OUTPT VISIT, EST, LEVL IV, 30-39 MIN: ICD-10-PCS | Mod: HCNC,S$GLB,, | Performed by: ALLERGY & IMMUNOLOGY

## 2023-04-13 PROCEDURE — 1126F PR PAIN SEVERITY QUANTIFIED, NO PAIN PRESENT: ICD-10-PCS | Mod: HCNC,CPTII,S$GLB, | Performed by: ALLERGY & IMMUNOLOGY

## 2023-04-17 ENCOUNTER — PATIENT MESSAGE (OUTPATIENT)
Dept: PSYCHIATRY | Facility: CLINIC | Age: 68
End: 2023-04-17
Payer: MEDICARE

## 2023-05-17 ENCOUNTER — OFFICE VISIT (OUTPATIENT)
Dept: PSYCHIATRY | Facility: CLINIC | Age: 68
End: 2023-05-17
Payer: MEDICARE

## 2023-05-17 ENCOUNTER — PATIENT MESSAGE (OUTPATIENT)
Dept: PSYCHIATRY | Facility: CLINIC | Age: 68
End: 2023-05-17

## 2023-05-17 DIAGNOSIS — R06.09 DYSPNEA ON EXERTION: ICD-10-CM

## 2023-05-17 DIAGNOSIS — F31.9 BIPOLAR 1 DISORDER: Primary | ICD-10-CM

## 2023-05-17 DIAGNOSIS — E55.9 VITAMIN D INSUFFICIENCY: ICD-10-CM

## 2023-05-17 PROCEDURE — 1159F MED LIST DOCD IN RCRD: CPT | Mod: CPTII,95,, | Performed by: PHYSICIAN ASSISTANT

## 2023-05-17 PROCEDURE — 1160F PR REVIEW ALL MEDS BY PRESCRIBER/CLIN PHARMACIST DOCUMENTED: ICD-10-PCS | Mod: CPTII,95,, | Performed by: PHYSICIAN ASSISTANT

## 2023-05-17 PROCEDURE — 1160F RVW MEDS BY RX/DR IN RCRD: CPT | Mod: CPTII,95,, | Performed by: PHYSICIAN ASSISTANT

## 2023-05-17 PROCEDURE — 1159F PR MEDICATION LIST DOCUMENTED IN MEDICAL RECORD: ICD-10-PCS | Mod: CPTII,95,, | Performed by: PHYSICIAN ASSISTANT

## 2023-05-17 PROCEDURE — 99214 PR OFFICE/OUTPT VISIT, EST, LEVL IV, 30-39 MIN: ICD-10-PCS | Mod: 95,,, | Performed by: PHYSICIAN ASSISTANT

## 2023-05-17 PROCEDURE — 99214 OFFICE O/P EST MOD 30 MIN: CPT | Mod: 95,,, | Performed by: PHYSICIAN ASSISTANT

## 2023-05-17 NOTE — PROGRESS NOTES
The patient location is: [place]    Visit type: audiovisual    Each patient to whom he or she provides medical services by telemedicine is:  (1) informed of the relationship between the physician and patient and the respective role of any other health care provider with respect to management of the patient; and (2) notified that he or she may decline to receive medical services by telemedicine and may withdraw from such care at any time.    Notes:     Outpatient Psychiatry Follow-Up Visit (MD/BEATRICE)    5/17/2023    Clinical Status of Patient:  Outpatient (Ambulatory)    Chief Complaint:  Mirza Morales is a 68 y.o. male who presents today for follow-up of mood disorder and substance problems.  Met with patient.      Interval History and Content of Current Session 05/17/2023:    Pt did not start vraylar. Pt was worried about it possible causing manic episodes. Discussed with pt that it is indicated to treat bipolar d/o and should not increase risk of manic episodes.    Discussed with pt his inability to get out of bed due to severe depression for several hrs as previously discussed in prior visits. Discussed starting vraylar to help with energy and motivation able to get out of bed more quickly by improving depression.    Reports significant fatigue during the daytime, states he is getting quickly fatigued and gets short of breath with exertion. Pt reports he plans on seeing PCP soon. Denies having any chest pains. Discussed with pt that needs to see PCP asap as could be a problem related to his heart or other medical condition.     Instructed pt that if he has worsening fatigue or shortness of breath, weakness, chest pain, episodes of dizziness, LOC, go to ED immediately for further evaluation.    PCP was sent message in epic after visit informing them of pt's symptoms and trying to get f/u soon in clinic.    Patients mood is depressed, affect appears mood congruent. Linear and logical, friendly and  "cooperative, good eye contact.    Denies SI/HI/AVH. Pt reports sleeping well and normal appetite. Denies side effects of medications.    Pt reports taking medications as prescribed and behaving appropriately during interview today.      Prior visit:    Pt reports today: "mood has been alright. In the mornings when I wake up I feel lowsy, it takes a while for me to come around." Reports lays in bed for a few hours its hard to get up and going, lays in bed feeling in deep depression for a few hours, reports this improves throughout the day.    Discussed with pt starting vraylar for mood and depressive symptoms. Pt agreeable to plan    Patients mood is depressive, affect appears mood congruent. Linear and logical, friendly and cooperative, good eye contact.    Denies SI/HI/AVH. Pt reports sleeping well and normal appetite. Denies side effects of medications.    Pt reports taking medications as prescribed and behaving appropriately during interview today.    Review of Systems     Review of Systems   Constitutional:  Positive for malaise/fatigue. Negative for fever.   HENT:  Negative for sore throat.    Eyes:  Negative for photophobia.   Respiratory:  Positive for shortness of breath (on exertion). Negative for cough and wheezing.    Cardiovascular:  Negative for chest pain, palpitations and PND.   Gastrointestinal:  Negative for abdominal pain.   Genitourinary:  Negative for dysuria.   Musculoskeletal:  Negative for myalgias.   Skin:  Negative for rash.   Neurological:  Negative for dizziness.   Endo/Heme/Allergies:  Does not bruise/bleed easily.   Psychiatric/Behavioral:  Positive for depression. Negative for hallucinations, substance abuse and suicidal ideas. The patient is not nervous/anxious and does not have insomnia.      Psychiatric Review Of Systems - Is patient experiencing or having changes in:  sleep: no  appetite: no  weight: no  energy/anergy: yes  interest/pleasure/anhedonia: yes  somatic symptoms: " no  libido: no  anxiety/panic: no  guilty/hopelessness: no  concentration: yes  S.I.B.s/risky behavior: no  Irritability: no  Racing thoughts: no  Impulsive behaviors: no  Paranoia: no  AVH: no      Past Medical, Family and Social History: The patient's past medical, family and social history have been reviewed and updated as appropriate within the electronic medical record - see encounter notes.      Current Medications:   Medication List with Changes/Refills   Current Medications    AMLODIPINE (NORVASC) 10 MG TABLET    Take 1 tablet (10 mg total) by mouth once daily.    ATORVASTATIN (LIPITOR) 40 MG TABLET    TAKE 1 TABLET ONE TIME DAILY    AZELASTINE (ASTELIN) 137 MCG (0.1 %) NASAL SPRAY    1 spray (137 mcg total) by Nasal route 2 (two) times daily.    CALCIUM POLYCARBOPHIL ORAL    Take by mouth once daily.     CHOLECALCIFEROL, VITAMIN D3, (VITAMIN D3) 25 MCG (1,000 UNIT) CAPSULE    Take 3 capsules (3,000 Units total) by mouth once daily.    LACOSAMIDE (VIMPAT) 200 MG TAB TABLET    Take 1 tablet (200 mg total) by mouth every 12 (twelve) hours.    PANTOPRAZOLE (PROTONIX) 40 MG TABLET    Take 1 tablet (40 mg total) by mouth once daily.    PROPRANOLOL (INDERAL LA) 60 MG 24 HR CAPSULE    Take 1 capsule (60 mg total) by mouth once daily.    QUETIAPINE (SEROQUEL) 100 MG TAB    Take one tablet at bedtime    TRAZODONE (DESYREL) 100 MG TABLET    TAKE 1-2 TABLETS BY MOUTH AT BEDTIME   Discontinued Medications    CARIPRAZINE (VRAYLAR) 1.5 MG CAP    Take 1 capsule (1.5 mg total) by mouth every morning.         Allergies:   Review of patient's allergies indicates:   Allergen Reactions    Gabapentin Other (See Comments)     SEVERE DIZZINESS AND BALANCE PROBLEMS, UNABLE TO WALK.    Nardil [phenelzine]      BECOMES HYPER, LIKE A MANIC EPISODE.    Penicillin     Penicillins Hives    Lamictal [lamotrigine] Rash         Vitals   There were no vitals filed for this visit.       Labs/Imaging/Studies:   No results found for this or any  previous visit (from the past 48 hour(s)).   Lab Results   Component Value Date    VALPROATE 48.2 (L) 09/29/2019       Compliance: yes    Side effects: None    Risk Parameters:  Patient reports no suicidal ideation  Patient reports no homicidal ideation  Patient reports no self-injurious behavior  Patient reports no violent behavior    Exam (detailed: at least 9 elements; comprehensive: all 15 elements)   Constitutional  Vitals:  Most recent vital signs, dated greater than 90 days prior to this appointment, were reviewed.   There were no vitals filed for this visit.     General:  unremarkable, age appropriate     Musculoskeletal  Muscle Strength/Tone:  not examined   Gait & Station:  Not examined     Psychiatric  Speech:  no latency; no press   Mood & Affect:  depressive  congruent and appropriate   Thought Process:  normal and logical   Associations:  intact   Thought Content:  normal, no suicidality, no homicidality, delusions, or paranoia   Insight:  intact, has awareness of illness   Judgement: behavior is adequate to circumstances   Orientation:  grossly intact   Memory: intact for content of interview   Language: grossly intact   Attention Span & Concentration:  able to focus   Fund of Knowledge:  intact and appropriate to age and level of education     Assessment and Diagnosis   Status/Progress: Based on the examination today, the patient's problem(s) is/are inadequately controlled.  New problems have not been presented today.   Co-morbidities may be complicating management of the primary condition.  There are no active rule-out diagnoses for this patient at this time.     General Impression:      ICD-10-CM ICD-9-CM   1. Bipolar 1 disorder  F31.9 296.7   2. Vitamin D insufficiency  E55.9 268.9   3. Dyspnea on exertion  R06.09 786.09           Intervention/Counseling/Treatment Plan   Medication Management: Continue current medications. The risks and benefits of medication were discussed with the  patient.    -see PCP asap as discussed for new fatigue and sob on exertion.  -Instructed pt that if he has worsening fatigue or shortness of breath, weakness, chest pain, episodes of dizziness, LOC, go to ED immediately for further evaluation.    -start vraylar 1.5mg daily    -continue seroquel 100mg qhs    -continue trazodone 100mg, take 1-2 tablets at bedtime as needed for insomnia    Return to Clinic: 1 month        Trav Andrews PA-C        Total face to face time: 32 min  Total time (chart review, patient contact, documentation): 45 min      *This note has been prepared using a combination of a dictation device and typing.  It has been checked for errors but some errors may still exist within the note as a result of speech recognition errors and/or typographical errors.

## 2023-05-23 ENCOUNTER — PATIENT MESSAGE (OUTPATIENT)
Dept: INTERNAL MEDICINE | Facility: CLINIC | Age: 68
End: 2023-05-23

## 2023-05-23 ENCOUNTER — LAB VISIT (OUTPATIENT)
Dept: LAB | Facility: HOSPITAL | Age: 68
End: 2023-05-23
Attending: INTERNAL MEDICINE
Payer: MEDICARE

## 2023-05-23 ENCOUNTER — IMMUNIZATION (OUTPATIENT)
Dept: PHARMACY | Facility: CLINIC | Age: 68
End: 2023-05-23
Payer: MEDICARE

## 2023-05-23 ENCOUNTER — OFFICE VISIT (OUTPATIENT)
Dept: INTERNAL MEDICINE | Facility: CLINIC | Age: 68
End: 2023-05-23
Payer: MEDICARE

## 2023-05-23 VITALS
HEART RATE: 96 BPM | BODY MASS INDEX: 28.97 KG/M2 | RESPIRATION RATE: 16 BRPM | WEIGHT: 237.88 LBS | HEIGHT: 76 IN | DIASTOLIC BLOOD PRESSURE: 88 MMHG | SYSTOLIC BLOOD PRESSURE: 134 MMHG | OXYGEN SATURATION: 95 %

## 2023-05-23 DIAGNOSIS — F31.9 BIPOLAR 1 DISORDER: ICD-10-CM

## 2023-05-23 DIAGNOSIS — R79.9 ABNORMAL BLOOD CHEMISTRY: ICD-10-CM

## 2023-05-23 DIAGNOSIS — E55.9 VITAMIN D INSUFFICIENCY: ICD-10-CM

## 2023-05-23 DIAGNOSIS — R53.83 FATIGUE, UNSPECIFIED TYPE: ICD-10-CM

## 2023-05-23 DIAGNOSIS — E78.2 MIXED HYPERLIPIDEMIA: ICD-10-CM

## 2023-05-23 DIAGNOSIS — Z72.0 TOBACCO ABUSE: Chronic | ICD-10-CM

## 2023-05-23 DIAGNOSIS — I10 BENIGN ESSENTIAL HYPERTENSION: ICD-10-CM

## 2023-05-23 DIAGNOSIS — R53.83 FATIGUE, UNSPECIFIED TYPE: Primary | ICD-10-CM

## 2023-05-23 DIAGNOSIS — R06.09 DOE (DYSPNEA ON EXERTION): ICD-10-CM

## 2023-05-23 DIAGNOSIS — F32.2 SEVERE DEPRESSION: ICD-10-CM

## 2023-05-23 DIAGNOSIS — Z01.00 ROUTINE EYE EXAM: ICD-10-CM

## 2023-05-23 DIAGNOSIS — Z12.5 SCREENING PSA (PROSTATE SPECIFIC ANTIGEN): ICD-10-CM

## 2023-05-23 DIAGNOSIS — Z23 NEED FOR VACCINATION: Primary | ICD-10-CM

## 2023-05-23 DIAGNOSIS — Z12.83 SKIN CANCER SCREENING: ICD-10-CM

## 2023-05-23 LAB
25(OH)D3+25(OH)D2 SERPL-MCNC: 40 NG/ML (ref 30–96)
ALBUMIN SERPL BCP-MCNC: 4.3 G/DL (ref 3.5–5.2)
ALP SERPL-CCNC: 77 U/L (ref 55–135)
ALT SERPL W/O P-5'-P-CCNC: 22 U/L (ref 10–44)
ANION GAP SERPL CALC-SCNC: 8 MMOL/L (ref 8–16)
AST SERPL-CCNC: 20 U/L (ref 10–40)
BASOPHILS # BLD AUTO: 0.04 K/UL (ref 0–0.2)
BASOPHILS NFR BLD: 0.6 % (ref 0–1.9)
BILIRUB SERPL-MCNC: 0.6 MG/DL (ref 0.1–1)
BUN SERPL-MCNC: 9 MG/DL (ref 8–23)
CALCIUM SERPL-MCNC: 10 MG/DL (ref 8.7–10.5)
CHLORIDE SERPL-SCNC: 96 MMOL/L (ref 95–110)
CHOLEST SERPL-MCNC: 145 MG/DL (ref 120–199)
CHOLEST/HDLC SERPL: 2.7 {RATIO} (ref 2–5)
CO2 SERPL-SCNC: 30 MMOL/L (ref 23–29)
COMPLEXED PSA SERPL-MCNC: 1.9 NG/ML (ref 0–4)
CREAT SERPL-MCNC: 1 MG/DL (ref 0.5–1.4)
DIFFERENTIAL METHOD: ABNORMAL
EOSINOPHIL # BLD AUTO: 0.2 K/UL (ref 0–0.5)
EOSINOPHIL NFR BLD: 2.3 % (ref 0–8)
ERYTHROCYTE [DISTWIDTH] IN BLOOD BY AUTOMATED COUNT: 11.9 % (ref 11.5–14.5)
EST. GFR  (NO RACE VARIABLE): >60 ML/MIN/1.73 M^2
ESTIMATED AVG GLUCOSE: 91 MG/DL (ref 68–131)
GLUCOSE SERPL-MCNC: 113 MG/DL (ref 70–110)
HBA1C MFR BLD: 4.8 % (ref 4–5.6)
HCT VFR BLD AUTO: 46.2 % (ref 40–54)
HDLC SERPL-MCNC: 53 MG/DL (ref 40–75)
HDLC SERPL: 36.6 % (ref 20–50)
HGB BLD-MCNC: 15.5 G/DL (ref 14–18)
IMM GRANULOCYTES # BLD AUTO: 0.04 K/UL (ref 0–0.04)
IMM GRANULOCYTES NFR BLD AUTO: 0.6 % (ref 0–0.5)
LDLC SERPL CALC-MCNC: 75.6 MG/DL (ref 63–159)
LYMPHOCYTES # BLD AUTO: 1.3 K/UL (ref 1–4.8)
LYMPHOCYTES NFR BLD: 19.6 % (ref 18–48)
MCH RBC QN AUTO: 34.1 PG (ref 27–31)
MCHC RBC AUTO-ENTMCNC: 33.5 G/DL (ref 32–36)
MCV RBC AUTO: 102 FL (ref 82–98)
MONOCYTES # BLD AUTO: 0.6 K/UL (ref 0.3–1)
MONOCYTES NFR BLD: 9.8 % (ref 4–15)
NEUTROPHILS # BLD AUTO: 4.3 K/UL (ref 1.8–7.7)
NEUTROPHILS NFR BLD: 67.1 % (ref 38–73)
NONHDLC SERPL-MCNC: 92 MG/DL
NRBC BLD-RTO: 0 /100 WBC
PLATELET # BLD AUTO: 216 K/UL (ref 150–450)
PMV BLD AUTO: 10 FL (ref 9.2–12.9)
POTASSIUM SERPL-SCNC: 4.1 MMOL/L (ref 3.5–5.1)
PROT SERPL-MCNC: 7.9 G/DL (ref 6–8.4)
RBC # BLD AUTO: 4.55 M/UL (ref 4.6–6.2)
SODIUM SERPL-SCNC: 134 MMOL/L (ref 136–145)
TRIGL SERPL-MCNC: 82 MG/DL (ref 30–150)
TSH SERPL DL<=0.005 MIU/L-ACNC: 1.38 UIU/ML (ref 0.4–4)
WBC # BLD AUTO: 6.44 K/UL (ref 3.9–12.7)

## 2023-05-23 PROCEDURE — 84153 ASSAY OF PSA TOTAL: CPT | Performed by: INTERNAL MEDICINE

## 2023-05-23 PROCEDURE — 3008F PR BODY MASS INDEX (BMI) DOCUMENTED: ICD-10-PCS | Mod: CPTII,S$GLB,, | Performed by: INTERNAL MEDICINE

## 2023-05-23 PROCEDURE — 1159F MED LIST DOCD IN RCRD: CPT | Mod: CPTII,S$GLB,, | Performed by: INTERNAL MEDICINE

## 2023-05-23 PROCEDURE — 1160F PR REVIEW ALL MEDS BY PRESCRIBER/CLIN PHARMACIST DOCUMENTED: ICD-10-PCS | Mod: CPTII,S$GLB,, | Performed by: INTERNAL MEDICINE

## 2023-05-23 PROCEDURE — 1101F PT FALLS ASSESS-DOCD LE1/YR: CPT | Mod: CPTII,S$GLB,, | Performed by: INTERNAL MEDICINE

## 2023-05-23 PROCEDURE — 1159F PR MEDICATION LIST DOCUMENTED IN MEDICAL RECORD: ICD-10-PCS | Mod: CPTII,S$GLB,, | Performed by: INTERNAL MEDICINE

## 2023-05-23 PROCEDURE — 1160F RVW MEDS BY RX/DR IN RCRD: CPT | Mod: CPTII,S$GLB,, | Performed by: INTERNAL MEDICINE

## 2023-05-23 PROCEDURE — 99214 OFFICE O/P EST MOD 30 MIN: CPT | Mod: S$GLB,,, | Performed by: INTERNAL MEDICINE

## 2023-05-23 PROCEDURE — 3008F BODY MASS INDEX DOCD: CPT | Mod: CPTII,S$GLB,, | Performed by: INTERNAL MEDICINE

## 2023-05-23 PROCEDURE — 80053 COMPREHEN METABOLIC PANEL: CPT | Performed by: INTERNAL MEDICINE

## 2023-05-23 PROCEDURE — 99999 PR PBB SHADOW E&M-EST. PATIENT-LVL V: CPT | Mod: PBBFAC,,, | Performed by: INTERNAL MEDICINE

## 2023-05-23 PROCEDURE — 3075F PR MOST RECENT SYSTOLIC BLOOD PRESS GE 130-139MM HG: ICD-10-PCS | Mod: CPTII,S$GLB,, | Performed by: INTERNAL MEDICINE

## 2023-05-23 PROCEDURE — 1126F AMNT PAIN NOTED NONE PRSNT: CPT | Mod: CPTII,S$GLB,, | Performed by: INTERNAL MEDICINE

## 2023-05-23 PROCEDURE — 36415 COLL VENOUS BLD VENIPUNCTURE: CPT | Performed by: INTERNAL MEDICINE

## 2023-05-23 PROCEDURE — 3288F FALL RISK ASSESSMENT DOCD: CPT | Mod: CPTII,S$GLB,, | Performed by: INTERNAL MEDICINE

## 2023-05-23 PROCEDURE — 3288F PR FALLS RISK ASSESSMENT DOCUMENTED: ICD-10-PCS | Mod: CPTII,S$GLB,, | Performed by: INTERNAL MEDICINE

## 2023-05-23 PROCEDURE — 84443 ASSAY THYROID STIM HORMONE: CPT | Performed by: INTERNAL MEDICINE

## 2023-05-23 PROCEDURE — 3079F PR MOST RECENT DIASTOLIC BLOOD PRESSURE 80-89 MM HG: ICD-10-PCS | Mod: CPTII,S$GLB,, | Performed by: INTERNAL MEDICINE

## 2023-05-23 PROCEDURE — 85025 COMPLETE CBC W/AUTO DIFF WBC: CPT | Performed by: INTERNAL MEDICINE

## 2023-05-23 PROCEDURE — 80061 LIPID PANEL: CPT | Performed by: INTERNAL MEDICINE

## 2023-05-23 PROCEDURE — 83036 HEMOGLOBIN GLYCOSYLATED A1C: CPT | Performed by: INTERNAL MEDICINE

## 2023-05-23 PROCEDURE — 3075F SYST BP GE 130 - 139MM HG: CPT | Mod: CPTII,S$GLB,, | Performed by: INTERNAL MEDICINE

## 2023-05-23 PROCEDURE — 1126F PR PAIN SEVERITY QUANTIFIED, NO PAIN PRESENT: ICD-10-PCS | Mod: CPTII,S$GLB,, | Performed by: INTERNAL MEDICINE

## 2023-05-23 PROCEDURE — 3079F DIAST BP 80-89 MM HG: CPT | Mod: CPTII,S$GLB,, | Performed by: INTERNAL MEDICINE

## 2023-05-23 PROCEDURE — 1101F PR PT FALLS ASSESS DOC 0-1 FALLS W/OUT INJ PAST YR: ICD-10-PCS | Mod: CPTII,S$GLB,, | Performed by: INTERNAL MEDICINE

## 2023-05-23 PROCEDURE — 99999 PR PBB SHADOW E&M-EST. PATIENT-LVL V: ICD-10-PCS | Mod: PBBFAC,,, | Performed by: INTERNAL MEDICINE

## 2023-05-23 PROCEDURE — 82306 VITAMIN D 25 HYDROXY: CPT | Performed by: INTERNAL MEDICINE

## 2023-05-23 PROCEDURE — 99214 PR OFFICE/OUTPT VISIT, EST, LEVL IV, 30-39 MIN: ICD-10-PCS | Mod: S$GLB,,, | Performed by: INTERNAL MEDICINE

## 2023-05-23 NOTE — PROGRESS NOTES
INTERNAL MEDICINE ESTABLISHED PATIENT VISIT NOTE    Subjective:     Chief Complaint: Fatigue       Patient ID: Mirza Morales is a 68 y.o. male with HTN, HLD, sz d/o, bipolar d/o c anxiety, hx EtOH dependence c hx withdrawal in the past, hx opiate overdose, lumbar radiculopathy c hx lumbar fusion around 4/2018 followed by Dr. Kannan Orona who is managing his pain), COPD c tob use, hx R fib fx, hx R clavicular fx 2/2 fall, L inguinal hernia (seen by Gen Surg/Dr. Chery but elected not to have surgery), vit D def, last seen by me 7/2020 for telemed visit, here today for f/u HTN, fatigue, LOVE.    Has been seen by Eduarda for interim visits.  Has been on Amlodipine and Propranolol for HTN.  Amlodipine was increased around Dec and then again in Feb at his f/u c Charisse De Jesus since BP was still elevated.    Still seeing psych for bipolar/depression.  Was started on Vraylar last week.  At that visit, c/o increased fatigue and LOVE so was referred back to us for eval. Still smokes 5-10 cig/day.    Denies cp or palpitations.  Denies LE edema.  No orthopnea or PND.  No cough or wheezing.    Feels like his sleep is good on his current psych regimen.  Denies issues c snoring.    Of note, PFTs done a year ago c mild restriction, unimproved c bronchodilator.     Had URI about a year or so ago and thinks he has been tired since that time.  States COVID testing was neg at that time.  Wife was also sick c similar sx at that time.    Sees Neuro for seizures, last sz was 12/2021 and overall controlled on current regimen.    Denies recent alcohol or illicit drug use.    Past Medical History:  Past Medical History:   Diagnosis Date    Alcohol abuse     Allergy     Behavioral problem     Bipolar 1 disorder     Chronic right hip pain     Depression     DJD (degenerative joint disease), lumbar 1/27/2015    Fatigue     Hepatitis     pt. denies this    History of psychiatric care     History of psychiatric hospitalization      Hypertension     Karina     Post traumatic stress disorder     Psychiatric problem     Seizures     Suicide attempt     Therapy     Vitamin D insufficiency 3/17/2022       Home Medications:  Prior to Admission medications    Medication Sig Start Date End Date Taking? Authorizing Provider   amLODIPine (NORVASC) 10 MG tablet Take 1 tablet (10 mg total) by mouth once daily. 2/7/23   Charisse De Jesus PA-C   atorvastatin (LIPITOR) 40 MG tablet TAKE 1 TABLET ONE TIME DAILY 3/20/23   Jud Mo MD   azelastine (ASTELIN) 137 mcg (0.1 %) nasal spray 1 spray (137 mcg total) by Nasal route 2 (two) times daily. 2/7/23 2/7/24  Charisse De Jesus PA-C   CALCIUM POLYCARBOPHIL ORAL Take by mouth once daily.     Historical Provider   cholecalciferol, vitamin D3, (VITAMIN D3) 25 mcg (1,000 unit) capsule Take 3 capsules (3,000 Units total) by mouth once daily. 3/17/22   DAVID Dexter   lacosamide (VIMPAT) 200 mg Tab tablet Take 1 tablet (200 mg total) by mouth every 12 (twelve) hours. 1/13/23   Samuel Julien MD   pantoprazole (PROTONIX) 40 MG tablet Take 1 tablet (40 mg total) by mouth once daily.  Patient taking differently: Take 40 mg by mouth daily as needed (heartburn). 1/15/20 1/14/21  Jacinta Young MD   propranoloL (INDERAL LA) 60 MG 24 hr capsule Take 1 capsule (60 mg total) by mouth once daily. 3/21/23   Samuel Julien MD   QUEtiapine (SEROQUEL) 100 MG Tab Take one tablet at bedtime 4/12/23   DAVID Dexter   traZODone (DESYREL) 100 MG tablet TAKE 1-2 TABLETS BY MOUTH AT BEDTIME 4/4/23   DAVID Dexter       Allergies:  Review of patient's allergies indicates:   Allergen Reactions    Gabapentin Other (See Comments)     SEVERE DIZZINESS AND BALANCE PROBLEMS, UNABLE TO WALK.    Nardil [phenelzine]      BECOMES HYPER, LIKE A MANIC EPISODE.    Penicillin     Penicillins Hives    Lamictal [lamotrigine] Rash       Social History:  Social History     Tobacco Use    Smoking status: Every Day     Packs/day: 0.50     Years:  "10.00     Pack years: 5.00     Types: Cigarettes    Smokeless tobacco: Never   Substance Use Topics    Alcohol use: Not Currently     Comment: quit 4 years ago    Drug use: Not Currently        Review of Systems   Constitutional:  Positive for fatigue. Negative for appetite change, chills, fever and unexpected weight change.   HENT:  Negative for congestion, hearing loss and rhinorrhea.    Eyes:  Negative for pain and visual disturbance.   Respiratory:  Positive for shortness of breath. Negative for cough, chest tightness and wheezing.    Cardiovascular:  Negative for chest pain, palpitations and leg swelling.   Gastrointestinal:  Negative for abdominal distention and abdominal pain.   Endocrine: Negative for polydipsia and polyuria.   Genitourinary:  Negative for decreased urine volume, dysuria, frequency and penile discharge.   Musculoskeletal:  Positive for arthralgias and back pain (chronic per pt).   Neurological:  Negative for dizziness, weakness, numbness and headaches.   Psychiatric/Behavioral:  Negative for behavioral problems and confusion.        Health Maintenance:     Flu 9/2022, rec repeat this Fall  Tdap 8/2018  Prevnar 5/2018, pneumovax 8/2018  Shingrix 1/2020, 8/2020  COVID 3/2021, 4/2021, 12/2021, rec booster today.    Colonoscopy 8/2019 wnl, no polyps, can repeat in 10 year.  PSA 3/2022 wnl, will schedule repeat.  AAA screen 4/2022 no AAA    Objective:   /88 (BP Location: Left arm, Patient Position: Sitting, BP Method: Large (Manual))   Pulse 96   Resp 16   Ht 6' 4" (1.93 m)   Wt 107.9 kg (237 lb 14 oz)   SpO2 95%   BMI 28.96 kg/m²        General: AAO x3, no apparent distress  HEENT: PERRL, OP clear  CV: RRR, no m/r/g  Pulm: Lungs CTAB, no crackles, no wheezes  Abd: s/NT/ND +BS  Extremities: no c/c/e    Labs:     Lab Results   Component Value Date    WBC 5.67 03/17/2022    HGB 15.2 03/17/2022    HCT 45.3 03/17/2022     (H) 03/17/2022     03/17/2022     Lab Results "   Component Value Date    [id number] 03/30/2022       Sodium   Date Value Ref Range Status   03/17/2022 136 136 - 145 mmol/L Final     Potassium   Date Value Ref Range Status   03/17/2022 5.0 3.5 - 5.1 mmol/L Final     Chloride   Date Value Ref Range Status   03/17/2022 97 95 - 110 mmol/L Final     CO2   Date Value Ref Range Status   03/17/2022 30 (H) 23 - 29 mmol/L Final     Glucose   Date Value Ref Range Status   03/17/2022 124 (H) 70 - 110 mg/dL Final     BUN   Date Value Ref Range Status   03/17/2022 11 8 - 23 mg/dL Final     Creatinine   Date Value Ref Range Status   03/17/2022 0.9 0.5 - 1.4 mg/dL Final     Calcium   Date Value Ref Range Status   03/17/2022 10.4 8.7 - 10.5 mg/dL Final     Total Protein   Date Value Ref Range Status   03/17/2022 8.1 6.0 - 8.4 g/dL Final     Albumin   Date Value Ref Range Status   03/17/2022 4.3 3.5 - 5.2 g/dL Final     Total Bilirubin   Date Value Ref Range Status   03/17/2022 0.7 0.1 - 1.0 mg/dL Final     Comment:     For infants and newborns, interpretation of results should be based  on gestational age, weight and in agreement with clinical  observations.    Premature Infant recommended reference ranges:  Up to 24 hours.............<8.0 mg/dL  Up to 48 hours............<12.0 mg/dL  3-5 days..................<15.0 mg/dL  6-29 days.................<15.0 mg/dL       Alkaline Phosphatase   Date Value Ref Range Status   03/17/2022 65 55 - 135 U/L Final     AST   Date Value Ref Range Status   03/17/2022 23 10 - 40 U/L Final     ALT   Date Value Ref Range Status   03/17/2022 26 10 - 44 U/L Final     Anion Gap   Date Value Ref Range Status   03/17/2022 9 8 - 16 mmol/L Final     eGFR if    Date Value Ref Range Status   03/17/2022 >60.0 >60 mL/min/1.73 m^2 Final     eGFR if non    Date Value Ref Range Status   03/17/2022 >60.0 >60 mL/min/1.73 m^2 Final     Comment:     Calculation used to obtain the estimated glomerular filtration  rate (eGFR) is the  CKD-EPI equation.        Lab Results   Component Value Date    HGBA1C 5.0 03/30/2022     Lab Results   Component Value Date    LDLCALC 67.2 03/17/2022     Lab Results   Component Value Date    TSH 1.538 03/17/2022         Assessment/Plan     Mirza was seen today for fatigue.    Diagnoses and all orders for this visit:    Fatigue, unspecified type  LOVE (dyspnea on exertion)  Will send for updated labs, CT chest and stress echo given his smoking hx and RF  PFTs a year ago s obstruction  Long covid also a possibility if he potentially had a false negative test last year.  Discussed conservative mgmt if that is the case.  -     CBC Auto Differential; Future  -     Comprehensive Metabolic Panel; Future  -     Hemoglobin A1C; Future  -     TSH; Future  -     Vitamin D; Future  -     Stress Echo Which stress agent will be used? Treadmill Exercise; Color Flow Doppler? No; Future  -     CT Chest Without Contrast; Future    Benign essential hypertension  Well controlled, cont current regimen.    Mixed hyperlipidemia  Lab Results   Component Value Date    LDLCALC 67.2 03/17/2022     at goal.  Cont current medications.  -     Lipid Panel; Future    Bipolar 1 disorder  Severe depression  As per HPI  Med mgmt as per psych, cont routine f/u    -     TSH; Future    Tobacco abuse  Patient counseled on the need to stop smoking.  Risks and benefits were discussed with patient.    Vitamin D insufficiency  On otc daily Vit D, cont meds  -     Vitamin D; Future    Screening PSA (prostate specific antigen)  -     PSA, Screening; Future    Abnormal blood chemistry  -     Hemoglobin A1C; Future    Skin cancer screening  -     Ambulatory referral/consult to Dermatology; Future    Routine eye exam  -     Ambulatory referral/consult to Optometry; Future        HM as above  RTC in 2 mos for f/u fatigue and LOVE, sooner if needed.    Jud Mo MD  Department of Internal Medicine - [facility]  05/23/2023

## 2023-05-24 ENCOUNTER — OFFICE VISIT (OUTPATIENT)
Dept: OPTOMETRY | Facility: CLINIC | Age: 68
End: 2023-05-24
Payer: MEDICARE

## 2023-05-24 ENCOUNTER — PATIENT MESSAGE (OUTPATIENT)
Dept: PSYCHIATRY | Facility: CLINIC | Age: 68
End: 2023-05-24
Payer: MEDICARE

## 2023-05-24 DIAGNOSIS — H25.13 NUCLEAR SCLEROSIS, BILATERAL: Primary | ICD-10-CM

## 2023-05-24 DIAGNOSIS — Z13.5 SCREENING FOR GLAUCOMA: ICD-10-CM

## 2023-05-24 PROCEDURE — 3044F HG A1C LEVEL LT 7.0%: CPT | Mod: CPTII,S$GLB,, | Performed by: OPTOMETRIST

## 2023-05-24 PROCEDURE — 92004 COMPRE OPH EXAM NEW PT 1/>: CPT | Mod: S$GLB,,, | Performed by: OPTOMETRIST

## 2023-05-24 PROCEDURE — 99999 PR PBB SHADOW E&M-EST. PATIENT-LVL III: ICD-10-PCS | Mod: PBBFAC,,, | Performed by: OPTOMETRIST

## 2023-05-24 PROCEDURE — 92004 PR EYE EXAM, NEW PATIENT,COMPREHESV: ICD-10-PCS | Mod: S$GLB,,, | Performed by: OPTOMETRIST

## 2023-05-24 PROCEDURE — 1126F AMNT PAIN NOTED NONE PRSNT: CPT | Mod: CPTII,S$GLB,, | Performed by: OPTOMETRIST

## 2023-05-24 PROCEDURE — 1159F PR MEDICATION LIST DOCUMENTED IN MEDICAL RECORD: ICD-10-PCS | Mod: CPTII,S$GLB,, | Performed by: OPTOMETRIST

## 2023-05-24 PROCEDURE — 1126F PR PAIN SEVERITY QUANTIFIED, NO PAIN PRESENT: ICD-10-PCS | Mod: CPTII,S$GLB,, | Performed by: OPTOMETRIST

## 2023-05-24 PROCEDURE — 99999 PR PBB SHADOW E&M-EST. PATIENT-LVL III: CPT | Mod: PBBFAC,,, | Performed by: OPTOMETRIST

## 2023-05-24 PROCEDURE — 3044F PR MOST RECENT HEMOGLOBIN A1C LEVEL <7.0%: ICD-10-PCS | Mod: CPTII,S$GLB,, | Performed by: OPTOMETRIST

## 2023-05-24 PROCEDURE — 1159F MED LIST DOCD IN RCRD: CPT | Mod: CPTII,S$GLB,, | Performed by: OPTOMETRIST

## 2023-05-24 NOTE — PROGRESS NOTES
HPI    [facility] OU blurry for reading for 4 years gradually.  Denies itch, tear, burn. No gtts  Denies Flashes, Floaters  Hx of Cataracts OU  Last edited by Hilario Christie, OD on 5/24/2023  2:05 PM.            Assessment /Plan     For exam results, see Encounter Report.    Nuclear sclerosis, bilateral  -Educated patient on presence of cataracts at today's exam, monitor at annual dilated fundus exam. 5+ years surgical estimate.    Screening for glaucoma  -     Ambulatory referral/consult to Optometry  -Monitor with annual eye exam and IOP check      RTC 1 yr

## 2023-05-28 ENCOUNTER — HOSPITAL ENCOUNTER (OUTPATIENT)
Dept: RADIOLOGY | Facility: HOSPITAL | Age: 68
Discharge: HOME | End: 2023-05-28
Attending: INTERNAL MEDICINE
Payer: MEDICARE

## 2023-05-28 DIAGNOSIS — R06.09 DOE (DYSPNEA ON EXERTION): ICD-10-CM

## 2023-05-28 PROCEDURE — 71250 CT THORAX DX C-: CPT | Mod: 26,,, | Performed by: INTERNAL MEDICINE

## 2023-05-28 PROCEDURE — 71250 CT THORAX DX C-: CPT | Mod: TC

## 2023-05-28 PROCEDURE — 71250 CT CHEST WITHOUT CONTRAST: ICD-10-PCS | Mod: 26,,, | Performed by: INTERNAL MEDICINE

## 2023-05-31 ENCOUNTER — PATIENT MESSAGE (OUTPATIENT)
Dept: INTERNAL MEDICINE | Facility: CLINIC | Age: 68
End: 2023-05-31
Payer: MEDICARE

## 2023-05-31 DIAGNOSIS — J84.9 INTERSTITIAL LUNG DISEASE: Primary | ICD-10-CM

## 2023-05-31 DIAGNOSIS — R93.89 ABNORMAL CT OF THE CHEST: ICD-10-CM

## 2023-06-08 ENCOUNTER — PATIENT MESSAGE (OUTPATIENT)
Dept: INTERNAL MEDICINE | Facility: CLINIC | Age: 68
End: 2023-06-08
Payer: MEDICARE

## 2023-06-08 DIAGNOSIS — N52.9 ERECTILE DYSFUNCTION, UNSPECIFIED ERECTILE DYSFUNCTION TYPE: Primary | ICD-10-CM

## 2023-06-08 RX ORDER — SILDENAFIL 25 MG/1
25 TABLET, FILM COATED ORAL DAILY PRN
Qty: 30 TABLET | Refills: 3 | Status: SHIPPED | OUTPATIENT
Start: 2023-06-08 | End: 2024-06-07

## 2023-06-09 ENCOUNTER — PATIENT MESSAGE (OUTPATIENT)
Dept: DERMATOLOGY | Facility: CLINIC | Age: 68
End: 2023-06-09

## 2023-06-12 ENCOUNTER — PATIENT MESSAGE (OUTPATIENT)
Dept: DERMATOLOGY | Facility: CLINIC | Age: 68
End: 2023-06-12
Payer: MEDICARE

## 2023-06-14 ENCOUNTER — OFFICE VISIT (OUTPATIENT)
Dept: PSYCHIATRY | Facility: CLINIC | Age: 68
End: 2023-06-14
Payer: MEDICARE

## 2023-06-14 DIAGNOSIS — F31.9 BIPOLAR 1 DISORDER: Primary | ICD-10-CM

## 2023-06-14 DIAGNOSIS — G47.00 INSOMNIA, UNSPECIFIED TYPE: ICD-10-CM

## 2023-06-14 PROCEDURE — 99214 PR OFFICE/OUTPT VISIT, EST, LEVL IV, 30-39 MIN: ICD-10-PCS | Mod: 95,,, | Performed by: PHYSICIAN ASSISTANT

## 2023-06-14 PROCEDURE — 1159F MED LIST DOCD IN RCRD: CPT | Mod: CPTII,95,, | Performed by: PHYSICIAN ASSISTANT

## 2023-06-14 PROCEDURE — 1160F PR REVIEW ALL MEDS BY PRESCRIBER/CLIN PHARMACIST DOCUMENTED: ICD-10-PCS | Mod: CPTII,95,, | Performed by: PHYSICIAN ASSISTANT

## 2023-06-14 PROCEDURE — 1159F PR MEDICATION LIST DOCUMENTED IN MEDICAL RECORD: ICD-10-PCS | Mod: CPTII,95,, | Performed by: PHYSICIAN ASSISTANT

## 2023-06-14 PROCEDURE — 99214 OFFICE O/P EST MOD 30 MIN: CPT | Mod: 95,,, | Performed by: PHYSICIAN ASSISTANT

## 2023-06-14 PROCEDURE — 3044F PR MOST RECENT HEMOGLOBIN A1C LEVEL <7.0%: ICD-10-PCS | Mod: CPTII,95,, | Performed by: PHYSICIAN ASSISTANT

## 2023-06-14 PROCEDURE — 3044F HG A1C LEVEL LT 7.0%: CPT | Mod: CPTII,95,, | Performed by: PHYSICIAN ASSISTANT

## 2023-06-14 PROCEDURE — 1160F RVW MEDS BY RX/DR IN RCRD: CPT | Mod: CPTII,95,, | Performed by: PHYSICIAN ASSISTANT

## 2023-06-14 RX ORDER — MODAFINIL 200 MG/1
200 TABLET ORAL DAILY
Qty: 30 TABLET | Refills: 2 | Status: SHIPPED | OUTPATIENT
Start: 2023-06-14 | End: 2023-07-14

## 2023-06-14 NOTE — PROGRESS NOTES
"The patient location is: [place]    Visit type: audiovisual    Each patient to whom he or she provides medical services by telemedicine is:  (1) informed of the relationship between the physician and patient and the respective role of any other health care provider with respect to management of the patient; and (2) notified that he or she may decline to receive medical services by telemedicine and may withdraw from such care at any time.    Notes:     Outpatient Psychiatry Follow-Up Visit (MD/BEATRICE)    6/14/2023    Clinical Status of Patient:  Outpatient (Ambulatory)    Chief Complaint:  Mirza Morales is a 68 y.o. male who presents today for follow-up of mood disorder and substance problems.  Met with patient.      Interval History and Content of Current Session 06/14/2023:    Pt dx ILD in 2017, had recent CT scan and now PCP has referred to pulmonologist. Pt also supposed to have stress test done. At previous visit has reported that dyspnea on exertion and was instructed to see PCP who is working up these symptoms.    Pt reports tried vraylar and did notice any benefits from it, denies any side effects. Pt discontinued the medication on his own.    Pt reports significant depression especially in morning and difficulty getting out of bed in morning due to depression. "I feel fine the night before, I wake up feeling very depressed and its hard to get going." Reports waking up in morning "with total lack of motivation and feel very down."    Discussed start provigil for depression augmentation. Instructed pt insurance unlikely to pay for and better to use good rx. Pt reports understanding and agreeable to plan    Patients mood is steady, affect appears mood congruent. Linear and logical, friendly and cooperative, good eye contact.    Denies SI/HI/AVH. Pt reports sleeping well and normal appetite. Denies side effects of medications.    Pt reports taking medications as prescribed and behaving appropriately during " interview today.      Prior visit:    Pt did not start vraylar. Pt was worried about it possible causing manic episodes. Discussed with pt that it is indicated to treat bipolar d/o and should not increase risk of manic episodes.    Discussed with pt his inability to get out of bed due to severe depression for several hrs as previously discussed in prior visits. Discussed starting vraylar to help with energy and motivation able to get out of bed more quickly by improving depression.    Reports significant fatigue during the daytime, states he is getting quickly fatigued and gets short of breath with exertion. Pt reports he plans on seeing PCP soon. Denies having any chest pains. Discussed with pt that needs to see PCP asap as could be a problem related to his heart or other medical condition.     Instructed pt that if he has worsening fatigue or shortness of breath, weakness, chest pain, episodes of dizziness, LOC, go to ED immediately for further evaluation.    PCP was sent message in epic after visit informing them of pt's symptoms and trying to get f/u soon in clinic.    Patients mood is depressed, affect appears mood congruent. Linear and logical, friendly and cooperative, good eye contact.    Denies SI/HI/AVH. Pt reports sleeping well and normal appetite. Denies side effects of medications.    Pt reports taking medications as prescribed and behaving appropriately during interview today.    Review of Systems     Review of Systems   Constitutional:  Positive for malaise/fatigue. Negative for fever.   HENT:  Negative for sore throat.    Eyes:  Negative for photophobia.   Respiratory:  Positive for shortness of breath (on exertion). Negative for cough and wheezing.    Cardiovascular:  Negative for chest pain, palpitations and PND.   Gastrointestinal:  Negative for abdominal pain.   Genitourinary:  Negative for dysuria.   Musculoskeletal:  Negative for myalgias.   Skin:  Negative for rash.   Neurological:  Negative  for dizziness.   Endo/Heme/Allergies:  Does not bruise/bleed easily.   Psychiatric/Behavioral:  Positive for depression. Negative for hallucinations, substance abuse and suicidal ideas. The patient is not nervous/anxious and does not have insomnia.      Psychiatric Review Of Systems - Is patient experiencing or having changes in:  sleep: no  appetite: no  weight: no  energy/anergy: yes  interest/pleasure/anhedonia: yes  somatic symptoms: no  libido: no  anxiety/panic: no  guilty/hopelessness: no  concentration: yes  S.I.B.s/risky behavior: no  Irritability: no  Racing thoughts: no  Impulsive behaviors: no  Paranoia: no  AVH: no      Past Medical, Family and Social History: The patient's past medical, family and social history have been reviewed and updated as appropriate within the electronic medical record - see encounter notes.      Current Medications:   Medication List with Changes/Refills   New Medications    MODAFINIL (PROVIGIL) 200 MG TAB    Take 1 tablet (200 mg total) by mouth once daily.   Current Medications    AMLODIPINE (NORVASC) 10 MG TABLET    Take 1 tablet (10 mg total) by mouth once daily.    ATORVASTATIN (LIPITOR) 40 MG TABLET    TAKE 1 TABLET ONE TIME DAILY    AZELASTINE (ASTELIN) 137 MCG (0.1 %) NASAL SPRAY    1 spray (137 mcg total) by Nasal route 2 (two) times daily.    CALCIUM POLYCARBOPHIL ORAL    Take by mouth once daily.     CHOLECALCIFEROL, VITAMIN D3, (VITAMIN D3) 25 MCG (1,000 UNIT) CAPSULE    Take 3 capsules (3,000 Units total) by mouth once daily.    LACOSAMIDE (VIMPAT) 200 MG TAB TABLET    Take 1 tablet (200 mg total) by mouth every 12 (twelve) hours.    PANTOPRAZOLE (PROTONIX) 40 MG TABLET    Take 1 tablet (40 mg total) by mouth once daily.    PROPRANOLOL (INDERAL LA) 60 MG 24 HR CAPSULE    Take 1 capsule (60 mg total) by mouth once daily.    QUETIAPINE (SEROQUEL) 100 MG TAB    Take one tablet at bedtime    SILDENAFIL (VIAGRA) 25 MG TABLET    Take 1 tablet (25 mg total) by mouth  daily as needed for Erectile Dysfunction.    TRAZODONE (DESYREL) 100 MG TABLET    TAKE 1-2 TABLETS BY MOUTH AT BEDTIME         Allergies:   Review of patient's allergies indicates:   Allergen Reactions    Gabapentin Other (See Comments)     SEVERE DIZZINESS AND BALANCE PROBLEMS, UNABLE TO WALK.    Nardil [phenelzine]      BECOMES HYPER, LIKE A MANIC EPISODE.    Penicillin     Penicillins Hives    Lamictal [lamotrigine] Rash         Vitals   There were no vitals filed for this visit.       Labs/Imaging/Studies:   No results found for this or any previous visit (from the past 48 hour(s)).   Lab Results   Component Value Date    VALPROATE 48.2 (L) 09/29/2019       Compliance: yes    Side effects: None    Risk Parameters:  Patient reports no suicidal ideation  Patient reports no homicidal ideation  Patient reports no self-injurious behavior  Patient reports no violent behavior    Exam (detailed: at least 9 elements; comprehensive: all 15 elements)   Constitutional  Vitals:  Most recent vital signs, dated greater than 90 days prior to this appointment, were reviewed.   There were no vitals filed for this visit.     General:  unremarkable, age appropriate     Musculoskeletal  Muscle Strength/Tone:  not examined   Gait & Station:  Not examined     Psychiatric  Speech:  no latency; no press   Mood & Affect:  depressive  congruent and appropriate   Thought Process:  normal and logical   Associations:  intact   Thought Content:  normal, no suicidality, no homicidality, delusions, or paranoia   Insight:  intact, has awareness of illness   Judgement: behavior is adequate to circumstances   Orientation:  grossly intact   Memory: intact for content of interview   Language: grossly intact   Attention Span & Concentration:  able to focus   Fund of Knowledge:  intact and appropriate to age and level of education     Assessment and Diagnosis   Status/Progress: Based on the examination today, the patient's problem(s) is/are  inadequately controlled.  New problems have not been presented today.   Co-morbidities may be complicating management of the primary condition.  There are no active rule-out diagnoses for this patient at this time.     General Impression:      ICD-10-CM ICD-9-CM   1. Bipolar 1 disorder  F31.9 296.7   2. Insomnia, unspecified type  G47.00 780.52       Intervention/Counseling/Treatment Plan   Medication Management: Continue current medications. The risks and benefits of medication were discussed with the patient.    -see PCP asap as discussed for new fatigue and sob on exertion.  -Instructed pt that if he has worsening fatigue or shortness of breath, weakness, chest pain, episodes of dizziness, LOC, go to ED immediately for further evaluation.    -pt stopped vraylar on his own    -start provigil 200mg tab, start with 100mg 3-5 days then increase if not having adequate benefit. Pt sent good rx coupon    -continue seroquel 100mg qhs    -continue trazodone 100mg, take 1-2 tablets at bedtime as needed for insomnia    Return to Clinic: 1 month        Trav Andrews PA-C        Total face to face time: 25 min  Total time (chart review, patient contact, documentation): 40 min      *This note has been prepared using a combination of a dictation device and typing.  It has been checked for errors but some errors may still exist within the note as a result of speech recognition errors and/or typographical errors.

## 2023-06-18 ENCOUNTER — PATIENT MESSAGE (OUTPATIENT)
Dept: PSYCHIATRY | Facility: CLINIC | Age: 68
End: 2023-06-18
Payer: MEDICARE

## 2023-06-19 ENCOUNTER — PATIENT MESSAGE (OUTPATIENT)
Dept: PSYCHIATRY | Facility: CLINIC | Age: 68
End: 2023-06-19
Payer: MEDICARE

## 2023-06-26 ENCOUNTER — PATIENT MESSAGE (OUTPATIENT)
Dept: INTERNAL MEDICINE | Facility: CLINIC | Age: 68
End: 2023-06-26
Payer: MEDICARE

## 2023-06-26 NOTE — TELEPHONE ENCOUNTER
It looks like Dr. Mo went over these results with him in May already and also placed a referral pulmonary at that time    He has an appointment with Dr. Mo next month and he has an appointment in Pulmonary in August.    Please let him know that she is out of the clinic until early July but will review these results with him in more detail at the appointment    Okay to send my [facility] message

## 2023-07-05 ENCOUNTER — PATIENT MESSAGE (OUTPATIENT)
Dept: ADMINISTRATIVE | Facility: OTHER | Age: 68
End: 2023-07-05
Payer: MEDICARE

## 2023-07-06 NOTE — PROGRESS NOTES
Two patient Identifier confirmed. (Name and ) Patient tolerated shot well. Patient was instructed to wait 15 minutes after injection.     10 (severe pain)

## 2023-07-14 ENCOUNTER — OFFICE VISIT (OUTPATIENT)
Dept: PSYCHIATRY | Facility: CLINIC | Age: 68
End: 2023-07-14
Payer: MEDICARE

## 2023-07-14 DIAGNOSIS — F31.9 BIPOLAR 1 DISORDER: Primary | ICD-10-CM

## 2023-07-14 DIAGNOSIS — G47.00 INSOMNIA, UNSPECIFIED TYPE: ICD-10-CM

## 2023-07-14 PROCEDURE — 99214 OFFICE O/P EST MOD 30 MIN: CPT | Mod: HCNC,95,, | Performed by: PHYSICIAN ASSISTANT

## 2023-07-14 PROCEDURE — 3044F PR MOST RECENT HEMOGLOBIN A1C LEVEL <7.0%: ICD-10-PCS | Mod: HCNC,CPTII,95, | Performed by: PHYSICIAN ASSISTANT

## 2023-07-14 PROCEDURE — 3044F HG A1C LEVEL LT 7.0%: CPT | Mod: HCNC,CPTII,95, | Performed by: PHYSICIAN ASSISTANT

## 2023-07-14 PROCEDURE — 99214 PR OFFICE/OUTPT VISIT, EST, LEVL IV, 30-39 MIN: ICD-10-PCS | Mod: HCNC,95,, | Performed by: PHYSICIAN ASSISTANT

## 2023-07-14 RX ORDER — TRAZODONE HYDROCHLORIDE 100 MG/1
TABLET ORAL
Qty: 180 TABLET | Refills: 2 | Status: SHIPPED | OUTPATIENT
Start: 2023-07-14 | End: 2023-12-19 | Stop reason: SDUPTHER

## 2023-07-14 RX ORDER — QUETIAPINE FUMARATE 100 MG/1
TABLET, FILM COATED ORAL
Qty: 90 TABLET | Refills: 1 | Status: SHIPPED | OUTPATIENT
Start: 2023-07-14 | End: 2023-12-19 | Stop reason: SDUPTHER

## 2023-07-14 NOTE — PROGRESS NOTES
"The patient location is: [place]    Visit type: audiovisual    Each patient to whom he or she provides medical services by telemedicine is:  (1) informed of the relationship between the physician and patient and the respective role of any other health care provider with respect to management of the patient; and (2) notified that he or she may decline to receive medical services by telemedicine and may withdraw from such care at any time.    Notes:     Outpatient Psychiatry Follow-Up Visit (MD/BEATRICE)    7/14/2023    Clinical Status of Patient:  Outpatient (Ambulatory)    Chief Complaint:  Mirza Morales is a 68 y.o. male who presents today for follow-up of mood disorder and substance problems.  Met with patient.      Interval History and Content of Current Session 07/14/2023:    Pt took the provigil for 1.5 weeks, thought was helping his mood but stated that he began to have some manic like feelings in the morning when he woke up. He was worried may lead to manic episode so he discontinued. Pt reports symptoms resolved    Pt continues to have issues with shortness of breath related to ILD.    Pt has echo stress test next week. Pt positive regarding this test and possible outcomes.    Reports that has continued to have difficulty getting out of bed in morning due to depression. States that mood improves over course of the day, "by the afternoon im feeling a good bit better."    Pt reports today: "i'm more upbeat overall, more accepting of how im feeling and feeling positive about dealing with my different health problems"    Patients mood is steady. Linear and logical, friendly and cooperative.    Denies SI/HI/AVH. Pt reports sleeping well and normal appetite. Denies side effects of medications.    Pt reports taking medications as prescribed and behaving appropriately during interview today.      Prior visit:    Pt dx ILD in 2017, had recent CT scan and now PCP has referred to pulmonologist. Pt also supposed to " "have stress test done. At previous visit has reported that dyspnea on exertion and was instructed to see PCP who is working up these symptoms.    Pt reports tried vraylar and did notice any benefits from it, denies any side effects. Pt discontinued the medication on his own.    Pt reports significant depression especially in morning and difficulty getting out of bed in morning due to depression. "I feel fine the night before, I wake up feeling very depressed and its hard to get going." Reports waking up in morning "with total lack of motivation and feel very down."    Discussed start provigil for depression augmentation. Instructed pt insurance unlikely to pay for and better to use good rx. Pt reports understanding and agreeable to plan    Patients mood is steady, affect appears mood congruent. Linear and logical, friendly and cooperative, good eye contact.    Denies SI/HI/AVH. Pt reports sleeping well and normal appetite. Denies side effects of medications.    Pt reports taking medications as prescribed and behaving appropriately during interview today.    Review of Systems     Review of Systems   Constitutional:  Positive for malaise/fatigue. Negative for fever.   HENT:  Negative for sore throat.    Eyes:  Negative for photophobia.   Respiratory:  Positive for shortness of breath (on exertion). Negative for cough and wheezing.    Cardiovascular:  Negative for chest pain, palpitations and PND.   Gastrointestinal:  Negative for abdominal pain.   Genitourinary:  Negative for dysuria.   Musculoskeletal:  Negative for myalgias.   Skin:  Negative for rash.   Neurological:  Negative for dizziness.   Endo/Heme/Allergies:  Does not bruise/bleed easily.   Psychiatric/Behavioral:  Positive for depression. Negative for hallucinations and suicidal ideas. The patient is not nervous/anxious and does not have insomnia.      Psychiatric Review Of Systems - Is patient experiencing or having changes in:  sleep: no  appetite: " no  weight: no  energy/anergy: yes  interest/pleasure/anhedonia: yes  somatic symptoms: no  libido: no  anxiety/panic: no  guilty/hopelessness: no  concentration: yes  S.I.B.s/risky behavior: no  Irritability: no  Racing thoughts: no  Impulsive behaviors: no  Paranoia: no  AVH: no      Past Medical, Family and Social History: The patient's past medical, family and social history have been reviewed and updated as appropriate within the electronic medical record - see encounter notes.      Current Medications:   Medication List with Changes/Refills   Current Medications    AMLODIPINE (NORVASC) 10 MG TABLET    Take 1 tablet (10 mg total) by mouth once daily.    ATORVASTATIN (LIPITOR) 40 MG TABLET    TAKE 1 TABLET ONE TIME DAILY    AZELASTINE (ASTELIN) 137 MCG (0.1 %) NASAL SPRAY    1 spray (137 mcg total) by Nasal route 2 (two) times daily.    CALCIUM POLYCARBOPHIL ORAL    Take by mouth once daily.     CHOLECALCIFEROL, VITAMIN D3, (VITAMIN D3) 25 MCG (1,000 UNIT) CAPSULE    Take 3 capsules (3,000 Units total) by mouth once daily.    LACOSAMIDE (VIMPAT) 200 MG TAB TABLET    Take 1 tablet (200 mg total) by mouth every 12 (twelve) hours.    PANTOPRAZOLE (PROTONIX) 40 MG TABLET    Take 1 tablet (40 mg total) by mouth once daily.    PROPRANOLOL (INDERAL LA) 60 MG 24 HR CAPSULE    Take 1 capsule (60 mg total) by mouth once daily.    QUETIAPINE (SEROQUEL) 100 MG TAB    Take one tablet at bedtime    SILDENAFIL (VIAGRA) 25 MG TABLET    Take 1 tablet (25 mg total) by mouth daily as needed for Erectile Dysfunction.    TRAZODONE (DESYREL) 100 MG TABLET    TAKE 1-2 TABLETS BY MOUTH AT BEDTIME   Discontinued Medications    MODAFINIL (PROVIGIL) 200 MG TAB    Take 1 tablet (200 mg total) by mouth once daily.         Allergies:   Review of patient's allergies indicates:   Allergen Reactions    Gabapentin Other (See Comments)     SEVERE DIZZINESS AND BALANCE PROBLEMS, UNABLE TO WALK.    Nardil [phenelzine]      BECOMES HYPER, LIKE A  MANIC EPISODE.    Penicillin     Penicillins Hives    Lamictal [lamotrigine] Rash         Vitals   There were no vitals filed for this visit.       Labs/Imaging/Studies:   No results found for this or any previous visit (from the past 48 hour(s)).   Lab Results   Component Value Date    VALPROATE 48.2 (L) 09/29/2019       Compliance: yes    Side effects: None    Risk Parameters:  Patient reports no suicidal ideation  Patient reports no homicidal ideation  Patient reports no self-injurious behavior  Patient reports no violent behavior    Exam (detailed: at least 9 elements; comprehensive: all 15 elements)   Constitutional  Vitals:  Most recent vital signs, dated greater than 90 days prior to this appointment, were reviewed.   There were no vitals filed for this visit.     General:  Not examined     Musculoskeletal  Muscle Strength/Tone:  not examined   Gait & Station:  Not examined     Psychiatric  Speech:  no latency; no press   Mood & Affect:  steady   Thought Process:  normal and logical   Associations:  intact   Thought Content:  normal, no suicidality, no homicidality, delusions, or paranoia   Insight:  intact, has awareness of illness   Judgement: behavior is adequate to circumstances   Orientation:  grossly intact   Memory: intact for content of interview   Language: grossly intact   Attention Span & Concentration:  able to focus   Fund of Knowledge:  intact and appropriate to age and level of education     Assessment and Diagnosis   Status/Progress: Based on the examination today, the patient's problem(s) is/are inadequately controlled.  New problems have not been presented today.   Co-morbidities may be complicating management of the primary condition.  There are no active rule-out diagnoses for this patient at this time.     General Impression:      ICD-10-CM ICD-9-CM   1. Bipolar 1 disorder  F31.9 296.7   2. Insomnia, unspecified type  G47.00 780.52         Intervention/Counseling/Treatment Plan    Medication Management: Continue current medications. The risks and benefits of medication were discussed with the patient.    -Instructed pt that if he has worsening fatigue or shortness of breath, weakness, chest pain, episodes of dizziness, LOC, go to ED immediately for further evaluation.    -pt stopped provigil after 2 weeks. Will remain discontinued    -continue seroquel 100mg qhs    -continue trazodone 100mg, take 1-2 tablets at bedtime as needed for insomnia    Return to Clinic: 2 months        Trav Andrews PA-C        Total face to face time: 21 min  Total time (chart review, patient contact, documentation): 26 min      *This note has been prepared using a combination of a dictation device and typing.  It has been checked for errors but some errors may still exist within the note as a result of speech recognition errors and/or typographical errors.

## 2023-07-20 ENCOUNTER — HOSPITAL ENCOUNTER (OUTPATIENT)
Dept: CARDIOLOGY | Facility: HOSPITAL | Age: 68
Discharge: HOME | End: 2023-07-20
Attending: INTERNAL MEDICINE
Payer: MEDICARE

## 2023-07-20 VITALS — HEIGHT: 76 IN | WEIGHT: 230 LBS | BODY MASS INDEX: 28.01 KG/M2

## 2023-07-20 DIAGNOSIS — R06.09 DOE (DYSPNEA ON EXERTION): ICD-10-CM

## 2023-07-20 LAB
ASCENDING AORTA: 3.21 CM
BSA FOR ECHO PROCEDURE: 2.37 M2
CV ECHO LV RWT: 0.42 CM
CV STRESS BASE HR: 74 BPM
DIASTOLIC BLOOD PRESSURE: 85 MMHG
DOP CALC LVOT AREA: 4.3 CM2
DOP CALC LVOT DIAMETER: 2.33 CM
DOP CALC LVOT PEAK VEL: 0.87 M/S
DOP CALC LVOT STROKE VOLUME: 75.73 CM3
DOP CALCLVOT PEAK VEL VTI: 17.77 CM
E WAVE DECELERATION TIME: 181.28 MSEC
E/A RATIO: 0.71
E/E' RATIO: 6.82 M/S
ECHO LV POSTERIOR WALL: 0.79 CM (ref 0.6–1.1)
EJECTION FRACTION: 50 %
FRACTIONAL SHORTENING: 31 % (ref 28–44)
INTERVENTRICULAR SEPTUM: 0.63 CM (ref 0.6–1.1)
IVRT: 105.61 MSEC
LA MAJOR: 4.71 CM
LA MINOR: 4.91 CM
LA WIDTH: 3.41 CM
LEFT ATRIUM SIZE: 3.25 CM
LEFT ATRIUM VOLUME INDEX MOD: 12.3 ML/M2
LEFT ATRIUM VOLUME INDEX: 19.3 ML/M2
LEFT ATRIUM VOLUME MOD: 28.79 CM3
LEFT ATRIUM VOLUME: 45.29 CM3
LEFT INTERNAL DIMENSION IN SYSTOLE: 2.57 CM (ref 2.1–4)
LEFT VENTRICLE DIASTOLIC VOLUME INDEX: 25.49 ML/M2
LEFT VENTRICLE DIASTOLIC VOLUME: 59.9 ML
LEFT VENTRICLE MASS INDEX: 31 G/M2
LEFT VENTRICLE SYSTOLIC VOLUME INDEX: 10.1 ML/M2
LEFT VENTRICLE SYSTOLIC VOLUME: 23.85 ML
LEFT VENTRICULAR INTERNAL DIMENSION IN DIASTOLE: 3.75 CM (ref 3.5–6)
LEFT VENTRICULAR MASS: 71.7 G
LV LATERAL E/E' RATIO: 7.25 M/S
LV SEPTAL E/E' RATIO: 6.44 M/S
MV A" WAVE DURATION": 6.85 MSEC
MV PEAK A VEL: 0.82 M/S
MV PEAK E VEL: 0.58 M/S
MV STENOSIS PRESSURE HALF TIME: 52.57 MS
MV VALVE AREA P 1/2 METHOD: 4.18 CM2
OHS CV CPX 1 MINUTE RECOVERY HEART RATE: 84 BPM
OHS CV CPX 85 PERCENT MAX PREDICTED HEART RATE MALE: 130
OHS CV CPX ESTIMATED METS: 3
OHS CV CPX MAX PREDICTED HEART RATE: 153
OHS CV CPX PATIENT IS FEMALE: 0
OHS CV CPX PATIENT IS MALE: 1
OHS CV CPX PEAK DIASTOLIC BLOOD PRESSURE: 102 MMHG
OHS CV CPX PEAK HEAR RATE: 100 BPM
OHS CV CPX PEAK RATE PRESSURE PRODUCT: NORMAL
OHS CV CPX PEAK SYSTOLIC BLOOD PRESSURE: 191 MMHG
OHS CV CPX PERCENT MAX PREDICTED HEART RATE ACHIEVED: 65
OHS CV CPX RATE PRESSURE PRODUCT PRESENTING: 9990
PISA TR MAX VEL: 2.2 M/S
PULM VEIN S/D RATIO: 1.12
PV PEAK D VEL: 0.34 M/S
PV PEAK S VEL: 0.38 M/S
RA MAJOR: 5.09 CM
RA PRESSURE: 8 MMHG
RA WIDTH: 4.39 CM
RIGHT VENTRICULAR END-DIASTOLIC DIMENSION: 4.18 CM
SINUS: 3.24 CM
STJ: 3.03 CM
STRESS ECHO POST EXERCISE DUR MIN: 3 MINUTES
STRESS ECHO POST EXERCISE DUR SEC: 48 SECONDS
SYSTOLIC BLOOD PRESSURE: 135 MMHG
TDI LATERAL: 0.08 M/S
TDI SEPTAL: 0.09 M/S
TDI: 0.09 M/S
TR MAX PG: 19 MMHG
TRICUSPID ANNULAR PLANE SYSTOLIC EXCURSION: 1.34 CM
TV REST PULMONARY ARTERY PRESSURE: 27 MMHG

## 2023-07-20 PROCEDURE — 93351 STRESS TTE COMPLETE: CPT | Mod: 26,HCNC,, | Performed by: INTERNAL MEDICINE

## 2023-07-20 PROCEDURE — 93351 STRESS ECHO (CUPID ONLY): ICD-10-PCS | Mod: 26,HCNC,, | Performed by: INTERNAL MEDICINE

## 2023-07-20 PROCEDURE — 93352 STRESS ECHO (CUPID ONLY): ICD-10-PCS | Mod: HCNC,,, | Performed by: INTERNAL MEDICINE

## 2023-07-20 PROCEDURE — 93352 ADMIN ECG CONTRAST AGENT: CPT | Mod: HCNC,,, | Performed by: INTERNAL MEDICINE

## 2023-07-20 PROCEDURE — 25500020 PHARM REV CODE 255: Mod: HCNC | Performed by: INTERNAL MEDICINE

## 2023-07-20 PROCEDURE — 93351 STRESS TTE COMPLETE: CPT | Mod: HCNC

## 2023-07-20 RX ADMIN — HUMAN ALBUMIN MICROSPHERES AND PERFLUTREN 0.66 MG: 10; .22 INJECTION, SOLUTION INTRAVENOUS at 03:07

## 2023-07-20 NOTE — PROGRESS NOTES
Procedure explained. 22 g sl started in left forearm for optison use. Optison given ivp via sl pre and post ex2d for imaging. Tolerated well. Sl d/crystal after. Pressure applied. Failed attempt in left forearm pressure applied. Appears dehydrated. Veins collapse despite good blood return.

## 2023-07-31 DIAGNOSIS — R06.09 DOE (DYSPNEA ON EXERTION): Primary | ICD-10-CM

## 2023-08-10 DIAGNOSIS — I10 HYPERTENSION, UNSPECIFIED TYPE: ICD-10-CM

## 2023-08-10 RX ORDER — AMLODIPINE BESYLATE 10 MG/1
10 TABLET ORAL
Qty: 90 TABLET | Refills: 3 | Status: SHIPPED | OUTPATIENT
Start: 2023-08-10 | End: 2023-10-09 | Stop reason: SDUPTHER

## 2023-08-31 ENCOUNTER — LAB VISIT (OUTPATIENT)
Dept: LAB | Facility: HOSPITAL | Age: 68
End: 2023-08-31
Attending: STUDENT IN AN ORGANIZED HEALTH CARE EDUCATION/TRAINING PROGRAM
Payer: MEDICARE

## 2023-08-31 ENCOUNTER — OFFICE VISIT (OUTPATIENT)
Dept: PULMONOLOGY | Facility: CLINIC | Age: 68
End: 2023-08-31
Payer: MEDICARE

## 2023-08-31 VITALS
BODY MASS INDEX: 28.49 KG/M2 | OXYGEN SATURATION: 97 % | DIASTOLIC BLOOD PRESSURE: 80 MMHG | SYSTOLIC BLOOD PRESSURE: 137 MMHG | WEIGHT: 233.94 LBS | HEART RATE: 102 BPM | HEIGHT: 76 IN

## 2023-08-31 DIAGNOSIS — J98.6 CHRONICALLY ELEVATED HEMIDIAPHRAGM: ICD-10-CM

## 2023-08-31 DIAGNOSIS — J84.10 PULMONARY FIBROSIS: ICD-10-CM

## 2023-08-31 DIAGNOSIS — R93.89 ABNORMAL CT OF THE CHEST: ICD-10-CM

## 2023-08-31 DIAGNOSIS — R53.83 FATIGUE, UNSPECIFIED TYPE: ICD-10-CM

## 2023-08-31 DIAGNOSIS — J84.9 INTERSTITIAL LUNG DISEASE: ICD-10-CM

## 2023-08-31 DIAGNOSIS — Z72.0 TOBACCO ABUSE: Chronic | ICD-10-CM

## 2023-08-31 DIAGNOSIS — J44.9 CHRONIC OBSTRUCTIVE PULMONARY DISEASE, UNSPECIFIED COPD TYPE: ICD-10-CM

## 2023-08-31 DIAGNOSIS — J84.10 PULMONARY FIBROSIS: Primary | ICD-10-CM

## 2023-08-31 LAB
CCP AB SER IA-ACNC: 0.9 U/ML
CK SERPL-CCNC: 131 U/L (ref 20–200)
CRP SERPL-MCNC: 2.2 MG/L (ref 0–8.2)
ERYTHROCYTE [SEDIMENTATION RATE] IN BLOOD BY PHOTOMETRIC METHOD: 23 MM/HR (ref 0–23)
RHEUMATOID FACT SERPL-ACNC: <13 IU/ML (ref 0–15)

## 2023-08-31 PROCEDURE — 99999 PR PBB SHADOW E&M-EST. PATIENT-LVL III: ICD-10-PCS | Mod: PBBFAC,HCNC,, | Performed by: STUDENT IN AN ORGANIZED HEALTH CARE EDUCATION/TRAINING PROGRAM

## 2023-08-31 PROCEDURE — 86200 CCP ANTIBODY: CPT | Mod: HCNC | Performed by: STUDENT IN AN ORGANIZED HEALTH CARE EDUCATION/TRAINING PROGRAM

## 2023-08-31 PROCEDURE — 3044F PR MOST RECENT HEMOGLOBIN A1C LEVEL <7.0%: ICD-10-PCS | Mod: CPTII,S$GLB,, | Performed by: STUDENT IN AN ORGANIZED HEALTH CARE EDUCATION/TRAINING PROGRAM

## 2023-08-31 PROCEDURE — 99205 OFFICE O/P NEW HI 60 MIN: CPT | Mod: S$GLB,,, | Performed by: STUDENT IN AN ORGANIZED HEALTH CARE EDUCATION/TRAINING PROGRAM

## 2023-08-31 PROCEDURE — 82550 ASSAY OF CK (CPK): CPT | Mod: HCNC | Performed by: STUDENT IN AN ORGANIZED HEALTH CARE EDUCATION/TRAINING PROGRAM

## 2023-08-31 PROCEDURE — 3075F PR MOST RECENT SYSTOLIC BLOOD PRESS GE 130-139MM HG: ICD-10-PCS | Mod: CPTII,S$GLB,, | Performed by: STUDENT IN AN ORGANIZED HEALTH CARE EDUCATION/TRAINING PROGRAM

## 2023-08-31 PROCEDURE — 86038 ANTINUCLEAR ANTIBODIES: CPT | Mod: HCNC | Performed by: STUDENT IN AN ORGANIZED HEALTH CARE EDUCATION/TRAINING PROGRAM

## 2023-08-31 PROCEDURE — 1126F PR PAIN SEVERITY QUANTIFIED, NO PAIN PRESENT: ICD-10-PCS | Mod: CPTII,S$GLB,, | Performed by: STUDENT IN AN ORGANIZED HEALTH CARE EDUCATION/TRAINING PROGRAM

## 2023-08-31 PROCEDURE — 3075F SYST BP GE 130 - 139MM HG: CPT | Mod: CPTII,S$GLB,, | Performed by: STUDENT IN AN ORGANIZED HEALTH CARE EDUCATION/TRAINING PROGRAM

## 2023-08-31 PROCEDURE — 85652 RBC SED RATE AUTOMATED: CPT | Mod: HCNC | Performed by: STUDENT IN AN ORGANIZED HEALTH CARE EDUCATION/TRAINING PROGRAM

## 2023-08-31 PROCEDURE — 3079F PR MOST RECENT DIASTOLIC BLOOD PRESSURE 80-89 MM HG: ICD-10-PCS | Mod: CPTII,S$GLB,, | Performed by: STUDENT IN AN ORGANIZED HEALTH CARE EDUCATION/TRAINING PROGRAM

## 2023-08-31 PROCEDURE — 3079F DIAST BP 80-89 MM HG: CPT | Mod: CPTII,S$GLB,, | Performed by: STUDENT IN AN ORGANIZED HEALTH CARE EDUCATION/TRAINING PROGRAM

## 2023-08-31 PROCEDURE — 99205 PR OFFICE/OUTPT VISIT, NEW, LEVL V, 60-74 MIN: ICD-10-PCS | Mod: S$GLB,,, | Performed by: STUDENT IN AN ORGANIZED HEALTH CARE EDUCATION/TRAINING PROGRAM

## 2023-08-31 PROCEDURE — 1126F AMNT PAIN NOTED NONE PRSNT: CPT | Mod: CPTII,S$GLB,, | Performed by: STUDENT IN AN ORGANIZED HEALTH CARE EDUCATION/TRAINING PROGRAM

## 2023-08-31 PROCEDURE — 3008F BODY MASS INDEX DOCD: CPT | Mod: CPTII,S$GLB,, | Performed by: STUDENT IN AN ORGANIZED HEALTH CARE EDUCATION/TRAINING PROGRAM

## 2023-08-31 PROCEDURE — 36415 COLL VENOUS BLD VENIPUNCTURE: CPT | Mod: HCNC | Performed by: STUDENT IN AN ORGANIZED HEALTH CARE EDUCATION/TRAINING PROGRAM

## 2023-08-31 PROCEDURE — 3008F PR BODY MASS INDEX (BMI) DOCUMENTED: ICD-10-PCS | Mod: CPTII,S$GLB,, | Performed by: STUDENT IN AN ORGANIZED HEALTH CARE EDUCATION/TRAINING PROGRAM

## 2023-08-31 PROCEDURE — 3044F HG A1C LEVEL LT 7.0%: CPT | Mod: CPTII,S$GLB,, | Performed by: STUDENT IN AN ORGANIZED HEALTH CARE EDUCATION/TRAINING PROGRAM

## 2023-08-31 PROCEDURE — 99999 PR PBB SHADOW E&M-EST. PATIENT-LVL III: CPT | Mod: PBBFAC,HCNC,, | Performed by: STUDENT IN AN ORGANIZED HEALTH CARE EDUCATION/TRAINING PROGRAM

## 2023-08-31 PROCEDURE — 86431 RHEUMATOID FACTOR QUANT: CPT | Mod: HCNC | Performed by: STUDENT IN AN ORGANIZED HEALTH CARE EDUCATION/TRAINING PROGRAM

## 2023-08-31 PROCEDURE — 86140 C-REACTIVE PROTEIN: CPT | Mod: HCNC | Performed by: STUDENT IN AN ORGANIZED HEALTH CARE EDUCATION/TRAINING PROGRAM

## 2023-08-31 NOTE — PROGRESS NOTES
"Subjective:     Reason for visit:  ILD    Patient ID:  Mirza Morales is a 68 y.o. male with HTN, hyperlipidemia, seizure disorder, bipolar disorder with anxiety, history of ETOH dependence and opiate overdose      Patient is accompanied by his wife who provides additional history    Interval History:  Still smoking.  Main complaints are fatigue.  The fatigue then leads to shortness of breath after the patient begins to push himself.  No overt chest pain.  Recently underwent stress echo but tired out prior to reaching an elevated HR      Additional Pulmonary History:  Childhood Illnesses:  none  Occupational/Environmental:  few dogs and a cat  Tobacco/Smoking:  Current.  1 pack per day for the past 20-25 years    Objective:     Vitals:    08/31/23 1303   BP: 137/80   BP Location: Right arm   Patient Position: Sitting   Pulse: 102   SpO2: 97%   Weight: 106.1 kg (233 lb 14.5 oz)   Height: 6' 4" (1.93 m)         Physical Exam  Vitals and nursing note reviewed.   Constitutional:       General: He is not in acute distress.     Appearance: He is not ill-appearing, toxic-appearing or diaphoretic.      Comments: Frail and elderly appearing   HENT:      Head: Normocephalic and atraumatic.      Nose: No rhinorrhea.      Mouth/Throat:      Mouth: Mucous membranes are moist.   Eyes:      General: No scleral icterus.     Extraocular Movements: Extraocular movements intact.   Cardiovascular:      Rate and Rhythm: Normal rate and regular rhythm.   Pulmonary:      Effort: No tachypnea, accessory muscle usage, respiratory distress or retractions.   Abdominal:      General: There is no distension.   Skin:     General: Skin is warm and dry.      Coloration: Skin is not jaundiced.      Findings: No rash.   Neurological:      General: No focal deficit present.      Mental Status: Mental status is at baseline.          Personal Diagnostic Review and Interpretation  05/28/2023 CT chest:  Aortic atherosclerosis; chronically elevated " left hemidiaphragm; 4 mm RUL nodule, stable; subpleural reticulations with honeycombing and scattered ground-glass.    11/27/2017 CT chest:  Scattered ground-glass with subpleural reticulations, but no honeycombing; chronically elevated left hemidiaphragm; scattered calcified granulomas in the right      Pertinent Studies Reviewed & Interpreted:     Pulmonary Function Tests:   None    6 Minute Walk Tests:   None    Echocardiograms:   07/20/2023 stress echo:  EF 50% with normal chamber sizes; PASP 27.  Negative for ischemia.  Severe exercise intolerance    Other pertinent laboratories:   06/16/2022 IgE negative    No peripheral eosinophilia      Assessment & Plan:       Problem List Items Addressed This Visit          Pulmonary    Chronically elevated hemidiaphragm    Overview     Chronically elevated left hemidiaphragm.  Compensatory volume loss on CT.         Chronic obstructive pulmonary disease, unspecified COPD type    Overview     Current smoker.  No gross emphysematous changes on previous CT.  No PFTs in chart.         Pulmonary fibrosis - Primary    Overview     Progression of subpleural reticulations and now with a new appearance of honeycombing between 2017 and 2023.  Also associated with ground-glass.  Unclear whether this is a fibrosing NSIP, or IPF complicated by diastolic heart failure.  Most recent echo with normal chamber sizes in EF, so heart failure less likely.  IgE negative and there is no peripheral eosinophilia.  Screening ILD labs ordered.  PFTs as well         Relevant Orders    Cyclic Citrullinated Peptide Antibody, IgG    ANGELICA Screen w/Reflex    Rheumatoid Factor    Stress test, pulmonary    DLCO-Carbon Monoxide Diffusing Capacity    Lung Volumes    Spirometry with/without bronchodilator    CK    Sedimentation rate    C-REACTIVE PROTEIN       Other    Tobacco abuse (Chronic)    Overview     Counseled on need for smoking cessation.  Discussed specific strategies.  Resources offered.          Fatigue    Overview      This is his primary complaint.  And based on his last echo, has exists outside of reaching the limits of his cardiovascular system.  Would continue to workup the cardiac aspect, however can order a pharmacologic stress test.   An inflammatory myopathy would  Explain fatigue and relate ILD to CTD.  Workup ordered as noted.          Other Visit Diagnoses       Interstitial lung disease        Abnormal CT of the chest        Relevant Orders    CK    Sedimentation rate    C-REACTIVE PROTEIN             Portions of the record may have been created with voice-recognition software. Occasional wrong-word or sound-a-like substitutions may have occurred due to the inherent limitations of voice-recognition software. Read the chart carefully and recognize, using context, where substitutions have occurred.

## 2023-09-01 LAB — ANA SER QL IF: NORMAL

## 2023-09-15 ENCOUNTER — OFFICE VISIT (OUTPATIENT)
Dept: PSYCHIATRY | Facility: CLINIC | Age: 68
End: 2023-09-15
Payer: MEDICARE

## 2023-09-15 DIAGNOSIS — G47.00 INSOMNIA, UNSPECIFIED TYPE: ICD-10-CM

## 2023-09-15 DIAGNOSIS — E55.9 VITAMIN D INSUFFICIENCY: ICD-10-CM

## 2023-09-15 DIAGNOSIS — F31.9 BIPOLAR 1 DISORDER: Primary | ICD-10-CM

## 2023-09-15 DIAGNOSIS — G47.19 EXCESSIVE DAYTIME SLEEPINESS: ICD-10-CM

## 2023-09-15 DIAGNOSIS — F12.90 CANNABIS USE, UNCOMPLICATED: ICD-10-CM

## 2023-09-15 DIAGNOSIS — G47.33 OSA (OBSTRUCTIVE SLEEP APNEA): ICD-10-CM

## 2023-09-15 PROCEDURE — 3044F HG A1C LEVEL LT 7.0%: CPT | Mod: HCNC,CPTII,95, | Performed by: PHYSICIAN ASSISTANT

## 2023-09-15 PROCEDURE — 1159F MED LIST DOCD IN RCRD: CPT | Mod: HCNC,CPTII,95, | Performed by: PHYSICIAN ASSISTANT

## 2023-09-15 PROCEDURE — 99214 OFFICE O/P EST MOD 30 MIN: CPT | Mod: HCNC,95,, | Performed by: PHYSICIAN ASSISTANT

## 2023-09-15 PROCEDURE — 90833 PR PSYCHOTHERAPY W/PATIENT W/E&M, 30 MIN (ADD ON): ICD-10-PCS | Mod: HCNC,95,, | Performed by: PHYSICIAN ASSISTANT

## 2023-09-15 PROCEDURE — 1160F RVW MEDS BY RX/DR IN RCRD: CPT | Mod: HCNC,CPTII,95, | Performed by: PHYSICIAN ASSISTANT

## 2023-09-15 PROCEDURE — 90833 PSYTX W PT W E/M 30 MIN: CPT | Mod: HCNC,95,, | Performed by: PHYSICIAN ASSISTANT

## 2023-09-15 PROCEDURE — 1160F PR REVIEW ALL MEDS BY PRESCRIBER/CLIN PHARMACIST DOCUMENTED: ICD-10-PCS | Mod: HCNC,CPTII,95, | Performed by: PHYSICIAN ASSISTANT

## 2023-09-15 PROCEDURE — 1159F PR MEDICATION LIST DOCUMENTED IN MEDICAL RECORD: ICD-10-PCS | Mod: HCNC,CPTII,95, | Performed by: PHYSICIAN ASSISTANT

## 2023-09-15 PROCEDURE — 3044F PR MOST RECENT HEMOGLOBIN A1C LEVEL <7.0%: ICD-10-PCS | Mod: HCNC,CPTII,95, | Performed by: PHYSICIAN ASSISTANT

## 2023-09-15 PROCEDURE — 99214 PR OFFICE/OUTPT VISIT, EST, LEVL IV, 30-39 MIN: ICD-10-PCS | Mod: HCNC,95,, | Performed by: PHYSICIAN ASSISTANT

## 2023-09-15 NOTE — PROGRESS NOTES
"The patient location is: [place]    Visit type: audiovisual    Each patient to whom he or she provides medical services by telemedicine is:  (1) informed of the relationship between the physician and patient and the respective role of any other health care provider with respect to management of the patient; and (2) notified that he or she may decline to receive medical services by telemedicine and may withdraw from such care at any time.    Notes:     Outpatient Psychiatry Follow-Up Visit (MD/BEATRICE)    9/15/2023    Clinical Status of Patient:  Outpatient (Ambulatory)    Chief Complaint:  Mirza Morales is a 68 y.o. male who presents today for follow-up of mood disorder and substance problems.  Met with patient.      Interval History and Content of Current Session 09/15/2023:    Pt reports today: "feel fatigued. Have shortness of breath, I've tried a lot of medications. Just weary of it all."     Pt reports hx ERIC but reports never wore cpap. Dx ERIC >5 years ago and states never tried cpap. Pt also has ILD being monitored by pulmonology. Discussed with pt need to see sleep medicine doctor and get restarted on cpap. Placed referral for sleep medicine.     Pt reports poor appetite about 1 meal per day. States is sleeping well at night but states never feels rested and is more tired when he wakes up in the morning than the night before.    Reports waking up in morning very depressed, states becomes less depressed throughout the day.    Mood overall is "down, depressed, tired all the time. Cant get out of bed I'm so tired".    Patients mood is depressive, affect appears mood congruent. Linear and logical, friendly and cooperative, good eye contact.    Denies SI/HI/AVH. Pt reports sleeping well and normal appetite. Denies side effects of medications.    Pt reports taking medications as prescribed and behaving appropriately during interview today.    Psychotherapy:  Target symptoms: depression, anxiety   Why chosen " "therapy is appropriate versus another modality: relevant to diagnosis, patient responds to this modality, evidence based practice  Outcome monitoring methods: self-report, observation  Therapeutic intervention type: insight oriented psychotherapy, supportive psychotherapy  Topics discussed/themes: stress related to medical comorbidities, building skills sets for symptom management, symptom recognition  The patient's response to the intervention is accepting. The patient's progress toward treatment goals is good.   Duration of intervention: 17 minutes.      Prior visit:    Pt took the provigil for 1.5 weeks, thought was helping his mood but stated that he began to have some manic like feelings in the morning when he woke up. He was worried may lead to manic episode so he discontinued. Pt reports symptoms resolved    Pt continues to have issues with shortness of breath related to ILD.    Pt has echo stress test next week. Pt positive regarding this test and possible outcomes.    Reports that has continued to have difficulty getting out of bed in morning due to depression. States that mood improves over course of the day, "by the afternoon im feeling a good bit better."    Pt reports today: "i'm more upbeat overall, more accepting of how im feeling and feeling positive about dealing with my different health problems"    Patients mood is steady. Linear and logical, friendly and cooperative.    Denies SI/HI/AVH. Pt reports sleeping well and normal appetite. Denies side effects of medications.    Pt reports taking medications as prescribed and behaving appropriately during interview today.    Review of Systems     Review of Systems   Constitutional:  Positive for malaise/fatigue. Negative for fever.   HENT:  Negative for sore throat.    Eyes:  Negative for photophobia.   Respiratory:  Positive for shortness of breath (on exertion). Negative for cough and wheezing.    Cardiovascular:  Negative for chest pain, palpitations " and PND.   Gastrointestinal:  Negative for abdominal pain.   Genitourinary:  Negative for dysuria.   Musculoskeletal:  Negative for myalgias.   Skin:  Negative for rash.   Neurological:  Negative for dizziness.   Endo/Heme/Allergies:  Does not bruise/bleed easily.   Psychiatric/Behavioral:  Positive for depression. Negative for hallucinations and suicidal ideas. The patient is not nervous/anxious and does not have insomnia.        Psychiatric Review Of Systems - Is patient experiencing or having changes in:  sleep: excessive  appetite: poor  weight: no  energy/anergy: yes  interest/pleasure/anhedonia: yes  somatic symptoms: no  libido: no  anxiety/panic: no  guilty/hopelessness: no  concentration: yes  S.I.B.s/risky behavior: no  Irritability: no  Racing thoughts: no  Impulsive behaviors: no  Paranoia: no  AVH: no      Past Medical, Family and Social History: The patient's past medical, family and social history have been reviewed and updated as appropriate within the electronic medical record - see encounter notes.      Current Medications:   Medication List with Changes/Refills   Current Medications    AMLODIPINE (NORVASC) 10 MG TABLET    TAKE 1 TABLET BY MOUTH EVERY DAY    ATORVASTATIN (LIPITOR) 40 MG TABLET    TAKE 1 TABLET ONE TIME DAILY    AZELASTINE (ASTELIN) 137 MCG (0.1 %) NASAL SPRAY    1 spray (137 mcg total) by Nasal route 2 (two) times daily.    CALCIUM POLYCARBOPHIL ORAL    Take by mouth once daily.     CHOLECALCIFEROL, VITAMIN D3, (VITAMIN D3) 25 MCG (1,000 UNIT) CAPSULE    Take 3 capsules (3,000 Units total) by mouth once daily.    LACOSAMIDE (VIMPAT) 200 MG TAB TABLET    Take 1 tablet (200 mg total) by mouth every 12 (twelve) hours.    PANTOPRAZOLE (PROTONIX) 40 MG TABLET    Take 1 tablet (40 mg total) by mouth once daily.    PROPRANOLOL (INDERAL LA) 60 MG 24 HR CAPSULE    Take 1 capsule (60 mg total) by mouth once daily.    QUETIAPINE (SEROQUEL) 100 MG TAB    Take one tablet at bedtime     SILDENAFIL (VIAGRA) 25 MG TABLET    Take 1 tablet (25 mg total) by mouth daily as needed for Erectile Dysfunction.    TRAZODONE (DESYREL) 100 MG TABLET    TAKE 1-2 TABLETS BY MOUTH AT BEDTIME         Allergies:   Review of patient's allergies indicates:   Allergen Reactions    Gabapentin Other (See Comments)     SEVERE DIZZINESS AND BALANCE PROBLEMS, UNABLE TO WALK.    Nardil [phenelzine]      BECOMES HYPER, LIKE A MANIC EPISODE.    Penicillin     Penicillins Hives    Lamictal [lamotrigine] Rash         Vitals   There were no vitals filed for this visit.       Labs/Imaging/Studies:   No results found for this or any previous visit (from the past 48 hour(s)).   Lab Results   Component Value Date    VALPROATE 48.2 (L) 09/29/2019       Compliance: yes    Side effects: None    Risk Parameters:  Patient reports no suicidal ideation  Patient reports no homicidal ideation  Patient reports no self-injurious behavior  Patient reports no violent behavior    Exam (detailed: at least 9 elements; comprehensive: all 15 elements)   Constitutional  Vitals:  Most recent vital signs, dated greater than 90 days prior to this appointment, were reviewed.   There were no vitals filed for this visit.     General:  Wnl, appropriate     Musculoskeletal  Muscle Strength/Tone:  not examined   Gait & Station:  Not examined     Psychiatric  Speech:  no latency; no press   Mood & Affect:  Depressed  Mood congruent   Thought Process:  normal and logical   Associations:  intact   Thought Content:  normal, no suicidality, no homicidality, delusions, or paranoia   Insight:  intact, has awareness of illness   Judgement: behavior is adequate to circumstances   Orientation:  grossly intact   Memory: intact for content of interview   Language: grossly intact   Attention Span & Concentration:  able to focus   Fund of Knowledge:  intact and appropriate to age and level of education     Assessment and Diagnosis   Status/Progress: Based on the examination  today, the patient's problem(s) is/are inadequately controlled.  New problems have not been presented today.   Co-morbidities may be complicating management of the primary condition.  There are no active rule-out diagnoses for this patient at this time.     General Impression:      ICD-10-CM ICD-9-CM   1. Bipolar 1 disorder  F31.9 296.7   2. Insomnia, unspecified type  G47.00 780.52   3. Vitamin D insufficiency  E55.9 268.9   4. Cannabis use, uncomplicated  F12.90 305.20   5. ERIC (obstructive sleep apnea)  G47.33 327.23           Intervention/Counseling/Treatment Plan   Medication Management: Continue current medications. The risks and benefits of medication were discussed with the patient.    -Instructed pt that if he has worsening fatigue or shortness of breath, weakness, chest pain, episodes of dizziness, LOC, go to ED immediately for further evaluation.    -continue seroquel 100mg qhs    -continue trazodone 100mg, take 1-2 tablets at bedtime as needed for insomnia    -referral ordered placed for sleep medicine. Pt has hx ERIC dx over 5 years ago. Never tx with CPAP    Return to Clinic: as scheduled        Trav Andrews PA-C        Total face to face time: 33 min  Total time (chart review, patient contact, documentation): 40 min      *This note has been prepared using a combination of a dictation device and typing.  It has been checked for errors but some errors may still exist within the note as a result of speech recognition errors and/or typographical errors.

## 2023-10-06 ENCOUNTER — PATIENT MESSAGE (OUTPATIENT)
Dept: ADMINISTRATIVE | Facility: OTHER | Age: 68
End: 2023-10-06
Payer: MEDICARE

## 2023-10-09 ENCOUNTER — PATIENT MESSAGE (OUTPATIENT)
Dept: INTERNAL MEDICINE | Facility: CLINIC | Age: 68
End: 2023-10-09
Payer: MEDICARE

## 2023-10-09 ENCOUNTER — PATIENT MESSAGE (OUTPATIENT)
Dept: NEUROLOGY | Facility: CLINIC | Age: 68
End: 2023-10-09
Payer: MEDICARE

## 2023-10-09 DIAGNOSIS — I10 HYPERTENSION, UNSPECIFIED TYPE: ICD-10-CM

## 2023-10-09 RX ORDER — ATORVASTATIN CALCIUM 40 MG/1
40 TABLET, FILM COATED ORAL DAILY
Qty: 90 TABLET | Refills: 2 | Status: SHIPPED | OUTPATIENT
Start: 2023-10-09

## 2023-10-09 NOTE — TELEPHONE ENCOUNTER
No care due was identified.  [facility] Embedded Care Due Messages. Reference number: [id number].   10/09/2023 1:30:27 PM CDT

## 2023-10-09 NOTE — TELEPHONE ENCOUNTER
Refill Routing Note   Medication(s) are not appropriate for processing by [facility] for the following reason(s):      New or recently adjusted medication    [facility] action(s):  Defer  Approve Care Due:  None identified              Appointments  past 12m or future 3m with PCP    Date Provider   Last Visit   5/23/2023 Jud Mo MD   Next Visit   Visit date not found Jud Mo MD   ED visits in past 90 days: 0        Note composed:5:10 PM 10/09/2023

## 2023-10-10 DIAGNOSIS — G25.0 ESSENTIAL TREMOR: ICD-10-CM

## 2023-10-10 DIAGNOSIS — G40.909 SEIZURE DISORDER: ICD-10-CM

## 2023-10-10 RX ORDER — LACOSAMIDE 200 MG/1
200 TABLET ORAL EVERY 12 HOURS
Qty: 180 TABLET | Refills: 0 | Status: SHIPPED | OUTPATIENT
Start: 2023-10-10 | End: 2023-10-19 | Stop reason: SDUPTHER

## 2023-10-10 RX ORDER — PROPRANOLOL HYDROCHLORIDE 60 MG/1
60 CAPSULE, EXTENDED RELEASE ORAL DAILY
Qty: 90 CAPSULE | Refills: 0 | Status: SHIPPED | OUTPATIENT
Start: 2023-10-10 | End: 2023-10-19 | Stop reason: SDUPTHER

## 2023-10-10 RX ORDER — AMLODIPINE BESYLATE 10 MG/1
10 TABLET ORAL DAILY
Qty: 90 TABLET | Refills: 3 | Status: SHIPPED | OUTPATIENT
Start: 2023-10-10

## 2023-10-19 ENCOUNTER — OFFICE VISIT (OUTPATIENT)
Dept: NEUROLOGY | Facility: CLINIC | Age: 68
End: 2023-10-19
Payer: MEDICARE

## 2023-10-19 VITALS
BODY MASS INDEX: 28.59 KG/M2 | DIASTOLIC BLOOD PRESSURE: 82 MMHG | WEIGHT: 234.81 LBS | SYSTOLIC BLOOD PRESSURE: 125 MMHG | HEART RATE: 84 BPM | HEIGHT: 76 IN

## 2023-10-19 DIAGNOSIS — M54.9 DORSALGIA, UNSPECIFIED: Primary | ICD-10-CM

## 2023-10-19 DIAGNOSIS — G25.0 ESSENTIAL TREMOR: ICD-10-CM

## 2023-10-19 DIAGNOSIS — G40.909 SEIZURE DISORDER: ICD-10-CM

## 2023-10-19 PROCEDURE — 3079F DIAST BP 80-89 MM HG: CPT | Mod: HCNC,CPTII,S$GLB, | Performed by: PSYCHIATRY & NEUROLOGY

## 2023-10-19 PROCEDURE — 3008F PR BODY MASS INDEX (BMI) DOCUMENTED: ICD-10-PCS | Mod: HCNC,CPTII,S$GLB, | Performed by: PSYCHIATRY & NEUROLOGY

## 2023-10-19 PROCEDURE — 1160F RVW MEDS BY RX/DR IN RCRD: CPT | Mod: HCNC,CPTII,S$GLB, | Performed by: PSYCHIATRY & NEUROLOGY

## 2023-10-19 PROCEDURE — 3008F BODY MASS INDEX DOCD: CPT | Mod: HCNC,CPTII,S$GLB, | Performed by: PSYCHIATRY & NEUROLOGY

## 2023-10-19 PROCEDURE — 99999 PR PBB SHADOW E&M-EST. PATIENT-LVL III: CPT | Mod: PBBFAC,HCNC,, | Performed by: PSYCHIATRY & NEUROLOGY

## 2023-10-19 PROCEDURE — 99214 OFFICE O/P EST MOD 30 MIN: CPT | Mod: HCNC,S$GLB,, | Performed by: PSYCHIATRY & NEUROLOGY

## 2023-10-19 PROCEDURE — 3074F PR MOST RECENT SYSTOLIC BLOOD PRESSURE < 130 MM HG: ICD-10-PCS | Mod: HCNC,CPTII,S$GLB, | Performed by: PSYCHIATRY & NEUROLOGY

## 2023-10-19 PROCEDURE — 99214 PR OFFICE/OUTPT VISIT, EST, LEVL IV, 30-39 MIN: ICD-10-PCS | Mod: HCNC,S$GLB,, | Performed by: PSYCHIATRY & NEUROLOGY

## 2023-10-19 PROCEDURE — 3079F PR MOST RECENT DIASTOLIC BLOOD PRESSURE 80-89 MM HG: ICD-10-PCS | Mod: HCNC,CPTII,S$GLB, | Performed by: PSYCHIATRY & NEUROLOGY

## 2023-10-19 PROCEDURE — 3044F PR MOST RECENT HEMOGLOBIN A1C LEVEL <7.0%: ICD-10-PCS | Mod: HCNC,CPTII,S$GLB, | Performed by: PSYCHIATRY & NEUROLOGY

## 2023-10-19 PROCEDURE — 3074F SYST BP LT 130 MM HG: CPT | Mod: HCNC,CPTII,S$GLB, | Performed by: PSYCHIATRY & NEUROLOGY

## 2023-10-19 PROCEDURE — 3044F HG A1C LEVEL LT 7.0%: CPT | Mod: HCNC,CPTII,S$GLB, | Performed by: PSYCHIATRY & NEUROLOGY

## 2023-10-19 PROCEDURE — 1126F AMNT PAIN NOTED NONE PRSNT: CPT | Mod: HCNC,CPTII,S$GLB, | Performed by: PSYCHIATRY & NEUROLOGY

## 2023-10-19 PROCEDURE — 1160F PR REVIEW ALL MEDS BY PRESCRIBER/CLIN PHARMACIST DOCUMENTED: ICD-10-PCS | Mod: HCNC,CPTII,S$GLB, | Performed by: PSYCHIATRY & NEUROLOGY

## 2023-10-19 PROCEDURE — 1126F PR PAIN SEVERITY QUANTIFIED, NO PAIN PRESENT: ICD-10-PCS | Mod: HCNC,CPTII,S$GLB, | Performed by: PSYCHIATRY & NEUROLOGY

## 2023-10-19 PROCEDURE — 1159F MED LIST DOCD IN RCRD: CPT | Mod: HCNC,CPTII,S$GLB, | Performed by: PSYCHIATRY & NEUROLOGY

## 2023-10-19 PROCEDURE — 1159F PR MEDICATION LIST DOCUMENTED IN MEDICAL RECORD: ICD-10-PCS | Mod: HCNC,CPTII,S$GLB, | Performed by: PSYCHIATRY & NEUROLOGY

## 2023-10-19 PROCEDURE — 99999 PR PBB SHADOW E&M-EST. PATIENT-LVL III: ICD-10-PCS | Mod: PBBFAC,HCNC,, | Performed by: PSYCHIATRY & NEUROLOGY

## 2023-10-19 RX ORDER — PROPRANOLOL HYDROCHLORIDE 60 MG/1
60 CAPSULE, EXTENDED RELEASE ORAL DAILY
Qty: 90 CAPSULE | Refills: 3 | Status: SHIPPED | OUTPATIENT
Start: 2023-10-19

## 2023-10-19 RX ORDER — LACOSAMIDE 200 MG/1
200 TABLET ORAL EVERY 12 HOURS
Qty: 180 TABLET | Refills: 1 | Status: SHIPPED | OUTPATIENT
Start: 2023-10-19 | End: 2024-02-10 | Stop reason: SDUPTHER

## 2023-10-19 NOTE — PROGRESS NOTES
[facility] - NEUROLOGY  [facility], [place]    Date: October 19, 2023   Patient Name: Mirza Morales   MRN: [id number]   PCP: Jud Mo  Referring Provider: No ref. provider found    Assessment:      This is Mirza Morales, 68 y.o. male with bipolar, epilepsy, EtOH abuse in remission, essential tremor, and lumbar degenerative disease s/p decompression 2015     Plan:      -  MRI L spine  -  Referral to spine clinic  -  Continue propranolol 60mg daily  -  Continue LCS 200mg bid    Follow up with 6 months       Greater than 30 minutes spent in chart review, documentation, independent review of imaging, and face to face time with patient    I discussed side effects of the medications. I asked the patient to stop the medication if he notices serious adverse effects as we discussed and to seek immediate medical attention at an ER.     Samuel Julien MD  [facility]   Department of Neurology    Subjective:        -  No seizures since 12/2022, tremor controlled  -  Progressive difficulty with gait and right foot drop over the past year, + low back pain    5/2022  -  Two seizures Tavares Shayla in the setting of stress of niece and her boyfriend who are troublesome staying at his house, compliant with medication  -  Tremor controlled with propranolol increase    6/2021  No seizure, partial improvement in tremor    HPI 5/2020:   Mr. Mirza Morales is a 68 y.o. male who presents with a chief complaint of seizure and tremor.  He typically follows with Dr. Ann for epilepsy and appointment was made with me today as he believed he needed additional appointment to get rx refills.    He continues to be seizure free on LCS 200mg bid and has noted improvement in tremor since full EtOH cessation following psych admit 1/2020 although persists to a great enough degree to interfere with ADL.  Tremor is bilateral and present more with action and intent than at rest and is not  constant.  It will at times make it difficulty for him to drink from a cup in the morning and he wishes to begin activities such as fishing although he fear he will not be able to do it due to tremor.    PAST MEDICAL HISTORY:  Past Medical History:   Diagnosis Date    Alcohol abuse     Allergy     Behavioral problem     Bipolar 1 disorder     Chronic right hip pain     Depression     DJD (degenerative joint disease), lumbar 1/27/2015    Fatigue     Hepatitis     pt. denies this    History of psychiatric care     History of psychiatric hospitalization     Hypertension     Karina     Post traumatic stress disorder     Psychiatric problem     Seizures     Suicide attempt     Therapy     Vitamin D insufficiency 3/17/2022       PAST SURGICAL HISTORY:  Past Surgical History:   Procedure Laterality Date    CHOLECYSTECTOMY  1990    COLONOSCOPY N/A 08/14/2019    Procedure: COLONOSCOPY;  Surgeon: Tammy Duval MD;  Location: [facility] ([address] [place] [address]);  Service: Endoscopy;  Laterality: N/A;    HERNIA REPAIR      SPINE SURGERY  11/12/2015    LAMINECTOMY/LUMBAR/WARE       CURRENT MEDS:  Current Outpatient Medications   Medication Sig Dispense Refill    amLODIPine (NORVASC) 10 MG tablet Take 1 tablet (10 mg total) by mouth once daily. 90 tablet 3    atorvastatin (LIPITOR) 40 MG tablet Take 1 tablet (40 mg total) by mouth once daily. 90 tablet 2    azelastine (ASTELIN) 137 mcg (0.1 %) nasal spray 1 spray (137 mcg total) by Nasal route 2 (two) times daily. (Patient not taking: Reported on 5/23/2023) 30 mL 1    CALCIUM POLYCARBOPHIL ORAL Take by mouth once daily.       cholecalciferol, vitamin D3, (VITAMIN D3) 25 mcg (1,000 unit) capsule Take 3 capsules (3,000 Units total) by mouth once daily. 180 capsule 2    lacosamide (VIMPAT) 200 mg Tab tablet Take 1 tablet (200 mg total) by mouth every 12 (twelve) hours. 180 tablet 1    pantoprazole (PROTONIX) 40 MG tablet Take 1 tablet (40 mg total) by mouth once daily. (Patient taking  differently: Take 40 mg by mouth daily as needed (heartburn).) 30 tablet 11    propranoloL (INDERAL LA) 60 MG 24 hr capsule Take 1 capsule (60 mg total) by mouth once daily. 90 capsule 3    QUEtiapine (SEROQUEL) 100 MG Tab Take one tablet at bedtime 90 tablet 1    sildenafiL (VIAGRA) 25 MG tablet Take 1 tablet (25 mg total) by mouth daily as needed for Erectile Dysfunction. 30 tablet 3    traZODone (DESYREL) 100 MG tablet TAKE 1-2 TABLETS BY MOUTH AT BEDTIME 180 tablet 2     No current facility-administered medications for this visit.       ALLERGIES:  Review of patient's allergies indicates:   Allergen Reactions    Gabapentin Other (See Comments)     SEVERE DIZZINESS AND BALANCE PROBLEMS, UNABLE TO WALK.    Nardil [phenelzine]      BECOMES HYPER, LIKE A MANIC EPISODE.    Penicillin     Penicillins Hives    Lamictal [lamotrigine] Rash       FAMILY HISTORY:  Family History   Problem Relation Age of Onset    Alcohol abuse Mother     Depression Mother     Mental illness Mother     Depression Father     Depression Sister     Suicide Sister     Drug abuse Brother     Anxiety disorder Brother     Bipolar disorder Brother     Depression Brother     Alcohol abuse Maternal Uncle     Alcohol abuse Paternal Uncle     Dementia Maternal Grandmother     Alcohol abuse Cousin     Amblyopia Neg Hx     Blindness Neg Hx     Cancer Neg Hx     Cataracts Neg Hx     Diabetes Neg Hx     Glaucoma Neg Hx     Hypertension Neg Hx     Macular degeneration Neg Hx     Retinal detachment Neg Hx     Strabismus Neg Hx     Stroke Neg Hx     Thyroid disease Neg Hx     Asthma Neg Hx     Emphysema Neg Hx        SOCIAL HISTORY:  Social History     Tobacco Use    Smoking status: Every Day     Current packs/day: 0.50     Average packs/day: 0.5 packs/day for 10.0 years (5.0 ttl pk-yrs)     Types: Cigarettes    Smokeless tobacco: Never    Tobacco comments:     8/31/2023      Patient states he only smokes 5 cigarettes a day   Substance Use Topics    Alcohol  "use: Not Currently     Comment: quit 4 years ago    Drug use: Not Currently       Review of Systems:  12 review of systems is negative except for the symptoms mentioned in HPI.        Objective:     Vitals:    10/19/23 1456   BP: 125/82   Pulse: 84   Weight: 106.5 kg (234 lb 12.6 oz)   Height: 6' 4" (1.93 m)         General: NAD, well nourished   Eyes: no tearing, discharge, no erythema   ENT: moist mucous membranes of the oral cavity, nares patent    Neck: Supple, full range of motion  Cardiovascular:  well perfused  Lungs: Normal work of breathing, normal chest wall excursions  Skin: No rash, lesions, or breakdown on exposed skin  Psychiatry: Mood and affect are appropriate   Abdomen: non distended  Extremeties: No cyanosis, clubbing or edema.    Neurological   MENTAL STATUS: Alert and oriented to person, place, and time. Attention and concentration within normal limits. Speech without dysarthria, able to name and repeat without difficulty. Recent and remote memory within normal limits   CRANIAL NERVES: Visual fields intact. PERRL. EOMI. Facial sensation intact. Face symmetrical. Hearing grossly intact. Full shoulder shrug bilaterally. Tongue protrudes midline   SENSORY: Sensation is intact to light touch throughout.   MOTOR: Normal bulk and tone. No pronator drift.    No bradykinesias or tremor noted  Right dorsiflexion 3/5, left dorsiflexion 5/5  CEREBELLAR/COORDINATION/GAIT: Finger to nose intact.  Gait with right steppage    "

## 2023-10-23 ENCOUNTER — TELEPHONE (OUTPATIENT)
Dept: ADMINISTRATIVE | Facility: HOSPITAL | Age: 68
End: 2023-10-23
Payer: MEDICARE

## 2023-10-24 ENCOUNTER — OFFICE VISIT (OUTPATIENT)
Dept: DERMATOLOGY | Facility: CLINIC | Age: 68
End: 2023-10-24
Payer: MEDICARE

## 2023-10-24 DIAGNOSIS — L57.0 ACTINIC KERATOSIS: ICD-10-CM

## 2023-10-24 DIAGNOSIS — D18.01 CHERRY ANGIOMA: ICD-10-CM

## 2023-10-24 DIAGNOSIS — L81.4 LENTIGO: ICD-10-CM

## 2023-10-24 DIAGNOSIS — L72.0 EIC (EPIDERMAL INCLUSION CYST): ICD-10-CM

## 2023-10-24 DIAGNOSIS — L82.1 SEBORRHEIC KERATOSES: ICD-10-CM

## 2023-10-24 DIAGNOSIS — D48.5 NEOPLASM OF UNCERTAIN BEHAVIOR OF SKIN: Primary | ICD-10-CM

## 2023-10-24 DIAGNOSIS — Z12.83 SKIN CANCER SCREENING: ICD-10-CM

## 2023-10-24 PROCEDURE — 99999 PR PBB SHADOW E&M-EST. PATIENT-LVL IV: CPT | Mod: PBBFAC,HCNC,, | Performed by: STUDENT IN AN ORGANIZED HEALTH CARE EDUCATION/TRAINING PROGRAM

## 2023-10-24 PROCEDURE — 1159F PR MEDICATION LIST DOCUMENTED IN MEDICAL RECORD: ICD-10-PCS | Mod: HCNC,CPTII,S$GLB, | Performed by: STUDENT IN AN ORGANIZED HEALTH CARE EDUCATION/TRAINING PROGRAM

## 2023-10-24 PROCEDURE — 11104 PR PUNCH BIOPSY, SKIN, SINGLE LESION: ICD-10-PCS | Mod: HCNC,S$GLB,, | Performed by: STUDENT IN AN ORGANIZED HEALTH CARE EDUCATION/TRAINING PROGRAM

## 2023-10-24 PROCEDURE — 88305 TISSUE EXAM BY PATHOLOGIST: CPT | Mod: 26,HCNC,, | Performed by: PATHOLOGY

## 2023-10-24 PROCEDURE — 11104 PUNCH BX SKIN SINGLE LESION: CPT | Mod: HCNC,S$GLB,, | Performed by: STUDENT IN AN ORGANIZED HEALTH CARE EDUCATION/TRAINING PROGRAM

## 2023-10-24 PROCEDURE — 1126F PR PAIN SEVERITY QUANTIFIED, NO PAIN PRESENT: ICD-10-PCS | Mod: HCNC,CPTII,S$GLB, | Performed by: STUDENT IN AN ORGANIZED HEALTH CARE EDUCATION/TRAINING PROGRAM

## 2023-10-24 PROCEDURE — 17003 DESTRUCT PREMALG LES 2-14: CPT | Mod: HCNC,S$GLB,, | Performed by: STUDENT IN AN ORGANIZED HEALTH CARE EDUCATION/TRAINING PROGRAM

## 2023-10-24 PROCEDURE — 99203 OFFICE O/P NEW LOW 30 MIN: CPT | Mod: 25,HCNC,S$GLB, | Performed by: STUDENT IN AN ORGANIZED HEALTH CARE EDUCATION/TRAINING PROGRAM

## 2023-10-24 PROCEDURE — 3044F HG A1C LEVEL LT 7.0%: CPT | Mod: HCNC,CPTII,S$GLB, | Performed by: STUDENT IN AN ORGANIZED HEALTH CARE EDUCATION/TRAINING PROGRAM

## 2023-10-24 PROCEDURE — 99999 PR PBB SHADOW E&M-EST. PATIENT-LVL IV: ICD-10-PCS | Mod: PBBFAC,HCNC,, | Performed by: STUDENT IN AN ORGANIZED HEALTH CARE EDUCATION/TRAINING PROGRAM

## 2023-10-24 PROCEDURE — 11103 TANGNTL BX SKIN EA SEP/ADDL: CPT | Mod: 59,HCNC,S$GLB, | Performed by: STUDENT IN AN ORGANIZED HEALTH CARE EDUCATION/TRAINING PROGRAM

## 2023-10-24 PROCEDURE — 11103 PR TANGENTIAL BIOPSY, SKIN, EA ADDTL LESION: ICD-10-PCS | Mod: 59,HCNC,S$GLB, | Performed by: STUDENT IN AN ORGANIZED HEALTH CARE EDUCATION/TRAINING PROGRAM

## 2023-10-24 PROCEDURE — 99203 PR OFFICE/OUTPT VISIT, NEW, LEVL III, 30-44 MIN: ICD-10-PCS | Mod: 25,HCNC,S$GLB, | Performed by: STUDENT IN AN ORGANIZED HEALTH CARE EDUCATION/TRAINING PROGRAM

## 2023-10-24 PROCEDURE — 1159F MED LIST DOCD IN RCRD: CPT | Mod: HCNC,CPTII,S$GLB, | Performed by: STUDENT IN AN ORGANIZED HEALTH CARE EDUCATION/TRAINING PROGRAM

## 2023-10-24 PROCEDURE — 1160F RVW MEDS BY RX/DR IN RCRD: CPT | Mod: HCNC,CPTII,S$GLB, | Performed by: STUDENT IN AN ORGANIZED HEALTH CARE EDUCATION/TRAINING PROGRAM

## 2023-10-24 PROCEDURE — 88305 TISSUE EXAM BY PATHOLOGIST: CPT | Mod: HCNC | Performed by: PATHOLOGY

## 2023-10-24 PROCEDURE — 1126F AMNT PAIN NOTED NONE PRSNT: CPT | Mod: HCNC,CPTII,S$GLB, | Performed by: STUDENT IN AN ORGANIZED HEALTH CARE EDUCATION/TRAINING PROGRAM

## 2023-10-24 PROCEDURE — 17000 DESTRUCT PREMALG LESION: CPT | Mod: XS,HCNC,S$GLB, | Performed by: STUDENT IN AN ORGANIZED HEALTH CARE EDUCATION/TRAINING PROGRAM

## 2023-10-24 PROCEDURE — 88305 TISSUE EXAM BY PATHOLOGIST: ICD-10-PCS | Mod: 26,HCNC,, | Performed by: PATHOLOGY

## 2023-10-24 PROCEDURE — 3044F PR MOST RECENT HEMOGLOBIN A1C LEVEL <7.0%: ICD-10-PCS | Mod: HCNC,CPTII,S$GLB, | Performed by: STUDENT IN AN ORGANIZED HEALTH CARE EDUCATION/TRAINING PROGRAM

## 2023-10-24 PROCEDURE — 1160F PR REVIEW ALL MEDS BY PRESCRIBER/CLIN PHARMACIST DOCUMENTED: ICD-10-PCS | Mod: HCNC,CPTII,S$GLB, | Performed by: STUDENT IN AN ORGANIZED HEALTH CARE EDUCATION/TRAINING PROGRAM

## 2023-10-24 PROCEDURE — 17000 PR DESTRUCTION(LASER SURGERY,CRYOSURGERY,CHEMOSURGERY),PREMALIGNANT LESIONS,FIRST LESION: ICD-10-PCS | Mod: XS,HCNC,S$GLB, | Performed by: STUDENT IN AN ORGANIZED HEALTH CARE EDUCATION/TRAINING PROGRAM

## 2023-10-24 PROCEDURE — 17003 DESTRUCTION, PREMALIGNANT LESIONS; SECOND THROUGH 14 LESIONS: ICD-10-PCS | Mod: HCNC,S$GLB,, | Performed by: STUDENT IN AN ORGANIZED HEALTH CARE EDUCATION/TRAINING PROGRAM

## 2023-10-24 NOTE — PROGRESS NOTES
Subjective:      Patient ID:  Mirza Morales is a 68 y.o. male who presents for   Chief Complaint   Patient presents with    Skin Check     UBSE     Pt here for UBSE  No h/o nmsc or mm    Patient is here today for a skin check.   Pt has a history of  moderate sun exposure in the past.   Pt recalls several blistering sunburns in the past- yes  Pt has history of tanning bed use- no  Pt has  had moles removed in the past- no  Pt has history of melanoma in first degree relatives-  no    Other concerns: spot on lower back, left chest, and right temple     Review of Systems   Skin:  Positive for wears hat. Negative for daily sunscreen use and activity-related sunscreen use.   Hematologic/Lymphatic: Bruises/bleeds easily.       Objective:   Physical Exam   Constitutional: He appears well-developed and well-nourished. No distress.   Neurological: He is alert and oriented to person, place, and time. He is not disoriented.   Psychiatric: He has a normal mood and affect.   Skin:   Areas Examined (abnormalities noted in diagram):   Scalp / Hair Palpated and Inspected  Head / Face Inspection Performed  Neck Inspection Performed  Chest / Axilla Inspection Performed  Abdomen Inspection Performed  Back Inspection Performed  RUE Inspected  LUE Inspection Performed                     Diagram Legend     Erythematous scaling macule/papule c/w actinic keratosis       Vascular papule c/w angioma      Pigmented verrucoid papule/plaque c/w seborrheic keratosis      Yellow umbilicated papule c/w sebaceous hyperplasia      Irregularly shaped tan macule c/w lentigo     1-2 mm smooth white papules consistent with Milia      Movable subcutaneous cyst with punctum c/w epidermal inclusion cyst      Subcutaneous movable cyst c/w pilar cyst      Firm pink to brown papule c/w dermatofibroma      Pedunculated fleshy papule(s) c/w skin tag(s)      Evenly pigmented macule c/w junctional nevus     Mildly variegated pigmented, slightly  irregular-bordered macule c/w mildly atypical nevus      Flesh colored to evenly pigmented papule c/w intradermal nevus       Pink pearly papule/plaque c/w basal cell carcinoma      Erythematous hyperkeratotic cursted plaque c/w SCC      Surgical scar with no sign of skin cancer recurrence      Open and closed comedones      Inflammatory papules and pustules      Verrucoid papule consistent consistent with wart     Erythematous eczematous patches and plaques     Dystrophic onycholytic nail with subungual debris c/w onychomycosis     Umbilicated papule    Erythematous-base heme-crusted tan verrucoid plaque consistent with inflamed seborrheic keratosis     Erythematous Silvery Scaling Plaque c/w Psoriasis     See annotation                    Assessment / Plan:      Pathology Orders:       Normal Orders This Visit    Specimen to Pathology, Dermatology     Questions:    Procedure Type: Dermatology and skin neoplasms    Number of Specimens: 2    ------------------------: -------------------------    Spec 1 Procedure: Biopsy    Spec 1 Clinical Impression: melanoma, IDN, dysplastic nevus    Spec 1 Source: left neck    ------------------------: -------------------------    Spec 2 Procedure: Biopsy    Spec 2 Clinical Impression: iSK, SCC, BCC    Spec 2 Source: right posterior arm    Release to patient: Immediate          Neoplasm of uncertain behavior of skin  -     Specimen to Pathology, Dermatology    Punch biopsy procedure note: left neck  Punch biopsy performed after verbal consent obtained. Area marked and prepped with alcohol. Approximately 1cc of 1% lidocaine with epinephrine injected. 4 mm disposable punch used to remove lesion. Hemostasis obtained and biopsy site closed with 1 - 2 Prolene sutures. Wound care instructions reviewed with patient and handout given.    Shave biopsy procedure note: right posterior arm    Shave biopsy performed after verbal consent including risk of infection, scar, recurrence, need for  additional treatment of site. Area prepped with alcohol, anesthetized with approximately 1.0cc of 1% lidocaine with epinephrine. Lesional tissue shaved with razor blade. Hemostasis achieved with application of aluminum chloride followed by hyfrecation. No complications. Dressing applied. Wound care explained.        Actinic keratosis  Cryosurgery Procedure Note    Verbal consent from the patient is obtained including, but not limited to, risk of hypopigmentation/hyperpigmentation, scar, recurrence of lesion. The patient is aware of the precancerous quality and need for treatment of these lesions. Liquid nitrogen cryosurgery is applied to the 7 actinic keratoses, as detailed in the physical exam, to produce a freeze injury. The patient is aware that blisters may form and is instructed on wound care with gentle cleansing and use of vaseline ointment to keep moist until healed. The patient is supplied a handout on cryosurgery and is instructed to call if lesions do not completely resolve.    Seborrheic keratoses  Lentigo  Cherry angioma  EIC (epidermal inclusion cyst)  Reassurance given to patient. No treatment is necessary.   Treatment of benign, asymptomatic lesions may be considered cosmetic.    Skin cancer screening    Upper body skin examination performed today including at least 6 points as noted in physical examination. Suspicious lesions noted.    Recommend daily sun protection/avoidance and use of at least SPF 30, broad spectrum sunscreen (OTC drug).            Follow up in about 6 months (around 4/24/2024) for TBSE.

## 2023-10-24 NOTE — PATIENT INSTRUCTIONS
Punch Biopsy Wound Care    Your doctor has performed a punch biopsy today.  A band aid and antibiotic ointment has been placed over the site.  This should remain in place for 24 hours.  It is recommended that you keep the area dry for the first 24 hours.  After 24 hours, you may remove the band aid and wash the area with warm soap and water and apply Vaseline jelly.  Many patients prefer to use Neosporin or Bacitracin ointment.  This is acceptable; however know that you can develop an allergy to this medication even if you have used it safely for years.  It is important to keep the area moist.  Letting it dry out and get air slows healing time, will worsen the scar, and make it more difficult to remove the stitches if they were placed.  Band aid is optional after first 24 hours.      If you notice increasing redness, tenderness, pain, or yellow drainage at the biopsy or surgical site, please notify your doctor.  These are signs of an infection.    If your biopsy/surgical site is bleeding, apply firm pressure for 15 minutes straight.  Repeat for another 15 minutes, if it is still bleeding.   If the surgical site continues to bleed, then please contact your doctor.      For [facility] users:   You will receive your biopsy results in [facility] as soon as they are available. Please be assured that your physician/provider will review your results and will then determine what further treatment, evaluation, or planning is required. You should be contacted by your physician's/provider's office within 5 business days of receiving your results; If not, please reach out to directly. This is one more way [facility] is putting you first.       [facility] [place] [address]/ [phone] FAX/ [email or web address]           Shave Biopsy Wound Care    Your doctor has performed a shave biopsy today.  A band aid and vaseline ointment has been placed over the site.  This should remain in place for NO LONGER  THAN 48 hours.  It is fine to remove the bandaid after 24 hours, if the area is no longer bleeding. It is recommended that you keep the area dry (do not wet)) for the first 24 hours.  After 24 hours, wash the area with warm soap and water and apply Vaseline jelly.  Many patients prefer to use Neosporin or Bacitracin ointment.  This is acceptable; however, know that you can develop an allergy to this medication even if you have used it safely for years.  It is important to keep the area moist.  Letting it dry out and get air slows healing time, and will worsen the scar.        If you notice increasing redness, tenderness, pain, or yellow drainage at the biopsy site, please notify your doctor.  These are signs of an infection.    If your biopsy site is bleeding, apply firm pressure for 15 minutes straight.  Repeat for another 15 minutes, if it is still bleeding.   If the surgical site continues to bleed, then please contact your doctor.      For [facility] users:   You will receive your biopsy results in [facility] as soon as they are available. Please be assured that your physician/provider will review your results and will then determine what further treatment, evaluation, or planning is required. You should be contacted by your physician's/provider's office within 5 business days of receiving your results; If not, please reach out to directly. This is one more way [facility] is putting you first.     [facility] [place] [address]/ [phone] FAX/ [email or web address]         CRYOSURGERY      Your doctor has used a method called cryosurgery to treat your skin condition. Cryosurgery refers to the use of very cold substances to treat a variety of skin conditions such as warts, pre-skin cancers, molluscum contagiosum, sun spots, and several benign growths. The substance we use in cryosurgery is liquid nitrogen and is so cold (-195 degrees Celsius) that is burns when administered.     Following  treatment in the office, the skin may immediately burn and become red. You may find the area around the lesion is affected as well. It is sometimes necessary to treat not only the lesion, but a small area of the surrounding normal skin to achieve a good response.     A blister, and even a blood filled blister, may form after treatment.   This is a normal response. If the blister is painful, it is acceptable to sterilize a needle and with rubbing alcohol and gently pop the blister. It is important that you gently wash the area with soap and warm water as the blister fluid may contain wart virus if a wart was treated. Do no remove the roof of the blister.     The area treated can take anywhere from 1-3 weeks to heal. Healing time depends on the kind skin lesion treated, the location, and how aggressively the lesion was treated. It is recommended that the areas treated are covered with Vaseline or bacitracin ointment and a band-aid. If a band-aid is not practical, just ointment applied several times per day will do. Keeping these areas moist will speed the healing time.    Treatment with liquid nitrogen can leave a scar. In dark skin, it may be a light or dark scar, in light skin it may be a white or pink scar. These will generally fade with time, but may never go away completely.     If you have any concerns after your treatment, please feel free to call the office.       [facility] [place] [address]/ [phone] FAX/ [email or web address]

## 2023-10-30 LAB
FINAL PATHOLOGIC DIAGNOSIS: NORMAL
GROSS: NORMAL
Lab: NORMAL
MICROSCOPIC EXAM: NORMAL

## 2023-11-07 ENCOUNTER — CLINICAL SUPPORT (OUTPATIENT)
Dept: DERMATOLOGY | Facility: CLINIC | Age: 68
End: 2023-11-07
Payer: MEDICARE

## 2023-11-07 DIAGNOSIS — Z48.02 ENCOUNTER FOR REMOVAL OF SUTURES: Primary | ICD-10-CM

## 2023-11-07 PROCEDURE — 99024 PR POST-OP FOLLOW-UP VISIT: ICD-10-PCS | Mod: HCNC,S$GLB,, | Performed by: STUDENT IN AN ORGANIZED HEALTH CARE EDUCATION/TRAINING PROGRAM

## 2023-11-07 PROCEDURE — 99024 POSTOP FOLLOW-UP VISIT: CPT | Mod: HCNC,S$GLB,, | Performed by: STUDENT IN AN ORGANIZED HEALTH CARE EDUCATION/TRAINING PROGRAM

## 2023-11-07 NOTE — PROGRESS NOTES
CC: 68 y.o.male patient is here for suture removal.     HPI: Patient is s/p punch biopsy/excision of lesion on left neck on 10/24/23.  Patient reports no problems.    WOUND PE:  Sutures intact.  Wound healing well.  Good approximation of skin edges.  No signs or symptoms of infection.    IMPRESSION:  Skin, left neck, punch biopsy:   - BASAL CELL CARCINOMA, SUPERFICIAL TYPE, PIGMENTED.   - THE TUMOR CLOSELY APPROACHES A PERIPHERAL BIOPSY MARGIN.     PLAN:  Sutures removed today.  Continue wound care.    RTC: In 6 month(s) for skin check or sooner should any new concerns arise

## 2023-11-10 ENCOUNTER — TELEPHONE (OUTPATIENT)
Dept: ORTHOPEDICS | Facility: CLINIC | Age: 68
End: 2023-11-10
Payer: MEDICARE

## 2023-11-10 ENCOUNTER — TELEPHONE (OUTPATIENT)
Dept: DERMATOLOGY | Facility: CLINIC | Age: 68
End: 2023-11-10
Payer: MEDICARE

## 2023-11-10 NOTE — TELEPHONE ENCOUNTER
----- Message from J Carlos Mccallum MD sent at 10/31/2023  4:01 PM CDT -----  1. Skin, left neck, punch biopsy:   - BASAL CELL CARCINOMA, SUPERFICIAL TYPE, PIGMENTED.     Staff: please schedule ED&C    2. Skin, right posterior arm, shave biopsy:   - BENIGN LICHENOID KERATOSIS.       Benign. No treatment needed

## 2023-11-10 NOTE — TELEPHONE ENCOUNTER
Attempt to contact patient regarding clarification of visit. Also left number for patient to return call back to [phone]. Thanks.

## 2023-11-16 ENCOUNTER — HOSPITAL ENCOUNTER (OUTPATIENT)
Dept: RADIOLOGY | Facility: HOSPITAL | Age: 68
Discharge: HOME | End: 2023-11-16
Attending: PSYCHIATRY & NEUROLOGY
Payer: MEDICARE

## 2023-11-16 DIAGNOSIS — M54.9 DORSALGIA, UNSPECIFIED: ICD-10-CM

## 2023-11-16 PROCEDURE — 72148 MRI LUMBAR SPINE W/O DYE: CPT | Mod: TC,HCNC

## 2023-11-16 PROCEDURE — 72148 MRI LUMBAR SPINE W/O DYE: CPT | Mod: 26,HCNC,, | Performed by: RADIOLOGY

## 2023-11-16 PROCEDURE — 72148 MRI LUMBAR SPINE WITHOUT CONTRAST: ICD-10-PCS | Mod: 26,HCNC,, | Performed by: RADIOLOGY

## 2023-11-17 ENCOUNTER — TELEPHONE (OUTPATIENT)
Dept: ORTHOPEDICS | Facility: CLINIC | Age: 68
End: 2023-11-17
Payer: MEDICARE

## 2023-11-17 DIAGNOSIS — R26.2 WALKING TROUBLES: Primary | ICD-10-CM

## 2023-11-17 DIAGNOSIS — M51.36 DDD (DEGENERATIVE DISC DISEASE), LUMBAR: Primary | ICD-10-CM

## 2023-11-17 NOTE — TELEPHONE ENCOUNTER
Attempt to contact patient regarding clarification on reason for visit to put in the correct x-ray. Also left number for patient to return call back to [phone]. Thanks.

## 2023-11-20 ENCOUNTER — TELEPHONE (OUTPATIENT)
Dept: ORTHOPEDICS | Facility: CLINIC | Age: 68
End: 2023-11-20
Payer: MEDICARE

## 2023-11-20 NOTE — TELEPHONE ENCOUNTER
Attempt to contact patient regarding x-ray. Left message stating that his x-ray are scheduled at the [facility] for 12:45 pm on 11/20/2023. Also left number for patient to return call back to [phone]. Thanks.

## 2023-12-12 ENCOUNTER — PATIENT MESSAGE (OUTPATIENT)
Dept: DERMATOLOGY | Facility: CLINIC | Age: 68
End: 2023-12-12
Payer: MEDICARE

## 2023-12-19 ENCOUNTER — OFFICE VISIT (OUTPATIENT)
Dept: PSYCHIATRY | Facility: CLINIC | Age: 68
End: 2023-12-19
Payer: MEDICARE

## 2023-12-19 DIAGNOSIS — F31.9 BIPOLAR 1 DISORDER: Primary | ICD-10-CM

## 2023-12-19 DIAGNOSIS — F10.21 ALCOHOL DEPENDENCE IN REMISSION: ICD-10-CM

## 2023-12-19 DIAGNOSIS — F12.90 CANNABIS USE, UNCOMPLICATED: ICD-10-CM

## 2023-12-19 DIAGNOSIS — G47.00 INSOMNIA, UNSPECIFIED TYPE: ICD-10-CM

## 2023-12-19 DIAGNOSIS — E55.9 VITAMIN D INSUFFICIENCY: ICD-10-CM

## 2023-12-19 PROCEDURE — 1159F PR MEDICATION LIST DOCUMENTED IN MEDICAL RECORD: ICD-10-PCS | Mod: HCNC,CPTII,95, | Performed by: PHYSICIAN ASSISTANT

## 2023-12-19 PROCEDURE — 3044F HG A1C LEVEL LT 7.0%: CPT | Mod: HCNC,CPTII,95, | Performed by: PHYSICIAN ASSISTANT

## 2023-12-19 PROCEDURE — 1160F PR REVIEW ALL MEDS BY PRESCRIBER/CLIN PHARMACIST DOCUMENTED: ICD-10-PCS | Mod: HCNC,CPTII,95, | Performed by: PHYSICIAN ASSISTANT

## 2023-12-19 PROCEDURE — 1159F MED LIST DOCD IN RCRD: CPT | Mod: HCNC,CPTII,95, | Performed by: PHYSICIAN ASSISTANT

## 2023-12-19 PROCEDURE — 99214 OFFICE O/P EST MOD 30 MIN: CPT | Mod: HCNC,95,, | Performed by: PHYSICIAN ASSISTANT

## 2023-12-19 PROCEDURE — 3044F PR MOST RECENT HEMOGLOBIN A1C LEVEL <7.0%: ICD-10-PCS | Mod: HCNC,CPTII,95, | Performed by: PHYSICIAN ASSISTANT

## 2023-12-19 PROCEDURE — 99214 PR OFFICE/OUTPT VISIT, EST, LEVL IV, 30-39 MIN: ICD-10-PCS | Mod: HCNC,95,, | Performed by: PHYSICIAN ASSISTANT

## 2023-12-19 PROCEDURE — 1160F RVW MEDS BY RX/DR IN RCRD: CPT | Mod: HCNC,CPTII,95, | Performed by: PHYSICIAN ASSISTANT

## 2023-12-19 RX ORDER — QUETIAPINE FUMARATE 100 MG/1
TABLET, FILM COATED ORAL
Qty: 90 TABLET | Refills: 1 | Status: SHIPPED | OUTPATIENT
Start: 2023-12-19 | End: 2024-02-24 | Stop reason: SDUPTHER

## 2023-12-19 RX ORDER — TRAZODONE HYDROCHLORIDE 100 MG/1
TABLET ORAL
Qty: 180 TABLET | Refills: 1 | Status: SHIPPED | OUTPATIENT
Start: 2023-12-19

## 2023-12-19 NOTE — PROGRESS NOTES
"The patient location is: [place]    Visit type: audiovisual    Each patient to whom he or she provides medical services by telemedicine is:  (1) informed of the relationship between the physician and patient and the respective role of any other health care provider with respect to management of the patient; and (2) notified that he or she may decline to receive medical services by telemedicine and may withdraw from such care at any time.    Notes:     Outpatient Psychiatry Follow-Up Visit (MD/BEATRICE)    12/19/2023    Clinical Status of Patient:  Outpatient (Ambulatory)    Chief Complaint:  Mirza Morales is a 68 y.o. male who presents today for follow-up of mood disorder and substance problems.  Met with patient.      Interval History and Content of Current Session 12/19/2023:    Pt reports today: "things are pretty good since. Mornings are tough but then they get better    Pt has not f/u with sleep medicine, hx ERIC pt has significant daytime fatigue and discussed today and last visit importance of treating ERIC for depression and anxiety.     Pt reports he will contact sleep medicine clinic and make an appointment.    Mood overall is "better overall, things are pretty good"    Patients mood is steady, affect appears mood congruent. Linear and logical, friendly and cooperative, good eye contact.    Denies SI/HI/AVH. Pt reports sleeping well trazodone and seroquel and normal appetite. Denies side effects of medications.    Pt reports taking medications as prescribed and behaving appropriately during interview today.      Prior visit:    Pt reports today: "feel fatigued. Have shortness of breath, I've tried a lot of medications. Just weary of it all."     Pt reports hx ERIC but reports never wore cpap. Dx ERIC >5 years ago and states never tried cpap. Pt also has ILD being monitored by pulmonology. Discussed with pt need to see sleep medicine doctor and get restarted on cpap. Placed referral for sleep medicine. " "    Pt reports poor appetite about 1 meal per day. States is sleeping well at night but states never feels rested and is more tired when he wakes up in the morning than the night before.    Reports waking up in morning very depressed, states becomes less depressed throughout the day.    Mood overall is "down, depressed, tired all the time. Cant get out of bed I'm so tired".    Patients mood is depressive, affect appears mood congruent. Linear and logical, friendly and cooperative, good eye contact.    Denies SI/HI/AVH. Pt reports sleeping well and normal appetite. Denies side effects of medications.    Pt reports taking medications as prescribed and behaving appropriately during interview today.  Review of Systems     Review of Systems   Constitutional:  Positive for malaise/fatigue. Negative for fever.   HENT:  Negative for sore throat.    Eyes:  Negative for photophobia.   Respiratory:  Positive for shortness of breath (on exertion. dx ILD). Negative for cough and wheezing.    Cardiovascular:  Negative for chest pain, palpitations and PND.   Gastrointestinal:  Negative for abdominal pain.   Genitourinary:  Negative for dysuria.   Musculoskeletal:  Negative for myalgias.   Skin:  Negative for rash.   Neurological:  Negative for dizziness.   Endo/Heme/Allergies:  Does not bruise/bleed easily.   Psychiatric/Behavioral:  Positive for depression. Negative for hallucinations and suicidal ideas. The patient is not nervous/anxious and does not have insomnia.        Psychiatric Review Of Systems - Is patient experiencing or having changes in:  sleep: no  appetite: normalizing  weight: no  energy/anergy: yes  interest/pleasure/anhedonia: no  somatic symptoms: no  libido: no  anxiety/panic: no  guilty/hopelessness: no  concentration: yes  S.I.B.s/risky behavior: no  Irritability: no  Racing thoughts: no  Impulsive behaviors: no  Paranoia: no  AVH: no      Past Medical, Family and Social History: The patient's past medical, " family and social history have been reviewed and updated as appropriate within the electronic medical record - see encounter notes.      Current Medications:   Medication List with Changes/Refills   Current Medications    AMLODIPINE (NORVASC) 10 MG TABLET    Take 1 tablet (10 mg total) by mouth once daily.    ATORVASTATIN (LIPITOR) 40 MG TABLET    Take 1 tablet (40 mg total) by mouth once daily.    AZELASTINE (ASTELIN) 137 MCG (0.1 %) NASAL SPRAY    1 spray (137 mcg total) by Nasal route 2 (two) times daily.    CALCIUM POLYCARBOPHIL ORAL    Take by mouth once daily.     CHOLECALCIFEROL, VITAMIN D3, (VITAMIN D3) 25 MCG (1,000 UNIT) CAPSULE    Take 3 capsules (3,000 Units total) by mouth once daily.    LACOSAMIDE (VIMPAT) 200 MG TAB TABLET    Take 1 tablet (200 mg total) by mouth every 12 (twelve) hours.    PANTOPRAZOLE (PROTONIX) 40 MG TABLET    Take 1 tablet (40 mg total) by mouth once daily.    PROPRANOLOL (INDERAL LA) 60 MG 24 HR CAPSULE    Take 1 capsule (60 mg total) by mouth once daily.    QUETIAPINE (SEROQUEL) 100 MG TAB    Take one tablet at bedtime    SILDENAFIL (VIAGRA) 25 MG TABLET    Take 1 tablet (25 mg total) by mouth daily as needed for Erectile Dysfunction.    TRAZODONE (DESYREL) 100 MG TABLET    TAKE 1-2 TABLETS BY MOUTH AT BEDTIME         Allergies:   Review of patient's allergies indicates:   Allergen Reactions    Gabapentin Other (See Comments)     SEVERE DIZZINESS AND BALANCE PROBLEMS, UNABLE TO WALK.    Nardil [phenelzine]      BECOMES HYPER, LIKE A MANIC EPISODE.    Penicillin     Penicillins Hives    Lamictal [lamotrigine] Rash         Vitals   There were no vitals filed for this visit.       Labs/Imaging/Studies:   No results found for this or any previous visit (from the past 48 hour(s)).   Lab Results   Component Value Date    VALPROATE 48.2 (L) 09/29/2019       Compliance: yes    Side effects: None    Risk Parameters:  Patient reports no suicidal ideation  Patient reports no homicidal  ideation  Patient reports no self-injurious behavior  Patient reports no violent behavior    Exam (detailed: at least 9 elements; comprehensive: all 15 elements)   Constitutional  Vitals:  Most recent vital signs, dated greater than 90 days prior to this appointment, were reviewed.   There were no vitals filed for this visit.     General:  Wnl, appropriate     Musculoskeletal  Muscle Strength/Tone:  not examined   Gait & Station:  Not examined     Psychiatric  Speech:  no latency; no press   Mood & Affect:  steady  Mood congruent   Thought Process:  normal and logical   Associations:  intact   Thought Content:  normal, no suicidality, no homicidality, delusions, or paranoia   Insight:  intact, has awareness of illness   Judgement: behavior is adequate to circumstances   Orientation:  grossly intact   Memory: intact for content of interview   Language: grossly intact   Attention Span & Concentration:  able to focus   Fund of Knowledge:  intact and appropriate to age and level of education     Assessment and Diagnosis   Status/Progress: Based on the examination today, the patient's problem(s) is/are improved and adequately but not ideally controlled.  New problems have not been presented today.   Co-morbidities may be complicating management of the primary condition.  There are no active rule-out diagnoses for this patient at this time.     General Impression:      ICD-10-CM ICD-9-CM   1. Bipolar 1 disorder  F31.9 296.7   2. Insomnia, unspecified type  G47.00 780.52   3. Vitamin D insufficiency  E55.9 268.9   4. Cannabis use, uncomplicated  F12.90 305.20   5. Alcohol dependence in remission  F10.21 303.93         Intervention/Counseling/Treatment Plan   Medication Management: Continue current medications. The risks and benefits of medication were discussed with the patient.    -continue seroquel 100mg qhs    -continue trazodone 100mg, take 1-2 tablets at bedtime as needed for insomnia    -referral ordered placed for  sleep medicine at last visit-pt instructed to make appt with sleep medicine provider.   Pt has hx ERIC dx over 5 years ago. Never tx with CPAP    The risks and benefits of medication were discussed with the patient.  Discussed diagnosis, risks and benefits of proposed treatment above vs alternative treatments vs no treatment, and potential side effects of these treatments. The patient expresses understanding of the above and displays the capacity to agree with this treatment given said understanding. Patient also agrees that, currently, the benefits outweigh the risks and would like to pursue treatment at this time.  Discussed inherent unpredictability of medications in each individual.   Encouraged Patient to keep future appointments.   Take medications as prescribed and abstain from substance abuse.   In the event of an emergency, patient was advised to go to the emergency room      Return to Clinic: 3 months        Trav Andrews PA-C        Total face to face time: 20 min  Total time (chart review, patient contact, documentation): 23 min      *This note has been prepared using a combination of a dictation device and typing.  It has been checked for errors but some errors may still exist within the note as a result of speech recognition errors and/or typographical errors.

## 2024-02-10 ENCOUNTER — NURSE TRIAGE (OUTPATIENT)
Dept: ADMINISTRATIVE | Facility: CLINIC | Age: 69
End: 2024-02-10
Payer: MEDICARE

## 2024-02-10 DIAGNOSIS — G40.909 SEIZURE DISORDER: ICD-10-CM

## 2024-02-10 RX ORDER — LACOSAMIDE 200 MG/1
200 TABLET ORAL EVERY 12 HOURS
Qty: 60 TABLET | Refills: 0 | Status: SHIPPED | OUTPATIENT
Start: 2024-02-10 | End: 2024-02-12 | Stop reason: SDUPTHER

## 2024-02-10 RX ORDER — LACOSAMIDE 200 MG/1
200 TABLET ORAL EVERY 12 HOURS
Qty: 30 TABLET | Refills: 0 | Status: SHIPPED | OUTPATIENT
Start: 2024-02-10 | End: 2024-02-10

## 2024-02-10 NOTE — TELEPHONE ENCOUNTER
Vimpat 200 mg Tablet every 12 hours, states last bottle is dated in December. He is out.  Last took today. Advised I could reach out to On Call. Verb understanding. He is unsure what pharmacy may have it in stock. Advised I would check with the [facility]. Verb understanding.       [facility] called, open until  4 pm, do have generic VIMPAT in stock.     Neuro on call paged. [phone] Dr. Burt will send in Refill. He would like message sent to him via secure chat with MRN. Information sent re [facility], closing time and that they did stock generic.     Patient called back, number and location of [facility] given, advised provider is rounding now and will enter when he can. Advised to call pharmacy around noon to check on RX. Verb understanding.   Reason for Disposition   [1] Prescription refill request for ESSENTIAL medicine (i.e., likelihood of harm to patient if not taken) AND [2] triager unable to refill per department policy    Protocols used: Medication Refill and Renewal Call-A-

## 2024-02-10 NOTE — TELEPHONE ENCOUNTER
Wife called back.  NT spoke with Wife, Anamaria Morales, on pts behalf.  She states that they were able to 15 pills of Vimpat generic at [facility] for a little of $19 with Good RX coupon, so he does have some medication to take.  I advised that OCP sent over a 30-day supply or 60 tablets to the [facility] pharmacy so they should've gotten more.  Advised that since it is controlled that the pharmacy may not give them the rest of the medication but to contact the pharmacy to see if they can get the remaining amount and if not then call the office on Monday to get another refill sent over.  Wife VU.  Advised to call for worsening/questions/concerns.  KAYLA.

## 2024-02-10 NOTE — PROGRESS NOTES
Contacted by on call nurse regarding patient running out of Vimpat.   15 day supply sent to [facility]. Patient must contact his neurology provider for further refills.    Mirza Loco MD

## 2024-02-10 NOTE — TELEPHONE ENCOUNTER
[place]    PCP:  Dr. Us    She reports [facility] pharmacy does have the insurance and she found a Good RX coupon for $30.  She needs the Dr to approve it to be filled early.  Per protocol, care advised is  now.  NT spoke with OCP, Dr. Loco.  OCP will transfer prescription to the requested pharmacy.  [employer] message sent to MD also.  Wife contacted and notified of OCP recommendations and she VU.  Advised to call for issues with prescription/questions/concerns.  VU.    Reason for Disposition   [1] Prescription refill request for ESSENTIAL medicine (i.e., likelihood of harm to patient if not taken) AND [2] triager unable to refill per department policy    Protocols used: Medication Refill and Renewal Call-A-

## 2024-02-10 NOTE — TELEPHONE ENCOUNTER
[place]    PCP:  Dr. Us    NT spoke with pt and Wife, Anamaria Morales, on pts behalf.  Pt states got a got a 3-month supply of Vimpat on 12/23/24.  He lost 2 month supply of the Vimpat.  He states that they got the prescription sent to the pharmacy but insurance won't cover it and the out of pocket cost is $800.  Advised that the insurance company won't cover the cost of the medication without an override from them so contact the insurance company first.  If they won't override to cover the cost of the medication and they want to pay out of pocket for it then call NT back and NT will contact the MD to get approval from them to okay the prescription to be filled early.  Also, advised only a 15-day refill sent in so will need to contact Neurologist for refill on Monday.  Wife VU.  Advised to call for approval to pay out of pocket for med/questions/concerns.  VU.    Reason for Disposition   [1] Follow-up call to recent contact AND [2] information only call, no triage required    Additional Information   Negative: RN needs further essential information from caller in order to complete triage   Negative: Requesting regular office appointment   Negative: [1] Caller requesting NON-URGENT health information AND [2] PCP's office is the best resource   Negative: Health Information question, no triage required and triager able to answer question   Negative: General information question, no triage required and triager able to answer question   Negative: Question about upcoming scheduled test, no triage required and triager able to answer question   Negative: [1] Caller is not with the adult (patient) AND [2] probable NON-URGENT symptoms    Protocols used: Information Only Call - No Triage-A-

## 2024-02-12 ENCOUNTER — OFFICE VISIT (OUTPATIENT)
Dept: DERMATOLOGY | Facility: CLINIC | Age: 69
End: 2024-02-12
Payer: MEDICARE

## 2024-02-12 DIAGNOSIS — Z85.828 HISTORY OF SKIN CANCER: ICD-10-CM

## 2024-02-12 DIAGNOSIS — C44.91 BASAL CELL CARCINOMA (BCC), UNSPECIFIED SITE: Primary | ICD-10-CM

## 2024-02-12 PROCEDURE — 99499 UNLISTED E&M SERVICE: CPT | Mod: HCNC,S$GLB,, | Performed by: STUDENT IN AN ORGANIZED HEALTH CARE EDUCATION/TRAINING PROGRAM

## 2024-02-12 PROCEDURE — 17271 DSTR MAL LES S/N/H/F/G 0.6-1: CPT | Mod: HCNC,S$GLB,, | Performed by: STUDENT IN AN ORGANIZED HEALTH CARE EDUCATION/TRAINING PROGRAM

## 2024-02-12 PROCEDURE — 99999 PR PBB SHADOW E&M-EST. PATIENT-LVL III: CPT | Mod: PBBFAC,HCNC,, | Performed by: STUDENT IN AN ORGANIZED HEALTH CARE EDUCATION/TRAINING PROGRAM

## 2024-02-12 RX ORDER — LACOSAMIDE 200 MG/1
200 TABLET ORAL EVERY 12 HOURS
Qty: 60 TABLET | Refills: 5 | Status: SHIPPED | OUTPATIENT
Start: 2024-02-12 | End: 2024-02-19 | Stop reason: SDUPTHER

## 2024-02-12 NOTE — PATIENT INSTRUCTIONS

## 2024-02-12 NOTE — PROGRESS NOTES
Here for electrodesiccation and curettage of superficial bcc on the left neck. bx done on 10/24/23:    Electrodessication and Curettage Procedure note:    Verbal consent obtained. Lesional tissue marked and prepped with alcohol. Lesion anesthetized with 1% lidocaine with epinephrine. Curettage and Desiccation x 3 cycles to base. Aluminum chloride for hemostasis. Lesion size after primary curettage: 0.8 cm    Area bandaged and wound care explained.    F/u 3 months

## 2024-02-16 ENCOUNTER — PATIENT MESSAGE (OUTPATIENT)
Dept: NEUROLOGY | Facility: CLINIC | Age: 69
End: 2024-02-16
Payer: MEDICARE

## 2024-02-19 DIAGNOSIS — G40.909 SEIZURE DISORDER: ICD-10-CM

## 2024-02-19 RX ORDER — LACOSAMIDE 200 MG/1
200 TABLET ORAL EVERY 12 HOURS
Qty: 60 TABLET | Refills: 5 | Status: SHIPPED | OUTPATIENT
Start: 2024-02-19 | End: 2024-05-27 | Stop reason: SDUPTHER

## 2024-02-23 ENCOUNTER — PATIENT MESSAGE (OUTPATIENT)
Dept: PSYCHIATRY | Facility: CLINIC | Age: 69
End: 2024-02-23
Payer: MEDICARE

## 2024-05-14 ENCOUNTER — OFFICE VISIT (OUTPATIENT)
Dept: SURGERY | Facility: CLINIC | Age: 69
End: 2024-05-14
Payer: MEDICARE

## 2024-05-14 VITALS
DIASTOLIC BLOOD PRESSURE: 83 MMHG | WEIGHT: 216.19 LBS | HEART RATE: 104 BPM | BODY MASS INDEX: 26.33 KG/M2 | HEIGHT: 76 IN | SYSTOLIC BLOOD PRESSURE: 141 MMHG | OXYGEN SATURATION: 97 %

## 2024-05-14 DIAGNOSIS — K40.91 RECURRENT LEFT INGUINAL HERNIA: Primary | ICD-10-CM

## 2024-05-14 PROCEDURE — 3008F BODY MASS INDEX DOCD: CPT | Mod: HCNC,CPTII,S$GLB, | Performed by: SURGERY

## 2024-05-14 PROCEDURE — 3079F DIAST BP 80-89 MM HG: CPT | Mod: HCNC,CPTII,S$GLB, | Performed by: SURGERY

## 2024-05-14 PROCEDURE — 1159F MED LIST DOCD IN RCRD: CPT | Mod: HCNC,CPTII,S$GLB, | Performed by: SURGERY

## 2024-05-14 PROCEDURE — 99999 PR PBB SHADOW E&M-EST. PATIENT-LVL IV: CPT | Mod: PBBFAC,HCNC,, | Performed by: SURGERY

## 2024-05-14 PROCEDURE — 1126F AMNT PAIN NOTED NONE PRSNT: CPT | Mod: HCNC,CPTII,S$GLB, | Performed by: SURGERY

## 2024-05-14 PROCEDURE — 3077F SYST BP >= 140 MM HG: CPT | Mod: HCNC,CPTII,S$GLB, | Performed by: SURGERY

## 2024-05-14 PROCEDURE — 99203 OFFICE O/P NEW LOW 30 MIN: CPT | Mod: HCNC,S$GLB,, | Performed by: SURGERY

## 2024-05-14 PROCEDURE — 3288F FALL RISK ASSESSMENT DOCD: CPT | Mod: HCNC,CPTII,S$GLB, | Performed by: SURGERY

## 2024-05-14 PROCEDURE — 1101F PT FALLS ASSESS-DOCD LE1/YR: CPT | Mod: HCNC,CPTII,S$GLB, | Performed by: SURGERY

## 2024-05-14 NOTE — PROGRESS NOTES
General Surgery Office Visit   History and Physical    Patient Name: Mirza Morales  YOB: 1955 (69 y.o.)  MRN: [id number]  Today's Date: 05/14/2024    Referring Md:   Self, Aaareferral  No address on file    SUBJECTIVE:     Chief Complaint: Inguinal hernia    Interval Update 5/14/24:  He presents again to clinic today for follow up of left inguinal hernia. He was scheduled for surgery in 2021, but got sick and had to cancel. He states since then the hernia has gotten progressively larger and more painful. Still reducible. No nausea, vomiting, overlying skin changes.     History of Present Illness:  Mirza Morales is a 69 y.o. male with PMHx of HTN, hyponatremia, and bipolar disorder who presents to the clinic today complaining of an inguinal hernia that he first noticed about 3 weeks ago. He was seen at his PCP's office for this and referred to our clinic. Patient describes it as a bulge in her L groin that feels like a pressure but is not painful. He reports that he has not attempted to reduce it but it spontaneously reduces. Patient states the sxs are aggravated by squatting and bending. Patient denies weight loss, melena, hematochezia, fever, sweats, urinary symptoms, changes in bowel movements, diarrhea, constipation. Patient would like to have the defect repaired. Patient reports being compliant with home medication regimen.     Surgical history includes: inguinal hernia repair with mesh (does not remember if L or R), laminectomy with hardware placement, and a lap jayy in the early 2000s    He smokes 1ppd but denies alcohol and drug use.     Denies additional medical history including DM, COPD, CVA, MI, liver disease. Denies current use of anticoags, including ASA.    Also denies family history of colon cancer        Review of patient's allergies indicates:   Allergen Reactions    Gabapentin Other (See Comments)     SEVERE DIZZINESS AND BALANCE PROBLEMS, UNABLE TO WALK.    Nardil  [phenelzine]      BECOMES HYPER, LIKE A MANIC EPISODE.    Penicillin     Penicillins Hives    Lamictal [lamotrigine] Rash       Past Medical History:   Diagnosis Date    Alcohol abuse     Allergy     Behavioral problem     Bipolar 1 disorder     Chronic right hip pain     Depression     DJD (degenerative joint disease), lumbar 01/27/2015    Fatigue     Hepatitis     pt. denies this    History of psychiatric care     History of psychiatric hospitalization     History of skin cancer 2/12/2024    Hypertension     Karina     Post traumatic stress disorder     Psychiatric problem     Seizures     Suicide attempt     Therapy     Vitamin D insufficiency 03/17/2022     Past Surgical History:   Procedure Laterality Date    CHOLECYSTECTOMY  1990    COLONOSCOPY N/A 08/14/2019    Procedure: COLONOSCOPY;  Surgeon: Tammy Duval MD;  Location: [facility] ([address] [place] [address]);  Service: Endoscopy;  Laterality: N/A;    HERNIA REPAIR      SPINE SURGERY  11/12/2015    LAMINECTOMY/LUMBAR/WARE     Family History   Problem Relation Name Age of Onset    Alcohol abuse Mother Mompolly awilda     Depression Mother Mompolly awilda     Mental illness Mother Mompolly awilda     Depression Father      Depression Sister      Suicide Sister      Drug abuse Brother      Anxiety disorder Brother      Bipolar disorder Brother      Depression Brother      Alcohol abuse Maternal Uncle      Alcohol abuse Paternal Uncle      Dementia Maternal Grandmother      Alcohol abuse Cousin      Amblyopia Neg Hx      Blindness Neg Hx      Cancer Neg Hx      Cataracts Neg Hx      Diabetes Neg Hx      Glaucoma Neg Hx      Hypertension Neg Hx      Macular degeneration Neg Hx      Retinal detachment Neg Hx      Strabismus Neg Hx      Stroke Neg Hx      Thyroid disease Neg Hx      Asthma Neg Hx      Emphysema Neg Hx       Social History     Tobacco Use    Smoking status: Every Day     Current packs/day: 0.50     Average packs/day: 0.5 packs/day for 10.0 years (5.0 ttl  pk-yrs)     Types: Cigarettes    Smokeless tobacco: Never    Tobacco comments:     8/31/2023      Patient states he only smokes 5 cigarettes a day   Substance Use Topics    Alcohol use: Not Currently     Comment: Not drinking alcohol    Drug use: Not Currently        Review of Systems:  Review of Systems   Constitutional:  Negative for chills, fever and weight loss.   HENT:  Negative for congestion and sore throat.    Eyes:  Negative for blurred vision and double vision.   Respiratory:  Negative for cough and shortness of breath.    Cardiovascular:  Negative for chest pain and palpitations.   Gastrointestinal:  Negative for abdominal pain, constipation, diarrhea, nausea and vomiting.        (+) bulge/hernia in L groin   Genitourinary:  Negative for dysuria and hematuria.   Musculoskeletal:  Negative for joint pain and myalgias.   Skin:  Negative for itching and rash.   Neurological:  Negative for dizziness, weakness and headaches.   Endo/Heme/Allergies:  Negative for environmental allergies. Does not bruise/bleed easily.   Psychiatric/Behavioral:  Negative for substance abuse. The patient is not nervous/anxious.    All other systems reviewed and are negative.      OBJECTIVE:     Physical Exam:  General: White male in no distress, normal apperance  Neuro: alert and oriented x 4.  Moves all extremities.     HEENT: Normocephalic, atraumatic. No icterus.  Trachea midline. EOM intact.   Respiratory: Respirations are even and unlabored  Cardiac: Regular rate. Regular rhythm  Abdomen: Soft, non distended. No tenderness or guarding noted. Moderate sized, palpable defect in L inguinal canal. No bowel contents protruding through defect on exam.  Extremities: Warm dry and intact.   Skin: No rashes. Warm and dry.          ASSESSMENT/PLAN:     Mirza Morales is a 69 y.o. M with history of left inguinal hernia repair several years ago at [facility] who presents today with recurrent hernia.    - open left inguinal hernia  repair with/without mesh   - Consent obtained   - Educated patient on the importance of smoking cessation as it pertains to his overall health, especially its negative effects on post op wound healing      Discussed case with Dr. Chery.       Gris Lambert MD  [facility]  General Surgery PGY-1

## 2024-05-22 NOTE — TELEPHONE ENCOUNTER
Care Due:                  Date            Visit Type   Department     Provider  --------------------------------------------------------------------------------                                EP -                              PRIMARY      [facility] INTER[facility]  Last Visit: 05-      CARE ([facility])   MEDICINE       Demetrice Mo  Next Visit: None Scheduled  None         None Found                                                            Last  Test          Frequency    Reason                     Performed    Due Date  --------------------------------------------------------------------------------    Office Visit  15 months..  amLODIPine, atorvastatin,   05-   08-                             sildenafiL...............    CMP.........  12 months..  atorvastatin.............  05- 05-    Lipid Panel.  12 months..  atorvastatin.............  05- 05-    [facility] Embedded Care Due Messages. Reference number: [id number].   5/22/2024 3:05:13 PM CDT

## 2024-05-22 NOTE — TELEPHONE ENCOUNTER
Refill Routing Note   Medication(s) are not appropriate for processing by [facility] for the following reason(s):        Required labs outdated    [facility] action(s):  Defer   Requires labs : Yes     Requires appointment : Yes             Appointments  past 12m or future 3m with PCP    Date Provider   Last Visit   5/23/2023 Jud Mo MD   Next Visit   Visit date not found Jud Mo MD   ED visits in past 90 days: 0        Note composed:6:17 PM 05/22/2024

## 2024-05-23 RX ORDER — ATORVASTATIN CALCIUM 40 MG/1
40 TABLET, FILM COATED ORAL
Qty: 90 TABLET | Refills: 3 | Status: SHIPPED | OUTPATIENT
Start: 2024-05-23

## 2024-05-27 ENCOUNTER — PATIENT MESSAGE (OUTPATIENT)
Dept: NEUROLOGY | Facility: CLINIC | Age: 69
End: 2024-05-27
Payer: MEDICARE

## 2024-05-27 ENCOUNTER — TELEPHONE (OUTPATIENT)
Dept: NEUROLOGY | Facility: CLINIC | Age: 69
End: 2024-05-27
Payer: MEDICARE

## 2024-05-27 DIAGNOSIS — G40.909 SEIZURE DISORDER: ICD-10-CM

## 2024-05-27 RX ORDER — LACOSAMIDE 200 MG/1
200 TABLET ORAL EVERY 12 HOURS
Qty: 60 TABLET | Refills: 5 | Status: SHIPPED | OUTPATIENT
Start: 2024-05-27

## 2024-05-27 NOTE — TELEPHONE ENCOUNTER
Spoke to patient's wife. She stated that her  has not had any Vimpat since Friday. Their mail in pharmacy has not delivered it and she wants a new script called into [facility] pharmacy ASAP. I sent Dr. Julien that message with pharmacy information.

## 2024-06-06 ENCOUNTER — TELEPHONE (OUTPATIENT)
Dept: PSYCHIATRY | Facility: CLINIC | Age: 69
End: 2024-06-06
Payer: MEDICARE

## 2024-06-06 ENCOUNTER — PATIENT MESSAGE (OUTPATIENT)
Dept: PSYCHIATRY | Facility: CLINIC | Age: 69
End: 2024-06-06
Payer: MEDICARE

## 2024-06-06 DIAGNOSIS — F31.9 BIPOLAR 1 DISORDER: ICD-10-CM

## 2024-06-06 RX ORDER — QUETIAPINE FUMARATE 100 MG/1
TABLET, FILM COATED ORAL
Qty: 45 TABLET | Refills: 0 | Status: SHIPPED | OUTPATIENT
Start: 2024-06-06 | End: 2024-06-07 | Stop reason: SDUPTHER

## 2024-06-06 NOTE — TELEPHONE ENCOUNTER
Current pt of DAVID Andrews (out of office) requesting quetiapine refill. L/s Feb 2024 when plan made to continue med unchanged. Has f/u with provider 7/2/24. Will consult with Dr. Perez for review/approval, as deemed appropriate, of one month supply of requested med.

## 2024-06-07 DIAGNOSIS — F31.9 BIPOLAR 1 DISORDER: ICD-10-CM

## 2024-06-07 RX ORDER — QUETIAPINE FUMARATE 100 MG/1
TABLET, FILM COATED ORAL
Qty: 45 TABLET | Refills: 0 | Status: SHIPPED | OUTPATIENT
Start: 2024-06-07

## 2024-06-10 ENCOUNTER — PATIENT MESSAGE (OUTPATIENT)
Dept: INTERNAL MEDICINE | Facility: CLINIC | Age: 69
End: 2024-06-10
Payer: MEDICARE

## 2024-06-14 NOTE — PRE-PROCEDURE INSTRUCTIONS
PREOP INSTRUCTIONS:  No food,milk or milk products for 8 hours before surgery.  Clear liquids like water,gatorade,apple juice are allowed up until 2 hours before surgery.  Instructed to follow the surgeon's instructions if they differ from these.  Shower instructions as well as directions to the Surgery Center were given.  Encouraged to wear loose fitting,comfortable clothing.  Medication instructions for pm prior to and am of procedure reviewed.  Instructed to avoid taking vitamins,supplements,aspirin and ibuprofen the morning of surgery.    Patient denies any side effects or issues with anesthesia or sedation.     Patient does not know arrival time.Explained that this information comes from the surgeon's office and if they haven't heard from them by 2 or 3 pm 6/17/2024 to call the office.Patient stated an understanding.

## 2024-06-17 ENCOUNTER — ANESTHESIA EVENT (OUTPATIENT)
Dept: SURGERY | Facility: HOSPITAL | Age: 69
End: 2024-06-17
Payer: MEDICARE

## 2024-06-17 ENCOUNTER — TELEPHONE (OUTPATIENT)
Dept: SURGERY | Facility: CLINIC | Age: 69
End: 2024-06-17
Payer: MEDICARE

## 2024-06-17 NOTE — ANESTHESIA PREPROCEDURE EVALUATION
[facility]  Anesthesia Pre-Operative Evaluation     Patient Name: Mirza Morales  YOB: 1955  MRN: [id number]  [facility]: [id number]       Admit Date: (Not on file)   Admit Team: Networked reference to record PCT      Date of Procedure: 6/18/2024  Anesthesia: General Procedure: Procedure(s) (LRB):  REPAIR, HERNIA, INGUINAL, RECURRENT Open Left With Mesh (N/A)  Pre-Operative Diagnosis: Recurrent left inguinal hernia [K40.91]  Proceduralist:Surgeons and Role:     * Jose Luis Chery MD - Primary  Code Status: Prior   Advanced Directive: <no information>  Isolation Precautions: No active isolations  Capacity: Full capacity     SUBJECTIVE:   Mirza Morales is a 69 y.o. male who  has a past medical history of Alcohol abuse, Allergy, Behavioral problem, Bipolar 1 disorder, Chronic right hip pain, Depression, DJD (degenerative joint disease), lumbar (01/27/2015), Fatigue, Hepatitis, History of psychiatric care, History of psychiatric hospitalization, History of skin cancer (2/12/2024), Hypertension, Karina, Post traumatic stress disorder, Psychiatric problem, Seizures, Suicide attempt, Therapy, and Vitamin D insufficiency (03/17/2022).  No notes on file    Mirza Morales is a 69 y.o. male with a PMHx significant for pulmonary fibrosis, HTN, ERIC who now presents for left open inguinal hernia repair.    He now presents for the above procedure(s) with General Surgery - Dr. Chery.    Previous Airway: Moderately difficult mask ventilation due to beard requiring 2 handed mask technique. Grade II view obtained with Antoine 2 by anesthesia resident.      Hospital LOS: 0 days  ICU LOS: Patient does not have an ICU stay during this admission.    he has a current medication list which includes the following long-term medication(s): amlodipine, atorvastatin, azelastine, lacosamide, pantoprazole, propranolol, quetiapine, sildenafil, and trazodone.     ALLERGIES:     Review of patient's allergies  indicates:   Allergen Reactions    Gabapentin Other (See Comments)     SEVERE DIZZINESS AND BALANCE PROBLEMS, UNABLE TO WALK.    Nardil [phenelzine]      BECOMES HYPER, LIKE A MANIC EPISODE.    Penicillin     Penicillins Hives    Lamictal [lamotrigine] Rash     LDA:   AIRWAY:         * No LDAs found *      Lines/Drains/Airways       None                  Anesthesia Evaluation      Airway   Mallampati: III  TM distance: Normal  Neck ROM: Normal ROM  Dental    (+) Intact    Pulmonary    (+) COPD, sleep apnea  Cardiovascular   (+) hypertension    Neuro/Psych    (+) seizures well controlled, neuromuscular disease, psychiatric history    GI/Hepatic/Renal    (+) hepatitis, liver disease    Endo/Other    (+) arthritis  Abdominal                    MEDICATIONS:     Current Outpatient Medications on File Prior to Encounter   Medication Sig Dispense Refill Last Dose    amLODIPine (NORVASC) 10 MG tablet Take 1 tablet (10 mg total) by mouth once daily. 90 tablet 3     azelastine (ASTELIN) 137 mcg (0.1 %) nasal spray 1 spray (137 mcg total) by Nasal route 2 (two) times daily. (Patient not taking: Reported on 5/23/2023) 30 mL 1     CALCIUM POLYCARBOPHIL ORAL Take by mouth once daily.        cholecalciferol, vitamin D3, (VITAMIN D3) 25 mcg (1,000 unit) capsule Take 3 capsules (3,000 Units total) by mouth once daily. 180 capsule 2     pantoprazole (PROTONIX) 40 MG tablet Take 1 tablet (40 mg total) by mouth once daily. (Patient taking differently: Take 40 mg by mouth daily as needed (heartburn).) 30 tablet 11     propranoloL (INDERAL LA) 60 MG 24 hr capsule Take 1 capsule (60 mg total) by mouth once daily. 90 capsule 3     sildenafiL (VIAGRA) 25 MG tablet Take 1 tablet (25 mg total) by mouth daily as needed for Erectile Dysfunction. 30 tablet 3     traZODone (DESYREL) 100 MG tablet TAKE 1-2 TABLETS BY MOUTH AT BEDTIME 180 tablet 1       Inpatient Medications:  Antibiotics (From admission, onward)      None          VTE Risk  Mitigation (From admission, onward)      None              No current facility-administered medications for this encounter.     Current Outpatient Medications   Medication Sig Dispense Refill    amLODIPine (NORVASC) 10 MG tablet Take 1 tablet (10 mg total) by mouth once daily. 90 tablet 3    atorvastatin (LIPITOR) 40 MG tablet TAKE 1 TABLET ONE TIME DAILY 90 tablet 3    azelastine (ASTELIN) 137 mcg (0.1 %) nasal spray 1 spray (137 mcg total) by Nasal route 2 (two) times daily. (Patient not taking: Reported on 5/23/2023) 30 mL 1    CALCIUM POLYCARBOPHIL ORAL Take by mouth once daily.       cholecalciferol, vitamin D3, (VITAMIN D3) 25 mcg (1,000 unit) capsule Take 3 capsules (3,000 Units total) by mouth once daily. 180 capsule 2    lacosamide (VIMPAT) 200 mg Tab tablet Take 1 tablet (200 mg total) by mouth every 12 (twelve) hours. 60 tablet 5    pantoprazole (PROTONIX) 40 MG tablet Take 1 tablet (40 mg total) by mouth once daily. (Patient taking differently: Take 40 mg by mouth daily as needed (heartburn).) 30 tablet 11    propranoloL (INDERAL LA) 60 MG 24 hr capsule Take 1 capsule (60 mg total) by mouth once daily. 90 capsule 3    QUEtiapine (SEROQUEL) 100 MG Tab Take 1.5 tablets at bedtime 45 tablet 0    sildenafiL (VIAGRA) 25 MG tablet Take 1 tablet (25 mg total) by mouth daily as needed for Erectile Dysfunction. 30 tablet 3    traZODone (DESYREL) 100 MG tablet TAKE 1-2 TABLETS BY MOUTH AT BEDTIME 180 tablet 1          History:   There are no hospital problems to display for this patient.    Surgical History:    has a past surgical history that includes Hernia repair; Spine surgery (11/12/2015); Colonoscopy (N/A, 08/14/2019); and Cholecystectomy (1990).   Social History:    reports that he is not currently sexually active and has had partner(s) who are female. He reports using the following method of birth control/protection: None.  reports that he has been smoking cigarettes. He has a 5 pack-year smoking history.  "He has never used smokeless tobacco. He reports that he does not currently use alcohol. He reports that he does not currently use drugs.    There were no vitals filed for this visit.  Vital Signs Range (Last 24H):       There is no height or weight on file to calculate BMI.  Wt Readings from Last 4 Encounters:   05/14/24 98.1 kg (216 lb 2.6 oz)   10/19/23 106.5 kg (234 lb 12.6 oz)   08/31/23 106.1 kg (233 lb 14.5 oz)   07/20/23 104.3 kg (230 lb)        Intake/Output - Last 3 Shifts       None          Lab Results   Component Value Date    WBC 6.44 05/23/2023    HGB 15.5 05/23/2023    HCT 46.2 05/23/2023     05/23/2023     (L) 05/23/2023    K 4.1 05/23/2023    CL 96 05/23/2023    CREATININE 1.0 05/23/2023    BUN 9 05/23/2023    CO2 30 (H) 05/23/2023     (H) 05/23/2023    CALCIUM 10.0 05/23/2023    MG 2.1 07/21/2020    PHOS 3.7 07/21/2020    ALKPHOS 77 05/23/2023    ALT 22 05/23/2023    AST 20 05/23/2023    ALBUMIN 4.3 05/23/2023    INR 1.1 01/07/2020    APTT 23.6 01/07/2020    HGBA1C 4.8 05/23/2023     08/31/2023    CPKMB 2.5 09/08/2010    TROPONINI 0.009 01/07/2020    MB 1.6 09/08/2010    BNP 10 07/21/2020     No results found for this or any previous visit (from the past 12 hour(s)).  No results for input(s): "WBC", "HGB", "HCT", "PLT", "NA", "K", "CREATININE", "GLU", "INR" in the last 168 hours.  No LMP for male patient.    EKG:   Results for orders placed or performed during the hospital encounter of 08/13/20   EKG 12-lead    Collection Time: 08/13/20  5:13 PM    Narrative    Test Reason : E87.5,    Vent. Rate : 085 BPM     Atrial Rate : 085 BPM     P-R Int : 186 ms          QRS Dur : 094 ms      QT Int : 372 ms       P-R-T Axes : 059 060 052 degrees     QTc Int : 442 ms    Normal sinus rhythm  Normal ECG  When compared with ECG of 20-JUL-2020 20:22,  No significant change was found    Referred By: [email or web address]   SELF           Confirmed By:      TTE:  Results for orders placed or " performed during the hospital encounter of 12/12/18   Transthoracic echo (TTE) complete (Cupid Only)   Result Value Ref Range    Ascending aorta 2.97 cm    STJ 2.73 cm    AV mean gradient 2.64 mmHg    Ao peak filiberto 1.16 m/s    Ao VTI 17.91 cm    IVRT 0.08 msec    IVS 0.81 0.6 - 1.1 cm    LA size 3.27 cm    Left Atrium Major Axis 4.65 cm    LVIDd 3.92 3.5 - 6.0 cm    LVIDs 2.78 2.1 - 4.0 cm    LVOT diameter 2.33 cm    LVOT peak VTI 16.27 cm    Posterior Wall 0.79 0.6 - 1.1 cm    MV Peak A Filiberto 0.90 m/s    E wave deceleration time 111.98 msec    MV Peak E Filiberto 0.54 m/s    PV Peak D Filiberto 0.39 m/s    PV Peak S Filiberto 0.48 m/s    RA Major Axis 4.72 cm    RA Width 4.14 cm    RVDD 4.58 cm    Sinus 3.27 cm    TAPSE 1.99 cm    TR Max Filiberto 1.86 m/s    TDI LATERAL 0.11     TDI SEPTAL 0.06     LA WIDTH 4.15 cm    LV Diastolic Volume 66.75 mL    LV Systolic Volume 28.93 mL    RV S' 10.65 m/s    LV LATERAL E/E' RATIO 4.91     LV SEPTAL E/E' RATIO 9.00     FS 29 %    LV mass 90.42 g    Left Ventricle Relative Wall Thickness 0.40 cm    AV valve area 3.87 cm2    AV index (prosthetic) 0.91     E/A ratio 0.60     Mean e' 0.09     Pulm vein S/D ratio 1.23     LVOT area 4.26 cm2    LVOT stroke volume 69.34 cm3    AV peak gradient 5.38 mmHg    E/E' ratio 6.35     Triscuspid Valve Regurgitation Peak Gradient 13.84 mmHg    BSA 2.26 m2    LV Systolic Volume Index 12.7 mL/m2    LV Diastolic Volume Index 29.35 mL/m2    LV Mass Index 39.8 g/m2    Right Atrial Pressure (from IVC) 3 mmHg    TV resting pulmonary artery pressure 16.84 mmHg    Narrative    · Normal left ventricular systolic function. The estimated ejection   fraction is 60%  · Normal LV diastolic function.  · No wall motion abnormalities.  · Normal right ventricular systolic function.  · Trace tricuspid regurgitation.  · Trace mitral regurgitation.  · Normal central venous pressure (3 mm Hg).  · Inadeqaute TR envelope to accurately assess PA pressure              No results found for this  or any previous visit.    Stress Echo:  Results for orders placed during the hospital encounter of 07/20/23    Stress Echo Which stress agent will be used? Treadmill Exercise; Color Flow Doppler? No    Interpretation Summary  · The left ventricle is normal in size with low normal systolic function. The estimated ejection fraction is 50%.  · Mild right ventricular enlargement with normal right ventricular systolic function.  · Normal left ventricular diastolic function.  · The estimated PA systolic pressure is 27 mmHg.  · Intermediate central venous pressure (8 mmHg).  · The patient's exercise capacity was severely impaired.  · The ECG portion of this study is negative for myocardial ischemia.  · The stress echo portion of this study is negative for myocardial ischemia.  · Overall, this test is negative for myocardial ischemia. Sensitivity is impaired due to the patient's failure to achieve target heart rate. This is essentially a nondiagnostic test.      ASSESSMENT/PLAN:     Pre-op Assessment    I have reviewed the Patient Summary Reports.     I have reviewed the Nursing Notes. I have reviewed the NPO Status.   I have reviewed the Medications.     Review of Systems  Anesthesia Hx:  No problems with previous Anesthesia             Denies Family Hx of Anesthesia complications.    Denies Personal Hx of Anesthesia complications.                    Cardiovascular:     Hypertension                                        Pulmonary:   COPD     Sleep Apnea Hemidiaphragm paresis               Hepatic/GI:      Liver Disease, Hepatitis           Musculoskeletal:  Arthritis               Neurological:    Neuromuscular Disease,   Seizures, well controlled                                Endocrine:  Endocrine Normal            Psych:  Psychiatric History                  Physical Exam  General: Well nourished    Airway:  Mallampati: III / II  Mouth Opening: Normal  TM Distance: Normal  Tongue: Normal  Neck ROM: Normal  ROM    Dental:  Intact        Anesthesia Plan  Type of Anesthesia, risks & benefits discussed:    Anesthesia Type: Gen ETT, Gen Supraglottic Airway  Intra-op Monitoring Plan: Standard ASA Monitors  Post Op Pain Control Plan: multimodal analgesia and IV/PO Opioids PRN  Induction:  IV  Airway Plan: Direct, Post-Induction  Informed Consent: Informed consent signed with the Patient and all parties understand the risks and agree with anesthesia plan.  All questions answered.   ASA Score: 3  Day of Surgery Review of History & Physical: H&P Update referred to the surgeon/provider.H&P completed by Anesthesiologist.    Ready For Surgery From Anesthesia Perspective.     .

## 2024-06-17 NOTE — TELEPHONE ENCOUNTER
Left message on patient's voicemail and My Chart for him to arrive at [facility] tomorrow 6/18/24 at 8:15am for surgery with Dr. Chery.  Pre-op instructions reinforced.  Direct phone number given for him to call with any questions or concerns.

## 2024-06-18 ENCOUNTER — ANESTHESIA (OUTPATIENT)
Dept: SURGERY | Facility: HOSPITAL | Age: 69
End: 2024-06-18
Payer: MEDICARE

## 2024-06-18 ENCOUNTER — HOSPITAL ENCOUNTER (OUTPATIENT)
Facility: HOSPITAL | Age: 69
Discharge: HOME | End: 2024-06-18
Attending: SURGERY | Admitting: SURGERY
Payer: MEDICARE

## 2024-06-18 VITALS
BODY MASS INDEX: 26.55 KG/M2 | SYSTOLIC BLOOD PRESSURE: 117 MMHG | OXYGEN SATURATION: 94 % | HEIGHT: 76 IN | TEMPERATURE: 98 F | DIASTOLIC BLOOD PRESSURE: 74 MMHG | WEIGHT: 218 LBS | RESPIRATION RATE: 10 BRPM | HEART RATE: 64 BPM

## 2024-06-18 DIAGNOSIS — K40.91 RECURRENT LEFT INGUINAL HERNIA: Primary | ICD-10-CM

## 2024-06-18 PROCEDURE — 25000003 PHARM REV CODE 250: Mod: HCNC

## 2024-06-18 PROCEDURE — 37000008 HC ANESTHESIA 1ST 15 MINUTES: Mod: HCNC | Performed by: SURGERY

## 2024-06-18 PROCEDURE — 63600175 PHARM REV CODE 636 W HCPCS: Mod: JZ,JG,HCNC | Performed by: SURGERY

## 2024-06-18 PROCEDURE — 63600175 PHARM REV CODE 636 W HCPCS: Mod: HCNC

## 2024-06-18 PROCEDURE — D9220A PRA ANESTHESIA: Mod: HCNC,,, | Performed by: ANESTHESIOLOGY

## 2024-06-18 PROCEDURE — 71000015 HC POSTOP RECOV 1ST HR: Mod: HCNC | Performed by: SURGERY

## 2024-06-18 PROCEDURE — 37000009 HC ANESTHESIA EA ADD 15 MINS: Mod: HCNC | Performed by: SURGERY

## 2024-06-18 PROCEDURE — 36000707: Mod: HCNC | Performed by: SURGERY

## 2024-06-18 PROCEDURE — 71000044 HC DOSC ROUTINE RECOVERY FIRST HOUR: Mod: HCNC | Performed by: SURGERY

## 2024-06-18 PROCEDURE — 25000003 PHARM REV CODE 250: Mod: HCNC | Performed by: STUDENT IN AN ORGANIZED HEALTH CARE EDUCATION/TRAINING PROGRAM

## 2024-06-18 PROCEDURE — 88304 TISSUE EXAM BY PATHOLOGIST: CPT | Mod: HCNC | Performed by: PATHOLOGY

## 2024-06-18 PROCEDURE — C1781 MESH (IMPLANTABLE): HCPCS | Mod: HCNC | Performed by: SURGERY

## 2024-06-18 PROCEDURE — 36000706: Mod: HCNC | Performed by: SURGERY

## 2024-06-18 PROCEDURE — 49520 REREPAIR ING HERNIA REDUCE: CPT | Mod: HCNC,LT,, | Performed by: SURGERY

## 2024-06-18 DEVICE — MESH POLY KNIT PERFIX PLG MED: Type: IMPLANTABLE DEVICE | Site: INGUINAL | Status: FUNCTIONAL

## 2024-06-18 RX ORDER — PROPOFOL 10 MG/ML
VIAL (ML) INTRAVENOUS
Status: DISCONTINUED | OUTPATIENT
Start: 2024-06-18 | End: 2024-06-18

## 2024-06-18 RX ORDER — OXYCODONE HYDROCHLORIDE 5 MG/1
5 TABLET ORAL ONCE AS NEEDED
Status: COMPLETED | OUTPATIENT
Start: 2024-06-18 | End: 2024-06-18

## 2024-06-18 RX ORDER — HYDROMORPHONE HYDROCHLORIDE 1 MG/ML
0.2 INJECTION, SOLUTION INTRAMUSCULAR; INTRAVENOUS; SUBCUTANEOUS EVERY 5 MIN PRN
Status: DISCONTINUED | OUTPATIENT
Start: 2024-06-18 | End: 2024-06-18 | Stop reason: HOSPADM

## 2024-06-18 RX ORDER — FENTANYL CITRATE 50 UG/ML
INJECTION, SOLUTION INTRAMUSCULAR; INTRAVENOUS
Status: DISCONTINUED | OUTPATIENT
Start: 2024-06-18 | End: 2024-06-18

## 2024-06-18 RX ORDER — LIDOCAINE HYDROCHLORIDE 20 MG/ML
INJECTION INTRAVENOUS
Status: DISCONTINUED | OUTPATIENT
Start: 2024-06-18 | End: 2024-06-18

## 2024-06-18 RX ORDER — SODIUM CHLORIDE 9 MG/ML
INJECTION, SOLUTION INTRAVENOUS CONTINUOUS
Status: DISCONTINUED | OUTPATIENT
Start: 2024-06-18 | End: 2024-06-18 | Stop reason: HOSPADM

## 2024-06-18 RX ORDER — EPHEDRINE SULFATE 50 MG/ML
INJECTION, SOLUTION INTRAVENOUS
Status: DISCONTINUED | OUTPATIENT
Start: 2024-06-18 | End: 2024-06-18

## 2024-06-18 RX ORDER — OXYCODONE HYDROCHLORIDE 5 MG/1
5 TABLET ORAL EVERY 4 HOURS PRN
Qty: 15 TABLET | Refills: 0 | Status: SHIPPED | OUTPATIENT
Start: 2024-06-18

## 2024-06-18 RX ORDER — ACETAMINOPHEN 10 MG/ML
INJECTION, SOLUTION INTRAVENOUS
Status: DISCONTINUED | OUTPATIENT
Start: 2024-06-18 | End: 2024-06-18

## 2024-06-18 RX ORDER — ROCURONIUM BROMIDE 10 MG/ML
INJECTION, SOLUTION INTRAVENOUS
Status: DISCONTINUED | OUTPATIENT
Start: 2024-06-18 | End: 2024-06-18

## 2024-06-18 RX ORDER — SODIUM CHLORIDE 0.9 % (FLUSH) 0.9 %
10 SYRINGE (ML) INJECTION
Status: DISCONTINUED | OUTPATIENT
Start: 2024-06-18 | End: 2024-06-18 | Stop reason: HOSPADM

## 2024-06-18 RX ORDER — BUPIVACAINE HYDROCHLORIDE 2.5 MG/ML
INJECTION, SOLUTION EPIDURAL; INFILTRATION; INTRACAUDAL
Status: DISCONTINUED | OUTPATIENT
Start: 2024-06-18 | End: 2024-06-18 | Stop reason: HOSPADM

## 2024-06-18 RX ORDER — MIDAZOLAM HYDROCHLORIDE 1 MG/ML
INJECTION INTRAMUSCULAR; INTRAVENOUS
Status: DISCONTINUED | OUTPATIENT
Start: 2024-06-18 | End: 2024-06-18

## 2024-06-18 RX ORDER — HALOPERIDOL 5 MG/ML
0.5 INJECTION INTRAMUSCULAR EVERY 10 MIN PRN
Status: DISCONTINUED | OUTPATIENT
Start: 2024-06-18 | End: 2024-06-18 | Stop reason: HOSPADM

## 2024-06-18 RX ORDER — KETOROLAC TROMETHAMINE 30 MG/ML
INJECTION, SOLUTION INTRAMUSCULAR; INTRAVENOUS
Status: DISCONTINUED | OUTPATIENT
Start: 2024-06-18 | End: 2024-06-18

## 2024-06-18 RX ORDER — DEXAMETHASONE SODIUM PHOSPHATE 4 MG/ML
INJECTION, SOLUTION INTRA-ARTICULAR; INTRALESIONAL; INTRAMUSCULAR; INTRAVENOUS; SOFT TISSUE
Status: DISCONTINUED | OUTPATIENT
Start: 2024-06-18 | End: 2024-06-18

## 2024-06-18 RX ORDER — CEFTRIAXONE 1 G/1
INJECTION, POWDER, FOR SOLUTION INTRAMUSCULAR; INTRAVENOUS
Status: DISCONTINUED | OUTPATIENT
Start: 2024-06-18 | End: 2024-06-18

## 2024-06-18 RX ORDER — PHENYLEPHRINE HYDROCHLORIDE 10 MG/ML
INJECTION INTRAVENOUS
Status: DISCONTINUED | OUTPATIENT
Start: 2024-06-18 | End: 2024-06-18

## 2024-06-18 RX ADMIN — PHENYLEPHRINE HYDROCHLORIDE 100 MCG: 10 INJECTION INTRAVENOUS at 12:06

## 2024-06-18 RX ADMIN — HYDROMORPHONE HYDROCHLORIDE 0.2 MG: 1 INJECTION, SOLUTION INTRAMUSCULAR; INTRAVENOUS; SUBCUTANEOUS at 02:06

## 2024-06-18 RX ADMIN — FENTANYL CITRATE 25 MCG: 50 INJECTION, SOLUTION INTRAMUSCULAR; INTRAVENOUS at 11:06

## 2024-06-18 RX ADMIN — CEFTRIAXONE 2 G: 1 INJECTION, POWDER, FOR SOLUTION INTRAMUSCULAR; INTRAVENOUS at 12:06

## 2024-06-18 RX ADMIN — SODIUM CHLORIDE: 0.9 INJECTION, SOLUTION INTRAVENOUS at 10:06

## 2024-06-18 RX ADMIN — ROCURONIUM BROMIDE 10 MG: 10 INJECTION, SOLUTION INTRAVENOUS at 12:06

## 2024-06-18 RX ADMIN — ACETAMINOPHEN 1000 MG: 10 INJECTION, SOLUTION INTRAVENOUS at 12:06

## 2024-06-18 RX ADMIN — GLYCOPYRROLATE 0.1 MG: 0.2 INJECTION, SOLUTION INTRAMUSCULAR; INTRAVENOUS at 12:06

## 2024-06-18 RX ADMIN — DEXAMETHASONE SODIUM PHOSPHATE 4 MG: 4 INJECTION INTRA-ARTICULAR; INTRALESIONAL; INTRAMUSCULAR; INTRAVENOUS; SOFT TISSUE at 11:06

## 2024-06-18 RX ADMIN — PROPOFOL 150 MG: 10 INJECTION, EMULSION INTRAVENOUS at 11:06

## 2024-06-18 RX ADMIN — EPHEDRINE SULFATE 25 MG: 50 INJECTION INTRAVENOUS at 12:06

## 2024-06-18 RX ADMIN — KETOROLAC TROMETHAMINE 30 MG: 30 INJECTION, SOLUTION INTRAMUSCULAR; INTRAVENOUS at 01:06

## 2024-06-18 RX ADMIN — OXYCODONE 5 MG: 5 TABLET ORAL at 02:06

## 2024-06-18 RX ADMIN — ROCURONIUM BROMIDE 70 MG: 10 INJECTION, SOLUTION INTRAVENOUS at 11:06

## 2024-06-18 RX ADMIN — MIDAZOLAM HYDROCHLORIDE 1 MG: 2 INJECTION, SOLUTION INTRAMUSCULAR; INTRAVENOUS at 11:06

## 2024-06-18 RX ADMIN — LIDOCAINE HYDROCHLORIDE 100 MG: 20 INJECTION INTRAVENOUS at 11:06

## 2024-06-18 NOTE — ANESTHESIA PROCEDURE NOTES
Intubation    Date/Time: 6/18/2024 11:31 AM    Performed by: Manjeet Johnston MD  Authorized by: Padma Guevara MD    Intubation:     Induction:  Intravenous    Intubated:  Postinduction    Mask Ventilation:  Moderately difficult with oral airway (Difficulty to maintain seal secondary to beard and longer face)    Attempts:  1    Attempted By:  Resident anesthesiologist    Method of Intubation:  Direct    Blade:  Antoine 3    Laryngeal View Grade: Grade I - full view of cords      Difficult Airway Encountered?: No      Complications:  None    Airway Device:  Oral endotracheal tube    Airway Device Size:  7.5    Style/Cuff Inflation:  Cuffed (inflated to minimal occlusive pressure)    Inflation Amount (mL):  8    Tube secured:  26    Secured at:  The teeth    Placement Verified By:  Capnometry    Complicating Factors:  None    Findings Post-Intubation:  BS equal bilateral and atraumatic/condition of teeth unchanged

## 2024-06-18 NOTE — PLAN OF CARE
Discharge instructions given and explained to patient and family with verbalized understanding. VSS. Denies N/V, tolerating PO fluids. Rates pain level as tolerable. IV removed. Pt left floor via W/C, stable for transport, responsible person available for transport home.

## 2024-06-18 NOTE — H&P
General Surgery Office Visit   History and Physical     Patient Name: Mirza Morales  YOB: 1955 (69 y.o.)  MRN: [id number]  Today's Date: 05/14/2024     Referring Md:   Self, Aaarefduong  No address on file     SUBJECTIVE:      Chief Complaint: Inguinal hernia  Interval:  Patient presents today for inguinal hernia repair. Patient was last seen in clinic on 5/14/24. There have been no changes in their history or physical exam since they were last seen in clinic.      Interval Update 5/14/24:  He presents again to clinic today for follow up of left inguinal hernia. He was scheduled for surgery in 2021, but got sick and had to cancel. He states since then the hernia has gotten progressively larger and more painful. Still reducible. No nausea, vomiting, overlying skin changes.     History of Present Illness:  Mirza Morales is a 69 y.o. male with PMHx of HTN, hyponatremia, and bipolar disorder who presents to the clinic today complaining of an inguinal hernia that he first noticed about 3 weeks ago. He was seen at his PCP's office for this and referred to our clinic. Patient describes it as a bulge in her L groin that feels like a pressure but is not painful. He reports that he has not attempted to reduce it but it spontaneously reduces. Patient states the sxs are aggravated by squatting and bending. Patient denies weight loss, melena, hematochezia, fever, sweats, urinary symptoms, changes in bowel movements, diarrhea, constipation. Patient would like to have the defect repaired. Patient reports being compliant with home medication regimen.      Surgical history includes: inguinal hernia repair with mesh (does not remember if L or R), laminectomy with hardware placement, and a lap jayy in the early 2000s     He smokes 1ppd but denies alcohol and drug use.      Denies additional medical history including DM, COPD, CVA, MI, liver disease. Denies current use of anticoags, including ASA.     Also  denies family history of colon cancer                 Review of patient's allergies indicates:   Allergen Reactions    Gabapentin Other (See Comments)       SEVERE DIZZINESS AND BALANCE PROBLEMS, UNABLE TO WALK.    Nardil [phenelzine]         BECOMES HYPER, LIKE A MANIC EPISODE.    Penicillin      Penicillins Hives    Lamictal [lamotrigine] Rash              Past Medical History:   Diagnosis Date    Alcohol abuse      Allergy      Behavioral problem      Bipolar 1 disorder      Chronic right hip pain      Depression      DJD (degenerative joint disease), lumbar 01/27/2015    Fatigue      Hepatitis       pt. denies this    History of psychiatric care      History of psychiatric hospitalization      History of skin cancer 2/12/2024    Hypertension      Karina      Post traumatic stress disorder      Psychiatric problem      Seizures      Suicide attempt      Therapy      Vitamin D insufficiency 03/17/2022            Past Surgical History:   Procedure Laterality Date    CHOLECYSTECTOMY   1990    COLONOSCOPY N/A 08/14/2019     Procedure: COLONOSCOPY;  Surgeon: Tammy Duval MD;  Location: [facility] ([address] [place] [address]);  Service: Endoscopy;  Laterality: N/A;    HERNIA REPAIR        SPINE SURGERY   11/12/2015     LAMINECTOMY/LUMBAR/WARE             Family History   Problem Relation Name Age of Onset    Alcohol abuse Mother Mompolly awilda      Depression Mother Mompolly awilda      Mental illness Mother Mompolly awilda      Depression Father        Depression Sister        Suicide Sister        Drug abuse Brother        Anxiety disorder Brother        Bipolar disorder Brother        Depression Brother        Alcohol abuse Maternal Uncle        Alcohol abuse Paternal Uncle        Dementia Maternal Grandmother        Alcohol abuse Cousin        Amblyopia Neg Hx        Blindness Neg Hx        Cancer Neg Hx        Cataracts Neg Hx        Diabetes Neg Hx        Glaucoma Neg Hx        Hypertension Neg Hx        Macular  degeneration Neg Hx        Retinal detachment Neg Hx        Strabismus Neg Hx        Stroke Neg Hx        Thyroid disease Neg Hx        Asthma Neg Hx        Emphysema Neg Hx          Social History   Social History            Tobacco Use    Smoking status: Every Day       Current packs/day: 0.50       Average packs/day: 0.5 packs/day for 10.0 years (5.0 ttl pk-yrs)       Types: Cigarettes    Smokeless tobacco: Never    Tobacco comments:       8/31/2023        Patient states he only smokes 5 cigarettes a day   Substance Use Topics    Alcohol use: Not Currently       Comment: Not drinking alcohol    Drug use: Not Currently            Review of Systems:  Review of Systems   Constitutional:  Negative for chills, fever and weight loss.   HENT:  Negative for congestion and sore throat.    Eyes:  Negative for blurred vision and double vision.   Respiratory:  Negative for cough and shortness of breath.    Cardiovascular:  Negative for chest pain and palpitations.   Gastrointestinal:  Negative for abdominal pain, constipation, diarrhea, nausea and vomiting.        (+) bulge/hernia in L groin   Genitourinary:  Negative for dysuria and hematuria.   Musculoskeletal:  Negative for joint pain and myalgias.   Skin:  Negative for itching and rash.   Neurological:  Negative for dizziness, weakness and headaches.   Endo/Heme/Allergies:  Negative for environmental allergies. Does not bruise/bleed easily.   Psychiatric/Behavioral:  Negative for substance abuse. The patient is not nervous/anxious.    All other systems reviewed and are negative.        OBJECTIVE:      Vitals:    06/18/24 0815   BP: (!) 140/82   Pulse: 88   Resp: 16   Temp: 97.7 °F (36.5 °C)        Physical Exam  Vitals and nursing note reviewed.   Constitutional:       Appearance: Normal appearance.   HENT:      Head: Normocephalic.   Cardiovascular:      Rate and Rhythm: Normal rate.   Pulmonary:      Effort: Pulmonary effort is normal.   Abdominal:      Palpations:  Abdomen is soft.      Tenderness: There is no abdominal tenderness.      Comments: Moderate sized, palpable defect in L inguinal canal. No bowel contents protruding through defect on exam.   Skin:     General: Skin is warm and dry.   Neurological:      Mental Status: He is alert and oriented to person, place, and time.   Psychiatric:         Mood and Affect: Mood normal.         Behavior: Behavior normal.                    ASSESSMENT/PLAN:      Mirza Morales is a 69 y.o. M with history of left inguinal hernia repair several years ago at [facility] who presents today with recurrent hernia.     - open left inguinal hernia repair with/without mesh   - Consent obtained   - Educated patient on the importance of smoking cessation as it pertains to his overall health, especially its negative effects on post op wound healing    Gerda Bazzi MD  [facility]  General Surgery PGY-1    pt tracking to the right unable to track to the left past midline with right eye.  Left eye with inward turning of left eye.

## 2024-06-18 NOTE — TRANSFER OF CARE
"Anesthesia Transfer of Care Note    Patient: Mirza Morales    Procedure(s) Performed: Procedure(s) (LRB):  REPAIR, HERNIA, INGUINAL, RECURRENT Open Left With Mesh (N/A)    Patient location: [facility]    Transport from OR: Transported from OR on 6-10 L/min O2 by face mask with adequate spontaneous ventilation    Post pain: adequate analgesia    Post assessment: no apparent anesthetic complications    Post vital signs: stable    Level of consciousness: awake and alert    Nausea/Vomiting: no nausea/vomiting    Complications: none    Transfer of care protocol was followed      Last vitals: Visit Vitals  /78   Pulse 69   Temp 36.6 °C (97.9 °F) (Temporal)   Resp 17   Ht 6' 4" (1.93 m)   Wt 98.9 kg (218 lb)   SpO2 98%   BMI 26.54 kg/m²     "

## 2024-06-18 NOTE — BRIEF OP NOTE
Rohith Caldera - Surgery ([facility])  Brief Operative Note    Surgery Date: 6/18/2024     Surgeons and Role:     * Jose Luis Chery MD - Primary     * Anna Bray MD - Resident - Chief    Assisting Surgeon: None    Pre-op Diagnosis:  Recurrent left inguinal hernia [K40.91]    Post-op Diagnosis:  Post-Op Diagnosis Codes:     * Recurrent left inguinal hernia [K40.91]    Procedure(s) (LRB):  REPAIR, HERNIA, INGUINAL, RECURRENT Open Left With Mesh (N/A)    Anesthesia: General    Operative Findings: Left inguinal hernia both direct and indirect component multiple moderate sized cord lipoma. Repaired with mesh.    Estimated Blood Loss: Minimal         Specimens:   Specimen (24h ago, onward)       Start     Ordered    06/18/24 1232  Specimen to Pathology, Surgery General Surgery  Once        Comments: Pre-op Diagnosis: Recurrent left inguinal hernia [K40.91]Procedure(s):REPAIR, HERNIA, INGUINAL, RECURRENT Open Left With Mesh Number of specimens: 1Name of specimens: 1. Cord lipoma and hernia sac- permanent     References:    Click here for ordering Quick Tip   Question Answer Comment   Procedure Type: General Surgery    Release to patient Immediate        06/18/24 1322                      Discharge Note    OUTCOME: Patient tolerated treatment/procedure well without complication and is now ready for discharge.    DISPOSITION: Home or Self Care    FINAL DIAGNOSIS:  Recurrent left inguinal hernia    FOLLOWUP: In clinic    DISCHARGE INSTRUCTIONS:    Discharge Procedure Orders   Diet Adult Regular     No driving until:   Order Comments: No driving while still taking pain medications daily.   Take a stool softener while taking pain medications daily.     Lifting restrictions   Order Comments: No lifting more than 10 lbs for 6 weeks.     Notify your health care provider if you experience any of the following:  increased confusion or weakness     Notify your health care provider if you experience any of the following:  persistent  dizziness, light-headedness, or visual disturbances     Notify your health care provider if you experience any of the following:  worsening rash     Notify your health care provider if you experience any of the following:  severe persistent headache     Notify your health care provider if you experience any of the following:  difficulty breathing or increased cough     Notify your health care provider if you experience any of the following:  redness, tenderness, or signs of infection (pain, swelling, redness, odor or green/yellow discharge around incision site)     Notify your health care provider if you experience any of the following:  severe uncontrolled pain     Notify your health care provider if you experience any of the following:  persistent nausea and vomiting or diarrhea     Notify your health care provider if you experience any of the following:  temperature >100.4     No dressing needed   Order Comments: Okay to take shower and get incision wet in 48 hours. Do NOT soak/submerge incision in water (aka no bathing, swimming, etc) for 2 weeks. Skin glue overlying incision will fall off on its own in 2-3 weeks.     Activity as tolerated

## 2024-06-19 ENCOUNTER — PATIENT MESSAGE (OUTPATIENT)
Dept: SURGERY | Facility: CLINIC | Age: 69
End: 2024-06-19
Payer: MEDICARE

## 2024-06-19 LAB
FINAL PATHOLOGIC DIAGNOSIS: NORMAL
GROSS: NORMAL
Lab: NORMAL

## 2024-06-19 NOTE — ANESTHESIA POSTPROCEDURE EVALUATION
Anesthesia Post Evaluation    Patient: Mirza Morales    Procedure(s) Performed: Procedure(s) (LRB):  REPAIR, HERNIA, INGUINAL, RECURRENT Open Left With Mesh (N/A)    Final Anesthesia Type: general      Patient location during evaluation: [facility]  Patient participation: Yes- Able to Participate  Level of consciousness: awake and alert  Post-procedure vital signs: reviewed and stable  Pain management: adequate  Airway patency: patent    PONV status at discharge: No PONV  Anesthetic complications: no      Cardiovascular status: blood pressure returned to baseline  Respiratory status: unassisted  Hydration status: euvolemic  Follow-up not needed.              Vitals Value Taken Time   /74 06/18/24 1446   Temp 36.7 °C (98 °F) 06/18/24 1500   Pulse 64 06/18/24 1500   Resp 10 06/18/24 1500   SpO2 94 % 06/18/24 1500         No case tracking events are documented in the log.      Pain/Liss Score: Pain Rating Prior to Med Admin: 5 (6/18/2024  2:30 PM)  Pain Rating Post Med Admin: 3 (6/18/2024  3:00 PM)  Liss Score: 9 (6/18/2024  2:15 PM)

## 2024-06-19 NOTE — OP NOTE
DATE OF PROCEDURE: 06/18/2024     PREOPERATIVE DIAGNOSIS: Recurrent left inguinal hernia.     POSTOPERATIVE DIAGNOSIS: Recurrent left inguinal hernia.     PROCEDURE PERFORMED: Recurrent left inguinal hernia repair with mesh.     ATTENDING SURGEON: Jose Luis Chery MD.     HOUSESTAFF SURGEON: Gerda Bazzi MD (RES).     : Anna Bray MD (RES).    ANESTHESIA: General.     INDICATION: Mirza Morales is a 69 y.o.male referred to my General Surgery Clinic with a history of a symptomatic, reducible inguinal bulge. The history and exam were consistent with recurrent inguinal hernia. We recommended an open inguinal hernia repair with mesh and the patient agreed to proceed. The patient signed informed consent and expressed understanding of the risks and benefits of surgery.     PROCEDURE IN DETAIL: The patient was taken to the operating room and placed supine. After induction of general endotracheal tube anesthesia, the left groin was prepped and draped in the standard fashion. Timeout was performed. Horizontal incision was made over the inguinal canal. Subcutaneous tissue was divided with Bovie [employer]rocaut[employer] to the level of the external oblique aponeurosis. The aponeurosis was incised in line with its fibers, first with a scalpel and then Metzenbaum scissors, and extended through the external inguinal ring. The inguinal canal contents were bluntly encircled and isolated with a Penrose drain. Skeletonization of the spermatic cord was performed. Direct and indirect inguinal hernia sacs were identifed, which was carefully dissected away from the cord structures to the level of the internal ring. The indirect sac was opened and examined for adhered intraabdominal structures and was highly ligated and reduced into the abdomen. Several cord lipomas were suture ligated and removed. All cord structures were confirmed intact. Direct sac was reduced and the floor was recreated by approximating the  transversalis fascia. Mesh was cut to fit the defect appropriately and sewn in place with interrupted 2-0 Ethibond suture. Medially, the mesh was anchored to the fascia overlying the pubic tubercle. Inferiorly, the mesh was anchored to the shelving edge of the inguinal ligament. Superiorly, the mesh was anchored to the conjoined tendon. The tails of the mesh were brought together around the cord structures to create a new internal ring that just admitted the tip of a finger. The mesh laid in appropriate position without any redundancy or tension. The testicle was pulled back down to the scrotum and there was noted to be no tension on the cord structures. The external oblique aponeuosis was closed with a running 2-0 Vicryl suture. Tj's fascia was closed with interrupted 3-0 Vicryl sutures and the skin was closed with a running subcuticular 4-0 Monocryl suture. Dermabond was applied. The patient was then awakened from anesthesia and transferred to the PACU in good condition. All needle and sponge counts were correct at the conclusion of the case. I was present for the case in its entirety.    ESTIMATED BLOOD LOSS: Less than 5 mL.     FINDINGS: Large-sized left inguinal hernia repaired with polypropylene mesh.    SPECIMEN: hernia sac and cord lipoma

## 2024-06-29 DIAGNOSIS — F31.9 BIPOLAR 1 DISORDER: ICD-10-CM

## 2024-07-01 RX ORDER — QUETIAPINE FUMARATE 100 MG/1
TABLET, FILM COATED ORAL
Qty: 135 TABLET | Refills: 1 | Status: SHIPPED | OUTPATIENT
Start: 2024-07-01

## 2024-07-02 ENCOUNTER — PATIENT MESSAGE (OUTPATIENT)
Dept: PSYCHIATRY | Facility: CLINIC | Age: 69
End: 2024-07-02
Payer: MEDICARE

## 2024-07-02 ENCOUNTER — TELEPHONE (OUTPATIENT)
Dept: PSYCHIATRY | Facility: CLINIC | Age: 69
End: 2024-07-02
Payer: MEDICARE

## 2024-07-15 ENCOUNTER — PATIENT MESSAGE (OUTPATIENT)
Dept: PSYCHIATRY | Facility: CLINIC | Age: 69
End: 2024-07-15
Payer: MEDICARE

## 2024-07-22 ENCOUNTER — PATIENT MESSAGE (OUTPATIENT)
Dept: PSYCHIATRY | Facility: CLINIC | Age: 69
End: 2024-07-22
Payer: MEDICARE

## 2024-07-22 ENCOUNTER — HOSPITAL ENCOUNTER (EMERGENCY)
Facility: HOSPITAL | Age: 69
Discharge: PSYCHIATRIC OR SUBSTANCE USE FACILITY | End: 2024-07-22
Attending: EMERGENCY MEDICINE
Payer: MEDICARE

## 2024-07-22 VITALS
OXYGEN SATURATION: 96 % | RESPIRATION RATE: 18 BRPM | TEMPERATURE: 98 F | HEART RATE: 97 BPM | SYSTOLIC BLOOD PRESSURE: 126 MMHG | WEIGHT: 203 LBS | DIASTOLIC BLOOD PRESSURE: 65 MMHG | BODY MASS INDEX: 24.71 KG/M2

## 2024-07-22 DIAGNOSIS — Z00.8 MEDICAL CLEARANCE FOR PSYCHIATRIC ADMISSION: Primary | ICD-10-CM

## 2024-07-22 DIAGNOSIS — F31.9 BIPOLAR 1 DISORDER: ICD-10-CM

## 2024-07-22 DIAGNOSIS — G47.00 INSOMNIA, UNSPECIFIED TYPE: ICD-10-CM

## 2024-07-22 DIAGNOSIS — F30.9 MANIA: ICD-10-CM

## 2024-07-22 DIAGNOSIS — R56.9 SEIZURE: ICD-10-CM

## 2024-07-22 LAB
ALBUMIN SERPL BCP-MCNC: 4.2 G/DL (ref 3.5–5.2)
ALLENS TEST: ABNORMAL
ALP SERPL-CCNC: 93 U/L (ref 55–135)
ALT SERPL W/O P-5'-P-CCNC: 23 U/L (ref 10–44)
AMPHET+METHAMPHET UR QL: NEGATIVE
ANION GAP SERPL CALC-SCNC: 23 MMOL/L (ref 8–16)
APAP SERPL-MCNC: <3 UG/ML (ref 10–20)
AST SERPL-CCNC: 25 U/L (ref 10–40)
BARBITURATES UR QL SCN>200 NG/ML: NEGATIVE
BASOPHILS # BLD AUTO: 0.06 K/UL (ref 0–0.2)
BASOPHILS NFR BLD: 0.6 % (ref 0–1.9)
BENZODIAZ UR QL SCN>200 NG/ML: NEGATIVE
BILIRUB SERPL-MCNC: 0.9 MG/DL (ref 0.1–1)
BILIRUB UR QL STRIP: NEGATIVE
BUN SERPL-MCNC: 20 MG/DL (ref 8–23)
BUN SERPL-MCNC: 23 MG/DL (ref 6–30)
BZE UR QL SCN: NEGATIVE
CALCIUM SERPL-MCNC: 10 MG/DL (ref 8.7–10.5)
CANNABINOIDS UR QL SCN: ABNORMAL
CHLORIDE SERPL-SCNC: 97 MMOL/L (ref 95–110)
CHLORIDE SERPL-SCNC: 99 MMOL/L (ref 95–110)
CLARITY UR REFRACT.AUTO: CLEAR
CO2 SERPL-SCNC: 13 MMOL/L (ref 23–29)
COLOR UR AUTO: YELLOW
CREAT SERPL-MCNC: 1 MG/DL (ref 0.5–1.4)
CREAT SERPL-MCNC: 1.1 MG/DL (ref 0.5–1.4)
CREAT UR-MCNC: 124 MG/DL (ref 23–375)
DIFFERENTIAL METHOD BLD: ABNORMAL
EOSINOPHIL # BLD AUTO: 0.1 K/UL (ref 0–0.5)
EOSINOPHIL NFR BLD: 1.3 % (ref 0–8)
ERYTHROCYTE [DISTWIDTH] IN BLOOD BY AUTOMATED COUNT: 12.1 % (ref 11.5–14.5)
EST. GFR  (NO RACE VARIABLE): >60 ML/MIN/1.73 M^2
ETHANOL SERPL-MCNC: <10 MG/DL
GLUCOSE SERPL-MCNC: 124 MG/DL (ref 70–110)
GLUCOSE SERPL-MCNC: 128 MG/DL (ref 70–110)
GLUCOSE UR QL STRIP: NEGATIVE
HCO3 UR-SCNC: 18 MMOL/L (ref 24–28)
HCT VFR BLD AUTO: 42.4 % (ref 40–54)
HCT VFR BLD CALC: 39 %PCV (ref 36–54)
HGB BLD-MCNC: 14.1 G/DL (ref 14–18)
HGB UR QL STRIP: NEGATIVE
IMM GRANULOCYTES # BLD AUTO: 0.03 K/UL (ref 0–0.04)
IMM GRANULOCYTES NFR BLD AUTO: 0.3 % (ref 0–0.5)
KETONES UR QL STRIP: NEGATIVE
LDH SERPL L TO P-CCNC: 13.01 MMOL/L (ref 0.5–2.2)
LDH SERPL L TO P-CCNC: 2.93 MMOL/L (ref 0.5–2.2)
LEUKOCYTE ESTERASE UR QL STRIP: NEGATIVE
LYMPHOCYTES # BLD AUTO: 2.6 K/UL (ref 1–4.8)
LYMPHOCYTES NFR BLD: 24.5 % (ref 18–48)
MCH RBC QN AUTO: 34.2 PG (ref 27–31)
MCHC RBC AUTO-ENTMCNC: 33.3 G/DL (ref 32–36)
MCV RBC AUTO: 103 FL (ref 82–98)
METHADONE UR QL SCN>300 NG/ML: NEGATIVE
MONOCYTES # BLD AUTO: 1.3 K/UL (ref 0.3–1)
MONOCYTES NFR BLD: 11.9 % (ref 4–15)
NEUTROPHILS # BLD AUTO: 6.6 K/UL (ref 1.8–7.7)
NEUTROPHILS NFR BLD: 61.4 % (ref 38–73)
NITRITE UR QL STRIP: NEGATIVE
NRBC BLD-RTO: 0 /100 WBC
OPIATES UR QL SCN: NEGATIVE
PCO2 BLDA: 48.2 MMHG (ref 35–45)
PCP UR QL SCN>25 NG/ML: NEGATIVE
PH SMN: 7.18 [PH] (ref 7.35–7.45)
PH UR STRIP: 7 [PH] (ref 5–8)
PLATELET # BLD AUTO: 205 K/UL (ref 150–450)
PMV BLD AUTO: 10.1 FL (ref 9.2–12.9)
PO2 BLDA: 76 MMHG (ref 40–60)
POC BE: -10 MMOL/L
POC IONIZED CALCIUM: 1.17 MMOL/L (ref 1.06–1.42)
POC SATURATED O2: 91 % (ref 95–100)
POC TCO2 (MEASURED): 22 MMOL/L (ref 23–29)
POC TCO2: 19 MMOL/L (ref 24–29)
POCT GLUCOSE: 130 MG/DL (ref 70–110)
POTASSIUM BLD-SCNC: 3.9 MMOL/L (ref 3.5–5.1)
POTASSIUM SERPL-SCNC: 3.6 MMOL/L (ref 3.5–5.1)
PROT SERPL-MCNC: 8.1 G/DL (ref 6–8.4)
PROT UR QL STRIP: ABNORMAL
PROVIDER CREDENTIALS: ABNORMAL
PROVIDER NOTIFIED: ABNORMAL
RBC # BLD AUTO: 4.12 M/UL (ref 4.6–6.2)
SAMPLE: ABNORMAL
SITE: ABNORMAL
SODIUM BLD-SCNC: 133 MMOL/L (ref 136–145)
SODIUM SERPL-SCNC: 135 MMOL/L (ref 136–145)
SP GR UR STRIP: 1.02 (ref 1–1.03)
TIME NOTIFIED: 1630
TIME NOTIFIED: 1630
TIME NOTIFIED: 1755
TOXICOLOGY INFORMATION: ABNORMAL
TSH SERPL DL<=0.005 MIU/L-ACNC: 3.6 UIU/ML (ref 0.4–4)
URN SPEC COLLECT METH UR: ABNORMAL
VERBAL RESULT READBACK PERFORMED: YES
WBC # BLD AUTO: 10.66 K/UL (ref 3.9–12.7)

## 2024-07-22 PROCEDURE — 82803 BLOOD GASES ANY COMBINATION: CPT | Mod: HCNC

## 2024-07-22 PROCEDURE — 84443 ASSAY THYROID STIM HORMONE: CPT | Mod: HCNC | Performed by: EMERGENCY MEDICINE

## 2024-07-22 PROCEDURE — 93010 ELECTROCARDIOGRAM REPORT: CPT | Mod: HCNC,,, | Performed by: INTERNAL MEDICINE

## 2024-07-22 PROCEDURE — 25000003 PHARM REV CODE 250: Mod: HCNC | Performed by: EMERGENCY MEDICINE

## 2024-07-22 PROCEDURE — 63600175 PHARM REV CODE 636 W HCPCS: Mod: HCNC

## 2024-07-22 PROCEDURE — 96374 THER/PROPH/DIAG INJ IV PUSH: CPT | Mod: HCNC

## 2024-07-22 PROCEDURE — 80307 DRUG TEST PRSMV CHEM ANLYZR: CPT | Mod: HCNC | Performed by: EMERGENCY MEDICINE

## 2024-07-22 PROCEDURE — 99900035 HC TECH TIME PER 15 MIN (STAT): Mod: HCNC

## 2024-07-22 PROCEDURE — 82077 ASSAY SPEC XCP UR&BREATH IA: CPT | Mod: HCNC | Performed by: EMERGENCY MEDICINE

## 2024-07-22 PROCEDURE — 25000003 PHARM REV CODE 250: Mod: HCNC

## 2024-07-22 PROCEDURE — 96361 HYDRATE IV INFUSION ADD-ON: CPT | Mod: HCNC

## 2024-07-22 PROCEDURE — 83605 ASSAY OF LACTIC ACID: CPT | Mod: HCNC,91

## 2024-07-22 PROCEDURE — 99285 EMERGENCY DEPT VISIT HI MDM: CPT | Mod: 25,HCNC

## 2024-07-22 PROCEDURE — 80053 COMPREHEN METABOLIC PANEL: CPT | Mod: HCNC | Performed by: EMERGENCY MEDICINE

## 2024-07-22 PROCEDURE — 85025 COMPLETE CBC W/AUTO DIFF WBC: CPT | Mod: HCNC | Performed by: EMERGENCY MEDICINE

## 2024-07-22 PROCEDURE — 99284 EMERGENCY DEPT VISIT MOD MDM: CPT | Mod: 25,HCNC

## 2024-07-22 PROCEDURE — 80235 DRUG ASSAY LACOSAMIDE: CPT | Mod: HCNC

## 2024-07-22 PROCEDURE — 80143 DRUG ASSAY ACETAMINOPHEN: CPT | Mod: HCNC | Performed by: EMERGENCY MEDICINE

## 2024-07-22 PROCEDURE — 63600175 PHARM REV CODE 636 W HCPCS: Mod: HCNC | Performed by: EMERGENCY MEDICINE

## 2024-07-22 PROCEDURE — 93005 ELECTROCARDIOGRAM TRACING: CPT | Mod: HCNC

## 2024-07-22 PROCEDURE — 82962 GLUCOSE BLOOD TEST: CPT | Mod: HCNC

## 2024-07-22 PROCEDURE — 81003 URINALYSIS AUTO W/O SCOPE: CPT | Mod: HCNC,59 | Performed by: EMERGENCY MEDICINE

## 2024-07-22 RX ORDER — OXYCODONE HYDROCHLORIDE 5 MG/1
10 TABLET ORAL
Status: COMPLETED | OUTPATIENT
Start: 2024-07-22 | End: 2024-07-22

## 2024-07-22 RX ORDER — LACOSAMIDE 50 MG/1
200 TABLET ORAL
Status: COMPLETED | OUTPATIENT
Start: 2024-07-22 | End: 2024-07-22

## 2024-07-22 RX ORDER — TRAZODONE HYDROCHLORIDE 100 MG/1
TABLET ORAL
Qty: 180 TABLET | Refills: 1 | Status: ON HOLD | OUTPATIENT
Start: 2024-07-22

## 2024-07-22 RX ORDER — LEVETIRACETAM 500 MG/5ML
2000 INJECTION, SOLUTION, CONCENTRATE INTRAVENOUS
Status: COMPLETED | OUTPATIENT
Start: 2024-07-22 | End: 2024-07-22

## 2024-07-22 RX ORDER — QUETIAPINE FUMARATE 100 MG/1
100 TABLET, FILM COATED ORAL
Status: COMPLETED | OUTPATIENT
Start: 2024-07-22 | End: 2024-07-22

## 2024-07-22 RX ORDER — MIDAZOLAM HYDROCHLORIDE 1 MG/ML
5 INJECTION, SOLUTION INTRAMUSCULAR; INTRAVENOUS
Status: DISCONTINUED | OUTPATIENT
Start: 2024-07-22 | End: 2024-07-23 | Stop reason: HOSPADM

## 2024-07-22 RX ORDER — QUETIAPINE FUMARATE 100 MG/1
TABLET, FILM COATED ORAL
Qty: 135 TABLET | Refills: 1 | Status: ON HOLD | OUTPATIENT
Start: 2024-07-22

## 2024-07-22 RX ADMIN — OXYCODONE HYDROCHLORIDE 10 MG: 5 TABLET ORAL at 08:07

## 2024-07-22 RX ADMIN — SODIUM CHLORIDE 500 ML: 9 INJECTION, SOLUTION INTRAVENOUS at 08:07

## 2024-07-22 RX ADMIN — QUETIAPINE FUMARATE 100 MG: 100 TABLET ORAL at 07:07

## 2024-07-22 RX ADMIN — LEVETIRACETAM 2000 MG: 100 INJECTION, SOLUTION INTRAVENOUS at 04:07

## 2024-07-22 RX ADMIN — SODIUM CHLORIDE, SODIUM LACTATE, POTASSIUM CHLORIDE, AND CALCIUM CHLORIDE 1000 ML: .6; .31; .03; .02 INJECTION, SOLUTION INTRAVENOUS at 04:07

## 2024-07-22 RX ADMIN — LACOSAMIDE 200 MG: 50 TABLET, FILM COATED ORAL at 04:07
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
## 2024-07-22 NOTE — ED NOTES
Pt had witnessed seizure activity while in front of charge desk waiting for ED room. Pt stood up while from chair, started yelling then kneeled to floor. Pt then laid down to floor and started convulsing. This RN grabbed a pillow from his chair and placed under his head and turned left side. NRB mask placed to 15L.

## 2024-07-22 NOTE — ED NOTES
I-STAT Chem-8+ Results:   Value Reference Range   Sodium 133 136-145 mmol/L   Potassium  3.9 3.5-5.1 mmol/L   Chloride 97  mmol/L   Ionized Calcium 1.17 1.06-1.42 mmol/L   CO2 (measured) 22 23-29 mmol/L   Glucose 128  mg/dL   BUN 23 6-30 mg/dL   Creatinine 1.1 0.5-1.4 mg/dL   Hematocrit 39 36-54%
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
## 2024-07-22 NOTE — ED NOTES
Pt in paper scrubs. Pt on continuous cardiac monitor, BP cuff, and pulse ox for medical necessity. lamar Christie in direct view of pt.  Bed in lowest position and side rails up x2. PEC in place, pt awaiting medical placement. Pt currently calm and cooperative.

## 2024-07-22 NOTE — ED NOTES
Pt changed into paper scrubs at this time. Pt belongings stored in pt belonging bag and placed in pt belonging closet. Belongings include:     1 jacket  1 shirt  1 pair of pants  1 pair of shoes  1 hat

## 2024-07-23 LAB
OHS QRS DURATION: 138 MS
OHS QTC CALCULATION: 470 MS

## 2024-07-23 NOTE — PROVIDER PROGRESS NOTES - EMERGENCY DEPT.
Encounter Date: 7/22/2024    ED Physician Progress Notes        ED Course: I, Kiley Echols MD have assumed care of this patient from  ***. Patient is a ***    At the time of signout plan was pending ***.    Medications given in the ED:    Medications   midazolam injection 5 mg (0 mg Intravenous Hold 7/22/24 1615)   lacosamide tablet 200 mg (200 mg Oral Given 7/22/24 1639)   lactated ringers bolus 1,000 mL (0 mLs Intravenous Stopped 7/22/24 1802)   levETIRAcetam injection 2,000 mg (2,000 mg Intravenous Given 7/22/24 1641)   QUEtiapine tablet 100 mg (100 mg Oral Given 7/22/24 1904)   oxyCODONE immediate release tablet 10 mg (10 mg Oral Given 7/22/24 2056)   sodium chloride 0.9% bolus 500 mL 500 mL (500 mLs Intravenous New Bag 7/22/24 2058)       Disposition: ***     Impression: ***

## 2024-07-23 NOTE — ED NOTES
Patient has assumed a comfortable position in bed, eyes closed, rise/fall of chest. Medicated for pain by primary nurse. Awaiting placement. DVC maintained for safety.

## 2024-07-23 NOTE — ED NOTES
"" I wanna' be taken to another hospital. I find this hospital groosly negligent of my medical needs. " He states that he wants a "pharnaceutical" medicine. Primary nurse notified of patient's c/o pain. DVC maintained for safety.  "
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
## 2024-07-23 NOTE — ED NOTES
Patient provided a different number for his wife, [phone], message left to call [phone]

## 2024-07-23 NOTE — ED NOTES
The patient is escorted to [facility] ambulance via [facility] gurney. All belongings accounted for, no valuables located in safe per Charge RN.
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
## 2024-07-23 NOTE — ED NOTES
"Provided w/ an additional pillow, bed adjusted per patient's request, " up, down, up, down." Patient is encouraged to decide the position that he wanted the HOB to be adjusted to. He is easily agitated although brief. Coverings adjusted, curtains closed as per patient's request. He states, " I think I can sleep now." DVC maintained for safety.  "

## 2024-07-23 NOTE — ED NOTES
Provided w/ a turkey sandwich, 3 servings of Orange Juice on ice, 2 packs of Kvng crackers. Patient is tolerating snacks well. DVC is maintained for safety. Awaiting transfer to [facility].
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
## 2024-07-25 PROBLEM — R63.4 WEIGHT LOSS, UNINTENTIONAL: Status: ACTIVE | Noted: 2024-07-25

## 2024-07-25 LAB — LACOSAMIDE: 5 MCG/ML (ref 1–10)

## 2024-07-26 ENCOUNTER — PATIENT MESSAGE (OUTPATIENT)
Dept: PSYCHIATRY | Facility: CLINIC | Age: 69
End: 2024-07-26
Payer: MEDICARE

## 2024-07-29 ENCOUNTER — PATIENT MESSAGE (OUTPATIENT)
Dept: PSYCHIATRY | Facility: CLINIC | Age: 69
End: 2024-07-29
Payer: MEDICARE

## 2024-08-01 ENCOUNTER — PATIENT MESSAGE (OUTPATIENT)
Dept: PSYCHIATRY | Facility: CLINIC | Age: 69
End: 2024-08-01
Payer: MEDICARE

## 2024-08-06 ENCOUNTER — PATIENT OUTREACH (OUTPATIENT)
Dept: ADMINISTRATIVE | Facility: CLINIC | Age: 69
End: 2024-08-06
Payer: MEDICARE

## 2024-08-06 ENCOUNTER — OFFICE VISIT (OUTPATIENT)
Dept: PSYCHIATRY | Facility: CLINIC | Age: 69
End: 2024-08-06
Payer: MEDICARE

## 2024-08-06 DIAGNOSIS — F10.21 ALCOHOL DEPENDENCE IN REMISSION: ICD-10-CM

## 2024-08-06 DIAGNOSIS — G25.0 ESSENTIAL TREMOR: ICD-10-CM

## 2024-08-06 DIAGNOSIS — G47.00 INSOMNIA, UNSPECIFIED TYPE: ICD-10-CM

## 2024-08-06 DIAGNOSIS — F31.9 BIPOLAR I DISORDER: Primary | ICD-10-CM

## 2024-08-06 PROCEDURE — G2211 COMPLEX E/M VISIT ADD ON: HCPCS | Mod: 95,,, | Performed by: PHYSICIAN ASSISTANT

## 2024-08-06 PROCEDURE — 99214 OFFICE O/P EST MOD 30 MIN: CPT | Mod: 95,,, | Performed by: PHYSICIAN ASSISTANT

## 2024-08-06 PROCEDURE — 1160F RVW MEDS BY RX/DR IN RCRD: CPT | Mod: CPTII,95,, | Performed by: PHYSICIAN ASSISTANT

## 2024-08-06 PROCEDURE — 1111F DSCHRG MED/CURRENT MED MERGE: CPT | Mod: CPTII,95,, | Performed by: PHYSICIAN ASSISTANT

## 2024-08-06 PROCEDURE — 1159F MED LIST DOCD IN RCRD: CPT | Mod: CPTII,95,, | Performed by: PHYSICIAN ASSISTANT

## 2024-08-06 RX ORDER — MIRTAZAPINE 7.5 MG/1
7.5 TABLET, FILM COATED ORAL NIGHTLY
Qty: 30 TABLET | Refills: 2 | Status: SHIPPED | OUTPATIENT
Start: 2024-08-06 | End: 2024-11-04

## 2024-08-06 RX ORDER — ARIPIPRAZOLE 15 MG/1
15 TABLET ORAL DAILY
Qty: 30 TABLET | Refills: 2 | Status: SHIPPED | OUTPATIENT
Start: 2024-08-06 | End: 2024-11-04

## 2024-08-06 RX ORDER — PROPRANOLOL HYDROCHLORIDE 10 MG/1
30 TABLET ORAL 2 TIMES DAILY
Qty: 180 TABLET | Refills: 2 | Status: SHIPPED | OUTPATIENT
Start: 2024-08-06 | End: 2024-11-04

## 2024-08-06 RX ORDER — DIVALPROEX SODIUM 250 MG/1
750 TABLET, DELAYED RELEASE ORAL NIGHTLY
Qty: 90 TABLET | Refills: 2 | Status: SHIPPED | OUTPATIENT
Start: 2024-08-06 | End: 2024-11-04

## 2024-08-09 ENCOUNTER — PATIENT MESSAGE (OUTPATIENT)
Dept: PSYCHIATRY | Facility: CLINIC | Age: 69
End: 2024-08-09
Payer: MEDICARE

## 2024-08-12 ENCOUNTER — OFFICE VISIT (OUTPATIENT)
Dept: INTERNAL MEDICINE | Facility: CLINIC | Age: 69
End: 2024-08-12
Payer: MEDICARE

## 2024-08-12 VITALS
HEART RATE: 71 BPM | OXYGEN SATURATION: 97 % | WEIGHT: 216.94 LBS | HEIGHT: 77 IN | SYSTOLIC BLOOD PRESSURE: 128 MMHG | DIASTOLIC BLOOD PRESSURE: 78 MMHG | BODY MASS INDEX: 25.61 KG/M2

## 2024-08-12 DIAGNOSIS — G40.909 SEIZURE DISORDER: ICD-10-CM

## 2024-08-12 DIAGNOSIS — F31.9 BIPOLAR 1 DISORDER: ICD-10-CM

## 2024-08-12 DIAGNOSIS — E78.2 MIXED HYPERLIPIDEMIA: ICD-10-CM

## 2024-08-12 DIAGNOSIS — Z12.5 SCREENING PSA (PROSTATE SPECIFIC ANTIGEN): ICD-10-CM

## 2024-08-12 DIAGNOSIS — Z72.0 TOBACCO ABUSE: Chronic | ICD-10-CM

## 2024-08-12 DIAGNOSIS — J44.9 CHRONIC OBSTRUCTIVE PULMONARY DISEASE, UNSPECIFIED COPD TYPE: ICD-10-CM

## 2024-08-12 DIAGNOSIS — I77.819 AORTIC ECTASIA: ICD-10-CM

## 2024-08-12 DIAGNOSIS — E55.9 VITAMIN D INSUFFICIENCY: ICD-10-CM

## 2024-08-12 DIAGNOSIS — Z09 HOSPITAL DISCHARGE FOLLOW-UP: Primary | ICD-10-CM

## 2024-08-12 DIAGNOSIS — M48.061 DEGENERATIVE LUMBAR SPINAL STENOSIS: ICD-10-CM

## 2024-08-12 DIAGNOSIS — I10 BENIGN ESSENTIAL HYPERTENSION: ICD-10-CM

## 2024-08-12 PROCEDURE — 1160F RVW MEDS BY RX/DR IN RCRD: CPT | Mod: HCNC,CPTII,S$GLB, | Performed by: INTERNAL MEDICINE

## 2024-08-12 PROCEDURE — 1159F MED LIST DOCD IN RCRD: CPT | Mod: HCNC,CPTII,S$GLB, | Performed by: INTERNAL MEDICINE

## 2024-08-12 PROCEDURE — 3078F DIAST BP <80 MM HG: CPT | Mod: HCNC,CPTII,S$GLB, | Performed by: INTERNAL MEDICINE

## 2024-08-12 PROCEDURE — 99214 OFFICE O/P EST MOD 30 MIN: CPT | Mod: HCNC,S$GLB,, | Performed by: INTERNAL MEDICINE

## 2024-08-12 PROCEDURE — 3288F FALL RISK ASSESSMENT DOCD: CPT | Mod: HCNC,CPTII,S$GLB, | Performed by: INTERNAL MEDICINE

## 2024-08-12 PROCEDURE — G0009 ADMIN PNEUMOCOCCAL VACCINE: HCPCS | Mod: HCNC,S$GLB,, | Performed by: INTERNAL MEDICINE

## 2024-08-12 PROCEDURE — 1111F DSCHRG MED/CURRENT MED MERGE: CPT | Mod: HCNC,CPTII,S$GLB, | Performed by: INTERNAL MEDICINE

## 2024-08-12 PROCEDURE — 90677 PCV20 VACCINE IM: CPT | Mod: HCNC,S$GLB,, | Performed by: INTERNAL MEDICINE

## 2024-08-12 PROCEDURE — 1101F PT FALLS ASSESS-DOCD LE1/YR: CPT | Mod: HCNC,CPTII,S$GLB, | Performed by: INTERNAL MEDICINE

## 2024-08-12 PROCEDURE — 3074F SYST BP LT 130 MM HG: CPT | Mod: HCNC,CPTII,S$GLB, | Performed by: INTERNAL MEDICINE

## 2024-08-12 PROCEDURE — 1126F AMNT PAIN NOTED NONE PRSNT: CPT | Mod: HCNC,CPTII,S$GLB, | Performed by: INTERNAL MEDICINE

## 2024-08-12 PROCEDURE — 99999 PR PBB SHADOW E&M-EST. PATIENT-LVL IV: CPT | Mod: PBBFAC,HCNC,, | Performed by: INTERNAL MEDICINE

## 2024-08-12 NOTE — PROGRESS NOTES
INTERNAL MEDICINE ESTABLISHED PATIENT VISIT NOTE    Subjective:     Chief Complaint: Hospital Follow Up       Patient ID: Mirza Morales is a 69 y.o. male with HTN, HLD, sz d/o, bipolar d/o c anxiety, hx EtOH dependence c hx withdrawal in the past, hx opiate overdose, lumbar radiculopathy c hx lumbar fusion around 4/2018 followed by Dr. Kannan Orona who is managing his pain), COPD c tob use, hx R fib fx, hx R clavicular fx 2/2 fall, L inguinal hernia (seen by Gen Surg/Dr. Chery but elected not to have surgery), vit D def, last seen by me in 5/2023, here today for hospital f/u.    Transitional Care Note    Family and/or Caretaker present at visit?  No.  Diagnostic tests reviewed/disposition: No diagnosic tests pending after this hospitalization.  Disease/illness education:  Completed  Home health/community services discussion/referrals: Patient does not have home health established from hospital visit.  They do not need home health.  If needed, we will set up home health for the patient.   Establishment or re-establishment of referral orders for community resources: No other necessary community resources.   Discussion with other health care providers: No discussion with other health care providers necessary.       Was admitted 7/31 through 8/1 p presenting with agitation and behavioral disturbances/manic behavior as noted by his wife.  Wife also reported pt had not been taking his meds for several days.  Had witnessed seizure in the ED.  UDS +THC.  Was admitted to inpatient psych p being medically cleared in the ED.  Was treated and discharged on Ability, Depakate, Remeron, and Propranolol.    Was continued on Atorvastatin for cholesterol and Amlodipine for HTN as well as Vimpat for hx sz.    Had follow-up telemedicine visit with psych last week on 08/06 at which time noted that he was feeling good on his current medications and stated that he was sleeping well.  Was continued on all previous medications  from discharge and was also referred to the sleep Medicine Clinic due to history of ERIC but never treated with CPAP.    Today c/o persistent fatigue.  States he gets a full night of sleep but still feels tired.    Past Medical History:  Past Medical History:   Diagnosis Date    Alcohol abuse     Allergy     Behavioral problem     Bipolar 1 disorder     Chronic right hip pain     Depression     DJD (degenerative joint disease), lumbar 01/27/2015    Fatigue     Hepatitis     pt. denies this    History of psychiatric care     History of psychiatric hospitalization     History of skin cancer 2/12/2024    Hypertension     Karina     Post traumatic stress disorder     Psychiatric problem     Seizures     Suicide attempt     Therapy     Vitamin D insufficiency 03/17/2022       Home Medications:  Prior to Admission medications    Medication Sig Start Date End Date Taking? Authorizing Provider   amLODIPine (NORVASC) 10 MG tablet Take 1 tablet (10 mg total) by mouth once daily. 10/10/23   Jud Mo MD   ARIPiprazole (ABILIFY) 15 MG Tab Take 1 tablet (15 mg total) by mouth once daily. 8/6/24 11/4/24  Emigdio Andrews PA   atorvastatin (LIPITOR) 40 MG tablet Take 1 tablet (40 mg total) by mouth every evening. 7/31/24 7/31/25  Elis Araujo NP   cholecalciferol, vitamin D3, 10 mcg (400 unit) Cap capsule Take 3 capsules (1,200 Units total) by mouth once daily. 8/1/24   Elis Araujo NP   divalproex (DEPAKOTE) 250 MG EC tablet Take 3 tablets (750 mg total) by mouth every evening. 8/6/24 11/4/24  Emigdio Andrews PA   lacosamide (VIMPAT) 200 mg Tab tablet Take 1 tablet (200 mg total) by mouth every 12 (twelve) hours.  Patient not taking: Reported on 8/6/2024 5/27/24   Samuel Julien MD   mirtazapine (REMERON) 7.5 MG Tab Take 1 tablet (7.5 mg total) by mouth every evening. 8/6/24 11/4/24  Emigdio Andrews PA   propranoloL (INDERAL) 10 MG tablet Take 3 tablets (30 mg total) by mouth 2 (two) times daily. 8/6/24 11/4/24  Darryl  DAVID Beal       Allergies:  Review of patient's allergies indicates:   Allergen Reactions    Gabapentin Other (See Comments)     SEVERE DIZZINESS AND BALANCE PROBLEMS, UNABLE TO WALK.    Phenelzine Other (See Comments)     BECOMES HYPER, LIKE A MANIC EPISODE.    Penicillin     Penicillins Hives and Other (See Comments)    Lamotrigine Rash and Other (See Comments)       Social History:  Social History     Tobacco Use    Smoking status: Every Day     Current packs/day: 0.50     Average packs/day: 0.5 packs/day for 10.0 years (5.0 ttl pk-yrs)     Types: Cigarettes    Smokeless tobacco: Never    Tobacco comments:     8/31/2023      Patient states he only smokes 5 cigarettes a day   Substance Use Topics    Alcohol use: Not Currently     Comment: Not drinking alcohol    Drug use: Not Currently        Review of Systems   Constitutional:  Positive for fatigue. Negative for appetite change, chills, fever and unexpected weight change.   HENT:  Negative for congestion, hearing loss and rhinorrhea.    Eyes:  Negative for pain and visual disturbance.   Respiratory:  Negative for cough, chest tightness, shortness of breath and wheezing.    Cardiovascular:  Negative for chest pain, palpitations and leg swelling.   Gastrointestinal:  Negative for abdominal distention and abdominal pain.   Endocrine: Negative for polydipsia and polyuria.   Genitourinary:  Negative for decreased urine volume, dysuria, frequency and penile discharge.   Neurological:  Negative for dizziness, weakness, numbness and headaches.   Psychiatric/Behavioral:  Negative for behavioral problems and confusion.          Health Maintenance:     Flu 10/2023 rec repeat this Fall  Tdap 8/2018  Prevnar 5/2018, pneumovax 8/2018, Prevnar today.  Shingrix 1/2020, 8/2020  COVID 3/2021, 4/2021, 12/2021, 9/2022, 5/2023, 11/2023, rec booster next month  RSV 10/2023     Colonoscopy 8/2019 wnl, no polyps, can repeat in 10 year.  PSA 5/2023 wnl, will schedule repeat.  AAA screen  "4/2022 no AAA    Objective:   /78 (BP Location: Left arm, Patient Position: Sitting, BP Method: Medium (Manual))   Pulse 71   Ht 6' 5" (1.956 m)   Wt 98.4 kg (216 lb 14.9 oz)   SpO2 97%   BMI 25.72 kg/m²        General: AAO x3, no apparent distress  HEENT: no cervical LAD, no thyroid masses appreciated  CV: RRR, no m/r/g  Pulm: Lungs CTAB, no crackles, no wheezes  Abd: s/NT/ND +BS  Extremities: no c/c/e    Labs:     Lab Results   Component Value Date    WBC 10.66 07/22/2024    HGB 14.1 07/22/2024    HCT 39 07/22/2024     (H) 07/22/2024     07/22/2024     Lab Results   Component Value Date    UQIWPCRS98 796 03/30/2022       Sodium   Date Value Ref Range Status   07/22/2024 135 (L) 136 - 145 mmol/L Final     Potassium   Date Value Ref Range Status   07/22/2024 3.6 3.5 - 5.1 mmol/L Final     Chloride   Date Value Ref Range Status   07/22/2024 99 95 - 110 mmol/L Final     CO2   Date Value Ref Range Status   07/22/2024 13 (L) 23 - 29 mmol/L Final     Glucose   Date Value Ref Range Status   07/22/2024 124 (H) 70 - 110 mg/dL Final     BUN   Date Value Ref Range Status   07/22/2024 20 8 - 23 mg/dL Final     Creatinine   Date Value Ref Range Status   07/22/2024 1.0 0.5 - 1.4 mg/dL Final     Calcium   Date Value Ref Range Status   07/22/2024 10.0 8.7 - 10.5 mg/dL Final     Total Protein   Date Value Ref Range Status   07/22/2024 8.1 6.0 - 8.4 g/dL Final     Albumin   Date Value Ref Range Status   07/22/2024 4.2 3.5 - 5.2 g/dL Final     Total Bilirubin   Date Value Ref Range Status   07/22/2024 0.9 0.1 - 1.0 mg/dL Final     Comment:     For infants and newborns, interpretation of results should be based  on gestational age, weight and in agreement with clinical  observations.    Premature Infant recommended reference ranges:  Up to 24 hours.............<8.0 mg/dL  Up to 48 hours............<12.0 mg/dL  3-5 days..................<15.0 mg/dL  6-29 days.................<15.0 mg/dL       Alkaline " Phosphatase   Date Value Ref Range Status   07/22/2024 93 55 - 135 U/L Final     AST   Date Value Ref Range Status   07/22/2024 25 10 - 40 U/L Final     ALT   Date Value Ref Range Status   07/22/2024 23 10 - 44 U/L Final     Anion Gap   Date Value Ref Range Status   07/22/2024 23 (H) 8 - 16 mmol/L Final     eGFR if    Date Value Ref Range Status   03/17/2022 >60.0 >60 mL/min/1.73 m^2 Final     eGFR if non    Date Value Ref Range Status   03/17/2022 >60.0 >60 mL/min/1.73 m^2 Final     Comment:     Calculation used to obtain the estimated glomerular filtration  rate (eGFR) is the CKD-EPI equation.        Lab Results   Component Value Date    HGBA1C 4.8 05/23/2023     Lab Results   Component Value Date    LDLCALC 75.6 05/23/2023     Lab Results   Component Value Date    TSH 3.598 07/22/2024         Assessment/Plan     Mirza was seen today for hospital follow up.    Diagnoses and all orders for this visit:    Hospital discharge follow-up  Bipolar 1 disorder  As per HPI  Better since discharge  Cont routine f/u c psych    Seizure disorder  No sz since inpt  Cont Vimpat    Benign essential hypertension  At goal  Cont meds    Mixed hyperlipidemia  Lab Results   Component Value Date    LDLCALC 75.6 05/23/2023     At goal on last check  Needs updated labs.  Will schedule for next week per pt request since non fasting today.  -     Lipid Panel; Future    Degenerative lumbar spinal stenosis  Chronic  Cont routine f/u c pain mgmt    Tobacco abuse  Patient counseled on the need to stop smoking.    Chronic obstructive pulmonary disease, unspecified COPD type  Stable, no issues    Vitamin D insufficiency  Normal on last check, no issues    Aortic ectasia  No issues, cont bp and lipid control    Screening PSA (prostate specific antigen)  -     PSA, Screening; Future      HM as above  RTC in 6 months, sooner if needed.    Jud Mo MD  Department of Internal Medicine - [facility]  [address]  08/12/2024

## 2024-08-22 ENCOUNTER — PATIENT MESSAGE (OUTPATIENT)
Dept: INTERNAL MEDICINE | Facility: CLINIC | Age: 69
End: 2024-08-22
Payer: MEDICARE

## 2024-08-22 ENCOUNTER — LAB VISIT (OUTPATIENT)
Dept: LAB | Facility: HOSPITAL | Age: 69
End: 2024-08-22
Attending: INTERNAL MEDICINE
Payer: MEDICARE

## 2024-08-22 DIAGNOSIS — E78.2 MIXED HYPERLIPIDEMIA: ICD-10-CM

## 2024-08-22 DIAGNOSIS — Z12.5 SCREENING PSA (PROSTATE SPECIFIC ANTIGEN): ICD-10-CM

## 2024-08-22 LAB
CHOLEST SERPL-MCNC: 132 MG/DL (ref 120–199)
CHOLEST/HDLC SERPL: 2.4 {RATIO} (ref 2–5)
COMPLEXED PSA SERPL-MCNC: 2 NG/ML (ref 0–4)
HDLC SERPL-MCNC: 56 MG/DL (ref 40–75)
HDLC SERPL: 42.4 % (ref 20–50)
LDLC SERPL CALC-MCNC: 60.8 MG/DL (ref 63–159)
NONHDLC SERPL-MCNC: 76 MG/DL
TRIGL SERPL-MCNC: 76 MG/DL (ref 30–150)

## 2024-08-22 PROCEDURE — 84153 ASSAY OF PSA TOTAL: CPT | Mod: HCNC | Performed by: INTERNAL MEDICINE

## 2024-08-22 PROCEDURE — 80061 LIPID PANEL: CPT | Mod: HCNC | Performed by: INTERNAL MEDICINE

## 2024-08-22 PROCEDURE — 36415 COLL VENOUS BLD VENIPUNCTURE: CPT | Mod: HCNC | Performed by: INTERNAL MEDICINE

## 2024-09-06 ENCOUNTER — OFFICE VISIT (OUTPATIENT)
Dept: PSYCHIATRY | Facility: CLINIC | Age: 69
End: 2024-09-06
Payer: MEDICARE

## 2024-09-06 DIAGNOSIS — G47.00 INSOMNIA, UNSPECIFIED TYPE: ICD-10-CM

## 2024-09-06 DIAGNOSIS — F10.21 ALCOHOL DEPENDENCE IN REMISSION: ICD-10-CM

## 2024-09-06 DIAGNOSIS — F31.9 BIPOLAR I DISORDER: Primary | ICD-10-CM

## 2024-09-06 RX ORDER — QUETIAPINE FUMARATE 50 MG/1
50 TABLET, FILM COATED ORAL NIGHTLY
Qty: 30 TABLET | Refills: 2 | Status: SHIPPED | OUTPATIENT
Start: 2024-09-06 | End: 2024-12-05

## 2024-09-06 NOTE — PROGRESS NOTES
"The patient location is: [place]    Visit type: audiovisual    Each patient to whom he or she provides medical services by telemedicine is:  (1) informed of the relationship between the physician and patient and the respective role of any other health care provider with respect to management of the patient; and (2) notified that he or she may decline to receive medical services by telemedicine and may withdraw from such care at any time.    Notes:     Outpatient Psychiatry Follow-Up Visit (MD/BEATRICE)    9/6/2024    Clinical Status of Patient:  Outpatient (Ambulatory)    Chief Complaint:  Mirza Morales is a 69 y.o. male who presents today for follow-up of mood disorder and substance problems.  Met with patient.      Interval History and Content of Current Session 09/06/2024:    Pt hospitalized on 7/23 for manic episode at [facility] and discharged on 8/1/23 on abilify 15mg, remeron 7.5mg qhs, depakote 750mg qhs. Pt saw after discharge at last visit.    Pt denies racing thoughts, linear and logical. Pt speaking at normal pace and and logical, no signs of marielena.    Pt reports only sleeping 4-5 hrs at night on remeron at night. Discussed adding seroquel 50mg qhs.    Pt reports today: "Nitza been feeling unmotivated"    Mood overall is "more depressed and unmotivated"    Patients mood is depressed, affect appears mood congruent. Linear and logical, friendly and cooperative, good eye contact.    Denies SI/HI/AVH. Pt reports sleeping poorly and normal appetite. Denies side effects of medications.    Pt reports taking medications as prescribed and behaving appropriately during interview today.      Prior visit:    Pt was brought to ED by police as pt's wife had contacted provider that he was manic, see note above. Prior to admission pt was tapering down on seroquel without recommendation from provider prior to this latest manic episode.    Pt hospitalized on 7/23 at [facility] and discharged on 8/1/23 on abilify 15mg, remeron 7.5mg " "qhs, depakote 750mg qhs.    Pt denies racing thoughts, linear and logical. Pt speaking at normal pace and and logical, no signs of marielena.    Pt reports today: "I feel very good on the medicine. I'm sleeping well and my appetite is good. I feel comfortable in my own skin. I feel like my old self"    Mood overall is "really good, these medicines to are working for me"    Patients mood is euthymic, affect appears mood congruent. Linear and logical, friendly and cooperative, good eye contact.    Denies SI/HI/AVH. Pt reports sleeping well and normal appetite. Denies side effects of medications.    Pt reports taking medications as prescribed and behaving appropriately during interview today.    Review of Systems     Review of Systems   Constitutional:  Negative for fever and malaise/fatigue.   HENT:  Negative for sore throat.    Eyes:  Negative for photophobia.   Respiratory:  Positive for shortness of breath (on exertion. dx ILD). Negative for cough and wheezing.    Cardiovascular:  Negative for chest pain, palpitations and PND.   Gastrointestinal:  Negative for abdominal pain.   Genitourinary:  Negative for dysuria.   Musculoskeletal:  Negative for myalgias.   Skin:  Negative for rash.   Neurological:  Negative for dizziness.   Endo/Heme/Allergies:  Does not bruise/bleed easily.   Psychiatric/Behavioral:  Positive for depression and substance abuse. Negative for hallucinations and suicidal ideas. The patient has insomnia. The patient is not nervous/anxious.        Psychiatric Review Of Systems - Is patient experiencing or having changes in:  sleep: yes  appetite: no  weight: no  energy/anergy: yes  interest/pleasure/anhedonia: yes  somatic symptoms: no  libido: no  anxiety/panic: no  guilty/hopelessness: no  concentration: no  S.I.B.s/risky behavior: no  Irritability: no  Racing thoughts: no  Impulsive behaviors: no  Paranoia: no  AVH: no      Past Medical, Family and Social History: The patient's past medical, family " and social history have been reviewed and updated as appropriate within the electronic medical record - see encounter notes.      Current Medications:   Medication List with Changes/Refills   Current Medications    AMLODIPINE (NORVASC) 10 MG TABLET    Take 1 tablet (10 mg total) by mouth once daily.    ARIPIPRAZOLE (ABILIFY) 15 MG TAB    Take 1 tablet (15 mg total) by mouth once daily.    ATORVASTATIN (LIPITOR) 40 MG TABLET    Take 1 tablet (40 mg total) by mouth every evening.    CHOLECALCIFEROL, VITAMIN D3, 10 MCG (400 UNIT) CAP CAPSULE    Take 3 capsules (1,200 Units total) by mouth once daily.    DIVALPROEX (DEPAKOTE) 250 MG EC TABLET    Take 3 tablets (750 mg total) by mouth every evening.    LACOSAMIDE (VIMPAT) 200 MG TAB TABLET    Take 1 tablet (200 mg total) by mouth every 12 (twelve) hours.    MIRTAZAPINE (REMERON) 7.5 MG TAB    Take 1 tablet (7.5 mg total) by mouth every evening.    PROPRANOLOL (INDERAL) 10 MG TABLET    Take 3 tablets (30 mg total) by mouth 2 (two) times daily.         Allergies:   Review of patient's allergies indicates:   Allergen Reactions    Gabapentin Other (See Comments)     SEVERE DIZZINESS AND BALANCE PROBLEMS, UNABLE TO WALK.    Phenelzine Other (See Comments)     BECOMES HYPER, LIKE A MANIC EPISODE.    Penicillin     Penicillins Hives and Other (See Comments)    Lamotrigine Rash and Other (See Comments)         Vitals   There were no vitals filed for this visit.       Labs/Imaging/Studies:   No results found for this or any previous visit (from the past 48 hour(s)).   Lab Results   Component Value Date    VALPROATE 48.2 (L) 09/29/2019       Compliance: yes    Side effects: None    Risk Parameters:  Patient reports no suicidal ideation  Patient reports no homicidal ideation  Patient reports no self-injurious behavior  Patient reports no violent behavior    Exam (detailed: at least 9 elements; comprehensive: all 15 elements)   Constitutional  Vitals:  Most recent vital signs, dated  greater than 90 days prior to this appointment, were reviewed.   There were no vitals filed for this visit.     General:  Wnl, appropriate     Musculoskeletal  Muscle Strength/Tone:  not examined   Gait & Station:  Not examined     Psychiatric  Speech:  no latency; no press   Mood & Affect:  depressed  Mood congruent   Thought Process:  normal and logical   Associations:  intact   Thought Content:  normal, no suicidality, no homicidality, delusions, or paranoia   Insight:  intact, has awareness of illness   Judgement: behavior is adequate to circumstances   Orientation:  grossly intact   Memory: intact for content of interview   Language: grossly intact   Attention Span & Concentration:  able to focus   Fund of Knowledge:  intact and appropriate to age and level of education     Assessment and Diagnosis   Status/Progress: Based on the examination today, the patient's problem(s) is/are adequately but not ideally controlled.  New problems have not been presented today.   Co-morbidities may be complicating management of the primary condition.  There are no active rule-out diagnoses for this patient at this time.     General Impression:      ICD-10-CM ICD-9-CM   1. Bipolar I disorder  F31.9 296.7   2. Alcohol dependence in remission  F10.21 303.93   3. Insomnia, unspecified type  G47.00 780.52         Intervention/Counseling/Treatment Plan   Medication Management: Continue current medications. The risks and benefits of medication were discussed with the patient.    -continue abilify 15mg daily  -continue depakote 750mg qhs  -continue remeron 7.5mg qhs    -start seroquel 50mg qhs    -continue propranolol as prescribed for essential tremor    -referral ordered placed for sleep medicine at a prior visit-pt instructed to make appt with sleep medicine provider.   Pt has hx ERIC dx over 5 years ago. Never tx with CPAP    The risks and benefits of medication were discussed with the patient.  Discussed diagnosis, risks and  benefits of proposed treatment above vs alternative treatments vs no treatment, and potential side effects of these treatments. The patient expresses understanding of the above and displays the capacity to agree with this treatment given said understanding. Patient also agrees that, currently, the benefits outweigh the risks and would like to pursue treatment at this time.  Discussed inherent unpredictability of medications in each individual.   Encouraged Patient to keep future appointments.   Take medications as prescribed and abstain from substance abuse.   In the event of an emergency, patient was advised to go to the emergency room      Return to Clinic: 1 month    Trav Andrews PA-C      Total face to face time: 11 min  Total time (chart review, patient contact, documentation): 15 min      *This note has been prepared using a combination of a dictation device and typing.  It has been checked for errors but some errors may still exist within the note as a result of speech recognition errors and/or typographical errors.

## 2024-09-19 ENCOUNTER — PATIENT MESSAGE (OUTPATIENT)
Dept: PSYCHIATRY | Facility: CLINIC | Age: 69
End: 2024-09-19
Payer: MEDICARE

## 2024-09-19 DIAGNOSIS — G47.00 INSOMNIA, UNSPECIFIED TYPE: Primary | ICD-10-CM

## 2024-09-20 RX ORDER — TRAZODONE HYDROCHLORIDE 100 MG/1
TABLET ORAL
Qty: 60 TABLET | Refills: 2 | Status: SHIPPED | OUTPATIENT
Start: 2024-09-20

## 2024-09-20 NOTE — TELEPHONE ENCOUNTER
Attempted to call regarding sleep issues. Call went to  and left VM for patient regarding issues with sleep

## 2024-09-23 ENCOUNTER — PATIENT MESSAGE (OUTPATIENT)
Dept: PSYCHIATRY | Facility: CLINIC | Age: 69
End: 2024-09-23
Payer: MEDICARE

## 2024-10-02 ENCOUNTER — PATIENT MESSAGE (OUTPATIENT)
Dept: INTERNAL MEDICINE | Facility: CLINIC | Age: 69
End: 2024-10-02
Payer: MEDICARE

## 2024-10-02 DIAGNOSIS — J30.9 ALLERGIC RHINITIS, UNSPECIFIED SEASONALITY, UNSPECIFIED TRIGGER: Primary | ICD-10-CM

## 2024-10-02 RX ORDER — LEVOCETIRIZINE DIHYDROCHLORIDE 5 MG/1
5 TABLET, FILM COATED ORAL DAILY PRN
Qty: 30 TABLET | Refills: 11 | Status: SHIPPED | OUTPATIENT
Start: 2024-10-02 | End: 2024-10-03 | Stop reason: SDUPTHER

## 2024-10-03 RX ORDER — LEVOCETIRIZINE DIHYDROCHLORIDE 5 MG/1
5 TABLET, FILM COATED ORAL DAILY PRN
Qty: 90 TABLET | Refills: 3 | Status: SHIPPED | OUTPATIENT
Start: 2024-10-03 | End: 2025-10-03

## 2024-10-03 NOTE — TELEPHONE ENCOUNTER
No care due was identified.  [facility] Embedded Care Due Messages. Reference number: [id number].   10/03/2024 3:23:55 PM CDT

## 2024-10-04 ENCOUNTER — OFFICE VISIT (OUTPATIENT)
Dept: PSYCHIATRY | Facility: CLINIC | Age: 69
End: 2024-10-04
Payer: MEDICARE

## 2024-10-04 DIAGNOSIS — F10.21 ALCOHOL DEPENDENCE IN REMISSION: ICD-10-CM

## 2024-10-04 DIAGNOSIS — G47.00 INSOMNIA, UNSPECIFIED TYPE: ICD-10-CM

## 2024-10-04 DIAGNOSIS — F31.9 BIPOLAR I DISORDER: Primary | ICD-10-CM

## 2024-10-04 RX ORDER — TRAZODONE HYDROCHLORIDE 100 MG/1
TABLET ORAL
Qty: 60 TABLET | Refills: 2 | Status: SHIPPED | OUTPATIENT
Start: 2024-10-04

## 2024-10-04 RX ORDER — QUETIAPINE FUMARATE 100 MG/1
100 TABLET, FILM COATED ORAL NIGHTLY
Qty: 90 TABLET | Refills: 1 | Status: SHIPPED | OUTPATIENT
Start: 2024-10-04 | End: 2025-04-02

## 2024-10-04 NOTE — PROGRESS NOTES
"The patient location is: [place]    Visit type: audio only-video failure during session    Each patient to whom he or she provides medical services by telemedicine is:  (1) informed of the relationship between the physician and patient and the respective role of any other health care provider with respect to management of the patient; and (2) notified that he or she may decline to receive medical services by telemedicine and may withdraw from such care at any time.    Notes:     Outpatient Psychiatry Follow-Up Visit (MD/BEATRICE)    10/4/2024    Clinical Status of Patient:  Outpatient (Ambulatory)    Chief Complaint:  Mirza Morales is a 69 y.o. male who presents today for follow-up of mood disorder and substance problems.  Met with patient.      Interval History and Content of Current Session 10/04/2024:    Pt requested to stop remeron and restart trazodone since having issues sleeping and previously did better on trazodone.    Pt now taking trazodone 100mg qhs states is sleeping much better    Pt reports since being on new regimen from hospital with abilify and depakote that he has been more depressed. Discussed with pt plan to switch back to only trazodone and seroquel as was taking prior or recent admission however pt had begun stopping his seroquel which caused.    Pt reports he continues to use cannabis daily in morning. Discussed with pt this is not recommended as can negatively effect mood.    Pt denies racing thoughts, linear and logical. Pt speaking at normal pace and and logical, no signs of marielena.    Pt reports only sleeping 6-8 hrs at night on current regimen.    Mood overall is "feeling depressed"    Patients mood is depressed. Linear and logical, friendly and cooperative, good eye contact.    Denies SI/HI/AVH. Pt reports sleeping well and normal appetite. Denies side effects of medications.    Pt reports taking medications as prescribed and behaving appropriately during interview " "today.    Psychotherapy:  Target symptoms: depression, anxiety , mood disorder  Why chosen therapy is appropriate versus another modality: relevant to diagnosis, patient responds to this modality, evidence based practice  Outcome monitoring methods: self-report, observation  Therapeutic intervention type: insight oriented psychotherapy, supportive psychotherapy  Topics discussed/themes: building skills sets for symptom management, symptom recognition  The patient's response to the intervention is accepting. The patient's progress toward treatment goals is good.   Duration of intervention: 18 minutes.      Prior visit:    Pt hospitalized on 7/23 for manic episode at [facility] and discharged on 8/1/23 on abilify 15mg, remeron 7.5mg qhs, depakote 750mg qhs. Pt saw after discharge at last visit.    Pt denies racing thoughts, linear and logical. Pt speaking at normal pace and and logical, no signs of marielena.    Pt reports only sleeping 4-5 hrs at night on remeron at night. Discussed adding seroquel 50mg qhs.    Pt reports today: "Nitza been feeling unmotivated"    Mood overall is "more depressed and unmotivated"    Patients mood is depressed, affect appears mood congruent. Linear and logical, friendly and cooperative, good eye contact.    Denies SI/HI/AVH. Pt reports sleeping poorly and normal appetite. Denies side effects of medications.    Pt reports taking medications as prescribed and behaving appropriately during interview today.    Review of Systems     Review of Systems   Constitutional:  Negative for fever and malaise/fatigue.   HENT:  Negative for sore throat.    Eyes:  Negative for photophobia.   Respiratory:  Positive for shortness of breath (on exertion. dx ILD). Negative for cough and wheezing.    Cardiovascular:  Negative for chest pain, palpitations and PND.   Gastrointestinal:  Negative for abdominal pain.   Genitourinary:  Negative for dysuria.   Musculoskeletal:  Negative for myalgias.   Skin:  Negative for " rash.   Neurological:  Negative for dizziness.   Endo/Heme/Allergies:  Does not bruise/bleed easily.   Psychiatric/Behavioral:  Positive for depression and substance abuse. Negative for hallucinations and suicidal ideas. The patient is not nervous/anxious and does not have insomnia.        Psychiatric Review Of Systems - Is patient experiencing or having changes in:  sleep: no  appetite: no  weight: no  energy/anergy: yes  interest/pleasure/anhedonia: yes  somatic symptoms: no  libido: no  anxiety/panic: no  guilty/hopelessness: no  concentration: no  S.I.B.s/risky behavior: no  Irritability: no  Racing thoughts: no  Impulsive behaviors: no  Paranoia: no  AVH: no      Past Medical, Family and Social History: The patient's past medical, family and social history have been reviewed and updated as appropriate within the electronic medical record - see encounter notes.      Current Medications:   Medication List with Changes/Refills   Current Medications    AMLODIPINE (NORVASC) 10 MG TABLET    TAKE 1 TABLET ONE TIME DAILY    ATORVASTATIN (LIPITOR) 40 MG TABLET    Take 1 tablet (40 mg total) by mouth every evening.    CHOLECALCIFEROL, VITAMIN D3, 10 MCG (400 UNIT) CAP CAPSULE    Take 3 capsules (1,200 Units total) by mouth once daily.    LACOSAMIDE (VIMPAT) 200 MG TAB TABLET    Take 1 tablet (200 mg total) by mouth every 12 (twelve) hours.    LEVOCETIRIZINE (XYZAL) 5 MG TABLET    Take 1 tablet (5 mg total) by mouth daily as needed for Allergies.    PROPRANOLOL (INDERAL) 10 MG TABLET    Take 3 tablets (30 mg total) by mouth 2 (two) times daily.    TRAZODONE (DESYREL) 100 MG TABLET    Take 1-2 tablets at bedtime as needed for insomnia   Changed and/or Refilled Medications    Modified Medication Previous Medication    QUETIAPINE (SEROQUEL) 100 MG TAB QUEtiapine (SEROQUEL) 50 MG tablet       Take 1 tablet (100 mg total) by mouth every evening.    Take 1 tablet (50 mg total) by mouth every evening.   Discontinued Medications     ARIPIPRAZOLE (ABILIFY) 15 MG TAB    Take 1 tablet (15 mg total) by mouth once daily.    DIVALPROEX (DEPAKOTE) 250 MG EC TABLET    Take 3 tablets (750 mg total) by mouth every evening.    MIRTAZAPINE (REMERON) 7.5 MG TAB    Take 1 tablet (7.5 mg total) by mouth every evening.         Allergies:   Review of patient's allergies indicates:   Allergen Reactions    Gabapentin Other (See Comments)     SEVERE DIZZINESS AND BALANCE PROBLEMS, UNABLE TO WALK.    Phenelzine Other (See Comments)     BECOMES HYPER, LIKE A MANIC EPISODE.    Penicillin     Penicillins Hives and Other (See Comments)    Lamotrigine Rash and Other (See Comments)         Vitals   There were no vitals filed for this visit.       Labs/Imaging/Studies:   No results found for this or any previous visit (from the past 48 hours).   Lab Results   Component Value Date    VALPROATE 48.2 (L) 09/29/2019       Compliance: yes    Side effects: None    Risk Parameters:  Patient reports no suicidal ideation  Patient reports no homicidal ideation  Patient reports no self-injurious behavior  Patient reports no violent behavior    Exam (detailed: at least 9 elements; comprehensive: all 15 elements)   Constitutional  Vitals:  Most recent vital signs, dated greater than 90 days prior to this appointment, were reviewed.   There were no vitals filed for this visit.     General:  Wnl, appropriate     Musculoskeletal  Muscle Strength/Tone:  not examined   Gait & Station:  Not examined     Psychiatric  Speech:  no latency; no press   Mood & Affect:  depressed  Mood congruent   Thought Process:  normal and logical   Associations:  intact   Thought Content:  normal, no suicidality, no homicidality, delusions, or paranoia   Insight:  intact, has awareness of illness   Judgement: behavior is adequate to circumstances   Orientation:  grossly intact   Memory: intact for content of interview   Language: grossly intact   Attention Span & Concentration:  able to focus   Fund of  Knowledge:  intact and appropriate to age and level of education     Assessment and Diagnosis   Status/Progress: Based on the examination today, the patient's problem(s) is/are adequately but not ideally controlled.  New problems have not been presented today.   Co-morbidities may be complicating management of the primary condition.  There are no active rule-out diagnoses for this patient at this time.     General Impression:      ICD-10-CM ICD-9-CM   1. Bipolar I disorder  F31.9 296.7   2. Insomnia, unspecified type  G47.00 780.52   3. Alcohol dependence in remission  F10.21 303.93         Intervention/Counseling/Treatment Plan   Medication Management: Continue current medications. The risks and benefits of medication were discussed with the patient.    -stop abilify    -stop depakote    -stopped remeron as ineffective    -continue trazodone 100mg qhs    -increase seroquel to 100mg qhs    -continue propranolol as prescribed for essential tremor    -referral ordered placed for sleep medicine at a prior visit-pt instructed to make appt with sleep medicine provider.     Pt has hx ERIC dx over 5 years ago. Never tx with CPAP    The risks and benefits of medication were discussed with the patient.  Discussed diagnosis, risks and benefits of proposed treatment above vs alternative treatments vs no treatment, and potential side effects of these treatments. The patient expresses understanding of the above and displays the capacity to agree with this treatment given said understanding. Patient also agrees that, currently, the benefits outweigh the risks and would like to pursue treatment at this time.  Discussed inherent unpredictability of medications in each individual.   Encouraged Patient to keep future appointments.   Take medications as prescribed and abstain from substance abuse.   In the event of an emergency, patient was advised to go to the emergency room      Return to Clinic: 1 month    Trav Andrews PA-C      Total  face to face time: 30 min  Total time (chart review, patient contact, documentation): 32 min      *This note has been prepared using a combination of a dictation device and typing.  It has been checked for errors but some errors may still exist within the note as a result of speech recognition errors and/or typographical errors.

## 2024-10-28 ENCOUNTER — PATIENT MESSAGE (OUTPATIENT)
Dept: PSYCHIATRY | Facility: CLINIC | Age: 69
End: 2024-10-28
Payer: MEDICARE

## 2024-10-28 DIAGNOSIS — G47.00 INSOMNIA, UNSPECIFIED TYPE: ICD-10-CM

## 2024-10-28 DIAGNOSIS — F41.1 GAD (GENERALIZED ANXIETY DISORDER): Primary | ICD-10-CM

## 2024-10-28 RX ORDER — MIRTAZAPINE 7.5 MG/1
7.5 TABLET, FILM COATED ORAL NIGHTLY
Qty: 30 TABLET | Refills: 2 | Status: SHIPPED | OUTPATIENT
Start: 2024-10-28 | End: 2025-01-26

## 2024-11-05 ENCOUNTER — PATIENT MESSAGE (OUTPATIENT)
Dept: PSYCHIATRY | Facility: CLINIC | Age: 69
End: 2024-11-05
Payer: MEDICARE

## 2024-11-05 NOTE — TELEPHONE ENCOUNTER
"Spoke with pt on telephone    Pt reports having racing thoughts. "Feel like thoughts are racing, but now full blown marielena or anything"    Pt has normal rate of speech, not pressured. Linear and logical, no overt signs of psychosis noted    States has not been sleeping well last few days.    Instructed pt to take 50mg seroquel now and 200mg at bedtime. he has been taking 100mg qhs    Will consider adding lithium if needed. GFR>60    Appt is scheduled already for tomorrow 11/6/24  "

## 2024-11-06 ENCOUNTER — OFFICE VISIT (OUTPATIENT)
Dept: PSYCHIATRY | Facility: CLINIC | Age: 69
End: 2024-11-06
Payer: MEDICARE

## 2024-11-06 DIAGNOSIS — F31.60 BIPOLAR 1 DISORDER, MIXED: Primary | ICD-10-CM

## 2024-11-06 DIAGNOSIS — F41.1 GAD (GENERALIZED ANXIETY DISORDER): ICD-10-CM

## 2024-11-06 DIAGNOSIS — G47.00 INSOMNIA, UNSPECIFIED TYPE: ICD-10-CM

## 2024-11-06 DIAGNOSIS — F10.21 ALCOHOL DEPENDENCE IN REMISSION: ICD-10-CM

## 2024-11-06 RX ORDER — LITHIUM CARBONATE 300 MG/1
300 TABLET, FILM COATED, EXTENDED RELEASE ORAL 2 TIMES DAILY
Qty: 60 TABLET | Refills: 2 | Status: SHIPPED | OUTPATIENT
Start: 2024-11-06 | End: 2025-02-04

## 2024-11-06 RX ORDER — RISPERIDONE 0.5 MG/1
0.5 TABLET ORAL 2 TIMES DAILY
Qty: 60 TABLET | Refills: 2 | Status: SHIPPED | OUTPATIENT
Start: 2024-11-06 | End: 2025-02-04

## 2024-11-06 NOTE — PROGRESS NOTES
"The patient location is: [place]    Visit type: audiovisual    Each patient to whom he or she provides medical services by telemedicine is:  (1) informed of the relationship between the physician and patient and the respective role of any other health care provider with respect to management of the patient; and (2) notified that he or she may decline to receive medical services by telemedicine and may withdraw from such care at any time.    Notes:     Outpatient Psychiatry Follow-Up Visit (MD/BEATRICE)    11/6/2024    Clinical Status of Patient:  Outpatient (Ambulatory)    Chief Complaint:  Mirza Morales is a 69 y.o. male who presents today for follow-up of mood disorder and substance problems.  Met with patient.      Interval History and Content of Current Session 11/06/2024:    Pt reports high anxiety, no motivation, dreading things he needs to do.    Pt yesterday reported having racing thoughts and instructed pt to increase seroquel to 200mg qhs. Today he reports having racing thoughts still.    Denies euphoria, states he feels severely depressed and anxious    No over signs of psychosis. Pt denies SI/HI/AVH. No delusional content or paranoia    Concern pt may be in mixed state    Discussed with pt will start lithium 300mg bid and risperdal 0.5mg bid and f/u in less than 1 week.     Pt instructed to contact provider if symptoms worsen. Wife also able to contact provider via epic regarding how pt is doing    Discussed with pt if racing thoughts worsen, becomes paranoid, other sx of psychosis, or SI to go to ED for further evaluation and management. Pt is agreeable to plan.    Pt reports today: "I have this fear I'm going to lose my mind"    Mood overall is "not good very depressed and anxious"    Patients mood is depressive, affect appears mood congruent. Linear and logical, friendly and cooperative, good eye contact.    Denies SI/HI/AVH. Pt reports sleeping 4-5 hr and decreased appetite. Denies side effects of " "medications.    Pt reports taking medications as prescribed and behaving appropriately during interview today.      Psychotherapy:  Target symptoms: depression, anxiety , mood disorder  Why chosen therapy is appropriate versus another modality: relevant to diagnosis, patient responds to this modality, evidence based practice  Outcome monitoring methods: self-report, observation  Therapeutic intervention type: insight oriented psychotherapy, supportive psychotherapy  Topics discussed/themes: building skills sets for symptom management, symptom recognition  The patient's response to the intervention is accepting. The patient's progress toward treatment goals is fair.   Duration of intervention: 16 minutes.      Prior visit:    Pt requested to stop remeron and restart trazodone since having issues sleeping and previously did better on trazodone.    Pt now taking trazodone 100mg qhs states is sleeping much better    Pt reports since being on new regimen from hospital with abilify and depakote that he has been more depressed. Discussed with pt plan to switch back to only trazodone and seroquel as was taking prior or recent admission however pt had begun stopping his seroquel which caused.    Pt reports he continues to use cannabis daily in morning. Discussed with pt this is not recommended as can negatively effect mood.    Pt denies racing thoughts, linear and logical. Pt speaking at normal pace and and logical, no signs of marielena.    Pt reports only sleeping 6-8 hrs at night on current regimen.    Mood overall is "feeling depressed"    Patients mood is depressed. Linear and logical, friendly and cooperative, good eye contact.    Denies SI/HI/AVH. Pt reports sleeping well and normal appetite. Denies side effects of medications.    Pt reports taking medications as prescribed and behaving appropriately during interview today.    Review of Systems     Review of Systems   Constitutional:  Negative for fever and " malaise/fatigue.   HENT:  Negative for sore throat.    Eyes:  Negative for photophobia.   Respiratory:  Positive for shortness of breath (on exertion. dx ILD). Negative for cough and wheezing.    Cardiovascular:  Negative for chest pain, palpitations and PND.   Gastrointestinal:  Negative for abdominal pain.   Genitourinary:  Negative for dysuria.   Musculoskeletal:  Negative for myalgias.   Skin:  Negative for rash.   Neurological:  Negative for dizziness.   Endo/Heme/Allergies:  Does not bruise/bleed easily.   Psychiatric/Behavioral:  Positive for depression and substance abuse. Negative for hallucinations and suicidal ideas. The patient is nervous/anxious and has insomnia.        Psychiatric Review Of Systems - Is patient experiencing or having changes in:  sleep: yes  appetite: no  weight: no  energy/anergy: yes  interest/pleasure/anhedonia: yes  somatic symptoms: no  libido: no  anxiety/panic: yes  guilty/hopelessness: no  concentration: no  S.I.B.s/risky behavior: no  Irritability: no  Racing thoughts: yes  Impulsive behaviors: no  Paranoia: no  AVH: no      Past Medical, Family and Social History: The patient's past medical, family and social history have been reviewed and updated as appropriate within the electronic medical record - see encounter notes.      Current Medications:   Medication List with Changes/Refills   New Medications    LITHIUM (LITHOBID) 300 MG CR TABLET    Take 1 tablet (300 mg total) by mouth 2 (two) times daily.    RISPERIDONE (RISPERDAL) 0.5 MG TAB    Take 1 tablet (0.5 mg total) by mouth 2 (two) times daily.   Current Medications    AMLODIPINE (NORVASC) 10 MG TABLET    TAKE 1 TABLET ONE TIME DAILY    ATORVASTATIN (LIPITOR) 40 MG TABLET    Take 1 tablet (40 mg total) by mouth every evening.    CHOLECALCIFEROL, VITAMIN D3, 10 MCG (400 UNIT) CAP CAPSULE    Take 3 capsules (1,200 Units total) by mouth once daily.    LACOSAMIDE (VIMPAT) 200 MG TAB TABLET    Take 1 tablet (200 mg total) by  mouth every 12 (twelve) hours.    LEVOCETIRIZINE (XYZAL) 5 MG TABLET    Take 1 tablet (5 mg total) by mouth daily as needed for Allergies.    PROPRANOLOL (INDERAL) 10 MG TABLET    Take 3 tablets (30 mg total) by mouth 2 (two) times daily.    QUETIAPINE (SEROQUEL) 100 MG TAB    Take 1 tablet (100 mg total) by mouth every evening.    TRAZODONE (DESYREL) 100 MG TABLET    Take 1-2 tablets at bedtime as needed for insomnia   Discontinued Medications    MIRTAZAPINE (REMERON) 7.5 MG TAB    Take 1 tablet (7.5 mg total) by mouth every evening.         Allergies:   Review of patient's allergies indicates:   Allergen Reactions    Gabapentin Other (See Comments)     SEVERE DIZZINESS AND BALANCE PROBLEMS, UNABLE TO WALK.    Phenelzine Other (See Comments)     BECOMES HYPER, LIKE A MANIC EPISODE.    Penicillin     Penicillins Hives and Other (See Comments)    Lamotrigine Rash and Other (See Comments)         Vitals   There were no vitals filed for this visit.       Labs/Imaging/Studies:   No results found for this or any previous visit (from the past 48 hours).   Lab Results   Component Value Date    VALPROATE 48.2 (L) 09/29/2019       Compliance: yes    Side effects: None    Risk Parameters:  Patient reports no suicidal ideation  Patient reports no homicidal ideation  Patient reports no self-injurious behavior  Patient reports no violent behavior    Exam (detailed: at least 9 elements; comprehensive: all 15 elements)   Constitutional  Vitals:  Most recent vital signs, dated greater than 90 days prior to this appointment, were reviewed.   There were no vitals filed for this visit.     General:  Wnl, appropriate     Musculoskeletal  Muscle Strength/Tone:  not examined   Gait & Station:  Not examined     Psychiatric  Speech:  no latency; no press   Mood & Affect:  depressed  Mood congruent   Thought Process:  normal and logical   Associations:  intact   Thought Content:  normal, no suicidality, no homicidality, delusions, or  paranoia   Insight:  intact, has awareness of illness   Judgement: behavior is adequate to circumstances   Orientation:  grossly intact   Memory: intact for content of interview   Language: grossly intact   Attention Span & Concentration:  able to focus   Fund of Knowledge:  intact and appropriate to age and level of education     Assessment and Diagnosis   Status/Progress: Based on the examination today, the patient's problem(s) is/are inadequately controlled and worsening.  New problems have not been presented today.   Co-morbidities may be complicating management of the primary condition.  There are no active rule-out diagnoses for this patient at this time.     General Impression:      ICD-10-CM ICD-9-CM   1. Bipolar 1 disorder, mixed  F31.60 296.60   2. NATALIA (generalized anxiety disorder)  F41.1 300.02   3. Insomnia, unspecified type  G47.00 780.52   4. Alcohol dependence in remission  F10.21 303.93       Intervention/Counseling/Treatment Plan   Medication Management: Continue current medications. The risks and benefits of medication were discussed with the patient.    -start lithium CR 300mg bid    -start risperdal 0.5mg bid    -continue trazodone 100mg qhs    -continue seroquel to 100mg qhs    -continue propranolol as prescribed for essential tremor    -referral ordered placed for sleep medicine at a prior visit-pt instructed to make appt with sleep medicine provider.     Pt has hx ERIC dx over 5 years ago. Never tx with CPAP    The risks and benefits of medication were discussed with the patient.  Discussed diagnosis, risks and benefits of proposed treatment above vs alternative treatments vs no treatment, and potential side effects of these treatments. The patient expresses understanding of the above and displays the capacity to agree with this treatment given said understanding. Patient also agrees that, currently, the benefits outweigh the risks and would like to pursue treatment at this time.  Discussed  inherent unpredictability of medications in each individual.   Encouraged Patient to keep future appointments.   Take medications as prescribed and abstain from substance abuse.   In the event of an emergency, patient was advised to go to the emergency room      Return to Clinic: 1 week, f/u scheduled next tues 11/12    Trav Andrews PA-C      Total face to face time: 30 min  Total time (chart review, patient contact, documentation): 35 min      *This note has been prepared using a combination of a dictation device and typing.  It has been checked for errors but some errors may still exist within the note as a result of speech recognition errors and/or typographical errors.

## 2024-11-11 ENCOUNTER — PATIENT MESSAGE (OUTPATIENT)
Dept: PSYCHIATRY | Facility: CLINIC | Age: 69
End: 2024-11-11
Payer: MEDICARE

## 2024-11-12 ENCOUNTER — OFFICE VISIT (OUTPATIENT)
Dept: PSYCHIATRY | Facility: CLINIC | Age: 69
End: 2024-11-12
Payer: MEDICARE

## 2024-11-12 DIAGNOSIS — F10.21 ALCOHOL DEPENDENCE IN REMISSION: ICD-10-CM

## 2024-11-12 DIAGNOSIS — F41.1 GAD (GENERALIZED ANXIETY DISORDER): ICD-10-CM

## 2024-11-12 DIAGNOSIS — G47.00 INSOMNIA, UNSPECIFIED TYPE: ICD-10-CM

## 2024-11-12 DIAGNOSIS — F31.9 BIPOLAR I DISORDER: Primary | ICD-10-CM

## 2024-11-12 PROCEDURE — 90833 PSYTX W PT W E/M 30 MIN: CPT | Mod: HCNC,95,, | Performed by: PHYSICIAN ASSISTANT

## 2024-11-12 PROCEDURE — G2211 COMPLEX E/M VISIT ADD ON: HCPCS | Mod: HCNC,95,, | Performed by: PHYSICIAN ASSISTANT

## 2024-11-12 PROCEDURE — 1159F MED LIST DOCD IN RCRD: CPT | Mod: HCNC,CPTII,95, | Performed by: PHYSICIAN ASSISTANT

## 2024-11-12 PROCEDURE — 1160F RVW MEDS BY RX/DR IN RCRD: CPT | Mod: HCNC,CPTII,95, | Performed by: PHYSICIAN ASSISTANT

## 2024-11-12 PROCEDURE — 90785 PSYTX COMPLEX INTERACTIVE: CPT | Mod: HCNC,95,, | Performed by: PHYSICIAN ASSISTANT

## 2024-11-12 PROCEDURE — 99214 OFFICE O/P EST MOD 30 MIN: CPT | Mod: HCNC,95,, | Performed by: PHYSICIAN ASSISTANT

## 2024-11-12 RX ORDER — ARIPIPRAZOLE 5 MG/1
5 TABLET ORAL DAILY
Qty: 30 TABLET | Refills: 2 | Status: SHIPPED | OUTPATIENT
Start: 2024-11-12 | End: 2025-02-10

## 2024-11-12 NOTE — PROGRESS NOTES
The patient location is: [place]    Visit type: audiovisual    Each patient to whom he or she provides medical services by telemedicine is:  (1) informed of the relationship between the physician and patient and the respective role of any other health care provider with respect to management of the patient; and (2) notified that he or she may decline to receive medical services by telemedicine and may withdraw from such care at any time.    Notes:     Outpatient Psychiatry Follow-Up Visit (MD/BEATRICE)    11/12/2024    Clinical Status of Patient:  Outpatient (Ambulatory)    Chief Complaint:  Mirza Morales is a 69 y.o. male who presents today for follow-up of mood disorder and substance problems.  Met with patient and spouse.      Interval History and Content of Current Session 11/12/2024:    Pt's wife Anamaria helped provide history.    Pt's wife contacted provider stating pt has only been taking seroquel 50mg. Appears pt has not increased to 100mg or 200mg as was directed at multiple visits as pt reported insomnia and increasing anxiety.    Pt had reported having high anxiety and racing thoughts at prior visit 1 week ago.    Was started on risperdal 0.5mg bid and lithium 300mg bid at last visit.    Reports took seroquel 200mg and trazodone 200mg qhs last night and slept 6-7 hrs at night.    Pt stopped lithium and risperdal, stated he did not like how they made him feel.    Pt reports high anxiety, no motivation, dreading things he needs to do.    Pt reports racing thoughts have resolved after getting more sleep.    Denies euphoria, states he feels severely depressed and anxious    No over signs of psychosis. Pt denies SI/HI/AVH. No delusional content or paranoia    Pt instructed to contact provider if symptoms worsen. Wife also able to contact provider via epic regarding how pt is doing    Discussed with pt if racing thoughts worsen, becomes paranoid, other sx of psychosis, or SI to go to ED for further evaluation  "and management. Pt is agreeable to plan.    Pt reports today: "sleeping better, still feel down"    Mood overall is "pretty depressed"    Patients mood is depressive, affect appears mood congruent. Linear and logical, friendly and cooperative, good eye contact.    Denies SI/HI/AVH. Pt reports sleeping 6 hr and normalizing appetite. Denies side effects of medications.    Pt reports taking medications as prescribed and behaving appropriately during interview today.      Psychotherapy:  Target symptoms: depression, anxiety , mood disorder  Why chosen therapy is appropriate versus another modality: relevant to diagnosis, patient responds to this modality, evidence based practice  Outcome monitoring methods: self-report, observation  Therapeutic intervention type: insight oriented psychotherapy, supportive psychotherapy  Topics discussed/themes: building skills sets for symptom management, symptom recognition  The patient's response to the intervention is accepting. The patient's progress toward treatment goals is fair.   Duration of intervention: 16 minutes.      Prior visit:    Pt reports high anxiety, no motivation, dreading things he needs to do.    Pt yesterday reported having racing thoughts and instructed pt to increase seroquel to 200mg qhs. Today he reports having racing thoughts still.    Denies euphoria, states he feels severely depressed and anxious    No over signs of psychosis. Pt denies SI/HI/AVH. No delusional content or paranoia    Concern pt may be in mixed state    Discussed with pt will start lithium 300mg bid and risperdal 0.5mg bid and f/u in less than 1 week.     Pt instructed to contact provider if symptoms worsen. Wife also able to contact provider via epic regarding how pt is doing    Discussed with pt if racing thoughts worsen, becomes paranoid, other sx of psychosis, or SI to go to ED for further evaluation and management. Pt is agreeable to plan.    Pt reports today: "I have this fear I'm " "going to lose my mind"    Mood overall is "not good very depressed and anxious"    Patients mood is depressive, affect appears mood congruent. Linear and logical, friendly and cooperative, good eye contact.    Denies SI/HI/AVH. Pt reports sleeping 4-5 hr and decreased appetite. Denies side effects of medications.    Pt reports taking medications as prescribed and behaving appropriately during interview today.    Review of Systems     Review of Systems   Constitutional:  Negative for fever and malaise/fatigue.   HENT:  Negative for sore throat.    Eyes:  Negative for photophobia.   Respiratory:  Positive for shortness of breath (on exertion. dx ILD). Negative for cough and wheezing.    Cardiovascular:  Negative for chest pain, palpitations and PND.   Gastrointestinal:  Negative for abdominal pain.   Genitourinary:  Negative for dysuria.   Musculoskeletal:  Negative for myalgias.   Skin:  Negative for rash.   Neurological:  Negative for dizziness.   Endo/Heme/Allergies:  Does not bruise/bleed easily.   Psychiatric/Behavioral:  Positive for depression and substance abuse. Negative for hallucinations and suicidal ideas. The patient is nervous/anxious and has insomnia.        Psychiatric Review Of Systems - Is patient experiencing or having changes in:  sleep: yes  appetite: no  weight: no  energy/anergy: yes  interest/pleasure/anhedonia: yes  somatic symptoms: no  libido: no  anxiety/panic: yes  guilty/hopelessness: no  concentration: no  S.I.B.s/risky behavior: no  Irritability: no  Racing thoughts: yes  Impulsive behaviors: no  Paranoia: no  AVH: no      Past Medical, Family and Social History: The patient's past medical, family and social history have been reviewed and updated as appropriate within the electronic medical record - see encounter notes.      Current Medications:   Medication List with Changes/Refills   Current Medications    AMLODIPINE (NORVASC) 10 MG TABLET    TAKE 1 TABLET ONE TIME DAILY    " ATORVASTATIN (LIPITOR) 40 MG TABLET    Take 1 tablet (40 mg total) by mouth every evening.    CHOLECALCIFEROL, VITAMIN D3, 10 MCG (400 UNIT) CAP CAPSULE    Take 3 capsules (1,200 Units total) by mouth once daily.    LACOSAMIDE (VIMPAT) 200 MG TAB TABLET    Take 1 tablet (200 mg total) by mouth every 12 (twelve) hours.    LEVOCETIRIZINE (XYZAL) 5 MG TABLET    Take 1 tablet (5 mg total) by mouth daily as needed for Allergies.    LITHIUM (LITHOBID) 300 MG CR TABLET    Take 1 tablet (300 mg total) by mouth 2 (two) times daily.    PROPRANOLOL (INDERAL) 10 MG TABLET    Take 3 tablets (30 mg total) by mouth 2 (two) times daily.    QUETIAPINE (SEROQUEL) 100 MG TAB    Take 1 tablet (100 mg total) by mouth every evening.    RISPERIDONE (RISPERDAL) 0.5 MG TAB    Take 1 tablet (0.5 mg total) by mouth 2 (two) times daily.    TRAZODONE (DESYREL) 100 MG TABLET    Take 1-2 tablets at bedtime as needed for insomnia         Allergies:   Review of patient's allergies indicates:   Allergen Reactions    Gabapentin Other (See Comments)     SEVERE DIZZINESS AND BALANCE PROBLEMS, UNABLE TO WALK.    Phenelzine Other (See Comments)     BECOMES HYPER, LIKE A MANIC EPISODE.    Penicillin     Penicillins Hives and Other (See Comments)    Lamotrigine Rash and Other (See Comments)         Vitals   There were no vitals filed for this visit.       Labs/Imaging/Studies:   No results found for this or any previous visit (from the past 48 hours).   Lab Results   Component Value Date    VALPROATE 48.2 (L) 09/29/2019       Compliance: yes    Side effects: None    Risk Parameters:  Patient reports no suicidal ideation  Patient reports no homicidal ideation  Patient reports no self-injurious behavior  Patient reports no violent behavior    Exam (detailed: at least 9 elements; comprehensive: all 15 elements)   Constitutional  Vitals:  Most recent vital signs, dated greater than 90 days prior to this appointment, were reviewed.   There were no vitals filed  for this visit.     General:  Wnl, appropriate     Musculoskeletal  Muscle Strength/Tone:  not examined   Gait & Station:  Not examined     Psychiatric  Speech:  no latency; no press   Mood & Affect:  depressed  Mood congruent   Thought Process:  normal and logical   Associations:  intact   Thought Content:  normal, no suicidality, no homicidality, delusions, or paranoia   Insight:  intact, has awareness of illness   Judgement: behavior is adequate to circumstances   Orientation:  grossly intact   Memory: intact for content of interview   Language: grossly intact   Attention Span & Concentration:  able to focus   Fund of Knowledge:  intact and appropriate to age and level of education     Assessment and Diagnosis   Status/Progress: Based on the examination today, the patient's problem(s) is/are inadequately controlled and worsening.  New problems have not been presented today.   Co-morbidities may be complicating management of the primary condition.  There are no active rule-out diagnoses for this patient at this time.     General Impression:      ICD-10-CM ICD-9-CM   1. Bipolar I disorder  F31.9 296.7   2. NATALIA (generalized anxiety disorder)  F41.1 300.02   3. Insomnia, unspecified type  G47.00 780.52   4. Alcohol dependence in remission  F10.21 303.93       Intervention/Counseling/Treatment Plan   Medication Management: Continue current medications. The risks and benefits of medication were discussed with the patient.    -pt stopped lithium and risperdal on his own    -start abilify 5mg daily    -continue trazodone 100mg qhs    -continue seroquel to 100mg qhs    -continue propranolol as prescribed for essential tremor    -referral ordered placed for sleep medicine at a prior visit-pt instructed to make appt with sleep medicine provider.     Pt has hx ERIC dx over 5 years ago. Never tx with CPAP    The risks and benefits of medication were discussed with the patient.  Discussed diagnosis, risks and benefits of  proposed treatment above vs alternative treatments vs no treatment, and potential side effects of these treatments. The patient expresses understanding of the above and displays the capacity to agree with this treatment given said understanding. Patient also agrees that, currently, the benefits outweigh the risks and would like to pursue treatment at this time.  Discussed inherent unpredictability of medications in each individual.   Encouraged Patient to keep future appointments.   Take medications as prescribed and abstain from substance abuse.   In the event of an emergency, patient was advised to go to the emergency room      Return to Clinic: 2 weeks    Trav Andrews PA-C      Total face to face time: 30 min  Total time (chart review, patient contact, documentation): 35 min      *This note has been prepared using a combination of a dictation device and typing.  It has been checked for errors but some errors may still exist within the note as a result of speech recognition errors and/or typographical errors.

## 2024-11-26 ENCOUNTER — OFFICE VISIT (OUTPATIENT)
Dept: PSYCHIATRY | Facility: CLINIC | Age: 69
End: 2024-11-26
Payer: MEDICARE

## 2024-11-26 DIAGNOSIS — F10.21 ALCOHOL DEPENDENCE IN REMISSION: ICD-10-CM

## 2024-11-26 DIAGNOSIS — F31.9 BIPOLAR I DISORDER: Primary | ICD-10-CM

## 2024-11-26 DIAGNOSIS — F41.1 GAD (GENERALIZED ANXIETY DISORDER): ICD-10-CM

## 2024-11-26 DIAGNOSIS — G47.00 INSOMNIA, UNSPECIFIED TYPE: ICD-10-CM

## 2024-11-26 DIAGNOSIS — F12.90 CANNABIS USE, UNCOMPLICATED: ICD-10-CM

## 2024-11-26 PROCEDURE — 1160F RVW MEDS BY RX/DR IN RCRD: CPT | Mod: HCNC,CPTII,95, | Performed by: PHYSICIAN ASSISTANT

## 2024-11-26 PROCEDURE — 1159F MED LIST DOCD IN RCRD: CPT | Mod: HCNC,CPTII,95, | Performed by: PHYSICIAN ASSISTANT

## 2024-11-26 PROCEDURE — G2211 COMPLEX E/M VISIT ADD ON: HCPCS | Mod: HCNC,95,, | Performed by: PHYSICIAN ASSISTANT

## 2024-11-26 PROCEDURE — 99214 OFFICE O/P EST MOD 30 MIN: CPT | Mod: HCNC,95,, | Performed by: PHYSICIAN ASSISTANT

## 2024-11-26 RX ORDER — QUETIAPINE FUMARATE 100 MG/1
200 TABLET, FILM COATED ORAL NIGHTLY
Qty: 180 TABLET | Refills: 1 | Status: SHIPPED | OUTPATIENT
Start: 2024-11-26 | End: 2025-05-25

## 2024-11-26 RX ORDER — TRAZODONE HYDROCHLORIDE 100 MG/1
TABLET ORAL
Qty: 60 TABLET | Refills: 2 | Status: SHIPPED | OUTPATIENT
Start: 2024-11-26

## 2024-11-26 NOTE — PROGRESS NOTES
"The patient location is: [place]    Visit type: audiovisual    Each patient to whom he or she provides medical services by telemedicine is:  (1) informed of the relationship between the physician and patient and the respective role of any other health care provider with respect to management of the patient; and (2) notified that he or she may decline to receive medical services by telemedicine and may withdraw from such care at any time.    Notes:     Outpatient Psychiatry Follow-Up Visit (MD/BEATRICE)    11/26/2024    Clinical Status of Patient:  Outpatient (Ambulatory)    Chief Complaint:  Mirza Morales is a 69 y.o. male who presents today for follow-up of mood disorder and substance problems.  Met with patient and spouse.      Interval History and Content of Current Session 11/26/2024:    Pt's wife Anamaria helped provide history.    Pt last seen only 2 weeks ago for follow up and starting abilify 5mg.    "I'm not sure the abilify isnt helping a whole lot". Discussed with pt that abilify has not had enough time to be fully benficial.    States is having less intrusive lots "its keeping them at bay", "feel more alert and energy on it"    Reports taking seroquel 200mg and trazodone 100mg at bedtime. Reports sleeping well, getting 6-7 hrs of sleep "getting deep sleep"    Pt had reported having high anxiety and racing thoughts at prior visit 1 week ago.    Pt reports improved motivation, less feelings of dreading things he needs to do.    Denies racing thoughts. Denies euphoria, states he feels depressed.    No over signs of psychosis. Pt denies SI/HI/AVH. No delusional content or paranoia    Pt instructed to contact provider if symptoms worsen. Wife also able to contact provider via epic regarding how pt is doing    Discussed with pt if racing thoughts worsen, becomes paranoid, other sx of psychosis, or SI to go to ED for further evaluation and management. Pt is agreeable to plan.    Pt reports today: "not as bad but " "not feeling great"    Mood overall is "depressed, not as bad as before"    Patients mood is depressive, affect appears mood congruent. Linear and logical, friendly and cooperative, good eye contact.    Denies SI/HI/AVH. Pt reports sleeping 6 hr and normalizing appetite. Denies side effects of medications.    Pt reports taking medications as prescribed and behaving appropriately during interview today.      Psychotherapy:  Target symptoms: depression, anxiety , mood disorder  Why chosen therapy is appropriate versus another modality: relevant to diagnosis, patient responds to this modality, evidence based practice  Outcome monitoring methods: self-report, observation  Therapeutic intervention type: insight oriented psychotherapy, supportive psychotherapy  Topics discussed/themes: building skills sets for symptom management, symptom recognition  The patient's response to the intervention is accepting. The patient's progress toward treatment goals is fair.   Duration of intervention: 10 minutes.      Prior visit:    Pt's wife Anamaria helped provide history.    Pt's wife contacted provider stating pt has only been taking seroquel 50mg. Appears pt has not increased to 100mg or 200mg as was directed at multiple visits as pt reported insomnia and increasing anxiety.    Pt had reported having high anxiety and racing thoughts at prior visit 1 week ago.    Was started on risperdal 0.5mg bid and lithium 300mg bid at last visit.    Reports took seroquel 200mg and trazodone 200mg qhs last night and slept 6-7 hrs at night.    Pt stopped lithium and risperdal, stated he did not like how they made him feel.    Pt reports high anxiety, no motivation, dreading things he needs to do.    Pt reports racing thoughts have resolved after getting more sleep.    Denies euphoria, states he feels severely depressed and anxious    No over signs of psychosis. Pt denies SI/HI/AVH. No delusional content or paranoia    Pt instructed to contact " "provider if symptoms worsen. Wife also able to contact provider via epic regarding how pt is doing    Discussed with pt if racing thoughts worsen, becomes paranoid, other sx of psychosis, or SI to go to ED for further evaluation and management. Pt is agreeable to plan.    Pt reports today: "sleeping better, still feel down"    Mood overall is "pretty depressed"    Patients mood is depressive, affect appears mood congruent. Linear and logical, friendly and cooperative, good eye contact.    Denies SI/HI/AVH. Pt reports sleeping 6 hr and normalizing appetite. Denies side effects of medications.    Pt reports taking medications as prescribed and behaving appropriately during interview today.    Review of Systems     Review of Systems   Constitutional:  Negative for fever and malaise/fatigue.   HENT:  Negative for sore throat.    Eyes:  Negative for photophobia.   Respiratory:  Positive for shortness of breath (on exertion. dx ILD). Negative for cough and wheezing.    Cardiovascular:  Negative for chest pain, palpitations and PND.   Gastrointestinal:  Negative for abdominal pain.   Genitourinary:  Negative for dysuria.   Musculoskeletal:  Negative for myalgias.   Skin:  Negative for rash.   Neurological:  Negative for dizziness.   Endo/Heme/Allergies:  Does not bruise/bleed easily.   Psychiatric/Behavioral:  Positive for depression and substance abuse. Negative for hallucinations and suicidal ideas. The patient is nervous/anxious. The patient does not have insomnia.        Psychiatric Review Of Systems - Is patient experiencing or having changes in:  sleep: yes  appetite: no  weight: no  energy/anergy: yes  interest/pleasure/anhedonia: yes  somatic symptoms: no  libido: no  anxiety/panic: yes  guilty/hopelessness: no  concentration: no  S.I.B.s/risky behavior: no  Irritability: no  Racing thoughts: yes  Impulsive behaviors: no  Paranoia: no  AVH: no      Past Medical, Family and Social History: The patient's past " medical, family and social history have been reviewed and updated as appropriate within the electronic medical record - see encounter notes.      Current Medications:   Medication List with Changes/Refills   Current Medications    AMLODIPINE (NORVASC) 10 MG TABLET    TAKE 1 TABLET ONE TIME DAILY    ARIPIPRAZOLE (ABILIFY) 5 MG TAB    Take 1 tablet (5 mg total) by mouth once daily.    ATORVASTATIN (LIPITOR) 40 MG TABLET    Take 1 tablet (40 mg total) by mouth every evening.    CHOLECALCIFEROL, VITAMIN D3, 10 MCG (400 UNIT) CAP CAPSULE    Take 3 capsules (1,200 Units total) by mouth once daily.    LACOSAMIDE (VIMPAT) 200 MG TAB TABLET    Take 1 tablet (200 mg total) by mouth every 12 (twelve) hours.    LEVOCETIRIZINE (XYZAL) 5 MG TABLET    Take 1 tablet (5 mg total) by mouth daily as needed for Allergies.    PROPRANOLOL (INDERAL) 10 MG TABLET    Take 3 tablets (30 mg total) by mouth 2 (two) times daily.   Changed and/or Refilled Medications    Modified Medication Previous Medication    QUETIAPINE (SEROQUEL) 100 MG TAB QUEtiapine (SEROQUEL) 100 MG Tab       Take 2 tablets (200 mg total) by mouth every evening.    Take 1 tablet (100 mg total) by mouth every evening.    TRAZODONE (DESYREL) 100 MG TABLET traZODone (DESYREL) 100 MG tablet       Take 1-2 tablets at bedtime as needed for insomnia    Take 1-2 tablets at bedtime as needed for insomnia         Allergies:   Review of patient's allergies indicates:   Allergen Reactions    Gabapentin Other (See Comments)     SEVERE DIZZINESS AND BALANCE PROBLEMS, UNABLE TO WALK.    Phenelzine Other (See Comments)     BECOMES HYPER, LIKE A MANIC EPISODE.    Penicillin     Penicillins Hives and Other (See Comments)    Lamotrigine Rash and Other (See Comments)         Vitals   There were no vitals filed for this visit.       Labs/Imaging/Studies:   No results found for this or any previous visit (from the past 48 hours).   Lab Results   Component Value Date    VALPROATE 48.2 (L)  09/29/2019       Compliance: yes    Side effects: None    Risk Parameters:  Patient reports no suicidal ideation  Patient reports no homicidal ideation  Patient reports no self-injurious behavior  Patient reports no violent behavior    Exam (detailed: at least 9 elements; comprehensive: all 15 elements)   Constitutional  Vitals:  Most recent vital signs, dated greater than 90 days prior to this appointment, were reviewed.   There were no vitals filed for this visit.     General:  Wnl, appropriate     Musculoskeletal  Muscle Strength/Tone:  not examined   Gait & Station:  Not examined     Psychiatric  Speech:  no latency; no press   Mood & Affect:  depressed  Mood congruent   Thought Process:  normal and logical   Associations:  intact   Thought Content:  normal, no suicidality, no homicidality, delusions, or paranoia   Insight:  intact, has awareness of illness   Judgement: behavior is adequate to circumstances   Orientation:  grossly intact   Memory: intact for content of interview   Language: grossly intact   Attention Span & Concentration:  able to focus   Fund of Knowledge:  intact and appropriate to age and level of education     Assessment and Diagnosis   Status/Progress: Based on the examination today, the patient's problem(s) is/are inadequately controlled and worsening.  New problems have not been presented today.   Co-morbidities may be complicating management of the primary condition.  There are no active rule-out diagnoses for this patient at this time.     General Impression:      ICD-10-CM ICD-9-CM   1. Bipolar I disorder  F31.9 296.7   2. NATALIA (generalized anxiety disorder)  F41.1 300.02   3. Insomnia, unspecified type  G47.00 780.52   4. Alcohol dependence in remission  F10.21 303.93   5. Cannabis use, uncomplicated  F12.90 305.20       Intervention/Counseling/Treatment Plan   Medication Management: Continue current medications. The risks and benefits of medication were discussed with the  patient.    -continue abilify 5mg daily    -continue trazodone 100mg qhs    -continue seroquel to 200mg qhs    -continue propranolol as prescribed for essential tremor    -referral ordered placed for sleep medicine at a prior visit-pt instructed to make appt with sleep medicine provider.     Pt has hx ERIC dx over 5 years ago. Never tx with CPAP    The risks and benefits of medication were discussed with the patient.  Discussed diagnosis, risks and benefits of proposed treatment above vs alternative treatments vs no treatment, and potential side effects of these treatments. The patient expresses understanding of the above and displays the capacity to agree with this treatment given said understanding. Patient also agrees that, currently, the benefits outweigh the risks and would like to pursue treatment at this time.  Discussed inherent unpredictability of medications in each individual.   Encouraged Patient to keep future appointments.   Take medications as prescribed and abstain from substance abuse.   In the event of an emergency, patient was advised to go to the emergency room      Return to Clinic: 1 month    Trav Andrews PA-C      Total face to face time: 15 min  Total time (chart review, patient contact, documentation): 25 min      *This note has been prepared using a combination of a dictation device and typing.  It has been checked for errors but some errors may still exist within the note as a result of speech recognition errors and/or typographical errors.

## 2024-12-12 NOTE — PLAN OF CARE
[facility]  Internal Medicine Discharge Summary       Admission Date: 12/11/2024  Discharge Date: 12/12/2024   PCP: Libby Win MD Attending: Frannie Dietz MD      Hospital Course     Ms. Mckenzie is a 79 year old female with PMHx of COPD on home oxygen, HFpEF, hypertension, hyperlipidemia, T2DM, GERD who presented with progressively worsening shortness of breath. Patient reports her at-home inhalers and nebulizer treatments at home stopped providing her relief and commented she was not adherent to PTA bumex given it makes her dehydrated and not feeling good. EMS gave nebulizer treatment en route with improvement of her symptoms. During ED admission, she was hemodynamically stable, saturating well on 2L NC without work of breathing. EKG showed sinus bradycardia without ischemic changes, NT proBNP 693, Troponin negatives. CT neck was ordered given patient new onset of hoarsenes, imaging was negative for focal mass or acute changes. She received dose of laxis 20 mg in ED and started on Duonebs q4h. Overnight, the patient received an additional dose of bumex. Given improvement of symptoms and patient endorsing feeling back on baseline she was discharge with indication to follow up with Cardiology to optimize HF treatment, she was discharge on Lasix 40 mg PO daily and continue prednisone 40 mg to complete 5 days of treatment.     At the time of follow up with outpatient team we recommend:     Follow up diuresis treatment  Schedule primary care appointment within 1 week  Follow up with cardiologist appointment  Home RN visit in 2 days  #Shortness of Breath  #Asthma/COPD on 2L Home O2  #HFpEF (EF 60%)  -Etiology possibly multifactorial in nature: COPD exacerbation vs HF exacerbation  -ED Vitals: AF, HR 60, /53 > 120/49, SpO2 100  -ED Labs: Na 146, K 3.6, Cr 0.56, CO2 38, , NT proBNP 693, Troponin 7, WBC 8.4, Hb 10.6, plt 187, TSH 3.361  -CXR - read pending  -EKG  Alert and oriented. Sitter at bedside. Denies complaints of pain or discomfort. Fall and injury free. Bed low and locked. Side rails up x 2. Call bell in reach.    - sinus bradycardia without overt ischemic changes  -S/p IV Decadron 10mg and IV Lasix 20mg in ED  -Appears euvolemic upon my evaluation  Plan:  -Prednisone 40mg daily for total 5 day steroid treatment  -Scheduled DuoNebs q4h while inpatient  -Resume home inhalers - Trelegy Ellipta and Albuterol  -Discharge on Lasix 40 mg daily.  -Continue home Jardiance 10mg       #Radiographic CAD  -Nuclear MPI (1/9/24) - normal myocardial perfusion study, LVEF 59% (stress)  -TTE (1/25/24) - EF 60%, G2DD, no WMAs  -ASA 81mg and Atorvastatin 10mg daily     #HTN  -Nebivolol 10mg daily     #HLD  -Atorvastatin 10mg daily     #T2DM  -Holding home medications  -SSI while inpatient  -Accuchecks  -Hypoglycemia protocol     #GERD  -Pantoprazole 20mg daily     #Voice Hoarseness  -Reports she feels like her throat is swollen and her voice has changed since she was intubated earlier this year. She is unsure exactly why she was intubated. Denies dysphagia.   -CT Neck - no focal neck mass, fluid collection, or LAD, pulmonary emphysema  -CTM    Objective     Blood pressure 121/64, pulse 72, temperature 97.5 °F (36.4 °C), temperature source Oral, resp. rate 18, height 5' 4\" (1.626 m), weight 58.8 kg (129 lb 10.1 oz), SpO2 96%.    Last Weight: 58.8 kg (129 lb 10.1 oz) First Weight: Weight: 57.2 kg (126 lb 1.7 oz)   Physical Exam  Constitutional:       Appearance: Normal appearance. She is normal weight.   HENT:      Head: Normocephalic and atraumatic.      Right Ear: External ear normal.      Left Ear: External ear normal.      Nose: Nose normal.   Eyes:      Conjunctiva/sclera: Conjunctivae normal.   Cardiovascular:      Rate and Rhythm: Normal rate and regular rhythm.      Heart sounds: Normal heart sounds.   Pulmonary:      Effort: Pulmonary effort is normal. No respiratory distress.      Breath sounds: Normal breath sounds. No wheezing, rhonchi or rales.   Abdominal:      General: Abdomen is flat. Bowel sounds are normal.      Palpations:  Abdomen is soft.   Musculoskeletal:         General: Normal range of motion.   Skin:     General: Skin is warm and dry.   Neurological:      General: No focal deficit present.      Mental Status: She is alert and oriented to person, place, and time.   Psychiatric:         Mood and Affect: Mood normal.         Behavior: Behavior normal.        Transition of Care Management (TCM)     Disposition: Home  Readmission Risk Score (%) =      Medication Changes  New Medications: 40mg lasix    Tests Pending or Requiring Follow Up    None.    Needs Further Goals of Care Discussion: NA   Home Health Referral: NA    Discharge Instructions     Please try to schedule an appointment with your primary care physician within 1-2 weeks.     Take lasix 40mg as prescribed daily. If you begin to feel dehydrated, reduce dosage to 20mg daily.    Please let your primary care physician know if you have any severe side effects on the medication.         Follow Up Appointments   No follow-up provider specified.  Unless otherwise attested, > 30 minutes was spent on this discharge including medicine reconciliation, counseling, coordination of care, day of discharge assessment and planning.

## 2024-12-31 ENCOUNTER — OFFICE VISIT (OUTPATIENT)
Dept: PSYCHIATRY | Facility: CLINIC | Age: 69
End: 2024-12-31
Payer: MEDICARE

## 2024-12-31 DIAGNOSIS — G47.00 INSOMNIA, UNSPECIFIED TYPE: ICD-10-CM

## 2024-12-31 DIAGNOSIS — F10.21 ALCOHOL DEPENDENCE IN REMISSION: ICD-10-CM

## 2024-12-31 DIAGNOSIS — R41.840 ATTENTION DEFICIT: ICD-10-CM

## 2024-12-31 DIAGNOSIS — F31.9 BIPOLAR I DISORDER: Primary | ICD-10-CM

## 2024-12-31 DIAGNOSIS — F41.1 GAD (GENERALIZED ANXIETY DISORDER): ICD-10-CM

## 2024-12-31 PROCEDURE — G2211 COMPLEX E/M VISIT ADD ON: HCPCS | Mod: HCNC,95,, | Performed by: PHYSICIAN ASSISTANT

## 2024-12-31 PROCEDURE — 1159F MED LIST DOCD IN RCRD: CPT | Mod: HCNC,CPTII,95, | Performed by: PHYSICIAN ASSISTANT

## 2024-12-31 PROCEDURE — 1160F RVW MEDS BY RX/DR IN RCRD: CPT | Mod: HCNC,CPTII,95, | Performed by: PHYSICIAN ASSISTANT

## 2024-12-31 PROCEDURE — 99214 OFFICE O/P EST MOD 30 MIN: CPT | Mod: HCNC,95,, | Performed by: PHYSICIAN ASSISTANT

## 2024-12-31 RX ORDER — METHYLPHENIDATE HYDROCHLORIDE 10 MG/1
10 TABLET ORAL 2 TIMES DAILY
Qty: 60 TABLET | Refills: 0 | Status: SHIPPED | OUTPATIENT
Start: 2024-12-31

## 2024-12-31 NOTE — PROGRESS NOTES
"The patient location is: [place]    Visit type: audiovisual    Each patient to whom he or she provides medical services by telemedicine is:  (1) informed of the relationship between the physician and patient and the respective role of any other health care provider with respect to management of the patient; and (2) notified that he or she may decline to receive medical services by telemedicine and may withdraw from such care at any time.    Notes:     Outpatient Psychiatry Follow-Up Visit (MD/BEATRICE)    12/31/2024    Clinical Status of Patient:  Outpatient (Ambulatory)    Chief Complaint:  Mirza Morales is a 69 y.o. male who presents today for follow-up of mood disorder and substance problems.  Met with patient and spouse.      Interval History and Content of Current Session 12/31/2024:    "Feeling the same. Feeling flat, no get up and go"    Reports issues with concentration, focus and completing tasks.    Discussed with pt plan to start ritalin 10mg bid for attention issues and to help with depression.     Pt is stabilized on current regimen on seroquel. Reports stopped abilify as it was making him fatigued and sleeping in AM.    -pt instructed to contact provider right away if having any side effects such as marielena, mood swings, etc. Pt agreeable to plan    States is having less intrusive thoughts. "I do feel more stable"    Reports taking seroquel 200mg and trazodone 100mg at bedtime. Reports sleeping well, getting 6-8 hrs of sleep.    Pt reports improved motivation, less feelings of dreading things he needs to do.    Denies racing thoughts. Denies euphoria, states he feels depressed.    No overt signs of psychosis. Pt denies SI/HI/AVH. No delusional content or paranoia    Discussed with pt if racing thoughts worsen, becomes paranoid, other sx of psychosis, or SI to go to ED for further evaluation and management. Pt is agreeable to plan.    Mood overall is "depressed, not a lot of motivation"    Patients " "mood is depressive, affect appears mood congruent. Linear and logical, friendly and cooperative, good eye contact.    Denies SI/HI/AVH. Pt reports sleeping 6 hr and normalizing appetite. Denies side effects of medications.    Pt reports taking medications as prescribed and behaving appropriately during interview today.      Psychotherapy:  Target symptoms: depression, anxiety , mood disorder  Why chosen therapy is appropriate versus another modality: relevant to diagnosis, patient responds to this modality, evidence based practice  Outcome monitoring methods: self-report, observation  Therapeutic intervention type: insight oriented psychotherapy, supportive psychotherapy  Topics discussed/themes: building skills sets for symptom management, symptom recognition  The patient's response to the intervention is accepting. The patient's progress toward treatment goals is fair.   Duration of intervention: 7 minutes.      Prior visit:    Pt's wife Anamaria helped provide history.    Pt last seen only 2 weeks ago for follow up and starting abilify 5mg.    "I'm not sure the abilify isnt helping a whole lot". Discussed with pt that abilify has not had enough time to be fully benficial.    States is having less intrusive lots "its keeping them at bay", "feel more alert and energy on it"    Reports taking seroquel 200mg and trazodone 100mg at bedtime. Reports sleeping well, getting 6-7 hrs of sleep "getting deep sleep"    Pt had reported having high anxiety and racing thoughts at prior visit 1 week ago.    Pt reports improved motivation, less feelings of dreading things he needs to do.    Denies racing thoughts. Denies euphoria, states he feels depressed.    No over signs of psychosis. Pt denies SI/HI/AVH. No delusional content or paranoia    Pt instructed to contact provider if symptoms worsen. Wife also able to contact provider via epic regarding how pt is doing    Discussed with pt if racing thoughts worsen, becomes paranoid, " "other sx of psychosis, or SI to go to ED for further evaluation and management. Pt is agreeable to plan.    Pt reports today: "not as bad but not feeling great"    Mood overall is "depressed, not as bad as before"    Patients mood is depressive, affect appears mood congruent. Linear and logical, friendly and cooperative, good eye contact.    Denies SI/HI/AVH. Pt reports sleeping 6 hr and normalizing appetite. Denies side effects of medications.    Pt reports taking medications as prescribed and behaving appropriately during interview today.    Review of Systems     Review of Systems   Constitutional:  Negative for fever and malaise/fatigue.   HENT:  Negative for sore throat.    Eyes:  Negative for photophobia.   Respiratory:  Positive for shortness of breath (on exertion. dx ILD). Negative for cough and wheezing.    Cardiovascular:  Negative for chest pain, palpitations and PND.   Gastrointestinal:  Negative for abdominal pain.   Genitourinary:  Negative for dysuria.   Musculoskeletal:  Negative for myalgias.   Skin:  Negative for rash.   Neurological:  Negative for dizziness.   Endo/Heme/Allergies:  Does not bruise/bleed easily.   Psychiatric/Behavioral:  Positive for depression and substance abuse. Negative for hallucinations and suicidal ideas. The patient is nervous/anxious. The patient does not have insomnia.        Psychiatric Review Of Systems - Is patient experiencing or having changes in:  sleep: yes  appetite: no  weight: no  energy/anergy: yes  interest/pleasure/anhedonia: yes  somatic symptoms: no  libido: no  anxiety/panic: yes  guilty/hopelessness: no  concentration: no  S.I.B.s/risky behavior: no  Irritability: no  Racing thoughts: yes  Impulsive behaviors: no  Paranoia: no  AVH: no      Past Medical, Family and Social History: The patient's past medical, family and social history have been reviewed and updated as appropriate within the electronic medical record - see encounter notes.      Current " Medications:   Medication List with Changes/Refills   New Medications    METHYLPHENIDATE HCL (RITALIN) 10 MG TABLET    Take 1 tablet (10 mg total) by mouth 2 (two) times daily.   Current Medications    AMLODIPINE (NORVASC) 10 MG TABLET    TAKE 1 TABLET ONE TIME DAILY    ARIPIPRAZOLE (ABILIFY) 5 MG TAB    Take 1 tablet (5 mg total) by mouth once daily.    ATORVASTATIN (LIPITOR) 40 MG TABLET    Take 1 tablet (40 mg total) by mouth every evening.    CHOLECALCIFEROL, VITAMIN D3, 10 MCG (400 UNIT) CAP CAPSULE    Take 3 capsules (1,200 Units total) by mouth once daily.    LACOSAMIDE (VIMPAT) 200 MG TAB TABLET    Take 1 tablet (200 mg total) by mouth every 12 (twelve) hours.    LEVOCETIRIZINE (XYZAL) 5 MG TABLET    Take 1 tablet (5 mg total) by mouth daily as needed for Allergies.    PROPRANOLOL (INDERAL) 10 MG TABLET    Take 3 tablets (30 mg total) by mouth 2 (two) times daily.    QUETIAPINE (SEROQUEL) 100 MG TAB    Take 2 tablets (200 mg total) by mouth every evening.    TRAZODONE (DESYREL) 100 MG TABLET    Take 1-2 tablets at bedtime as needed for insomnia         Allergies:   Review of patient's allergies indicates:   Allergen Reactions    Gabapentin Other (See Comments)     SEVERE DIZZINESS AND BALANCE PROBLEMS, UNABLE TO WALK.    Phenelzine Other (See Comments)     BECOMES HYPER, LIKE A MANIC EPISODE.    Penicillin     Penicillins Hives and Other (See Comments)    Lamotrigine Rash and Other (See Comments)         Vitals   There were no vitals filed for this visit.       Labs/Imaging/Studies:   No results found for this or any previous visit (from the past 48 hours).   Lab Results   Component Value Date    VALPROATE 48.2 (L) 09/29/2019       Compliance: yes    Side effects: None    Risk Parameters:  Patient reports no suicidal ideation  Patient reports no homicidal ideation  Patient reports no self-injurious behavior  Patient reports no violent behavior    Exam (detailed: at least 9 elements; comprehensive: all 15  elements)   Constitutional  Vitals:  Most recent vital signs, dated greater than 90 days prior to this appointment, were reviewed.   There were no vitals filed for this visit.     General:  Wnl, appropriate     Musculoskeletal  Muscle Strength/Tone:  not examined   Gait & Station:  Not examined     Psychiatric  Speech:  no latency; no press   Mood & Affect:  depressed  Mood congruent   Thought Process:  normal and logical   Associations:  intact   Thought Content:  normal, no suicidality, no homicidality, delusions, or paranoia   Insight:  intact, has awareness of illness   Judgement: behavior is adequate to circumstances   Orientation:  grossly intact   Memory: intact for content of interview   Language: grossly intact   Attention Span & Concentration:  able to focus   Fund of Knowledge:  intact and appropriate to age and level of education     Assessment and Diagnosis   Status/Progress: Based on the examination today, the patient's problem(s) is/are inadequately controlled and worsening.  New problems have not been presented today.   Co-morbidities may be complicating management of the primary condition.  There are no active rule-out diagnoses for this patient at this time.     General Impression:      ICD-10-CM ICD-9-CM   1. Bipolar I disorder  F31.9 296.7   2. NATALIA (generalized anxiety disorder)  F41.1 300.02   3. Insomnia, unspecified type  G47.00 780.52   4. Alcohol dependence in remission  F10.21 303.93   5. Attention deficit  R41.840 799.51         Intervention/Counseling/Treatment Plan   Medication Management: Continue current medications. The risks and benefits of medication were discussed with the patient.    -stopped abilify prior to last visit    -start ritalin 10mg bid   -pt instructed to contact provider right away if having any side effects such as marielena, mood swings, etc. Pt agreeable to plan    -continue trazodone 100mg qhs    -continue seroquel to 200mg qhs    -continue propranolol as prescribed  for essential tremor    -referral ordered placed for sleep medicine at a prior visit-pt instructed to make appt with sleep medicine provider.     Pt has hx ERIC dx over 5 years ago. Never tx with CPAP    The risks and benefits of medication were discussed with the patient.  Discussed diagnosis, risks and benefits of proposed treatment above vs alternative treatments vs no treatment, and potential side effects of these treatments. The patient expresses understanding of the above and displays the capacity to agree with this treatment given said understanding. Patient also agrees that, currently, the benefits outweigh the risks and would like to pursue treatment at this time.  Discussed inherent unpredictability of medications in each individual.   Encouraged Patient to keep future appointments.   Take medications as prescribed and abstain from substance abuse.   In the event of an emergency, patient was advised to go to the emergency room      Return to Clinic: 1 month    Trav Andrews PA-C      Total face to face time: 15 min  Total time (chart review, patient contact, documentation): 20 min      *This note has been prepared using a combination of a dictation device and typing.  It has been checked for errors but some errors may still exist within the note as a result of speech recognition errors and/or typographical errors.

## 2025-01-01 ENCOUNTER — PATIENT MESSAGE (OUTPATIENT)
Dept: PSYCHIATRY | Facility: CLINIC | Age: 70
End: 2025-01-01
Payer: MEDICARE

## 2025-01-03 ENCOUNTER — PATIENT MESSAGE (OUTPATIENT)
Dept: PSYCHIATRY | Facility: CLINIC | Age: 70
End: 2025-01-03
Payer: MEDICARE

## 2025-01-14 DIAGNOSIS — G40.909 SEIZURE DISORDER: ICD-10-CM

## 2025-01-14 RX ORDER — LACOSAMIDE 200 MG/1
200 TABLET ORAL EVERY 12 HOURS
Qty: 60 TABLET | Refills: 5 | Status: SHIPPED | OUTPATIENT
Start: 2025-01-14

## 2025-01-14 RX ORDER — LACOSAMIDE 200 MG/1
200 TABLET ORAL EVERY 12 HOURS
Qty: 60 TABLET | Refills: 5 | Status: SHIPPED | OUTPATIENT
Start: 2025-01-14 | End: 2025-01-14 | Stop reason: SDUPTHER

## 2025-01-19 ENCOUNTER — PATIENT MESSAGE (OUTPATIENT)
Dept: ALLERGY | Facility: CLINIC | Age: 70
End: 2025-01-19
Payer: MEDICARE

## 2025-01-30 DIAGNOSIS — Z00.00 ENCOUNTER FOR MEDICARE ANNUAL WELLNESS EXAM: ICD-10-CM

## 2025-01-31 ENCOUNTER — OFFICE VISIT (OUTPATIENT)
Dept: PSYCHIATRY | Facility: CLINIC | Age: 70
End: 2025-01-31
Payer: MEDICARE

## 2025-01-31 DIAGNOSIS — F41.1 GAD (GENERALIZED ANXIETY DISORDER): ICD-10-CM

## 2025-01-31 DIAGNOSIS — G47.00 INSOMNIA, UNSPECIFIED TYPE: ICD-10-CM

## 2025-01-31 DIAGNOSIS — R41.840 ATTENTION DEFICIT: ICD-10-CM

## 2025-01-31 DIAGNOSIS — F31.9 BIPOLAR I DISORDER: Primary | ICD-10-CM

## 2025-01-31 PROCEDURE — 1159F MED LIST DOCD IN RCRD: CPT | Mod: HCNC,CPTII,95, | Performed by: PHYSICIAN ASSISTANT

## 2025-01-31 PROCEDURE — 1160F RVW MEDS BY RX/DR IN RCRD: CPT | Mod: HCNC,CPTII,95, | Performed by: PHYSICIAN ASSISTANT

## 2025-01-31 PROCEDURE — 98006 SYNCH AUDIO-VIDEO EST MOD 30: CPT | Mod: HCNC,95,, | Performed by: PHYSICIAN ASSISTANT

## 2025-01-31 RX ORDER — QUETIAPINE FUMARATE 100 MG/1
200 TABLET, FILM COATED ORAL NIGHTLY
Qty: 180 TABLET | Refills: 1 | Status: SHIPPED | OUTPATIENT
Start: 2025-01-31 | End: 2025-07-30

## 2025-01-31 RX ORDER — METHYLPHENIDATE HYDROCHLORIDE 10 MG/1
10 TABLET ORAL 2 TIMES DAILY
Qty: 60 TABLET | Refills: 0 | Status: SHIPPED | OUTPATIENT
Start: 2025-01-31

## 2025-01-31 RX ORDER — TRAZODONE HYDROCHLORIDE 100 MG/1
100 TABLET ORAL NIGHTLY
Qty: 90 TABLET | Refills: 1 | Status: SHIPPED | OUTPATIENT
Start: 2025-01-31

## 2025-01-31 NOTE — PROGRESS NOTES
"The patient location is: [place]    Visit type: audiovisual    Each patient to whom he or she provides medical services by telemedicine is:  (1) informed of the relationship between the physician and patient and the respective role of any other health care provider with respect to management of the patient; and (2) notified that he or she may decline to receive medical services by telemedicine and may withdraw from such care at any time.    Notes:     Outpatient Psychiatry Follow-Up Visit (MD/BEATRICE)    1/31/2025    Clinical Status of Patient:  Outpatient (Ambulatory)    Chief Complaint:  Mirza Morales is a 69 y.o. male who presents today for follow-up of mood disorder and substance problems.  Met with patient and spouse.      Interval History and Content of Current Session 01/31/2025:    "Feeling better, the ritalin is working"    Pt reports has been taking ritalin 10mg 1-2x per day, most days only taking 1 tablet in the morning.    Denies any feelings of marielena, racing thoughts.    Pt is calm and cooperative, normal rate of speech, linear and logical.    Reports issues with concentration, focus and completing tasks have improved.    Discussed with pt plan to continue ritalin 10mg bid for attention issues and to help with depression.     Pt is stabilized on current regimen on seroquel.    -pt instructed to contact provider right away if having any side effects such as marielena, mood swings, etc. Pt agreeable to plan    States is having less intrusive thoughts. "I do feel more stable"    Reports taking seroquel 100mg and trazodone 100mg at bedtime. Reports sleeping well, getting 6-8 hrs of sleep.    Pt reports improved motivation, less feelings of dreading things he needs to do.    Denies racing thoughts. Denies euphoria, states he feels depressed.    No overt signs of psychosis. Pt denies SI/HI/AVH. No delusional content or paranoia    Discussed with pt if racing thoughts worsen, becomes paranoid, other sx of " "psychosis, or SI to go to ED for further evaluation and management. Pt is agreeable to plan.    Mood overall is "better, more up. Better mood in general"    Patients mood is steady, affect appears mood congruent. Linear and logical, friendly and cooperative, good eye contact.    Denies SI/HI/AVH. Pt reports sleeping 6-8 hr and normalizing appetite. Denies side effects of medications.    Pt reports taking medications as prescribed and behaving appropriately during interview today.      Psychotherapy:  Target symptoms: depression, anxiety , mood disorder  Why chosen therapy is appropriate versus another modality: relevant to diagnosis, patient responds to this modality, evidence based practice  Outcome monitoring methods: self-report, observation  Therapeutic intervention type: insight oriented psychotherapy, supportive psychotherapy  Topics discussed/themes: building skills sets for symptom management, symptom recognition  The patient's response to the intervention is accepting. The patient's progress toward treatment goals is fair.   Duration of intervention: 7 minutes.      Prior visit:    "Feeling the same. Feeling flat, no get up and go"    Reports issues with concentration, focus and completing tasks.    Discussed with pt plan to start ritalin 10mg bid for attention issues and to help with depression.     Pt is stabilized on current regimen on seroquel. Reports stopped abilify as it was making him fatigued and sleeping in AM.    -pt instructed to contact provider right away if having any side effects such as marielena, mood swings, etc. Pt agreeable to plan    States is having less intrusive thoughts. "I do feel more stable"    Reports taking seroquel 200mg and trazodone 100mg at bedtime. Reports sleeping well, getting 6-8 hrs of sleep.    Pt reports improved motivation, less feelings of dreading things he needs to do.    Denies racing thoughts. Denies euphoria, states he feels depressed.    No overt signs of " "psychosis. Pt denies SI/HI/AVH. No delusional content or paranoia    Discussed with pt if racing thoughts worsen, becomes paranoid, other sx of psychosis, or SI to go to ED for further evaluation and management. Pt is agreeable to plan.    Mood overall is "depressed, not a lot of motivation"    Patients mood is depressive, affect appears mood congruent. Linear and logical, friendly and cooperative, good eye contact.    Denies SI/HI/AVH. Pt reports sleeping 6 hr and normalizing appetite. Denies side effects of medications.    Pt reports taking medications as prescribed and behaving appropriately during interview today.      Review of Systems     Review of Systems   Constitutional:  Negative for fever and malaise/fatigue.   HENT:  Negative for sore throat.    Eyes:  Negative for photophobia.   Respiratory:  Positive for shortness of breath (on exertion. dx ILD). Negative for cough and wheezing.    Cardiovascular:  Negative for chest pain, palpitations and PND.   Gastrointestinal:  Negative for abdominal pain.   Genitourinary:  Negative for dysuria.   Musculoskeletal:  Negative for myalgias.   Skin:  Negative for rash.   Neurological:  Negative for dizziness.   Endo/Heme/Allergies:  Does not bruise/bleed easily.   Psychiatric/Behavioral:  Positive for depression. Negative for hallucinations and suicidal ideas. The patient is nervous/anxious. The patient does not have insomnia.        Psychiatric Review Of Systems - Is patient experiencing or having changes in:  sleep: yes  appetite: no  weight: no  energy/anergy: yes  interest/pleasure/anhedonia: yes  somatic symptoms: no  libido: no  anxiety/panic: yes  guilty/hopelessness: no  concentration: no  S.I.B.s/risky behavior: no  Irritability: no  Racing thoughts: yes  Impulsive behaviors: no  Paranoia: no  AVH: no      Past Medical, Family and Social History: The patient's past medical, family and social history have been reviewed and updated as appropriate within the " electronic medical record - see encounter notes.      Current Medications:   Medication List with Changes/Refills   Current Medications    AMLODIPINE (NORVASC) 10 MG TABLET    TAKE 1 TABLET ONE TIME DAILY    ARIPIPRAZOLE (ABILIFY) 5 MG TAB    Take 1 tablet (5 mg total) by mouth once daily.    ATORVASTATIN (LIPITOR) 40 MG TABLET    Take 1 tablet (40 mg total) by mouth every evening.    CHOLECALCIFEROL, VITAMIN D3, 10 MCG (400 UNIT) CAP CAPSULE    Take 3 capsules (1,200 Units total) by mouth once daily.    LACOSAMIDE (VIMPAT) 200 MG TAB TABLET    Take 1 tablet (200 mg total) by mouth every 12 (twelve) hours.    LEVOCETIRIZINE (XYZAL) 5 MG TABLET    Take 1 tablet (5 mg total) by mouth daily as needed for Allergies.    METHYLPHENIDATE HCL (RITALIN) 10 MG TABLET    Take 1 tablet (10 mg total) by mouth 2 (two) times daily.    PROPRANOLOL (INDERAL) 10 MG TABLET    Take 3 tablets (30 mg total) by mouth 2 (two) times daily.    QUETIAPINE (SEROQUEL) 100 MG TAB    Take 2 tablets (200 mg total) by mouth every evening.    TRAZODONE (DESYREL) 100 MG TABLET    Take 1-2 tablets at bedtime as needed for insomnia         Allergies:   Review of patient's allergies indicates:   Allergen Reactions    Gabapentin Other (See Comments)     SEVERE DIZZINESS AND BALANCE PROBLEMS, UNABLE TO WALK.    Phenelzine Other (See Comments)     BECOMES HYPER, LIKE A MANIC EPISODE.    Penicillin     Penicillins Hives and Other (See Comments)    Lamotrigine Rash and Other (See Comments)         Vitals   There were no vitals filed for this visit.       Labs/Imaging/Studies:   No results found for this or any previous visit (from the past 48 hours).   Lab Results   Component Value Date    VALPROATE 48.2 (L) 09/29/2019       Compliance: yes    Side effects: None    Risk Parameters:  Patient reports no suicidal ideation  Patient reports no homicidal ideation  Patient reports no self-injurious behavior  Patient reports no violent behavior    Exam (detailed: at  least 9 elements; comprehensive: all 15 elements)   Constitutional  Vitals:  Most recent vital signs, dated greater than 90 days prior to this appointment, were reviewed.   There were no vitals filed for this visit.     General:  Wnl, appropriate     Musculoskeletal  Muscle Strength/Tone:  not examined   Gait & Station:  Not examined     Psychiatric  Speech:  no latency; no press   Mood & Affect:  depressed  Mood congruent   Thought Process:  normal and logical   Associations:  intact   Thought Content:  normal, no suicidality, no homicidality, delusions, or paranoia   Insight:  intact, has awareness of illness   Judgement: behavior is adequate to circumstances   Orientation:  grossly intact   Memory: intact for content of interview   Language: grossly intact   Attention Span & Concentration:  able to focus   Fund of Knowledge:  intact and appropriate to age and level of education     Assessment and Diagnosis   Status/Progress: Based on the examination today, the patient's problem(s) is/are inadequately controlled and worsening.  New problems have not been presented today.   Co-morbidities may be complicating management of the primary condition.  There are no active rule-out diagnoses for this patient at this time.     General Impression:      ICD-10-CM ICD-9-CM   1. Bipolar I disorder  F31.9 296.7   2. NATALIA (generalized anxiety disorder)  F41.1 300.02   3. Insomnia, unspecified type  G47.00 780.52   4. Attention deficit  R41.840 799.51       Intervention/Counseling/Treatment Plan   Medication Management: Continue current medications. The risks and benefits of medication were discussed with the patient.    -continue ritalin 10mg bid   -pt instructed to contact provider right away if having any side effects such as marielena, mood swings, etc. Pt agreeable to plan    -continue trazodone 100mg qhs    -continue seroquel to 100mg qhs. Pt decreased from 200mg on his own, stable at current dosage 100mg qhs.    -continue  propranolol as prescribed for essential tremor    -referral ordered placed for sleep medicine at a prior visit-pt instructed to make appt with sleep medicine provider.     Pt has hx ERIC dx over 5 years ago. Never tx with CPAP    The risks and benefits of medication were discussed with the patient.  Discussed diagnosis, risks and benefits of proposed treatment above vs alternative treatments vs no treatment, and potential side effects of these treatments. The patient expresses understanding of the above and displays the capacity to agree with this treatment given said understanding. Patient also agrees that, currently, the benefits outweigh the risks and would like to pursue treatment at this time.  Discussed inherent unpredictability of medications in each individual.   Encouraged Patient to keep future appointments.   Take medications as prescribed and abstain from substance abuse.   In the event of an emergency, patient was advised to go to the emergency room      Return to Clinic: 2 months    Trav Andrews PA-C      Total face to face time: 12 min  Total time (chart review, patient contact, documentation): 14 min      *This note has been prepared using a combination of a dictation device and typing.  It has been checked for errors but some errors may still exist within the note as a result of speech recognition errors and/or typographical errors.

## 2025-03-18 ENCOUNTER — OFFICE VISIT (OUTPATIENT)
Dept: PSYCHIATRY | Facility: CLINIC | Age: 70
End: 2025-03-18
Payer: MEDICARE

## 2025-03-18 DIAGNOSIS — F41.1 GAD (GENERALIZED ANXIETY DISORDER): ICD-10-CM

## 2025-03-18 DIAGNOSIS — G47.00 INSOMNIA, UNSPECIFIED TYPE: ICD-10-CM

## 2025-03-18 DIAGNOSIS — F31.9 BIPOLAR I DISORDER: Primary | ICD-10-CM

## 2025-03-18 PROCEDURE — 1160F RVW MEDS BY RX/DR IN RCRD: CPT | Mod: HCNC,CPTII,95, | Performed by: PHYSICIAN ASSISTANT

## 2025-03-18 PROCEDURE — 98006 SYNCH AUDIO-VIDEO EST MOD 30: CPT | Mod: HCNC,95,, | Performed by: PHYSICIAN ASSISTANT

## 2025-03-18 PROCEDURE — 1159F MED LIST DOCD IN RCRD: CPT | Mod: HCNC,CPTII,95, | Performed by: PHYSICIAN ASSISTANT

## 2025-03-18 NOTE — PROGRESS NOTES
"The patient location is: [place]    Visit type: audiovisual    Each patient to whom he or she provides medical services by telemedicine is:  (1) informed of the relationship between the physician and patient and the respective role of any other health care provider with respect to management of the patient; and (2) notified that he or she may decline to receive medical services by telemedicine and may withdraw from such care at any time.    Notes:     Outpatient Psychiatry Follow-Up Visit (MD/BEATRICE)    3/18/2025    Clinical Status of Patient:  Outpatient (Ambulatory)    Chief Complaint:  Mirza Morales is a 69 y.o. male who presents today for follow-up of mood disorder and substance problems.  Met with patient and spouse.      Interval History and Content of Current Session 03/18/2025:    "Stopped the ritalin, had some racing thoughts and it wasn't agreeing with me."    Denies any feelings of marielena or racing thoughts currently. Will remain off ritalin, pt stopped it over 1 week ago    Pt is calm and cooperative, normal rate of speech, linear and logical.    Reports issues with concentration, focus and completing tasks have improved.    Pt is stabilized on current regimen on seroquel.    Reports taking seroquel 100mg and trazodone 100mg at bedtime. Reports sleeping well, getting 6-8 hrs of sleep.    Pt reports improved motivation, less feelings of dreading things he needs to do.    No overt signs of psychosis. Pt denies SI/HI/AVH. No delusional content or paranoia    Mood overall is "its been ok"    Patients mood is steady, affect appears mood congruent. Linear and logical, friendly and cooperative, good eye contact.    Denies SI/HI/AVH. Pt reports sleeping 6-8 hr and normalizing appetite. Denies side effects of medications.    Pt reports taking medications as prescribed and behaving appropriately during interview today.      Psychotherapy:  Target symptoms: depression, anxiety , mood disorder  Why chosen " "therapy is appropriate versus another modality: relevant to diagnosis, patient responds to this modality, evidence based practice  Outcome monitoring methods: self-report, observation  Therapeutic intervention type: insight oriented psychotherapy, supportive psychotherapy  Topics discussed/themes: building skills sets for symptom management, symptom recognition  The patient's response to the intervention is accepting. The patient's progress toward treatment goals is fair.   Duration of intervention: 7 minutes.      Prior visit:    "Feeling better, the ritalin is working"    Pt reports has been taking ritalin 10mg 1-2x per day, most days only taking 1 tablet in the morning.    Denies any feelings of marielena, racing thoughts.    Pt is calm and cooperative, normal rate of speech, linear and logical.    Reports issues with concentration, focus and completing tasks have improved.    Discussed with pt plan to continue ritalin 10mg bid for attention issues and to help with depression.     Pt is stabilized on current regimen on seroquel.    -pt instructed to contact provider right away if having any side effects such as marielena, mood swings, etc. Pt agreeable to plan    States is having less intrusive thoughts. "I do feel more stable"    Reports taking seroquel 100mg and trazodone 100mg at bedtime. Reports sleeping well, getting 6-8 hrs of sleep.    Pt reports improved motivation, less feelings of dreading things he needs to do.    Denies racing thoughts. Denies euphoria, states he feels depressed.    No overt signs of psychosis. Pt denies SI/HI/AVH. No delusional content or paranoia    Discussed with pt if racing thoughts worsen, becomes paranoid, other sx of psychosis, or SI to go to ED for further evaluation and management. Pt is agreeable to plan.    Mood overall is "better, more up. Better mood in general"    Patients mood is steady, affect appears mood congruent. Linear and logical, friendly and cooperative, good eye " contact.    Denies SI/HI/AVH. Pt reports sleeping 6-8 hr and normalizing appetite. Denies side effects of medications.    Pt reports taking medications as prescribed and behaving appropriately during interview today.      Review of Systems     Review of Systems   Constitutional:  Negative for fever and malaise/fatigue.   HENT:  Negative for sore throat.    Eyes:  Negative for photophobia.   Respiratory:  Positive for shortness of breath (on exertion. dx ILD). Negative for cough and wheezing.    Cardiovascular:  Negative for chest pain, palpitations and PND.   Gastrointestinal:  Negative for abdominal pain.   Genitourinary:  Negative for dysuria.   Musculoskeletal:  Negative for myalgias.   Skin:  Negative for rash.   Neurological:  Negative for dizziness.   Endo/Heme/Allergies:  Does not bruise/bleed easily.   Psychiatric/Behavioral:  Positive for depression. Negative for hallucinations and suicidal ideas. The patient is nervous/anxious. The patient does not have insomnia.        Psychiatric Review Of Systems - Is patient experiencing or having changes in:  sleep: yes  appetite: no  weight: no  energy/anergy: yes  interest/pleasure/anhedonia: yes  somatic symptoms: no  libido: no  anxiety/panic: yes  guilty/hopelessness: no  concentration: no  S.I.B.s/risky behavior: no  Irritability: no  Racing thoughts: yes  Impulsive behaviors: no  Paranoia: no  AVH: no      Past Medical, Family and Social History: The patient's past medical, family and social history have been reviewed and updated as appropriate within the electronic medical record - see encounter notes.      Current Medications:   Medication List with Changes/Refills   Current Medications    AMLODIPINE (NORVASC) 10 MG TABLET    TAKE 1 TABLET ONE TIME DAILY    ATORVASTATIN (LIPITOR) 40 MG TABLET    Take 1 tablet (40 mg total) by mouth every evening.    CHOLECALCIFEROL, VITAMIN D3, 10 MCG (400 UNIT) CAP CAPSULE    Take 3 capsules (1,200 Units total) by mouth once  daily.    LACOSAMIDE (VIMPAT) 200 MG TAB TABLET    Take 1 tablet (200 mg total) by mouth every 12 (twelve) hours.    LEVOCETIRIZINE (XYZAL) 5 MG TABLET    Take 1 tablet (5 mg total) by mouth daily as needed for Allergies.    PROPRANOLOL (INDERAL) 10 MG TABLET    Take 3 tablets (30 mg total) by mouth 2 (two) times daily.    QUETIAPINE (SEROQUEL) 100 MG TAB    Take 2 tablets (200 mg total) by mouth every evening.    TRAZODONE (DESYREL) 100 MG TABLET    Take 1 tablet (100 mg total) by mouth every evening.   Discontinued Medications    METHYLPHENIDATE HCL (RITALIN) 10 MG TABLET    Take 1 tablet (10 mg total) by mouth 2 (two) times daily.         Allergies:   Review of patient's allergies indicates:   Allergen Reactions    Gabapentin Other (See Comments)     SEVERE DIZZINESS AND BALANCE PROBLEMS, UNABLE TO WALK.    Phenelzine Other (See Comments)     BECOMES HYPER, LIKE A MANIC EPISODE.    Penicillin     Penicillins Hives and Other (See Comments)    Lamotrigine Rash and Other (See Comments)         Vitals   There were no vitals filed for this visit.       Labs/Imaging/Studies:   No results found for this or any previous visit (from the past 48 hours).   Lab Results   Component Value Date    VALPROATE 48.2 (L) 09/29/2019       Compliance: yes    Side effects: None    Risk Parameters:  Patient reports no suicidal ideation  Patient reports no homicidal ideation  Patient reports no self-injurious behavior  Patient reports no violent behavior    Exam (detailed: at least 9 elements; comprehensive: all 15 elements)   Constitutional  Vitals:  Most recent vital signs, dated greater than 90 days prior to this appointment, were reviewed.   There were no vitals filed for this visit.     General:  Wnl, appropriate     Musculoskeletal  Muscle Strength/Tone:  not examined   Gait & Station:  Not examined     Psychiatric  Speech:  no latency; no press   Mood & Affect:  steady  Mood congruent   Thought Process:  normal and logical    Associations:  intact   Thought Content:  normal, no suicidality, no homicidality, delusions, or paranoia   Insight:  intact, has awareness of illness   Judgement: behavior is adequate to circumstances   Orientation:  grossly intact   Memory: intact for content of interview   Language: grossly intact   Attention Span & Concentration:  able to focus   Fund of Knowledge:  intact and appropriate to age and level of education     Assessment and Diagnosis   Status/Progress: Based on the examination today, the patient's problem(s) is/are adequately but not ideally controlled.  New problems have not been presented today.   Co-morbidities may be complicating management of the primary condition.  There are no active rule-out diagnoses for this patient at this time.     General Impression:      ICD-10-CM ICD-9-CM   1. Bipolar I disorder  F31.9 296.7   2. NATALIA (generalized anxiety disorder)  F41.1 300.02   3. Insomnia, unspecified type  G47.00 780.52         Intervention/Counseling/Treatment Plan   Medication Management: Continue current medications. The risks and benefits of medication were discussed with the patient.    -stopped ritalin due side effects. Will remain discontinued    -continue trazodone 100mg qhs    -continue seroquel to 100mg qhs. Pt decreased from 200mg on his own, stable at current dosage 100mg qhs.    -continue propranolol as prescribed for essential tremor    -referral ordered placed for sleep medicine at a prior visit-pt instructed to make appt with sleep medicine provider.     Pt has hx ERIC dx over 5 years ago. Never tx with CPAP    The risks and benefits of medication were discussed with the patient.  Discussed diagnosis, risks and benefits of proposed treatment above vs alternative treatments vs no treatment, and potential side effects of these treatments. The patient expresses understanding of the above and displays the capacity to agree with this treatment given said understanding. Patient also  agrees that, currently, the benefits outweigh the risks and would like to pursue treatment at this time.  Discussed inherent unpredictability of medications in each individual.   Encouraged Patient to keep future appointments.   Take medications as prescribed and abstain from substance abuse.   In the event of an emergency, patient was advised to go to the emergency room      Return to Clinic: 3 months    Trav Andrews PA-C      Total face to face time: 10 min  Total time (chart review, patient contact, documentation): 15 min      *This note has been prepared using a combination of a dictation device and typing.  It has been checked for errors but some errors may still exist within the note as a result of speech recognition errors and/or typographical errors.

## 2025-04-09 ENCOUNTER — PATIENT MESSAGE (OUTPATIENT)
Dept: INTERNAL MEDICINE | Facility: CLINIC | Age: 70
End: 2025-04-09
Payer: MEDICARE

## 2025-04-09 RX ORDER — ATORVASTATIN CALCIUM 40 MG/1
40 TABLET, FILM COATED ORAL NIGHTLY
Qty: 90 TABLET | Refills: 0 | Status: SHIPPED | OUTPATIENT
Start: 2025-04-09 | End: 2026-04-09

## 2025-04-09 NOTE — TELEPHONE ENCOUNTER
No care due was identified.  [facility] Embedded Care Due Messages. Reference number: [id number].   4/09/2025 4:39:00 PM CDT

## 2025-04-09 NOTE — TELEPHONE ENCOUNTER
Refill request atorvastatin (LIPITOR) 40 MG tablet  Pt was advised an appointment may be needed.      Last office visit     8/12/24    Medication pended

## 2025-05-16 ENCOUNTER — PATIENT MESSAGE (OUTPATIENT)
Dept: INTERNAL MEDICINE | Facility: CLINIC | Age: 70
End: 2025-05-16
Payer: MEDICARE

## 2025-05-29 ENCOUNTER — PATIENT MESSAGE (OUTPATIENT)
Dept: INTERNAL MEDICINE | Facility: CLINIC | Age: 70
End: 2025-05-29
Payer: MEDICARE

## 2025-05-29 NOTE — TELEPHONE ENCOUNTER
LOV with Jud Mo MD , 8/12/2024    Please see patient's BP readings    Message dated 5/19/25:  Pt reports that his psychiatrist prescribed propanolol and PCP prescribed amlodipine   It is typically okay as long as your blood pressure is not currently already low.  If you can send us a log of most recent blood pressure readings that would be helpful.  Do you have a cuff at home?  Dr. Mo

## 2025-06-18 ENCOUNTER — PATIENT MESSAGE (OUTPATIENT)
Dept: DERMATOLOGY | Facility: CLINIC | Age: 70
End: 2025-06-18
Payer: MEDICARE

## 2025-06-23 RX ORDER — ATORVASTATIN CALCIUM 40 MG/1
40 TABLET, FILM COATED ORAL NIGHTLY
Qty: 90 TABLET | Refills: 0 | Status: SHIPPED | OUTPATIENT
Start: 2025-06-23

## 2025-06-23 NOTE — TELEPHONE ENCOUNTER
Provider Staff:  Action required for this patient    Requires labs      Please see care gap opportunities below in Care Due Message.    Thanks!  [facility]     Appointments      Date Provider   Last Visit   8/12/2024 Jud Mo MD   Next Visit   Visit date not found Jud Mo MD     Refill Decision Note   Mirza Morales  is requesting a refill authorization.  Brief Assessment and Rationale for Refill:  Approve     Medication Therapy Plan:         Comments:     Note composed:7:20 AM 06/23/2025

## 2025-06-23 NOTE — TELEPHONE ENCOUNTER
Care Due:                  Date            Visit Type   Department     Provider  --------------------------------------------------------------------------------                                HOSPITAL     [facility] INTERNAL  Last Visit: 08-      FOLLOW UP    MEDICINE       Demetrice Mo  Next Visit: None Scheduled  None         None Found                                                            Last  Test          Frequency    Reason                     Performed    Due Date  --------------------------------------------------------------------------------    CMP.........  12 months..  atorvastatin.............  07- 07-    Lipid Panel.  12 months..  atorvastatin.............  08- 08-    [employer] Embedded Care Due Messages. Reference number: [id number].   6/23/2025 1:58:03 AM CDT

## 2025-06-25 ENCOUNTER — TELEPHONE (OUTPATIENT)
Dept: PULMONOLOGY | Facility: CLINIC | Age: 70
End: 2025-06-25
Payer: MEDICARE

## 2025-06-25 ENCOUNTER — OFFICE VISIT (OUTPATIENT)
Dept: INTERNAL MEDICINE | Facility: CLINIC | Age: 70
End: 2025-06-25
Payer: MEDICARE

## 2025-06-25 VITALS
HEIGHT: 77 IN | HEART RATE: 92 BPM | BODY MASS INDEX: 26.74 KG/M2 | SYSTOLIC BLOOD PRESSURE: 120 MMHG | WEIGHT: 226.44 LBS | OXYGEN SATURATION: 97 % | DIASTOLIC BLOOD PRESSURE: 80 MMHG

## 2025-06-25 DIAGNOSIS — R06.09 DOE (DYSPNEA ON EXERTION): ICD-10-CM

## 2025-06-25 DIAGNOSIS — R53.83 FATIGUE, UNSPECIFIED TYPE: ICD-10-CM

## 2025-06-25 DIAGNOSIS — R06.2 WHEEZING: ICD-10-CM

## 2025-06-25 DIAGNOSIS — G47.33 OSA (OBSTRUCTIVE SLEEP APNEA): ICD-10-CM

## 2025-06-25 DIAGNOSIS — J44.9 CHRONIC OBSTRUCTIVE PULMONARY DISEASE, UNSPECIFIED COPD TYPE: Primary | ICD-10-CM

## 2025-06-25 DIAGNOSIS — J84.10 PULMONARY FIBROSIS: ICD-10-CM

## 2025-06-25 DIAGNOSIS — G40.909 SEIZURE DISORDER: ICD-10-CM

## 2025-06-25 DIAGNOSIS — R00.0 SINUS TACHYCARDIA: ICD-10-CM

## 2025-06-25 DIAGNOSIS — R79.9 ABNORMAL FINDING OF BLOOD CHEMISTRY, UNSPECIFIED: ICD-10-CM

## 2025-06-25 DIAGNOSIS — F10.21 ALCOHOL DEPENDENCE IN REMISSION: ICD-10-CM

## 2025-06-25 DIAGNOSIS — F31.9 BIPOLAR 1 DISORDER: ICD-10-CM

## 2025-06-25 DIAGNOSIS — R06.7 SNEEZING: ICD-10-CM

## 2025-06-25 DIAGNOSIS — E78.2 MIXED HYPERLIPIDEMIA: ICD-10-CM

## 2025-06-25 DIAGNOSIS — J84.9 INTERSTITIAL PULMONARY DISEASE, UNSPECIFIED: ICD-10-CM

## 2025-06-25 DIAGNOSIS — I10 BENIGN ESSENTIAL HYPERTENSION: ICD-10-CM

## 2025-06-25 DIAGNOSIS — Z72.0 TOBACCO ABUSE: ICD-10-CM

## 2025-06-25 DIAGNOSIS — Z12.5 SCREENING PSA (PROSTATE SPECIFIC ANTIGEN): ICD-10-CM

## 2025-06-25 PROCEDURE — 3288F FALL RISK ASSESSMENT DOCD: CPT | Mod: CPTII,S$GLB,, | Performed by: NURSE PRACTITIONER

## 2025-06-25 PROCEDURE — 3079F DIAST BP 80-89 MM HG: CPT | Mod: CPTII,S$GLB,, | Performed by: NURSE PRACTITIONER

## 2025-06-25 PROCEDURE — 1159F MED LIST DOCD IN RCRD: CPT | Mod: CPTII,S$GLB,, | Performed by: NURSE PRACTITIONER

## 2025-06-25 PROCEDURE — 3074F SYST BP LT 130 MM HG: CPT | Mod: CPTII,S$GLB,, | Performed by: NURSE PRACTITIONER

## 2025-06-25 PROCEDURE — 3008F BODY MASS INDEX DOCD: CPT | Mod: CPTII,S$GLB,, | Performed by: NURSE PRACTITIONER

## 2025-06-25 PROCEDURE — 3044F HG A1C LEVEL LT 7.0%: CPT | Mod: CPTII,S$GLB,, | Performed by: NURSE PRACTITIONER

## 2025-06-25 PROCEDURE — 99999 PR PBB SHADOW E&M-EST. PATIENT-LVL IV: CPT | Mod: PBBFAC,,, | Performed by: NURSE PRACTITIONER

## 2025-06-25 PROCEDURE — 1101F PT FALLS ASSESS-DOCD LE1/YR: CPT | Mod: CPTII,S$GLB,, | Performed by: NURSE PRACTITIONER

## 2025-06-25 PROCEDURE — 1126F AMNT PAIN NOTED NONE PRSNT: CPT | Mod: CPTII,S$GLB,, | Performed by: NURSE PRACTITIONER

## 2025-06-25 PROCEDURE — 99214 OFFICE O/P EST MOD 30 MIN: CPT | Mod: S$GLB,,, | Performed by: NURSE PRACTITIONER

## 2025-06-25 RX ORDER — AZELASTINE 1 MG/ML
2 SPRAY, METERED NASAL 2 TIMES DAILY
Qty: 30 ML | Refills: 0 | Status: SHIPPED | OUTPATIENT
Start: 2025-06-25 | End: 2026-06-25

## 2025-06-25 NOTE — TELEPHONE ENCOUNTER
----- Message from Hyacinth sent at 6/25/2025  4:06 PM CDT -----  Regarding: Appt  Hi,    Patient needs to be seen in clinic as soon as possible due his SOB. Please contact the pt to schedule.  Thanks,

## 2025-06-25 NOTE — PROGRESS NOTES
INTERNAL MEDICINE PROGRESSNOTE    CHIEF COMPLAINT     Chief Complaint   Patient presents with    Shortness of Breath     Upon walking     Fatigue    discuss status of Lung Disease       HPI     Mirza Morales is a 70 y.o. male  with HTN, HLD, sz d/o, bipolar d/o c anxiety, hx EtOH dependence c hx withdrawal in the past, hx opiate overdose, lumbar radiculopathy c hx lumbar fusion around 4/2018 followed by Dr. Kannan Orona who is managing his pain), COPD c tob use, hx R fib fx, hx R clavicular fx 2/2 fall, L inguinal hernia (seen by Gen Surg/Dr. Chery but elected not to have surgery), vit D def who presents for a follow up visit today.    He is an established pt of Dr Mo - last seen 8/2024    Here with c/o shortness of breath and fatigue   Interstitial lung disease - reviewed CT of the lungs from 5/28/2023   Signs of interstitial lung disease, progressed from 2017 with new regions of honeycombing suggesting UIP   Seen by Pulmonology Dr Javier 8/31/2023  Feels shortness of breath with exertion and dizziness   Still smoking - has decreased to 5 cigs per day   Pulmonary Function Tests:   None  6 Minute Walk Tests:   None   Echocardiograms:   07/20/2023 stress echo:  EF 50% with normal chamber sizes; PASP 27.  Negative for ischemia.  Severe exercise intolerance   Other pertinent laboratories:   06/16/2022 IgE negative     No peripheral eosinophilia    Bipolar - followed by psych. Taking seroquel. Has some fatigue and drowsiness in the morning. Feels a lot of dread in the morning     Chronic Back pain - relates to past scoliosis surgeries    Seizure activity- last seizure 1 year ago   Taking vimpat     Allergies- has tried xyzal   Still having sneezing fits after meals   +PND     Constipation - Last BM today. Feels like he is not completely evacuating the bowels   Soft Bms        Past Medical History:  Past Medical History:   Diagnosis Date    Alcohol abuse     Allergy     Behavioral problem     Bipolar 1  disorder     Chronic right hip pain     Depression     DJD (degenerative joint disease), lumbar 01/27/2015    Fatigue     Hepatitis     pt. denies this    History of psychiatric care     History of psychiatric hospitalization     History of skin cancer 2/12/2024    Hypertension     Karina     Post traumatic stress disorder     Psychiatric problem     Seizures     Suicide attempt     Therapy     Vitamin D insufficiency 03/17/2022       Home Medications:  Prior to Admission medications    Medication Sig Start Date End Date Taking? Authorizing Provider   amLODIPine (NORVASC) 10 MG tablet TAKE 1 TABLET ONE TIME DAILY 9/15/24  Yes Jud Mo MD   atorvastatin (LIPITOR) 40 MG tablet TAKE 1 TABLET EVERY EVENING 6/23/25  Yes Jud Mo MD   lacosamide (VIMPAT) 200 mg Tab tablet Take 1 tablet (200 mg total) by mouth every 12 (twelve) hours. 1/14/25  Yes Samuel Julien MD   QUEtiapine (SEROQUEL) 100 MG Tab Take 2 tablets (200 mg total) by mouth every evening. 1/31/25 7/30/25 Yes Emigdio Andrews PA   traZODone (DESYREL) 100 MG tablet Take 1 tablet (100 mg total) by mouth every evening. 1/31/25  Yes Emigdio Andrews PA   propranoloL (INDERAL) 10 MG tablet Take 3 tablets (30 mg total) by mouth 2 (two) times daily. 8/6/24 11/4/24  Emigdio Andrews PA   cholecalciferol, vitamin D3, 10 mcg (400 unit) Cap capsule Take 3 capsules (1,200 Units total) by mouth once daily. 8/1/24 6/25/25  Elis Araujo, MODE   levocetirizine (XYZAL) 5 MG tablet Take 1 tablet (5 mg total) by mouth daily as needed for Allergies. 10/3/24 6/25/25  Jud Mo MD       Review of Systems:  Review of Systems   Constitutional:  Positive for fatigue. Negative for chills and fever.   Respiratory:  Positive for shortness of breath and wheezing. Negative for cough and chest tightness.    Cardiovascular:  Negative for chest pain, palpitations and leg swelling.   Gastrointestinal:  Positive for constipation. Negative for abdominal pain, diarrhea, nausea and  "vomiting.        Indigestion     Musculoskeletal:  Positive for arthralgias and back pain.   Neurological:  Negative for dizziness, light-headedness and headaches.   Psychiatric/Behavioral:  Positive for dysphoric mood. Negative for sleep disturbance. The patient is nervous/anxious.        Health Maintainence:   Immunizations:  Health Maintenance         Date Due Completion Date    COVID-19 Vaccine (7 - 2024-25 season) 09/01/2024 11/7/2023    PROSTATE-SPECIFIC ANTIGEN 08/22/2025 8/22/2024    Lipid Panel 08/22/2025 8/22/2024    Influenza Vaccine (Season Ended) 09/01/2025 10/18/2023    Hemoglobin A1c (Diabetic Prevention Screening) 05/23/2026 5/23/2023    TETANUS VACCINE 08/08/2028 8/8/2018    Colorectal Cancer Screening 08/14/2029 8/14/2019             PHYSICAL EXAM     /80 (BP Location: Left arm, Patient Position: Sitting)   Pulse 92   Ht 6' 5" (1.956 m)   Wt 102.7 kg (226 lb 6.6 oz)   SpO2 97%   BMI 26.85 kg/m²     Physical Exam  Vitals reviewed.   Constitutional:       Appearance: He is well-developed.   HENT:      Head: Normocephalic.      Right Ear: External ear normal.      Left Ear: External ear normal.      Nose: Nose normal.      Mouth/Throat:      Pharynx: No oropharyngeal exudate.   Eyes:      Pupils: Pupils are equal, round, and reactive to light.   Neck:      Thyroid: No thyromegaly.      Vascular: No JVD.      Trachea: No tracheal deviation.   Cardiovascular:      Rate and Rhythm: Normal rate and regular rhythm.      Heart sounds: No murmur heard.     No friction rub. No gallop.   Pulmonary:      Effort: No respiratory distress.      Breath sounds: Normal breath sounds. No wheezing or rales.   Chest:      Chest wall: No tenderness.   Abdominal:      General: Bowel sounds are normal. There is no distension.      Palpations: Abdomen is soft.      Tenderness: There is no abdominal tenderness.   Musculoskeletal:         General: No tenderness. Normal range of motion.      Thoracic back: Scoliosis " present.   Lymphadenopathy:      Cervical: No cervical adenopathy.   Skin:     General: Skin is warm and dry.      Findings: No rash.   Neurological:      Mental Status: He is alert and oriented to person, place, and time.   Psychiatric:         Behavior: Behavior normal.         LABS     Lab Results   Component Value Date    HGBA1C 4.8 05/23/2023     CMP  Sodium   Date Value Ref Range Status   07/22/2024 135 (L) 136 - 145 mmol/L Final     Potassium   Date Value Ref Range Status   07/22/2024 3.6 3.5 - 5.1 mmol/L Final     Chloride   Date Value Ref Range Status   07/22/2024 99 95 - 110 mmol/L Final     CO2   Date Value Ref Range Status   07/22/2024 13 (L) 23 - 29 mmol/L Final     Glucose   Date Value Ref Range Status   07/22/2024 124 (H) 70 - 110 mg/dL Final     BUN   Date Value Ref Range Status   07/22/2024 20 8 - 23 mg/dL Final     Creatinine   Date Value Ref Range Status   07/22/2024 1.0 0.5 - 1.4 mg/dL Final     Calcium   Date Value Ref Range Status   07/22/2024 10.0 8.7 - 10.5 mg/dL Final     Total Protein   Date Value Ref Range Status   07/22/2024 8.1 6.0 - 8.4 g/dL Final     Albumin   Date Value Ref Range Status   07/22/2024 4.2 3.5 - 5.2 g/dL Final     Total Bilirubin   Date Value Ref Range Status   07/22/2024 0.9 0.1 - 1.0 mg/dL Final     Comment:     For infants and newborns, interpretation of results should be based  on gestational age, weight and in agreement with clinical  observations.    Premature Infant recommended reference ranges:  Up to 24 hours.............<8.0 mg/dL  Up to 48 hours............<12.0 mg/dL  3-5 days..................<15.0 mg/dL  6-29 days.................<15.0 mg/dL       Alkaline Phosphatase   Date Value Ref Range Status   07/22/2024 93 55 - 135 U/L Final     AST   Date Value Ref Range Status   07/22/2024 25 10 - 40 U/L Final     ALT   Date Value Ref Range Status   07/22/2024 23 10 - 44 U/L Final     Anion Gap   Date Value Ref Range Status   07/22/2024 23 (H) 8 - 16 mmol/L Final      eGFR if    Date Value Ref Range Status   03/17/2022 >60.0 >60 mL/min/1.73 m^2 Final     eGFR if non    Date Value Ref Range Status   03/17/2022 >60.0 >60 mL/min/1.73 m^2 Final     Comment:     Calculation used to obtain the estimated glomerular filtration  rate (eGFR) is the CKD-EPI equation.        Lab Results   Component Value Date    WBC 10.66 07/22/2024    HGB 14.1 07/22/2024    HCT 39 07/22/2024     (H) 07/22/2024     07/22/2024     Lab Results   Component Value Date    CHOL 132 08/22/2024    CHOL 145 05/23/2023    CHOL 144 03/17/2022     Lab Results   Component Value Date    HDL 56 08/22/2024    HDL 53 05/23/2023    HDL 60 03/17/2022     Lab Results   Component Value Date    LDLCALC 60.8 (L) 08/22/2024    LDLCALC 75.6 05/23/2023    LDLCALC 67.2 03/17/2022     Lab Results   Component Value Date    TRIG 76 08/22/2024    TRIG 82 05/23/2023    TRIG 84 03/17/2022     Lab Results   Component Value Date    CHOLHDL 42.4 08/22/2024    CHOLHDL 36.6 05/23/2023    CHOLHDL 41.7 03/17/2022     Lab Results   Component Value Date    TSH 3.598 07/22/2024       ASSESSMENT/PLAN     Mirza Morales is a 70 y.o. male     Chronic obstructive pulmonary disease, unspecified COPD type- worsening of SOB, will check labs and CT chest and refer back to pulmonology. Send for pulmonary stress test   -     CBC Auto Differential; Future; Expected date: 06/25/2025  -     Comprehensive Metabolic Panel; Future; Expected date: 06/25/2025  -     Hemoglobin A1C; Future; Expected date: 06/25/2025  -     Lipid Panel; Future; Expected date: 06/25/2025  -     TSH; Future; Expected date: 06/25/2025  -     PSA, Screening; Future; Expected date: 06/25/2025  -     BNP; Future; Expected date: 06/25/2025    Pulmonary fibrosis- worsening of SOB, will check labs and CT chest and refer back to pulmonology. Send for pulmonary stress test   -     CBC Auto Differential; Future; Expected date: 06/25/2025  -      Comprehensive Metabolic Panel; Future; Expected date: 06/25/2025  -     Hemoglobin A1C; Future; Expected date: 06/25/2025  -     Lipid Panel; Future; Expected date: 06/25/2025  -     TSH; Future; Expected date: 06/25/2025  -     PSA, Screening; Future; Expected date: 06/25/2025  -     BNP; Future; Expected date: 06/25/2025    Wheezing- worsening of SOB, will check labs and CT chest and refer back to pulmonology. Send for pulmonary stress test   -     CBC Auto Differential; Future; Expected date: 06/25/2025  -     Comprehensive Metabolic Panel; Future; Expected date: 06/25/2025  -     Hemoglobin A1C; Future; Expected date: 06/25/2025  -     Lipid Panel; Future; Expected date: 06/25/2025  -     TSH; Future; Expected date: 06/25/2025  -     PSA, Screening; Future; Expected date: 06/25/2025  -     BNP; Future; Expected date: 06/25/2025    ERIC (obstructive sleep apnea)- stable. Will cont CPAP   -     CBC Auto Differential; Future; Expected date: 06/25/2025  -     Comprehensive Metabolic Panel; Future; Expected date: 06/25/2025  -     Hemoglobin A1C; Future; Expected date: 06/25/2025  -     Lipid Panel; Future; Expected date: 06/25/2025  -     TSH; Future; Expected date: 06/25/2025  -     PSA, Screening; Future; Expected date: 06/25/2025  -     BNP; Future; Expected date: 06/25/2025    Benign essential hypertension- stable. Will cont amlodipine   -     CBC Auto Differential; Future; Expected date: 06/25/2025  -     Comprehensive Metabolic Panel; Future; Expected date: 06/25/2025  -     Hemoglobin A1C; Future; Expected date: 06/25/2025  -     Lipid Panel; Future; Expected date: 06/25/2025  -     TSH; Future; Expected date: 06/25/2025  -     PSA, Screening; Future; Expected date: 06/25/2025  -     BNP; Future; Expected date: 06/25/2025    Mixed hyperlipidemia- stable. Will cont lipitor   -     CBC Auto Differential; Future; Expected date: 06/25/2025  -     Comprehensive Metabolic Panel; Future; Expected date: 06/25/2025  -      Hemoglobin A1C; Future; Expected date: 06/25/2025  -     Lipid Panel; Future; Expected date: 06/25/2025  -     TSH; Future; Expected date: 06/25/2025  -     PSA, Screening; Future; Expected date: 06/25/2025  -     BNP; Future; Expected date: 06/25/2025    Fatigue, unspecified type- check labs and CT chest   -     CBC Auto Differential; Future; Expected date: 06/25/2025  -     Comprehensive Metabolic Panel; Future; Expected date: 06/25/2025  -     Hemoglobin A1C; Future; Expected date: 06/25/2025  -     Lipid Panel; Future; Expected date: 06/25/2025  -     TSH; Future; Expected date: 06/25/2025  -     PSA, Screening; Future; Expected date: 06/25/2025  -     BNP; Future; Expected date: 06/25/2025    Seizure disorder- stable. Will cont vimpat and f/u with neurology   -     CBC Auto Differential; Future; Expected date: 06/25/2025  -     Comprehensive Metabolic Panel; Future; Expected date: 06/25/2025  -     Hemoglobin A1C; Future; Expected date: 06/25/2025  -     Lipid Panel; Future; Expected date: 06/25/2025  -     TSH; Future; Expected date: 06/25/2025  -     PSA, Screening; Future; Expected date: 06/25/2025  -     BNP; Future; Expected date: 06/25/2025    Alcohol dependence in remission  -     CBC Auto Differential; Future; Expected date: 06/25/2025  -     Comprehensive Metabolic Panel; Future; Expected date: 06/25/2025  -     Hemoglobin A1C; Future; Expected date: 06/25/2025  -     Lipid Panel; Future; Expected date: 06/25/2025  -     TSH; Future; Expected date: 06/25/2025  -     PSA, Screening; Future; Expected date: 06/25/2025  -     BNP; Future; Expected date: 06/25/2025    Tobacco abuse- advised cessation   -     CBC Auto Differential; Future; Expected date: 06/25/2025  -     Comprehensive Metabolic Panel; Future; Expected date: 06/25/2025  -     Hemoglobin A1C; Future; Expected date: 06/25/2025  -     Lipid Panel; Future; Expected date: 06/25/2025  -     TSH; Future; Expected date: 06/25/2025  -     PSA,  Screening; Future; Expected date: 06/25/2025  -     BNP; Future; Expected date: 06/25/2025    Bipolar 1 disorder- mood is stable. Will cont current meds and f/u with psychiatry   -     CBC Auto Differential; Future; Expected date: 06/25/2025  -     Comprehensive Metabolic Panel; Future; Expected date: 06/25/2025  -     Hemoglobin A1C; Future; Expected date: 06/25/2025  -     Lipid Panel; Future; Expected date: 06/25/2025  -     TSH; Future; Expected date: 06/25/2025  -     PSA, Screening; Future; Expected date: 06/25/2025  -     BNP; Future; Expected date: 06/25/2025    LOVE (dyspnea on exertion) -worsening of SOB, will check labs and CT chest and refer back to pulmonology. Send for pulmonary stress test   -     Nuclear Stress - Cardiology Interpreted; Future  -     Six Minute Walk Test to qualify for Home Oxygen; Future  -     CBC Auto Differential; Future; Expected date: 06/25/2025  -     Comprehensive Metabolic Panel; Future; Expected date: 06/25/2025  -     Hemoglobin A1C; Future; Expected date: 06/25/2025  -     Lipid Panel; Future; Expected date: 06/25/2025  -     TSH; Future; Expected date: 06/25/2025  -     PSA, Screening; Future; Expected date: 06/25/2025  -     BNP; Future; Expected date: 06/25/2025    Sinus tachycardia  -     Nuclear Stress - Cardiology Interpreted; Future  -     CBC Auto Differential; Future; Expected date: 06/25/2025  -     Comprehensive Metabolic Panel; Future; Expected date: 06/25/2025  -     Hemoglobin A1C; Future; Expected date: 06/25/2025  -     Lipid Panel; Future; Expected date: 06/25/2025  -     TSH; Future; Expected date: 06/25/2025  -     PSA, Screening; Future; Expected date: 06/25/2025  -     BNP; Future; Expected date: 06/25/2025    Interstitial pulmonary disease,  -worsening of SOB, will check labs and CT chest and refer back to pulmonology. Send for pulmonary stress test   -     CT Chest Without Contrast; Future; Expected date: 06/25/2025  -     Six Minute Walk Test to  qualify for Home Oxygen; Future  -     Ambulatory referral/consult to Pulmonology; Future; Expected date: 07/02/2025  -     CBC Auto Differential; Future; Expected date: 06/25/2025  -     Comprehensive Metabolic Panel; Future; Expected date: 06/25/2025  -     Hemoglobin A1C; Future; Expected date: 06/25/2025  -     Lipid Panel; Future; Expected date: 06/25/2025  -     TSH; Future; Expected date: 06/25/2025  -     PSA, Screening; Future; Expected date: 06/25/2025  -     BNP; Future; Expected date: 06/25/2025    Sneezing  -     azelastine (ASTELIN) 137 mcg (0.1 %) nasal spray; 2 sprays (274 mcg total) by Nasal route 2 (two) times daily.  Dispense: 30 mL; Refill: 0  -     CBC Auto Differential; Future; Expected date: 06/25/2025  -     Comprehensive Metabolic Panel; Future; Expected date: 06/25/2025  -     Hemoglobin A1C; Future; Expected date: 06/25/2025  -     Lipid Panel; Future; Expected date: 06/25/2025  -     TSH; Future; Expected date: 06/25/2025  -     PSA, Screening; Future; Expected date: 06/25/2025  -     BNP; Future; Expected date: 06/25/2025    Screening PSA (prostate specific antigen)  -     PSA, Screening; Future; Expected date: 06/25/2025    Abnormal finding of blood chemistry, unspecified  -     Hemoglobin A1C; Future; Expected date: 06/25/2025           Follow up with PCP     Patient education provided from [facility]. Patient was counseled on when and how to seek emergent care.       Eduarda BECKWITH, APRN, FNP-c   Department of Internal Medicine - [facility]  3:00 PM

## 2025-06-26 ENCOUNTER — HOSPITAL ENCOUNTER (OUTPATIENT)
Dept: RADIOLOGY | Facility: HOSPITAL | Age: 70
Discharge: HOME | End: 2025-06-26
Attending: NURSE PRACTITIONER
Payer: MEDICARE

## 2025-06-26 ENCOUNTER — HOSPITAL ENCOUNTER (OUTPATIENT)
Dept: PULMONOLOGY | Facility: CLINIC | Age: 70
Discharge: HOME | End: 2025-06-26
Payer: MEDICARE

## 2025-06-26 VITALS — WEIGHT: 224 LBS | BODY MASS INDEX: 27.28 KG/M2 | HEIGHT: 76 IN

## 2025-06-26 DIAGNOSIS — R06.09 DOE (DYSPNEA ON EXERTION): ICD-10-CM

## 2025-06-26 DIAGNOSIS — J84.9 INTERSTITIAL PULMONARY DISEASE, UNSPECIFIED: ICD-10-CM

## 2025-06-26 PROCEDURE — 71250 CT THORAX DX C-: CPT | Mod: TC

## 2025-06-26 NOTE — PROCEDURES
Mirza Morales is a 70 y.o.   male patient, who presents for a 6 minute walk test ordered by MODE Calderon.  The diagnosis is Qualify for Oxygen; Shortness of Breath; Interstitial Lung Disease.  The patient's BMI is 27.3 kg/m2.  Predicted distance (lower limit of normal) is 354.99 meters.      Test Results:    The test was completed without stopping.  The total time walked was 360 seconds.  During walking, the patient reported:  No complaints.  The patient used no assistive devices during testing.     06/26/2025---------Distance: 243.84 meters (800 feet)     O2 Sat % Supplemental Oxygen Heart Rate Blood Pressure Saurabh Scale   Pre-exercise  (Resting) 97 % Room Air 99 bpm 150/87 mmHg 0   During Exercise 95 % Room Air 111 bpm 152/87 mmHg 2   Post-exercise  (Recovery) 97 % Room Air  97 bpm       Recovery Time: 60 seconds    Performing nurse/tech: Koffi [place]      PREVIOUS STUDY:   The patient has not had a previous study.      CLINICAL INTERPRETATION:  Six minute walk distance is 243.84 meters (800 feet) with light dyspnea.  During exercise, there was no significant desaturation while breathing room air.  Both blood pressure and heart rate remained stable with walking.  Hypertension was present prior to exercise.  The patient did not report non-pulmonary symptoms during exercise.  Significant exercise impairment is likely due to subjective symptoms.  The patient did complete the study, walking 360 seconds of the 360 second test.  No previous study performed.  Based upon age and body mass index, exercise capacity is less than predicted.

## 2025-06-27 ENCOUNTER — RESULTS FOLLOW-UP (OUTPATIENT)
Dept: INTERNAL MEDICINE | Facility: CLINIC | Age: 70
End: 2025-06-27

## 2025-06-27 DIAGNOSIS — I25.10 CORONARY ARTERY DISEASE INVOLVING NATIVE CORONARY ARTERY OF NATIVE HEART, UNSPECIFIED WHETHER ANGINA PRESENT: Primary | ICD-10-CM

## 2025-07-02 ENCOUNTER — TELEPHONE (OUTPATIENT)
Dept: ADMINISTRATIVE | Facility: CLINIC | Age: 70
End: 2025-07-02
Payer: MEDICARE

## 2025-07-05 DIAGNOSIS — I10 HYPERTENSION, UNSPECIFIED TYPE: ICD-10-CM

## 2025-07-05 NOTE — TELEPHONE ENCOUNTER
No care due was identified.  [facility] Embedded Care Due Messages. Reference number: [id number].   7/05/2025 3:06:39 AM CDT

## 2025-07-06 RX ORDER — AMLODIPINE BESYLATE 10 MG/1
10 TABLET ORAL
Qty: 90 TABLET | Refills: 0 | Status: SHIPPED | OUTPATIENT
Start: 2025-07-06

## 2025-07-07 NOTE — TELEPHONE ENCOUNTER
Refill Decision Note   Mirza Morales  is requesting a refill authorization.  Brief Assessment and Rationale for Refill:  Approve     Medication Therapy Plan:         Comments:     Note composed:10:49 PM 07/06/2025

## 2025-07-17 ENCOUNTER — PATIENT MESSAGE (OUTPATIENT)
Dept: NEUROLOGY | Facility: CLINIC | Age: 70
End: 2025-07-17
Payer: MEDICARE

## 2025-07-17 DIAGNOSIS — G40.909 SEIZURE DISORDER: ICD-10-CM

## 2025-07-17 DIAGNOSIS — R06.7 SNEEZING: ICD-10-CM

## 2025-07-17 RX ORDER — LACOSAMIDE 200 MG/1
200 TABLET ORAL EVERY 12 HOURS
Qty: 180 TABLET | Refills: 0 | Status: SHIPPED | OUTPATIENT
Start: 2025-07-17

## 2025-07-18 RX ORDER — AZELASTINE 1 MG/ML
SPRAY, METERED NASAL
Qty: 30 ML | Refills: 2 | Status: SHIPPED | OUTPATIENT
Start: 2025-07-18

## 2025-07-21 ENCOUNTER — TELEPHONE (OUTPATIENT)
Dept: NEUROLOGY | Facility: CLINIC | Age: 70
End: 2025-07-21
Payer: MEDICARE

## 2025-07-21 NOTE — TELEPHONE ENCOUNTER
Attempted to contact pt reschedule VV per providers request, no answer, lvm, awaiting call back. Appt will be cancelled for now   Visited the pt, CardioMems reading done. Results as followed:         PAD today is 14 mmHg (-10 below goal). Pt does not appear volume overload on exam. Pt states \"feeling good\" today   Will obtain CardioMEM readings daily while patient is admitted.       -[place]

## 2025-07-23 ENCOUNTER — OFFICE VISIT (OUTPATIENT)
Dept: PSYCHIATRY | Facility: CLINIC | Age: 70
End: 2025-07-23
Payer: MEDICARE

## 2025-07-23 DIAGNOSIS — G47.00 INSOMNIA, UNSPECIFIED TYPE: ICD-10-CM

## 2025-07-23 DIAGNOSIS — F41.1 GAD (GENERALIZED ANXIETY DISORDER): ICD-10-CM

## 2025-07-23 DIAGNOSIS — F31.9 BIPOLAR I DISORDER: Primary | ICD-10-CM

## 2025-07-23 PROCEDURE — 1159F MED LIST DOCD IN RCRD: CPT | Mod: CPTII,HCNC,95, | Performed by: PHYSICIAN ASSISTANT

## 2025-07-23 PROCEDURE — 98006 SYNCH AUDIO-VIDEO EST MOD 30: CPT | Mod: HCNC,95,, | Performed by: PHYSICIAN ASSISTANT

## 2025-07-23 PROCEDURE — 1160F RVW MEDS BY RX/DR IN RCRD: CPT | Mod: CPTII,HCNC,95, | Performed by: PHYSICIAN ASSISTANT

## 2025-07-23 PROCEDURE — 3044F HG A1C LEVEL LT 7.0%: CPT | Mod: CPTII,HCNC,95, | Performed by: PHYSICIAN ASSISTANT

## 2025-07-23 RX ORDER — QUETIAPINE FUMARATE 100 MG/1
100 TABLET, FILM COATED ORAL NIGHTLY
Qty: 90 TABLET | Refills: 1 | Status: SHIPPED | OUTPATIENT
Start: 2025-07-23 | End: 2026-01-19

## 2025-07-23 RX ORDER — TRAZODONE HYDROCHLORIDE 100 MG/1
100 TABLET ORAL NIGHTLY
Qty: 90 TABLET | Refills: 1 | Status: SHIPPED | OUTPATIENT
Start: 2025-07-23

## 2025-07-23 NOTE — PROGRESS NOTES
"The patient location is: [place]    Visit type: audiovisual    Each patient to whom he or she provides medical services by telemedicine is:  (1) informed of the relationship between the physician and patient and the respective role of any other health care provider with respect to management of the patient; and (2) notified that he or she may decline to receive medical services by telemedicine and may withdraw from such care at any time.    Notes:     Outpatient Psychiatry Follow-Up Visit (MD/BEATRICE)    7/23/2025    Clinical Status of Patient:  Outpatient (Ambulatory)    Chief Complaint:  Mirza Morales is a 70 y.o. male who presents today for follow-up of mood disorder and substance problems.  Met with patient and spouse.      Interval History and Content of Current Session 07/23/2025:    "Feel ok, I could feel better"    Reports issues with concentration, focus and completing tasks have improved.    Pt is stabilized on current regimen on seroquel.    Reports taking seroquel 100mg and trazodone 100mg at bedtime. Reports sleeping well, getting 6-8 hrs of sleep.    Pt prescribed seroquel 200mg at night but only taking 100mg which he finds effective. Will continue at 100mg.    Pt reports improved motivation, less feelings of dreading things he needs to do.    Mood overall is "its been ok"    Patients mood is steady, affect appears mood congruent. Linear and logical, friendly and cooperative, good eye contact.    Denies SI/HI/AVH. Pt reports sleeping 6-8 hr and normalizing appetite. Denies side effects of medications.    Pt reports taking medications as prescribed and behaving appropriately during interview today.      Psychotherapy:  Target symptoms: depression, anxiety , mood disorder  Why chosen therapy is appropriate versus another modality: relevant to diagnosis, patient responds to this modality, evidence based practice  Outcome monitoring methods: self-report, observation  Therapeutic intervention type: " "insight oriented psychotherapy, supportive psychotherapy  Topics discussed/themes: building skills sets for symptom management, symptom recognition  The patient's response to the intervention is accepting. The patient's progress toward treatment goals is fair.   Duration of intervention: 7 minutes.      Prior visit:    "Stopped the ritalin, had some racing thoughts and it wasn't agreeing with me."    Denies any feelings of marielena or racing thoughts currently. Will remain off ritalin, pt stopped it over 1 week ago    Pt is calm and cooperative, normal rate of speech, linear and logical.    Reports issues with concentration, focus and completing tasks have improved.    Pt is stabilized on current regimen on seroquel.    Reports taking seroquel 100mg and trazodone 100mg at bedtime. Reports sleeping well, getting 6-8 hrs of sleep.    Pt reports improved motivation, less feelings of dreading things he needs to do.    No overt signs of psychosis. Pt denies SI/HI/AVH. No delusional content or paranoia    Mood overall is "its been ok"    Patients mood is steady, affect appears mood congruent. Linear and logical, friendly and cooperative, good eye contact.    Denies SI/HI/AVH. Pt reports sleeping 6-8 hr and normalizing appetite. Denies side effects of medications.    Pt reports taking medications as prescribed and behaving appropriately during interview today.        Review of Systems     Review of Systems   Constitutional:  Negative for fever and malaise/fatigue.   HENT:  Negative for sore throat.    Eyes:  Negative for photophobia.   Respiratory:  Positive for shortness of breath (on exertion. dx ILD). Negative for cough and wheezing.    Cardiovascular:  Negative for chest pain, palpitations and PND.   Gastrointestinal:  Negative for abdominal pain.   Genitourinary:  Negative for dysuria.   Musculoskeletal:  Negative for myalgias.   Skin:  Negative for rash.   Neurological:  Negative for dizziness.   Endo/Heme/Allergies:  " Does not bruise/bleed easily.   Psychiatric/Behavioral:  Positive for depression. Negative for hallucinations and suicidal ideas. The patient is nervous/anxious. The patient does not have insomnia.        Psychiatric Review Of Systems - Is patient experiencing or having changes in:  sleep: yes  appetite: no  weight: no  energy/anergy: yes  interest/pleasure/anhedonia: yes  somatic symptoms: no  libido: no  anxiety/panic: yes  guilty/hopelessness: no  concentration: no  S.I.B.s/risky behavior: no  Irritability: no  Racing thoughts: yes  Impulsive behaviors: no  Paranoia: no  AVH: no      Past Medical, Family and Social History: The patient's past medical, family and social history have been reviewed and updated as appropriate within the electronic medical record - see encounter notes.      Current Medications:   Medication List with Changes/Refills   Current Medications    AMLODIPINE (NORVASC) 10 MG TABLET    TAKE 1 TABLET EVERY DAY    ATORVASTATIN (LIPITOR) 40 MG TABLET    TAKE 1 TABLET EVERY EVENING    AZELASTINE (ASTELIN) 137 MCG (0.1 %) NASAL SPRAY    SPRAY 2 SPRAYS IN EACH NOSTRIL TWO TIMES DAILY    LACOSAMIDE (VIMPAT) 200 MG TAB TABLET    Take 1 tablet (200 mg total) by mouth every 12 (twelve) hours. No further refill without appointment    PROPRANOLOL (INDERAL) 10 MG TABLET    Take 3 tablets (30 mg total) by mouth 2 (two) times daily.    QUETIAPINE (SEROQUEL) 100 MG TAB    Take 2 tablets (200 mg total) by mouth every evening.    TRAZODONE (DESYREL) 100 MG TABLET    Take 1 tablet (100 mg total) by mouth every evening.         Allergies:   Review of patient's allergies indicates:   Allergen Reactions    Gabapentin Other (See Comments)     SEVERE DIZZINESS AND BALANCE PROBLEMS, UNABLE TO WALK.    Phenelzine Other (See Comments)     BECOMES HYPER, LIKE A MANIC EPISODE.    Penicillin     Penicillins Hives and Other (See Comments)    Lamotrigine Rash and Other (See Comments)         Vitals   There were no vitals  filed for this visit.       Labs/Imaging/Studies:   No results found for this or any previous visit (from the past 48 hours).   Lab Results   Component Value Date    VALPROATE 48.2 (L) 09/29/2019       Compliance: yes    Side effects: None    Risk Parameters:  Patient reports no suicidal ideation  Patient reports no homicidal ideation  Patient reports no self-injurious behavior  Patient reports no violent behavior    Exam (detailed: at least 9 elements; comprehensive: all 15 elements)   Constitutional  Vitals:  Most recent vital signs, dated greater than 90 days prior to this appointment, were reviewed.   There were no vitals filed for this visit.     General:  Wnl, appropriate     Musculoskeletal  Muscle Strength/Tone:  not examined   Gait & Station:  Not examined     Psychiatric  Speech:  no latency; no press   Mood & Affect:  steady  Mood congruent   Thought Process:  normal and logical   Associations:  intact   Thought Content:  normal, no suicidality, no homicidality, delusions, or paranoia   Insight:  intact, has awareness of illness   Judgement: behavior is adequate to circumstances   Orientation:  grossly intact   Memory: intact for content of interview   Language: grossly intact   Attention Span & Concentration:  able to focus   Fund of Knowledge:  intact and appropriate to age and level of education     Assessment and Diagnosis   Status/Progress: Based on the examination today, the patient's problem(s) is/are adequately but not ideally controlled.  New problems have not been presented today.   Co-morbidities may be complicating management of the primary condition.  There are no active rule-out diagnoses for this patient at this time.     General Impression:      ICD-10-CM ICD-9-CM   1. Bipolar I disorder  F31.9 296.7   2. NATALIA (generalized anxiety disorder)  F41.1 300.02   3. Insomnia, unspecified type  G47.00 780.52           Intervention/Counseling/Treatment Plan   Medication Management: Continue current  medications. The risks and benefits of medication were discussed with the patient.    -continue trazodone 100mg qhs    -continue seroquel to 100mg qhs. Pt decreased from 200mg on his own, stable at current dosage 100mg qhs.    -continue propranolol as prescribed for essential tremor    -referral ordered placed for sleep medicine at a prior visit-pt instructed to make appt with sleep medicine provider.     Pt has hx ERIC dx over 5 years ago. Never tx with CPAP    The risks and benefits of medication were discussed with the patient.  Discussed diagnosis, risks and benefits of proposed treatment above vs alternative treatments vs no treatment, and potential side effects of these treatments. The patient expresses understanding of the above and displays the capacity to agree with this treatment given said understanding. Patient also agrees that, currently, the benefits outweigh the risks and would like to pursue treatment at this time.  Discussed inherent unpredictability of medications in each individual.   Encouraged Patient to keep future appointments.   Take medications as prescribed and abstain from substance abuse.   In the event of an emergency, patient was advised to go to the emergency room      Return to Clinic: 3 months    Trav Andrews PA-C      Total face to face time: 15 min  Total time (chart review, patient contact, documentation): 17 min      *This note has been prepared using a combination of a dictation device and typing.  It has been checked for errors but some errors may still exist within the note as a result of speech recognition errors and/or typographical errors.

## 2025-09-01 ENCOUNTER — PATIENT MESSAGE (OUTPATIENT)
Dept: INTERNAL MEDICINE | Facility: CLINIC | Age: 70
End: 2025-09-01
Payer: MEDICARE

## 2025-09-03 ENCOUNTER — TELEPHONE (OUTPATIENT)
Dept: INTERNAL MEDICINE | Facility: CLINIC | Age: 70
End: 2025-09-03
Payer: MEDICARE

## 2025-09-05 RX ORDER — ATORVASTATIN CALCIUM 40 MG/1
40 TABLET, FILM COATED ORAL NIGHTLY
Qty: 90 TABLET | Refills: 0 | Status: SHIPPED | OUTPATIENT
Start: 2025-09-05

## (undated) DEVICE — DRESSING TRANS 4X4 TEGADERM

## (undated) DEVICE — SUT 2-0 VICRYL / SH (J417)

## (undated) DEVICE — NDL 22GA X1 1/2 REG BEVEL

## (undated) DEVICE — SUT ETHIBOND EXCEL 2-0 SH

## (undated) DEVICE — GOWN POLY REINF BRTH SLV XL

## (undated) DEVICE — PAD CURAD NONADH 3X4IN

## (undated) DEVICE — SUT VICRYL 3-0 27 SH

## (undated) DEVICE — DRAPE LAP T SHT W/ INSTR PAD

## (undated) DEVICE — GLOVE GAMMEX SURG LF PI SZ 7.5

## (undated) DEVICE — TRAY MINOR GEN SURG OMC

## (undated) DEVICE — ELECTRODE REM PLYHSV RETURN 9

## (undated) DEVICE — BOVIE SUCTION

## (undated) DEVICE — SUT CTD VICRYL 3-0 UND BR

## (undated) DEVICE — GOWN SMART IMP BREATHABLE XXLG

## (undated) DEVICE — ADHESIVE DERMABOND ADVANCED

## (undated) DEVICE — SUT 2/0 36IN ETHIBOND EXCE

## (undated) DEVICE — SUT MCRYL PLUS 4-0 PS2 27IN

## (undated) DEVICE — DRAIN PENROSE XRAY 12 X 1/4 ST

## (undated) DEVICE — GOWN SURGICAL X-LARGE

## (undated) DEVICE — TOWEL OR XRAY BLUE 17X26IN

## (undated) DEVICE — APPLICATOR CHLORAPREP ORN 26ML

## (undated) DEVICE — ADHESIVE MASTISOL VIAL 48/BX

## (undated) DEVICE — STRIP MEDI WND CLSR 1/4X3IN